# Patient Record
Sex: MALE | Race: BLACK OR AFRICAN AMERICAN | HISPANIC OR LATINO | Employment: FULL TIME | URBAN - METROPOLITAN AREA
[De-identification: names, ages, dates, MRNs, and addresses within clinical notes are randomized per-mention and may not be internally consistent; named-entity substitution may affect disease eponyms.]

---

## 2017-04-27 ENCOUNTER — HOSPITAL ENCOUNTER (EMERGENCY)
Facility: HOSPITAL | Age: 42
Discharge: HOME | End: 2017-04-27
Admitting: EMERGENCY MEDICINE
Payer: COMMERCIAL

## 2017-04-27 VITALS
OXYGEN SATURATION: 98 % | RESPIRATION RATE: 16 BRPM | DIASTOLIC BLOOD PRESSURE: 77 MMHG | HEART RATE: 98 BPM | WEIGHT: 211.86 LBS | TEMPERATURE: 99 F | SYSTOLIC BLOOD PRESSURE: 156 MMHG

## 2017-04-27 DIAGNOSIS — L02.91 ABSCESS: Primary | ICD-10-CM

## 2017-04-27 PROCEDURE — 99282 EMERGENCY DEPT VISIT SF MDM: CPT

## 2017-04-27 RX ORDER — SULFAMETHOXAZOLE AND TRIMETHOPRIM 800; 160 MG/1; MG/1
1 TABLET ORAL 2 TIMES DAILY
Qty: 20 TABLET | Refills: 0 | Status: SHIPPED | OUTPATIENT
Start: 2017-04-27 | End: 2017-05-07

## 2017-04-27 RX ORDER — CEPHALEXIN 500 MG/1
500 CAPSULE ORAL 4 TIMES DAILY
Qty: 40 CAPSULE | Refills: 0 | Status: SHIPPED | OUTPATIENT
Start: 2017-04-27 | End: 2017-05-07

## 2018-03-17 ENCOUNTER — HOSPITAL ENCOUNTER (EMERGENCY)
Facility: HOSPITAL | Age: 43
Discharge: HOME | End: 2018-03-17
Payer: COMMERCIAL

## 2018-03-17 VITALS
HEART RATE: 96 BPM | RESPIRATION RATE: 16 BRPM | TEMPERATURE: 98.2 F | DIASTOLIC BLOOD PRESSURE: 83 MMHG | SYSTOLIC BLOOD PRESSURE: 165 MMHG | OXYGEN SATURATION: 96 %

## 2018-03-17 DIAGNOSIS — L02.91 SKIN ABSCESS: ICD-10-CM

## 2018-03-17 DIAGNOSIS — IMO0002 ABDOMINAL ABSCESS: Primary | ICD-10-CM

## 2018-03-17 PROCEDURE — 99282 EMERGENCY DEPT VISIT SF MDM: CPT

## 2018-03-17 RX ORDER — ACETAMINOPHEN 500 MG
500 TABLET ORAL EVERY 6 HOURS PRN
Qty: 30 TABLET | Refills: 0 | Status: SHIPPED | OUTPATIENT
Start: 2018-03-17 | End: 2018-03-17

## 2018-03-17 RX ORDER — DOXYCYCLINE HYCLATE 100 MG/1
100 CAPSULE ORAL EVERY 12 HOURS SCHEDULED
Qty: 20 CAPSULE | Refills: 0 | Status: SHIPPED | OUTPATIENT
Start: 2018-03-17 | End: 2018-03-17

## 2018-03-17 RX ORDER — ACETAMINOPHEN 500 MG
500 TABLET ORAL EVERY 6 HOURS PRN
Qty: 30 TABLET | Refills: 0 | Status: SHIPPED | OUTPATIENT
Start: 2018-03-17 | End: 2019-09-24 | Stop reason: ALTCHOICE

## 2018-03-17 RX ORDER — DOXYCYCLINE HYCLATE 100 MG/1
100 CAPSULE ORAL EVERY 12 HOURS SCHEDULED
Qty: 20 CAPSULE | Refills: 0 | Status: SHIPPED | OUTPATIENT
Start: 2018-03-17 | End: 2018-03-27

## 2018-03-17 RX ORDER — IBUPROFEN 400 MG/1
400 TABLET ORAL EVERY 6 HOURS PRN
Qty: 30 TABLET | Refills: 0 | Status: SHIPPED | OUTPATIENT
Start: 2018-03-17 | End: 2018-03-17

## 2018-03-17 RX ORDER — LIDOCAINE HYDROCHLORIDE AND EPINEPHRINE 10; 10 MG/ML; UG/ML
1 INJECTION, SOLUTION INFILTRATION; PERINEURAL ONCE
Status: COMPLETED | OUTPATIENT
Start: 2018-03-17 | End: 2018-03-17

## 2018-03-17 RX ORDER — IBUPROFEN 400 MG/1
800 TABLET ORAL ONCE
Status: COMPLETED | OUTPATIENT
Start: 2018-03-17 | End: 2018-03-17

## 2018-03-17 RX ORDER — OXYCODONE HYDROCHLORIDE 5 MG/1
5 TABLET ORAL EVERY 4 HOURS PRN
Qty: 10 TABLET | Refills: 0 | Status: SHIPPED | OUTPATIENT
Start: 2018-03-17 | End: 2018-03-27

## 2018-03-17 RX ORDER — IBUPROFEN 400 MG/1
400 TABLET ORAL EVERY 6 HOURS PRN
Qty: 30 TABLET | Refills: 0 | Status: SHIPPED | OUTPATIENT
Start: 2018-03-17 | End: 2018-07-11

## 2018-03-17 RX ORDER — ACETAMINOPHEN 325 MG/1
975 TABLET ORAL ONCE
Status: COMPLETED | OUTPATIENT
Start: 2018-03-17 | End: 2018-03-17

## 2018-03-17 RX ADMIN — IBUPROFEN 800 MG: 400 TABLET ORAL at 05:35

## 2018-03-17 RX ADMIN — ACETAMINOPHEN 975 MG: 325 TABLET, FILM COATED ORAL at 05:35

## 2018-03-17 RX ADMIN — LIDOCAINE HYDROCHLORIDE,EPINEPHRINE BITARTRATE 1 ML: 10; .01 INJECTION, SOLUTION INFILTRATION; PERINEURAL at 04:22

## 2018-03-17 NOTE — DISCHARGE INSTRUCTIONS
Abscess   WHAT YOU NEED TO KNOW:   A warm compress may help your abscess drain  Your healthcare provider may make a cut in the abscess so it can drain  You may need surgery to remove an abscess that is on your hands or buttocks  DISCHARGE INSTRUCTIONS:   Return to the emergency department if:   · The area around your abscess becomes very painful, warm, or has red streaks  · You have a fever and chills  · Your heart is beating faster than usual      · You feel faint or confused  Contact your healthcare provider if:   · Your abscess gets bigger or does not get better  · Your abscess returns  · You have questions or concerns about your condition or care  Medicines: You may  need any of the following:  · Antibiotics  help treat a bacterial infection  · Acetaminophen  decreases pain and fever  It is available without a doctor's order  Ask how much to take and how often to take it  Follow directions  Acetaminophen can cause liver damage if not taken correctly  · NSAIDs , such as ibuprofen, help decrease swelling, pain, and fever  This medicine is available with or without a doctor's order  NSAIDs can cause stomach bleeding or kidney problems in certain people  If you take blood thinner medicine, always ask your healthcare provider if NSAIDs are safe for you  Always read the medicine label and follow directions  · Take your medicine as directed  Contact your healthcare provider if you think your medicine is not helping or if you have side effects  Tell him or her if you are allergic to any medicine  Keep a list of the medicines, vitamins, and herbs you take  Include the amounts, and when and why you take them  Bring the list or the pill bottles to follow-up visits  Carry your medicine list with you in case of an emergency  Self-care:   · Apply a warm compress to your abscess  This will help it open and drain  Wet a washcloth in warm, but not hot, water  Apply the compress for 10 minutes  Repeat this 4 times each day  Do not  press on an abscess or try to open it with a needle  You may push the bacteria deeper or into your blood  · Do not share your clothes, towels, or sheets with anyone  This can spread the infection to others  · Wash your hands often  This can help prevent the spread of germs  Use soap and water or an alcohol-based hand rub  Care for your wound after it is drained:   · Care for your wound as directed  If your healthcare provider says it is okay, carefully remove the bandage and gauze packing  You may need to soak the gauze to get it out of your wound  Clean your wound and the area around it as directed  Dry the area and put on new, clean bandages  Change your bandages when they get wet or dirty  · Ask your healthcare provider how to change the gauze in your wound  Keep track of how many pieces of gauze are placed inside the wound  Do not put too much packing in the wound  Do not pack the gauze too tightly in your wound  Follow up with your healthcare provider in 1 to 3 days: You may need to have your packing removed or your bandage changed  Write down your questions so you remember to ask them during your visits  © 2017 [address]  Information is for End User's use only and may not be sold, redistributed or otherwise used for commercial purposes  All illustrations and images included in CareNotes® are the copyrighted property of [employer] , [employer]  or Blake Mendoza  The above information is an  only  It is not intended as medical advice for individual conditions or treatments  Talk to your doctor, nurse or pharmacist before following any medical regimen to see if it is safe and effective for you

## 2018-03-17 NOTE — ED PROVIDER NOTES
History  Chief Complaint   Patient presents with    Abscess     pt reports he has a painful "boil" on his stomach that has been there for 3 days  pt reports white/red drainage  History provided by:  Patient   used: No    Abscess   Location:  Torso  Size:  3cm  Abscess quality: draining, fluctuance, induration, painful, redness, warmth and weeping    Red streaking: no    Progression:  Worsening  Pain details:     Quality:  Pressure and sharp    Severity:  Moderate  Chronicity:  Recurrent  Context: diabetes    Relieved by:  Nothing  Worsened by:  Nothing  Ineffective treatments:  Warm compresses  Associated symptoms: no fever and no headaches    Risk factors: prior abscess    Risk factors: no hx of MRSA        Prior to Admission Medications   Prescriptions Last Dose Informant Patient Reported? Taking?   metFORMIN (GLUCOPHAGE) 500 mg tablet   Yes No   Sig: Take 500 mg by mouth 2 (two) times a day with meals      Facility-Administered Medications: None       Past Medical History:   Diagnosis Date    Diabetes mellitus ([facility] )        History reviewed  No pertinent surgical history  History reviewed  No pertinent family history  I have reviewed and agree with the history as documented  Social History   Substance Use Topics    Smoking status: Current Every Day Smoker     Packs/day: 1 00     Types: Cigarettes    Smokeless tobacco: Not on file    Alcohol use Yes      Comment: Occ  Review of Systems   Constitutional: Negative for fever  HENT: Negative for ear pain  Eyes: Negative for pain  Respiratory: Negative for chest tightness  Cardiovascular: Negative for chest pain  Gastrointestinal: Negative for abdominal pain  Endocrine: Negative for polydipsia  Genitourinary: Negative for dysuria  Musculoskeletal: Negative for arthralgias  Skin: Positive for wound  Neurological: Negative for headaches  Hematological: Negative for adenopathy     Psychiatric/Behavioral: Negative for agitation  Physical Exam  ED Triage Vitals   Temperature Pulse Respirations Blood Pressure SpO2   03/17/18 0354 03/17/18 0355 03/17/18 0355 03/17/18 0355 03/17/18 0355   98 2 °F (36 8 °C) (!) 114 16 165/83 96 %      Temp Source Heart Rate Source Patient Position - Orthostatic VS BP Location FiO2 (%)   03/17/18 0354 03/17/18 0355 03/17/18 0355 03/17/18 0355 --   Oral Monitor Sitting Right arm       Pain Score       --                  Orthostatic Vital Signs  Vitals:    03/17/18 0355 03/17/18 0357   BP: 165/83    Pulse: (!) 114 96   Patient Position - Orthostatic VS: Sitting        Physical Exam   Constitutional: He appears well-developed and well-nourished  HENT:   Head: Normocephalic and atraumatic  Eyes: Conjunctivae are normal    Neck: Normal range of motion  Neck supple  Cardiovascular: Normal rate  Pulmonary/Chest: Effort normal    Abdominal: Soft  Genitourinary:   Genitourinary Comments: No complaints deferred  Musculoskeletal: Normal range of motion  He exhibits no edema, tenderness or deformity  Neurological: He is alert  Skin: Skin is warm  Capillary refill takes less than 2 seconds  There is erythema  No pallor  Psychiatric: He has a normal mood and affect         ED Medications  Medications   lidocaine-epinephrine (XYLOCAINE/EPINEPHRINE) 1 %-1:100,000 injection 1 mL (1 mL Infiltration Given by Other 3/17/18 0422)   ibuprofen (MOTRIN) tablet 800 mg (800 mg Oral Given 3/17/18 0535)   acetaminophen (TYLENOL) tablet 975 mg (975 mg Oral Given 3/17/18 0535)       Diagnostic Studies  Results Reviewed     None                 No orders to display              Procedures  Incision/Drainage  Date/Time: 3/17/2018 5:00 AM  Performed by: Shilo Dwyer  Authorized by: Shilo Dwyer     Patient location:  ED  Consent:     Consent obtained:  Verbal    Consent given by:  Patient    Risks discussed:  Bleeding, incomplete drainage, pain, damage to other organs and infection Alternatives discussed:  No treatment and delayed treatment  Universal protocol:     Procedure explained and questions answered to patient or proxy's satisfaction: yes      Relevant documents present and verified: yes      Patient identity confirmed:  Verbally with patient, arm band, provided demographic data and hospital-assigned identification number  Location:     Type:  Abscess    Size:  3 5 centimeters    Location:  Trunk    Trunk location:  Abdomen  Procedure details:     Complexity:  Simple    Incision types:  Stab incision    Scalpel blade:  11    Approach:  Open    Incision depth:  Skin    Wound management:  Probed and deloculated    Drainage:  Purulent and serosanguinous    Drainage amount: Moderate (With solid tissue expressed from wound)    Packing materials:  None  Post-procedure details:     Patient tolerance of procedure: Tolerated well, no immediate complications           Phone Contacts  ED Phone Contact    ED Course  ED Course                                MDM  Number of Diagnoses or Management Options  Abdominal abscess [facility]):   Skin abscess:   Diagnosis management comments: 59-year-old male presents emergency department for recurrence abscess above the umbilicus  Patient states he gets these abscesses about once a year  Patient is a diabetic  Patient states that abscess has become larger over the last few days  Patient states that abscess has been draining but lightly  I and D performed to improve output  Bloody scant pustular material was expressed from wound  Patient provided antibiotics and home pain medicines  Patient educated on wound care  Patient educated on persistent or worsening signs symptoms and to follow up with primary care and/or return to the emergency department  Patient admits understanding and agreement      CritCare Time    Disposition  Final diagnoses:   Abdominal abscess ([address] )   Skin abscess     Time reflects when diagnosis was documented in both MDM as applicable and the Disposition within this note     Time User Action Codes Description Comment    3/17/2018  5:01 AM Jessica Ann Add [K65 1] Abdominal abscess ([address] )     3/17/2018  5:02 AM Jessicasharon Hanbeau Add [L02 91] Skin abscess       ED Disposition     ED Disposition Condition Comment    Discharge  [address] discharge to home/self care  Condition at discharge: Stable        Follow-up Information     Follow up With Specialties Details Why Contact Info Additional [address],  Family Medicine   [address]  [address] [place] [address] Emergency Department Emergency Medicine  If symptoms worsen [address] [facility]  [phone] [place] ED, [address] [facility] , [place], [place], [address]        Discharge Medication List as of 3/17/2018  5:33 AM      START taking these medications    Details   oxyCODONE (ROXICODONE) 5 mg immediate release tablet Take 1 tablet (5 mg total) by mouth every 4 (four) hours as needed for moderate pain for up to 10 days Max Daily Amount: 30 mg, Starting Sat 3/17/2018, Until Tue 3/27/2018, Print      acetaminophen (TYLENOL) 500 mg tablet Take 1 tablet (500 mg total) by mouth every 6 (six) hours as needed for mild pain, moderate pain, headaches or fever, Starting Sat 3/17/2018, Normal      doxycycline hyclate (VIBRAMYCIN) 100 mg capsule Take 1 capsule (100 mg total) by mouth every 12 (twelve) hours for 10 days, Starting Sat 3/17/2018, Until Tue 3/27/2018, Normal      ibuprofen (MOTRIN) 400 mg tablet Take 1 tablet (400 mg total) by mouth every 6 (six) hours as needed for mild pain, Starting Sat 3/17/2018, Normal         CONTINUE these medications which have NOT CHANGED    Details   metFORMIN (GLUCOPHAGE) 500 mg tablet Take 500 mg by mouth 2 (two) times a day with meals, Until Discontinued, Historical Med           No discharge procedures on file      ED Provider  Electronically Signed by           Liss Brooks PA-C  03/20/18 0777

## 2018-08-31 LAB
LEFT EYE DIABETIC RETINOPATHY: NORMAL
RIGHT EYE DIABETIC RETINOPATHY: NORMAL

## 2018-09-07 RX ORDER — SILDENAFIL 100 MG/1
TABLET, FILM COATED ORAL
COMMUNITY
Start: 2015-09-28 | End: 2018-06-11

## 2018-09-07 RX ORDER — LISINOPRIL 5 MG/1
1 TABLET ORAL DAILY
COMMUNITY
Start: 2015-07-27 | End: 2018-09-11 | Stop reason: ALTCHOICE

## 2018-09-07 RX ORDER — ATORVASTATIN CALCIUM 10 MG/1
1 TABLET, FILM COATED ORAL 3 TIMES WEEKLY
COMMUNITY
Start: 2015-07-27 | End: 2018-09-11 | Stop reason: SDUPTHER

## 2018-09-09 ENCOUNTER — HOSPITAL ENCOUNTER (EMERGENCY)
Facility: HOSPITAL | Age: 43
Discharge: HOME | End: 2018-09-09
Attending: EMERGENCY MEDICINE
Payer: COMMERCIAL

## 2018-09-09 VITALS
DIASTOLIC BLOOD PRESSURE: 104 MMHG | BODY MASS INDEX: 28.29 KG/M2 | WEIGHT: 188.8 LBS | RESPIRATION RATE: 20 BRPM | HEART RATE: 105 BPM | OXYGEN SATURATION: 99 % | SYSTOLIC BLOOD PRESSURE: 203 MMHG | TEMPERATURE: 97.9 F

## 2018-09-09 DIAGNOSIS — L02.211 ABDOMINAL WALL ABSCESS: Primary | ICD-10-CM

## 2018-09-09 PROCEDURE — 99282 EMERGENCY DEPT VISIT SF MDM: CPT

## 2018-09-09 RX ORDER — HYDROCHLOROTHIAZIDE 12.5 MG/1
12.5 TABLET ORAL DAILY
Status: DISCONTINUED | OUTPATIENT
Start: 2018-09-10 | End: 2018-09-09

## 2018-09-09 RX ORDER — SULFAMETHOXAZOLE AND TRIMETHOPRIM 800; 160 MG/1; MG/1
1 TABLET ORAL 2 TIMES DAILY
Qty: 20 TABLET | Refills: 0 | Status: SHIPPED | OUTPATIENT
Start: 2018-09-09 | End: 2018-09-19

## 2018-09-09 RX ORDER — LIDOCAINE HYDROCHLORIDE 20 MG/ML
10 INJECTION, SOLUTION EPIDURAL; INFILTRATION; INTRACAUDAL; PERINEURAL ONCE
Status: COMPLETED | OUTPATIENT
Start: 2018-09-09 | End: 2018-09-09

## 2018-09-09 RX ORDER — HYDROCHLOROTHIAZIDE 12.5 MG/1
12.5 TABLET ORAL ONCE
Status: COMPLETED | OUTPATIENT
Start: 2018-09-09 | End: 2018-09-09

## 2018-09-09 RX ORDER — HYDROCHLOROTHIAZIDE 12.5 MG/1
12.5 TABLET ORAL DAILY
Qty: 30 TABLET | Refills: 0 | Status: SHIPPED | OUTPATIENT
Start: 2018-09-09 | End: 2018-09-18 | Stop reason: ALTCHOICE

## 2018-09-09 RX ORDER — SULFAMETHOXAZOLE AND TRIMETHOPRIM 800; 160 MG/1; MG/1
1 TABLET ORAL ONCE
Status: COMPLETED | OUTPATIENT
Start: 2018-09-09 | End: 2018-09-09

## 2018-09-09 RX ADMIN — SULFAMETHOXAZOLE AND TRIMETHOPRIM 1 TABLET: 800; 160 TABLET ORAL at 18:46

## 2018-09-09 RX ADMIN — HYDROCHLOROTHIAZIDE 12.5 MG: 12.5 TABLET ORAL at 18:46

## 2018-09-09 RX ADMIN — LIDOCAINE HYDROCHLORIDE 10 ML: 20 INJECTION, SOLUTION EPIDURAL; INFILTRATION; INTRACAUDAL; PERINEURAL at 17:57

## 2018-09-09 NOTE — ED PROVIDER NOTES
History  Chief Complaint   Patient presents with    Abscess     patient c/o of "boil" on abdomen;patient states that he has had this before and was seen here in the ED; denies fever but states that he feels tried      36 YO male presents with an abscess to the anterior abdominal wall  States he has recurrent abscesses to the same area, was evaluated in the ED 6 months ago for same  States swelling started again as previous  Pt has not had drainage from the area, he denies fevers  States he does feel some fatigue  Pt has a Hx of DM, states he has been compliant with his medications  Pt states he usually gets similar abscesses yearly  Denies new or different symptoms associated with this  Pt denies CP/SOB/F/C/N/V/D/C, no dysuria, burning on urination or blood in urine  History provided by:  Patient   used: No    Abscess   Location:  Torso  Torso abscess location:  Abd RUQ  Size:  3cm  Abscess quality: fluctuance    Red streaking: no    Progression:  Worsening  Chronicity:  Recurrent  Context: diabetes    Relieved by:  Nothing  Worsened by:  Nothing  Ineffective treatments:  None tried  Associated symptoms: fatigue    Associated symptoms: no fever, no headaches, no nausea and no vomiting    Fatigue:     Severity:  Moderate    Duration:  3 days    Timing:  Intermittent    Progression:  Waxing and waning      Prior to Admission Medications   Prescriptions Last Dose Informant Patient Reported?  Taking?   acetaminophen (TYLENOL) 500 mg tablet   No No   Sig: Take 1 tablet (500 mg total) by mouth every 6 (six) hours as needed for mild pain, moderate pain, headaches or fever   atorvastatin (LIPITOR) 10 mg tablet   Yes No   Sig: Take 1 tablet by mouth 3 (three) times a week   ibuprofen (MOTRIN) 400 mg tablet   No No   Sig: Take 1 tablet (400 mg total) by mouth every 6 (six) hours as needed for mild pain   insulin detemir (LEVEMIR FLEXTOUCH) 100 Units/mL injection pen   Yes No   Sig: Inject 24 Units under the skin daily   lisinopril (ZESTRIL) 5 mg tablet   Yes No   Sig: Take 1 tablet by mouth daily   metFORMIN (GLUCOPHAGE) 500 mg tablet   Yes No   Sig: Take 500 mg by mouth 2 (two) times a day with meals   sildenafil (VIAGRA) 100 mg tablet   Yes No   Sig: Take by mouth      Facility-Administered Medications: None       Past Medical History:   Diagnosis Date    Diabetes mellitus ([address] )     Hypertension        History reviewed  No pertinent surgical history  History reviewed  No pertinent family history  I have reviewed and agree with the history as documented  Social History   Substance Use Topics    Smoking status: Current Every Day Smoker     Packs/day: 1 00     Types: Cigarettes    Smokeless tobacco: Never Used    Alcohol use Yes      Comment: socially        Review of Systems   Constitutional: Positive for fatigue  Negative for fever  HENT: Negative for dental problem  Eyes: Negative for visual disturbance  Respiratory: Negative for shortness of breath  Cardiovascular: Negative for chest pain  Gastrointestinal: Negative for abdominal pain, nausea and vomiting  Genitourinary: Negative for dysuria and frequency  Musculoskeletal: Negative for neck pain and neck stiffness  Skin: Negative for rash  Neurological: Negative for dizziness, weakness, light-headedness and headaches  Psychiatric/Behavioral: Negative for agitation, behavioral problems and confusion  All other systems reviewed and are negative  Physical Exam  Physical Exam   Constitutional: He is oriented to person, place, and time  He appears well-developed and well-nourished  HENT:   Head: Normocephalic and atraumatic  Eyes: EOM are normal    Neck: Normal range of motion  Cardiovascular: Normal rate, regular rhythm and normal heart sounds  Pulmonary/Chest: Effort normal and breath sounds normal    Abdominal: Soft  Musculoskeletal: Normal range of motion     Neurological: He is alert and oriented to person, place, and time  Skin: Skin is warm and dry  Psychiatric: He has a normal mood and affect  His behavior is normal    Nursing note and vitals reviewed  Vital Signs  ED Triage Vitals [09/09/18 1738]   Temperature Pulse Respirations Blood Pressure SpO2   97 9 °F (36 6 °C) 105 20 (!) 203/104 99 %      Temp Source Heart Rate Source Patient Position - Orthostatic VS BP Location FiO2 (%)   Oral Monitor Sitting Right arm --      Pain Score       --           Vitals:    09/09/18 1738   BP: (!) 203/104   Pulse: 105   Patient Position - Orthostatic VS: Sitting       Visual Acuity      ED Medications  Medications   lidocaine (PF) (XYLOCAINE-MPF) 2 % injection 10 mL (10 mL Infiltration Given 9/9/18 1757)   sulfamethoxazole-trimethoprim (BACTRIM DS) 800-160 mg per tablet 1 tablet (1 tablet Oral Given 9/9/18 1846)   hydrochlorothiazide (HYDRODIURIL) tablet 12 5 mg (12 5 mg Oral Given 9/9/18 1846)       Diagnostic Studies  Results Reviewed     None                 No orders to display              Procedures  Incision/Drainage  Date/Time: 9/9/2018 7:16 PM  Performed by: Maximo Farris, [address] [place] [address] by: Christie GRACE     Patient location:  ED  Consent:     Consent obtained:  Verbal    Risks discussed:  Bleeding, incomplete drainage and pain  Universal protocol:     Procedure explained and questions answered to patient or proxy's satisfaction: yes      Patient identity confirmed:  Verbally with patient  Location:     Type:  Abscess    Size:  3cm    Location:  Trunk    Trunk location:  Abdomen  Pre-procedure details:     Skin preparation:  Chloraprep  Anesthesia (see MAR for exact dosages):      Anesthesia method:  Local infiltration    Local anesthetic:  Lidocaine 2% w/o epi  Procedure details:     Complexity:  Simple    Incision types:  Single straight    Scalpel blade:  11    Approach:  Open    Incision depth:  Skin and subcutaneous    Wound management:  Probed and deloculated    Drainage:  Bloody and purulent Drainage amount: Moderate    Wound treatment:  Packing placed    Packing materials:  1/2 in gauze  Post-procedure details:     Patient tolerance of procedure: Tolerated well, no immediate complications           Phone Contacts  ED Phone Contact    ED Course                               MDM  Number of Diagnoses or Management Options  Abdominal wall abscess: new and requires workup  Diagnosis management comments: 1  Abscess - Pt with recurrent abscesses  Likely MRSA infection  Will I&D  Will place on Abx as this is recurrent  Amount and/or Complexity of Data Reviewed  Review and summarize past medical records: yes  Independent visualization of images, tracings, or specimens: yes    Patient Progress  Patient progress: stable    CritCare Time    Disposition  Final diagnoses:   Abdominal wall abscess     Time reflects when diagnosis was documented in both MDM as applicable and the Disposition within this note     Time User Action Codes Description Comment    9/9/2018  6:38 PM Tcnata Garnett E Add [L02 211] Abdominal wall abscess       ED Disposition     ED Disposition Condition Comment    Discharge  [address] discharge to home/self care      Condition at discharge: Stable        Follow-up Information     Follow up With Specialties Details Why Contact Info    Becca Gustafson MD General Surgery Schedule an appointment as soon as possible for a visit  [address] [place] [address]  [phone]            Discharge Medication List as of 9/9/2018  6:41 PM      START taking these medications    Details   hydrochlorothiazide (HYDRODIURIL) 12 5 mg tablet Take 1 tablet (12 5 mg total) by mouth daily, Starting Sun 9/9/2018, Normal      sulfamethoxazole-trimethoprim (BACTRIM DS) 800-160 mg per tablet Take 1 tablet by mouth 2 (two) times a day for 10 days smx-tmp DS (BACTRIM) 800-160 mg tabs (1tab q12 D10), Starting Sun 9/9/2018, Until Wed 9/19/2018, Normal         CONTINUE these medications which have NOT CHANGED    Details   acetaminophen (TYLENOL) 500 mg tablet Take 1 tablet (500 mg total) by mouth every 6 (six) hours as needed for mild pain, moderate pain, headaches or fever, Starting Sat 3/17/2018, Print      atorvastatin (LIPITOR) 10 mg tablet Take 1 tablet by mouth 3 (three) times a week, Starting Mon 7/27/2015, Historical Med      ibuprofen (MOTRIN) 400 mg tablet Take 1 tablet (400 mg total) by mouth every 6 (six) hours as needed for mild pain, Starting Sat 3/17/2018, Print      insulin detemir (LEVEMIR FLEXTOUCH) 100 Units/mL injection pen Inject 24 Units under the skin daily, Starting Mon 7/13/2015, Historical Med      lisinopril (ZESTRIL) 5 mg tablet Take 1 tablet by mouth daily, Starting Mon 7/27/2015, Historical Med      metFORMIN (GLUCOPHAGE) 500 mg tablet Take 500 mg by mouth 2 (two) times a day with meals, Until Discontinued, Historical Med      sildenafil (VIAGRA) 100 mg tablet Take by mouth, Starting Mon 9/28/2015, Historical Med           No discharge procedures on file      ED Provider  Electronically Signed by           Tyrell Samano MD  09/09/18 6428

## 2018-09-11 ENCOUNTER — OFFICE VISIT (OUTPATIENT)
Dept: FAMILY MEDICINE CLINIC | Facility: CLINIC | Age: 43
End: 2018-09-11
Payer: COMMERCIAL

## 2018-09-11 ENCOUNTER — TELEPHONE (OUTPATIENT)
Dept: FAMILY MEDICINE CLINIC | Facility: CLINIC | Age: 43
End: 2018-09-11

## 2018-09-11 VITALS
WEIGHT: 181.2 LBS | OXYGEN SATURATION: 98 % | SYSTOLIC BLOOD PRESSURE: 110 MMHG | DIASTOLIC BLOOD PRESSURE: 78 MMHG | HEIGHT: 68 IN | BODY MASS INDEX: 27.46 KG/M2 | HEART RATE: 108 BPM

## 2018-09-11 DIAGNOSIS — F17.200 CURRENT SMOKER: ICD-10-CM

## 2018-09-11 DIAGNOSIS — Z79.4 UNCONTROLLED TYPE 2 DIABETES MELLITUS WITH HYPERGLYCEMIA, WITH LONG-TERM CURRENT USE OF INSULIN (HCC): Primary | ICD-10-CM

## 2018-09-11 DIAGNOSIS — R80.9 MICROALBUMINURIA: ICD-10-CM

## 2018-09-11 DIAGNOSIS — L02.211 ABSCESS OF ABDOMINAL WALL: ICD-10-CM

## 2018-09-11 DIAGNOSIS — E11.65 UNCONTROLLED TYPE 2 DIABETES MELLITUS WITH HYPERGLYCEMIA, WITH LONG-TERM CURRENT USE OF INSULIN (HCC): Primary | ICD-10-CM

## 2018-09-11 DIAGNOSIS — E78.00 HYPERCHOLESTEROLEMIA: ICD-10-CM

## 2018-09-11 LAB
SL AMB POCT GLUCOSE BLD: 392
SL AMB POCT HEMOGLOBIN AIC: 11.8

## 2018-09-11 PROCEDURE — 82948 REAGENT STRIP/BLOOD GLUCOSE: CPT | Performed by: FAMILY MEDICINE

## 2018-09-11 PROCEDURE — 3066F NEPHROPATHY DOC TX: CPT | Performed by: FAMILY MEDICINE

## 2018-09-11 PROCEDURE — 3046F HEMOGLOBIN A1C LEVEL >9.0%: CPT | Performed by: FAMILY MEDICINE

## 2018-09-11 PROCEDURE — 99214 OFFICE O/P EST MOD 30 MIN: CPT | Performed by: FAMILY MEDICINE

## 2018-09-11 PROCEDURE — 83036 HEMOGLOBIN GLYCOSYLATED A1C: CPT | Performed by: FAMILY MEDICINE

## 2018-09-11 RX ORDER — PEN NEEDLE, DIABETIC 31 GX5/16"
NEEDLE, DISPOSABLE MISCELLANEOUS DAILY
Qty: 100 EACH | Refills: 1 | Status: SHIPPED | OUTPATIENT
Start: 2018-09-11

## 2018-09-11 RX ORDER — ATORVASTATIN CALCIUM 10 MG/1
10 TABLET, FILM COATED ORAL DAILY
Qty: 30 TABLET | Refills: 3 | Status: SHIPPED | OUTPATIENT
Start: 2018-09-11 | End: 2018-10-16 | Stop reason: SDUPTHER

## 2018-09-11 NOTE — TELEPHONE ENCOUNTER
Called Dr Marjorie Dunn office and was able to get pt appt tomorrow, Sept 12 at 10:30am at the [facility] office  Pt aware

## 2018-09-11 NOTE — PATIENT INSTRUCTIONS
We reviewed his emergency room visit, still with very significant abscess abdominal wall, fortunately is afebrile, he will stay on Bactrim/antibiotic, we will set him up with general surgeon  We reviewed his sugars are far too high, uncontrolled diabetes, well aware of risks associated with this including heart disease, kidney damage, eye damage along with neuropathy  I would like him to restart his insulin at prior dose of 24 U daily, glucometer reading here now 392, A1c 11 8  Slip given for blood work along with urinalysis  He will also restart metformin twice daily with meals  He received prescription for hydrochlorothiazide 12 5 mg daily at the emergency room, blood pressure here today is much lower than prior reading, stay off lisinopril for now  I did send prescription to restart atorvastatin  I would like to see him again in 1 week, call us sooner if needed  He wishes to return to work today  He should use glucometer at least twice daily, call us if drops less than 70 or climbs over 450  Plan future diabetic education  He continues to smoke 1 pack per day, well aware of risk lung cancer etc, really needs to taper down and quit, consider future Chantix

## 2018-09-12 ENCOUNTER — TELEPHONE (OUTPATIENT)
Dept: FAMILY MEDICINE CLINIC | Facility: CLINIC | Age: 43
End: 2018-09-12

## 2018-09-12 ENCOUNTER — OFFICE VISIT (OUTPATIENT)
Dept: SURGERY | Facility: MEDICAL CENTER | Age: 43
End: 2018-09-12
Payer: COMMERCIAL

## 2018-09-12 VITALS
RESPIRATION RATE: 16 BRPM | BODY MASS INDEX: 27.68 KG/M2 | TEMPERATURE: 95.6 F | HEART RATE: 76 BPM | DIASTOLIC BLOOD PRESSURE: 70 MMHG | WEIGHT: 182.6 LBS | SYSTOLIC BLOOD PRESSURE: 110 MMHG | HEIGHT: 68 IN

## 2018-09-12 DIAGNOSIS — Z79.4 TYPE 2 DIABETES MELLITUS WITH HYPERGLYCEMIA, WITH LONG-TERM CURRENT USE OF INSULIN (HCC): Primary | ICD-10-CM

## 2018-09-12 DIAGNOSIS — E11.65 TYPE 2 DIABETES MELLITUS WITH HYPERGLYCEMIA, WITH LONG-TERM CURRENT USE OF INSULIN (HCC): Primary | ICD-10-CM

## 2018-09-12 DIAGNOSIS — L02.211 ABSCESS OF ABDOMINAL WALL: ICD-10-CM

## 2018-09-12 PROCEDURE — 99243 OFF/OP CNSLTJ NEW/EST LOW 30: CPT | Performed by: SURGERY

## 2018-09-12 PROCEDURE — 10060 I&D ABSCESS SIMPLE/SINGLE: CPT | Performed by: SURGERY

## 2018-09-12 NOTE — PROGRESS NOTES
Assessment/Plan: Sharon Mccormack is a 37year old male who presents today, per referral by Dr Ivett Polanco, regarding an abdominal wall abscess  Physical exam revealed an abscess of abdominal wall with some drainage  Discussed risks, benefits, and alternatives to incision and drainage of abdominal wall abscess  He consented and procedure occurred without complications  Demonstrated proper packing technique  He should pack the wound daily and cover the wound with gauze  He will follow up in 1 week  He knows to call the office if any questions or concerns arise  Smoking- Discussed smoking cessation  Explained there is a connection between smoking and abscesses  No problem-specific Assessment & Plan notes found for this encounter  Diagnoses and all orders for this visit:    Abscess of abdominal wall  -     Ambulatory referral to General Surgery    Other orders  -     Incision and Drainage          Subjective:      Patient ID: Corby Holder is a 37 y o  male  Sharon Mccormack is a 37year old male who presents today, per referral by Dr Ivett Polanco, regarding an abdominal wall abscess  He had it drained in the ER on 9/9/18  He reports it is sore on the right side of the abscess and prevents him from sleeping on his side  He has had abscesses in the past   He is a smoker  The following portions of the patient's history were reviewed and updated as appropriate: allergies, current medications, past family history, past medical history, past social history, past surgical history and problem list     Review of Systems   Constitutional: Negative  HENT: Negative  Eyes: Negative  Respiratory: Negative  Cardiovascular: Negative  Gastrointestinal: Negative  Endocrine: Negative  Genitourinary: Negative  Musculoskeletal: Negative  Skin: Positive for wound (Abdomenal wall)  Allergic/Immunologic: Negative  Neurological: Negative  Hematological: Negative      Psychiatric/Behavioral: Negative  All other systems reviewed and are negative  Objective:      /70   Pulse 76   Temp (!) 95 6 °F (35 3 °C) (Tympanic)   Resp 16   Ht 5' 8" (1 727 m)   Wt 82 8 kg (182 lb 9 6 oz)   BMI 27 76 kg/m²          Physical Exam   Constitutional: He is oriented to person, place, and time  He appears well-developed and well-nourished  No distress  HENT:   Head: Normocephalic and atraumatic  Right Ear: External ear normal    Left Ear: External ear normal    Nose: Nose normal    Mouth/Throat: Oropharynx is clear and moist  No oropharyngeal exudate  Eyes: Conjunctivae and EOM are normal  Right eye exhibits no discharge  Left eye exhibits no discharge  No scleral icterus  Neck: Normal range of motion  Neck supple  No JVD present  No tracheal deviation present  No thyromegaly present  Cardiovascular: Normal rate, regular rhythm, normal heart sounds and intact distal pulses  Exam reveals no gallop and no friction rub  No murmur heard  Pulmonary/Chest: Effort normal and breath sounds normal  No stridor  No respiratory distress  He has no wheezes  He has no rales  He exhibits no tenderness  Abdominal: Soft  Bowel sounds are normal  He exhibits no distension and no mass  There is no tenderness  There is no rebound and no guarding  Musculoskeletal: Normal range of motion  He exhibits no edema, tenderness or deformity  Lymphadenopathy:     He has no cervical adenopathy  Neurological: He is alert and oriented to person, place, and time  No cranial nerve deficit  Coordination normal    Skin: Skin is dry  No rash noted  He is not diaphoretic  No erythema  No pallor  Abscess of abdominal wall with drainage  Psychiatric: He has a normal mood and affect  His behavior is normal  Thought content normal    Nursing note and vitals reviewed        Incision and Drainage  Date/Time: 9/12/2018 10:47 AM  Performed by: Mario Jarrell by: Timothy Ruiz     Patient location: Bedside  Consent:     Consent obtained:  Verbal    Consent given by:  Patient    Risks discussed:  Bleeding and incomplete drainage  Location:     Type:  Abscess    Location:  Trunk    Trunk location:  Abdomen  Pre-procedure details:     Skin preparation:  Betadine  Anesthesia (see MAR for exact dosages): Anesthesia method:  Local infiltration    Local anesthetic:  Lidocaine 1% WITH epi and bupivacaine 0 5% w/o epi  Procedure details:     Complexity:  Intermediate    Incision types:  Elliptical    Scalpel blade:  15    Approach:  Open    Incision depth:  Subcutaneous    Wound management:  Probed and deloculated    Drainage:  Purulent    Drainage amount: Moderate    Wound treatment:  Packing placed    Packing materials:  1/2 in gauze  Post-procedure details:     Patient tolerance of procedure: Tolerated well, no immediate complications            By signing my name below, I, Sonny Dee, attest that this documentation has been prepared under the direction and in the presence of Richard Mckeon MD  Electronically Signed: Natan Hatfield 9/12/2018  Kevinbello Mo, personally performed the services described in this documentation  All medical record entries made by the scribe were at my direction and in my presence  I have reviewed the chart and discharge instructions (if applicable) and agree that the record reflects my personal performance and is accurate and complete  Richard Mckeon MD  9/12/2018

## 2018-09-12 NOTE — LETTER
September 12, 2018     Soledad August DO  [address]    Patient: Leatha Gamez   YOB: 1975   Date of Visit: 9/12/2018       Dear Dr  [facility]: Thank you for referring Jennifer Macias to me for evaluation  Below are my notes for this consultation  If you have questions, please do not hesitate to call me  I look forward to following your patient along with you           Sincerely,        Nicki Haas MD        CC: No Recipients

## 2018-09-12 NOTE — TELEPHONE ENCOUNTER
Received a call from [employer] is not cover by [employer], they will cover [address], please advise

## 2018-09-18 ENCOUNTER — OFFICE VISIT (OUTPATIENT)
Dept: FAMILY MEDICINE CLINIC | Facility: CLINIC | Age: 43
End: 2018-09-18

## 2018-09-18 VITALS
SYSTOLIC BLOOD PRESSURE: 130 MMHG | HEIGHT: 69 IN | WEIGHT: 183.6 LBS | DIASTOLIC BLOOD PRESSURE: 84 MMHG | BODY MASS INDEX: 27.19 KG/M2 | OXYGEN SATURATION: 98 % | HEART RATE: 102 BPM

## 2018-09-18 DIAGNOSIS — Z00.00 WELL ADULT HEALTH CHECK: Primary | ICD-10-CM

## 2018-09-18 DIAGNOSIS — F17.200 CURRENT SMOKER: ICD-10-CM

## 2018-09-18 DIAGNOSIS — E11.65 UNCONTROLLED TYPE 2 DIABETES MELLITUS WITH HYPERGLYCEMIA, WITH LONG-TERM CURRENT USE OF INSULIN (HCC): ICD-10-CM

## 2018-09-18 DIAGNOSIS — Z79.4 UNCONTROLLED TYPE 2 DIABETES MELLITUS WITH HYPERGLYCEMIA, WITH LONG-TERM CURRENT USE OF INSULIN (HCC): ICD-10-CM

## 2018-09-18 DIAGNOSIS — R80.9 MICROALBUMINURIA: ICD-10-CM

## 2018-09-18 DIAGNOSIS — L02.211 ABSCESS OF ABDOMINAL WALL: ICD-10-CM

## 2018-09-18 DIAGNOSIS — E78.00 HYPERCHOLESTEROLEMIA: ICD-10-CM

## 2018-09-18 RX ORDER — LOSARTAN POTASSIUM 50 MG/1
50 TABLET ORAL EVERY MORNING
Qty: 30 TABLET | Refills: 5 | Status: SHIPPED | OUTPATIENT
Start: 2018-09-18 | End: 2018-10-16 | Stop reason: SDUPTHER

## 2018-09-18 NOTE — PROGRESS NOTES
FAMILY PRACTICE OFFICE VISIT  [address]  [address], [address]      NAME: Alia Nielsen  AGE: 37 y o  SEX: male  : 1975   MRN: [id number]    DATE: 2018  TIME: 12:03 PM    Assessment and Plan     Problem List Items Addressed This Visit        Endocrine    Uncontrolled type 2 diabetes mellitus with hyperglycemia, with long-term current use of insulin (HCC)    Relevant Medications    losartan (COZAAR) 50 mg tablet    Other Relevant Orders    CBC    Comprehensive metabolic panel    Lipid panel    Urinalysis with microscopic       Other    Abscess of abdominal wall    Relevant Orders    CBC    Current smoker    Hypercholesterolemia    Microalbuminuria    Relevant Medications    losartan (COZAAR) 50 mg tablet      Other Visit Diagnoses     Well adult health check    -  Primary          Patient Instructions   He is in today for a physical exam/ regular check  He looks well here today, he has been using insulin, home readings have ranged from 125-150s AM,  150-190 PM-   Other than 350 other day after fries w watching football  He has not yet done blood work/urine, slip given to do fasting  He has not been using atorvastatin, I would like him to start that  He will stop hydrochlorothiazide, I would like him to use losartan 50 mg once daily for kidney protection, does have history microalbumin urea  Blood pressure also borderline here today  Abdominal wall abscess much improved, he will complete out Bactrim, he will see the surgeon again tomorrow  He does not do yearly Flu shot  Should do one -  He will consider doing in Oct at pharmacy    Tdap/tetanus shot will be done at a future date  (done every 10 yrs for superficial cuts, every 5 yrs for deep wounds)      Is a current smoker  (1 ppd x 5 yrs) -  Has smoked x 30 yrs or so 1/2 PPD -  Currently down to few cigs a day -  Aware risks/ need to quit      Regarding Colon Cancer screening, we discussed Colonoscopy vs ColoGuard is indicated starting at age 48  Not indicated      Discussed Prostate Cancer screening pros/cons starting at age 48  PSA blood test not indicated  Should do monthly testicular exam at home  We did review previous blood work,   He is not up to date with Lipid screening  He is up to date with Diabetes screening  Discussed importance of routine exercise, healthy diet       Also - should see Dentist routinely, and also should see Eye Dr ( Saw 3 weeks ago w new glasses)    We will see him again in 3 months, sooner as needed  Redo A1C then ( was 11 8 other day)              Chief Complaint     Chief Complaint   Patient presents with    Physical Exam       History of Present Illness   Facundo Au is a 37y o -year-old male who  is in today for a routine physical/follow-up visit, I had seen him the other day for the 1st time in a long while  He did see surgeon afterwards, relates he drained quite a bit regarding abdominal wall abscess, much improved, no pain or fever  He did start his insulin, sugars are improving  Has been cutting down on smoking  Review of Systems   Review of Systems   Constitutional: Negative for activity change, appetite change, fatigue, fever and unexpected weight change  HENT: Negative for sore throat and trouble swallowing  Respiratory: Negative for cough, chest tightness and shortness of breath  Cardiovascular: Negative for chest pain, palpitations and leg swelling  Gastrointestinal: Negative for abdominal pain and blood in stool  No acid reflux     No change in bowel  Abdominal abscess much improved, no drainage today   Genitourinary: Negative for dysuria and hematuria  Neurological: Negative for dizziness, light-headedness and headaches  Psychiatric/Behavioral: Negative for behavioral problems and confusion         Active Problem List     Patient Active Problem List Diagnosis    Hypercholesterolemia    Microalbuminuria    Uncontrolled type 2 diabetes mellitus with hyperglycemia, with long-term current use of insulin (HCC)    Abscess of abdominal wall    Current smoker       Past Medical History:  Past Medical History:   Diagnosis Date    Diabetes mellitus ([facility] [address] )     Hypertension        Past Surgical History:  No past surgical history on file  Family History:  No family history on file  Social History:  Social History     Social History    Marital status: Single     Spouse name: N/A    Number of children: N/A    Years of education: N/A     Occupational History    Not on file  Social History Main Topics    Smoking status: Current Every Day Smoker     Packs/day: 1 00     Types: Cigarettes    Smokeless tobacco: Never Used    Alcohol use Yes      Comment: socially    Drug use: No    Sexual activity: Not on file     Other Topics Concern    Not on file     Social History Narrative    No narrative on file       Objective     Vitals:    09/18/18 1007   BP: 130/84   Pulse: 102   SpO2: 98%   Weight: 83 3 kg (183 lb 9 6 oz)   Height: 5' 8 5" (1 74 m)     Body mass index is 27 51 kg/m²  BP Readings from Last 3 Encounters:   09/18/18 130/84   09/12/18 110/70   09/11/18 110/78       Wt Readings from Last 3 Encounters:   09/18/18 83 3 kg (183 lb 9 6 oz)   09/12/18 82 8 kg (182 lb 9 6 oz)   09/11/18 82 2 kg (181 lb 3 2 oz)       Physical Exam   Constitutional: He is oriented to person, place, and time  He appears well-developed and well-nourished  No distress  HENT:   Mouth/Throat: Oropharynx is clear and moist    TM clear b/l   Eyes: Conjunctivae are normal  No scleral icterus  Neck: Normal range of motion  Neck supple  Carotid bruit is not present  No thyromegaly present  Cardiovascular: Normal rate, regular rhythm and normal heart sounds  No murmur heard    No carotid bruit   Pulmonary/Chest: Effort normal and breath sounds normal  No respiratory distress  He has no wheezes  He has no rales  Abdominal: Soft  There is no tenderness  Abscess much smaller, no current drainage   Musculoskeletal: He exhibits no edema  Lymphadenopathy:     He has no cervical adenopathy  Neurological: He is alert and oriented to person, place, and time  No cranial nerve deficit  Coordination normal    Skin: He is not diaphoretic  Psychiatric: He has a normal mood and affect  His behavior is normal    Nursing note and vitals reviewed  ALLERGIES:  No Known Allergies    Current Medications     Current Outpatient Prescriptions   Medication Sig Dispense Refill    atorvastatin (LIPITOR) 10 mg tablet Take 1 tablet (10 mg total) by mouth daily 30 tablet 3    insulin glargine (BASAGLAR KWIKPEN) 100 units/mL injection pen Inject 24 Units under the skin daily 5 pen 3    metFORMIN (GLUCOPHAGE) 500 mg tablet Take 1 tablet (500 mg total) by mouth 2 (two) times a day with meals 60 tablet 3    sulfamethoxazole-trimethoprim (BACTRIM DS) 800-160 mg per tablet Take 1 tablet by mouth 2 (two) times a day for 10 days smx-tmp DS (BACTRIM) 800-160 mg tabs (1tab q12 D10) 20 tablet 0    acetaminophen (TYLENOL) 500 mg tablet Take 1 tablet (500 mg total) by mouth every 6 (six) hours as needed for mild pain, moderate pain, headaches or fever 30 tablet 0    Insulin Pen Needle (PEN NEEDLES 31GX5/16") 31G X 8 MM MISC Inject as directed daily 100 each 1    losartan (COZAAR) 50 mg tablet Take 1 tablet (50 mg total) by mouth every morning 30 tablet 5     No current facility-administered medications for this visit                Most recent labs available from [address] [place] [address]   ( others may be available in Care Everywhere / Media sections)  Lab Results   Component Value Date    WBC 12 65 (H) 03/10/2015    HGB 18 5 (H) 03/10/2015    HCT 50 6 (H) 03/10/2015     03/10/2015    ALT 16 03/10/2015    AST 10 03/10/2015     09/22/2015    K 4 3 09/22/2015     09/22/2015 CREATININE 0 84 09/22/2015    BUN 11 09/22/2015    CO2 28 09/22/2015    INR 0 92 03/10/2015    HGBA1C 11 8 09/11/2018     No results found for: [address]  Lab Results   Component Value Date    FKR3FRNKDXYS 1 459 03/10/2015         Orders Placed This Encounter   Procedures    CBC    Comprehensive metabolic panel    Lipid panel    Urinalysis with microscopic         Kevan Lind DO

## 2018-09-18 NOTE — PATIENT INSTRUCTIONS
He is in today for a physical exam/ regular check  He looks well here today, he has been using insulin, home readings have ranged from 125-150s AM,  150-190 PM-   Other than 350 other day after fries w watching football  He has not yet done blood work/urine, slip given to do fasting  He has not been using atorvastatin, I would like him to start that  He will stop hydrochlorothiazide, I would like him to use losartan 50 mg once daily for kidney protection, does have history microalbumin urea  Blood pressure also borderline here today  Abdominal wall abscess much improved, he will complete out Bactrim, he will see the surgeon again tomorrow  He does not do yearly Flu shot  Should do one -  He will consider doing in Oct at pharmacy  Tdap/tetanus shot will be done at a future date  (done every 10 yrs for superficial cuts, every 5 yrs for deep wounds)      Is a current smoker  (1 ppd x 5 yrs) -  Has smoked x 30 yrs or so 1/2 PPD -  Currently down to few cigs a day -  Aware risks/ need to quit      Regarding Colon Cancer screening, we discussed Colonoscopy vs ColoGuard is indicated starting at age 48  Not indicated      Discussed Prostate Cancer screening pros/cons starting at age 48  PSA blood test not indicated  Should do monthly testicular exam at home  We did review previous blood work,   He is not up to date with Lipid screening  He is up to date with Diabetes screening  Discussed importance of routine exercise, healthy diet       Also - should see Dentist routinely, and also should see Eye Dr ( Saw 3 weeks ago w new glasses)    We will see him again in 3 months, sooner as needed    Redo A1C then ( was 11 8 other day)

## 2018-09-19 ENCOUNTER — OFFICE VISIT (OUTPATIENT)
Dept: SURGERY | Facility: MEDICAL CENTER | Age: 43
End: 2018-09-19

## 2018-09-19 VITALS — TEMPERATURE: 94.8 F | BODY MASS INDEX: 27.78 KG/M2 | HEIGHT: 69 IN | WEIGHT: 187.6 LBS

## 2018-09-19 DIAGNOSIS — L02.211 ABSCESS OF ABDOMINAL WALL: Primary | ICD-10-CM

## 2018-09-19 PROCEDURE — 99024 POSTOP FOLLOW-UP VISIT: CPT | Performed by: SURGERY

## 2018-09-19 NOTE — PROGRESS NOTES
Assessment/Plan: Beverly Silva is a 37year old male who presents today in post-operative state status post incision and drainage of abscess of abdominal wall performed in office on 9/12/18  Physical exam revealed the wound is healing well  He does not need to continue packing the wound  Recommend he applies silver wound gel to the wound to aid in healing  He will follow up as needed  He knows to call the office if any questions or concerns arise  No problem-specific Assessment & Plan notes found for this encounter  Diagnoses and all orders for this visit:    Abscess of abdominal wall          Subjective:      Patient ID: Mitchell Galicia is a 37 y o  male  Beverly Silva is a 37year old male who presents today in post-operative state status post incision and drainage of abscess of abdominal wall performed in office on 9/12/18  He reports he has been packing the wound and is doing well  Abscess         The following portions of the patient's history were reviewed and updated as appropriate: allergies, current medications, past family history, past medical history, past social history, past surgical history and problem list     Review of Systems      Objective:      Temp (!) 94 8 °F (34 9 °C) (Tympanic)   Ht 5' 8 5" (1 74 m)   Wt 85 1 kg (187 lb 9 6 oz)   BMI 28 11 kg/m²          Physical Exam   Skin:   Wound is healing well  By signing my name below, Giuseppe Bourgeois, attest that this documentation has been prepared under the direction and in the presence of Ian Gupta MD  Electronically Signed: Natan Jauregui 9/19/2018  Woody Ahmadi, personally performed the services described in this documentation  All medical record entries made by the [employer] were at my direction and in my presence  I have reviewed the chart and discharge instructions (if applicable) and agree that the record reflects my personal performance and is accurate and complete   Ian Gupta MD  9/19/2018

## 2018-09-19 NOTE — LETTER
September 19, 2018     Lisbet Thomas DO  [address] [facility] [address]    Patient: Brooke Shane   YOB: 1975   Date of Visit: 9/19/2018       Dear Dr Alethea Jensen: Thank you for referring Ishan Herring to me for evaluation  Below are my notes for this consultation  If you have questions, please do not hesitate to call me  I look forward to following your patient along with you           Sincerely,        Lisa Moran MD        CC: No Recipients

## 2018-10-16 DIAGNOSIS — E11.65 TYPE 2 DIABETES MELLITUS WITH HYPERGLYCEMIA, WITH LONG-TERM CURRENT USE OF INSULIN (HCC): ICD-10-CM

## 2018-10-16 DIAGNOSIS — Z79.4 UNCONTROLLED TYPE 2 DIABETES MELLITUS WITH HYPERGLYCEMIA, WITH LONG-TERM CURRENT USE OF INSULIN (HCC): ICD-10-CM

## 2018-10-16 DIAGNOSIS — R80.9 MICROALBUMINURIA: ICD-10-CM

## 2018-10-16 DIAGNOSIS — E11.65 UNCONTROLLED TYPE 2 DIABETES MELLITUS WITH HYPERGLYCEMIA, WITH LONG-TERM CURRENT USE OF INSULIN (HCC): ICD-10-CM

## 2018-10-16 DIAGNOSIS — Z79.4 TYPE 2 DIABETES MELLITUS WITH HYPERGLYCEMIA, WITH LONG-TERM CURRENT USE OF INSULIN (HCC): ICD-10-CM

## 2018-10-16 PROCEDURE — 4010F ACE/ARB THERAPY RXD/TAKEN: CPT | Performed by: FAMILY MEDICINE

## 2018-10-16 RX ORDER — LOSARTAN POTASSIUM 50 MG/1
50 TABLET ORAL EVERY MORNING
Qty: 30 TABLET | Refills: 3 | Status: SHIPPED | OUTPATIENT
Start: 2018-10-16 | End: 2018-12-04 | Stop reason: SDUPTHER

## 2018-10-16 RX ORDER — ATORVASTATIN CALCIUM 10 MG/1
10 TABLET, FILM COATED ORAL DAILY
Qty: 30 TABLET | Refills: 5 | Status: SHIPPED | OUTPATIENT
Start: 2018-10-16 | End: 2019-04-21 | Stop reason: SDUPTHER

## 2018-12-03 RX ORDER — IBUPROFEN 800 MG/1
800 TABLET ORAL EVERY 6 HOURS PRN
Refills: 0 | COMMUNITY
Start: 2018-09-07 | End: 2019-03-26 | Stop reason: ALTCHOICE

## 2018-12-03 RX ORDER — BLOOD-GLUCOSE METER
KIT MISCELLANEOUS
Refills: 0 | COMMUNITY
Start: 2018-09-13 | End: 2022-02-04

## 2018-12-03 RX ORDER — LANCETS 33 GAUGE
EACH MISCELLANEOUS
Refills: 0 | COMMUNITY
Start: 2018-09-13 | End: 2019-09-24 | Stop reason: SDUPTHER

## 2018-12-03 RX ORDER — OXYCODONE HYDROCHLORIDE AND ACETAMINOPHEN 5; 325 MG/1; MG/1
1 TABLET ORAL
Refills: 0 | COMMUNITY
Start: 2018-09-07 | End: 2019-01-13 | Stop reason: ALTCHOICE

## 2018-12-04 ENCOUNTER — OFFICE VISIT (OUTPATIENT)
Dept: FAMILY MEDICINE CLINIC | Facility: CLINIC | Age: 43
End: 2018-12-04
Payer: COMMERCIAL

## 2018-12-04 VITALS
BODY MASS INDEX: 27.81 KG/M2 | HEART RATE: 94 BPM | HEIGHT: 69 IN | SYSTOLIC BLOOD PRESSURE: 150 MMHG | WEIGHT: 187.8 LBS | DIASTOLIC BLOOD PRESSURE: 94 MMHG | OXYGEN SATURATION: 97 %

## 2018-12-04 DIAGNOSIS — Z79.4 UNCONTROLLED TYPE 2 DIABETES MELLITUS WITH HYPERGLYCEMIA, WITH LONG-TERM CURRENT USE OF INSULIN (HCC): Primary | ICD-10-CM

## 2018-12-04 DIAGNOSIS — F17.200 CURRENT SMOKER: ICD-10-CM

## 2018-12-04 DIAGNOSIS — I10 ESSENTIAL HYPERTENSION: ICD-10-CM

## 2018-12-04 DIAGNOSIS — E78.00 HYPERCHOLESTEROLEMIA: ICD-10-CM

## 2018-12-04 DIAGNOSIS — N52.1 ERECTILE DYSFUNCTION ASSOCIATED WITH TYPE 2 DIABETES MELLITUS (HCC): ICD-10-CM

## 2018-12-04 DIAGNOSIS — E11.69 ERECTILE DYSFUNCTION ASSOCIATED WITH TYPE 2 DIABETES MELLITUS (HCC): ICD-10-CM

## 2018-12-04 DIAGNOSIS — E11.65 UNCONTROLLED TYPE 2 DIABETES MELLITUS WITH HYPERGLYCEMIA, WITH LONG-TERM CURRENT USE OF INSULIN (HCC): Primary | ICD-10-CM

## 2018-12-04 DIAGNOSIS — R80.9 MICROALBUMINURIA: ICD-10-CM

## 2018-12-04 LAB — SL AMB POCT HEMOGLOBIN AIC: 8.5

## 2018-12-04 PROCEDURE — 83036 HEMOGLOBIN GLYCOSYLATED A1C: CPT | Performed by: FAMILY MEDICINE

## 2018-12-04 PROCEDURE — 3045F PR MOST RECENT HEMOGLOBIN A1C LEVEL 7.0-9.0%: CPT | Performed by: FAMILY MEDICINE

## 2018-12-04 PROCEDURE — 4010F ACE/ARB THERAPY RXD/TAKEN: CPT | Performed by: FAMILY MEDICINE

## 2018-12-04 PROCEDURE — 3008F BODY MASS INDEX DOCD: CPT | Performed by: FAMILY MEDICINE

## 2018-12-04 PROCEDURE — 3066F NEPHROPATHY DOC TX: CPT | Performed by: FAMILY MEDICINE

## 2018-12-04 PROCEDURE — 99214 OFFICE O/P EST MOD 30 MIN: CPT | Performed by: FAMILY MEDICINE

## 2018-12-04 RX ORDER — LOSARTAN POTASSIUM 100 MG/1
100 TABLET ORAL EVERY MORNING
Qty: 90 TABLET | Refills: 3 | Status: SHIPPED | OUTPATIENT
Start: 2018-12-04 | End: 2019-09-24 | Stop reason: SDUPTHER

## 2018-12-04 RX ORDER — SILDENAFIL CITRATE 20 MG/1
TABLET ORAL
Qty: 90 TABLET | Refills: 1 | Status: SHIPPED | OUTPATIENT
Start: 2018-12-04 | End: 2019-03-26

## 2018-12-04 RX ORDER — VARENICLINE TARTRATE 25 MG
KIT ORAL
Qty: 53 TABLET | Refills: 0 | Status: SHIPPED | OUTPATIENT
Start: 2018-12-04 | End: 2019-03-26 | Stop reason: SDUPTHER

## 2018-12-04 NOTE — PROGRESS NOTES
FAMILY PRACTICE OFFICE VISIT  [place] Chen [address], [address]      NAME: Talia Alonzo  AGE: 37 y o  SEX: male  : 1975   MRN: [id number]    DATE: 2018  TIME: 10:12 AM    Assessment and Plan     Problem List Items Addressed This Visit        Unprioritized    Current smoker    Relevant Medications    varenicline (CHANTIX STARTING MONTH ) 0 5 MG X 11 & 1 MG X 42 tablet    Essential hypertension    Relevant Medications    losartan (COZAAR) 100 MG tablet    Hypercholesterolemia    Relevant Orders    Lipid panel    Microalbuminuria    Relevant Medications    losartan (COZAAR) 100 MG tablet    Other Relevant Orders    Urinalysis with microscopic    Uncontrolled type 2 diabetes mellitus with hyperglycemia, with long-term current use of insulin ([facility]) - Primary    Relevant Medications    losartan (COZAAR) 100 MG tablet    Other Relevant Orders    POCT hemoglobin A1c    Comprehensive metabolic panel    Urinalysis with microscopic    Lipid panel      Other Visit Diagnoses     Erectile dysfunction associated with type 2 diabetes mellitus ([facility] [address] )        Relevant Medications    sildenafil (REVATIO) 20 mg tablet        Patient Instructions   He has been watching his diet more closely, A1c redone here today has dropped from 11 8 to 8 5, keep at it  He will continue on his insulin/metformin as is  Blood pressure not ideal, I would like him to increase losartan to 100 mg daily, use up 2 of the 50 mg pills he has daily, then get new prescription for 100 mg  Slip given to redo blood work along with urinalysis, fasting, 1 month  Stay on atorvastatin 10 mg daily  Does have erectile dysfunction, we did discuss etiology, nerve damage with diabetes, issues with smoking  Can use generic Viagra 3 to 5 pills 1 hour before need  As is associated with medical condition may be covered by insurance      Smoking 1/2 pack per day, requests prescription for Chantix, discussed use, pros and cons  He will use the starter pack, stopped smoking after 1 week, update us in 3 weeks and we will send in continuing pack at that time  Try to keep the cigarettes out of the car on commute  Fortunately no others in his friend group/at work smoke  ( wife does smoke )    I would like him to have staff do blood pressure check in 6 weeks, I would like to see him in 4 months  Chief Complaint     Chief Complaint   Patient presents with    Follow-up     3 month check up to DM        History of Present Illness   Gali Rodriguez is a 37y o -year-old male who is in today for re-evaluation, I had seen him back in September for a physical, had abscess abdominal wall at that time, did see surgeon, issue resolved  He is using insulin 24 units daily, also on metformin twice daily with meals  Home readings have been running around 155 in the morning, 120 to 180 range postprandial     Trying to watch his diet more closely  Smoking 1/2 PPD yet -    ( previous 1/2 to 1 pack per day )      Review of Systems   Review of Systems   Constitutional: Negative for activity change, appetite change, chills, fatigue, fever and unexpected weight change  HENT: Negative for congestion, mouth sores, nosebleeds, sinus pressure, sore throat and trouble swallowing  Eyes: Negative for visual disturbance  Respiratory: Negative for cough, choking, chest tightness and shortness of breath  Cardiovascular: Negative for chest pain, palpitations and leg swelling  Gastrointestinal: Negative for abdominal pain, blood in stool, constipation, diarrhea, nausea and vomiting  No acid reflux   abdominal wall abscess resolved  No change in bowel   Genitourinary: Negative for dysuria and hematuria  Neurological: Negative for dizziness, syncope, speech difficulty, weakness, light-headedness and headaches  Hematological: Does not bruise/bleed easily  Psychiatric/Behavioral: Negative for behavioral problems, confusion and sleep disturbance  Active Problem List     Patient Active Problem List   Diagnosis    Hypercholesterolemia    Microalbuminuria    Uncontrolled type 2 diabetes mellitus with hyperglycemia, with long-term current use of insulin (HCC)    Abscess of abdominal wall    Current smoker    Essential hypertension       Past Medical History:  Past Medical History:   Diagnosis Date    Diabetes mellitus ([facility] [address] )     Hypertension        Past Surgical History:  No past surgical history on file  Family History:  No family history on file  Social History:  Social History     Social History    Marital status: Single     Spouse name: N/A    Number of children: N/A    Years of education: N/A     Occupational History    Not on file  Social History Main Topics    Smoking status: Current Every Day Smoker     Packs/day: 1 00     Types: Cigarettes    Smokeless tobacco: Never Used    Alcohol use Yes      Comment: socially    Drug use: No    Sexual activity: Not on file     Other Topics Concern    Not on file     Social History Narrative    No narrative on file       Objective     Vitals:    12/04/18 0936   BP: 150/94   BP Location: Left arm   Patient Position: Sitting   Cuff Size: Large   Pulse: 94   SpO2: 97%   Weight: 85 2 kg (187 lb 12 8 oz)   Height: 5' 8 5" (1 74 m)     Body mass index is 28 14 kg/m²  BP Readings from Last 3 Encounters:   12/04/18 150/94   09/18/18 130/84   09/12/18 110/70       Wt Readings from Last 3 Encounters:   12/04/18 85 2 kg (187 lb 12 8 oz)   09/19/18 85 1 kg (187 lb 9 6 oz)   09/18/18 83 3 kg (183 lb 9 6 oz)       Physical Exam   Constitutional: He is oriented to person, place, and time  He appears well-developed and well-nourished  No distress  Eyes: Conjunctivae are normal  No scleral icterus  Cardiovascular: Normal rate, regular rhythm and normal heart sounds      No murmur heard   Pulmonary/Chest: Effort normal and breath sounds normal  No respiratory distress  He has no wheezes  Abdominal: There is no tenderness  Musculoskeletal: He exhibits no edema  Neurological: He is alert and oriented to person, place, and time  Psychiatric: He has a normal mood and affect  ALLERGIES:  No Known Allergies    Current Medications     Current Outpatient Prescriptions   Medication Sig Dispense Refill    atorvastatin (LIPITOR) 10 mg tablet Take 1 tablet (10 mg total) by mouth daily 30 tablet 5    Blood Glucose Monitoring Suppl (ONE TOUCH ULTRA MINI) w/Device KIT As directed  0    insulin glargine (BASAGLAR KWIKPEN) 100 units/mL injection pen Inject 24 Units under the skin daily 5 pen 3    Insulin Pen Needle (PEN NEEDLES 31GX5/16") 31G X 8 MM MISC Inject as directed daily 100 each 1    losartan (COZAAR) 100 MG tablet Take 1 tablet (100 mg total) by mouth every morning 90 tablet 3    metFORMIN (GLUCOPHAGE) 500 mg tablet Take 1 tablet (500 mg total) by mouth 2 (two) times a day with meals 60 tablet 3    ONE TOUCH ULTRA TEST test strip daily  0    ONETOUCH DELICA LANCETS 80K MISC USE ONE DAILY  0    acetaminophen (TYLENOL) 500 mg tablet Take 1 tablet (500 mg total) by mouth every 6 (six) hours as needed for mild pain, moderate pain, headaches or fever 30 tablet 0    ibuprofen (MOTRIN) 800 mg tablet Take 800 mg by mouth every 6 (six) hours as needed  0    oxyCODONE-acetaminophen (PERCOCET) 5-325 mg per tablet Take 1 tablet by mouth every 4 to 6 hours if needed for pain  0    sildenafil (REVATIO) 20 mg tablet 3-5 pills 1 hr before need 90 tablet 1    varenicline (CHANTIX STARTING MONTH PAK) 0 5 MG X 11 & 1 MG X 42 tablet Take one 0 5mg tablet by mouth once daily for 3 days, then increase to one 0 5mg tablet twice daily for 3 days, then increase to one 1mg tablet twice daily  53 tablet 0     No current facility-administered medications for this visit                Most recent labs available from [address] [place] [address]   ( others may be available in Care Everywhere / Media sections)  Lab Results   Component Value Date    WBC 12 65 (H) 03/10/2015    HGB 18 5 (H) 03/10/2015    HCT 50 6 (H) 03/10/2015     03/10/2015    ALT 16 03/10/2015    AST 10 03/10/2015     09/22/2015    K 4 3 09/22/2015     09/22/2015    CREATININE 0 84 09/22/2015    BUN 11 09/22/2015    CO2 28 09/22/2015    INR 0 92 03/10/2015    HGBA1C 11 8 09/11/2018     No results found for: [facility]  Lab Results   Component Value Date    RSX4ESLDOBMU 1 459 03/10/2015         Orders Placed This Encounter   Procedures    Comprehensive metabolic panel    Urinalysis with microscopic    Lipid panel    POCT hemoglobin A1c         Shahrzad Alfaro DO

## 2018-12-04 NOTE — PROGRESS NOTES
Patient's shoes and socks removed  Right Foot/Ankle   Right Foot Inspection  Skin Exam: skin normal and skin intact no dry skin, no warmth, no callus, no erythema, no maceration, no abnormal color, no pre-ulcer, no ulcer and no callus                            Sensory       Monofilament testing: intact  Vascular    The right DP pulse is 1+  The right PT pulse is 1+  Right Toe  - Comprehensive Exam  Ecchymosis: none  Tenderness: none         Left Foot/Ankle  Left Foot Inspection  Skin Exam: skin normal and skin intactno dry skin, no warmth, no erythema, no maceration, normal color, no pre-ulcer, no ulcer and no callus                                         Sensory       Monofilament: diminished  Vascular    The left DP pulse is 1+  The left PT pulse is 1+  Left Toe  - Comprehensive Exam  Ecchymosis: none  Tenderness: none       Assign Risk Category:  Deformity present; Loss of protective sensation;  No weak pulses       Risk: 1

## 2018-12-04 NOTE — PATIENT INSTRUCTIONS
He has been watching his diet more closely, A1c redone here today has dropped from 11 8 to 8 5, keep at it  He will continue on his insulin/metformin as is  Blood pressure not ideal, I would like him to increase losartan to 100 mg daily, use up 2 of the 50 mg pills he has daily, then get new prescription for 100 mg  Slip given to redo blood work along with urinalysis, fasting, 1 month  Stay on atorvastatin 10 mg daily  Does have erectile dysfunction, we did discuss etiology, nerve damage with diabetes, issues with smoking  Can use generic Viagra 3 to 5 pills 1 hour before need  As is associated with medical condition may be covered by insurance  Smoking 1/2 pack per day, requests prescription for Chantix, discussed use, pros and cons  He will use the starter pack, stopped smoking after 1 week, update us in 3 weeks and we will send in continuing pack at that time  Try to keep the cigarettes out of the car on commute  Fortunately no others in his friend group/at work smoke  ( wife does smoke )    I would like him to have staff do blood pressure check in 6 weeks, I would like to see him in 4 months

## 2018-12-05 ENCOUNTER — HOSPITAL ENCOUNTER (EMERGENCY)
Facility: HOSPITAL | Age: 43
Discharge: HOME | End: 2018-12-05
Attending: EMERGENCY MEDICINE | Admitting: EMERGENCY MEDICINE
Payer: COMMERCIAL

## 2018-12-05 ENCOUNTER — APPOINTMENT (EMERGENCY)
Dept: CT IMAGING | Facility: HOSPITAL | Age: 43
End: 2018-12-05
Payer: COMMERCIAL

## 2018-12-05 VITALS
TEMPERATURE: 98.6 F | SYSTOLIC BLOOD PRESSURE: 151 MMHG | WEIGHT: 190.9 LBS | RESPIRATION RATE: 18 BRPM | OXYGEN SATURATION: 99 % | BODY MASS INDEX: 28.6 KG/M2 | HEART RATE: 89 BPM | DIASTOLIC BLOOD PRESSURE: 84 MMHG

## 2018-12-05 DIAGNOSIS — G98.8 NEUROLOGIC DISORDER: Primary | ICD-10-CM

## 2018-12-05 DIAGNOSIS — R47.1 DYSARTHRIA: ICD-10-CM

## 2018-12-05 LAB
ALBUMIN SERPL BCP-MCNC: 2.6 G/DL (ref 3.5–5)
ALP SERPL-CCNC: 101 U/L (ref 46–116)
ALT SERPL W P-5'-P-CCNC: 16 U/L (ref 12–78)
ANION GAP SERPL CALCULATED.3IONS-SCNC: 8 MMOL/L (ref 4–13)
APTT PPP: 30 SECONDS (ref 26–38)
AST SERPL W P-5'-P-CCNC: 20 U/L (ref 5–45)
BASOPHILS # BLD AUTO: 0.04 THOUSANDS/ΜL (ref 0–0.1)
BASOPHILS NFR BLD AUTO: 1 % (ref 0–1)
BILIRUB SERPL-MCNC: 0.16 MG/DL (ref 0.2–1)
BUN SERPL-MCNC: 10 MG/DL (ref 5–25)
CALCIUM SERPL-MCNC: 8.5 MG/DL (ref 8.3–10.1)
CHLORIDE SERPL-SCNC: 101 MMOL/L (ref 100–108)
CO2 SERPL-SCNC: 27 MMOL/L (ref 21–32)
CREAT SERPL-MCNC: 1.1 MG/DL (ref 0.6–1.3)
EOSINOPHIL # BLD AUTO: 0.18 THOUSAND/ΜL (ref 0–0.61)
EOSINOPHIL NFR BLD AUTO: 2 % (ref 0–6)
ERYTHROCYTE [DISTWIDTH] IN BLOOD BY AUTOMATED COUNT: 12.8 % (ref 11.6–15.1)
GFR SERPL CREATININE-BSD FRML MDRD: 93 ML/MIN/1.73SQ M
GLUCOSE SERPL-MCNC: 137 MG/DL (ref 65–140)
HCT VFR BLD AUTO: 45.9 % (ref 36.5–49.3)
HGB BLD-MCNC: 15.4 G/DL (ref 12–17)
IMM GRANULOCYTES # BLD AUTO: 0.02 THOUSAND/UL (ref 0–0.2)
IMM GRANULOCYTES NFR BLD AUTO: 0 % (ref 0–2)
INR PPP: 0.96 (ref 0.86–1.17)
LYMPHOCYTES # BLD AUTO: 1.99 THOUSANDS/ΜL (ref 0.6–4.47)
LYMPHOCYTES NFR BLD AUTO: 24 % (ref 14–44)
MCH RBC QN AUTO: 30.4 PG (ref 26.8–34.3)
MCHC RBC AUTO-ENTMCNC: 33.6 G/DL (ref 31.4–37.4)
MCV RBC AUTO: 91 FL (ref 82–98)
MONOCYTES # BLD AUTO: 0.65 THOUSAND/ΜL (ref 0.17–1.22)
MONOCYTES NFR BLD AUTO: 8 % (ref 4–12)
NEUTROPHILS # BLD AUTO: 5.56 THOUSANDS/ΜL (ref 1.85–7.62)
NEUTS SEG NFR BLD AUTO: 65 % (ref 43–75)
NRBC BLD AUTO-RTO: 0 /100 WBCS
PLATELET # BLD AUTO: 303 THOUSANDS/UL (ref 149–390)
PMV BLD AUTO: 9.4 FL (ref 8.9–12.7)
POTASSIUM SERPL-SCNC: 3.8 MMOL/L (ref 3.5–5.3)
PROT SERPL-MCNC: 6.9 G/DL (ref 6.4–8.2)
PROTHROMBIN TIME: 12.9 SECONDS (ref 11.8–14.2)
RBC # BLD AUTO: 5.06 MILLION/UL (ref 3.88–5.62)
SODIUM SERPL-SCNC: 136 MMOL/L (ref 136–145)
TROPONIN I SERPL-MCNC: <0.02 NG/ML
WBC # BLD AUTO: 8.44 THOUSAND/UL (ref 4.31–10.16)

## 2018-12-05 PROCEDURE — 80053 COMPREHEN METABOLIC PANEL: CPT | Performed by: EMERGENCY MEDICINE

## 2018-12-05 PROCEDURE — 70496 CT ANGIOGRAPHY HEAD: CPT

## 2018-12-05 PROCEDURE — 85610 PROTHROMBIN TIME: CPT | Performed by: EMERGENCY MEDICINE

## 2018-12-05 PROCEDURE — 70498 CT ANGIOGRAPHY NECK: CPT

## 2018-12-05 PROCEDURE — 84484 ASSAY OF TROPONIN QUANT: CPT | Performed by: EMERGENCY MEDICINE

## 2018-12-05 PROCEDURE — 99285 EMERGENCY DEPT VISIT HI MDM: CPT

## 2018-12-05 PROCEDURE — 85025 COMPLETE CBC W/AUTO DIFF WBC: CPT | Performed by: EMERGENCY MEDICINE

## 2018-12-05 PROCEDURE — 85730 THROMBOPLASTIN TIME PARTIAL: CPT | Performed by: EMERGENCY MEDICINE

## 2018-12-05 PROCEDURE — 93005 ELECTROCARDIOGRAM TRACING: CPT

## 2018-12-05 PROCEDURE — 36415 COLL VENOUS BLD VENIPUNCTURE: CPT | Performed by: EMERGENCY MEDICINE

## 2018-12-05 PROCEDURE — 70450 CT HEAD/BRAIN W/O DYE: CPT

## 2018-12-05 RX ORDER — ASPIRIN 81 MG/1
81 TABLET, CHEWABLE ORAL DAILY
Qty: 30 TABLET | Refills: 2 | Status: SHIPPED | OUTPATIENT
Start: 2018-12-05 | End: 2019-10-14

## 2018-12-05 RX ADMIN — IOHEXOL 85 ML: 350 INJECTION, SOLUTION INTRAVENOUS at 19:26

## 2018-12-05 NOTE — ED PROVIDER NOTES
History  Chief Complaint   Patient presents with    Slurred Speech     Pt experiencing slurred speech and diffirculy writing since the weekend  Pt denies H/A, dizziness, CP, SOB, denies any history to strokes  36 yo male heavy smoker with HTN, DM2, insulin controlled, c/o onset of speech change, that he describes as garbled or slurred, without aphasia, also associated with change in his handwriting, "like a  wrote it", also without word finding issue, that he says he has noticed consistently over the last 4-5 days personally, but cannot recall a precise onset  He even mentioned it to his PCP yesterday, without a specific plan or intervention  Then today at work, the handwriting issue was bothering him at work and a friend confirmed to him that his speech seemed slurred  He denies any ongoing HA, chest pain, shortness of breath  He also denies any other focal weakness, or balance or gait issue   He uses only occasional alcohol, none recent or associated with speech issue today, and he denies any street drugs  History provided by:  Patient      Prior to Admission Medications   Prescriptions Last Dose Informant Patient Reported? Taking?    Blood Glucose Monitoring Suppl (ONE TOUCH ULTRA MINI) w/Device KIT   Yes Yes   Sig: As directed   Insulin Pen Needle (PEN NEEDLES 31GX5/16") 31G X 8 MM MISC   No Yes   Sig: Inject as directed daily   ONE TOUCH ULTRA TEST test strip   Yes Yes   Sig: daily   ONETOUCH DELICA LANCETS 72O MISC   Yes Yes   Sig: USE ONE DAILY   acetaminophen (TYLENOL) 500 mg tablet   No Yes   Sig: Take 1 tablet (500 mg total) by mouth every 6 (six) hours as needed for mild pain, moderate pain, headaches or fever   atorvastatin (LIPITOR) 10 mg tablet   No Yes   Sig: Take 1 tablet (10 mg total) by mouth daily   ibuprofen (MOTRIN) 800 mg tablet   Yes Yes   Sig: Take 800 mg by mouth every 6 (six) hours as needed   insulin glargine (BASAGLAR KWIKPEN) 100 units/mL injection pen No Yes   Sig: Inject 24 Units under the skin daily   losartan (COZAAR) 100 MG tablet   No Yes   Sig: Take 1 tablet (100 mg total) by mouth every morning   metFORMIN (GLUCOPHAGE) 500 mg tablet   No Yes   Sig: Take 1 tablet (500 mg total) by mouth 2 (two) times a day with meals   oxyCODONE-acetaminophen (PERCOCET) 5-325 mg per tablet   Yes Yes   Sig: Take 1 tablet by mouth every 4 to 6 hours if needed for pain   sildenafil (REVATIO) 20 mg tablet   No Yes   Sig: 3-5 pills 1 hr before need   varenicline (CHANTIX STARTING MONTH PAK) 0 5 MG X 11 & 1 MG X 42 tablet   No Yes   Sig: Take one 0 5mg tablet by mouth once daily for 3 days, then increase to one 0 5mg tablet twice daily for 3 days, then increase to one 1mg tablet twice daily  Facility-Administered Medications: None       Past Medical History:   Diagnosis Date    Diabetes mellitus ([facility] [address] )     Hypertension        History reviewed  No pertinent surgical history  History reviewed  No pertinent family history  I have reviewed and agree with the history as documented  Social History   Substance Use Topics    Smoking status: Current Every Day Smoker     Packs/day: 1 00     Types: Cigarettes    Smokeless tobacco: Never Used    Alcohol use Yes      Comment: socially        Review of Systems   Constitutional: Negative for appetite change, chills and fever  HENT: Negative for sore throat  Respiratory: Negative for cough, shortness of breath and wheezing  Cardiovascular: Negative for chest pain and palpitations  Gastrointestinal: Negative for abdominal pain, diarrhea, nausea and vomiting  Genitourinary: Negative for dysuria and hematuria  Musculoskeletal: Negative for neck pain  Skin: Negative for rash  Neurological: Positive for speech difficulty  Negative for dizziness, weakness and headaches  Psychiatric/Behavioral: Negative for suicidal ideas  All other systems reviewed and are negative        Physical Exam  Physical Exam Constitutional: He is oriented to person, place, and time  He appears well-developed and well-nourished  Non-toxic appearance  HENT:   Head: Normocephalic  Right Ear: Tympanic membrane and external ear normal    Left Ear: External ear normal    Nose: Nose normal    Mouth/Throat: Oropharynx is clear and moist    Eyes: Pupils are equal, round, and reactive to light  Conjunctivae, EOM and lids are normal    Neck: Normal range of motion  Neck supple  No JVD present  No Brudzinski's sign and no Kernig's sign noted  Cardiovascular: Normal rate, regular rhythm and normal heart sounds  No murmur heard  Pulmonary/Chest: Effort normal and breath sounds normal  No accessory muscle usage  No tachypnea  No respiratory distress  He has no wheezes  Abdominal: Soft  Normal appearance and bowel sounds are normal  He exhibits no distension and no mass  There is no tenderness  There is no rigidity, no rebound and no guarding  Musculoskeletal: Normal range of motion  Lymphadenopathy:        Head (right side): No submental, no submandibular, no preauricular and no posterior auricular adenopathy present  Head (left side): No submental, no submandibular, no preauricular and no posterior auricular adenopathy present  He has no cervical adenopathy  Neurological: He is alert and oriented to person, place, and time  He has normal strength and normal reflexes  He displays no tremor  No cranial nerve deficit or sensory deficit  Coordination and gait normal  GCS eye subscore is 4  GCS verbal subscore is 5  GCS motor subscore is 6  Dysarthria is subjective, he is edentulous on the top but that is his baseline, but he feels he is slurring   He had no aphasia, and clock face was drawn with normal orientation  Skin: Skin is warm and dry  No rash noted  He is not diaphoretic  Psychiatric: He has a normal mood and affect   His speech is normal and behavior is normal  Thought content normal  Cognition and memory are normal    Nursing note and vitals reviewed        Vital Signs  ED Triage Vitals   Temperature Pulse Respirations Blood Pressure SpO2   12/05/18 1709 12/05/18 1707 12/05/18 1707 12/05/18 1707 12/05/18 1707   98 6 °F (37 °C) 101 20 (!) 176/94 98 %      Temp Source Heart Rate Source Patient Position - Orthostatic VS BP Location FiO2 (%)   12/05/18 1709 12/05/18 1707 12/05/18 1707 12/05/18 1707 --   Oral Monitor Lying Right arm       Pain Score       12/05/18 1707       No Pain           Vitals:    12/05/18 1707 12/05/18 1800 12/05/18 1901 12/05/18 1958   BP: (!) 176/94 166/92 155/84 151/84   Pulse: 101 96 90 89   Patient Position - Orthostatic VS: Lying Lying Lying Lying       Visual Acuity  Visual Acuity      Most Recent Value   L Pupil Size (mm)  3   R Pupil Size (mm)  3          ED Medications  Medications   iohexol (OMNIPAQUE) 350 MG/ML injection (MULTI-DOSE) 85 mL (85 mL Intravenous Given 12/5/18 1926)       Diagnostic Studies  Results Reviewed     Procedure Component Value Units Date/Time    Protime-INR [[id number]]  (Normal) Collected:  12/05/18 1727    Lab Status:  Final result Specimen:  Blood from Arm, Left Updated:  12/05/18 1808     Protime 12 9 seconds      INR 0 96    APTT [[id number]]  (Normal) Collected:  12/05/18 1727    Lab Status:  Final result Specimen:  Blood from Arm, Left Updated:  12/05/18 1808     PTT 30 seconds     Comprehensive metabolic panel [[id number]]  (Abnormal) Collected:  12/05/18 1727    Lab Status:  Final result Specimen:  Blood from Arm, Left Updated:  12/05/18 1806     Sodium 136 mmol/L      Potassium 3 8 mmol/L      Chloride 101 mmol/L      CO2 27 mmol/L      ANION GAP 8 mmol/L      BUN 10 mg/dL      Creatinine 1 10 mg/dL      Glucose 137 mg/dL      Calcium 8 5 mg/dL      AST 20 U/L      ALT 16 U/L      Alkaline Phosphatase 101 U/L      Total Protein 6 9 g/dL      Albumin 2 6 (L) g/dL      Total Bilirubin 0 16 (L) mg/dL      eGFR 93 ml/min/1 73sq m     Narrative:         Consolidated [place] [employer] Disease Education Program recommendations are as follows:  GFR calculation is accurate only with a steady state creatinine  Chronic Kidney disease less than 60 ml/min/1 73 sq  meters  Kidney failure less than 15 ml/min/1 73 sq  meters  Troponin I [[id number]]  (Normal) Collected:  12/05/18 1727    Lab Status:  Final result Specimen:  Blood from Arm, Left Updated:  12/05/18 1757     Troponin I <0 02 ng/mL     CBC and differential [[id number]] Collected:  12/05/18 1727    Lab Status:  Final result Specimen:  Blood from Arm, Left Updated:  12/05/18 1738     WBC 8 44 Thousand/uL      RBC 5 06 Million/uL      Hemoglobin 15 4 g/dL      Hematocrit 45 9 %      MCV 91 fL      MCH 30 4 pg      MCHC 33 6 g/dL      RDW 12 8 %      MPV 9 4 fL      Platelets 081 Thousands/uL      nRBC 0 /100 WBCs      Neutrophils Relative 65 %      Immat GRANS % 0 %      Lymphocytes Relative 24 %      Monocytes Relative 8 %      Eosinophils Relative 2 %      Basophils Relative 1 %      Neutrophils Absolute 5 56 Thousands/µL      Immature Grans Absolute 0 02 Thousand/uL      Lymphocytes Absolute 1 99 Thousands/µL      Monocytes Absolute 0 65 Thousand/µL      Eosinophils Absolute 0 18 Thousand/µL      Basophils Absolute 0 04 Thousands/µL                  CTA head and neck w wo contrast   Final Result by Mich Dias MD (12/05 1949)      No evidence of hemodynamic significant stenosis, aneurysm or dissection  If symptoms persist or worsen, follow-up brain MRI  Workstation performed: [id number]         CT head without contrast   Final Result by Conchita Lima MD (12/05 1741)      No acute intracranial abnormality                    Workstation performed: [id number]         MRI brain wo contrast    (Results Pending)              Procedures  ECG 12 Lead Documentation  Date/Time: 12/5/2018 5:11 PM  Performed by: Adelia Brito  Authorized by: Adelia Brito     Rate:     ECG rate:  98  Rhythm:     Rhythm: sinus rhythm Other findings:     Other findings: LVH             Phone Contacts  ED Phone Contact    ED Course  ED Course as of Dec 05 2305   Wed Dec 05, 2018   1722 Pt is not a candidate for TPA due to onset of unknown onset of symptoms more than 24 hours at this time    1801 Negative CT CT head without contrast   1830 D/W Dr Jose E Rivera, neurology, agrees with NIH zero, due to only subjective patient deficits, negative CT, normal labs, the acuity for admission can be based on CTA, home with aspirin, if positive with stenosis, admit for further workup  Stroke Assessment     Row Name 12/05/18 1807             NIH Stroke Scale    Interval Baseline      Level of Consciousness (1a ) 0      LOC Questions (1b ) 0      LOC Commands (1c ) 0      Best Gaze (2 ) 0      Visual (3 ) 0      Facial Palsy (4 ) 0      Motor Arm, Left (5a ) 0      Motor Arm, Right (5b ) 0      Motor Leg, Left (6a ) 0      Motor Leg, Right (6b ) 0      Limb Ataxia (7 ) 0      Sensory (8 ) 0      Best Language (9 ) 0      Dysarthria (10 ) 0      Extinction and Inattention (11 ) (Formerly Neglect) 0      Total 0                          MDM  CritCare Time    Disposition  Final diagnoses:   Neurologic disorder - possible subacute CVA   Dysarthria     Time reflects when diagnosis was documented in both MDM as applicable and the Disposition within this note     Time User Action Codes Description Comment    12/5/2018  8:37 PM [place] Add [G98 8] Neurologic disorder     12/5/2018  8:37 PM [place] Modify [G98 8] Neurologic disorder possible subacute CVA    12/5/2018  8:44 PM [place] Add [R47 1] Dysarthria       ED Disposition     ED Disposition Condition Comment    Discharge  [address] discharge to home/self care      Condition at discharge: Good        Follow-up Information     Follow up With Specialties Details Why Contact Info Additional [address], [address] [place] [address]  [phone]       [facility] Call For followup [place]ina [phone]  [address] [place] [address] Neurology [facility], [address] [place] [address], [place], [place], [phone]          Discharge Medication List as of 12/5/2018  8:44 PM      START taking these medications    Details   aspirin 81 mg chewable tablet Chew 1 tablet (81 mg total) daily, Starting Wed 12/5/2018, Print         CONTINUE these medications which have NOT CHANGED    Details   acetaminophen (TYLENOL) 500 mg tablet Take 1 tablet (500 mg total) by mouth every 6 (six) hours as needed for mild pain, moderate pain, headaches or fever, Starting Sat 3/17/2018, Print      atorvastatin (LIPITOR) 10 mg tablet Take 1 tablet (10 mg total) by mouth daily, Starting Tue 10/16/2018, Normal      Blood Glucose Monitoring Suppl (ONE TOUCH ULTRA MINI) w/Device KIT As directed, Starting Thu 9/13/2018, Historical Med      ibuprofen (MOTRIN) 800 mg tablet Take 800 mg by mouth every 6 (six) hours as needed, Starting Fri 9/7/2018, Historical Med      insulin glargine (BASAGLAR KWIKPEN) 100 units/mL injection pen Inject 24 Units under the skin daily, Starting Tue 10/16/2018, Normal      Insulin Pen Needle (PEN NEEDLES 31GX5/16") 31G X 8 MM MISC Inject as directed daily, Starting Tue 9/11/2018, Normal      losartan (COZAAR) 100 MG tablet Take 1 tablet (100 mg total) by mouth every morning, Starting Tue 12/4/2018, Print      metFORMIN (GLUCOPHAGE) 500 mg tablet Take 1 tablet (500 mg total) by mouth 2 (two) times a day with meals, Starting Tue 10/16/2018, Normal      ONE TOUCH ULTRA TEST test strip daily, Starting Thu 9/13/2018, Historical Med      ONETOUCH DELICA LANCETS 37F MISC USE ONE DAILY, Historical Med      oxyCODONE-acetaminophen (PERCOCET) 5-325 mg per tablet Take 1 tablet by mouth every 4 to 6 hours if needed for pain, Starting Fri 9/7/2018, Historical Med sildenafil (REVATIO) 20 mg tablet 3-5 pills 1 hr before need, Print      varenicline (CHANTIX STARTING MONTH DANIELLE) 0 5 MG X 11 & 1 MG X 42 tablet Take one 0 5mg tablet by mouth once daily for 3 days, then increase to one 0 5mg tablet twice daily for 3 days, then increase to one 1mg tablet twice daily  , Print             Outpatient Discharge Orders  MRI brain wo contrast   Standing Status: Future  Standing Exp   Date: 12/05/22         ED Provider  Electronically Signed by           Emiliano Tejeda MD  12/05/18 7010

## 2018-12-06 ENCOUNTER — TELEPHONE (OUTPATIENT)
Dept: FAMILY MEDICINE CLINIC | Facility: CLINIC | Age: 43
End: 2018-12-06

## 2018-12-06 NOTE — DISCHARGE INSTRUCTIONS
You did not have a clear neurologic deficit tonight that corresponded to any imaging abnormality  You still need an MRI which can be done as an outpatient, so I put in an order  Start an aspirin everyday  Follow up with your primary doctor and set up follow up with neurology yourself or through your doctor if you need a referral   Return if you are experiencing a new sudden weakness or deficit

## 2018-12-09 LAB
ATRIAL RATE: 98 BPM
P AXIS: 79 DEGREES
PR INTERVAL: 112 MS
QRS AXIS: 77 DEGREES
QRSD INTERVAL: 98 MS
QT INTERVAL: 362 MS
QTC INTERVAL: 462 MS
T WAVE AXIS: 88 DEGREES
VENTRICULAR RATE: 98 BPM

## 2018-12-09 PROCEDURE — 93010 ELECTROCARDIOGRAM REPORT: CPT | Performed by: INTERNAL MEDICINE

## 2019-01-11 ENCOUNTER — APPOINTMENT (OUTPATIENT)
Dept: LAB | Facility: CLINIC | Age: 44
End: 2019-01-11
Payer: COMMERCIAL

## 2019-01-11 ENCOUNTER — OFFICE VISIT (OUTPATIENT)
Dept: FAMILY MEDICINE CLINIC | Facility: CLINIC | Age: 44
End: 2019-01-11
Payer: COMMERCIAL

## 2019-01-11 VITALS
HEIGHT: 69 IN | OXYGEN SATURATION: 98 % | DIASTOLIC BLOOD PRESSURE: 96 MMHG | WEIGHT: 197.2 LBS | HEART RATE: 96 BPM | SYSTOLIC BLOOD PRESSURE: 158 MMHG | BODY MASS INDEX: 29.21 KG/M2

## 2019-01-11 DIAGNOSIS — Z79.4 UNCONTROLLED TYPE 2 DIABETES MELLITUS WITH HYPERGLYCEMIA, WITH LONG-TERM CURRENT USE OF INSULIN (HCC): ICD-10-CM

## 2019-01-11 DIAGNOSIS — I10 ESSENTIAL HYPERTENSION: ICD-10-CM

## 2019-01-11 DIAGNOSIS — F17.200 CURRENT SMOKER: ICD-10-CM

## 2019-01-11 DIAGNOSIS — E11.65 UNCONTROLLED TYPE 2 DIABETES MELLITUS WITH HYPERGLYCEMIA, WITH LONG-TERM CURRENT USE OF INSULIN (HCC): ICD-10-CM

## 2019-01-11 DIAGNOSIS — I10 ESSENTIAL HYPERTENSION: Primary | ICD-10-CM

## 2019-01-11 DIAGNOSIS — R80.9 MICROALBUMINURIA: ICD-10-CM

## 2019-01-11 PROBLEM — L02.211 ABSCESS OF ABDOMINAL WALL: Status: RESOLVED | Noted: 2018-09-11 | Resolved: 2019-01-11

## 2019-01-11 LAB
ANION GAP SERPL CALCULATED.3IONS-SCNC: 7 MMOL/L (ref 4–13)
BACTERIA UR QL AUTO: ABNORMAL /HPF
BILIRUB UR QL STRIP: NEGATIVE
BUN SERPL-MCNC: 16 MG/DL (ref 5–25)
CALCIUM SERPL-MCNC: 8.6 MG/DL (ref 8.3–10.1)
CHLORIDE SERPL-SCNC: 104 MMOL/L (ref 100–108)
CHOLEST SERPL-MCNC: 184 MG/DL (ref 50–200)
CLARITY UR: CLEAR
CO2 SERPL-SCNC: 28 MMOL/L (ref 21–32)
COLOR UR: YELLOW
CREAT SERPL-MCNC: 1.15 MG/DL (ref 0.6–1.3)
GFR SERPL CREATININE-BSD FRML MDRD: 88 ML/MIN/1.73SQ M
GLUCOSE P FAST SERPL-MCNC: 176 MG/DL (ref 65–99)
GLUCOSE UR STRIP-MCNC: ABNORMAL MG/DL
HDLC SERPL-MCNC: 71 MG/DL (ref 40–60)
HGB UR QL STRIP.AUTO: ABNORMAL
KETONES UR STRIP-MCNC: NEGATIVE MG/DL
LDLC SERPL CALC-MCNC: 96 MG/DL (ref 0–100)
LEUKOCYTE ESTERASE UR QL STRIP: NEGATIVE
NITRITE UR QL STRIP: NEGATIVE
NON-SQ EPI CELLS URNS QL MICRO: ABNORMAL /HPF
NONHDLC SERPL-MCNC: 113 MG/DL
OTHER STN SPEC: ABNORMAL
PH UR STRIP.AUTO: 6.5 [PH] (ref 4.5–8)
POTASSIUM SERPL-SCNC: 4.4 MMOL/L (ref 3.5–5.3)
PROT UR STRIP-MCNC: ABNORMAL MG/DL
RBC #/AREA URNS AUTO: ABNORMAL /HPF
SODIUM SERPL-SCNC: 139 MMOL/L (ref 136–145)
SP GR UR STRIP.AUTO: 1.02 (ref 1–1.03)
TRIGL SERPL-MCNC: 87 MG/DL
UROBILINOGEN UR QL STRIP.AUTO: 1 E.U./DL
WBC #/AREA URNS AUTO: ABNORMAL /HPF

## 2019-01-11 PROCEDURE — 80048 BASIC METABOLIC PNL TOTAL CA: CPT

## 2019-01-11 PROCEDURE — 36415 COLL VENOUS BLD VENIPUNCTURE: CPT

## 2019-01-11 PROCEDURE — 81001 URINALYSIS AUTO W/SCOPE: CPT | Performed by: FAMILY MEDICINE

## 2019-01-11 PROCEDURE — 80061 LIPID PANEL: CPT | Performed by: FAMILY MEDICINE

## 2019-01-11 PROCEDURE — 99213 OFFICE O/P EST LOW 20 MIN: CPT | Performed by: FAMILY MEDICINE

## 2019-01-11 PROCEDURE — 3066F NEPHROPATHY DOC TX: CPT | Performed by: FAMILY MEDICINE

## 2019-01-11 RX ORDER — METOPROLOL SUCCINATE 50 MG/1
50 TABLET, EXTENDED RELEASE ORAL DAILY
Qty: 30 TABLET | Refills: 5 | Status: SHIPPED | OUTPATIENT
Start: 2019-01-11 | End: 2019-09-24 | Stop reason: SDUPTHER

## 2019-01-11 NOTE — PATIENT INSTRUCTIONS
His blood pressure at home has been running around 160/100, he will continue on losartan 100 mg every morning which we had increased 1 month ago, we will add metoprolol succinate 50 mg at night  He will call us with home readings in 2 weeks  He is on Chantix, is down to 1/4 pack per day, keep trying to quit  Home sugars have been running around 120 in the morning, see previous note regarding improvement A1c, dropped from 11 8 to 8 5, keep at it  He will redo fasting blood work/urinalysis today    We did review his emergency room visit December 5th, no further issues with slurred speech

## 2019-01-11 NOTE — PROGRESS NOTES
FAMILY PRACTICE OFFICE VISIT  [facility] [address] [address]  [address], [address]      NAME: Lane Greene  AGE: 37 y o  SEX: male  : 1975   MRN: [id number]    DATE: 2019  TIME: 9:00 AM    Assessment and Plan     Problem List Items Addressed This Visit        U    Uncontrolled type 2 diabetes mellitus with hyperglycemia, with long-term current use of insulin (HCC)     Improving, home readings 120 range, treatment as is           Relevant Orders    Basic metabolic panel (Completed)       V    Essential hypertension - Primary     His blood pressure at home has been running around 160/100, he will continue on losartan 100 mg every morning which we had increased 1 month ago, we will add metoprolol succinate 50 mg at night  He will call us with home readings in 2 weeks  Relevant Medications    metoprolol succinate (TOPROL-XL) 50 mg 24 hr tablet    Other Relevant Orders    Basic metabolic panel (Completed)       W    Proteinuria     Continue on losartan            X    Current smoker     1 pack per day in past, started smoking age 13  Now 1/2 PPD -   On Chantix                Patient Instructions   His blood pressure at home has been running around 160/100, he will continue on losartan 100 mg every morning which we had increased 1 month ago, we will add metoprolol succinate 50 mg at night  He will call us with home readings in 2 weeks  He is on Chantix, is down to 1/4 pack per day, keep trying to quit  Home sugars have been running around 120 in the morning, see previous note regarding improvement A1c, dropped from 11 8 to 8 5, keep at it  He will redo fasting blood work/urinalysis today    We did review his emergency room visit , no further issues with slurred speech    ADD -   UA w +4 protein -  Set up Nephroogy consult/ do US     Chief Complaint     Chief Complaint   Patient presents with  Hypertension     bp was 164/97 afternoon 158/103(20 minutes after he took the 1st reading)       History of Present Illness   María Mercedes is a 37y o -year-old male who is in today as his blood pressure has been running high at home, he is trying to stop smoking, down to 1/4 pack per day, did start Chantix last month, is tolerating it  He relates his home sugar readings have been running around 120, trying to watch diet closer  After his last visit with me he presented to the emergency room with slurred speech the next day, testing was negative, no current issues  Review of Systems   Review of Systems   Constitutional: Negative for appetite change, fatigue, fever and unexpected weight change  HENT: Negative for sore throat and trouble swallowing  Respiratory: Negative for cough, chest tightness and shortness of breath  Cardiovascular: Negative for chest pain, palpitations and leg swelling  Gastrointestinal: Negative for abdominal pain and blood in stool  No acid reflux     No change in bowel   Genitourinary: Negative for dysuria and hematuria  Neurological: Negative for dizziness, light-headedness and headaches  Active Problem List     Patient Active Problem List   Diagnosis    Hypercholesterolemia    Proteinuria    Uncontrolled type 2 diabetes mellitus with hyperglycemia, with long-term current use of insulin ([facility] )    Current smoker    Essential hypertension       Past Medical History:  Past Medical History:   Diagnosis Date    Diabetes mellitus ([facility] )     Hypertension        Past Surgical History:  No past surgical history on file  Family History:  No family history on file  Social History:  Social History     Social History    Marital status: Single     Spouse name: N/A    Number of children: N/A    Years of education: N/A     Occupational History    Not on file       Social History Main Topics    Smoking status: Current Every Day Smoker     Packs/day: 1 00 Types: Cigarettes    Smokeless tobacco: Never Used    Alcohol use Yes      Comment: socially    Drug use: No    Sexual activity: Not on file     Other Topics Concern    Not on file     Social History Narrative    No narrative on file       Objective     Vitals:    01/11/19 0859   BP: 158/96   BP Location: Left arm   Patient Position: Sitting   Cuff Size: Standard   Pulse: 96   SpO2: 98%   Weight: 89 4 kg (197 lb 3 2 oz)   Height: 5' 8 5" (1 74 m)     Body mass index is 29 55 kg/m²  BP Readings from Last 3 Encounters:   01/11/19 158/96   12/05/18 151/84   12/04/18 150/94       Wt Readings from Last 3 Encounters:   01/11/19 89 4 kg (197 lb 3 2 oz)   12/05/18 86 6 kg (190 lb 14 4 oz)   12/04/18 85 2 kg (187 lb 12 8 oz)       Physical Exam   Constitutional: He is oriented to person, place, and time  He appears well-developed and well-nourished  No distress  Eyes: Conjunctivae are normal  No scleral icterus  Cardiovascular: Normal rate, regular rhythm and normal heart sounds  No murmur heard  Pulmonary/Chest: Effort normal and breath sounds normal  No respiratory distress  He has no wheezes  Musculoskeletal: He exhibits no edema  Lymphadenopathy:     He has no cervical adenopathy  Neurological: He is alert and oriented to person, place, and time         ALLERGIES:  No Known Allergies    Current Medications     Current Outpatient Prescriptions   Medication Sig Dispense Refill    atorvastatin (LIPITOR) 10 mg tablet Take 1 tablet (10 mg total) by mouth daily 30 tablet 5    Blood Glucose Monitoring Suppl (ONE TOUCH ULTRA MINI) w/Device KIT As directed  0    ibuprofen (MOTRIN) 800 mg tablet Take 800 mg by mouth every 6 (six) hours as needed  0    insulin glargine (BASAGLAR KWIKPEN) 100 units/mL injection pen Inject 24 Units under the skin daily 5 pen 3    Insulin Pen Needle (PEN NEEDLES 31GX5/16") 31G X 8 MM MISC Inject as directed daily 100 each 1    losartan (COZAAR) 100 MG tablet Take 1 tablet (100 mg total) by mouth every morning 90 tablet 3    metFORMIN (GLUCOPHAGE) 500 mg tablet Take 1 tablet (500 mg total) by mouth 2 (two) times a day with meals 60 tablet 3    ONE TOUCH ULTRA TEST test strip daily  0    ONETOUCH DELICA LANCETS 81O MISC USE ONE DAILY  0    sildenafil (REVATIO) 20 mg tablet 3-5 pills 1 hr before need 90 tablet 1    varenicline (CHANTIX STARTING MONTH DANIELLE) 0 5 MG X 11 & 1 MG X 42 tablet Take one 0 5mg tablet by mouth once daily for 3 days, then increase to one 0 5mg tablet twice daily for 3 days, then increase to one 1mg tablet twice daily  53 tablet 0    acetaminophen (TYLENOL) 500 mg tablet Take 1 tablet (500 mg total) by mouth every 6 (six) hours as needed for mild pain, moderate pain, headaches or fever (Patient not taking: Reported on 1/11/2019 ) 30 tablet 0    aspirin 81 mg chewable tablet Chew 1 tablet (81 mg total) daily (Patient not taking: Reported on 1/11/2019 ) 30 tablet 2    metoprolol succinate (TOPROL-XL) 50 mg 24 hr tablet Take 1 tablet (50 mg total) by mouth daily 30 tablet 5     No current facility-administered medications for this visit          Lab Results   Component Value Date    WBC 8 44 12/05/2018    HGB 15 4 12/05/2018    HCT 45 9 12/05/2018     12/05/2018    TRIG 87 01/11/2019    HDL 71 (H) 01/11/2019    ALT 16 12/05/2018    AST 20 12/05/2018     09/22/2015    K 4 4 01/11/2019     01/11/2019    CREATININE 1 15 01/11/2019    BUN 16 01/11/2019    CO2 28 01/11/2019    INR 0 96 12/05/2018    GLUF 176 (H) 01/11/2019    HGBA1C 8 5 12/04/2018     Lab Results   Component Value Date    LDLCALC 96 01/11/2019     Lab Results   Component Value Date    UHH0XFQSLQPH 1 459 03/10/2015         Orders Placed This Encounter   Procedures    Basic metabolic panel         Kobe Gustafson DO

## 2019-01-11 NOTE — ASSESSMENT & PLAN NOTE
His blood pressure at home has been running around 160/100, he will continue on losartan 100 mg every morning which we had increased 1 month ago, we will add metoprolol succinate 50 mg at night  He will call us with home readings in 2 weeks

## 2019-01-13 DIAGNOSIS — R80.9 PROTEINURIA, UNSPECIFIED TYPE: Primary | ICD-10-CM

## 2019-01-13 DIAGNOSIS — I10 ESSENTIAL HYPERTENSION: ICD-10-CM

## 2019-01-13 DIAGNOSIS — E11.65 UNCONTROLLED TYPE 2 DIABETES MELLITUS WITH HYPERGLYCEMIA, WITH LONG-TERM CURRENT USE OF INSULIN (HCC): ICD-10-CM

## 2019-01-13 DIAGNOSIS — Z79.4 UNCONTROLLED TYPE 2 DIABETES MELLITUS WITH HYPERGLYCEMIA, WITH LONG-TERM CURRENT USE OF INSULIN (HCC): ICD-10-CM

## 2019-02-19 ENCOUNTER — OFFICE VISIT (OUTPATIENT)
Dept: NEPHROLOGY | Facility: CLINIC | Age: 44
End: 2019-02-19
Payer: COMMERCIAL

## 2019-02-19 VITALS
SYSTOLIC BLOOD PRESSURE: 144 MMHG | DIASTOLIC BLOOD PRESSURE: 88 MMHG | HEIGHT: 69 IN | BODY MASS INDEX: 29.41 KG/M2 | WEIGHT: 198.6 LBS | HEART RATE: 80 BPM

## 2019-02-19 DIAGNOSIS — I10 ESSENTIAL HYPERTENSION: ICD-10-CM

## 2019-02-19 DIAGNOSIS — Z79.4 UNCONTROLLED TYPE 2 DIABETES MELLITUS WITH HYPERGLYCEMIA, WITH LONG-TERM CURRENT USE OF INSULIN (HCC): ICD-10-CM

## 2019-02-19 DIAGNOSIS — E11.65 UNCONTROLLED TYPE 2 DIABETES MELLITUS WITH HYPERGLYCEMIA, WITH LONG-TERM CURRENT USE OF INSULIN (HCC): ICD-10-CM

## 2019-02-19 DIAGNOSIS — I10 HYPERTENSION, UNSPECIFIED TYPE: Primary | ICD-10-CM

## 2019-02-19 DIAGNOSIS — R80.9 PROTEINURIA, UNSPECIFIED TYPE: ICD-10-CM

## 2019-02-19 PROCEDURE — 99244 OFF/OP CNSLTJ NEW/EST MOD 40: CPT | Performed by: INTERNAL MEDICINE

## 2019-02-19 RX ORDER — AMLODIPINE BESYLATE 5 MG/1
5 TABLET ORAL DAILY
Qty: 30 TABLET | Refills: 5 | Status: SHIPPED | OUTPATIENT
Start: 2019-02-19 | End: 2019-09-24 | Stop reason: SDUPTHER

## 2019-02-19 NOTE — PROGRESS NOTES
Consultation - Nephrology   Afia Maciel 37 y o  male MRN: [id number]  Unit/Bed#:  Encounter: [id number]      Assessment/Plan     Assessment / Plan:  1  Proteinuria    The patient is a diabetic and he states he was diagnosed sometime prior than 5 years ago and admits to not taking care of himself and having blood sugars that were out of control  He was found to have increasing proteinuria and he has been hearing about this for couple years and he is here for evaluation  He more than likely has underlying diabetic kidney disease  Fortunately his creatinine is still normal at 1  I spoke to him and his history is consistent with having diabetic nephropathy  What we need to do is focus on blood sugar control and he told me his A1c has improved significantly in the target would be under 7%  Blood pressure control is important and I am going to add amlodipine 5 mg a day  His lipid profile is excellent  I will check urine protein estimation and he is on an ARB  I will see him back in 2 months for blood pressure evaluation and to review his urine protein estimation I told to continue all his other current medications  Of note he does not take ibuprofen that often at all even though it is on his med list       2  Hypertension    The patient's blood pressure is not optimal   It is only slightly elevated today but he showed me levels that he takes as an outpatient will have 160/100  Will add amlodipine 5 mg a day and follow  History of Present Illness   Physician Requesting Consult: No att  providers found  Reason for Consult / Principal Problem:  Proteinuria  Hx and PE limited by:   HPI: Afia Maciel is a 37y o  year old male who presents for evaluation of proteinuria  The patient has had diabetes for at least 5 or more years although he admits to not seeing a doctor not having blood work done and not caring for his blood sugars prior to 5 years ago    Proteinuria has been increasing so he is here for evaluation and recommendations for treatment  History obtained from chart review and the patient  Consults    Review of Systems   Constitutional: Negative  HENT: Negative  Eyes: Negative  Respiratory: Negative  Negative for cough, chest tightness and shortness of breath  Cardiovascular: Negative  Negative for chest pain and leg swelling  Gastrointestinal: Negative  Negative for abdominal distention, abdominal pain, diarrhea, nausea and vomiting  Genitourinary: Negative  Negative for difficulty urinating, dysuria and hematuria  Musculoskeletal:        Hand pain and cramping for the last 3 years  Neurological: Negative  Negative for dizziness and light-headedness  Historical Information   Patient Active Problem List   Diagnosis    Hypercholesterolemia    Proteinuria    Current smoker    Essential hypertension     Past Medical History:   Diagnosis Date    [facility]     Hypertension     No history of hepatitis, cancer, stroke, kidney stones, kidney infections, lung disease, liver disease, bowel disease  History reviewed  No pertinent surgical history  Social History   Social History     Substance and Sexual Activity   Alcohol Use Yes    Comment: socially     Social History     Substance and Sexual Activity   Drug Use No     Social History     Tobacco Use   Smoking Status Current Every Day Smoker    Packs/day: 1 00    Types: Cigarettes   Smokeless Tobacco Never Used     Family History   Problem Relation Age of Onset    No Known Problems Mother     No Known Problems Father        Meds/Allergies   current meds:   No current facility-administered medications for this visit  No Known Allergies    Objective   [unfilled]  Body mass index is 29 76 kg/m²      Invasive Devices:        PHYSICAL EXAM:  /88 (BP Location: Left arm, Patient Position: Sitting, Cuff Size: Standard)   Pulse 80   Ht 5' 8 5" (1 74 m)   Wt 90 1 kg (198 lb 9 6 oz)   BMI 29 76 kg/m² Physical Exam   Constitutional: He is oriented to person, place, and time  He appears well-nourished  No distress  HENT:   Head: Atraumatic  Mouth/Throat: Oropharynx is clear and moist    Eyes: EOM are normal  No scleral icterus  Neck: Normal range of motion  Neck supple  No JVD present  Cardiovascular: Normal rate and regular rhythm  Exam reveals no friction rub  Pulmonary/Chest: Effort normal and breath sounds normal  No respiratory distress  He has no wheezes  He has no rales  Abdominal: Soft  Bowel sounds are normal  He exhibits no distension  There is no tenderness  Neurological: He is alert and oriented to person, place, and time           Current Weight: Weight - Scale: 90 1 kg (198 lb 9 6 oz)  First Weight: Weight - Scale: 90 1 kg (198 lb 9 6 oz)    Lab Results:              Invalid input(s): LABGLOM        Invalid input(s): LABALBU

## 2019-02-19 NOTE — PATIENT INSTRUCTIONS
You are here for your initial evaluation for protein in the urine  Given the history of longstanding diabetes it is likely that diabetes is started affect the kidneys and that is why there is protein there  You have been doing very well with her health taking medications having DR follow-up her blood pressures improved although not ideal and her sugars have improved  Also the good news is the blood test for the kidney function her creatinine is still normal at 1  For now we just need to make sure good sugar control with hemoglobin A1c less than 7% as the target  We are going to try and started medication amlodipine for her blood pressure  You are on losartan that helps reduce protein in the urine  Your lipid profiles phenomenal       Do lab work I am going to measure the amount of protein in the urine  Start amlodipine 5 mg once a day  Any side effects you can stop the medication and call me

## 2019-02-19 NOTE — LETTER
February 19, 2019     Marlis Nissen,   [address]    Patient: Xi Chu   YOB: 1975   Date of Visit: 2/19/2019       Dear Dr Wang Linares: Thank you for referring Boris Celeste to me for evaluation  Below are my notes for this consultation  If you have questions, please do not hesitate to call me  I look forward to following your patient along with you  Sincerely,        Izabella Aguila MD        CC: No Recipients  Izabella Aguila MD  2/19/2019  2:07 PM  Sign at close encounter  Consultation - Nephrology   Xi Chu 37 y o  male MRN: [id number]  Unit/Bed#:  Encounter: [id number]      Assessment/Plan     Assessment / Plan:  1  Proteinuria    The patient is a diabetic and he states he was diagnosed sometime prior than 5 years ago and admits to not taking care of himself and having blood sugars that were out of control  He was found to have increasing proteinuria and he has been hearing about this for couple years and he is here for evaluation  He more than likely has underlying diabetic kidney disease  Fortunately his creatinine is still normal at 1  I spoke to him and his history is consistent with having diabetic nephropathy  What we need to do is focus on blood sugar control and he told me his A1c has improved significantly in the target would be under 7%  Blood pressure control is important and I am going to add amlodipine 5 mg a day  His lipid profile is excellent  I will check urine protein estimation and he is on an ARB  I will see him back in 2 months for blood pressure evaluation and to review his urine protein estimation I told to continue all his other current medications  Of note he does not take ibuprofen that often at all even though it is on his med list       2  Hypertension    The patient's blood pressure is not optimal   It is only slightly elevated today but he showed me levels that he takes as an outpatient will have 160/100  Will add amlodipine 5 mg a day and follow  History of Present Illness   Physician Requesting Consult: No att  providers found  Reason for Consult / Principal Problem:  Proteinuria  Hx and PE limited by:   HPI: Aaron Moraes is a 37y o  year old male who presents for evaluation of proteinuria  The patient has had diabetes for at least 5 or more years although he admits to not seeing a doctor not having blood work done and not caring for his blood sugars prior to 5 years ago  Proteinuria has been increasing so he is here for evaluation and recommendations for treatment  History obtained from chart review and the patient  Consults    Review of Systems   Constitutional: Negative  HENT: Negative  Eyes: Negative  Respiratory: Negative  Negative for cough, chest tightness and shortness of breath  Cardiovascular: Negative  Negative for chest pain and leg swelling  Gastrointestinal: Negative  Negative for abdominal distention, abdominal pain, diarrhea, nausea and vomiting  Genitourinary: Negative  Negative for difficulty urinating, dysuria and hematuria  Musculoskeletal:        Hand pain and cramping for the last 3 years  Neurological: Negative  Negative for dizziness and light-headedness  Historical Information   Patient Active Problem List   Diagnosis    Hypercholesterolemia    Proteinuria    Current smoker    Essential hypertension     Past Medical History:   Diagnosis Date    [facility]     Hypertension     No history of hepatitis, cancer, stroke, kidney stones, kidney infections, lung disease, liver disease, bowel disease  History reviewed  No pertinent surgical history    Social History   Social History     Substance and Sexual Activity   Alcohol Use Yes    Comment: socially     Social History     Substance and Sexual Activity   Drug Use No     Social History     Tobacco Use   Smoking Status Current Every Day Smoker    Packs/day: 1 00    Types: Cigarettes Smokeless Tobacco Never Used     Family History   Problem Relation Age of Onset    No Known Problems Mother     No Known Problems Father        Meds/Allergies   current meds:   No current facility-administered medications for this visit  No Known Allergies    Objective   [unfilled]  Body mass index is 29 76 kg/m²  Invasive Devices:        PHYSICAL EXAM:  /88 (BP Location: Left arm, Patient Position: Sitting, Cuff Size: Standard)   Pulse 80   Ht 5' 8 5" (1 74 m)   Wt 90 1 kg (198 lb 9 6 oz)   BMI 29 76 kg/m²      Physical Exam   Constitutional: He is oriented to person, place, and time  He appears well-nourished  No distress  HENT:   Head: Atraumatic  Mouth/Throat: Oropharynx is clear and moist    Eyes: EOM are normal  No scleral icterus  Neck: Normal range of motion  Neck supple  No JVD present  Cardiovascular: Normal rate and regular rhythm  Exam reveals no friction rub  Pulmonary/Chest: Effort normal and breath sounds normal  No respiratory distress  He has no wheezes  He has no rales  Abdominal: Soft  Bowel sounds are normal  He exhibits no distension  There is no tenderness  Neurological: He is alert and oriented to person, place, and time           Current Weight: Weight - Scale: 90 1 kg (198 lb 9 6 oz)  First Weight: Weight - Scale: 90 1 kg (198 lb 9 6 oz)    Lab Results:              Invalid input(s): LABGLOM        Invalid input(s): LABALBU

## 2019-02-22 ENCOUNTER — HOSPITAL ENCOUNTER (EMERGENCY)
Facility: HOSPITAL | Age: 44
Discharge: HOME | End: 2019-02-22
Attending: EMERGENCY MEDICINE
Payer: COMMERCIAL

## 2019-02-22 ENCOUNTER — APPOINTMENT (EMERGENCY)
Dept: RADIOLOGY | Facility: HOSPITAL | Age: 44
End: 2019-02-22
Payer: COMMERCIAL

## 2019-02-22 VITALS
HEART RATE: 95 BPM | DIASTOLIC BLOOD PRESSURE: 81 MMHG | BODY MASS INDEX: 30 KG/M2 | HEIGHT: 68 IN | WEIGHT: 197.97 LBS | SYSTOLIC BLOOD PRESSURE: 145 MMHG | RESPIRATION RATE: 18 BRPM | TEMPERATURE: 98 F | OXYGEN SATURATION: 99 %

## 2019-02-22 DIAGNOSIS — R06.02 SHORTNESS OF BREATH: Primary | ICD-10-CM

## 2019-02-22 PROCEDURE — 99285 EMERGENCY DEPT VISIT HI MDM: CPT

## 2019-02-22 PROCEDURE — 71046 X-RAY EXAM CHEST 2 VIEWS: CPT

## 2019-02-22 PROCEDURE — 93005 ELECTROCARDIOGRAM TRACING: CPT

## 2019-02-22 RX ORDER — ALBUTEROL SULFATE 90 UG/1
2 AEROSOL, METERED RESPIRATORY (INHALATION) EVERY 4 HOURS PRN
Qty: 1 INHALER | Refills: 0 | Status: SHIPPED | OUTPATIENT
Start: 2019-02-22 | End: 2019-09-24 | Stop reason: ALTCHOICE

## 2019-02-23 NOTE — ED PROVIDER NOTES
History  Chief Complaint   Patient presents with    Shortness of Breath     Pt reports that 1 week ago he began with SOB, increasing over the past week  Pt is a 37year old male with a PMH of hypertension, hyperlipidemia, insulin dependent DM type II, 32 pack year history presenting with SOB for 2 weeks  Pt states that he was having sexual intercourse at onset of his symptoms, and since has has SOB with deep inspiration  He denies chest pain with the SOB  He denies ORTIZ, orthopnea, PND  Denies having these symptoms in the past  He denies fevers, cough, hemoptysis  He denies a history of asthma or COPD  He denies stridor or wheezing  States he has been taking Chantix and cut down to 1/2 pack per day  Denies sick contacts  Denies recent travel, history of clots, unilateral leg swelling or pain, recent surgery or trauma  Prior to Admission Medications   Prescriptions Last Dose Informant Patient Reported? Taking?    Blood Glucose Monitoring Suppl (ONE TOUCH ULTRA MINI) w/Device KIT  Self Yes Yes   Sig: As directed   Insulin Pen Needle (PEN NEEDLES 31GX5/16") 31G X 8 MM MISC  Self No Yes   Sig: Inject as directed daily   ONE TOUCH ULTRA TEST test strip  Self Yes Yes   Sig: daily   ONETOUCH DELICA LANCETS 69R MISC  Self Yes Yes   Sig: USE ONE DAILY   acetaminophen (TYLENOL) 500 mg tablet  Self No Yes   Sig: Take 1 tablet (500 mg total) by mouth every 6 (six) hours as needed for mild pain, moderate pain, headaches or fever   amLODIPine (NORVASC) 5 mg tablet   No Yes   Sig: Take 1 tablet (5 mg total) by mouth daily   aspirin 81 mg chewable tablet  Self No Yes   Sig: Chew 1 tablet (81 mg total) daily   atorvastatin (LIPITOR) 10 mg tablet  Self No Yes   Sig: Take 1 tablet (10 mg total) by mouth daily   ibuprofen (MOTRIN) 800 mg tablet  Self Yes Yes   Sig: Take 800 mg by mouth every 6 (six) hours as needed   insulin glargine (BASAGLAR KWIKPEN) 100 units/mL injection pen  Self No Yes   Sig: Inject 24 Units under the skin daily   losartan (COZAAR) 100 MG tablet  Self No Yes   Sig: Take 1 tablet (100 mg total) by mouth every morning   metFORMIN (GLUCOPHAGE) 500 mg tablet  Self No Yes   Sig: Take 1 tablet (500 mg total) by mouth 2 (two) times a day with meals   metoprolol succinate (TOPROL-XL) 50 mg 24 hr tablet   No Yes   Sig: Take 1 tablet (50 mg total) by mouth daily   sildenafil (REVATIO) 20 mg tablet  Self No Yes   Sig: 3-5 pills 1 hr before need   varenicline (CHANTIX STARTING MONTH PAK) 0 5 MG X 11 & 1 MG X 42 tablet Not Taking at Unknown time Self No No   Sig: Take one 0 5mg tablet by mouth once daily for 3 days, then increase to one 0 5mg tablet twice daily for 3 days, then increase to one 1mg tablet twice daily  Patient not taking: Reported on 2/22/2019      Facility-Administered Medications: None       Past Medical History:   Diagnosis Date    [facility] ([facility]     Hypertension        History reviewed  No pertinent surgical history  Family History   Problem Relation Age of Onset    No Known Problems Mother     No Known Problems Father      I have reviewed and agree with the history as documented  Social History     Tobacco Use    Smoking status: Current Every Day Smoker     Packs/day: 1 00     Types: Cigarettes    Smokeless tobacco: Never Used   Substance Use Topics    Alcohol use: Yes     Comment: socially    Drug use: No        Review of Systems   Constitutional: Negative for activity change, appetite change, chills, diaphoresis, fatigue and fever  HENT: Negative  Respiratory: Positive for shortness of breath  Negative for cough, chest tightness, wheezing and stridor  Cardiovascular: Negative for chest pain  Gastrointestinal: Negative for abdominal pain, constipation, diarrhea, nausea and vomiting  Genitourinary: Negative for decreased urine volume and difficulty urinating  Musculoskeletal: Negative for back pain, neck pain and neck stiffness     Neurological: Negative for dizziness, weakness, light-headedness and headaches  Physical Exam  Physical Exam   Constitutional: He is oriented to person, place, and time  He appears well-developed and well-nourished  No distress  HENT:   Head: Normocephalic and atraumatic  Mouth/Throat: Oropharynx is clear and moist  No oropharyngeal exudate or posterior oropharyngeal edema  Eyes: Pupils are equal, round, and reactive to light  Conjunctivae and EOM are normal    Neck: Normal range of motion  Neck supple  No JVD present  No tracheal deviation present  Cardiovascular: Normal rate, regular rhythm, normal heart sounds and intact distal pulses  Pulses:       Radial pulses are 2+ on the right side, and 2+ on the left side  Dorsalis pedis pulses are 2+ on the right side, and 2+ on the left side  No LE edema  Pulmonary/Chest: Effort normal and breath sounds normal  No accessory muscle usage or stridor  No tachypnea  No respiratory distress  He has no decreased breath sounds  He has no wheezes  He has no rhonchi  He has no rales  Abdominal: Soft  Bowel sounds are normal  He exhibits no distension and no mass  There is no tenderness  There is no rebound and no guarding  Musculoskeletal: Normal range of motion  Right lower leg: Normal  He exhibits no tenderness and no edema  Left lower leg: Normal  He exhibits no tenderness and no edema  Lymphadenopathy:     He has no cervical adenopathy  Neurological: He is alert and oriented to person, place, and time  Skin: Skin is warm and dry  Capillary refill takes less than 2 seconds  He is not diaphoretic         Vital Signs  ED Triage Vitals   Temperature Pulse Respirations Blood Pressure SpO2   02/22/19 1817 02/22/19 1817 02/22/19 1817 02/22/19 1818 02/22/19 1817   98 °F (36 7 °C) 95 18 145/81 99 %      Temp Source Heart Rate Source Patient Position - Orthostatic VS BP Location FiO2 (%)   02/22/19 1817 -- 02/22/19 1817 02/22/19 1817 --   Oral  Sitting Right arm Pain Score       02/22/19 1817       No Pain           Vitals:    02/22/19 1817 02/22/19 1818   BP:  145/81   Pulse: 95    Patient Position - Orthostatic VS: Sitting        Visual Acuity      ED Medications  Medications - No data to display    Diagnostic Studies  Results Reviewed     None                 XR chest 2 views   ED Interpretation by Angella Thornton PA-C (02/22 2024)   No acute findings      Final Result by Sean Bills MD (02/22 2103)      No acute cardiopulmonary disease  Workstation performed: [id number]                    Procedures  ECG 12 Lead Documentation  Date/Time: 2/22/2019 8:42 PM  Performed by: Angella Thornton PA-C  Authorized by: Angella Thornton PA-C     ECG reviewed by me, the ED Provider: yes    Patient location:  ED  Previous ECG:     Previous ECG:  Unavailable    Comparison to cardiac monitor: No    Interpretation:     Interpretation: normal    Rate:     ECG rate:  96    ECG rate assessment: normal    Rhythm:     Rhythm: sinus rhythm    Ectopy:     Ectopy: none    QRS:     QRS axis:  Normal    QRS intervals:  Normal  Conduction:     Conduction: normal    ST segments:     ST segments:  Normal  T waves:     T waves: normal             Phone Contacts  ED Phone Contact    ED Course               PERC Rule for PE      Most Recent Value   PERC Rule for PE   Age >=50  0 Filed at: 02/23/2019 0451   HR >=100  0 Filed at: 02/23/2019 0451   O2 Sat on room air < 95%  0 Filed at: 02/23/2019 0451   History of PE or DVT  0 Filed at: 02/23/2019 0451   Recent trauma or surgery  0 Filed at: 02/23/2019 0451   Hemoptysis  0 Filed at: 02/23/2019 0451   Exogenous estrogen  0 Filed at: 02/23/2019 0451   Unilateral leg swelling  0 Filed at: 02/23/2019 0451   [address]  Rule for PE Results  0 Filed at: 02/23/2019 0451                      MDM  Number of Diagnoses or Management Options  Shortness of breath:   Diagnosis management comments: EKG and chest XR negative for acute findings   PERC negative for PE, no risk factors warranting workup  With 2 week history, and no chest pain, unlikely this is cardiac in origin and troponin deferred  Given inhaler for SOB  Extensive history of smoking  Potential for early onset COPD  Follow up with PCP  Educated on return precautions  Stable and ready for discharge  Disposition  Final diagnoses:   Shortness of breath     Time reflects when diagnosis was documented in both MDM as applicable and the Disposition within this note     Time User Action Codes Description Comment    2/22/2019  8:37 PM Rocky Calloway Add [R06 02] Shortness of breath       ED Disposition     ED Disposition Condition Date/Time Comment    Discharge Good Fri Feb 22, 2019  8:37 PM [address] discharge to home/self care  Follow-up Information     Follow up With Specialties Details Why Contact Info Misty Kanner, DO Family Medicine Schedule an appointment as soon as possible for a visit today As needed [address] [place] [address]  [phone]            Discharge Medication List as of 2/22/2019  8:39 PM      START taking these medications    Details   albuterol (PROVENTIL HFA,VENTOLIN HFA) 90 mcg/act inhaler Inhale 2 puffs every 4 (four) hours as needed for wheezing, Starting Fri 2/22/2019, Print         CONTINUE these medications which have NOT CHANGED    Details   acetaminophen (TYLENOL) 500 mg tablet Take 1 tablet (500 mg total) by mouth every 6 (six) hours as needed for mild pain, moderate pain, headaches or fever, Starting Sat 3/17/2018, Print      amLODIPine (NORVASC) 5 mg tablet Take 1 tablet (5 mg total) by mouth daily, Starting Tue 2/19/2019, Normal      aspirin 81 mg chewable tablet Chew 1 tablet (81 mg total) daily, Starting Wed 12/5/2018, Print      atorvastatin (LIPITOR) 10 mg tablet Take 1 tablet (10 mg total) by mouth daily, Starting Tue 10/16/2018, Normal      Blood Glucose Monitoring Suppl (ONE TOUCH ULTRA MINI) w/Device KIT As directed, Starting Thu 9/13/2018, Historical Med      ibuprofen (MOTRIN) 800 mg tablet Take 800 mg by mouth every 6 (six) hours as needed, Starting Fri 9/7/2018, Historical Med      insulin glargine (BASAGLAR KWIKPEN) 100 units/mL injection pen Inject 24 Units under the skin daily, Starting Tue 10/16/2018, Normal      Insulin Pen Needle (PEN NEEDLES 31GX5/16") 31G X 8 MM MISC Inject as directed daily, Starting Tue 9/11/2018, Normal      losartan (COZAAR) 100 MG tablet Take 1 tablet (100 mg total) by mouth every morning, Starting Tue 12/4/2018, Print      metFORMIN (GLUCOPHAGE) 500 mg tablet Take 1 tablet (500 mg total) by mouth 2 (two) times a day with meals, Starting Tue 10/16/2018, Normal      metoprolol succinate (TOPROL-XL) 50 mg 24 hr tablet Take 1 tablet (50 mg total) by mouth daily, Starting Fri 1/11/2019, Normal      ONE TOUCH ULTRA TEST test strip daily, Starting Thu 9/13/2018, Historical Med      ONETOUCH DELICA LANCETS 99M MISC USE ONE DAILY, Historical Med      sildenafil (REVATIO) 20 mg tablet 3-5 pills 1 hr before need, Print      varenicline (CHANTIX STARTING MONTH PAK) 0 5 MG X 11 & 1 MG X 42 tablet Take one 0 5mg tablet by mouth once daily for 3 days, then increase to one 0 5mg tablet twice daily for 3 days, then increase to one 1mg tablet twice daily  , Print           No discharge procedures on file      ED Provider  Electronically Signed by           Sheron Gamble PA-C  02/23/19 0818

## 2019-02-24 LAB
ATRIAL RATE: 96 BPM
P AXIS: 73 DEGREES
PR INTERVAL: 132 MS
QRS AXIS: 38 DEGREES
QRSD INTERVAL: 94 MS
QT INTERVAL: 368 MS
QTC INTERVAL: 464 MS
T WAVE AXIS: 74 DEGREES
VENTRICULAR RATE: 96 BPM

## 2019-02-24 PROCEDURE — 93010 ELECTROCARDIOGRAM REPORT: CPT | Performed by: INTERNAL MEDICINE

## 2019-03-25 DIAGNOSIS — E11.65 UNCONTROLLED TYPE 2 DIABETES MELLITUS WITH HYPERGLYCEMIA, WITH LONG-TERM CURRENT USE OF INSULIN (HCC): ICD-10-CM

## 2019-03-25 DIAGNOSIS — Z79.4 UNCONTROLLED TYPE 2 DIABETES MELLITUS WITH HYPERGLYCEMIA, WITH LONG-TERM CURRENT USE OF INSULIN (HCC): ICD-10-CM

## 2019-03-26 ENCOUNTER — OFFICE VISIT (OUTPATIENT)
Dept: FAMILY MEDICINE CLINIC | Facility: CLINIC | Age: 44
End: 2019-03-26
Payer: COMMERCIAL

## 2019-03-26 VITALS
DIASTOLIC BLOOD PRESSURE: 88 MMHG | WEIGHT: 199 LBS | OXYGEN SATURATION: 98 % | TEMPERATURE: 97 F | HEIGHT: 69 IN | HEART RATE: 91 BPM | SYSTOLIC BLOOD PRESSURE: 150 MMHG | BODY MASS INDEX: 29.47 KG/M2

## 2019-03-26 DIAGNOSIS — M26.621 TMJ TENDERNESS, RIGHT: Primary | ICD-10-CM

## 2019-03-26 DIAGNOSIS — F17.200 CURRENT SMOKER: ICD-10-CM

## 2019-03-26 PROCEDURE — 3008F BODY MASS INDEX DOCD: CPT | Performed by: FAMILY MEDICINE

## 2019-03-26 PROCEDURE — 99213 OFFICE O/P EST LOW 20 MIN: CPT | Performed by: FAMILY MEDICINE

## 2019-03-26 RX ORDER — NAPROXEN 500 MG/1
250 TABLET ORAL 2 TIMES DAILY WITH MEALS
Qty: 20 TABLET | Refills: 0 | Status: SHIPPED | OUTPATIENT
Start: 2019-03-26 | End: 2019-09-24 | Stop reason: ALTCHOICE

## 2019-03-26 RX ORDER — VARENICLINE TARTRATE 25 MG
KIT ORAL
Qty: 53 TABLET | Refills: 0 | Status: SHIPPED | OUTPATIENT
Start: 2019-03-26 | End: 2019-09-24 | Stop reason: ALTCHOICE

## 2019-03-26 NOTE — PROGRESS NOTES
Assessment/Plan:     Diagnoses and all orders for this visit:    TMJ tenderness, right  Comments: Will treat with naproxen 500 mg b i d  With meals  He may also use Tylenol up to the maximum dose and recommended moist heat  Orders:  -     naproxen (NAPROSYN) 500 mg tablet; Take 0 5 tablets (250 mg total) by mouth 2 (two) times a day with meals    Current smoker  -     varenicline (CHANTIX STARTING MONTH PAK) 0 5 MG X 11 & 1 MG X 42 tablet; Take one 0 5mg tablet by mouth once daily for 3 days, then increase to one 0 5mg tablet twice daily for 3 days, then increase to one 1mg tablet twice daily  Subjective:      Patient ID: Renea Shoemaker is a 53 y o  male  Right ear started bothering him a couple days ago  It hurts 20 lies on that side  He denies nasal congestion or signs of illness  No fevers or chills  He has not had any drainage from the ear  No hearing loss  No recent dental problems  He has upper dentures for the past 4 months or so and lower dentures for 3 years  The following portions of the patient's history were reviewed and updated as appropriate: allergies, current medications, past family history, past medical history, past social history, past surgical history and problem list     Review of Systems   Constitutional: Negative for activity change, chills, fatigue and fever  HENT: Positive for ear pain  Negative for sore throat  Respiratory: Negative for cough  Neurological: Negative for dizziness and headaches  Objective:      /88   Pulse 91   Temp (!) 97 °F (36 1 °C) (Tympanic)   Ht 5' 8 5" (1 74 m)   Wt 90 3 kg (199 lb)   SpO2 98%   BMI 29 82 kg/m²          Physical Exam   Constitutional: He appears well-developed and well-nourished  HENT:   Head: Normocephalic and atraumatic  Left Ear: External ear normal    Mouth/Throat: Oropharynx is clear and moist    Both TMs are clear    There is tenderness anterior right ear  There is tenderness over the TMJ   Pharynx is normal    Cardiovascular: Normal rate and regular rhythm  Pulmonary/Chest: Effort normal and breath sounds normal    Musculoskeletal:   Pain right TMJ with jaw opening and movement side to side  Nursing note and vitals reviewed

## 2019-04-15 ENCOUNTER — TELEPHONE (OUTPATIENT)
Dept: NEPHROLOGY | Facility: CLINIC | Age: 44
End: 2019-04-15

## 2019-04-21 DIAGNOSIS — E11.65 UNCONTROLLED TYPE 2 DIABETES MELLITUS WITH HYPERGLYCEMIA, WITH LONG-TERM CURRENT USE OF INSULIN (HCC): ICD-10-CM

## 2019-04-21 DIAGNOSIS — Z79.4 UNCONTROLLED TYPE 2 DIABETES MELLITUS WITH HYPERGLYCEMIA, WITH LONG-TERM CURRENT USE OF INSULIN (HCC): ICD-10-CM

## 2019-04-21 RX ORDER — ATORVASTATIN CALCIUM 10 MG/1
TABLET, FILM COATED ORAL
Qty: 30 TABLET | Refills: 5 | Status: SHIPPED | OUTPATIENT
Start: 2019-04-21 | End: 2019-09-24 | Stop reason: SDUPTHER

## 2019-06-06 ENCOUNTER — TELEPHONE (OUTPATIENT)
Dept: NEPHROLOGY | Facility: CLINIC | Age: 44
End: 2019-06-06

## 2019-06-06 DIAGNOSIS — R80.1 PERSISTENT PROTEINURIA: ICD-10-CM

## 2019-06-06 DIAGNOSIS — I10 ESSENTIAL HYPERTENSION: Primary | ICD-10-CM

## 2019-09-23 ENCOUNTER — APPOINTMENT (EMERGENCY)
Dept: CT IMAGING | Facility: HOSPITAL | Age: 44
End: 2019-09-23
Payer: COMMERCIAL

## 2019-09-23 ENCOUNTER — HOSPITAL ENCOUNTER (EMERGENCY)
Facility: HOSPITAL | Age: 44
Discharge: HOME | End: 2019-09-23
Attending: EMERGENCY MEDICINE
Payer: COMMERCIAL

## 2019-09-23 VITALS
DIASTOLIC BLOOD PRESSURE: 100 MMHG | RESPIRATION RATE: 18 BRPM | TEMPERATURE: 98.4 F | HEART RATE: 75 BPM | SYSTOLIC BLOOD PRESSURE: 174 MMHG | OXYGEN SATURATION: 95 %

## 2019-09-23 DIAGNOSIS — R42 DIZZINESS: Primary | ICD-10-CM

## 2019-09-23 DIAGNOSIS — R73.9 HYPERGLYCEMIA: ICD-10-CM

## 2019-09-23 DIAGNOSIS — R29.898 ARM WEAKNESS: ICD-10-CM

## 2019-09-23 LAB
ALBUMIN SERPL BCP-MCNC: 2.2 G/DL (ref 3.5–5)
ALP SERPL-CCNC: 89 U/L (ref 46–116)
ALT SERPL W P-5'-P-CCNC: 14 U/L (ref 12–78)
ANION GAP SERPL CALCULATED.3IONS-SCNC: 7 MMOL/L (ref 4–13)
APTT PPP: 27 SECONDS (ref 23–37)
AST SERPL W P-5'-P-CCNC: 14 U/L (ref 5–45)
BACTERIA UR QL AUTO: ABNORMAL /HPF
BASOPHILS # BLD AUTO: 0.04 THOUSANDS/ΜL (ref 0–0.1)
BASOPHILS NFR BLD AUTO: 0 % (ref 0–1)
BILIRUB DIRECT SERPL-MCNC: 0.08 MG/DL (ref 0–0.2)
BILIRUB SERPL-MCNC: 0.2 MG/DL (ref 0.2–1)
BILIRUB UR QL STRIP: NEGATIVE
BUN SERPL-MCNC: 15 MG/DL (ref 5–25)
CALCIUM SERPL-MCNC: 8.2 MG/DL (ref 8.3–10.1)
CHLORIDE SERPL-SCNC: 102 MMOL/L (ref 100–108)
CLARITY UR: CLEAR
CO2 SERPL-SCNC: 28 MMOL/L (ref 21–32)
COLOR UR: YELLOW
CREAT SERPL-MCNC: 1.43 MG/DL (ref 0.6–1.3)
EOSINOPHIL # BLD AUTO: 0.24 THOUSAND/ΜL (ref 0–0.61)
EOSINOPHIL NFR BLD AUTO: 3 % (ref 0–6)
ERYTHROCYTE [DISTWIDTH] IN BLOOD BY AUTOMATED COUNT: 12.3 % (ref 11.6–15.1)
GFR SERPL CREATININE-BSD FRML MDRD: 67 ML/MIN/1.73SQ M
GLUCOSE SERPL-MCNC: 278 MG/DL (ref 65–140)
GLUCOSE UR STRIP-MCNC: ABNORMAL MG/DL
HCT VFR BLD AUTO: 39.6 % (ref 36.5–49.3)
HGB BLD-MCNC: 13.4 G/DL (ref 12–17)
HGB UR QL STRIP.AUTO: ABNORMAL
IMM GRANULOCYTES # BLD AUTO: 0.03 THOUSAND/UL (ref 0–0.2)
IMM GRANULOCYTES NFR BLD AUTO: 0 % (ref 0–2)
INR PPP: 0.99 (ref 0.84–1.19)
KETONES UR STRIP-MCNC: NEGATIVE MG/DL
LEUKOCYTE ESTERASE UR QL STRIP: NEGATIVE
LYMPHOCYTES # BLD AUTO: 2.07 THOUSANDS/ΜL (ref 0.6–4.47)
LYMPHOCYTES NFR BLD AUTO: 22 % (ref 14–44)
MCH RBC QN AUTO: 30.5 PG (ref 26.8–34.3)
MCHC RBC AUTO-ENTMCNC: 33.8 G/DL (ref 31.4–37.4)
MCV RBC AUTO: 90 FL (ref 82–98)
MONOCYTES # BLD AUTO: 0.7 THOUSAND/ΜL (ref 0.17–1.22)
MONOCYTES NFR BLD AUTO: 7 % (ref 4–12)
MUCOUS THREADS UR QL AUTO: ABNORMAL
NEUTROPHILS # BLD AUTO: 6.55 THOUSANDS/ΜL (ref 1.85–7.62)
NEUTS SEG NFR BLD AUTO: 68 % (ref 43–75)
NITRITE UR QL STRIP: NEGATIVE
NON-SQ EPI CELLS URNS QL MICRO: ABNORMAL /HPF
NRBC BLD AUTO-RTO: 0 /100 WBCS
PH UR STRIP.AUTO: 6 [PH]
PLATELET # BLD AUTO: 279 THOUSANDS/UL (ref 149–390)
PMV BLD AUTO: 9.3 FL (ref 8.9–12.7)
POTASSIUM SERPL-SCNC: 3.9 MMOL/L (ref 3.5–5.3)
PROT SERPL-MCNC: 6.3 G/DL (ref 6.4–8.2)
PROT UR STRIP-MCNC: >=300 MG/DL
PROTHROMBIN TIME: 13.1 SECONDS (ref 11.6–14.5)
RBC # BLD AUTO: 4.4 MILLION/UL (ref 3.88–5.62)
RBC #/AREA URNS AUTO: ABNORMAL /HPF
SODIUM SERPL-SCNC: 137 MMOL/L (ref 136–145)
SP GR UR STRIP.AUTO: 1.02 (ref 1–1.03)
TROPONIN I SERPL-MCNC: <0.02 NG/ML
UROBILINOGEN UR QL STRIP.AUTO: 1 E.U./DL
WBC # BLD AUTO: 9.63 THOUSAND/UL (ref 4.31–10.16)
WBC #/AREA URNS AUTO: ABNORMAL /HPF

## 2019-09-23 PROCEDURE — 96360 HYDRATION IV INFUSION INIT: CPT

## 2019-09-23 PROCEDURE — 84484 ASSAY OF TROPONIN QUANT: CPT | Performed by: EMERGENCY MEDICINE

## 2019-09-23 PROCEDURE — 80076 HEPATIC FUNCTION PANEL: CPT | Performed by: EMERGENCY MEDICINE

## 2019-09-23 PROCEDURE — 99285 EMERGENCY DEPT VISIT HI MDM: CPT | Performed by: EMERGENCY MEDICINE

## 2019-09-23 PROCEDURE — 85610 PROTHROMBIN TIME: CPT | Performed by: EMERGENCY MEDICINE

## 2019-09-23 PROCEDURE — 81001 URINALYSIS AUTO W/SCOPE: CPT | Performed by: EMERGENCY MEDICINE

## 2019-09-23 PROCEDURE — 99284 EMERGENCY DEPT VISIT MOD MDM: CPT

## 2019-09-23 PROCEDURE — 70450 CT HEAD/BRAIN W/O DYE: CPT

## 2019-09-23 PROCEDURE — 85730 THROMBOPLASTIN TIME PARTIAL: CPT | Performed by: EMERGENCY MEDICINE

## 2019-09-23 PROCEDURE — 93005 ELECTROCARDIOGRAM TRACING: CPT

## 2019-09-23 PROCEDURE — 85025 COMPLETE CBC W/AUTO DIFF WBC: CPT | Performed by: EMERGENCY MEDICINE

## 2019-09-23 PROCEDURE — 36415 COLL VENOUS BLD VENIPUNCTURE: CPT | Performed by: EMERGENCY MEDICINE

## 2019-09-23 PROCEDURE — 80048 BASIC METABOLIC PNL TOTAL CA: CPT | Performed by: EMERGENCY MEDICINE

## 2019-09-23 RX ADMIN — SODIUM CHLORIDE 1000 ML: 0.9 INJECTION, SOLUTION INTRAVENOUS at 18:52

## 2019-09-23 NOTE — ED PROVIDER NOTES
History  Chief Complaint   Patient presents with    Dizziness     pt presents to ed feeling lightdead, dizzy and having a left leg limp  pt denies chest pain, sob, and denies any numbness  Pt comes to ED c/o feeling lightheaded x several weeks described as feeling off balance without syncope or presyncope or dyspnea diaphoresis or CP  He also reports L leg weakness x 1 day in the entire L leg which is constant, currently present, and without modifiers  He is also concerned about L hand weakness which he says has been constant and unchanged x 2 years and is still present and affects the L hand but not the arm  He denies HA, neck pain, and recent fall/trauma  Prior to Admission Medications   Prescriptions Last Dose Informant Patient Reported? Taking? Blood Glucose Monitoring Suppl (ONE TOUCH ULTRA MINI) w/Device KIT  Self Yes No   Sig: As directed   Insulin Pen Needle (PEN NEEDLES 31GX5/16") 31G X 8 MM MISC  Self No No   Sig: Inject as directed daily   ONETOUCH DELICA LANCETS 34U MISC  Self Yes No   Sig: USE ONE DAILY   aspirin 81 mg chewable tablet  Self No No   Sig: Chew 1 tablet (81 mg total) daily   Patient taking differently: Chew 81 mg as needed       Facility-Administered Medications: None       Past Medical History:   Diagnosis Date    Diabetes mellitus ([facility] [address] )     Hypertension        History reviewed  No pertinent surgical history  Family History   Problem Relation Age of Onset    No Known Problems Mother     No Known Problems Father      I have reviewed and agree with the history as documented  Social History     Tobacco Use    Smoking status: Current Every Day Smoker     Packs/day: 0 50     Types: Cigarettes    Smokeless tobacco: Never Used   Substance Use Topics    Alcohol use: Yes     Comment: socially maybe 5 times a year    Drug use: No        Review of Systems   Neurological: Positive for weakness and light-headedness   Negative for dizziness, tremors, seizures, syncope, facial asymmetry, speech difficulty, numbness and headaches  All other systems reviewed and are negative  Physical Exam  Physical Exam   Constitutional: He is oriented to person, place, and time  He appears well-developed and well-nourished  No distress  HENT:   Head: Normocephalic and atraumatic  Eyes: Pupils are equal, round, and reactive to light  Conjunctivae and EOM are normal    Neck: Normal range of motion  Neck supple  No JVD present  Cardiovascular: Normal rate, regular rhythm, normal heart sounds and intact distal pulses  Exam reveals no gallop and no friction rub  No murmur heard  Pulmonary/Chest: Effort normal and breath sounds normal  No stridor  No respiratory distress  He has no wheezes  He has no rales  Abdominal: Soft  He exhibits no distension  There is no tenderness  Musculoskeletal: Normal range of motion  He exhibits no edema, tenderness or deformity  Neurological: He is alert and oriented to person, place, and time  No cranial nerve deficit or sensory deficit  He exhibits normal muscle tone  Coordination normal    Skin: Skin is warm and dry  Capillary refill takes less than 2 seconds  He is not diaphoretic  Nursing note and vitals reviewed        Vital Signs  ED Triage Vitals [09/23/19 1618]   Temperature Pulse Respirations Blood Pressure SpO2   98 4 °F (36 9 °C) 101 18 160/82 99 %      Temp Source Heart Rate Source Patient Position - Orthostatic VS BP Location FiO2 (%)   Oral Monitor Sitting Left arm --      Pain Score       No Pain           Vitals:    09/23/19 1618 09/23/19 1852 09/23/19 1900   BP: 160/82 168/95 (!) 174/100   Pulse: 101 78 75   Patient Position - Orthostatic VS: Sitting Lying          Visual Acuity      ED Medications  Medications   sodium chloride 0 9 % bolus 1,000 mL (0 mL Intravenous Stopped 9/23/19 2003)       Diagnostic Studies  Results Reviewed     Procedure Component Value Units Date/Time    Basic metabolic panel [[id number]]  (Abnormal) Collected:  09/23/19 1809    Lab Status:  Final result Specimen:  Blood from Arm, Right Updated:  09/23/19 1839     Sodium 137 mmol/L      Potassium 3 9 mmol/L      Chloride 102 mmol/L      CO2 28 mmol/L      ANION GAP 7 mmol/L      BUN 15 mg/dL      Creatinine 1 43 mg/dL      Glucose 278 mg/dL      Calcium 8 2 mg/dL      eGFR 67 ml/min/1 73sq m     Narrative:       [place] guidelines for Chronic Kidney Disease (CKD):     Stage 1 with normal or high GFR (GFR > 90 mL/min/1 73 square meters)    Stage 2 Mild CKD (GFR = 60-89 mL/min/1 73 square meters)    Stage 3A Moderate CKD (GFR = 45-59 mL/min/1 73 square meters)    Stage 3B Moderate CKD (GFR = 30-44 mL/min/1 73 square meters)    Stage 4 Severe CKD (GFR = 15-29 mL/min/1 73 square meters)    Stage 5 End Stage CKD (GFR <15 mL/min/1 73 square meters)  Note: GFR calculation is accurate only with a steady state creatinine    Hepatic function panel [[id number]]  (Abnormal) Collected:  09/23/19 1809    Lab Status:  Final result Specimen:  Blood from Arm, Right Updated:  09/23/19 1839     Total Bilirubin 0 20 mg/dL      Bilirubin, Direct 0 08 mg/dL      Alkaline Phosphatase 89 U/L      AST 14 U/L      ALT 14 U/L      Total Protein 6 3 g/dL      Albumin 2 2 g/dL     Troponin I [[id number]]  (Normal) Collected:  09/23/19 1809    Lab Status:  Final result Specimen:  Blood from Arm, Right Updated:  09/23/19 1839     Troponin I <0 02 ng/mL     Protime-INR [[id number]]  (Normal) Collected:  09/23/19 1809    Lab Status:  Final result Specimen:  Blood from Arm, Right Updated:  09/23/19 1831     Protime 13 1 seconds      INR 0 99    APTT [[id number]]  (Normal) Collected:  09/23/19 1809    Lab Status:  Final result Specimen:  Blood from Arm, Right Updated:  09/23/19 1831     PTT 27 seconds     Urine Microscopic [[id number]]  (Abnormal) Collected:  09/23/19 1759    Lab Status:  Final result Specimen:  Urine, Clean Catch Updated:  09/23/19 1830     RBC, UA 2-4 /hpf      WBC, UA None Seen /hpf      Epithelial Cells Occasional /hpf      Bacteria, UA None Seen /hpf      MUCUS THREADS Moderate    CBC and differential [[id number]] Collected:  09/23/19 1809    Lab Status:  Final result Specimen:  Blood from Arm, Right Updated:  09/23/19 1816     WBC 9 63 Thousand/uL      RBC 4 40 Million/uL      Hemoglobin 13 4 g/dL      Hematocrit 39 6 %      MCV 90 fL      MCH 30 5 pg      MCHC 33 8 g/dL      RDW 12 3 %      MPV 9 3 fL      Platelets 115 Thousands/uL      nRBC 0 /100 WBCs      Neutrophils Relative 68 %      Immat GRANS % 0 %      Lymphocytes Relative 22 %      Monocytes Relative 7 %      Eosinophils Relative 3 %      Basophils Relative 0 %      Neutrophils Absolute 6 55 Thousands/µL      Immature Grans Absolute 0 03 Thousand/uL      Lymphocytes Absolute 2 07 Thousands/µL      Monocytes Absolute 0 70 Thousand/µL      Eosinophils Absolute 0 24 Thousand/µL      Basophils Absolute 0 04 Thousands/µL     UA w Reflex to Microscopic [[id number]]  (Abnormal) Collected:  09/23/19 1759    Lab Status:  Final result Specimen:  Urine, Clean Catch Updated:  09/23/19 1810     Color, UA Yellow     Clarity, UA Clear     Specific Gravity, UA 1 025     pH, UA 6 0     Leukocytes, UA Negative     Nitrite, UA Negative     Protein, UA >=300 mg/dl      Glucose,  (1/2%) mg/dl      Ketones, UA Negative mg/dl      Urobilinogen, UA 1 0 E U /dl      Bilirubin, UA Negative     Blood, UA Moderate                 CT head without contrast   Final Result by Shannan Robledo MD (09/23 1945)      No acute intracranial abnormality                    Workstation performed: [id number]                    Procedures  ECG 12 Lead Documentation Only  Date/Time: 9/23/2019 6:05 PM  Performed by: Jules Beverly MD  Authorized by: Jules Beverly MD     Indications / Diagnosis:  Weakness  ECG reviewed by me, the ED Provider: yes    Patient location:  ED  Previous ECG:     Previous ECG:  Compared to current Comparison ECG info:  Sep 2019    Similarity:  No change  Interpretation:     Interpretation: non-specific    Rate:     ECG rate:  96    ECG rate assessment: normal    Rhythm:     Rhythm: sinus rhythm    Comments:      SR, LVH  No acute ischemic changes  Not significantly changed from prior  ED Course                               MDM    Disposition  Final diagnoses:   Dizziness   Hyperglycemia   Arm weakness     Time reflects when diagnosis was documented in both MDM as applicable and the Disposition within this note     Time User Action Codes Description Comment    9/23/2019  7:51 PM Tirso Sniff Add [R42] Dizziness     9/23/2019  7:51 PM Tirso Sniff Add [R73 9] Hyperglycemia     9/23/2019  7:51 PM Tirso Sniff Add [R29 898] Arm weakness       ED Disposition     ED Disposition Condition Date/Time Comment    Discharge Stable Mon Sep 23, 2019  7:51 PM [address] discharge to home/self care  Follow-up Information     Follow up With Specialties Details Why Contact Info    Ivett Polanco DO Family Medicine In 3 days  [address]    [address] [facility]  [phone]            Discharge Medication List as of 9/23/2019  7:52 PM      CONTINUE these medications which have NOT CHANGED    Details   aspirin 81 mg chewable tablet Chew 1 tablet (81 mg total) daily, Starting Wed 12/5/2018, Print      Blood Glucose Monitoring Suppl (ONE TOUCH ULTRA MINI) w/Device KIT As directed, Starting Thu 9/13/2018, Historical Med      Insulin Pen Needle (PEN NEEDLES 31GX5/16") 31G X 8 MM MISC Inject as directed daily, Starting Tue 9/11/2018, Normal      acetaminophen (TYLENOL) 500 mg tablet Take 1 tablet (500 mg total) by mouth every 6 (six) hours as needed for mild pain, moderate pain, headaches or fever, Starting Sat 3/17/2018, Print      albuterol (PROVENTIL HFA,VENTOLIN HFA) 90 mcg/act inhaler Inhale 2 puffs every 4 (four) hours as needed for wheezing, Starting Fri 2/22/2019, Print      amLODIPine (NORVASC) 5 mg tablet Take 1 tablet (5 mg total) by mouth daily, Starting Tue 2/19/2019, Normal      atorvastatin (LIPITOR) 10 mg tablet take 1 tablet by mouth once daily, Normal      insulin glargine (BASAGLAR KWIKPEN) 100 units/mL injection pen Inject 24 Units under the skin daily, Starting Tue 10/16/2018, Normal      losartan (COZAAR) 100 MG tablet Take 1 tablet (100 mg total) by mouth every morning, Starting Tue 12/4/2018, Print      metFORMIN (GLUCOPHAGE) 500 mg tablet take 1 tablet by mouth twice a day with meals, Normal      metoprolol succinate (TOPROL-XL) 50 mg 24 hr tablet Take 1 tablet (50 mg total) by mouth daily, Starting Fri 1/11/2019, Normal      naproxen (NAPROSYN) 500 mg tablet Take 0 5 tablets (250 mg total) by mouth 2 (two) times a day with meals, Starting Tue 3/26/2019, Normal      ONE TOUCH ULTRA TEST test strip daily, Starting Thu 9/13/2018, Historical Med      ONETOUCH DELICA LANCETS 15Q MISC USE ONE DAILY, Historical Med      varenicline (CHANTIX STARTING MONTH PAK) 0 5 MG X 11 & 1 MG X 42 tablet Take one 0 5mg tablet by mouth once daily for 3 days, then increase to one 0 5mg tablet twice daily for 3 days, then increase to one 1mg tablet twice daily  , Print           No discharge procedures on file      ED Provider  Electronically Signed by           Jordon Jones MD  09/26/19 8842

## 2019-09-23 NOTE — ED NOTES
Patient transported to [address] [place] [address] , [facility]  09/23/19 8019

## 2019-09-24 ENCOUNTER — TELEPHONE (OUTPATIENT)
Dept: FAMILY MEDICINE CLINIC | Facility: CLINIC | Age: 44
End: 2019-09-24

## 2019-09-24 ENCOUNTER — OFFICE VISIT (OUTPATIENT)
Dept: FAMILY MEDICINE CLINIC | Facility: CLINIC | Age: 44
End: 2019-09-24
Payer: COMMERCIAL

## 2019-09-24 VITALS
BODY MASS INDEX: 29.95 KG/M2 | DIASTOLIC BLOOD PRESSURE: 100 MMHG | HEIGHT: 69 IN | WEIGHT: 202.2 LBS | HEART RATE: 88 BPM | SYSTOLIC BLOOD PRESSURE: 160 MMHG | OXYGEN SATURATION: 98 %

## 2019-09-24 DIAGNOSIS — E11.65 TYPE 2 DIABETES MELLITUS WITH HYPERGLYCEMIA, WITH LONG-TERM CURRENT USE OF INSULIN (HCC): ICD-10-CM

## 2019-09-24 DIAGNOSIS — I10 HYPERTENSION, UNSPECIFIED TYPE: ICD-10-CM

## 2019-09-24 DIAGNOSIS — R80.9 MICROALBUMINURIA: ICD-10-CM

## 2019-09-24 DIAGNOSIS — R80.9 PROTEINURIA, UNSPECIFIED TYPE: ICD-10-CM

## 2019-09-24 DIAGNOSIS — F17.200 CURRENT SMOKER: ICD-10-CM

## 2019-09-24 DIAGNOSIS — I10 ESSENTIAL HYPERTENSION: ICD-10-CM

## 2019-09-24 DIAGNOSIS — R29.898 WEAKNESS OF LEFT HAND: ICD-10-CM

## 2019-09-24 DIAGNOSIS — Z79.4 UNCONTROLLED TYPE 2 DIABETES MELLITUS WITH HYPERGLYCEMIA, WITH LONG-TERM CURRENT USE OF INSULIN (HCC): ICD-10-CM

## 2019-09-24 DIAGNOSIS — I10 UNCONTROLLED HYPERTENSION: Primary | ICD-10-CM

## 2019-09-24 DIAGNOSIS — Z79.4 TYPE 2 DIABETES MELLITUS WITH HYPERGLYCEMIA, WITH LONG-TERM CURRENT USE OF INSULIN (HCC): ICD-10-CM

## 2019-09-24 DIAGNOSIS — N18.30 STAGE 3 CHRONIC KIDNEY DISEASE (HCC): ICD-10-CM

## 2019-09-24 DIAGNOSIS — E11.65 UNCONTROLLED TYPE 2 DIABETES MELLITUS WITH HYPERGLYCEMIA, WITH LONG-TERM CURRENT USE OF INSULIN (HCC): ICD-10-CM

## 2019-09-24 DIAGNOSIS — R29.898 LEFT LEG WEAKNESS: ICD-10-CM

## 2019-09-24 LAB
ATRIAL RATE: 79 BPM
P AXIS: 70 DEGREES
PR INTERVAL: 134 MS
QRS AXIS: 73 DEGREES
QRSD INTERVAL: 88 MS
QT INTERVAL: 380 MS
QTC INTERVAL: 435 MS
SL AMB POCT HEMOGLOBIN AIC: 10.9 (ref ?–6.5)
T WAVE AXIS: 84 DEGREES
VENTRICULAR RATE: 79 BPM

## 2019-09-24 PROCEDURE — 93010 ELECTROCARDIOGRAM REPORT: CPT | Performed by: INTERNAL MEDICINE

## 2019-09-24 PROCEDURE — 99214 OFFICE O/P EST MOD 30 MIN: CPT | Performed by: FAMILY MEDICINE

## 2019-09-24 PROCEDURE — 83036 HEMOGLOBIN GLYCOSYLATED A1C: CPT | Performed by: FAMILY MEDICINE

## 2019-09-24 RX ORDER — ATORVASTATIN CALCIUM 10 MG/1
10 TABLET, FILM COATED ORAL DAILY
Qty: 90 TABLET | Refills: 3 | Status: SHIPPED | OUTPATIENT
Start: 2019-09-24 | End: 2019-10-14 | Stop reason: SDUPTHER

## 2019-09-24 RX ORDER — LOSARTAN POTASSIUM 100 MG/1
100 TABLET ORAL EVERY MORNING
Qty: 90 TABLET | Refills: 3 | Status: SHIPPED | OUTPATIENT
Start: 2019-09-24 | End: 2020-07-24 | Stop reason: SDUPTHER

## 2019-09-24 RX ORDER — LANCETS 33 GAUGE
EACH MISCELLANEOUS
Refills: 0 | Status: CANCELLED | OUTPATIENT
Start: 2019-09-24

## 2019-09-24 RX ORDER — AMLODIPINE BESYLATE 5 MG/1
5 TABLET ORAL DAILY
Qty: 90 TABLET | Refills: 3 | Status: SHIPPED | OUTPATIENT
Start: 2019-09-24 | End: 2019-09-27 | Stop reason: SDUPTHER

## 2019-09-24 RX ORDER — METOPROLOL SUCCINATE 50 MG/1
50 TABLET, EXTENDED RELEASE ORAL DAILY
Qty: 90 TABLET | Refills: 3 | Status: SHIPPED | OUTPATIENT
Start: 2019-09-24 | End: 2020-07-24 | Stop reason: SDUPTHER

## 2019-09-24 NOTE — PATIENT INSTRUCTIONS
We reviewed emergency room visit from yesterday, he does have left leg weakness with imbalance, left hand clumsiness with decreased , CT of head was unremarkable, I would like him to do MRI of brain  Rule out CVA  We did discuss uncontrolled hypertension, he will use all medication, needs follow-up with Nephrology, had seen them back in February but has had no follow-up  Does have significant proteinuria, creatinine at emergency room had risen to 1 43  Make sure to keep hydrated  A1c was redone here today, much too high at 10 9, we discussed uncontrolled diabetes, well aware of risks associated with same  He has not been using insulin, he will restart that, I would like him to start at 10 units daily and monitor home readings, can titrate back up to 24 units which was prior dosing  We will see him again in 3 to 4 weeks, call sooner if needed  Also still smoking 1/2 pack per day, previously had quit with Chantix  He really needs to reassess use, well aware risk associated with this, taper down and quit  Due to weakness he is off work this week, if he notes any worsening I would like him to present to the emergency room again

## 2019-09-24 NOTE — PROGRESS NOTES
FAMILY PRACTICE OFFICE VISIT  Becca Chester YOUSIF SANCHEZ O  [facility]  [address] [address] [address] [place], [place], [address]      NAME: Becca Hanna  AGE: 53 y o   SEX: male  : 1975   MRN: [id number]    DATE: 2019  TIME: 5:27 PM    Assessment and Plan     Problem List Items Addressed This Visit        Endocrine    Type 2 diabetes mellitus with hyperglycemia, with long-term current use of insulin (HCC)    Relevant Medications    insulin glargine (BASAGLAR KWIKPEN) 100 units/mL injection pen    metFORMIN (GLUCOPHAGE) 500 mg tablet    Other Relevant Orders    POCT hemoglobin A1c (Completed)    MRI brain wo contrast    Glucometer    Glucometer test strips    Lancets       Cardiovascular and Mediastinum    Essential hypertension    Relevant Medications    amLODIPine (NORVASC) 5 mg tablet    losartan (COZAAR) 100 MG tablet    metoprolol succinate (TOPROL-XL) 50 mg 24 hr tablet       Genitourinary    Stage 3 chronic kidney disease (HCC)    Relevant Orders    MRI brain wo contrast       Other    Current smoker    Relevant Orders    MRI brain wo contrast    Proteinuria    Relevant Medications    losartan (COZAAR) 100 MG tablet    Other Relevant Orders    Ambulatory referral to Nephrology      Other Visit Diagnoses     Uncontrolled hypertension    -  Primary    Relevant Medications    amLODIPine (NORVASC) 5 mg tablet    losartan (COZAAR) 100 MG tablet    metoprolol succinate (TOPROL-XL) 50 mg 24 hr tablet    Other Relevant Orders    Ambulatory referral to Nephrology    MRI brain wo contrast    Weakness of left hand        Relevant Orders    MRI brain wo contrast    Left leg weakness        Relevant Orders    MRI brain wo contrast    Hypertension, unspecified type        Relevant Medications    amLODIPine (NORVASC) 5 mg tablet    losartan (COZAAR) 100 MG tablet    metoprolol succinate (TOPROL-XL) 50 mg 24 hr tablet    Uncontrolled type 2 diabetes mellitus with hyperglycemia, with long-term current use of insulin (HCC)        Relevant Medications    atorvastatin (LIPITOR) 10 mg tablet    insulin glargine (BASAGLAR KWIKPEN) 100 units/mL injection pen    losartan (COZAAR) 100 MG tablet    metFORMIN (GLUCOPHAGE) 500 mg tablet    Microalbuminuria        Relevant Medications    losartan (COZAAR) 100 MG tablet          Patient Instructions   We reviewed emergency room visit from yesterday, he does have left leg weakness with imbalance, left hand clumsiness with decreased , CT of head was unremarkable, I would like him to do MRI of brain  Rule out CVA  We did discuss uncontrolled hypertension, he will use all medication, needs follow-up with Nephrology, had seen them back in February but has had no follow-up  Does have significant proteinuria, creatinine at emergency room had risen to 1 43  Make sure to keep hydrated  A1c was redone here today, much too high at 10 9, we discussed uncontrolled diabetes, well aware of risks associated with same  He has not been using insulin, he will restart that, I would like him to start at 10 units daily and monitor home readings, can titrate back up to 24 units which was prior dosing  We will see him again in 3 to 4 weeks, call sooner if needed  Also still smoking 1/2 pack per day, previously had quit with Chantix  He really needs to reassess use, well aware risk associated with this, taper down and quit  Due to weakness he is off work this week, if he notes any worsening I would like him to present to the emergency room again  Chief Complaint     Chief Complaint   Patient presents with    Dizziness       History of Present Illness   Eduarda Mei is a 53y o -year-old male who is in today to re-evaluate after emergency room visit yesterday, yesterday had noted weakness left leg, when ambulating he feels leg may give out, imbalance, also notes tightness with clumsiness left hand  No change in speech, no altered mentation    No increased headache or vision change     CT at emergency room was okay    He admits to stopping insulin  Blood pressure remains far too high, never followed up with Nephrology, had seen them once  Relates is using blood pressure medication  Continues to smoke, 1/2 pack per day  Review of Systems   Review of Systems   Constitutional: Positive for fatigue  Negative for appetite change, fever and unexpected weight change  HENT: Negative for sore throat and trouble swallowing  Eyes: Negative for visual disturbance  Respiratory: Negative for cough, chest tightness, shortness of breath and wheezing  Cardiovascular: Positive for palpitations  Negative for chest pain and leg swelling  Gastrointestinal: Negative for abdominal pain, blood in stool, nausea and vomiting  No acid reflux     No change in bowel   Genitourinary: Negative for dysuria and hematuria  Skin: Negative for rash  Neurological: Positive for weakness, light-headedness and headaches  Negative for dizziness, seizures, syncope and speech difficulty  Hematological: Does not bruise/bleed easily  Psychiatric/Behavioral: Negative for behavioral problems, confusion and decreased concentration  Active Problem List     Patient Active Problem List   Diagnosis    Hypercholesterolemia    Proteinuria    Current smoker    Essential hypertension    Type 2 diabetes mellitus with hyperglycemia, with long-term current use of insulin (HCC)    Stage 3 chronic kidney disease (HCC)       Past Medical History:  Reviewed    Past Surgical History:  Reviewed    Family History:  Reviewed    Social History:  Reviewed    Objective     Vitals:    09/24/19 1540   BP: 160/100   BP Location: Left arm   Patient Position: Sitting   Cuff Size: Large   Pulse: 88   SpO2: 98%   Weight: 91 7 kg (202 lb 3 2 oz)   Height: 5' 8 5" (1 74 m)     Body mass index is 30 3 kg/m²      BP Readings from Last 3 Encounters:   09/24/19 160/100   09/23/19 (!) 174/100 03/26/19 150/88       Wt Readings from Last 3 Encounters:   09/24/19 91 7 kg (202 lb 3 2 oz)   03/26/19 90 3 kg (199 lb)   02/22/19 89 8 kg (197 lb 15 6 oz)       Physical Exam   Constitutional: He is oriented to person, place, and time  Pleasant male seated on table, speech is fluent, answers are appropriate  Blood pressure is elevated  Ambulation somewhat unsteady in hallway, appears to be favoring left leg  HENT:   Mouth/Throat: Oropharynx is clear and moist    Eyes: Pupils are equal, round, and reactive to light  Conjunctivae and EOM are normal  No scleral icterus  Cardiovascular: Normal rate and regular rhythm  Murmur (1/6 systolic ejection murmur left sternal border) heard  Pulmonary/Chest: Effort normal and breath sounds normal  He has no wheezes  He has no rales  Abdominal: Soft  There is no tenderness  Musculoskeletal: He exhibits no edema  Lymphadenopathy:     He has no cervical adenopathy  Neurological: He is alert and oriented to person, place, and time  Significant decrease fine motor left hand, decreased  left hand, proximal musculature arm appears to have intact strength  No cervical spine tenderness  Difficult to elicit DTR left arm  Mild generalized weakness left lower extremity, ambulates favoring left leg with some imbalance, nontender lumbar spine  DTR slightly decreased left patellar  Skin: He is not diaphoretic  Psychiatric: He has a normal mood and affect   His behavior is normal        ALLERGIES:  No Known Allergies    Current Medications     Current Outpatient Medications   Medication Sig Dispense Refill    amLODIPine (NORVASC) 5 mg tablet Take 1 tablet (5 mg total) by mouth daily 90 tablet 3    aspirin 81 mg chewable tablet Chew 1 tablet (81 mg total) daily (Patient taking differently: Chew 81 mg as needed ) 30 tablet 2    atorvastatin (LIPITOR) 10 mg tablet Take 1 tablet (10 mg total) by mouth daily 90 tablet 3    Blood Glucose Monitoring Suppl (ONE TOUCH ULTRA MINI) w/Device KIT As directed  0    insulin glargine (BASAGLAR KWIKPEN) 100 units/mL injection pen Inject 24 Units under the skin daily 5 pen 3    Insulin Pen Needle (PEN NEEDLES 31GX5/16") 31G X 8 MM MISC Inject as directed daily 100 each 1    losartan (COZAAR) 100 MG tablet Take 1 tablet (100 mg total) by mouth every morning 90 tablet 3    metFORMIN (GLUCOPHAGE) 500 mg tablet Take 1 tablet (500 mg total) by mouth 2 (two) times a day with meals 180 tablet 3    metoprolol succinate (TOPROL-XL) 50 mg 24 hr tablet Take 1 tablet (50 mg total) by mouth daily 90 tablet 3     No current facility-administered medications for this visit               Orders Placed This Encounter   Procedures    Glucometer    Glucometer test strips    Lancets    MRI brain wo contrast    Ambulatory referral to Nephrology    POCT hemoglobin A1c         Suhail Montano DO

## 2019-09-24 NOTE — TELEPHONE ENCOUNTER
Authorization for MRI submitted to [id number]  Sent to clinical review  Called and spoke with Ivan Clemons  In clinical review  Authorization approved        Paul isaura #[id number]  Valid: 9/24/2019 - 11/23/2019

## 2019-09-25 ENCOUNTER — HOSPITAL ENCOUNTER (OUTPATIENT)
Dept: MRI IMAGING | Facility: CLINIC | Age: 44
Discharge: HOME | End: 2019-09-25
Payer: COMMERCIAL

## 2019-09-25 DIAGNOSIS — F17.200 CURRENT SMOKER: ICD-10-CM

## 2019-09-25 DIAGNOSIS — N18.30 STAGE 3 CHRONIC KIDNEY DISEASE (HCC): ICD-10-CM

## 2019-09-25 DIAGNOSIS — Z79.4 TYPE 2 DIABETES MELLITUS WITH HYPERGLYCEMIA, WITH LONG-TERM CURRENT USE OF INSULIN (HCC): ICD-10-CM

## 2019-09-25 DIAGNOSIS — R29.898 LEFT LEG WEAKNESS: ICD-10-CM

## 2019-09-25 DIAGNOSIS — I10 UNCONTROLLED HYPERTENSION: ICD-10-CM

## 2019-09-25 DIAGNOSIS — R29.898 WEAKNESS OF LEFT HAND: ICD-10-CM

## 2019-09-25 DIAGNOSIS — E11.65 TYPE 2 DIABETES MELLITUS WITH HYPERGLYCEMIA, WITH LONG-TERM CURRENT USE OF INSULIN (HCC): ICD-10-CM

## 2019-09-25 PROCEDURE — 70551 MRI BRAIN STEM W/O DYE: CPT

## 2019-09-26 ENCOUNTER — TELEPHONE (OUTPATIENT)
Dept: FAMILY MEDICINE CLINIC | Facility: CLINIC | Age: 44
End: 2019-09-26

## 2019-09-26 PROBLEM — I63.81 INFARCTION OF RIGHT THALAMUS (HCC): Status: ACTIVE | Noted: 2019-09-26

## 2019-09-26 PROBLEM — I63.9 INFARCTION OF RIGHT THALAMUS (HCC): Status: ACTIVE | Noted: 2019-09-26

## 2019-09-26 NOTE — TELEPHONE ENCOUNTER
Dr Francia Johnson may you review this pt's MRI of the Brain - this is a pt of Dr Donell Curling       Radiology dept called the office with results: significant findings    MRI report is in Epic already     Thanks

## 2019-09-27 ENCOUNTER — OFFICE VISIT (OUTPATIENT)
Dept: FAMILY MEDICINE CLINIC | Facility: CLINIC | Age: 44
End: 2019-09-27
Payer: COMMERCIAL

## 2019-09-27 VITALS
OXYGEN SATURATION: 98 % | HEART RATE: 96 BPM | DIASTOLIC BLOOD PRESSURE: 100 MMHG | HEIGHT: 69 IN | BODY MASS INDEX: 29.33 KG/M2 | WEIGHT: 198 LBS | RESPIRATION RATE: 18 BRPM | SYSTOLIC BLOOD PRESSURE: 142 MMHG

## 2019-09-27 DIAGNOSIS — E78.00 HYPERCHOLESTEROLEMIA: ICD-10-CM

## 2019-09-27 DIAGNOSIS — N18.30 STAGE 3 CHRONIC KIDNEY DISEASE (HCC): ICD-10-CM

## 2019-09-27 DIAGNOSIS — I10 HYPERTENSION, UNSPECIFIED TYPE: ICD-10-CM

## 2019-09-27 DIAGNOSIS — I63.9 INFARCTION OF RIGHT THALAMUS (HCC): Primary | ICD-10-CM

## 2019-09-27 DIAGNOSIS — F17.200 CURRENT SMOKER: ICD-10-CM

## 2019-09-27 DIAGNOSIS — Z79.4 TYPE 2 DIABETES MELLITUS WITH HYPERGLYCEMIA, WITH LONG-TERM CURRENT USE OF INSULIN (HCC): ICD-10-CM

## 2019-09-27 DIAGNOSIS — I10 ESSENTIAL HYPERTENSION: ICD-10-CM

## 2019-09-27 DIAGNOSIS — E11.65 TYPE 2 DIABETES MELLITUS WITH HYPERGLYCEMIA, WITH LONG-TERM CURRENT USE OF INSULIN (HCC): ICD-10-CM

## 2019-09-27 DIAGNOSIS — R29.898 LEFT ARM WEAKNESS: ICD-10-CM

## 2019-09-27 LAB — SL AMB POCT GLUCOSE BLD: 251

## 2019-09-27 PROCEDURE — 99215 OFFICE O/P EST HI 40 MIN: CPT | Performed by: FAMILY MEDICINE

## 2019-09-27 PROCEDURE — 82948 REAGENT STRIP/BLOOD GLUCOSE: CPT | Performed by: FAMILY MEDICINE

## 2019-09-27 RX ORDER — AMLODIPINE BESYLATE 10 MG/1
10 TABLET ORAL DAILY
Qty: 90 TABLET | Refills: 3 | Status: SHIPPED | OUTPATIENT
Start: 2019-09-27 | End: 2019-10-06 | Stop reason: SDUPTHER

## 2019-09-27 NOTE — ASSESSMENT & PLAN NOTE
Lab Results   Component Value Date    HGBA1C 10 9 (A) 09/24/2019    Patient is diabetic control is quite poor  His Accu-Chek today was 250  He will bump up his Basaglar insulin to 16 units nightly  If his sugar remains above 150 in the morning for 2 days in a row, he will increase it by 2 more units, and continue that until his morning sugar is consistently below 150  Contact our office if he has any questions or concerns about this  He will start checking his sugars in a fasting state, 2 hours after dinner, and mid day  I would like him to come back within 2-3 weeks to see Dr Ena Aguilar  I am going to stop his low-dose metformin at this point due to his chronic kidney disease and the fact that it probably is not contributing much to his diabetic control

## 2019-09-27 NOTE — ASSESSMENT & PLAN NOTE
He does have some left triny paresis  He has some chronic left arm weakness for which I am going to get an EMG of his arm  I am going to expedite a neurology consultation and have him start taking a baby aspirin 81 mg daily  He also needs much better control of his diabetes and blood pressure  He agrees to quit smoking as well at this time

## 2019-09-27 NOTE — ASSESSMENT & PLAN NOTE
In light of his chronic left arm weakness which seems exacerbated by his recent stroke, I am going to get an EMG

## 2019-09-27 NOTE — PROGRESS NOTES
Assessment/Plan:       Problem List Items Addressed This Visit        Endocrine    Type 2 diabetes mellitus with hyperglycemia, with long-term current use of insulin ([facility] [address] )       Lab Results   Component Value Date    HGBA1C 10 9 (A) 09/24/2019    Patient is diabetic control is quite poor  His Accu-Chek today was 250  He will bump up his Basaglar insulin to 16 units nightly  If his sugar remains above 150 in the morning for 2 days in a row, he will increase it by 2 more units, and continue that until his morning sugar is consistently below 150  Contact our office if he has any questions or concerns about this  He will start checking his sugars in a fasting state, 2 hours after dinner, and mid day  I would like him to come back within 2-3 weeks to see Dr Roddy Arteaga  I am going to stop his low-dose metformin at this point due to his chronic kidney disease and the fact that it probably is not contributing much to his diabetic control  Relevant Medications    insulin glargine (BASAGLAR KWIKPEN) 100 units/mL injection pen    Other Relevant Orders    POCT blood glucose (Completed)    Ambulatory referral to Diabetic Education       Cardiovascular and Mediastinum    Essential hypertension     I am going to bump up his amlodipine to 10 mg daily  Relevant Medications    amLODIPine (NORVASC) 10 mg tablet       Nervous and Auditory    Infarction of right thalamus ([facility] [address] ) - [place] ( 9/19) - Primary     He does have some left triny paresis  He has some chronic left arm weakness for which I am going to get an EMG of his arm  I am going to expedite a neurology consultation and have him start taking a baby aspirin 81 mg daily  He also needs much better control of his diabetes and blood pressure  He agrees to quit smoking as well at this time  Relevant Orders    Ambulatory referral to Neurology       Genitourinary    Stage 3 chronic kidney disease [facility])     Continue Nephrology follow-up    Work on better diabetic control  Stop metformin  Relevant Orders    Ambulatory referral to Diabetic Education       Other    Hypercholesterolemia     Consider titrating his atorvastatin at his follow-up visit  I did not do that today as we are making other medication changes  Current smoker     I implored him to quit smoking  He believes he is going to start Chantix  Left arm weakness     In light of his chronic left arm weakness which seems exacerbated by his recent stroke, I am going to get an EMG  Relevant Orders    EMG 1 Limb      Other Visit Diagnoses     Hypertension, unspecified type        Relevant Medications    amLODIPine (NORVASC) 10 mg tablet        BMI Counseling: Body mass index is 29 67 kg/m²  The BMI is above normal  Nutrition recommendations include decreasing portion sizes and encouraging healthy choices of fruits and vegetables  Patient referred to nutritionist due to patient being overweight  Tobacco Cessation Counseling: Tobacco cessation counseling was provided  The patient is sincerely urged to quit consumption of tobacco  He is not ready to quit tobacco  Medication options and side effects of medication discussed  Varenicline (chantix) was prescribed  Subjective:      Patient ID: Ezio Rivera is a 53 y o  male  HPI patient presents today for a recent history of acute stroke  He noted 5 days ago when he awoke in the middle the night of his left leg felt weak, but seem to improve in the morning  Throughout the work day, he had persistent weakness of the left leg and arm and presented to the emergency room  CT of the head was negative  EKG was negative  Labs reveal poorly controlled diabetes with diabetic nephropathy  He was discharged from the emergency room  In follow-up  He was noted to still have some left arm weakness and had an MRI done which did reveal a right lacunar infarct  Patient is noting persistent left leg and arm weakness    He has had no dysarthria  He feels as though his symptoms have stabilized  He does note also having chronic, persistent left arm weakness with difficulty opening his hand in particular  He feels that he does not have as much coordination in his left hand as he did previously  He notes this has been going on for about 2 years  He has not had a workup for  He does continue to smoke but is interested in quitting smoking  He has very poorly controlled diabetes  He denies vision changes, numbness or weakness  He has not really been checking his sugars  Blood pressure is poorly controlled  He denies chest pain, shortness of breath, lightheadedness or headaches  He has been compliant with his blood pressure medications  The following portions of the patient's history were reviewed and updated as appropriate: allergies, current medications, past family history, past medical history, past social history, past surgical history and problem list       Current Outpatient Medications:     amLODIPine (NORVASC) 10 mg tablet, Take 1 tablet (10 mg total) by mouth daily, Disp: 90 tablet, Rfl: 3    aspirin 81 mg chewable tablet, Chew 1 tablet (81 mg total) daily (Patient taking differently: Chew 81 mg as needed ), Disp: 30 tablet, Rfl: 2    atorvastatin (LIPITOR) 10 mg tablet, Take 1 tablet (10 mg total) by mouth daily, Disp: 90 tablet, Rfl: 3    Blood Glucose Monitoring Suppl (ONE TOUCH ULTRA MINI) w/Device KIT, As directed, Disp: , Rfl: 0    insulin glargine (BASAGLAR KWIKPEN) 100 units/mL injection pen, Start 16 units September 27, 2019   Titrate as directed , Disp: 5 pen, Rfl: 3    Insulin Pen Needle (PEN NEEDLES 31GX5/16") 31G X 8 MM MISC, Inject as directed daily, Disp: 100 each, Rfl: 1    losartan (COZAAR) 100 MG tablet, Take 1 tablet (100 mg total) by mouth every morning, Disp: 90 tablet, Rfl: 3    metoprolol succinate (TOPROL-XL) 50 mg 24 hr tablet, Take 1 tablet (50 mg total) by mouth daily, Disp: 90 tablet, Rfl: 3     Review of Systems   Constitutional: Negative for appetite change, chills, fatigue, fever and unexpected weight change  HENT: Negative for trouble swallowing  Eyes: Negative for visual disturbance  Respiratory: Negative for cough, chest tightness, shortness of breath and wheezing  Cardiovascular: Negative for chest pain  Gastrointestinal: Negative for abdominal distention, abdominal pain, blood in stool, constipation and diarrhea  Endocrine: Negative for polyuria  Genitourinary: Negative for difficulty urinating and flank pain  Musculoskeletal: Negative for arthralgias and myalgias  Skin: Negative for rash  Neurological: Negative for dizziness and light-headedness  Hematological: Negative for adenopathy  Does not bruise/bleed easily  Psychiatric/Behavioral: Negative for sleep disturbance  Objective:      /100   Pulse 96   Resp 18   Ht 5' 8 5" (1 74 m)   Wt 89 8 kg (198 lb)   SpO2 98%   BMI 29 67 kg/m²          Physical Exam   Constitutional: He is oriented to person, place, and time  He appears well-developed and well-nourished  No distress  HENT:   Head: Normocephalic and atraumatic  Right Ear: External ear normal    Left Ear: External ear normal    Mouth/Throat: Oropharynx is clear and moist  No oropharyngeal exudate  Eyes: Pupils are equal, round, and reactive to light  EOM are normal  Right eye exhibits no discharge  Left eye exhibits no discharge  No scleral icterus  Neck: Normal carotid pulses present  Carotid bruit is not present  No tracheal deviation, no edema and no erythema present  No thyromegaly present  Cardiovascular: Normal rate, regular rhythm and normal heart sounds  Exam reveals no gallop  No murmur heard  Pulmonary/Chest: Effort normal  No stridor  No tachypnea  No respiratory distress  He has no wheezes  He has no rales  Abdominal: Soft  Bowel sounds are normal  He exhibits no distension and no mass   There is no hepatosplenomegaly  There is no tenderness  There is no rebound, no guarding and no CVA tenderness  Musculoskeletal: Normal range of motion  He exhibits no edema, tenderness or deformity  He has some weakness with left hand  as well as flexion and extension of his left arm  Strength is about 4/5 diffusely  He has some diffuse weakness throughout the left leg exam as well  Lymphadenopathy:     He has no cervical adenopathy  Neurological: He is alert and oriented to person, place, and time  He displays normal reflexes  No cranial nerve deficit  He exhibits normal muscle tone  Coordination normal    Skin: Skin is warm  No rash noted  He is not diaphoretic  No erythema  No pallor  Psychiatric: His speech is normal and behavior is normal  Judgment and thought content normal  His mood appears not anxious  Cognition and memory are normal  He does not exhibit a depressed mood           Kin Gutierres MD

## 2019-09-27 NOTE — PATIENT INSTRUCTIONS
Type 2 diabetes mellitus with hyperglycemia, with long-term current use of insulin ([facility])    Lab Results   Component Value Date    HGBA1C 10 9 (A) 09/24/2019    Patient is diabetic control is quite poor  His Accu-Chek today was 250  He will bump up his Basaglar insulin to 16 units nightly  If his sugar remains above 150 in the morning for 2 days in a row, he will increase it by 2 more units, and continue that until his morning sugar is consistently below 150  Contact our office if he has any questions or concerns about this  He will start checking his sugars in a fasting state, 2 hours after dinner, and mid day  I would like him to come back within 2-3 weeks to see Dr Donal Seth  I am going to stop his low-dose metformin at this point due to his chronic kidney disease and the fact that it probably is not contributing much to his diabetic control  Essential hypertension  I am going to bump up his amlodipine to 10 mg daily  Infarction of right thalamus ([facility] [address] ) - [place] ( 9/19)  He does have some left triny paresis  He has some chronic left arm weakness for which I am going to get an EMG of his arm  I am going to expedite a neurology consultation and have him start taking a baby aspirin 81 mg daily  He also needs much better control of his diabetes and blood pressure  He agrees to quit smoking as well at this time  Stage 3 chronic kidney disease [facility])  Continue Nephrology follow-up  Work on better diabetic control  Stop metformin  Current smoker  I implored him to quit smoking  He believes he is going to start Chantix  Hypercholesterolemia  Consider titrating his atorvastatin at his follow-up visit  I did not do that today as we are making other medication changes  Left arm weakness  In light of his chronic left arm weakness which seems exacerbated by his recent stroke, I am going to get an EMG       10% - bad control"> 10% - bad control,Hemoglobin A1c (HbA1c) greater than 10% indicating poor diabetic control,Haemoglobin A1c greater than 10% indicating poor diabetic control">   Diabetes Mellitus Type 2 in Adults, Ambulatory Care   GENERAL INFORMATION:   Diabetes mellitus type 2  is a disease that affects how your body uses glucose (sugar)  Insulin helps move sugar out of the blood so it can be used for energy  Normally, when the blood sugar level increases, the pancreas makes more insulin  Type 2 diabetes develops because either the body cannot make enough insulin, or it cannot use the insulin correctly  After many years, your pancreas may stop making insulin  Common symptoms include the following:   · More hunger or thirst than usual     · Frequent urination     · Weight loss without trying     · Blurred vision  Seek immediate care for the following symptoms:   · Severe abdominal pain, or pain that spreads to your back  You may also be vomiting  · Trouble staying awake or focusing    · Shaking or sweating    · Blurred or double vision    · Breath has a fruity, sweet smell    · Breathing is deep and labored, or rapid and shallow    · Heartbeat is fast and weak  Treatment for diabetes mellitus type 2  includes keeping your blood sugar at a normal level  You must eat the right foods, and exercise regularly  You may also need medicine if you cannot control your blood sugar level with nutrition and exercise  Manage diabetes mellitus type 2:   · Check your blood sugar level  You will be taught how to check a small drop of blood in a glucose monitor  Ask your healthcare provider when and how often to check during the day  Ask your healthcare provider what your blood sugar levels should be when you check them  · Keep track of carbohydrates (sugar and starchy foods)  Your blood sugar level can get too high if you eat too many carbohydrates  Your dietitian will help you plan meals and snacks that have the right amount of carbohydrates  · Eat low-fat foods    Some examples are skinless chicken and low-fat milk  · Eat less sodium (salt)  Some examples of high-sodium foods to limit are soy sauce, potato chips, and soup  Do not add salt to food you cook  Limit your use of table salt  · Eat high-fiber foods  Foods that are a good source of fiber include vegetables, whole grain bread, and beans  · Limit alcohol  Alcohol affects your blood sugar level and can make it harder to manage your diabetes  Women should limit alcohol to 1 drink a day  Men should limit alcohol to 2 drinks a day  A drink of alcohol is 12 ounces of beer, 5 ounces of wine, or 1½ ounces of liquor  · Get regular exercise  Exercise can help keep your blood sugar level steady, decrease your risk of heart disease, and help you lose weight  Exercise for at least 30 minutes, 5 days a week  Include muscle strengthening activities 2 days each week  Work with your healthcare provider to create an exercise plan  · Check your feet each day  for injuries or open sores  Ask your healthcare provider for activities you can do if you have an open sore  · Quit smoking  If you smoke, it is never too late to quit  Smoking can worsen the problems that may occur with diabetes  Ask your healthcare provider for information about how to stop smoking if you are having trouble quitting  · Ask about your weight:  Ask healthcare providers if you need to lose weight, and how much to lose  Ask them to help you with a weight loss program  Even a 10 to 15 pound weight loss can help you manage your blood sugar level  · Carry medical alert identification  Wear medical alert jewelry or carry a card that says you have diabetes  Ask your healthcare provider where to get these items  · Ask about vaccines  Diabetes puts you at risk of serious illness if you get the flu, pneumonia, or hepatitis  Ask your healthcare provider if you should get a flu, pneumonia, or hepatitis B vaccine, and when to get the vaccine    Follow up with your healthcare provider as directed:  Write down your questions so you remember to ask them during your visits  CARE AGREEMENT:   You have the right to help plan your care  Learn about your health condition and how it may be treated  Discuss treatment options with your caregivers to decide what care you want to receive  You always have the right to refuse treatment  The above information is an  only  It is not intended as medical advice for individual conditions or treatments  Talk to your doctor, nurse or pharmacist before following any medical regimen to see if it is safe and effective for you  © 2014 [address] is for End User's use only and may not be sold, redistributed or otherwise used for commercial purposes  All illustrations and images included in CareNotes® are the copyrighted property of A D A M [employer] Inc  or Blake Mendoza  Hypoglycemia in a Person with Diabetes   WHAT YOU NEED TO KNOW:   Hypoglycemia is a serious condition that happens when your blood glucose (sugar) level drops too low  The blood sugar level is usually too high in a person with diabetes, but the level can also drop too low  It is important to follow your diabetes management plan to keep your blood sugar level steady  DISCHARGE INSTRUCTIONS:   Call 911 for any of the following:   · You feel you are going to pass out  · You have a seizure or pass out  · You have trouble thinking clearly  Seek care immediately if:  · Your blood sugar is less than 50 mg/dL and does not respond to treatment  Contact your healthcare provider if:   · You have had symptoms of low blood sugar several times  · You have questions about the amount of insulin or diabetes medicine you are taking  · You have questions or concerns about your condition or care  Medicines:   · Insulin or diabetes medicine  help to keep your blood sugar under control       · Glucagon  may be needed if you have severe hypoglycemia  · Take your medicine as directed  Contact your healthcare provider if you think your medicine is not helping or if you have side effects  Tell him or her if you are allergic to any medicine  Keep a list of the medicines, vitamins, and herbs you take  Include the amounts, and when and why you take them  Bring the list or the pill bottles to follow-up visits  Carry your medicine list with you in case of an emergency  Follow up with your healthcare provider or specialist as directed: You may need dose changes to your insulin or oral diabetes medicine if you have hypoglycemia  Write down your questions so you remember to ask them during your visits  Manage hypoglycemia:   · Check your blood sugar level right away if you have symptoms of hypoglycemia  Hypoglycemia is usually 70 mg/dL or below  Ask your healthcare provider what blood sugar level is too low for you  · If your blood sugar level is too low, eat or drink 15 grams of fast-acting carbohydrate  Examples of this amount of fast-acting carbohydrate are 4 ounces (½ cup) of fruit juice or 4 ounces of regular soda  Other examples are 2 tablespoons of raisins or 3 to 4 glucose tablets  Check your blood sugar level 15 minutes later  If the level is still low (less than 100 mg/dL), have another 15 grams of carbohydrate  When the level returns to 100 mg/dL, eat a snack or meal that contains carbohydrates  This will help prevent another drop in blood sugar  Always carefully follow your healthcare provider's instructions on how to treat low blood sugar levels  · Always carry a source of fast-acting carbohydrate  If you have symptoms of hypoglycemia and you do not have a blood glucose meter, have a source of fast-acting carbohydrate anyway  Avoid carbohydrate foods that are high in fat  The fat content may make it take longer to increase your blood sugar level  Ask your healthcare provider if you should carry a glucagon kit   Glucagon is a medicine that is injected when you develop severe hypoglycemia and become unconscious  Check the expiration date every month and replace it before it expires  · Teach others how to help you if you have symptoms of hypoglycemia  Tell them about the symptoms of hypoglycemia  Ask them to give you a source of fast-acting carbohydrate if you cannot get it yourself  Ask them to give you a glucagon injection if you have symptoms of hypoglycemia and you become unconscious or have a seizure  Ask them to call 911   This is an emergency  Tell them never to try to make you swallow anything if you faint or have a seizure  · Wear medical alert jewelry  or carry a card that says you have diabetes  Ask where to get these items  Prevent hypoglycemia:   · Take diabetes medicine as directed  Take your medicine at the right time and in the right amount  Your healthcare provider may change your blood sugar goals if you get hypoglycemia often  · Eat regular meals and snacks  Talk to your dietitian or healthcare provider about a meal plan that is right for you  Do not skip meals  · Check your blood sugar level as directed  Ask your healthcare provider what your blood sugar levels should be before and after you eat  Ask when and how often to check your blood sugar level  You may need to check at least 3 times each day  Record your blood sugar level results and take the record with you when you see your healthcare provider  Your provider may use the record to make changes to your medicine, food, or exercise schedules  · Check your blood sugar level before you exercise  Exercise can decrease your blood sugar level  If your blood sugar level is less than 100 mg/dL, have a carbohydrate snack  Examples are 4 to 6 crackers, ½ banana, 8 ounces (1 cup) of nonfat or 1% milk, or 4 ounces (½ cup) of juice  If you will exercise for more than 1 hour, you may need to check your blood sugar level every 30 minutes   Your healthcare provider may also recommend that you check your blood sugar level after exercise  · Be aware of how alcohol affects your blood sugar level  Alcohol can cause your blood sugar level to drop for up to 12 hours after drinking  Ask your healthcare provider if alcohol is safe for you  If you drink alcohol, always have a snack or meal at the same time  Women should limit alcohol to 1 drink a day  Men should limit alcohol to 2 drinks a day  A drink of alcohol is 12 ounces of beer, 5 ounces of wine, or 1½ ounces of liquor  © 2017 [address] [facility] Information is for End User's use only and may not be sold, redistributed or otherwise used for commercial purposes  All illustrations and images included in CareNotes® are the copyrighted property of [employer]  or Blake Mendoza  The above information is an  only  It is not intended as medical advice for individual conditions or treatments  Talk to your doctor, nurse or pharmacist before following any medical regimen to see if it is safe and effective for you  Cigarette Smoking and Your Health   AMBULATORY CARE:   Risks to your health if you smoke:  Nicotine and other chemicals found in tobacco damage every cell in your body  Even if you are a light smoker, you have an increased risk for cancer, heart disease, and lung disease  If you are pregnant or have diabetes, smoking increases your risk for complications  Benefits to your health if you stop smoking:   · You decrease respiratory symptoms such as coughing, wheezing, and shortness of breath  · You reduce your risk for cancers of the lung, mouth, throat, kidney, bladder, pancreas, stomach, and cervix  If you already have cancer, you increase the benefits of chemotherapy  You also reduce your risk for cancer returning or a second cancer from developing  · You reduce your risk for heart disease, blood clots, heart attack, and stroke       · You reduce your risk for lung infections, and diseases such as pneumonia, asthma, chronic bronchitis, and emphysema  · Your circulation improves  More oxygen can be delivered to your body  If you have diabetes, you lower your risk for complications, such as kidney, artery, and eye diseases  You also lower your risk for nerve damage  Nerve damage can lead to amputations, poor vision, and blindness  · You improve your body's ability to heal and to fight infections  Benefits to the health of others if you stop smoking:  Tobacco is harmful to nonsmokers who breathe in your secondhand smoke  The following are ways the health of others around you may improve when you stop smoking:  · You lower the risks for lung cancer and heart disease in nonsmoking adults  · If you are pregnant, you lower the risk for miscarriage, early delivery, low birth weight, and stillbirth  You also lower your baby's risk for SIDS, obesity, developmental delay, and neurobehavioral problems, such as ADHD  · If you have children, you lower their risk for ear infections, colds, pneumonia, bronchitis, and asthma  For more information and support to stop smoking:   · [employer]  Phone: 6- 560 - 817-8333  Web Address: www Qu[employer]  Follow up with your healthcare provider as directed:  Write down your questions so you remember to ask them during your visits  © 2017 [address] Information is for End User's use only and may not be sold, redistributed or otherwise used for commercial purposes  All illustrations and images included in CareNotes® are the copyrighted property of A D A M , Inc  or Blake Mendoza  The above information is an  only  It is not intended as medical advice for individual conditions or treatments  Talk to your doctor, nurse or pharmacist before following any medical regimen to see if it is safe and effective for you

## 2019-09-27 NOTE — LETTER
September 27, 2019     Patient: Carlos Brochure   YOB: 1975   Date of Visit: 9/27/2019       To Whom it May Concern:    Houston Fisher is under my professional care  He was seen in my office on 9/27/2019  Please excuse Juliann Schaefer from 9/27 to 10/11/2019  He may return to work on 10/12/2019  If you have any questions or concerns, please don't hesitate to call           Sincerely,          Simba Post MD

## 2019-09-27 NOTE — ASSESSMENT & PLAN NOTE
Consider titrating his atorvastatin at his follow-up visit  I did not do that today as we are making other medication changes

## 2019-10-02 ENCOUNTER — OFFICE VISIT (OUTPATIENT)
Dept: DIABETES SERVICES | Facility: CLINIC | Age: 44
End: 2019-10-02
Payer: COMMERCIAL

## 2019-10-02 DIAGNOSIS — E11.65 TYPE 2 DIABETES MELLITUS WITH HYPERGLYCEMIA, WITH LONG-TERM CURRENT USE OF INSULIN (HCC): Primary | ICD-10-CM

## 2019-10-02 DIAGNOSIS — Z79.4 TYPE 2 DIABETES MELLITUS WITH HYPERGLYCEMIA, WITH LONG-TERM CURRENT USE OF INSULIN (HCC): Primary | ICD-10-CM

## 2019-10-02 PROCEDURE — 98960 EDU&TRN PT SELF-MGMT NQHP 1: CPT | Performed by: DIETITIAN, REGISTERED

## 2019-10-02 NOTE — PROGRESS NOTES
Type 2 Diabetes Class Assessment    HPI: Met with Antoni Key for DSME Initial visit  Wing Moreno has Type 2 Diabetes  He has been taking insulin as prescribed and is titrating doseas per Dr Cori Asher guidelines  States he is now taking 20 units daily and FBG this morning was 294  Diabetes Assessment  Visit Type: Initial visit  Present at Session: patient   Special Learning Needs: No  Barriers to Learning: no barriers    How do you feel about making lifestyle changes at this time? I have children, it's for my kids  How would you rate your current knowledge of diabetes? poor  How confident are you that will be able to take better control of your diabetes?: very good    How long have you had diabetes? About 10 years but I never took it seriously  Have you had diabetes education in the past?: Yes - but didn't take it seriously  Do you have any family members with diabetes?: Yes  Do you monitor your blood sugar? yes  Type of blood sugar monitor: AccuChek Guide  How old is your meter?: 2 weeks  How often do you test your blood sugars?:3 times daily  Do you keep a written record of your blood sugars? Yes   Blood sugar log with patient today and reviewed by educator?: No   Any financial concerns pertaining to your diabetes supplies, medication or care?: No, not yet  Have you ever experienced hypoglycemia?:  No  Have you ever been hospitalized or gone to the ER due to your blood sugars?: No  How do you treat low blood sugars?: educated  How do you treat high blood sugars? educated  Do you wear a Diabetes I D ?: no  Where do you dispose of your sharps (needles,lancetes)?: educated    Ht Readings from Last 1 Encounters:   09/27/19 5' 8 5" (1 74 m)     Wt Readings from Last 3 Encounters:   09/27/19 89 8 kg (198 lb)   09/25/19 90 7 kg (200 lb)   09/24/19 91 7 kg (202 lb 3 2 oz)        There is no height or weight on file to calculate BMI       Lab Results   Component Value Date    HGBA1C 10 9 (A) 09/24/2019    HGBA1C 8 5 12/04/2018    HGBA1C 11 8 09/11/2018       No results found for: CHOL  Lab Results   Component Value Date    HDL 71 (H) 01/11/2019     Lab Results   Component Value Date    LDLCALC 96 01/11/2019     Lab Results   Component Value Date    TRIG 87 01/11/2019     No results found for: CHOLHDL  No results found for: [place]    Weight Change: No    Diet Assessment    Do you follow any special diet presently?: No, but girlfriend won't let him have any sweets  Who cooks at home?:  girlfriend  Who does the grocery shopping?: girlfriend and patient   How frequently do you eat out?: not often    Activity Assessment    Exercise: walking the dog, using the stairs, home from work for 2 weeks post- stroke    Lifestyle/Social Assessment    Racial/ethnic group:                                       Primary Language: English  Marital Status: Single , has significant other and children  Education Level: High School Graduate   Work status: Full Time  Type of job and hours: 10 am - 7 pm  Who lives in your household?: significant other children  Who is you primary support person(s): friend(s)   Describe your quality of life currently?: good  Any concerns for your safety?: No  Any Methodist or cultural practices that may affect your diabetes care: No  Do you have a decrease or loss of hearing?: No  Do you have a decrease or loss of vision?: No  When was the last time you had an ophthalmology exam?: 8/2018  When was the last time you had dental exam?: not anymore, has full dentures  Do you check your feet for cracks, sores, debris?: No  When was the last time you had podiatry or foot exam?: last year  Last flu shot?: doesn't get them  Pneumonia shot?: No      The patient's history was reviewed and updated as appropriate: allergies, current medications      Intervention    Diabetes Overview :   Ciera Crawford was instructed on basic concepts of diabetes, including identifying role of diabetes self management, basic pathophysiology and types of diabetes, A1c and blood sugar targets  Karinbro Garcia has good understanding of material covered  Taking Medications: Instructed patient on action, side effects, efficacy, prescribed dosage and appropriate timing and frequency of administration of his diabetes medication  Keshia Garcia has good understanding of material covered  Monitoring Blood Sugars  Instructions for Meter Teaching- Patient instructed in the following:  Site selection and skin preparation, Loading strips and lancet device, meter activation, obtaining blood sample, test strip and lancet disposal and recording log book entries  Patient has good understanding of material covered and was able to test their own blood sugar in office today  Testing frequency: Encouraged pair testing  Test sugars before a meal and 2hr after the same meal, rotating between breakfast, lunch, and dinner  Test sugars twice a day (3 days a week, 7 days a week)  Goal Blood Sugars:   Premeal , even better <110  2hr after a meal <180, even better <140  A1C <7%, even better <6 5%  Hypoglycemia: Instructed patient on definition/risk of hypoglycemia, treatment, causes/symptoms, when to notify provider of lows, prevention of hypoglycemia and exercise precautions  Comments: Keshia Garcia verbalizes understanding of hypoglycemia concepts      Physical Activity: Discussed benefits of physical activity to optimize blood glucose control, encouraged activity at patient is physically able   Always consult a physician prior to starting an exercise program   Comments: Juan Ocampo understanding of hypoglycemia concepts        Diabetes Education Record  Keshia Garcia received the following handouts: Class Assessment education folder, class schedule      Patient response to instruction    Comprehensionvery good  Motivationvery good  Expected Compliancevery good    Thank you for referring your patient to [facility], it was a pleasure working with them today  Please feel free to call with any questions or concerns      Donya Stephenson  [address]  [address] [facility] [phone]

## 2019-10-02 NOTE — PATIENT INSTRUCTIONS
Class Assessment AVS    You are scheduled to attend Living Well with Diabetes Classes starting: November 5 at 9:30  Please bring a copy of your blood sugar log and pen with you to class  Testing frequency: 2-3 times per day    Goal Blood Sugars:   Premeal , even better <110  2hr after a meal <180, even better <140  A1C <7%, even better <6 5%  Thank you for coming to the [facility] for education today  Please feel free to call with any questions or concerns      Marcello Grissom  [address]  [address] [facility] [phone]

## 2019-10-06 DIAGNOSIS — I10 HYPERTENSION, UNSPECIFIED TYPE: ICD-10-CM

## 2019-10-07 RX ORDER — AMLODIPINE BESYLATE 10 MG/1
10 TABLET ORAL DAILY
Qty: 90 TABLET | Refills: 3 | Status: SHIPPED | OUTPATIENT
Start: 2019-10-07 | End: 2019-10-10 | Stop reason: SDUPTHER

## 2019-10-08 ENCOUNTER — PROCEDURE VISIT (OUTPATIENT)
Dept: NEUROLOGY | Facility: CLINIC | Age: 44
End: 2019-10-08
Payer: COMMERCIAL

## 2019-10-08 DIAGNOSIS — R29.898 LEFT ARM WEAKNESS: ICD-10-CM

## 2019-10-08 PROCEDURE — 95910 NRV CNDJ TEST 7-8 STUDIES: CPT | Performed by: PHYSICAL MEDICINE & REHABILITATION

## 2019-10-08 PROCEDURE — 95886 MUSC TEST DONE W/N TEST COMP: CPT | Performed by: PHYSICAL MEDICINE & REHABILITATION

## 2019-10-08 NOTE — PROGRESS NOTES
EMG 1 Limb     Date/Time 10/8/2019 11:22 AM     Performed by  Annamarie Yung MD     Authorized by Savita Hinton MD      Universal Protocol Consent: Verbal consent obtained  Risks and benefits: risks, benefits and alternatives were discussed  Consent given by: patient  Patient understanding: patient states understanding of the procedure being performed  Patient consent: the patient's understanding of the procedure matches consent given  Patient identity confirmed: verbally with patient               EMG LEFT UPPER EXTREMITY   78-year-old male  with past medical history of diabetes presents with chronic left arm weakness for the last 2 years    which seems to have been exacerbated by his recent stroke  He notes difficulty opening his hand in particular  Motor strength is  5/5 proximally and 4/5 distally  Patient is being evaluated for a focal neuropathy versus brachial plexopathy  Motor and sensory conduction studies were performed on the left median and ulnar nerves  The  median motor terminal latency was prolonged at 4 7 milliseconds with normal compound motor action potential amplitude and a slow conduction velocity of 48 m/sec across the wrist   The ulnar motor terminal latency was prolonged at 4 8 milliseconds with a low compound motor action potential amplitude of 2 5 mV and a slow conduction velocity 46 m/sec across the wrist and 29 m/sec across the elbow  The left  median and ulnar F waves were  prolonged at 33 6 and 39 0 milliseconds  The left median  sensory peak latency was prolonged at 4 5 milliseconds with a low sensory action potential amplitude of 11 microvolt  The ulnar  sensory peak latency was prolonged at 4 4 milliseconds with a low sensory action potential amplitude of 3 microvolt  The radial sensory action potential was normal   The medial antebrachial cutaneous sensory response was absent  The dorsal ulnar cutaneous sensory response was absent      Concentric needle EMG was performed in various distal and proximal muscles of the left upper extremity including APB, FDI, EIP, FCU, FDP 4,5 pronator teres, biceps, triceps and low cervical paraspinal region  There was no evidence of active denervation in any other muscles tested  Moderate decreased recruitment of giant motor units was noted in the FCU, FDP 4, 5, EIP and moderate decreased recruitment of giant and polyphasic motor units was noted in the APB and FDI  The compound motor unit action potentials were of normal configuration with interference patterns being full or full for effort in the remaining muscles tested  IMPRESSION:   There is electrophysiologic evidence of a chronic axonal brachial plexus lesion primarily affecting the lower trunk  Clinical correlation is recommended      INÉS Sanchez

## 2019-10-10 ENCOUNTER — OFFICE VISIT (OUTPATIENT)
Dept: NEPHROLOGY | Facility: CLINIC | Age: 44
End: 2019-10-10
Payer: COMMERCIAL

## 2019-10-10 VITALS
SYSTOLIC BLOOD PRESSURE: 136 MMHG | DIASTOLIC BLOOD PRESSURE: 78 MMHG | HEIGHT: 69 IN | BODY MASS INDEX: 29.84 KG/M2 | WEIGHT: 201.5 LBS | HEART RATE: 80 BPM

## 2019-10-10 DIAGNOSIS — I10 ESSENTIAL HYPERTENSION: Primary | ICD-10-CM

## 2019-10-10 DIAGNOSIS — N18.9 CHRONIC KIDNEY DISEASE, UNSPECIFIED CKD STAGE: ICD-10-CM

## 2019-10-10 DIAGNOSIS — R80.9 PROTEINURIA, UNSPECIFIED TYPE: ICD-10-CM

## 2019-10-10 DIAGNOSIS — I10 HYPERTENSION, UNSPECIFIED TYPE: ICD-10-CM

## 2019-10-10 PROBLEM — N18.30 STAGE 3 CHRONIC KIDNEY DISEASE (HCC): Status: RESOLVED | Noted: 2019-09-24 | Resolved: 2019-10-10

## 2019-10-10 PROCEDURE — 99214 OFFICE O/P EST MOD 30 MIN: CPT | Performed by: INTERNAL MEDICINE

## 2019-10-10 RX ORDER — AMLODIPINE BESYLATE 10 MG/1
10 TABLET ORAL DAILY
Qty: 90 TABLET | Refills: 3 | Status: SHIPPED | OUTPATIENT
Start: 2019-10-10 | End: 2020-07-24 | Stop reason: SDUPTHER

## 2019-10-10 NOTE — LETTER
October 10, 2019     Enedina Stevens DO  [address] [facility]  [address] [facility]    Patient: Estrada Mendez   YOB: 1975   Date of Visit: 10/10/2019       Dear Dr Guerda Avitia: Thank you for referring Gisell Persaud to me for evaluation  Below are my notes for this consultation  If you have questions, please do not hesitate to call me  I look forward to following your patient along with you  Sincerely,        Benjamin Humphrey MD        CC: No Recipients  Benjamin Humphrey MD  10/10/2019  5:50 PM  Sign at close encounter  NEPHROLOGY PROGRESS NOTE    Estrada Mendez 53 y o  male MRN: [id number]  Unit/Bed#:  Encounter: [id number]  Reason for Consult:  Proteinuria and chronic kidney disease    The patient was seen 1 time by me and it appears he did not really have all of his studies done that I wanted him to do  Unfortunately he did suffer a stroke with left-sided leg weakness he states he was not even kept in the hospital and went home but an MRI revealed a lacunar thalamic infarct  He states that he feels a little weakness in the left but he definitely was improved since his initial presentation  He is here for follow-up  ASSESSMENT/PLAN:  1  Renal    Patient's chronic kidney disease likely due to diabetic nephropathy  He had dipstick positive proteinuria and his renal function was normal when I initially met him  I initially was going to try and quantitate the proteinuria and also control his blood pressure is that was not controlled well in this would help delay progression  The patient did have the studies done but he is here for follow-up  His latest creatinine was 1 4 but that was when he was in the hospital and suffered a stroke in his blood sugars were high  I am unclear if there was to some pre renal azotemia whether not there has been progression which would be less likely    At this point his blood pressure is under better control with titration of his amlodipine and other medications  I am going to check a urine protein creatinine ratio  Important thing is to help delay progression or glycemic control blood pressure control he is on losartan and lipid control and I stressed the importance of these  He seems to have good insight at the present time with his recent stroke and he says he is eating much better so we will monitor his progression and progress  SUBJECTIVE:  Review of Systems   Constitution: Negative for chills and fever  HENT: Negative  Eyes: Negative  Cardiovascular: Negative  Negative for chest pain, dyspnea on exertion, leg swelling, orthopnea and palpitations  Respiratory: Negative  Negative for cough, shortness of breath and wheezing  Gastrointestinal: Negative  Negative for abdominal pain, diarrhea, nausea and vomiting  Genitourinary: Negative  Negative for dysuria, hematuria, incomplete emptying and urgency  Neurological:        Feels a little weak in his left leg but he is ambulating without difficulty  No other focal findings  Psychiatric/Behavioral: Negative  OBJECTIVE:  Current Weight: Weight - Scale: 91 4 kg (201 lb 8 oz)  [email or web address]:     Blood pressure 136/78, pulse 80, height 5' 8 5" (1 74 m), weight 91 4 kg (201 lb 8 oz)  , Body mass index is 30 19 kg/m²  [unfilled]    Physical Exam: /78 (BP Location: Left arm, Patient Position: Sitting, Cuff Size: Standard)   Pulse 80   Ht 5' 8 5" (1 74 m)   Wt 91 4 kg (201 lb 8 oz)   BMI 30 19 kg/m²    Physical Exam   Constitutional: He is oriented to person, place, and time  No distress  HENT:   Head: Atraumatic  Eyes: No scleral icterus  Neck: Neck supple  No JVD present  Cardiovascular: Normal rate and regular rhythm  Exam reveals no friction rub  No edema  Pulmonary/Chest: Effort normal and breath sounds normal  No respiratory distress  He has no wheezes  He has no rales  Abdominal: Soft  Bowel sounds are normal  He exhibits no distension  There is no tenderness  Neurological: He is oriented to person, place, and time  Psychiatric: He has a normal mood and affect  Medications:    Current Outpatient Medications:     amLODIPine (NORVASC) 10 mg tablet, Take 1 tablet (10 mg total) by mouth daily, Disp: 90 tablet, Rfl: 3    aspirin 81 mg chewable tablet, Chew 1 tablet (81 mg total) daily (Patient taking differently: Chew 81 mg as needed ), Disp: 30 tablet, Rfl: 2    atorvastatin (LIPITOR) 10 mg tablet, Take 1 tablet (10 mg total) by mouth daily, Disp: 90 tablet, Rfl: 3    Blood Glucose Monitoring Suppl (ONE TOUCH ULTRA MINI) w/Device KIT, As directed, Disp: , Rfl: 0    insulin glargine (BASAGLAR KWIKPEN) 100 units/mL injection pen, Start 16 units September 27, 2019  Titrate as directed  (Patient taking differently: 24 Units Start 16 units September 27, 2019   Titrate as directed ), Disp: 5 pen, Rfl: 3    Insulin Pen Needle (PEN NEEDLES 31GX5/16") 31G X 8 MM MISC, Inject as directed daily, Disp: 100 each, Rfl: 1    losartan (COZAAR) 100 MG tablet, Take 1 tablet (100 mg total) by mouth every morning, Disp: 90 tablet, Rfl: 3    metoprolol succinate (TOPROL-XL) 50 mg 24 hr tablet, Take 1 tablet (50 mg total) by mouth daily, Disp: 90 tablet, Rfl: 3    Laboratory Results:  Lab Results   Component Value Date    WBC 9 63 09/23/2019    HGB 13 4 09/23/2019    HCT 39 6 09/23/2019    MCV 90 09/23/2019     09/23/2019     Lab Results   Component Value Date    SODIUM 137 09/23/2019    K 3 9 09/23/2019     09/23/2019    CO2 28 09/23/2019    BUN 15 09/23/2019    CREATININE 1 43 (H) 09/23/2019    GLUC 251 09/27/2019    CALCIUM 8 2 (L) 09/23/2019     Lab Results   Component Value Date    CALCIUM 8 2 (L) 09/23/2019     No results found for: LABPROT

## 2019-10-10 NOTE — PROGRESS NOTES
NEPHROLOGY PROGRESS NOTE    Stiven Monreal 53 y o  male MRN: [id number]  Unit/Bed#:  Encounter: [id number]  Reason for Consult:  Proteinuria and chronic kidney disease    The patient was seen 1 time by me and it appears he did not really have all of his studies done that I wanted him to do  Unfortunately he did suffer a stroke with left-sided leg weakness he states he was not even kept in the hospital and went home but an MRI revealed a lacunar thalamic infarct  He states that he feels a little weakness in the left but he definitely was improved since his initial presentation  He is here for follow-up  ASSESSMENT/PLAN:  1  Renal    Patient's chronic kidney disease likely due to diabetic nephropathy  He had dipstick positive proteinuria and his renal function was normal when I initially met him  I initially was going to try and quantitate the proteinuria and also control his blood pressure is that was not controlled well in this would help delay progression  The patient did have the studies done but he is here for follow-up  His latest creatinine was 1 4 but that was when he was in the hospital and suffered a stroke in his blood sugars were high  I am unclear if there was to some pre renal azotemia whether not there has been progression which would be less likely  At this point his blood pressure is under better control with titration of his amlodipine and other medications  I am going to check a urine protein creatinine ratio  Important thing is to help delay progression or glycemic control blood pressure control he is on losartan and lipid control and I stressed the importance of these  He seems to have good insight at the present time with his recent stroke and he says he is eating much better so we will monitor his progression and progress  SUBJECTIVE:  Review of Systems   Constitution: Negative for chills and fever  HENT: Negative  Eyes: Negative  Cardiovascular: Negative    Negative for chest pain, dyspnea on exertion, leg swelling, orthopnea and palpitations  Respiratory: Negative  Negative for cough, shortness of breath and wheezing  Gastrointestinal: Negative  Negative for abdominal pain, diarrhea, nausea and vomiting  Genitourinary: Negative  Negative for dysuria, hematuria, incomplete emptying and urgency  Neurological:        Feels a little weak in his left leg but he is ambulating without difficulty  No other focal findings  Psychiatric/Behavioral: Negative  OBJECTIVE:  Current Weight: Weight - Scale: 91 4 kg (201 lb 8 oz)  [email or web address]     Blood pressure 136/78, pulse 80, height 5' 8 5" (1 74 m), weight 91 4 kg (201 lb 8 oz)  , Body mass index is 30 19 kg/m²  [unfilled]    Physical Exam: /78 (BP Location: Left arm, Patient Position: Sitting, Cuff Size: Standard)   Pulse 80   Ht 5' 8 5" (1 74 m)   Wt 91 4 kg (201 lb 8 oz)   BMI 30 19 kg/m²   Physical Exam   Constitutional: He is oriented to person, place, and time  No distress  HENT:   Head: Atraumatic  Eyes: No scleral icterus  Neck: Neck supple  No JVD present  Cardiovascular: Normal rate and regular rhythm  Exam reveals no friction rub  No edema  Pulmonary/Chest: Effort normal and breath sounds normal  No respiratory distress  He has no wheezes  He has no rales  Abdominal: Soft  Bowel sounds are normal  He exhibits no distension  There is no tenderness  Neurological: He is oriented to person, place, and time  Psychiatric: He has a normal mood and affect         Medications:    Current Outpatient Medications:     amLODIPine (NORVASC) 10 mg tablet, Take 1 tablet (10 mg total) by mouth daily, Disp: 90 tablet, Rfl: 3    aspirin 81 mg chewable tablet, Chew 1 tablet (81 mg total) daily (Patient taking differently: Chew 81 mg as needed ), Disp: 30 tablet, Rfl: 2    atorvastatin (LIPITOR) 10 mg tablet, Take 1 tablet (10 mg total) by mouth daily, Disp: 90 tablet, Rfl: 3    Blood Glucose Monitoring Suppl (ONE TOUCH ULTRA MINI) w/Device KIT, As directed, Disp: , Rfl: 0    insulin glargine (BASAGLAR KWIKPEN) 100 units/mL injection pen, Start 16 units September 27, 2019  Titrate as directed  (Patient taking differently: 24 Units Start 16 units September 27, 2019   Titrate as directed ), Disp: 5 pen, Rfl: 3    Insulin Pen Needle (PEN NEEDLES 31GX5/16") 31G X 8 MM MISC, Inject as directed daily, Disp: 100 each, Rfl: 1    losartan (COZAAR) 100 MG tablet, Take 1 tablet (100 mg total) by mouth every morning, Disp: 90 tablet, Rfl: 3    metoprolol succinate (TOPROL-XL) 50 mg 24 hr tablet, Take 1 tablet (50 mg total) by mouth daily, Disp: 90 tablet, Rfl: 3    Laboratory Results:  Lab Results   Component Value Date    WBC 9 63 09/23/2019    HGB 13 4 09/23/2019    HCT 39 6 09/23/2019    MCV 90 09/23/2019     09/23/2019     Lab Results   Component Value Date    SODIUM 137 09/23/2019    K 3 9 09/23/2019     09/23/2019    CO2 28 09/23/2019    BUN 15 09/23/2019    CREATININE 1 43 (H) 09/23/2019    GLUC 251 09/27/2019    CALCIUM 8 2 (L) 09/23/2019     Lab Results   Component Value Date    CALCIUM 8 2 (L) 09/23/2019     No results found for: LABPROT

## 2019-10-14 ENCOUNTER — OFFICE VISIT (OUTPATIENT)
Dept: FAMILY MEDICINE CLINIC | Facility: CLINIC | Age: 44
End: 2019-10-14
Payer: COMMERCIAL

## 2019-10-14 VITALS
RESPIRATION RATE: 18 BRPM | OXYGEN SATURATION: 98 % | WEIGHT: 199 LBS | HEIGHT: 69 IN | DIASTOLIC BLOOD PRESSURE: 80 MMHG | HEART RATE: 92 BPM | SYSTOLIC BLOOD PRESSURE: 120 MMHG | BODY MASS INDEX: 29.47 KG/M2

## 2019-10-14 DIAGNOSIS — F17.200 CURRENT SMOKER: ICD-10-CM

## 2019-10-14 DIAGNOSIS — Z79.4 UNCONTROLLED TYPE 2 DIABETES MELLITUS WITH HYPERGLYCEMIA, WITH LONG-TERM CURRENT USE OF INSULIN (HCC): ICD-10-CM

## 2019-10-14 DIAGNOSIS — I63.9 INFARCTION OF RIGHT THALAMUS (HCC): ICD-10-CM

## 2019-10-14 DIAGNOSIS — I10 ESSENTIAL HYPERTENSION: ICD-10-CM

## 2019-10-14 DIAGNOSIS — Z79.4 TYPE 2 DIABETES MELLITUS WITH HYPERGLYCEMIA, WITH LONG-TERM CURRENT USE OF INSULIN (HCC): Primary | ICD-10-CM

## 2019-10-14 DIAGNOSIS — E11.65 TYPE 2 DIABETES MELLITUS WITH HYPERGLYCEMIA, WITH LONG-TERM CURRENT USE OF INSULIN (HCC): Primary | ICD-10-CM

## 2019-10-14 DIAGNOSIS — E11.65 UNCONTROLLED TYPE 2 DIABETES MELLITUS WITH HYPERGLYCEMIA, WITH LONG-TERM CURRENT USE OF INSULIN (HCC): ICD-10-CM

## 2019-10-14 DIAGNOSIS — G98.8 NEUROLOGIC DISORDER: ICD-10-CM

## 2019-10-14 DIAGNOSIS — S14.3XXS BRACHIAL PLEXUS INJURY, LEFT, SEQUELA: ICD-10-CM

## 2019-10-14 DIAGNOSIS — E66.3 OVERWEIGHT WITH BODY MASS INDEX (BMI) 25.0-29.9: ICD-10-CM

## 2019-10-14 PROCEDURE — 3074F SYST BP LT 130 MM HG: CPT | Performed by: FAMILY MEDICINE

## 2019-10-14 PROCEDURE — 99214 OFFICE O/P EST MOD 30 MIN: CPT | Performed by: FAMILY MEDICINE

## 2019-10-14 PROCEDURE — 3008F BODY MASS INDEX DOCD: CPT | Performed by: FAMILY MEDICINE

## 2019-10-14 PROCEDURE — 3079F DIAST BP 80-89 MM HG: CPT | Performed by: FAMILY MEDICINE

## 2019-10-14 RX ORDER — ASPIRIN 81 MG/1
81 TABLET, CHEWABLE ORAL DAILY
Qty: 30 TABLET | Refills: 2 | Status: ON HOLD | OUTPATIENT
Start: 2019-10-14 | End: 2021-09-29 | Stop reason: SDUPTHER

## 2019-10-14 RX ORDER — ATORVASTATIN CALCIUM 80 MG/1
80 TABLET, FILM COATED ORAL DAILY
Qty: 90 TABLET | Refills: 3 | Status: SHIPPED | OUTPATIENT
Start: 2019-10-14 | End: 2020-07-24 | Stop reason: SDUPTHER

## 2019-10-14 NOTE — LETTER
October 15, 2019     Patient: Naa Marshall   YOB: 1975   Date of Visit: 10/14/2019       To Whom it May Concern:    Osmany Nye is under my professional care  He was seen in my office on 10/14/2019  He may return to work on 10/28/2019  If you have any questions or concerns, please don't hesitate to call           Sincerely,          Jose Guadalupe Lujan MD

## 2019-10-14 NOTE — PROGRESS NOTES
Assessment/Plan:       Problem List Items Addressed This Visit        Endocrine    Type 2 diabetes mellitus with hyperglycemia, with long-term current use of insulin ([facility] [address] ) - Primary       Lab Results   Component Value Date    HGBA1C 10 9 (A) 09/24/2019    Glucose readings are improving  He is going to continue to monitor his glucose routinely and he can continue to titrate up the Basaglar by 2 units if his fasting sugars remained above 150 for 2 days in a row  Relevant Medications    insulin glargine (BASAGLAR KWIKPEN) 100 units/mL injection pen    Other Relevant Orders    Lipid Panel with Direct LDL reflex    Comprehensive metabolic panel    CBC and differential    Hemoglobin A1C       Cardiovascular and Mediastinum    Essential hypertension     Continue current regimen  Relevant Orders    Lipid Panel with Direct LDL reflex    Comprehensive metabolic panel    Hemoglobin A1C       Nervous and Auditory    Infarction of right thalamus ([facility] [address] ) - Lacunar ( 9/19)     Blood pressure has improved significantly  Continue current regimen  He will be seeing Neurology later this week  He also will now bump up his atorvastatin to 80 mg daily  He had been tolerating 10 mg daily  Contact me if he is having any significant myalgias  Repeat labs within 3 months for A1c and lipids  Brachial plexus injury, left, sequela     He appears to have a chronic brachial plexus injury for least the past 2 years  He denies any prior history of trauma  He will be seeing Neurology later this week and I would appreciate their input on this chronic hand weakness  He does have some obvious atrophy of the interosseous muscles as well  Other    Current smoker     He did initiate Chantix  He has been cutting back             Other Visit Diagnoses     Overweight with body mass index (BMI) 25 0-29 9        Neurologic disorder        possible subacute CVA    Relevant Medications    aspirin 81 mg chewable tablet Uncontrolled type 2 diabetes mellitus with hyperglycemia, with long-term current use of insulin (HCC)        Relevant Medications    atorvastatin (LIPITOR) 80 mg tablet    insulin glargine (BASAGLAR KWIKPEN) 100 units/mL injection pen    Other Relevant Orders    Lipid Panel with Direct LDL reflex    Comprehensive metabolic panel    Hemoglobin A1C            Tobacco Cessation Counseling: Tobacco cessation counseling was provided  The patient is sincerely urged to quit consumption of tobacco  He is ready to quit tobacco  Medication options and side effects of medication discussed  Varenicline (chantix) was prescribed  He is taking Chantix currently  Subjective:      Patient ID: Anjel Tolliver is a 53 y o  male  HPI patient presents today for follow-up for his recent stroke and hypertension  Fortunately, he is tolerating amlodipine and his blood pressure seems to be coming down significantly  He has type 2 diabetes with improved control since he is back on insulin and titrating upward  He has had no hypoglycemia  He denies polyuria or polydipsia  In regards to his recent thalamic stroke he, he notes no significant deficit at this time  He does have chronic left arm weakness and I did an EMG which reveals a brachial plexus injury  The patient denies any known history of trauma but does note some weakening of his hand over the past 2 years  This does not appear to have anything to do with his recent stroke  He has some chronic kidney disease which is stable on recent lab work  He has hyperlipidemia and has tolerated statin therapy in the form of atorvastatin 10 mg        The following portions of the patient's history were reviewed and updated as appropriate: allergies, current medications, past family history, past medical history, past social history, past surgical history and problem list       Current Outpatient Medications:     amLODIPine (NORVASC) 10 mg tablet, Take 1 tablet (10 mg total) by mouth daily, Disp: 90 tablet, Rfl: 3    aspirin 81 mg chewable tablet, Chew 1 tablet (81 mg total) daily, Disp: 30 tablet, Rfl: 2    atorvastatin (LIPITOR) 80 mg tablet, Take 1 tablet (80 mg total) by mouth daily, Disp: 90 tablet, Rfl: 3    Blood Glucose Monitoring Suppl (ONE TOUCH ULTRA MINI) w/Device KIT, As directed, Disp: , Rfl: 0    insulin glargine (BASAGLAR KWIKPEN) 100 units/mL injection pen, 24 units daily  Titrate to units for fasting glucose of 150 for 2 days in a row, Disp: 5 pen, Rfl: 3    Insulin Pen Needle (PEN NEEDLES 31GX5/16") 31G X 8 MM MISC, Inject as directed daily, Disp: 100 each, Rfl: 1    losartan (COZAAR) 100 MG tablet, Take 1 tablet (100 mg total) by mouth every morning, Disp: 90 tablet, Rfl: 3    metoprolol succinate (TOPROL-XL) 50 mg 24 hr tablet, Take 1 tablet (50 mg total) by mouth daily, Disp: 90 tablet, Rfl: 3     Review of Systems   Constitutional: Negative for appetite change, chills, fatigue, fever and unexpected weight change  HENT: Negative for trouble swallowing  Eyes: Negative for visual disturbance  Respiratory: Negative for cough, chest tightness, shortness of breath and wheezing  Cardiovascular: Negative for chest pain  Gastrointestinal: Negative for abdominal distention, abdominal pain, blood in stool, constipation and diarrhea  Endocrine: Negative for polyuria  Genitourinary: Negative for difficulty urinating and flank pain  Musculoskeletal: Negative for arthralgias and myalgias  Skin: Negative for rash  Neurological: Negative for dizziness and light-headedness  Hematological: Negative for adenopathy  Does not bruise/bleed easily  Psychiatric/Behavioral: Negative for sleep disturbance  Objective:      /80   Pulse 92   Resp 18   Ht 5' 8 5" (1 74 m)   Wt 90 3 kg (199 lb)   SpO2 98%   BMI 29 82 kg/m²          Physical Exam   Constitutional: He is oriented to person, place, and time   He appears well-developed and well-nourished  No distress  HENT:   Head: Normocephalic  Eyes: Pupils are equal, round, and reactive to light  Right eye exhibits no discharge  Left eye exhibits no discharge  Neck: No tracheal deviation present  No thyromegaly present  Cardiovascular: Normal rate, regular rhythm and normal heart sounds  No murmur heard  Pulmonary/Chest: Effort normal  No respiratory distress  He has no wheezes  He has no rales  Abdominal: Soft  He exhibits no distension  There is no tenderness  Musculoskeletal: Normal range of motion  He exhibits no edema  He has some chronic weakness of his left   He has some notable atrophy of his left hand  Lymphadenopathy:     He has no cervical adenopathy  Neurological: He is alert and oriented to person, place, and time  No cranial nerve deficit  Skin: Skin is warm  He is not diaphoretic  No erythema  Psychiatric: He has a normal mood and affect   Judgment and thought content normal          Elizabeth Price MD

## 2019-10-14 NOTE — ASSESSMENT & PLAN NOTE
Lab Results   Component Value Date    HGBA1C 10 9 (A) 09/24/2019    Glucose readings are improving  He is going to continue to monitor his glucose routinely and he can continue to titrate up the Basaglar by 2 units if his fasting sugars remained above 150 for 2 days in a row

## 2019-10-14 NOTE — ASSESSMENT & PLAN NOTE
He appears to have a chronic brachial plexus injury for least the past 2 years  He denies any prior history of trauma  He will be seeing Neurology later this week and I would appreciate their input on this chronic hand weakness  He does have some obvious atrophy of the interosseous muscles as well

## 2019-10-14 NOTE — ASSESSMENT & PLAN NOTE
Blood pressure has improved significantly  Continue current regimen  He will be seeing Neurology later this week  He also will now bump up his atorvastatin to 80 mg daily  He had been tolerating 10 mg daily  Contact me if he is having any significant myalgias  Repeat labs within 3 months for A1c and lipids

## 2019-10-14 NOTE — LETTER
October 14, 2019     Patient: Rebekah Grey   YOB: 1975   Date of Visit: 10/14/2019       To Whom it May Concern:    Mami Muñoz is under my professional care  He was seen in my office on 10/14/2019  He may return to work on 09/28/2019       If you have any questions or concerns, please don't hesitate to call           Sincerely,          Jackelyn Singh MD

## 2019-10-17 ENCOUNTER — CONSULT (OUTPATIENT)
Dept: NEUROLOGY | Facility: CLINIC | Age: 44
End: 2019-10-17
Payer: COMMERCIAL

## 2019-10-17 VITALS
DIASTOLIC BLOOD PRESSURE: 80 MMHG | SYSTOLIC BLOOD PRESSURE: 130 MMHG | HEART RATE: 72 BPM | WEIGHT: 199 LBS | BODY MASS INDEX: 29.47 KG/M2 | HEIGHT: 69 IN

## 2019-10-17 DIAGNOSIS — I63.9 INFARCTION OF RIGHT THALAMUS (HCC): ICD-10-CM

## 2019-10-17 DIAGNOSIS — S14.3XXS BRACHIAL PLEXUS INJURY, LEFT, SEQUELA: Primary | ICD-10-CM

## 2019-10-17 PROCEDURE — 99245 OFF/OP CONSLTJ NEW/EST HI 55: CPT | Performed by: PSYCHIATRY & NEUROLOGY

## 2019-10-17 NOTE — PROGRESS NOTES
Juanjose Skelton is a 53 y o  male  Chief Complaint   Patient presents with    Cerebrovascular Accident       Assessment:  1, Right  thalamic lacunar CVA  2  Brachial plexus injury left sequela  Discussion:  Patient's recent MRI scan of the brain was reviewed, he has a 4 mm acute right thalamic lacunar infarction which could be secondary to his history of diabetes, would recommend a carotid ultrasound also would recommend evaluation by cardiologist to rule out any other etiology of his stroke, continue with aspirin, stroke education given to the patient, to keep his blood pressure cholesterol and sugar under control, also we discussed regarding his EMG findings in the left arm weakness which has been going on for the last 2 years, looks like that he has some lower trunk brachial plexus involvement he denies any history of trauma, would recommend an MRI scan of the left brachial plexus, patient was advised physical therapy, also would recommend evaluation by an orthopedic surgeon, for to go to the hospital if has any stroke-like symptoms and call us otherwise to see me back in 2 months and follow up with his other physicians      Subjective:    HPI   Patient is here for evaluation of left leg weakness that has resolved, it happened a few days ago, he went to the ER on September 23rd for some dizziness and left leg weakness for 1 day and this send him home, he was seen by his family physician and had an MRI scan of the brain that showed he had a small right thalamic lacunar stroke, since then his weakness has resolved he denies any headaches no vision difficulty no dizziness no speech difficulty, at baseline he has left arm weakness for the last 2 years and denies having any history of trauma, he does have some atrophy of the muscles of his hand, and he had an EMG that shows chronic axonal brachial plexus lesion primarily affecting the lower trunk, no other complaints    Vitals:    10/17/19 1521   BP: 130/80   BP Location: Left arm   Patient Position: Sitting   Cuff Size: Adult   Pulse: 72   Weight: 90 3 kg (199 lb)   Height: 5' 8 5" (1 74 m)       Current Medications    Current Outpatient Medications:     amLODIPine (NORVASC) 10 mg tablet, Take 1 tablet (10 mg total) by mouth daily, Disp: 90 tablet, Rfl: 3    aspirin 81 mg chewable tablet, Chew 1 tablet (81 mg total) daily, Disp: 30 tablet, Rfl: 2    atorvastatin (LIPITOR) 80 mg tablet, Take 1 tablet (80 mg total) by mouth daily, Disp: 90 tablet, Rfl: 3    Blood Glucose Monitoring Suppl (ONE TOUCH ULTRA MINI) w/Device KIT, As directed, Disp: , Rfl: 0    insulin glargine (BASAGLAR KWIKPEN) 100 units/mL injection pen, 24 units daily  Titrate to units for fasting glucose of 150 for 2 days in a row, Disp: 5 pen, Rfl: 3    Insulin Pen Needle (PEN NEEDLES 31GX5/16") 31G X 8 MM MISC, Inject as directed daily, Disp: 100 each, Rfl: 1    losartan (COZAAR) 100 MG tablet, Take 1 tablet (100 mg total) by mouth every morning, Disp: 90 tablet, Rfl: 3    metoprolol succinate (TOPROL-XL) 50 mg 24 hr tablet, Take 1 tablet (50 mg total) by mouth daily, Disp: 90 tablet, Rfl: 3      Allergies  Patient has no known allergies  Past Medical History  Past Medical History:   Diagnosis Date    Diabetes mellitus ([facility] [address] )     Hypertension          Past Surgical History:  History reviewed  No pertinent surgical history  Family History:  Family History   Problem Relation Age of Onset    No Known Problems Mother     No Known Problems Father        Social History:   reports that he has been smoking cigarettes  He has been smoking about 0 50 packs per day  He has never used smokeless tobacco  He reports that he drinks alcohol  He reports that he does not use drugs  I have reviewed the past medical history, surgical history, social and family history, current medications, allergies vitals, review of systems, and updated this information as appropriate today     Objective:    Physical Exam    Neurological Exam    GENERAL:  Cooperative in no acute distress  Well-developed and well-nourished    HEAD and NECK   Head is atraumatic normocephalic with no lesions or masses  Neck is supple with full range of motion    CARDIOVASCULAR  Carotid Arteries-no carotid bruits  NEUROLOGIC:  Mental Status-the patient is awake alert and oriented without aphasia or apraxia  Cranial Nerves: Visual fields are full to confrontation  Discs are flat  Limited exam as unable to dilate the pupils Extraocular movements are full without nystagmus  Pupils are 2-1/2 mm and reactive  Face is symmetrical to light touch  Movements of facial expression move symmetrically  Hearing is normal to finger rub bilaterally  Soft palate lifts symmetrically  Shoulder shrug is symmetrical  Tongue is midline without atrophy  Motor: No drift is noted on arm extension  Strength is full in the upper and lower extremities with normal bulk and tone  Except for wasting in the thenar and hypothenar muscles of the left hand and left hand  is decreased compared to the right with 5/-5 strength in left upper extremity proximally and distally, I did not see any weakness of the left leg but patient does complain of slight limping while walking  Sensory:  Decreased light touch pinprick temperature sensation in a glove and stocking distribution Cortical function is intact  Coordination: Finger to nose testing is performed accurately  Romberg is negative  Gait reveals a normal base with symmetrical arm swing  Tandem walk is normal   Reflexes:       1+ and symmetrical Toes are downgoing  No cervical spine tenderness          ROS:  Review of Systems   Constitutional: Negative  Negative for appetite change, chills, fatigue and fever  HENT: Negative  Negative for hearing loss, tinnitus, trouble swallowing and voice change  Eyes: Negative  Negative for photophobia, pain and visual disturbance  Respiratory: Negative    Negative for shortness of breath and wheezing  Cardiovascular: Negative  Negative for chest pain and palpitations  Gastrointestinal: Negative  Negative for nausea and vomiting  Endocrine: Negative  Negative for cold intolerance and heat intolerance  Genitourinary: Negative  Negative for dysuria, frequency and urgency  Musculoskeletal: Positive for gait problem (leg drop, left )  Negative for arthralgias, back pain, myalgias, neck pain and neck stiffness  Skin: Negative  Negative for rash  Allergic/Immunologic: Negative  Neurological: Negative for dizziness, tremors, seizures, syncope, facial asymmetry, speech difficulty, weakness, light-headedness, numbness and headaches  Hematological: Negative  Does not bruise/bleed easily  Psychiatric/Behavioral: Negative  Negative for confusion, decreased concentration, hallucinations and sleep disturbance

## 2019-10-25 ENCOUNTER — OFFICE VISIT (OUTPATIENT)
Dept: FAMILY MEDICINE CLINIC | Facility: CLINIC | Age: 44
End: 2019-10-25
Payer: COMMERCIAL

## 2019-10-25 VITALS
DIASTOLIC BLOOD PRESSURE: 80 MMHG | BODY MASS INDEX: 29.62 KG/M2 | WEIGHT: 200 LBS | SYSTOLIC BLOOD PRESSURE: 138 MMHG | HEART RATE: 80 BPM | OXYGEN SATURATION: 97 % | HEIGHT: 69 IN | RESPIRATION RATE: 18 BRPM

## 2019-10-25 DIAGNOSIS — I10 ESSENTIAL HYPERTENSION: ICD-10-CM

## 2019-10-25 DIAGNOSIS — Z79.4 TYPE 2 DIABETES MELLITUS WITH HYPERGLYCEMIA, WITH LONG-TERM CURRENT USE OF INSULIN (HCC): ICD-10-CM

## 2019-10-25 DIAGNOSIS — Z00.00 WELL ADULT HEALTH CHECK: Primary | ICD-10-CM

## 2019-10-25 DIAGNOSIS — F17.200 CURRENT SMOKER: ICD-10-CM

## 2019-10-25 DIAGNOSIS — I63.9 INFARCTION OF RIGHT THALAMUS (HCC): ICD-10-CM

## 2019-10-25 DIAGNOSIS — N18.9 CHRONIC KIDNEY DISEASE, UNSPECIFIED CKD STAGE: ICD-10-CM

## 2019-10-25 DIAGNOSIS — E11.65 TYPE 2 DIABETES MELLITUS WITH HYPERGLYCEMIA, WITH LONG-TERM CURRENT USE OF INSULIN (HCC): ICD-10-CM

## 2019-10-25 PROCEDURE — 99396 PREV VISIT EST AGE 40-64: CPT | Performed by: FAMILY MEDICINE

## 2019-10-25 RX ORDER — VARENICLINE TARTRATE 25 MG
KIT ORAL
Qty: 53 TABLET | Refills: 0 | Status: SHIPPED | OUTPATIENT
Start: 2019-10-25 | End: 2020-07-24 | Stop reason: ALTCHOICE

## 2019-10-25 RX ORDER — VARENICLINE TARTRATE 1 MG/1
1 TABLET, FILM COATED ORAL 2 TIMES DAILY
Qty: 56 TABLET | Refills: 1 | Status: SHIPPED | OUTPATIENT
Start: 2019-10-25 | End: 2020-07-24 | Stop reason: ALTCHOICE

## 2019-10-25 NOTE — PROGRESS NOTES
FAMILY PRACTICE OFFICE VISIT  Laquita SANCHEZ O  [facility]  [id number]   [address] [place], [place], [address]      NAME: Juanjose Skelton  AGE: 53 y o  SEX: male  : 1975   MRN: [id number]    DATE: 10/25/2019  TIME: 12:04 PM    Assessment and Plan     Problem List Items Addressed This Visit        Endocrine    Type 2 diabetes mellitus with hyperglycemia, with long-term current use of insulin (HCC)    Relevant Medications    insulin glargine (BASAGLAR KWIKPEN) 100 units/mL injection pen       Cardiovascular and Mediastinum    Essential hypertension       Nervous and Auditory    Infarction of right thalamus (HCC) - Lacunar ( )       Genitourinary    CKD (chronic kidney disease)       Other    Current smoker    Relevant Medications    varenicline (CHANTIX STARTING MONTH ) 0 5 MG X 11 & 1 MG X 42 tablet    varenicline (CHANTIX CONTINUING MONTH ) 1 mg tablet      Other Visit Diagnoses     Well adult health check    -  Primary          Patient Instructions   He is medically stable here today, has noted improvement regarding left-sided weakness/symptoms thalamic stroke which was seen on MRI  His blood pressure is much improved, 138/80, he will continue with medication as is, we did review his visit with Nephrology, he will be seeing them again in follow-up  He does have slip to redo fasting blood work/urine test in December  His sugars are improving, redo A1c in December, A1c in September was 10 9  We do need to get this at least less than 7  He will use 28 units insulin daily, call us if drops to 70 or less  Keep working at [employer]  He will be doing diabetic Education classes  We did review Neurology consultation, EMG showed findings brachial plexus left upper extremity, he does have upcoming MRI brachial plexus, neurology notes states they are referring to Orthopedics      Continue to work at controlling cardiovascular risk, history thalamic stroke with left-sided symptoms  Improving, he will be returning the full-time work in 3 days  Stay on aspirin 81 mg daily, atorvastatin 80 mg daily  _________________________________________________________________________    He does not do yearly Flu shot  Declines  Tdap/tetanus shot was declined  (done every 10 yrs for superficial cuts, every 5 yrs for deep wounds)  Declines Pneumovax  Currently smoking 3 to 4 cigarettes daily  he did use Chantix in the past, wishes to retry, prescription given for starter pack along with continuing pack  He tolerated it without issue in the past   He is well aware the risks associated with smoking, try not to smoke in the car on his way to work  Regarding Colon Cancer screening, we discussed Colonoscopy vs ColoGuard is indicated starting at age 36-53  He will do colonoscopy age 46  Discussed Prostate Cancer screening pros/cons starting at age 48    Continue to try to watch healthy diet, exercise routinely  We will see him again in 4 months, sooner as needed  Chief Complaint     Chief Complaint   Patient presents with    Annual Exam       History of Present Illness   Dhiraj Joel is a 53y o -year-old male who is in for a PE-  See prev re stroke/ diabetes htn etc -      He notes his left leg is getting stronger, still with some paresthesias/weakness left arm, EMG showed findings brachial plexus, has upcoming MRI  He did see Neurology, saw Nephrology  He did see Dr Howard Abdi here in follow-up, he is using 26 units insulin, sugars are improving, was 140 this morning, has run as low as 116 in the morning  No other hypoglycemic readings  Runs 180/190 later in the day  Still smokes few cigarettes daily, wishes to restart Chantix  Will be returning to work in 3 days      Review of Systems   Review of Systems   Constitutional: Positive for fatigue  Negative for appetite change, fever and unexpected weight change     HENT: Negative for sore throat and trouble swallowing  Respiratory: Negative for cough, chest tightness, shortness of breath and wheezing  Cardiovascular: Negative for chest pain, palpitations and leg swelling  Gastrointestinal: Negative for abdominal pain, blood in stool, nausea and vomiting  No acid reflux     No change in bowel   Genitourinary: Negative for dysuria and hematuria  Skin: Negative for rash and wound  Neurological: Negative for dizziness, seizures, syncope, light-headedness and headaches  Weakness left leg minimal/improved, paresthesias left upper extremity with mild weakness somewhat improved but still present   Hematological: Does not bruise/bleed easily  Psychiatric/Behavioral: Negative for behavioral problems and confusion  Active Problem List     Patient Active Problem List   Diagnosis    Hypercholesterolemia    Proteinuria    Current smoker    Essential hypertension    Type 2 diabetes mellitus with hyperglycemia, with long-term current use of insulin ([facility] [facility] )    Infarction of right thalamus ([facility] ) - Lacunar ( 9/19)    Left arm weakness    CKD (chronic kidney disease)    Brachial plexus injury, left, sequela       Past Medical History:  Reviewed    Past Surgical History:  Reviewed    Family History:  Reviewed    Social History:  Reviewed    Objective     Vitals:    10/25/19 0841   BP: 138/80   Pulse: 80   Resp: 18   SpO2: 97%   Weight: 90 7 kg (200 lb)   Height: 5' 8 5" (1 74 m)     Body mass index is 29 97 kg/m²  BP Readings from Last 3 Encounters:   10/25/19 138/80   10/17/19 130/80   10/14/19 120/80       Wt Readings from Last 3 Encounters:   10/25/19 90 7 kg (200 lb)   10/17/19 90 3 kg (199 lb)   10/14/19 90 3 kg (199 lb)       Physical Exam   Constitutional: He is oriented to person, place, and time  He appears well-developed and well-nourished  No distress  HENT:   TM clear   Eyes: No scleral icterus     Cardiovascular: Normal rate, regular rhythm and normal heart sounds  No murmur heard  No carotid bruit   Pulmonary/Chest: Effort normal and breath sounds normal  No respiratory distress  Abdominal: Soft  There is no tenderness  Musculoskeletal: He exhibits no edema  Lymphadenopathy:     He has no cervical adenopathy  Neurological: He is alert and oriented to person, place, and time  Slight decreased sensation to touch left upper extremity, very mild weakness left leg/arm verses right  See Neurology notes  Psychiatric: He has a normal mood and affect  His behavior is normal        ALLERGIES:  No Known Allergies    Current Medications     Current Outpatient Medications   Medication Sig Dispense Refill    amLODIPine (NORVASC) 10 mg tablet Take 1 tablet (10 mg total) by mouth daily 90 tablet 3    aspirin 81 mg chewable tablet Chew 1 tablet (81 mg total) daily 30 tablet 2    atorvastatin (LIPITOR) 80 mg tablet Take 1 tablet (80 mg total) by mouth daily 90 tablet 3    insulin glargine (BASAGLAR KWIKPEN) 100 units/mL injection pen Inject 28 Units under the skin daily Or as directed 5 pen 3    losartan (COZAAR) 100 MG tablet Take 1 tablet (100 mg total) by mouth every morning 90 tablet 3    metoprolol succinate (TOPROL-XL) 50 mg 24 hr tablet Take 1 tablet (50 mg total) by mouth daily 90 tablet 3    Blood Glucose Monitoring Suppl (ONE TOUCH ULTRA MINI) w/Device KIT As directed  0    Insulin Pen Needle (PEN NEEDLES 31GX5/16") 31G X 8 MM MISC Inject as directed daily 100 each 1    varenicline (CHANTIX CONTINUING MONTH DANIELLE) 1 mg tablet Take 1 tablet (1 mg total) by mouth 2 (two) times a day ( after using starter pack) 56 tablet 1    varenicline (CHANTIX STARTING MONTH DANIELLE) 0 5 MG X 11 & 1 MG X 42 tablet Take one 0 5mg tab by mouth 1x daily for 3 days, then increase to one 0 5mg tab 2x daily for 3 days, then increase to one 1mg tab 2x daily 53 tablet 0     No current facility-administered medications for this visit               No orders of the defined types were placed in this encounter          Sharri AcostarDO

## 2019-10-25 NOTE — PATIENT INSTRUCTIONS
He is medically stable here today, has noted improvement regarding left-sided weakness/symptoms thalamic stroke which was seen on MRI  His blood pressure is much improved, 138/80, he will continue with medication as is, we did review his visit with Nephrology, he will be seeing them again in follow-up  He does have slip to redo fasting blood work/urine test in December  His sugars are improving, redo A1c in December, A1c in September was 10 9  We do need to get this at least less than 7  He will use 28 units insulin daily, call us if drops to 70 or less  Keep working at [employer]  He will be doing diabetic Education classes  We did review Neurology consultation, EMG showed findings brachial plexus left upper extremity, he does have upcoming MRI brachial plexus, neurology notes states they are referring to Orthopedics  Continue to work at controlling cardiovascular risk, history thalamic stroke with left-sided symptoms  Improving, he will be returning the full-time work in 3 days  Stay on aspirin 81 mg daily, atorvastatin 80 mg daily  _________________________________________________________________________    He does not do yearly Flu shot  Declines  Tdap/tetanus shot was declined  (done every 10 yrs for superficial cuts, every 5 yrs for deep wounds)  Declines Pneumovax  Currently smoking 3 to 4 cigarettes daily  he did use Chantix in the past, wishes to retry, prescription given for starter pack along with continuing pack  He tolerated it without issue in the past   He is well aware the risks associated with smoking, try not to smoke in the car on his way to work  Regarding Colon Cancer screening, we discussed Colonoscopy vs ColoGuard is indicated starting at age 36-53  He will do colonoscopy age 46  Discussed Prostate Cancer screening pros/cons starting at age 48    Continue to try to watch healthy diet, exercise routinely      We will see him again in 4 months, sooner as needed

## 2019-10-30 DIAGNOSIS — E11.65 TYPE 2 DIABETES MELLITUS WITH HYPERGLYCEMIA, WITH LONG-TERM CURRENT USE OF INSULIN (HCC): ICD-10-CM

## 2019-10-30 DIAGNOSIS — Z79.4 TYPE 2 DIABETES MELLITUS WITH HYPERGLYCEMIA, WITH LONG-TERM CURRENT USE OF INSULIN (HCC): ICD-10-CM

## 2019-10-30 NOTE — TELEPHONE ENCOUNTER
Received a message from pt requesting a refill for his basaglar, Called pt , left message to contact his [facility] pharmacy,  Cynthea Dancer was approve by Dr Kianna Valencia on 10/25/2015

## 2019-11-05 ENCOUNTER — OFFICE VISIT (OUTPATIENT)
Dept: DIABETES SERVICES | Facility: CLINIC | Age: 44
End: 2019-11-05
Payer: COMMERCIAL

## 2019-11-05 DIAGNOSIS — Z79.4 TYPE 2 DIABETES MELLITUS WITH HYPERGLYCEMIA, WITH LONG-TERM CURRENT USE OF INSULIN (HCC): Primary | ICD-10-CM

## 2019-11-05 DIAGNOSIS — E11.65 TYPE 2 DIABETES MELLITUS WITH HYPERGLYCEMIA, WITH LONG-TERM CURRENT USE OF INSULIN (HCC): Primary | ICD-10-CM

## 2019-11-05 PROCEDURE — 98962 EDU&TRN PT SLF-MGMT NQHP 5-8: CPT | Performed by: DIETITIAN, REGISTERED

## 2019-11-05 NOTE — PROGRESS NOTES
Living Well with Diabetes Group Class #1    Gladys Gordon attended the Living Well with Diabetes Group Class #1  Topics Covered in class today include: What is diabetes; Types of Diabetes; How Diabetes is diagnosed; Management skills; the role of exercise in blood sugar managements, Home glucose monitoring, and target ranges  Foy Gottron participated in group activities    The patient's history was reviewed and updated as appropriate: allergies, current medications  Lab Results   Component Value Date    HGBA1C 10 9 (A) 09/24/2019       Diabetes Education Record  Foy Gottron was provided Living Well with Diabetes Class #1 book      Patient response to instruction    Comprehensiongood  Motivationgood  Expected Compliancegood    Start- Stop: 9:30 am - 11:30 am  Total Minutes: 120 Minutes  Group or Individual Instruction: DSMT - G - LW1  Other: Marco Paiz MD    Thank you for referring your patient to [facility], it was a pleasure working with them today  Please feel free to call with any questions or concerns      Missael Acosta [address]  [place] [phone]

## 2019-11-27 ENCOUNTER — TELEPHONE (OUTPATIENT)
Dept: DIABETES SERVICES | Facility: CLINIC | Age: 44
End: 2019-11-27

## 2020-01-17 ENCOUNTER — TELEPHONE (OUTPATIENT)
Dept: NEPHROLOGY | Facility: CLINIC | Age: 45
End: 2020-01-17

## 2020-01-17 NOTE — TELEPHONE ENCOUNTER
I called patient to schedule March follow up with Dr Conchita Rivera in our [facility] but patient was not accepting calls at this time  I will try again

## 2020-02-13 ENCOUNTER — TELEPHONE (OUTPATIENT)
Dept: NEPHROLOGY | Facility: CLINIC | Age: 45
End: 2020-02-13

## 2020-02-13 NOTE — TELEPHONE ENCOUNTER
I left a message for patient to schedule March follow up with Dr Olvin Ayala in our [facility]

## 2020-03-25 NOTE — DISCHARGE INSTRUCTIONS
March 26, 2020     Delaney Rehman    Patient: Delaney Rehman   YOB: 2000   Date of Visit: 3/25/2020       To Whom it May Concern:     Shama Dossjosué is under my professional care  He was seen by video visit on 3/25/20  He may return to work on 03/30/2020  Please excuse him for the following dates: 3/25/20, 3/26/20, 3/27/20, 3/28/20, and 3/29/20      If you have any questions or concerns, please don't hesitate to call                  Sincerely,       Renny Washburn, DO Take the Bactrim twice daily for the next 10 days  The number for the general surgeon is included in these discharge instructions  Call to follow up in the office for your recurrent abscesses  Try the hydrochlorothiazide for your blood pressure, discuss this with your family doctor, you should be managed by them  Abscess   WHAT YOU NEED TO KNOW:   A warm compress may help your abscess drain  Your healthcare provider may make a cut in the abscess so it can drain  You may need surgery to remove an abscess that is on your hands or buttocks  DISCHARGE INSTRUCTIONS:   Return to the emergency department if:   · The area around your abscess becomes very painful, warm, or has red streaks  · You have a fever and chills  · Your heart is beating faster than usual      · You feel faint or confused  Contact your healthcare provider if:   · Your abscess gets bigger or does not get better  · Your abscess returns  · You have questions or concerns about your condition or care  Medicines: You may  need any of the following:  · Antibiotics  help treat a bacterial infection  · Acetaminophen  decreases pain and fever  It is available without a doctor's order  Ask how much to take and how often to take it  Follow directions  Acetaminophen can cause liver damage if not taken correctly  · NSAIDs , such as ibuprofen, help decrease swelling, pain, and fever  This medicine is available with or without a doctor's order  NSAIDs can cause stomach bleeding or kidney problems in certain people  If you take blood thinner medicine, always ask your healthcare provider if NSAIDs are safe for you  Always read the medicine label and follow directions  · Take your medicine as directed  Contact your healthcare provider if you think your medicine is not helping or if you have side effects  Tell him or her if you are allergic to any medicine  Keep a list of the medicines, vitamins, and herbs you take   Include the amounts, and when and why you take them  Bring the list or the pill bottles to follow-up visits  Carry your medicine list with you in case of an emergency  Self-care:   · Apply a warm compress to your abscess  This will help it open and drain  Wet a washcloth in warm, but not hot, water  Apply the compress for 10 minutes  Repeat this 4 times each day  Do not  press on an abscess or try to open it with a needle  You may push the bacteria deeper or into your blood  · Do not share your clothes, towels, or sheets with anyone  This can spread the infection to others  · Wash your hands often  This can help prevent the spread of germs  Use soap and water or an alcohol-based hand rub  Care for your wound after it is drained:   · Care for your wound as directed  If your healthcare provider says it is okay, carefully remove the bandage and gauze packing  You may need to soak the gauze to get it out of your wound  Clean your wound and the area around it as directed  Dry the area and put on new, clean bandages  Change your bandages when they get wet or dirty  · Ask your healthcare provider how to change the gauze in your wound  Keep track of how many pieces of gauze are placed inside the wound  Do not put too much packing in the wound  Do not pack the gauze too tightly in your wound  Follow up with your healthcare provider in 1 to 3 days: You may need to have your packing removed or your bandage changed  Write down your questions so you remember to ask them during your visits  © 2017 [employer] Information is for End User's use only and may not be sold, redistributed or otherwise used for commercial purposes  All illustrations and images included in CareNotes® are the copyrighted property of [employer]  or Blake Mendoza  The above information is an  only  It is not intended as medical advice for individual conditions or treatments   Talk to your doctor, nurse or pharmacist before following any medical regimen to see if it is safe and effective for you

## 2020-04-16 ENCOUNTER — TELEPHONE (OUTPATIENT)
Dept: NEPHROLOGY | Facility: CLINIC | Age: 45
End: 2020-04-16

## 2020-07-24 ENCOUNTER — OFFICE VISIT (OUTPATIENT)
Dept: FAMILY MEDICINE CLINIC | Facility: CLINIC | Age: 45
End: 2020-07-24
Payer: COMMERCIAL

## 2020-07-24 VITALS
TEMPERATURE: 97.8 F | HEIGHT: 69 IN | WEIGHT: 196 LBS | HEART RATE: 76 BPM | BODY MASS INDEX: 29.03 KG/M2 | DIASTOLIC BLOOD PRESSURE: 78 MMHG | SYSTOLIC BLOOD PRESSURE: 140 MMHG | OXYGEN SATURATION: 100 %

## 2020-07-24 DIAGNOSIS — M79.89 SWELLING OF LOWER EXTREMITY: ICD-10-CM

## 2020-07-24 DIAGNOSIS — Z79.4 UNCONTROLLED TYPE 2 DIABETES MELLITUS WITH HYPERGLYCEMIA, WITH LONG-TERM CURRENT USE OF INSULIN (HCC): ICD-10-CM

## 2020-07-24 DIAGNOSIS — E11.65 UNCONTROLLED TYPE 2 DIABETES MELLITUS WITH HYPERGLYCEMIA, WITH LONG-TERM CURRENT USE OF INSULIN (HCC): ICD-10-CM

## 2020-07-24 DIAGNOSIS — E11.65 TYPE 2 DIABETES MELLITUS WITH HYPERGLYCEMIA, WITH LONG-TERM CURRENT USE OF INSULIN (HCC): ICD-10-CM

## 2020-07-24 DIAGNOSIS — E66.3 OVERWEIGHT WITH BODY MASS INDEX (BMI) OF 29 TO 29.9 IN ADULT: ICD-10-CM

## 2020-07-24 DIAGNOSIS — I10 ESSENTIAL HYPERTENSION: Primary | ICD-10-CM

## 2020-07-24 DIAGNOSIS — Z79.4 TYPE 2 DIABETES MELLITUS WITH HYPERGLYCEMIA, WITH LONG-TERM CURRENT USE OF INSULIN (HCC): ICD-10-CM

## 2020-07-24 DIAGNOSIS — F17.200 CURRENT SMOKER: ICD-10-CM

## 2020-07-24 DIAGNOSIS — R80.9 PROTEINURIA, UNSPECIFIED TYPE: ICD-10-CM

## 2020-07-24 DIAGNOSIS — I63.9 INFARCTION OF RIGHT THALAMUS (HCC): ICD-10-CM

## 2020-07-24 DIAGNOSIS — Z23 NEED FOR TDAP VACCINATION: ICD-10-CM

## 2020-07-24 DIAGNOSIS — R80.9 MICROALBUMINURIA: ICD-10-CM

## 2020-07-24 DIAGNOSIS — I10 HYPERTENSION, UNSPECIFIED TYPE: ICD-10-CM

## 2020-07-24 DIAGNOSIS — N18.9 CHRONIC KIDNEY DISEASE, UNSPECIFIED CKD STAGE: ICD-10-CM

## 2020-07-24 DIAGNOSIS — E11.42 DIABETIC POLYNEUROPATHY ASSOCIATED WITH TYPE 2 DIABETES MELLITUS (HCC): ICD-10-CM

## 2020-07-24 LAB — SL AMB POCT HEMOGLOBIN AIC: 11.4 (ref ?–6.5)

## 2020-07-24 PROCEDURE — 4010F ACE/ARB THERAPY RXD/TAKEN: CPT | Performed by: FAMILY MEDICINE

## 2020-07-24 PROCEDURE — 3046F HEMOGLOBIN A1C LEVEL >9.0%: CPT | Performed by: FAMILY MEDICINE

## 2020-07-24 PROCEDURE — 90471 IMMUNIZATION ADMIN: CPT

## 2020-07-24 PROCEDURE — 4004F PT TOBACCO SCREEN RCVD TLK: CPT | Performed by: FAMILY MEDICINE

## 2020-07-24 PROCEDURE — 3078F DIAST BP <80 MM HG: CPT | Performed by: FAMILY MEDICINE

## 2020-07-24 PROCEDURE — 3077F SYST BP >= 140 MM HG: CPT | Performed by: FAMILY MEDICINE

## 2020-07-24 PROCEDURE — 3008F BODY MASS INDEX DOCD: CPT | Performed by: FAMILY MEDICINE

## 2020-07-24 PROCEDURE — 3066F NEPHROPATHY DOC TX: CPT | Performed by: FAMILY MEDICINE

## 2020-07-24 PROCEDURE — 90715 TDAP VACCINE 7 YRS/> IM: CPT

## 2020-07-24 PROCEDURE — 99214 OFFICE O/P EST MOD 30 MIN: CPT | Performed by: FAMILY MEDICINE

## 2020-07-24 PROCEDURE — 83036 HEMOGLOBIN GLYCOSYLATED A1C: CPT | Performed by: FAMILY MEDICINE

## 2020-07-24 RX ORDER — METOPROLOL SUCCINATE 50 MG/1
50 TABLET, EXTENDED RELEASE ORAL DAILY
Qty: 90 TABLET | Refills: 3 | Status: SHIPPED | OUTPATIENT
Start: 2020-07-24 | End: 2021-11-23 | Stop reason: SDUPTHER

## 2020-07-24 RX ORDER — LOSARTAN POTASSIUM 100 MG/1
100 TABLET ORAL EVERY MORNING
Qty: 90 TABLET | Refills: 3 | Status: SHIPPED | OUTPATIENT
Start: 2020-07-24 | End: 2021-09-29 | Stop reason: HOSPADM

## 2020-07-24 RX ORDER — ATORVASTATIN CALCIUM 80 MG/1
80 TABLET, FILM COATED ORAL DAILY
Qty: 90 TABLET | Refills: 3 | Status: ON HOLD | OUTPATIENT
Start: 2020-07-24 | End: 2021-09-29 | Stop reason: SDUPTHER

## 2020-07-24 RX ORDER — AMLODIPINE BESYLATE 10 MG/1
10 TABLET ORAL DAILY
Qty: 90 TABLET | Refills: 3 | Status: SHIPPED | OUTPATIENT
Start: 2020-07-24 | End: 2021-11-23 | Stop reason: SDUPTHER

## 2020-07-24 NOTE — PROGRESS NOTES
Diabetic Foot Exam    Patient's shoes and socks removed  Right Foot/Ankle   Right Foot Inspection  Skin Exam: skin normal, skin intact and dry skin no warmth, no callus, no erythema, no maceration, no abnormal color, no pre-ulcer, no ulcer and no callus                            Sensory   Vibration: intact  Proprioception: intact   Monofilament testing: diminished  Vascular    The right DP pulse is 2+  The right PT pulse is 2+  Right Toe  - Comprehensive Exam  Ecchymosis: none  Arch: normal  Hammertoes: absent  Claw Toes: absent  Swelling: none   Tenderness: none         Left Foot/Ankle  Left Foot Inspection  Skin Exam: skin normal and skin intactno warmth, no erythema, no maceration, normal color, no pre-ulcer, no ulcer and no callus                                         Sensory   Vibration: intact  Proprioception: intact  Monofilament: diminished  Vascular    The left DP pulse is 2+  The left PT pulse is 2+  Left Toe  - Comprehensive Exam  Ecchymosis: none  Arch: normal  Hammertoes: absent  Claw toes: absent  Swelling: none   Tenderness: none       Assign Risk Category:  No deformity present; Loss of protective sensation;  No weak pulses
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
## 2020-07-24 NOTE — PROGRESS NOTES
Diabetic Foot Exam    Patient's shoes and socks removed  Right Foot/Ankle   Right Foot Inspection  Skin Exam: skin intact and dry skin                            Sensory       Monofilament testing: diminished  Vascular    The right DP pulse is 2+  The right PT pulse is 2+  Right Toe  - Comprehensive Exam  Ecchymosis: none  Arch: normal  Hammertoes: absent  Claw Toes: absent  Swelling: none   Tenderness: none         Left Foot/Ankle  Left Foot Inspection  Skin Exam: skin intact and dry skin                                         Sensory       Monofilament: diminished  Vascular    The left DP pulse is 2+  The left PT pulse is 2+  Left Toe  - Comprehensive Exam  Ecchymosis: none  Arch: normal  Hammertoes: absent  Claw toes: absent  Swelling: none   Tenderness: none       Assign Risk Category:  No deformity present; No loss of protective sensation;  No weak pulses       Risk: 0

## 2020-07-24 NOTE — PATIENT INSTRUCTIONS
His blood pressure here today is improved vs last year's readings but still not ideal, appears he was not using metoprolol, he should use metoprolol succinate 50 mg daily in p m , losartan 100 mg every morning, stay on amlodipine 10 mg daily  Does have some ankle swelling, possibly related to amlodipine, but we need to reassess kidney function, does have known chronic kidney disease along with protein in urine  Has not yet done blood work, slip given to do blood work along with urine test     Sugars are out of control, he lost his meter, I did write script for new meter  He should check his sugars and drop those readings off with us in 1 to 2 weeks, for now he will continue insulin 28 units daily  I did place order for him to see endocrinology regarding uncontrolled diabetes  Does have degree neuropathy in feet already  Should see eye doctor routinely  History CVA, continue aspirin 81 mg daily, atorvastatin 80 mg daily  Still smokes 1/2 pack per day, has cut down, did use Chantix, keep trying to quit, well aware risks associated with this  He held off on doing MRI brachial plexus on left, feels weakness has improved  We will see him again in 3 to 4 months with a physical   Also await endocrinology consult  Await update regarding sugars

## 2020-08-04 DIAGNOSIS — E11.65 TYPE 2 DIABETES MELLITUS WITH HYPERGLYCEMIA, WITH LONG-TERM CURRENT USE OF INSULIN (HCC): Primary | ICD-10-CM

## 2020-08-04 DIAGNOSIS — Z79.4 TYPE 2 DIABETES MELLITUS WITH HYPERGLYCEMIA, WITH LONG-TERM CURRENT USE OF INSULIN (HCC): Primary | ICD-10-CM

## 2020-08-04 RX ORDER — BLOOD-GLUCOSE METER
EACH MISCELLANEOUS
Qty: 100 EACH | Refills: 3 | Status: SHIPPED | OUTPATIENT
Start: 2020-08-04 | End: 2022-02-04

## 2020-08-04 NOTE — TELEPHONE ENCOUNTER
Pt needs a new glucose meter  Onetouch ultra mini is not available but Onetouch ultra 2 is   Order put in pending approval

## 2020-08-18 ENCOUNTER — OFFICE VISIT (OUTPATIENT)
Dept: FAMILY MEDICINE CLINIC | Facility: CLINIC | Age: 45
End: 2020-08-18
Payer: COMMERCIAL

## 2020-08-18 VITALS
OXYGEN SATURATION: 98 % | WEIGHT: 195 LBS | HEART RATE: 89 BPM | BODY MASS INDEX: 29.22 KG/M2 | SYSTOLIC BLOOD PRESSURE: 140 MMHG | TEMPERATURE: 98.4 F | DIASTOLIC BLOOD PRESSURE: 80 MMHG

## 2020-08-18 DIAGNOSIS — E11.42 DIABETIC POLYNEUROPATHY ASSOCIATED WITH TYPE 2 DIABETES MELLITUS (HCC): ICD-10-CM

## 2020-08-18 DIAGNOSIS — F17.200 CURRENT SMOKER: ICD-10-CM

## 2020-08-18 DIAGNOSIS — N18.9 CHRONIC KIDNEY DISEASE, UNSPECIFIED CKD STAGE: ICD-10-CM

## 2020-08-18 DIAGNOSIS — Z79.4 TYPE 2 DIABETES MELLITUS WITH HYPERGLYCEMIA, WITH LONG-TERM CURRENT USE OF INSULIN (HCC): ICD-10-CM

## 2020-08-18 DIAGNOSIS — I63.9 INFARCTION OF RIGHT THALAMUS (HCC): ICD-10-CM

## 2020-08-18 DIAGNOSIS — S14.3XXS BRACHIAL PLEXUS INJURY, LEFT, SEQUELA: ICD-10-CM

## 2020-08-18 DIAGNOSIS — R26.89 IMBALANCE: Primary | ICD-10-CM

## 2020-08-18 DIAGNOSIS — E11.65 TYPE 2 DIABETES MELLITUS WITH HYPERGLYCEMIA, WITH LONG-TERM CURRENT USE OF INSULIN (HCC): ICD-10-CM

## 2020-08-18 DIAGNOSIS — R29.898 LEFT ARM WEAKNESS: ICD-10-CM

## 2020-08-18 DIAGNOSIS — I10 ESSENTIAL HYPERTENSION: ICD-10-CM

## 2020-08-18 PROCEDURE — 3046F HEMOGLOBIN A1C LEVEL >9.0%: CPT | Performed by: FAMILY MEDICINE

## 2020-08-18 PROCEDURE — 99214 OFFICE O/P EST MOD 30 MIN: CPT | Performed by: FAMILY MEDICINE

## 2020-08-18 PROCEDURE — 3066F NEPHROPATHY DOC TX: CPT | Performed by: FAMILY MEDICINE

## 2020-08-18 PROCEDURE — 4004F PT TOBACCO SCREEN RCVD TLK: CPT | Performed by: FAMILY MEDICINE

## 2020-08-18 PROCEDURE — 3079F DIAST BP 80-89 MM HG: CPT | Performed by: FAMILY MEDICINE

## 2020-08-18 PROCEDURE — 3077F SYST BP >= 140 MM HG: CPT | Performed by: FAMILY MEDICINE

## 2020-08-18 NOTE — PATIENT INSTRUCTIONS
- history lacunar infarct lateral right thalamus September 2019, recent increased imbalance, has diabetic neuropathy, history brachial plexus left upper extremity with weakness  Would like him to do MRI head to rule out new CVA, do echocardiogram to rule out embolic source, does have hypertension, uncontrolled diabetes  See recent visit with me  Home sugar readings have been improving, was down to 92 this morning  Blood pressure 140/80 here today  He will be seeing Endocrinology September 14  He will continue with medication as is including aspirin 81 mg daily  Await testing  Can start physical therapy  If any worsening I would like him to present to the emergency room  Nephrology had been trying to get a hold of him to set up follow-up  Other treatment as discussed at recent office visit    Still needs to quit smoking -  Currently 1/2 PPD      OOW 8/17 - RTW 8/24

## 2020-08-18 NOTE — PROGRESS NOTES
FAMILY PRACTICE OFFICE VISIT  Mal[facility] SANCHEZ [facility]  [facility]  [address]  [facility] Jake Boyce [place], [place], [address]      NAME: Sarah Giang  AGE: 50 y o  SEX: male  : 1975   MRN: [id number]    DATE: 2020  TIME: 5:01 PM    Assessment and Plan     Problem List Items Addressed This Visit        Endocrine    Type 2 diabetes mellitus with hyperglycemia, with long-term current use of insulin ([address] )    Relevant Orders    Echo complete with contrast if indicated    Diabetic polyneuropathy associated with type 2 diabetes mellitus ([address] )       Cardiovascular and Mediastinum    Essential hypertension    Relevant Orders    Echo complete with contrast if indicated       Nervous and Auditory    Infarction of right thalamus ([address] ) - Lacunar ( )    Relevant Orders    Echo complete with contrast if indicated    MRI brain wo contrast    Ambulatory referral to Physical Therapy    Brachial plexus injury, left, sequela    Relevant Orders    Ambulatory referral to Physical Therapy       Genitourinary    CKD (chronic kidney disease)       Other    Current smoker    Left arm weakness    Relevant Orders    Ambulatory referral to Physical Therapy      Other Visit Diagnoses     Imbalance    -  Primary    Relevant Orders    MRI brain wo contrast    Ambulatory referral to Physical Therapy          Patient Instructions   - history lacunar infarct lateral right thalamus 2019, recent increased imbalance, has diabetic neuropathy, history brachial plexus left upper extremity with weakness  Would like him to do MRI head to rule out new CVA, do echocardiogram to rule out embolic source, does have hypertension, uncontrolled diabetes  See recent visit with me  Home sugar readings have been improving, was down to 92 this morning  Blood pressure 140/80 here today  He will be seeing Endocrinology       He will continue with medication as is including aspirin 81 mg daily  Await testing  Can start physical therapy  If any worsening I would like him to present to the emergency room  Nephrology had been trying to get a hold of him to set up follow-up  Other treatment as discussed at recent office visit    Still needs to quit smoking -  Currently 1/2 PPD  OOW 8/17 - RTW 8/24      Chief Complaint     Chief Complaint   Patient presents with    Dizziness     X 1 month worsening    Foot Swelling     right foot swelling, left foot is better       History of Present Illness   Mulugeta Gallagher is a 50y o -year-old male who I had seen on July 24th for regular visit, he relates he was sent home from work yesterday due to losing his balance, falling to the side while walking, like I was drunk but I was not drinking-denies full spinning sensation  Did have CVA last September with similar symptoms  No increased weakness left hand, see previous, never did MRI regarding brachial plexus  No focal weakness in legs, does feel his legs just give out at times  Sugar readings have been improving, no low readings  Unfortunately does continue to smoke  He relates he is using all medication as directed  Has had no chest pain or increased palpitations, no increased shortness of breath  Review of Systems   Review of Systems   Constitutional: Negative for appetite change, fatigue, fever and unexpected weight change  HENT: Negative for sore throat and trouble swallowing  Eyes:        No vision loss versus baseline   Respiratory: Negative for cough, chest tightness, shortness of breath and wheezing  Cardiovascular: Negative for chest pain, palpitations and leg swelling  Gastrointestinal: Negative for abdominal pain, blood in stool, nausea and vomiting  No acid reflux     No change in bowel   Genitourinary: Negative for dysuria and hematuria  Neurological: Negative for seizures, syncope, facial asymmetry, speech difficulty, light-headedness and headaches  See HPI   Hematological: Does not bruise/bleed easily  Psychiatric/Behavioral: Negative for behavioral problems and confusion  Active Problem List     Patient Active Problem List   Diagnosis    Hypercholesterolemia    Proteinuria    Current smoker    Essential hypertension    Type 2 diabetes mellitus with hyperglycemia, with long-term current use of insulin ([facility])    Infarction of right thalamus ([facility] ) - Lacunar ( 9/19)    Left arm weakness    CKD (chronic kidney disease)    Brachial plexus injury, left, sequela    Diabetic polyneuropathy associated with type 2 diabetes mellitus ([facility] )    Overweight with body mass index (BMI) of 29 to 29 9 in adult       Past Medical History:  Reviewed    Past Surgical History:  Reviewed    Family History:  Reviewed    Social History:  Reviewed    Objective     Vitals:    08/18/20 1354   BP: 140/80   BP Location: Left arm   Patient Position: Sitting   Cuff Size: Standard   Pulse: 89   Temp: 98 4 °F (36 9 °C)   SpO2: 98%   Weight: 88 5 kg (195 lb)     Body mass index is 29 22 kg/m²  BP Readings from Last 3 Encounters:   08/18/20 140/80   07/24/20 140/78   10/25/19 138/80       Wt Readings from Last 3 Encounters:   08/18/20 88 5 kg (195 lb)   07/24/20 88 9 kg (196 lb)   10/25/19 90 7 kg (200 lb)       Physical Exam  Constitutional:       Comments: Pleasant 22-year-old, appears older than stated age  Comfortable seated in chair  He arises from chair, able to stand for 3 to 5 seconds unassisted on each leg  Does fall slightly to left after this  No focal weakness in legs, muscle strength appears equal, intact  Decreased  left hand appears similar to prior visits, 4/5  Decreased sensation lateral hand as before  Alert, oriented x3  Mental status appears as at baseline  He is not orthostatic here to   Eyes:      General: No scleral icterus  Extraocular Movements: Extraocular movements intact        Conjunctiva/sclera: Conjunctivae normal  Pupils: Pupils are equal, round, and reactive to light  Cardiovascular:      Rate and Rhythm: Normal rate and regular rhythm  Heart sounds: Murmur (1/6 systolic ejection murmur left sternal border) present  Pulmonary:      Effort: Pulmonary effort is normal  No respiratory distress  Breath sounds: Normal breath sounds  No wheezing, rhonchi or rales  Abdominal:      Palpations: Abdomen is soft  Tenderness: There is no abdominal tenderness  Musculoskeletal:      Right lower leg: No edema  Left lower leg: No edema  Skin:     Coloration: Skin is not jaundiced  Neurological:      Comments: See constitutional above   Psychiatric:         Mood and Affect: Mood normal          Behavior: Behavior normal          ALLERGIES:  No Known Allergies    Current Medications     Current Outpatient Medications   Medication Sig Dispense Refill    amLODIPine (NORVASC) 10 mg tablet Take 1 tablet (10 mg total) by mouth daily 90 tablet 3    aspirin 81 mg chewable tablet Chew 1 tablet (81 mg total) daily 30 tablet 2    atorvastatin (LIPITOR) 80 mg tablet Take 1 tablet (80 mg total) by mouth daily 90 tablet 3    Blood Glucose Monitoring Suppl (ONE TOUCH ULTRA 2) w/Device KIT Test once daily or as directed 100 each 3    Blood Glucose Monitoring Suppl (ONE TOUCH ULTRA MINI) w/Device KIT As directed  0    insulin glargine (BASAGLAR KWIKPEN) 100 units/mL injection pen Inject 28 Units under the skin daily Or as directed 5 pen 3    Insulin Pen Needle (PEN NEEDLES 31GX5/16") 31G X 8 MM MISC Inject as directed daily 100 each 1    losartan (COZAAR) 100 MG tablet Take 1 tablet (100 mg total) by mouth every morning 90 tablet 3    metoprolol succinate (TOPROL-XL) 50 mg 24 hr tablet Take 1 tablet (50 mg total) by mouth daily 90 tablet 3     No current facility-administered medications for this visit               Orders Placed This Encounter   Procedures    MRI brain wo contrast    Ambulatory referral to Physical Therapy    Echo complete with contrast if indicated         Kobe Gustafson,

## 2020-08-18 NOTE — LETTER
August 18, 2020     Patient: Vickie Willingham   YOB: 1975   Date of Visit: 8/18/2020       To Whom it May Concern:    Art Clap is under my professional care  He was seen in my office on 8/18/2020  He is out of work as of 8/17/20 -  He can return to full duty as of Monday 8/24/20  If you have any questions or concerns, please don't hesitate to call           Sincerely,          Kobe Gustafson, DO        CC: No Recipients

## 2020-09-08 ENCOUNTER — HOSPITAL ENCOUNTER (OUTPATIENT)
Dept: MRI IMAGING | Facility: CLINIC | Age: 45
Discharge: HOME | End: 2020-09-08
Payer: COMMERCIAL

## 2020-09-08 DIAGNOSIS — R26.89 IMBALANCE: ICD-10-CM

## 2020-09-08 DIAGNOSIS — I63.9 INFARCTION OF RIGHT THALAMUS (HCC): ICD-10-CM

## 2020-09-08 PROCEDURE — G1004 CDSM NDSC: HCPCS

## 2020-09-08 PROCEDURE — 70551 MRI BRAIN STEM W/O DYE: CPT

## 2020-09-11 ENCOUNTER — HOSPITAL ENCOUNTER (OUTPATIENT)
Dept: NON INVASIVE DIAGNOSTICS | Facility: CLINIC | Age: 45
Discharge: HOME | End: 2020-09-11
Payer: COMMERCIAL

## 2020-09-11 DIAGNOSIS — E11.65 TYPE 2 DIABETES MELLITUS WITH HYPERGLYCEMIA, WITH LONG-TERM CURRENT USE OF INSULIN (HCC): ICD-10-CM

## 2020-09-11 DIAGNOSIS — I10 ESSENTIAL HYPERTENSION: ICD-10-CM

## 2020-09-11 DIAGNOSIS — I63.9 INFARCTION OF RIGHT THALAMUS (HCC): ICD-10-CM

## 2020-09-11 DIAGNOSIS — Z79.4 TYPE 2 DIABETES MELLITUS WITH HYPERGLYCEMIA, WITH LONG-TERM CURRENT USE OF INSULIN (HCC): ICD-10-CM

## 2020-09-11 PROCEDURE — 93306 TTE W/DOPPLER COMPLETE: CPT

## 2020-09-11 PROCEDURE — 93306 TTE W/DOPPLER COMPLETE: CPT | Performed by: INTERNAL MEDICINE

## 2020-09-12 PROBLEM — I35.1 NONRHEUMATIC AORTIC VALVE INSUFFICIENCY: Status: ACTIVE | Noted: 2020-09-12

## 2020-09-17 ENCOUNTER — TELEMEDICINE (OUTPATIENT)
Dept: ENDOCRINOLOGY | Facility: CLINIC | Age: 45
End: 2020-09-17
Payer: COMMERCIAL

## 2020-09-17 DIAGNOSIS — E11.65 TYPE 2 DIABETES MELLITUS WITH HYPERGLYCEMIA, WITH LONG-TERM CURRENT USE OF INSULIN (HCC): Primary | ICD-10-CM

## 2020-09-17 DIAGNOSIS — Z79.4 CURRENT USE OF INSULIN (HCC): ICD-10-CM

## 2020-09-17 DIAGNOSIS — Z79.4 TYPE 2 DIABETES MELLITUS WITH HYPERGLYCEMIA, WITH LONG-TERM CURRENT USE OF INSULIN (HCC): Primary | ICD-10-CM

## 2020-09-17 DIAGNOSIS — I63.9 INFARCTION OF RIGHT THALAMUS (HCC): ICD-10-CM

## 2020-09-17 PROCEDURE — 4004F PT TOBACCO SCREEN RCVD TLK: CPT | Performed by: INTERNAL MEDICINE

## 2020-09-17 PROCEDURE — 99214 OFFICE O/P EST MOD 30 MIN: CPT | Performed by: INTERNAL MEDICINE

## 2020-09-17 NOTE — PROGRESS NOTES
Virtual Brief Visit    Assessment/Plan:    Problem List Items Addressed This Visit        Endocrine    Type 2 diabetes mellitus with hyperglycemia, with long-term current use of insulin ([facility] [address] ) - Primary       Nervous and Auditory    Infarction of right thalamus ([facility] [address] ) - Lacunar ( 9/19)       Other    Current use of insulin ([facility] [address] )                Reason for visit is uncontrolled diabetes    Chief Complaint   Patient presents with    Virtual Brief Visit        Encounter provider Tre Menon MD    Provider located at [address] [place] [id number]    Recent Visits  No visits were found meeting these conditions  Showing recent visits within past 7 days and meeting all other requirements     Today's Visits  Date Type Provider Dept   09/17/20 Telemedicine Tre Menon MD  [facility] [address] today's visits and meeting all other requirements     Future Appointments  No visits were found meeting these conditions  Showing future appointments within next 150 days and meeting all other requirements        After connecting through telephone, the patient was identified by name and date of birth  Vickie Willingham was informed that this is a telemedicine visit and that the visit is being conducted through telephone  My office door was closed  No one else was in the room  He acknowledged consent and understanding of privacy and security of the platform  The patient has agreed to participate and understands he can discontinue the visit at any time  Patient is aware this is a billable service  Subjective    Vickie Willingham is a 50 y o  male  Seen in consultation for uncontrolled T2DM at the request of Dr Kobe Gustafson  HPI     Recalls being diagnosed with diabetes for about 5-6yrs ago  At the time he had a skin infection (boil on legs) and went to seek care at the ER  Bloodwork was elevated and he had surgery on the leg  Initially, diabetes was treated with metformin  He no longer takes this  He admits to not taking the disease "seriously" until about a year ago  He uses insulin, but cannot recall when he started this    He denies complications of eyes or kidneys, and has some "tingling" in feet  Denies recent  hospitalizations severe hypoglycemic or severe hyperglycemic episodes  "Mild" CVA in the Fall of 2019  Current regimen: Basaglar 28units at 9pm once daily  He is not taking anything other then the [address]    Injects in leg and rotates sites  He states that he had pain when injecting in the abdomen    further diabetic ROS: denies blurry vision, increased thirst or urination, but has the numbness/tingling in feet  Six months ago, he was having increased urination  He had lost weight but is now stable  Checks Bgs 2x daily  Recalled home blood glucose readings:   Before breakfast: 102-170  Before lunch:   Before dinner:   Bedtime: 100-170, 192 last night    He uses [employer] meter from [address]  Hypoglycemic episodes: denies, but recalls seeing a 74 about one month  This was in the morning  Hypoglycemia symptoms:   Treatment of hypoglycemia:     Diabetes education: Attended one group class last year  Diet: He has changed his diet in the past 2 mos  Usually, he eats a lunch and dinner  Lunch includes "a slice or two" of pizza  He will eat [place] food  He used to eat takeout every night, but now take out is 1-2x per week  Dinner includes meat and veggies  He is not snacking frequently  If he wants something sweet, he will now eat a fruit rather then cookies  He was drinking sweetened iced teas  He is now drinking water and unsweetened tea  diabetic diet compliance:  fair  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 [employer]       Opthamology: seen 2mos; no retinopathy, no macular edema  Podiatry: he does self care  Dental: has dentures  Infuenza vaccine: does not get these    Has hypertension: takes three meds losartan, metoprolol and amlodipine  Has hyperlipidemia: followed by PCP; on atorvastatin - tolerating well, no myalgias  Thyroid disorders: no personal or family hx of thyroid disease  History of pancreatitis: no hx, no hx of gallstones, little alcohol, no elevated trigs      Of note, he recently had an echo showing an EF of 50-55%    Past Medical History:   Diagnosis Date    Diabetes mellitus ([facility] [address] )     Hypertension        History reviewed  No pertinent surgical history  Current Outpatient Medications   Medication Sig Dispense Refill    amLODIPine (NORVASC) 10 mg tablet Take 1 tablet (10 mg total) by mouth daily 90 tablet 3    aspirin 81 mg chewable tablet Chew 1 tablet (81 mg total) daily 30 tablet 2    atorvastatin (LIPITOR) 80 mg tablet Take 1 tablet (80 mg total) by mouth daily 90 tablet 3    Blood Glucose Monitoring Suppl (ONE TOUCH ULTRA 2) w/Device KIT Test once daily or as directed 100 each 3    Blood Glucose Monitoring Suppl (ONE TOUCH ULTRA MINI) w/Device KIT As directed  0    insulin glargine (BASAGLAR KWIKPEN) 100 units/mL injection pen Inject 28 Units under the skin daily Or as directed 5 pen 3    Insulin Pen Needle (PEN NEEDLES 31GX5/16") 31G X 8 MM MISC Inject as directed daily 100 each 1    losartan (COZAAR) 100 MG tablet Take 1 tablet (100 mg total) by mouth every morning 90 tablet 3    metoprolol succinate (TOPROL-XL) 50 mg 24 hr tablet Take 1 tablet (50 mg total) by mouth daily 90 tablet 3     No current facility-administered medications for this visit  No Known Allergies    Review of Systems   Constitutional: Negative for fatigue and unexpected weight change  HENT: Negative for hearing loss, trouble swallowing and voice change  Eyes: Negative for visual disturbance  Respiratory: Negative for cough and shortness of breath  Cardiovascular: Negative for chest pain and palpitations     Gastrointestinal: Negative for constipation, diarrhea, nausea and vomiting  Endocrine: Negative for polydipsia and polyuria  Musculoskeletal: Negative for arthralgias and myalgias  Skin: Negative for rash  Denies changes in hair/skin/nails   Neurological: Negative for tremors, weakness and numbness  Psychiatric/Behavioral: Negative for dysphoric mood  The patient is not nervous/anxious  see HPI    There were no vitals filed for this visit  DATA  Lab Results   Component Value Date    HGBA1C 11 4 (A) 07/24/2020     Lab Results   Component Value Date     09/22/2015    SODIUM 137 09/23/2019    K 3 9 09/23/2019     09/23/2019    CO2 28 09/23/2019    ANIONGAP 8 09/22/2015    AGAP 7 09/23/2019    BUN 15 09/23/2019    CREATININE 1 43 (H) 09/23/2019    GLUC 251 09/27/2019    GLUF 176 (H) 01/11/2019    CALCIUM 8 2 (L) 09/23/2019    AST 14 09/23/2019    ALT 14 09/23/2019    ALKPHOS 89 09/23/2019    PROT 8 3 (H) 03/10/2015    TP 6 3 (L) 09/23/2019    BILITOT 0 2 03/10/2015    TBILI 0 20 09/23/2019    EGFR 67 09/23/2019       Lab Results   Component Value Date    CHOLESTEROL 184 01/11/2019     Lab Results   Component Value Date    HDL 71 (H) 01/11/2019     Lab Results   Component Value Date    TRIG 87 01/11/2019     Lab Results   Component Value Date    [place] 113 01/11/2019           Assessment   45yom with uncontrolled T2DM    Plan     1  T2DM, uncontrolled with hx of stroke: Unfortunately, the visit was shortened as he had another urgent matter to attend  I asked him to get his labs done to reassess options for treatments  There are labs from Dr Reagan López office in the system  Our office will have him reschedule a face to face visit  He has made changes to his diet and is adherent to the [employer]  I suspect the metformin was stopped after his hospital course as the creatinine did increase  He will need new CMP to determine additional treatment options          I spent 30 minutes with patient today in which greater than 50% of the time was spent in counseling/coordination of care regarding diabetes    VIRTUAL VISIT DISCLAIMER    Pippamarguerite Clinton acknowledges that he has consented to an online visit or consultation  He understands that the online visit is based solely on information provided by him, and that, in the absence of a face-to-face physical evaluation by the physician, the diagnosis he receives is both limited and provisional in terms of accuracy and completeness  This is not intended to replace a full medical face-to-face evaluation by the physician  Talia Alonzo understands and accepts these terms

## 2020-11-20 DIAGNOSIS — Z79.4 TYPE 2 DIABETES MELLITUS WITH HYPERGLYCEMIA, WITH LONG-TERM CURRENT USE OF INSULIN (HCC): ICD-10-CM

## 2020-11-20 DIAGNOSIS — E11.65 TYPE 2 DIABETES MELLITUS WITH HYPERGLYCEMIA, WITH LONG-TERM CURRENT USE OF INSULIN (HCC): ICD-10-CM

## 2020-11-20 RX ORDER — INSULIN GLARGINE 100 [IU]/ML
INJECTION, SOLUTION SUBCUTANEOUS
Qty: 15 ML | Refills: 0 | Status: SHIPPED | OUTPATIENT
Start: 2020-11-20 | End: 2021-09-30

## 2021-04-21 ENCOUNTER — OFFICE VISIT (OUTPATIENT)
Dept: FAMILY MEDICINE CLINIC | Facility: CLINIC | Age: 46
End: 2021-04-21
Payer: COMMERCIAL

## 2021-04-21 VITALS
BODY MASS INDEX: 28.35 KG/M2 | RESPIRATION RATE: 18 BRPM | WEIGHT: 191.4 LBS | TEMPERATURE: 96.5 F | HEART RATE: 85 BPM | SYSTOLIC BLOOD PRESSURE: 160 MMHG | HEIGHT: 69 IN | DIASTOLIC BLOOD PRESSURE: 98 MMHG | OXYGEN SATURATION: 99 %

## 2021-04-21 DIAGNOSIS — N17.9 ACUTE KIDNEY INJURY SUPERIMPOSED ON CHRONIC KIDNEY DISEASE (HCC): ICD-10-CM

## 2021-04-21 DIAGNOSIS — N28.9 RENAL INSUFFICIENCY: ICD-10-CM

## 2021-04-21 DIAGNOSIS — E11.65 TYPE 2 DIABETES MELLITUS WITH HYPERGLYCEMIA, WITH LONG-TERM CURRENT USE OF INSULIN (HCC): ICD-10-CM

## 2021-04-21 DIAGNOSIS — I10 ESSENTIAL HYPERTENSION: ICD-10-CM

## 2021-04-21 DIAGNOSIS — N18.9 ACUTE KIDNEY INJURY SUPERIMPOSED ON CHRONIC KIDNEY DISEASE (HCC): ICD-10-CM

## 2021-04-21 DIAGNOSIS — R60.0 LOCALIZED EDEMA: Primary | ICD-10-CM

## 2021-04-21 DIAGNOSIS — Z79.4 TYPE 2 DIABETES MELLITUS WITH HYPERGLYCEMIA, WITH LONG-TERM CURRENT USE OF INSULIN (HCC): ICD-10-CM

## 2021-04-21 PROCEDURE — 3080F DIAST BP >= 90 MM HG: CPT | Performed by: FAMILY MEDICINE

## 2021-04-21 PROCEDURE — 3066F NEPHROPATHY DOC TX: CPT | Performed by: FAMILY MEDICINE

## 2021-04-21 PROCEDURE — 3008F BODY MASS INDEX DOCD: CPT | Performed by: FAMILY MEDICINE

## 2021-04-21 PROCEDURE — 99214 OFFICE O/P EST MOD 30 MIN: CPT | Performed by: FAMILY MEDICINE

## 2021-04-21 PROCEDURE — 3077F SYST BP >= 140 MM HG: CPT | Performed by: FAMILY MEDICINE

## 2021-04-21 PROCEDURE — 4004F PT TOBACCO SCREEN RCVD TLK: CPT | Performed by: FAMILY MEDICINE

## 2021-04-21 NOTE — LETTER
April 21, 2021     Patient: Lashaun Muhammad   YOB: 1975   Date of Visit: 4/21/2021       To Whom it May Concern:    Kwadwo Bryson is under my professional care  He was seen in my office on 4/21/2021  He may return to work on 4/26/2021  If you have any questions or concerns, please don't hesitate to call           Sincerely,          Eric Lance DO        CC: No Recipients

## 2021-04-21 NOTE — PATIENT INSTRUCTIONS
No work until Monday 4/26/2021    Go for labs in the morning  Also chest xray in the am    Venous doppler tomorrow to check for blood clots  If negative - dr Dolores Crowe will call in rx for compression stockings    If any worsening thru the night - go to ER! NO added salt  NO soda  Limit salty foods  NO motrin like products  Drink more water     Try to monitor blood sugars and blood pressure        Return to see dr Katelyn Reyes next tues 4/27/2021  No work until 4/26/2021

## 2021-04-21 NOTE — PROGRESS NOTES
FAMILY PRACTICE OFFICE VISIT    NAME: Kathleen Harper    AGE: 48 y o  SEX: male  : 1975   MRN: [id number]    DATE: 2021  TIME: 6:23 PM    Assessment and Plan   There are no Patient Instructions on file for this visit  1  Localized edema  Differentials :  Venous insufficiency  With chronic skin changes associated with DM    Other differentials:  Lymphedema  DVT  CHF ( no personal history)  To consider sending to cardio pending followup    Return next week/sooner to ED prn    AVS:  Venous doppler tomorrow to check for blood clots  If negative - dr Tato Albarado will call in rx for compression stockings    If any worsening thru the night - go to ER! NO added salt  NO soda  Limit salty foods  NO motrin like products  Drink more water     Try to monitor blood sugars and blood pressure  Return to see dr Hayden Gomez next tu2021  No work until 2021      - VAS lower limb venous duplex study, complete bilateral; Future  - CBC and differential; Future  - Comprehensive metabolic panel; Future  - Troponin I; Future  - NT-BNP PRO; Future  - XR chest pa & lateral; Future    Advise pt to consider covid vaccine        Chief Complaint     Chief Complaint   Patient presents with    Leg Swelling     both legs x 3m       History of Present Illness   Kathleen Harper is a 48y o -year-old male who presents today with swelling in both legs below the knees  Right worse than left  This has been going on for 2 mos  Not worsening - but came for visit due to persistence  Today - swelling is greatly improved  No recent changes to medications  No recent travel  Pt is very active and denies sedentary lifestyle  No h/o DVT or PE  No known thrombotic events/history    Does not see any specialists  No h/o CHF    Pt drinks one soda/day  And 1 large coffee/day  Does not limit salt intake  Had echo done 2020 - mild to moderate aortic regurg        Not testing BP at home - cannot find cuff        Review of Systems Review of Systems   Constitutional:        Down 4 # from visit 8 mos ago  Is very busy at work  Does not monitor weight   Does not watch salt intake   Drinks large coffee daily without sugar  Eyes:        No changes in vision     Respiratory: Positive for cough  Negative for shortness of breath and wheezing  Tob use - so chronic cough     Cardiovascular: Positive for leg swelling  Negative for chest pain and palpitations  No paroxysmal nocturnal dyspnea  No orthopnea     Gastrointestinal: Negative for abdominal distention and abdominal pain  No abdominal bloating     Endocrine:        Has not been monitoring sugars at home  Not following careful diabetic diet  Genitourinary: Negative for decreased urine volume  Urination unchanged  Neurological: Negative for dizziness and headaches  Hematological:        No h/o dvt or PE       Active Problem List     Patient Active Problem List   Diagnosis    Hypercholesterolemia    Proteinuria    Current smoker    Essential hypertension    Type 2 diabetes mellitus with hyperglycemia, with long-term current use of insulin ([place] [address] )    Infarction of right thalamus ([place] [address] ) - Lacunar ( 9/19)    Left arm weakness    CKD (chronic kidney disease)    Brachial plexus injury, left, sequela    Diabetic polyneuropathy associated with type 2 diabetes mellitus ([place] [address] )    Overweight with body mass index (BMI) of 29 to 29 9 in adult    Aortic valve insufficiency ( mild-mod 2020 )    Current use of insulin ([facility])         Past Medical History:  Past Medical History:   Diagnosis Date    Diabetes mellitus ([facility] )     Hypertension        Past Surgical History:  History reviewed  No pertinent surgical history      Family History:  Family History   Problem Relation Age of Onset    No Known Problems Mother     No Known Problems Father        Social History:  Social History     Socioeconomic History    Marital status: Single     Spouse name: Not on file  Number of children: Not on file    Years of education: Not on file    Highest education level: Not on file   Occupational History    Not on file   Social Needs    Financial resource strain: Not on file    Food insecurity     Worry: Not on file     Inability: Not on file    Transportation needs     Medical: Not on file     Non-medical: Not on file   Tobacco Use    Smoking status: Current Every Day Smoker     Packs/day: 0 50     Types: Cigarettes    Smokeless tobacco: Never Used    Tobacco comment: states slowing it down, using Chantix   Substance and Sexual Activity    Alcohol use: Yes     Frequency: Monthly or less     Drinks per session: 1 or 2     Binge frequency: Never     Comment: socially maybe 5 times a year    Drug use: No    Sexual activity: Yes   Lifestyle    Physical activity     Days per week: Not on file     Minutes per session: Not on file    Stress: Not on file   Relationships    Social connections     Talks on phone: Not on file     Gets together: Not on file     Attends Muslim service: Not on file     Active member of club or organization: Not on file     Attends meetings of clubs or organizations: Not on file     Relationship status: Not on file    Intimate partner violence     Fear of current or ex partner: Not on file     Emotionally abused: Not on file     Physically abused: Not on file     Forced sexual activity: Not on file   Other Topics Concern    Not on file   Social History Narrative    Not on file       Objective     Vitals:    04/21/21 1757   BP: 160/98   Pulse: 85   Resp: 18   Temp: (!) 96 5 °F (35 8 °C)   SpO2: 99%     Wt Readings from Last 3 Encounters:   04/21/21 86 8 kg (191 lb 6 4 oz)   09/08/20 88 5 kg (195 lb)   08/18/20 88 5 kg (195 lb)       Physical Exam  Vitals signs and nursing note reviewed  Constitutional:       General: He is not in acute distress  Appearance: Normal appearance  He is not ill-appearing or toxic-appearing        Comments: Very comfortable   No distress     HENT:      Mouth/Throat:      Mouth: Mucous membranes are moist    Eyes:      General: No scleral icterus  Pupils: Pupils are equal, round, and reactive to light  Neck:      Vascular: No carotid bruit  Comments: No JVD  Cardiovascular:      Rate and Rhythm: Normal rate and regular rhythm  Pulses: Normal pulses  Heart sounds: Murmur present  Comments:  murmur - heard best right 2nd intercostal space - nonradiating  No ectopy  Pulmonary:      Effort: Pulmonary effort is normal  No respiratory distress  Breath sounds: Normal breath sounds  No stridor  No wheezing, rhonchi or rales  Abdominal:      General: Abdomen is flat  There is no distension  Tenderness: There is no abdominal tenderness  There is no guarding or rebound  Comments: No HJR   nondistended     Musculoskeletal:         General: No tenderness  Right lower leg: Edema present  Left lower leg: Edema present  Comments: B/l lower ext edema - right worse than left - pitting on left (+)1-2/4 and right (+) 2-3/4  nontender - both actually much better today than they have been - as per pt  (-) homans sign   Skin:     General: Skin is warm  Comments: Hyperpigmentation b/l lower ext (distal) - left worse than right  Changes most consistent with diabetes lipoidica  Prominent gastrocs musculature   Neurological:      General: No focal deficit present  Mental Status: He is alert and oriented to person, place, and time  Psychiatric:         Mood and Affect: Mood normal          Behavior: Behavior normal          Thought Content:  Thought content normal          Judgment: Judgment normal       Comments: Very pleasant         Pertinent Laboratory/Diagnostic Studies:  Lab Results   Component Value Date    GLUCOSE 193 (H) 09/22/2015    BUN 15 09/23/2019    CREATININE 1 43 (H) 09/23/2019    CALCIUM 8 2 (L) 09/23/2019     09/22/2015    K 3 9 09/23/2019    CO2 28 09/23/2019     09/23/2019     Lab Results   Component Value Date    ALT 14 09/23/2019    AST 14 09/23/2019    ALKPHOS 89 09/23/2019    BILITOT 0 2 03/10/2015       Lab Results   Component Value Date    WBC 9 63 09/23/2019    HGB 13 4 09/23/2019    HCT 39 6 09/23/2019    MCV 90 09/23/2019     09/23/2019       No results found for: TSH    No results found for: CHOL  Lab Results   Component Value Date    TRIG 87 01/11/2019     Lab Results   Component Value Date    HDL 71 (H) 01/11/2019     Lab Results   Component Value Date    LDLCALC 96 01/11/2019     Lab Results   Component Value Date    HGBA1C 11 4 (A) 07/24/2020       Results for orders placed or performed in visit on 07/24/20   POCT hemoglobin A1c   Result Value Ref Range    Hemoglobin A1C 11 4 (A) 6 5       No orders of the defined types were placed in this encounter  ALLERGIES:  No Known Allergies    Current Medications     Current Outpatient Medications   Medication Sig Dispense Refill    amLODIPine (NORVASC) 10 mg tablet Take 1 tablet (10 mg total) by mouth daily 90 tablet 3    aspirin 81 mg chewable tablet Chew 1 tablet (81 mg total) daily 30 tablet 2    atorvastatin (LIPITOR) 80 mg tablet Take 1 tablet (80 mg total) by mouth daily 90 tablet 3    Basaglar KwikPen 100 units/mL injection pen inject 28 unit subcutaneously once daily or as directed 15 mL 0    Blood Glucose Monitoring Suppl (ONE TOUCH ULTRA 2) w/Device KIT Test once daily or as directed 100 each 3    Blood Glucose Monitoring Suppl (ONE TOUCH ULTRA MINI) w/Device KIT As directed  0    Insulin Pen Needle (PEN NEEDLES 31GX5/16") 31G X 8 MM MISC Inject as directed daily 100 each 1    losartan (COZAAR) 100 MG tablet Take 1 tablet (100 mg total) by mouth every morning 90 tablet 3    metoprolol succinate (TOPROL-XL) 50 mg 24 hr tablet Take 1 tablet (50 mg total) by mouth daily 90 tablet 3     No current facility-administered medications for this visit            Health Maintenance     Health Maintenance   Topic Date Due    Pneumococcal Vaccine: Pediatrics (0 to 5 Years) and At-Risk Patients (6 to 59 Years) (1 of 1 - PPSV23) Never done    HIV Screening  Never done    COVID-19 Vaccine (1) Never done    DM Eye Exam  08/31/2020    Influenza Vaccine (1) Never done    HEMOGLOBIN A1C  10/24/2020    Annual Physical  10/25/2020    Depression Screening PHQ  07/24/2021    BMI: Followup Plan  07/24/2021    Diabetic Foot Exam  07/24/2021    BMI: Adult  04/21/2022    DTaP,Tdap,and Td Vaccines (2 - Td) 07/24/2030    HIB Vaccine  Aged Out    Hepatitis B Vaccine  Aged Out    IPV Vaccine  Aged Out    Hepatitis A Vaccine  Aged Out    Meningococcal ACWY Vaccine  Aged Out    HPV Vaccine  Aged [employer] History   Administered Date(s) Administered    Tdap 07/24/2020          Sheri Hendricks DO

## 2021-04-22 ENCOUNTER — HOSPITAL ENCOUNTER (OUTPATIENT)
Dept: ULTRASOUND IMAGING | Facility: HOSPITAL | Age: 46
Discharge: HOME | End: 2021-04-22
Payer: COMMERCIAL

## 2021-04-22 ENCOUNTER — HOSPITAL ENCOUNTER (OUTPATIENT)
Dept: RADIOLOGY | Facility: HOSPITAL | Age: 46
Discharge: HOME | End: 2021-04-22
Payer: COMMERCIAL

## 2021-04-22 DIAGNOSIS — R60.0 LOCALIZED EDEMA: ICD-10-CM

## 2021-04-22 PROCEDURE — 93971 EXTREMITY STUDY: CPT

## 2021-04-22 PROCEDURE — 93971 EXTREMITY STUDY: CPT | Performed by: SURGERY

## 2021-04-22 PROCEDURE — 71046 X-RAY EXAM CHEST 2 VIEWS: CPT

## 2021-04-23 NOTE — RESULT ENCOUNTER NOTE
Please call pt with results of chest xray and venous doppler -   1  No evidence of blood clots in either leg   2  CXR is normal  3  Still advise that pt get labs as ordered  4    Keep scheduled followup appt with me in may  Thanks!  (will also send thru the portal)

## 2021-04-30 ENCOUNTER — LAB (OUTPATIENT)
Dept: LAB | Facility: HOSPITAL | Age: 46
End: 2021-04-30
Payer: COMMERCIAL

## 2021-04-30 DIAGNOSIS — R60.0 LOCALIZED EDEMA: ICD-10-CM

## 2021-04-30 LAB
ALBUMIN SERPL BCP-MCNC: 1.5 G/DL (ref 3.5–5)
ALP SERPL-CCNC: 143 U/L (ref 46–116)
ALT SERPL W P-5'-P-CCNC: 22 U/L (ref 12–78)
ANION GAP SERPL CALCULATED.3IONS-SCNC: 5 MMOL/L (ref 4–13)
AST SERPL W P-5'-P-CCNC: 24 U/L (ref 5–45)
BASOPHILS # BLD AUTO: 0.04 THOUSANDS/ΜL (ref 0–0.1)
BASOPHILS NFR BLD AUTO: 1 % (ref 0–1)
BILIRUB SERPL-MCNC: 0.18 MG/DL (ref 0.2–1)
BUN SERPL-MCNC: 19 MG/DL (ref 5–25)
CALCIUM ALBUM COR SERPL-MCNC: 9.5 MG/DL (ref 8.3–10.1)
CALCIUM SERPL-MCNC: 7.5 MG/DL (ref 8.3–10.1)
CHLORIDE SERPL-SCNC: 108 MMOL/L (ref 100–108)
CO2 SERPL-SCNC: 26 MMOL/L (ref 21–32)
CREAT SERPL-MCNC: 2.02 MG/DL (ref 0.6–1.3)
EOSINOPHIL # BLD AUTO: 0.34 THOUSAND/ΜL (ref 0–0.61)
EOSINOPHIL NFR BLD AUTO: 4 % (ref 0–6)
ERYTHROCYTE [DISTWIDTH] IN BLOOD BY AUTOMATED COUNT: 14 % (ref 11.6–15.1)
GFR SERPL CREATININE-BSD FRML MDRD: 44 ML/MIN/1.73SQ M
GLUCOSE P FAST SERPL-MCNC: 240 MG/DL (ref 65–99)
HCT VFR BLD AUTO: 41.1 % (ref 36.5–49.3)
HGB BLD-MCNC: 13.8 G/DL (ref 12–17)
IMM GRANULOCYTES # BLD AUTO: 0.03 THOUSAND/UL (ref 0–0.2)
IMM GRANULOCYTES NFR BLD AUTO: 0 % (ref 0–2)
LYMPHOCYTES # BLD AUTO: 1.68 THOUSANDS/ΜL (ref 0.6–4.47)
LYMPHOCYTES NFR BLD AUTO: 19 % (ref 14–44)
MCH RBC QN AUTO: 29.9 PG (ref 26.8–34.3)
MCHC RBC AUTO-ENTMCNC: 33.6 G/DL (ref 31.4–37.4)
MCV RBC AUTO: 89 FL (ref 82–98)
MONOCYTES # BLD AUTO: 0.61 THOUSAND/ΜL (ref 0.17–1.22)
MONOCYTES NFR BLD AUTO: 7 % (ref 4–12)
NEUTROPHILS # BLD AUTO: 6.12 THOUSANDS/ΜL (ref 1.85–7.62)
NEUTS SEG NFR BLD AUTO: 69 % (ref 43–75)
NRBC BLD AUTO-RTO: 0 /100 WBCS
NT-PROBNP SERPL-MCNC: 1779 PG/ML
PLATELET # BLD AUTO: 302 THOUSANDS/UL (ref 149–390)
PMV BLD AUTO: 9.4 FL (ref 8.9–12.7)
POTASSIUM SERPL-SCNC: 3.7 MMOL/L (ref 3.5–5.3)
PROT SERPL-MCNC: 5.8 G/DL (ref 6.4–8.2)
RBC # BLD AUTO: 4.61 MILLION/UL (ref 3.88–5.62)
SODIUM SERPL-SCNC: 139 MMOL/L (ref 136–145)
TROPONIN I SERPL-MCNC: <0.02 NG/ML
WBC # BLD AUTO: 8.82 THOUSAND/UL (ref 4.31–10.16)

## 2021-04-30 PROCEDURE — 36415 COLL VENOUS BLD VENIPUNCTURE: CPT

## 2021-04-30 PROCEDURE — 84484 ASSAY OF TROPONIN QUANT: CPT

## 2021-04-30 PROCEDURE — 85025 COMPLETE CBC W/AUTO DIFF WBC: CPT

## 2021-04-30 PROCEDURE — 83880 ASSAY OF NATRIURETIC PEPTIDE: CPT

## 2021-04-30 PROCEDURE — 80053 COMPREHEN METABOLIC PANEL: CPT

## 2021-04-30 NOTE — RESULT ENCOUNTER NOTE
I tried calling patient at 18 - with results of labs done earlier today  I left message on voice mail as per pt communication that I will try calling back later this evening or tomorrow morning  I then tried his girlfriends # - no answer

## 2021-05-02 NOTE — RESULT ENCOUNTER NOTE
I was able to speak with pt yesterday am (5/1/2021)  around 0930  He admits to feeling well  Denies s/s CHF  Normal urine output  No fevers  Denies confusion, mental status changes or unusual fatique  I advised him to get a repeat STAT bmp Monday am (on 5/3/2021)  And that he may need to f/u with nephro pending results  He did not know what his sugars are running  labslip generated

## 2021-05-03 ENCOUNTER — LAB (OUTPATIENT)
Dept: LAB | Facility: HOSPITAL | Age: 46
End: 2021-05-03
Payer: COMMERCIAL

## 2021-05-03 DIAGNOSIS — N28.9 RENAL INSUFFICIENCY: ICD-10-CM

## 2021-05-03 LAB
ANION GAP SERPL CALCULATED.3IONS-SCNC: 7 MMOL/L (ref 4–13)
BUN SERPL-MCNC: 30 MG/DL (ref 5–25)
CALCIUM SERPL-MCNC: 7.7 MG/DL (ref 8.3–10.1)
CHLORIDE SERPL-SCNC: 107 MMOL/L (ref 100–108)
CO2 SERPL-SCNC: 25 MMOL/L (ref 21–32)
CREAT SERPL-MCNC: 2.13 MG/DL (ref 0.6–1.3)
GFR SERPL CREATININE-BSD FRML MDRD: 41 ML/MIN/1.73SQ M
GLUCOSE P FAST SERPL-MCNC: 259 MG/DL (ref 65–99)
POTASSIUM SERPL-SCNC: 3.9 MMOL/L (ref 3.5–5.3)
SODIUM SERPL-SCNC: 139 MMOL/L (ref 136–145)

## 2021-05-03 PROCEDURE — 36415 COLL VENOUS BLD VENIPUNCTURE: CPT

## 2021-05-03 PROCEDURE — 80048 BASIC METABOLIC PNL TOTAL CA: CPT

## 2021-05-03 NOTE — RESULT ENCOUNTER NOTE
Please call pt - his renal function continues to decline  I am quite concerned as he does have diabetes and hypertension  Advise that he keep hydrated and I have made an urgent referral to nephrology  Also  Sugars remains very high - and his overall nutritional status is poor  Advise a visit with his PCP - dr Kiana Natarajan - in followup within the next week  Thanks!

## 2021-05-04 NOTE — RESULT ENCOUNTER NOTE
I personally called pt at 1805 with message below  He is in agreement to f/u with nephrology asap - he was seen by dr Mayra Lopez in the past and will call today to get an appt scheduled  I also discussed that his albumin and protein are low and sugars remain very elevated  He has f/u already scheduled with pcp - dr Nazanin Borges within next week

## 2021-05-14 PROBLEM — E66.3 OVERWEIGHT WITH BODY MASS INDEX (BMI) OF 29 TO 29.9 IN ADULT: Status: RESOLVED | Noted: 2020-07-24 | Resolved: 2021-05-14

## 2021-05-14 PROBLEM — Z79.4 CURRENT USE OF INSULIN (HCC): Status: RESOLVED | Noted: 2020-09-17 | Resolved: 2021-05-14

## 2021-09-27 ENCOUNTER — APPOINTMENT (INPATIENT)
Dept: RADIOLOGY | Facility: HOSPITAL | Age: 46
DRG: 062 | End: 2021-09-27
Payer: COMMERCIAL

## 2021-09-27 ENCOUNTER — APPOINTMENT (EMERGENCY)
Dept: CT IMAGING | Facility: HOSPITAL | Age: 46
DRG: 062 | End: 2021-09-27
Payer: COMMERCIAL

## 2021-09-27 ENCOUNTER — HOSPITAL ENCOUNTER (INPATIENT)
Facility: HOSPITAL | Age: 46
LOS: 2 days | Discharge: HOME | DRG: 062 | End: 2021-09-29
Attending: EMERGENCY MEDICINE | Admitting: ANESTHESIOLOGY
Payer: COMMERCIAL

## 2021-09-27 DIAGNOSIS — R29.90 STROKE-LIKE SYMPTOMS: ICD-10-CM

## 2021-09-27 DIAGNOSIS — G98.8 NEUROLOGIC DISORDER: ICD-10-CM

## 2021-09-27 DIAGNOSIS — I63.9 INFARCTION OF RIGHT THALAMUS (HCC): ICD-10-CM

## 2021-09-27 DIAGNOSIS — H49.01 3RD CRANIAL NERVE PALSY, RIGHT: ICD-10-CM

## 2021-09-27 DIAGNOSIS — E11.65 TYPE 2 DIABETES MELLITUS WITH HYPERGLYCEMIA, WITH LONG-TERM CURRENT USE OF INSULIN (HCC): ICD-10-CM

## 2021-09-27 DIAGNOSIS — Z79.4 TYPE 2 DIABETES MELLITUS WITH HYPERGLYCEMIA, WITH LONG-TERM CURRENT USE OF INSULIN (HCC): ICD-10-CM

## 2021-09-27 DIAGNOSIS — H53.2 DIPLOPIA: Primary | ICD-10-CM

## 2021-09-27 DIAGNOSIS — I63.9 CEREBROVASCULAR ACCIDENT (CVA) (HCC): ICD-10-CM

## 2021-09-27 LAB
ANION GAP SERPL CALCULATED.3IONS-SCNC: 7 MMOL/L (ref 4–13)
APTT PPP: 28 SECONDS (ref 23–37)
ATRIAL RATE: 86 BPM
BUN SERPL-MCNC: 16 MG/DL (ref 5–25)
CALCIUM SERPL-MCNC: 7.6 MG/DL (ref 8.3–10.1)
CHLORIDE SERPL-SCNC: 102 MMOL/L (ref 100–108)
CO2 SERPL-SCNC: 24 MMOL/L (ref 21–32)
CREAT SERPL-MCNC: 2.33 MG/DL (ref 0.6–1.3)
ERYTHROCYTE [DISTWIDTH] IN BLOOD BY AUTOMATED COUNT: 13.7 % (ref 11.6–15.1)
GFR SERPL CREATININE-BSD FRML MDRD: 37 ML/MIN/1.73SQ M
GLUCOSE SERPL-MCNC: 196 MG/DL (ref 65–140)
GLUCOSE SERPL-MCNC: 342 MG/DL (ref 65–140)
GLUCOSE SERPL-MCNC: 364 MG/DL (ref 65–140)
GLUCOSE SERPL-MCNC: 417 MG/DL (ref 65–140)
HCT VFR BLD AUTO: 42.8 % (ref 36.5–49.3)
HGB BLD-MCNC: 14.3 G/DL (ref 12–17)
INR PPP: 0.95 (ref 0.84–1.19)
MCH RBC QN AUTO: 29.8 PG (ref 26.8–34.3)
MCHC RBC AUTO-ENTMCNC: 33.4 G/DL (ref 31.4–37.4)
MCV RBC AUTO: 89 FL (ref 82–98)
P AXIS: 75 DEGREES
PLATELET # BLD AUTO: 289 THOUSANDS/UL (ref 149–390)
PMV BLD AUTO: 9.7 FL (ref 8.9–12.7)
POTASSIUM SERPL-SCNC: 3.6 MMOL/L (ref 3.5–5.3)
PR INTERVAL: 146 MS
PROTHROMBIN TIME: 12.3 SECONDS (ref 11.6–14.5)
QRS AXIS: 60 DEGREES
QRSD INTERVAL: 92 MS
QT INTERVAL: 408 MS
QTC INTERVAL: 488 MS
RBC # BLD AUTO: 4.8 MILLION/UL (ref 3.88–5.62)
SARS-COV-2 RNA RESP QL NAA+PROBE: NEGATIVE
SODIUM SERPL-SCNC: 133 MMOL/L (ref 136–145)
T WAVE AXIS: 113 DEGREES
TROPONIN I SERPL-MCNC: 0.02 NG/ML
VENTRICULAR RATE: 86 BPM
WBC # BLD AUTO: 9.55 THOUSAND/UL (ref 4.31–10.16)

## 2021-09-27 PROCEDURE — 85610 PROTHROMBIN TIME: CPT | Performed by: EMERGENCY MEDICINE

## 2021-09-27 PROCEDURE — 85730 THROMBOPLASTIN TIME PARTIAL: CPT | Performed by: EMERGENCY MEDICINE

## 2021-09-27 PROCEDURE — 84484 ASSAY OF TROPONIN QUANT: CPT | Performed by: EMERGENCY MEDICINE

## 2021-09-27 PROCEDURE — 82948 REAGENT STRIP/BLOOD GLUCOSE: CPT

## 2021-09-27 PROCEDURE — U0005 INFEC AGEN DETEC AMPLI PROBE: HCPCS | Performed by: EMERGENCY MEDICINE

## 2021-09-27 PROCEDURE — 36415 COLL VENOUS BLD VENIPUNCTURE: CPT | Performed by: EMERGENCY MEDICINE

## 2021-09-27 PROCEDURE — 70496 CT ANGIOGRAPHY HEAD: CPT

## 2021-09-27 PROCEDURE — 93005 ELECTROCARDIOGRAM TRACING: CPT

## 2021-09-27 PROCEDURE — 80048 BASIC METABOLIC PNL TOTAL CA: CPT | Performed by: EMERGENCY MEDICINE

## 2021-09-27 PROCEDURE — 99285 EMERGENCY DEPT VISIT HI MDM: CPT

## 2021-09-27 PROCEDURE — U0003 INFECTIOUS AGENT DETECTION BY NUCLEIC ACID (DNA OR RNA); SEVERE ACUTE RESPIRATORY SYNDROME CORONAVIRUS 2 (SARS-COV-2) (CORONAVIRUS DISEASE [COVID-19]), AMPLIFIED PROBE TECHNIQUE, MAKING USE OF HIGH THROUGHPUT TECHNOLOGIES AS DESCRIBED BY CMS-2020-01-R: HCPCS | Performed by: EMERGENCY MEDICINE

## 2021-09-27 PROCEDURE — 85027 COMPLETE CBC AUTOMATED: CPT | Performed by: EMERGENCY MEDICINE

## 2021-09-27 PROCEDURE — 99292 CRITICAL CARE ADDL 30 MIN: CPT | Performed by: ANESTHESIOLOGY

## 2021-09-27 PROCEDURE — 96374 THER/PROPH/DIAG INJ IV PUSH: CPT

## 2021-09-27 PROCEDURE — NC001 PR NO CHARGE: Performed by: PSYCHIATRY & NEUROLOGY

## 2021-09-27 PROCEDURE — 3E03317 INTRODUCTION OF OTHER THROMBOLYTIC INTO PERIPHERAL VEIN, PERCUTANEOUS APPROACH: ICD-10-PCS | Performed by: EMERGENCY MEDICINE

## 2021-09-27 PROCEDURE — 71045 X-RAY EXAM CHEST 1 VIEW: CPT

## 2021-09-27 PROCEDURE — 99291 CRITICAL CARE FIRST HOUR: CPT | Performed by: NURSE PRACTITIONER

## 2021-09-27 PROCEDURE — 93010 ELECTROCARDIOGRAM REPORT: CPT | Performed by: INTERNAL MEDICINE

## 2021-09-27 PROCEDURE — 70498 CT ANGIOGRAPHY NECK: CPT

## 2021-09-27 PROCEDURE — 99285 EMERGENCY DEPT VISIT HI MDM: CPT | Performed by: EMERGENCY MEDICINE

## 2021-09-27 PROCEDURE — 99291 CRITICAL CARE FIRST HOUR: CPT | Performed by: PSYCHIATRY & NEUROLOGY

## 2021-09-27 PROCEDURE — 96376 TX/PRO/DX INJ SAME DRUG ADON: CPT

## 2021-09-27 RX ORDER — METOPROLOL SUCCINATE 50 MG/1
50 TABLET, EXTENDED RELEASE ORAL DAILY
Status: DISCONTINUED | OUTPATIENT
Start: 2021-09-28 | End: 2021-09-29

## 2021-09-27 RX ORDER — AMLODIPINE BESYLATE 10 MG/1
10 TABLET ORAL DAILY
Status: DISCONTINUED | OUTPATIENT
Start: 2021-09-28 | End: 2021-09-29 | Stop reason: HOSPADM

## 2021-09-27 RX ORDER — LABETALOL 20 MG/4 ML (5 MG/ML) INTRAVENOUS SYRINGE
10 ONCE AS NEEDED
Status: COMPLETED | OUTPATIENT
Start: 2021-09-27 | End: 2021-09-27

## 2021-09-27 RX ORDER — ATORVASTATIN CALCIUM 40 MG/1
80 TABLET, FILM COATED ORAL EVERY EVENING
Status: DISCONTINUED | OUTPATIENT
Start: 2021-09-28 | End: 2021-09-29 | Stop reason: HOSPADM

## 2021-09-27 RX ORDER — ATORVASTATIN CALCIUM 40 MG/1
40 TABLET, FILM COATED ORAL EVERY EVENING
Status: DISCONTINUED | OUTPATIENT
Start: 2021-09-27 | End: 2021-09-27

## 2021-09-27 RX ORDER — NICOTINE 21 MG/24HR
1 PATCH, TRANSDERMAL 24 HOURS TRANSDERMAL DAILY
Status: DISCONTINUED | OUTPATIENT
Start: 2021-09-28 | End: 2021-09-29 | Stop reason: HOSPADM

## 2021-09-27 RX ORDER — LABETALOL 20 MG/4 ML (5 MG/ML) INTRAVENOUS SYRINGE
10 ONCE
Status: COMPLETED | OUTPATIENT
Start: 2021-09-27 | End: 2021-09-27

## 2021-09-27 RX ORDER — LABETALOL 20 MG/4 ML (5 MG/ML) INTRAVENOUS SYRINGE
10 EVERY 4 HOURS PRN
Status: DISCONTINUED | OUTPATIENT
Start: 2021-09-27 | End: 2021-09-29 | Stop reason: HOSPADM

## 2021-09-27 RX ORDER — SODIUM CHLORIDE 9 MG/ML
50 INJECTION, SOLUTION INTRAVENOUS CONTINUOUS
Status: DISCONTINUED | OUTPATIENT
Start: 2021-09-27 | End: 2021-09-29

## 2021-09-27 RX ADMIN — ALTEPLASE 73.1 MG: KIT at 11:23

## 2021-09-27 RX ADMIN — ATORVASTATIN CALCIUM 40 MG: 40 TABLET, FILM COATED ORAL at 17:40

## 2021-09-27 RX ADMIN — LABETALOL 20 MG/4 ML (5 MG/ML) INTRAVENOUS SYRINGE 10 MG: at 10:41

## 2021-09-27 RX ADMIN — SODIUM CHLORIDE 9 UNITS/HR: 9 INJECTION, SOLUTION INTRAVENOUS at 20:32

## 2021-09-27 RX ADMIN — SODIUM CHLORIDE 50 ML: 9 INJECTION, SOLUTION INTRAVENOUS at 12:17

## 2021-09-27 RX ADMIN — LABETALOL 20 MG/4 ML (5 MG/ML) INTRAVENOUS SYRINGE 10 MG: at 11:12

## 2021-09-27 RX ADMIN — NICARDIPINE HYDROCHLORIDE 5 MG/HR: 2.5 INJECTION, SOLUTION INTRAVENOUS at 11:28

## 2021-09-27 RX ADMIN — SODIUM CHLORIDE 50 ML/HR: 0.9 INJECTION, SOLUTION INTRAVENOUS at 17:37

## 2021-09-27 RX ADMIN — NICARDIPINE HYDROCHLORIDE 5 MG/HR: 2.5 INJECTION, SOLUTION INTRAVENOUS at 18:06

## 2021-09-27 RX ADMIN — IOHEXOL 100 ML: 350 INJECTION, SOLUTION INTRAVENOUS at 10:30

## 2021-09-28 ENCOUNTER — APPOINTMENT (INPATIENT)
Dept: MRI IMAGING | Facility: HOSPITAL | Age: 46
DRG: 062 | End: 2021-09-28
Payer: COMMERCIAL

## 2021-09-28 ENCOUNTER — APPOINTMENT (INPATIENT)
Dept: NON INVASIVE DIAGNOSTICS | Facility: HOSPITAL | Age: 46
DRG: 062 | End: 2021-09-28
Payer: COMMERCIAL

## 2021-09-28 ENCOUNTER — APPOINTMENT (INPATIENT)
Dept: VASCULAR ULTRASOUND | Facility: HOSPITAL | Age: 46
DRG: 062 | End: 2021-09-28
Payer: COMMERCIAL

## 2021-09-28 ENCOUNTER — APPOINTMENT (INPATIENT)
Dept: CT IMAGING | Facility: HOSPITAL | Age: 46
DRG: 062 | End: 2021-09-28
Payer: COMMERCIAL

## 2021-09-28 PROBLEM — I63.9 CEREBROVASCULAR ACCIDENT (CVA) (HCC): Status: ACTIVE | Noted: 2021-09-27

## 2021-09-28 LAB
CHOLEST SERPL-MCNC: 287 MG/DL (ref 50–200)
EST. AVERAGE GLUCOSE BLD GHB EST-MCNC: 209 MG/DL
GLUCOSE SERPL-MCNC: 125 MG/DL (ref 65–140)
GLUCOSE SERPL-MCNC: 128 MG/DL (ref 65–140)
GLUCOSE SERPL-MCNC: 159 MG/DL (ref 65–140)
GLUCOSE SERPL-MCNC: 166 MG/DL (ref 65–140)
GLUCOSE SERPL-MCNC: 167 MG/DL (ref 65–140)
GLUCOSE SERPL-MCNC: 240 MG/DL (ref 65–140)
GLUCOSE SERPL-MCNC: 267 MG/DL (ref 65–140)
GLUCOSE SERPL-MCNC: 273 MG/DL (ref 65–140)
GLUCOSE SERPL-MCNC: 86 MG/DL (ref 65–140)
HBA1C MFR BLD: 8.9 %
HDLC SERPL-MCNC: 46 MG/DL
LDLC SERPL CALC-MCNC: 178 MG/DL (ref 0–100)
TRIGL SERPL-MCNC: 316 MG/DL

## 2021-09-28 PROCEDURE — G1004 CDSM NDSC: HCPCS

## 2021-09-28 PROCEDURE — 99222 1ST HOSP IP/OBS MODERATE 55: CPT | Performed by: INTERNAL MEDICINE

## 2021-09-28 PROCEDURE — 99233 SBSQ HOSP IP/OBS HIGH 50: CPT | Performed by: PSYCHIATRY & NEUROLOGY

## 2021-09-28 PROCEDURE — 70551 MRI BRAIN STEM W/O DYE: CPT

## 2021-09-28 PROCEDURE — 97162 PT EVAL MOD COMPLEX 30 MIN: CPT

## 2021-09-28 PROCEDURE — 82948 REAGENT STRIP/BLOOD GLUCOSE: CPT

## 2021-09-28 PROCEDURE — 97166 OT EVAL MOD COMPLEX 45 MIN: CPT

## 2021-09-28 PROCEDURE — 70450 CT HEAD/BRAIN W/O DYE: CPT

## 2021-09-28 PROCEDURE — 93306 TTE W/DOPPLER COMPLETE: CPT | Performed by: INTERNAL MEDICINE

## 2021-09-28 PROCEDURE — 80061 LIPID PANEL: CPT | Performed by: PHYSICIAN ASSISTANT

## 2021-09-28 PROCEDURE — 93970 EXTREMITY STUDY: CPT

## 2021-09-28 PROCEDURE — 93970 EXTREMITY STUDY: CPT | Performed by: SURGERY

## 2021-09-28 PROCEDURE — 99233 SBSQ HOSP IP/OBS HIGH 50: CPT | Performed by: ANESTHESIOLOGY

## 2021-09-28 PROCEDURE — 83036 HEMOGLOBIN GLYCOSYLATED A1C: CPT | Performed by: PHYSICIAN ASSISTANT

## 2021-09-28 PROCEDURE — 99223 1ST HOSP IP/OBS HIGH 75: CPT | Performed by: PHYSICAL MEDICINE & REHABILITATION

## 2021-09-28 PROCEDURE — 93306 TTE W/DOPPLER COMPLETE: CPT

## 2021-09-28 RX ORDER — CLOPIDOGREL BISULFATE 75 MG/1
75 TABLET ORAL DAILY
Status: DISCONTINUED | OUTPATIENT
Start: 2021-09-28 | End: 2021-09-29 | Stop reason: HOSPADM

## 2021-09-28 RX ORDER — POTASSIUM CHLORIDE 20 MEQ/1
40 TABLET, EXTENDED RELEASE ORAL ONCE
Status: COMPLETED | OUTPATIENT
Start: 2021-09-28 | End: 2021-09-28

## 2021-09-28 RX ORDER — ASPIRIN 81 MG/1
81 TABLET ORAL DAILY
Status: DISCONTINUED | OUTPATIENT
Start: 2021-09-29 | End: 2021-09-29 | Stop reason: HOSPADM

## 2021-09-28 RX ORDER — INSULIN GLARGINE 100 [IU]/ML
10 INJECTION, SOLUTION SUBCUTANEOUS
Status: DISCONTINUED | OUTPATIENT
Start: 2021-09-28 | End: 2021-09-29 | Stop reason: HOSPADM

## 2021-09-28 RX ORDER — ASPIRIN 325 MG
325 TABLET ORAL ONCE
Status: COMPLETED | OUTPATIENT
Start: 2021-09-28 | End: 2021-09-28

## 2021-09-28 RX ADMIN — CLOPIDOGREL BISULFATE 75 MG: 75 TABLET ORAL at 15:46

## 2021-09-28 RX ADMIN — INSULIN LISPRO 4 UNITS: 100 INJECTION, SOLUTION INTRAVENOUS; SUBCUTANEOUS at 15:43

## 2021-09-28 RX ADMIN — METOPROLOL SUCCINATE 50 MG: 50 TABLET, EXTENDED RELEASE ORAL at 09:50

## 2021-09-28 RX ADMIN — ASPIRIN 325 MG ORAL TABLET 325 MG: 325 PILL ORAL at 15:46

## 2021-09-28 RX ADMIN — INSULIN GLARGINE 10 UNITS: 100 INJECTION, SOLUTION SUBCUTANEOUS at 22:10

## 2021-09-28 RX ADMIN — POTASSIUM CHLORIDE 40 MEQ: 1500 TABLET, EXTENDED RELEASE ORAL at 09:53

## 2021-09-28 RX ADMIN — INSULIN LISPRO 2 UNITS: 100 INJECTION, SOLUTION INTRAVENOUS; SUBCUTANEOUS at 22:10

## 2021-09-28 RX ADMIN — AMLODIPINE BESYLATE 10 MG: 10 TABLET ORAL at 09:50

## 2021-09-28 RX ADMIN — LABETALOL 20 MG/4 ML (5 MG/ML) INTRAVENOUS SYRINGE 10 MG: at 09:50

## 2021-09-28 RX ADMIN — ATORVASTATIN CALCIUM 80 MG: 40 TABLET, FILM COATED ORAL at 18:38

## 2021-09-29 ENCOUNTER — TELEPHONE (OUTPATIENT)
Dept: CARDIOLOGY CLINIC | Facility: CLINIC | Age: 46
End: 2021-09-29

## 2021-09-29 VITALS
BODY MASS INDEX: 29.45 KG/M2 | DIASTOLIC BLOOD PRESSURE: 99 MMHG | HEIGHT: 69 IN | RESPIRATION RATE: 16 BRPM | OXYGEN SATURATION: 99 % | SYSTOLIC BLOOD PRESSURE: 160 MMHG | HEART RATE: 81 BPM | TEMPERATURE: 98.1 F | WEIGHT: 198.85 LBS

## 2021-09-29 LAB
ANION GAP SERPL CALCULATED.3IONS-SCNC: 8 MMOL/L (ref 4–13)
BASOPHILS # BLD AUTO: 0.04 THOUSANDS/ΜL (ref 0–0.1)
BASOPHILS NFR BLD AUTO: 0 % (ref 0–1)
BUN SERPL-MCNC: 18 MG/DL (ref 5–25)
CALCIUM SERPL-MCNC: 8 MG/DL (ref 8.3–10.1)
CHLORIDE SERPL-SCNC: 107 MMOL/L (ref 100–108)
CO2 SERPL-SCNC: 23 MMOL/L (ref 21–32)
CREAT SERPL-MCNC: 2.3 MG/DL (ref 0.6–1.3)
EOSINOPHIL # BLD AUTO: 0.42 THOUSAND/ΜL (ref 0–0.61)
EOSINOPHIL NFR BLD AUTO: 5 % (ref 0–6)
ERYTHROCYTE [DISTWIDTH] IN BLOOD BY AUTOMATED COUNT: 13.7 % (ref 11.6–15.1)
GFR SERPL CREATININE-BSD FRML MDRD: 37 ML/MIN/1.73SQ M
GLUCOSE SERPL-MCNC: 164 MG/DL (ref 65–140)
GLUCOSE SERPL-MCNC: 183 MG/DL (ref 65–140)
GLUCOSE SERPL-MCNC: 217 MG/DL (ref 65–140)
HCT VFR BLD AUTO: 40.8 % (ref 36.5–49.3)
HGB BLD-MCNC: 13.6 G/DL (ref 12–17)
IMM GRANULOCYTES # BLD AUTO: 0.03 THOUSAND/UL (ref 0–0.2)
IMM GRANULOCYTES NFR BLD AUTO: 0 % (ref 0–2)
LYMPHOCYTES # BLD AUTO: 1.72 THOUSANDS/ΜL (ref 0.6–4.47)
LYMPHOCYTES NFR BLD AUTO: 19 % (ref 14–44)
MCH RBC QN AUTO: 29.9 PG (ref 26.8–34.3)
MCHC RBC AUTO-ENTMCNC: 33.3 G/DL (ref 31.4–37.4)
MCV RBC AUTO: 90 FL (ref 82–98)
MONOCYTES # BLD AUTO: 0.7 THOUSAND/ΜL (ref 0.17–1.22)
MONOCYTES NFR BLD AUTO: 8 % (ref 4–12)
NEUTROPHILS # BLD AUTO: 5.99 THOUSANDS/ΜL (ref 1.85–7.62)
NEUTS SEG NFR BLD AUTO: 68 % (ref 43–75)
NRBC BLD AUTO-RTO: 0 /100 WBCS
PLATELET # BLD AUTO: 263 THOUSANDS/UL (ref 149–390)
PMV BLD AUTO: 9 FL (ref 8.9–12.7)
POTASSIUM SERPL-SCNC: 4.2 MMOL/L (ref 3.5–5.3)
RBC # BLD AUTO: 4.55 MILLION/UL (ref 3.88–5.62)
SODIUM SERPL-SCNC: 138 MMOL/L (ref 136–145)
WBC # BLD AUTO: 8.9 THOUSAND/UL (ref 4.31–10.16)

## 2021-09-29 PROCEDURE — 82948 REAGENT STRIP/BLOOD GLUCOSE: CPT

## 2021-09-29 PROCEDURE — 99239 HOSP IP/OBS DSCHRG MGMT >30: CPT | Performed by: INTERNAL MEDICINE

## 2021-09-29 PROCEDURE — 99233 SBSQ HOSP IP/OBS HIGH 50: CPT | Performed by: PHYSICIAN ASSISTANT

## 2021-09-29 PROCEDURE — 99232 SBSQ HOSP IP/OBS MODERATE 35: CPT | Performed by: INTERNAL MEDICINE

## 2021-09-29 PROCEDURE — 85025 COMPLETE CBC W/AUTO DIFF WBC: CPT | Performed by: INTERNAL MEDICINE

## 2021-09-29 PROCEDURE — 80048 BASIC METABOLIC PNL TOTAL CA: CPT | Performed by: INTERNAL MEDICINE

## 2021-09-29 RX ORDER — NICOTINE 21 MG/24HR
1 PATCH, TRANSDERMAL 24 HOURS TRANSDERMAL DAILY
Qty: 28 PATCH | Refills: 0 | Status: SHIPPED | OUTPATIENT
Start: 2021-09-30 | End: 2022-02-02 | Stop reason: HOSPADM

## 2021-09-29 RX ORDER — CLOPIDOGREL BISULFATE 75 MG/1
75 TABLET ORAL DAILY
Qty: 21 TABLET | Refills: 0 | Status: SHIPPED | OUTPATIENT
Start: 2021-09-29 | End: 2021-11-14

## 2021-09-29 RX ORDER — METOPROLOL TARTRATE 50 MG/1
50 TABLET, FILM COATED ORAL EVERY 12 HOURS SCHEDULED
Status: DISCONTINUED | OUTPATIENT
Start: 2021-09-29 | End: 2021-09-29 | Stop reason: HOSPADM

## 2021-09-29 RX ORDER — HYDRALAZINE HYDROCHLORIDE 25 MG/1
25 TABLET, FILM COATED ORAL EVERY 8 HOURS SCHEDULED
Qty: 90 TABLET | Refills: 0 | Status: SHIPPED | OUTPATIENT
Start: 2021-09-29 | End: 2021-11-14

## 2021-09-29 RX ORDER — HYDRALAZINE HYDROCHLORIDE 25 MG/1
25 TABLET, FILM COATED ORAL EVERY 8 HOURS SCHEDULED
Status: DISCONTINUED | OUTPATIENT
Start: 2021-09-29 | End: 2021-09-29 | Stop reason: HOSPADM

## 2021-09-29 RX ORDER — ASPIRIN 81 MG/1
81 TABLET, CHEWABLE ORAL DAILY
Qty: 30 TABLET | Refills: 0 | Status: ON HOLD | OUTPATIENT
Start: 2021-09-29 | End: 2022-02-02

## 2021-09-29 RX ORDER — ATORVASTATIN CALCIUM 80 MG/1
80 TABLET, FILM COATED ORAL DAILY
Qty: 30 TABLET | Refills: 0 | Status: SHIPPED | OUTPATIENT
Start: 2021-09-29 | End: 2021-11-14

## 2021-09-29 RX ADMIN — AMLODIPINE BESYLATE 10 MG: 10 TABLET ORAL at 08:49

## 2021-09-29 RX ADMIN — METOPROLOL SUCCINATE 50 MG: 50 TABLET, EXTENDED RELEASE ORAL at 08:49

## 2021-09-29 RX ADMIN — INSULIN LISPRO 2 UNITS: 100 INJECTION, SOLUTION INTRAVENOUS; SUBCUTANEOUS at 12:54

## 2021-09-29 RX ADMIN — INSULIN LISPRO 1 UNITS: 100 INJECTION, SOLUTION INTRAVENOUS; SUBCUTANEOUS at 08:49

## 2021-09-29 RX ADMIN — HYDRALAZINE HYDROCHLORIDE 25 MG: 25 TABLET, FILM COATED ORAL at 12:54

## 2021-09-29 NOTE — TELEPHONE ENCOUNTER
ALEX Leblanc [place], [place]  We need to add Loop implant the same day, on Friday if possible with virparia

## 2021-09-29 NOTE — TELEPHONE ENCOUNTER
S/w patient, bed res/prescreening completed  BW done while in the hospital  Aware not to take Losartan the morning of procedure  Patient will be coming into [place] office to  info sheet for procedure tomorrow  NALLELY for Loop implant  Pending auth for ANGIE  Referral has been updated

## 2021-09-29 NOTE — TELEPHONE ENCOUNTER
----- Message from Emilie Chow PA-C sent at 9/29/2021  9:28 AM EDT -----  Please schedule this pt for ANGIE; order is in  DX: stroke  Plan for this Friday 10/1/21 with Dr Cathy Su  Pt is leaving hospital today    thanks

## 2021-09-30 ENCOUNTER — TELEPHONE (OUTPATIENT)
Dept: INTERVENTIONAL RADIOLOGY/VASCULAR | Facility: HOSPITAL | Age: 46
End: 2021-09-30

## 2021-09-30 ENCOUNTER — TRANSITIONAL CARE MANAGEMENT (OUTPATIENT)
Dept: FAMILY MEDICINE CLINIC | Facility: CLINIC | Age: 46
End: 2021-09-30

## 2021-09-30 ENCOUNTER — TELEPHONE (OUTPATIENT)
Dept: FAMILY MEDICINE CLINIC | Facility: CLINIC | Age: 46
End: 2021-09-30

## 2021-09-30 DIAGNOSIS — Z79.4 TYPE 2 DIABETES MELLITUS WITH HYPERGLYCEMIA, WITH LONG-TERM CURRENT USE OF INSULIN (HCC): ICD-10-CM

## 2021-09-30 DIAGNOSIS — E11.65 TYPE 2 DIABETES MELLITUS WITH HYPERGLYCEMIA, WITH LONG-TERM CURRENT USE OF INSULIN (HCC): ICD-10-CM

## 2021-09-30 RX ORDER — INSULIN GLARGINE 100 [IU]/ML
INJECTION, SOLUTION SUBCUTANEOUS
Qty: 15 ML | Refills: 0 | Status: SHIPPED | OUTPATIENT
Start: 2021-09-30 | End: 2022-02-04 | Stop reason: SDUPTHER

## 2021-09-30 RX ORDER — FLUTICASONE PROPIONATE 50 MCG
2 SPRAY, SUSPENSION (ML) NASAL DAILY
COMMUNITY
Start: 2021-09-15 | End: 2022-02-02 | Stop reason: HOSPADM

## 2021-09-30 NOTE — TELEPHONE ENCOUNTER
Per [facility] protocol the patient was contacted to schedule a follow up appointment since she has not been seen within the last 6 months  Patient's appointment is scheduled for 10/7/21

## 2021-09-30 NOTE — PROGRESS NOTES
Patient instructed to arrive at 1000 for 1100 ANGIE procedure, nothing by mouth after midnight except sips of water with morning medications, and transportation required following procedure  Pt instructed to take evening long acting insulin per guidelines, pt does not take any other diabetic medications  Pt was instructed by the cardiology office to hold losartan and that was reinforced  Pt was able to verbalize back instructions and understanding

## 2021-10-01 ENCOUNTER — ANESTHESIA (OUTPATIENT)
Dept: INTERVENTIONAL RADIOLOGY/VASCULAR | Facility: HOSPITAL | Age: 46
End: 2021-10-01

## 2021-10-01 ENCOUNTER — HOSPITAL ENCOUNTER (OUTPATIENT)
Dept: INTERVENTIONAL RADIOLOGY/VASCULAR | Facility: HOSPITAL | Age: 46
Discharge: HOME | End: 2021-10-01
Payer: COMMERCIAL

## 2021-10-01 ENCOUNTER — ANESTHESIA EVENT (OUTPATIENT)
Dept: INTERVENTIONAL RADIOLOGY/VASCULAR | Facility: HOSPITAL | Age: 46
End: 2021-10-01

## 2021-10-01 ENCOUNTER — TELEPHONE (OUTPATIENT)
Dept: SURGERY | Facility: HOSPITAL | Age: 46
End: 2021-10-01

## 2021-10-01 VITALS — RESPIRATION RATE: 20 BRPM | OXYGEN SATURATION: 100 % | HEART RATE: 71 BPM

## 2021-10-01 VITALS
BODY MASS INDEX: 26.68 KG/M2 | HEIGHT: 69 IN | WEIGHT: 180.12 LBS | OXYGEN SATURATION: 99 % | TEMPERATURE: 97 F | SYSTOLIC BLOOD PRESSURE: 167 MMHG | RESPIRATION RATE: 22 BRPM | DIASTOLIC BLOOD PRESSURE: 93 MMHG | HEART RATE: 74 BPM

## 2021-10-01 DIAGNOSIS — I63.9 CEREBROVASCULAR ACCIDENT (CVA), UNSPECIFIED MECHANISM (HCC): ICD-10-CM

## 2021-10-01 LAB — GLUCOSE SERPL-MCNC: 206 MG/DL (ref 65–140)

## 2021-10-01 PROCEDURE — 93312 ECHO TRANSESOPHAGEAL: CPT

## 2021-10-01 PROCEDURE — 33285 INSJ SUBQ CAR RHYTHM MNTR: CPT

## 2021-10-01 PROCEDURE — C1764 EVENT RECORDER, CARDIAC: HCPCS

## 2021-10-01 PROCEDURE — 93312 ECHO TRANSESOPHAGEAL: CPT | Performed by: INTERNAL MEDICINE

## 2021-10-01 PROCEDURE — 93320 DOPPLER ECHO COMPLETE: CPT | Performed by: INTERNAL MEDICINE

## 2021-10-01 PROCEDURE — 93325 DOPPLER ECHO COLOR FLOW MAPG: CPT | Performed by: INTERNAL MEDICINE

## 2021-10-01 PROCEDURE — 82948 REAGENT STRIP/BLOOD GLUCOSE: CPT

## 2021-10-01 RX ORDER — LIDOCAINE HYDROCHLORIDE 20 MG/ML
INJECTION, SOLUTION EPIDURAL; INFILTRATION; INTRACAUDAL; PERINEURAL AS NEEDED
Status: DISCONTINUED | OUTPATIENT
Start: 2021-10-01 | End: 2021-10-01

## 2021-10-01 RX ORDER — GLYCOPYRROLATE 0.2 MG/ML
INJECTION INTRAMUSCULAR; INTRAVENOUS AS NEEDED
Status: DISCONTINUED | OUTPATIENT
Start: 2021-10-01 | End: 2021-10-01

## 2021-10-01 RX ORDER — PROPOFOL 10 MG/ML
INJECTION, EMULSION INTRAVENOUS AS NEEDED
Status: DISCONTINUED | OUTPATIENT
Start: 2021-10-01 | End: 2021-10-01

## 2021-10-01 RX ORDER — SODIUM CHLORIDE 9 MG/ML
INJECTION, SOLUTION INTRAVENOUS CONTINUOUS PRN
Status: DISCONTINUED | OUTPATIENT
Start: 2021-10-01 | End: 2021-10-01

## 2021-10-01 RX ADMIN — GLYCOPYRROLATE 0.1 MG: 0.2 INJECTION, SOLUTION INTRAMUSCULAR; INTRAVENOUS at 11:24

## 2021-10-01 RX ADMIN — PROPOFOL 20 MG: 10 INJECTION, EMULSION INTRAVENOUS at 11:28

## 2021-10-01 RX ADMIN — Medication 8 ML: at 12:13

## 2021-10-01 RX ADMIN — PROPOFOL 20 MG: 10 INJECTION, EMULSION INTRAVENOUS at 11:34

## 2021-10-01 RX ADMIN — PROPOFOL 120 MG: 10 INJECTION, EMULSION INTRAVENOUS at 11:26

## 2021-10-01 RX ADMIN — LIDOCAINE HYDROCHLORIDE 100 MG: 20 INJECTION, SOLUTION EPIDURAL; INFILTRATION; INTRACAUDAL; PERINEURAL at 11:26

## 2021-10-01 RX ADMIN — SODIUM CHLORIDE: 0.9 INJECTION, SOLUTION INTRAVENOUS at 11:10

## 2021-10-01 RX ADMIN — PROPOFOL 20 MG: 10 INJECTION, EMULSION INTRAVENOUS at 11:30

## 2021-10-01 RX ADMIN — PROPOFOL 20 MG: 10 INJECTION, EMULSION INTRAVENOUS at 11:32

## 2021-10-06 ENCOUNTER — TELEPHONE (OUTPATIENT)
Dept: NEUROLOGY | Facility: CLINIC | Age: 46
End: 2021-10-06

## 2021-10-06 PROBLEM — Z91.19 POOR COMPLIANCE: Status: ACTIVE | Noted: 2021-10-06

## 2021-10-06 PROBLEM — Z91.199 POOR COMPLIANCE: Status: ACTIVE | Noted: 2021-10-06

## 2021-10-07 ENCOUNTER — OFFICE VISIT (OUTPATIENT)
Dept: FAMILY MEDICINE CLINIC | Facility: CLINIC | Age: 46
End: 2021-10-07
Payer: COMMERCIAL

## 2021-10-07 ENCOUNTER — DOCUMENTATION (OUTPATIENT)
Dept: FAMILY MEDICINE CLINIC | Facility: CLINIC | Age: 46
End: 2021-10-07

## 2021-10-07 VITALS
OXYGEN SATURATION: 98 % | BODY MASS INDEX: 27.4 KG/M2 | TEMPERATURE: 97.5 F | HEART RATE: 89 BPM | SYSTOLIC BLOOD PRESSURE: 130 MMHG | HEIGHT: 69 IN | DIASTOLIC BLOOD PRESSURE: 76 MMHG | WEIGHT: 185 LBS

## 2021-10-07 DIAGNOSIS — I10 ESSENTIAL HYPERTENSION: ICD-10-CM

## 2021-10-07 DIAGNOSIS — E78.00 HYPERCHOLESTEROLEMIA: ICD-10-CM

## 2021-10-07 DIAGNOSIS — R80.9 PROTEINURIA, UNSPECIFIED TYPE: ICD-10-CM

## 2021-10-07 DIAGNOSIS — I63.9 CEREBROVASCULAR ACCIDENT (CVA), UNSPECIFIED MECHANISM (HCC): Primary | ICD-10-CM

## 2021-10-07 DIAGNOSIS — F17.200 CURRENT SMOKER: ICD-10-CM

## 2021-10-07 DIAGNOSIS — N18.32 STAGE 3B CHRONIC KIDNEY DISEASE (HCC): ICD-10-CM

## 2021-10-07 DIAGNOSIS — R29.898 LEFT ARM WEAKNESS: ICD-10-CM

## 2021-10-07 DIAGNOSIS — E11.42 DIABETIC POLYNEUROPATHY ASSOCIATED WITH TYPE 2 DIABETES MELLITUS (HCC): ICD-10-CM

## 2021-10-07 DIAGNOSIS — E11.65 TYPE 2 DIABETES MELLITUS WITH HYPERGLYCEMIA, WITH LONG-TERM CURRENT USE OF INSULIN (HCC): ICD-10-CM

## 2021-10-07 DIAGNOSIS — I63.9 INFARCTION OF RIGHT THALAMUS (HCC): ICD-10-CM

## 2021-10-07 DIAGNOSIS — Z79.4 TYPE 2 DIABETES MELLITUS WITH HYPERGLYCEMIA, WITH LONG-TERM CURRENT USE OF INSULIN (HCC): ICD-10-CM

## 2021-10-07 PROBLEM — Z91.19 POOR COMPLIANCE: Status: RESOLVED | Noted: 2021-10-06 | Resolved: 2021-10-07

## 2021-10-07 PROBLEM — Z91.199 POOR COMPLIANCE: Status: RESOLVED | Noted: 2021-10-06 | Resolved: 2021-10-07

## 2021-10-07 PROCEDURE — 1111F DSCHRG MED/CURRENT MED MERGE: CPT | Performed by: FAMILY MEDICINE

## 2021-10-07 PROCEDURE — 99495 TRANSJ CARE MGMT MOD F2F 14D: CPT | Performed by: FAMILY MEDICINE

## 2021-10-14 ENCOUNTER — IN-CLINIC DEVICE VISIT (OUTPATIENT)
Dept: CARDIOLOGY CLINIC | Facility: CLINIC | Age: 46
End: 2021-10-14
Payer: COMMERCIAL

## 2021-10-14 DIAGNOSIS — Z95.818 PRESENCE OF OTHER CARDIAC IMPLANTS AND GRAFTS: Primary | ICD-10-CM

## 2021-10-14 PROCEDURE — 93285 PRGRMG DEV EVAL SCRMS IP: CPT | Performed by: INTERNAL MEDICINE

## 2021-11-04 ENCOUNTER — TELEPHONE (OUTPATIENT)
Dept: NEPHROLOGY | Facility: CLINIC | Age: 46
End: 2021-11-04

## 2021-11-14 DIAGNOSIS — I63.9 INFARCTION OF RIGHT THALAMUS (HCC): ICD-10-CM

## 2021-11-14 DIAGNOSIS — Z79.4 TYPE 2 DIABETES MELLITUS WITH HYPERGLYCEMIA, WITH LONG-TERM CURRENT USE OF INSULIN (HCC): ICD-10-CM

## 2021-11-14 DIAGNOSIS — E11.65 TYPE 2 DIABETES MELLITUS WITH HYPERGLYCEMIA, WITH LONG-TERM CURRENT USE OF INSULIN (HCC): ICD-10-CM

## 2021-11-14 RX ORDER — ATORVASTATIN CALCIUM 80 MG/1
TABLET, FILM COATED ORAL
Qty: 30 TABLET | Refills: 0 | Status: SHIPPED | OUTPATIENT
Start: 2021-11-14 | End: 2021-12-08

## 2021-11-14 RX ORDER — CLOPIDOGREL BISULFATE 75 MG/1
TABLET ORAL
Qty: 21 TABLET | Refills: 0 | Status: SHIPPED | OUTPATIENT
Start: 2021-11-14 | End: 2021-11-23 | Stop reason: SDUPTHER

## 2021-11-14 RX ORDER — HYDRALAZINE HYDROCHLORIDE 25 MG/1
TABLET, FILM COATED ORAL
Qty: 90 TABLET | Refills: 0 | Status: SHIPPED | OUTPATIENT
Start: 2021-11-14 | End: 2021-12-08

## 2021-11-23 ENCOUNTER — OFFICE VISIT (OUTPATIENT)
Dept: FAMILY MEDICINE CLINIC | Facility: CLINIC | Age: 46
End: 2021-11-23
Payer: COMMERCIAL

## 2021-11-23 VITALS
BODY MASS INDEX: 27.4 KG/M2 | OXYGEN SATURATION: 98 % | HEART RATE: 86 BPM | HEIGHT: 69 IN | SYSTOLIC BLOOD PRESSURE: 130 MMHG | DIASTOLIC BLOOD PRESSURE: 60 MMHG | TEMPERATURE: 97.5 F | WEIGHT: 185 LBS

## 2021-11-23 DIAGNOSIS — I10 ESSENTIAL HYPERTENSION: ICD-10-CM

## 2021-11-23 DIAGNOSIS — Z79.4 TYPE 2 DIABETES MELLITUS WITH HYPERGLYCEMIA, WITH LONG-TERM CURRENT USE OF INSULIN (HCC): ICD-10-CM

## 2021-11-23 DIAGNOSIS — I63.9 CEREBROVASCULAR ACCIDENT (CVA), UNSPECIFIED MECHANISM (HCC): Primary | ICD-10-CM

## 2021-11-23 DIAGNOSIS — E78.00 HYPERCHOLESTEROLEMIA: ICD-10-CM

## 2021-11-23 DIAGNOSIS — N18.32 STAGE 3B CHRONIC KIDNEY DISEASE (HCC): ICD-10-CM

## 2021-11-23 DIAGNOSIS — F17.200 CURRENT SMOKER: ICD-10-CM

## 2021-11-23 DIAGNOSIS — I63.9 INFARCTION OF RIGHT THALAMUS (HCC): ICD-10-CM

## 2021-11-23 DIAGNOSIS — E11.65 TYPE 2 DIABETES MELLITUS WITH HYPERGLYCEMIA, WITH LONG-TERM CURRENT USE OF INSULIN (HCC): ICD-10-CM

## 2021-11-23 LAB — SL AMB POCT HEMOGLOBIN AIC: 9.7 (ref ?–6.5)

## 2021-11-23 PROCEDURE — 3008F BODY MASS INDEX DOCD: CPT | Performed by: FAMILY MEDICINE

## 2021-11-23 PROCEDURE — 83036 HEMOGLOBIN GLYCOSYLATED A1C: CPT | Performed by: FAMILY MEDICINE

## 2021-11-23 PROCEDURE — 99214 OFFICE O/P EST MOD 30 MIN: CPT | Performed by: FAMILY MEDICINE

## 2021-11-23 PROCEDURE — 3046F HEMOGLOBIN A1C LEVEL >9.0%: CPT | Performed by: FAMILY MEDICINE

## 2021-11-23 PROCEDURE — 4004F PT TOBACCO SCREEN RCVD TLK: CPT | Performed by: FAMILY MEDICINE

## 2021-11-23 RX ORDER — AMLODIPINE BESYLATE 10 MG/1
10 TABLET ORAL DAILY
Qty: 90 TABLET | Refills: 3 | Status: SHIPPED | OUTPATIENT
Start: 2021-11-23

## 2021-11-23 RX ORDER — METOPROLOL SUCCINATE 50 MG/1
50 TABLET, EXTENDED RELEASE ORAL DAILY
Qty: 90 TABLET | Refills: 3 | Status: SHIPPED | OUTPATIENT
Start: 2021-11-23

## 2021-11-23 RX ORDER — CLOPIDOGREL BISULFATE 75 MG/1
75 TABLET ORAL DAILY
Qty: 30 TABLET | Refills: 0 | Status: SHIPPED | OUTPATIENT
Start: 2021-11-23 | End: 2022-01-02 | Stop reason: ALTCHOICE

## 2021-12-08 ENCOUNTER — TELEPHONE (OUTPATIENT)
Dept: NEUROLOGY | Facility: CLINIC | Age: 46
End: 2021-12-08

## 2021-12-08 DIAGNOSIS — Z79.4 TYPE 2 DIABETES MELLITUS WITH HYPERGLYCEMIA, WITH LONG-TERM CURRENT USE OF INSULIN (HCC): ICD-10-CM

## 2021-12-08 DIAGNOSIS — E11.65 TYPE 2 DIABETES MELLITUS WITH HYPERGLYCEMIA, WITH LONG-TERM CURRENT USE OF INSULIN (HCC): ICD-10-CM

## 2021-12-08 DIAGNOSIS — I63.9 INFARCTION OF RIGHT THALAMUS (HCC): ICD-10-CM

## 2021-12-08 RX ORDER — ATORVASTATIN CALCIUM 80 MG/1
TABLET, FILM COATED ORAL
Qty: 30 TABLET | Refills: 0 | Status: SHIPPED | OUTPATIENT
Start: 2021-12-08 | End: 2022-01-02

## 2021-12-08 RX ORDER — HYDRALAZINE HYDROCHLORIDE 25 MG/1
TABLET, FILM COATED ORAL
Qty: 90 TABLET | Refills: 0 | Status: SHIPPED | OUTPATIENT
Start: 2021-12-08 | End: 2022-01-02

## 2021-12-10 ENCOUNTER — TELEPHONE (OUTPATIENT)
Dept: ADMINISTRATIVE | Facility: OTHER | Age: 46
End: 2021-12-10

## 2022-01-18 DIAGNOSIS — N18.30 STAGE 3 CHRONIC KIDNEY DISEASE, UNSPECIFIED WHETHER STAGE 3A OR 3B CKD (HCC): Primary | ICD-10-CM

## 2022-01-18 PROCEDURE — 3066F NEPHROPATHY DOC TX: CPT | Performed by: PSYCHIATRY & NEUROLOGY

## 2022-01-20 ENCOUNTER — REMOTE DEVICE CLINIC VISIT (OUTPATIENT)
Dept: CARDIOLOGY CLINIC | Facility: CLINIC | Age: 47
End: 2022-01-20
Payer: COMMERCIAL

## 2022-01-20 DIAGNOSIS — Z95.818 PRESENCE OF OTHER CARDIAC IMPLANTS AND GRAFTS: Primary | ICD-10-CM

## 2022-01-20 PROCEDURE — 93298 REM INTERROG DEV EVAL SCRMS: CPT | Performed by: INTERNAL MEDICINE

## 2022-01-20 PROCEDURE — G2066 INTER DEVC REMOTE 30D: HCPCS | Performed by: INTERNAL MEDICINE

## 2022-01-20 NOTE — PROGRESS NOTES
MDT LNQ22/ ACTIVE SYSTEM IS MRI CONDITIONAL   CARELINK TRANSMISSION: LOOP RECORDER  PRESENTING RHYTHM NSR @ 84 BPM  BATTERY STATUS "OK " NO PATIENT OR DEVICE ACTIVATED EPISODES  NORMAL DEVICE FUNCTION   DL

## 2022-01-21 ENCOUNTER — TELEPHONE (OUTPATIENT)
Dept: NEUROLOGY | Facility: CLINIC | Age: 47
End: 2022-01-21

## 2022-01-25 ENCOUNTER — VBI (OUTPATIENT)
Dept: ADMINISTRATIVE | Facility: OTHER | Age: 47
End: 2022-01-25

## 2022-01-25 ENCOUNTER — OFFICE VISIT (OUTPATIENT)
Dept: NEUROLOGY | Facility: CLINIC | Age: 47
End: 2022-01-25
Payer: COMMERCIAL

## 2022-01-25 VITALS
OXYGEN SATURATION: 99 % | TEMPERATURE: 96.9 F | DIASTOLIC BLOOD PRESSURE: 80 MMHG | WEIGHT: 188 LBS | SYSTOLIC BLOOD PRESSURE: 120 MMHG | HEART RATE: 81 BPM | HEIGHT: 69 IN | BODY MASS INDEX: 27.85 KG/M2

## 2022-01-25 DIAGNOSIS — F17.200 CURRENT SMOKER: ICD-10-CM

## 2022-01-25 DIAGNOSIS — E11.65 TYPE 2 DIABETES MELLITUS WITH HYPERGLYCEMIA, WITH LONG-TERM CURRENT USE OF INSULIN (HCC): ICD-10-CM

## 2022-01-25 DIAGNOSIS — I10 ESSENTIAL HYPERTENSION: ICD-10-CM

## 2022-01-25 DIAGNOSIS — I63.9 INFARCTION OF RIGHT THALAMUS (HCC): ICD-10-CM

## 2022-01-25 DIAGNOSIS — S14.3XXS BRACHIAL PLEXUS INJURY, LEFT, SEQUELA: ICD-10-CM

## 2022-01-25 DIAGNOSIS — E78.00 HYPERCHOLESTEROLEMIA: ICD-10-CM

## 2022-01-25 DIAGNOSIS — Z86.73 HISTORY OF CVA (CEREBROVASCULAR ACCIDENT): Primary | ICD-10-CM

## 2022-01-25 DIAGNOSIS — Z79.4 TYPE 2 DIABETES MELLITUS WITH HYPERGLYCEMIA, WITH LONG-TERM CURRENT USE OF INSULIN (HCC): ICD-10-CM

## 2022-01-25 PROCEDURE — 3079F DIAST BP 80-89 MM HG: CPT | Performed by: PSYCHIATRY & NEUROLOGY

## 2022-01-25 PROCEDURE — 3074F SYST BP LT 130 MM HG: CPT | Performed by: PSYCHIATRY & NEUROLOGY

## 2022-01-25 PROCEDURE — 3008F BODY MASS INDEX DOCD: CPT | Performed by: PSYCHIATRY & NEUROLOGY

## 2022-01-25 PROCEDURE — 4004F PT TOBACCO SCREEN RCVD TLK: CPT | Performed by: PSYCHIATRY & NEUROLOGY

## 2022-01-25 PROCEDURE — 99215 OFFICE O/P EST HI 40 MIN: CPT | Performed by: PSYCHIATRY & NEUROLOGY

## 2022-01-25 NOTE — PROGRESS NOTES
Mulugeta Gallagher is a 48 y o  male  Assessment:  1  History of CVA (cerebrovascular accident)    2  Essential hypertension    3  Type 2 diabetes mellitus with hyperglycemia, with long-term current use of insulin ([address] )    4  Infarction of right thalamus ([facility] [address] ) - [place] ( 9/19)    5  Current smoker    6  Hypercholesterolemia    7  Brachial plexus injury, left, sequela        Plan:  Continue with aspirin and statin  Stroke education given to the patient  Patient advised to quit smoking  Referral to Cardiology since patient has a loop recorder and also for ANGIE to rule out cardioembolic stroke  Patient advised to follow-up with ophthalmology  Referral to Orthopedics for his history of prior brachial injury and left hand weakness  Follow-up in 3-4 months  Discussion:  Patient's recent hospital records and imaging study results were reviewed, patient in the past in 2019 has had right thalamic lacunar infarct, his recent MRI scan of the brain shows punctate stroke in the left periventricular white matter adjacent to the atrium of the ventricle, CTA of the head and neck showed no large vessel occlusion there is a stenosis in the origin of the left vertebral artery  Patient finished his dual anti-platelet treatment and is currently on aspirin and continuing with statin  He is in follow up with Cardiology regarding his loop recorder and I am not sure if he has had a ANGIE if not then I think it is reasonable to have a ANGIE to rule out a cardioembolic etiology of his stroke, also patient was strongly advised that he should quit smoking and its complications were explained to the patient including recurrence of the stroke he has had history of for left brachial plexus injury with weakness of the left hand and tells me that did not follow-up with an orthopedic surgeon does not have any neck pain or arm pain but does have left hand weakness and hence would refer the patient to follow up with an orthopedic surgeon    I have advised the patient to see an ophthalmologist and get a complete eye exam in a visual field testing and to be careful with his driving    Stroke education was given to the patient to keep his blood pressure cholesterol and sugar under control to go to the hospital if has any recurrence of stroke like symptoms and call us otherwise to see me back in 4 months and follow up with his other physicians  Subjective:    HPI     49-year-old right-handed male with prior right thalamic infarct in 2019 history of hypertension hyperlipidemia and diabetes were medication compliance tobacco use who was in [facility] in September of 2021 as a stroke alert for sudden onset of lightheadedness and diplopia, and was found to have an acute ischemia in the left periventricular white matter since his discharge he tells me his double vision has resolved he does not have any focal weakness except for mild left hand weakness that is old secondary to history of brachial plexus injury, he continues to smoke otherwise denies any complaints    Vitals:    01/25/22 1106   BP: 120/80   BP Location: Left arm   Patient Position: Sitting   Cuff Size: Adult   Pulse: 81   Temp: (!) 96 9 °F (36 1 °C)   TempSrc: Temporal   SpO2: 99%   Weight: 85 3 kg (188 lb)   Height: 5' 9" (1 753 m)       Current Medications    Current Outpatient Medications:     amLODIPine (NORVASC) 10 mg tablet, Take 1 tablet (10 mg total) by mouth daily, Disp: 90 tablet, Rfl: 3    atorvastatin (LIPITOR) 80 mg tablet, take 1 tablet by mouth once daily, Disp: 30 tablet, Rfl: 0    Basaglar KwikPen 100 units/mL injection pen, inject 28 unit subcutaneously once daily or as directed, Disp: 15 mL, Rfl: 0    Blood Glucose Monitoring Suppl (ONE TOUCH ULTRA 2) w/Device KIT, Test once daily or as directed, Disp: 100 each, Rfl: 3    Blood Glucose Monitoring Suppl (ONE TOUCH ULTRA MINI) w/Device KIT, As directed, Disp: , Rfl: 0    hydrALAZINE (APRESOLINE) 25 mg tablet, take 1 tablet by mouth three times a day, Disp: 90 tablet, Rfl: 0    Insulin Pen Needle (PEN NEEDLES 31GX5/16") 31G X 8 MM MISC, Inject as directed daily, Disp: 100 each, Rfl: 1    metoprolol succinate (TOPROL-XL) 50 mg 24 hr tablet, Take 1 tablet (50 mg total) by mouth daily, Disp: 90 tablet, Rfl: 3    aspirin 81 mg chewable tablet, Chew 1 tablet (81 mg total) daily, Disp: 30 tablet, Rfl: 0    fluticasone (FLONASE) 50 mcg/act nasal spray, 2 sprays into each nostril daily (Patient not taking: Reported on 1/25/2022 ), Disp: , Rfl:     nicotine (NICODERM CQ) 14 mg/24hr TD 24 hr patch, Place 1 patch on the skin daily (Patient not taking: Reported on 10/7/2021), Disp: 28 patch, Rfl: 0      Allergies  Patient has no known allergies  Past Medical History  Past Medical History:   Diagnosis Date    Diabetes mellitus ([facility] [address] )     Hypertension     Stroke [facility])          Past Surgical History:  History reviewed  No pertinent surgical history  Family History:  Family History   Problem Relation Age of Onset    No Known Problems Mother     No Known Problems Father        Social History:   reports that he has been smoking cigarettes  He has been smoking about 0 50 packs per day  He has never used smokeless tobacco  He reports previous alcohol use  He reports that he does not use drugs  I have reviewed the past medical history, surgical history, social and family history, current medications, allergies vitals, review of systems, and updated this information as appropriate today  Objective:    Physical Exam    Neurological Exam    GENERAL:  Cooperative in no acute distress  Well-developed and well-nourished    HEAD and NECK   Head is atraumatic normocephalic with no lesions or masses  Neck is supple with full range of motion    CARDIOVASCULAR  Carotid Arteries-no carotid bruits      NEUROLOGIC:  Mental Status-the patient is awake alert and oriented without aphasia or apraxia  Cranial Nerves: Visual fields are full to confrontation  Except for questionabl in the right inferior field Visual acuity is 20/20 with hand-held chart Extraocular movements are full without nystagmus  Pupils are 2-1/2 mm and reactive  Face is symmetrical to light touch  Movements of facial expression move symmetrically  Hearing is normal to finger rub bilaterally  Soft palate lifts symmetrically  Shoulder shrug is symmetrical  Tongue is midline without atrophy  Motor: No drift is noted on arm extension  Strength is full in the upper and lower extremities with normal bulk and tone  Except for left hand weakness and difficulty in left hand extension at the wrist which is old as per the patient  Sensory: Intact to temperature and vibratory sensation in the upper and lower extremities bilaterally  Cortical function is intact  Coordination: Finger to nose testing is performed accurately  Romberg is negative  Gait reveals a normal base with symmetrical arm swing  Tandem walk is normal   Reflexes:  2+ and symmetrical  No cervical spine tenderness          ROS:  Review of Systems   Constitutional: Negative  Negative for appetite change and fever  HENT: Negative  Negative for hearing loss, tinnitus, trouble swallowing and voice change  Eyes: Negative  Negative for photophobia and pain  Respiratory: Negative  Negative for shortness of breath  Cardiovascular: Negative  Negative for palpitations  Gastrointestinal: Negative  Negative for nausea and vomiting  Endocrine: Negative  Negative for cold intolerance  Genitourinary: Positive for frequency and urgency  Negative for dysuria  Musculoskeletal: Negative  Negative for myalgias and neck pain  Skin: Negative  Negative for rash  Neurological: Negative  Negative for dizziness, tremors, seizures, syncope, facial asymmetry, speech difficulty, weakness, light-headedness, numbness and headaches  Hematological: Negative  Does not bruise/bleed easily     Psychiatric/Behavioral: Negative  Negative for confusion, hallucinations and sleep disturbance

## 2022-01-27 ENCOUNTER — VBI (OUTPATIENT)
Dept: ADMINISTRATIVE | Facility: OTHER | Age: 47
End: 2022-01-27

## 2022-01-27 DIAGNOSIS — I63.9 INFARCTION OF RIGHT THALAMUS (HCC): ICD-10-CM

## 2022-01-27 DIAGNOSIS — E11.65 TYPE 2 DIABETES MELLITUS WITH HYPERGLYCEMIA, WITH LONG-TERM CURRENT USE OF INSULIN (HCC): ICD-10-CM

## 2022-01-27 DIAGNOSIS — Z79.4 TYPE 2 DIABETES MELLITUS WITH HYPERGLYCEMIA, WITH LONG-TERM CURRENT USE OF INSULIN (HCC): ICD-10-CM

## 2022-01-27 RX ORDER — HYDRALAZINE HYDROCHLORIDE 25 MG/1
TABLET, FILM COATED ORAL
Qty: 90 TABLET | Refills: 0 | Status: SHIPPED | OUTPATIENT
Start: 2022-01-27 | End: 2022-02-04 | Stop reason: SDUPTHER

## 2022-01-27 RX ORDER — ATORVASTATIN CALCIUM 80 MG/1
TABLET, FILM COATED ORAL
Qty: 30 TABLET | Refills: 0 | Status: SHIPPED | OUTPATIENT
Start: 2022-01-27 | End: 2022-02-04 | Stop reason: SDUPTHER

## 2022-01-31 ENCOUNTER — APPOINTMENT (INPATIENT)
Dept: CT IMAGING | Facility: HOSPITAL | Age: 47
DRG: 062 | End: 2022-01-31
Payer: COMMERCIAL

## 2022-01-31 ENCOUNTER — HOSPITAL ENCOUNTER (INPATIENT)
Facility: HOSPITAL | Age: 47
LOS: 2 days | Discharge: HOME | DRG: 062 | End: 2022-02-02
Attending: EMERGENCY MEDICINE | Admitting: ANESTHESIOLOGY
Payer: COMMERCIAL

## 2022-01-31 ENCOUNTER — APPOINTMENT (EMERGENCY)
Dept: CT IMAGING | Facility: HOSPITAL | Age: 47
DRG: 062 | End: 2022-01-31
Payer: COMMERCIAL

## 2022-01-31 ENCOUNTER — APPOINTMENT (INPATIENT)
Dept: MRI IMAGING | Facility: HOSPITAL | Age: 47
DRG: 062 | End: 2022-01-31
Payer: COMMERCIAL

## 2022-01-31 DIAGNOSIS — I63.9 CEREBROVASCULAR ACCIDENT (CVA), UNSPECIFIED MECHANISM (HCC): ICD-10-CM

## 2022-01-31 DIAGNOSIS — G98.8 NEUROLOGIC DISORDER: ICD-10-CM

## 2022-01-31 DIAGNOSIS — R47.1 DYSARTHRIA: Primary | ICD-10-CM

## 2022-01-31 DIAGNOSIS — E11.65 TYPE 2 DIABETES MELLITUS WITH HYPERGLYCEMIA, WITH LONG-TERM CURRENT USE OF INSULIN (HCC): ICD-10-CM

## 2022-01-31 DIAGNOSIS — R29.90 STROKE-LIKE SYMPTOMS: ICD-10-CM

## 2022-01-31 DIAGNOSIS — Z79.4 TYPE 2 DIABETES MELLITUS WITH HYPERGLYCEMIA, WITH LONG-TERM CURRENT USE OF INSULIN (HCC): ICD-10-CM

## 2022-01-31 DIAGNOSIS — N17.9 AKI (ACUTE KIDNEY INJURY) (HCC): ICD-10-CM

## 2022-01-31 PROBLEM — R29.898 LEFT ARM WEAKNESS: Status: RESOLVED | Noted: 2019-09-27 | Resolved: 2022-01-31

## 2022-01-31 PROBLEM — N18.9 ACUTE ON CHRONIC RENAL FAILURE (HCC): Status: ACTIVE | Noted: 2022-01-31

## 2022-01-31 PROBLEM — I63.81 INFARCTION OF RIGHT THALAMUS (HCC): Status: RESOLVED | Noted: 2019-09-26 | Resolved: 2022-01-31

## 2022-01-31 PROBLEM — N18.9 CKD (CHRONIC KIDNEY DISEASE): Status: RESOLVED | Noted: 2019-10-10 | Resolved: 2022-01-31

## 2022-01-31 LAB
ANION GAP SERPL CALCULATED.3IONS-SCNC: 7 MMOL/L (ref 4–13)
APTT PPP: 26 SECONDS (ref 23–37)
ATRIAL RATE: 80 BPM
BUN SERPL-MCNC: 30 MG/DL (ref 5–25)
CALCIUM SERPL-MCNC: 7.6 MG/DL (ref 8.3–10.1)
CARDIAC TROPONIN I PNL SERPL HS: 21 NG/L
CHLORIDE SERPL-SCNC: 98 MMOL/L (ref 100–108)
CHOLEST SERPL-MCNC: 205 MG/DL
CO2 SERPL-SCNC: 26 MMOL/L (ref 21–32)
CREAT SERPL-MCNC: 3.04 MG/DL (ref 0.6–1.3)
ERYTHROCYTE [DISTWIDTH] IN BLOOD BY AUTOMATED COUNT: 13.9 % (ref 11.6–15.1)
FLUAV RNA RESP QL NAA+PROBE: NEGATIVE
FLUBV RNA RESP QL NAA+PROBE: NEGATIVE
GFR SERPL CREATININE-BSD FRML MDRD: 23 ML/MIN/1.73SQ M
GLUCOSE SERPL-MCNC: 325 MG/DL (ref 65–140)
GLUCOSE SERPL-MCNC: 469 MG/DL (ref 65–140)
GLUCOSE SERPL-MCNC: 490 MG/DL (ref 65–140)
HCT VFR BLD AUTO: 45.5 % (ref 36.5–49.3)
HDLC SERPL-MCNC: 59 MG/DL
HGB BLD-MCNC: 15.3 G/DL (ref 12–17)
INR PPP: 0.92 (ref 0.84–1.19)
LDLC SERPL CALC-MCNC: 95 MG/DL (ref 0–100)
MCH RBC QN AUTO: 30.8 PG (ref 26.8–34.3)
MCHC RBC AUTO-ENTMCNC: 33.6 G/DL (ref 31.4–37.4)
MCV RBC AUTO: 92 FL (ref 82–98)
NONHDLC SERPL-MCNC: 146 MG/DL
P AXIS: 61 DEGREES
PLATELET # BLD AUTO: 294 THOUSANDS/UL (ref 149–390)
PMV BLD AUTO: 9.5 FL (ref 8.9–12.7)
POTASSIUM SERPL-SCNC: 4.6 MMOL/L (ref 3.5–5.3)
PR INTERVAL: 142 MS
PROTHROMBIN TIME: 12 SECONDS (ref 11.6–14.5)
QRS AXIS: 71 DEGREES
QRSD INTERVAL: 100 MS
QT INTERVAL: 394 MS
QTC INTERVAL: 454 MS
RBC # BLD AUTO: 4.97 MILLION/UL (ref 3.88–5.62)
RSV RNA RESP QL NAA+PROBE: NEGATIVE
SARS-COV-2 RNA RESP QL NAA+PROBE: NEGATIVE
SODIUM SERPL-SCNC: 131 MMOL/L (ref 136–145)
T WAVE AXIS: 89 DEGREES
TRIGL SERPL-MCNC: 253 MG/DL
VENTRICULAR RATE: 80 BPM
WBC # BLD AUTO: 9.45 THOUSAND/UL (ref 4.31–10.16)

## 2022-01-31 PROCEDURE — 70498 CT ANGIOGRAPHY NECK: CPT

## 2022-01-31 PROCEDURE — 3E03317 INTRODUCTION OF OTHER THROMBOLYTIC INTO PERIPHERAL VEIN, PERCUTANEOUS APPROACH: ICD-10-PCS | Performed by: EMERGENCY MEDICINE

## 2022-01-31 PROCEDURE — G1004 CDSM NDSC: HCPCS

## 2022-01-31 PROCEDURE — 93005 ELECTROCARDIOGRAM TRACING: CPT

## 2022-01-31 PROCEDURE — NC001 PR NO CHARGE: Performed by: NURSE PRACTITIONER

## 2022-01-31 PROCEDURE — 70496 CT ANGIOGRAPHY HEAD: CPT

## 2022-01-31 PROCEDURE — 99232 SBSQ HOSP IP/OBS MODERATE 35: CPT | Performed by: NURSE PRACTITIONER

## 2022-01-31 PROCEDURE — 85610 PROTHROMBIN TIME: CPT | Performed by: EMERGENCY MEDICINE

## 2022-01-31 PROCEDURE — 85027 COMPLETE CBC AUTOMATED: CPT | Performed by: EMERGENCY MEDICINE

## 2022-01-31 PROCEDURE — 0241U HB NFCT DS VIR RESP RNA 4 TRGT: CPT | Performed by: EMERGENCY MEDICINE

## 2022-01-31 PROCEDURE — 82948 REAGENT STRIP/BLOOD GLUCOSE: CPT

## 2022-01-31 PROCEDURE — 36415 COLL VENOUS BLD VENIPUNCTURE: CPT | Performed by: EMERGENCY MEDICINE

## 2022-01-31 PROCEDURE — 99285 EMERGENCY DEPT VISIT HI MDM: CPT | Performed by: EMERGENCY MEDICINE

## 2022-01-31 PROCEDURE — 80048 BASIC METABOLIC PNL TOTAL CA: CPT | Performed by: EMERGENCY MEDICINE

## 2022-01-31 PROCEDURE — 80061 LIPID PANEL: CPT | Performed by: NURSE PRACTITIONER

## 2022-01-31 PROCEDURE — 70551 MRI BRAIN STEM W/O DYE: CPT

## 2022-01-31 PROCEDURE — 84484 ASSAY OF TROPONIN QUANT: CPT | Performed by: EMERGENCY MEDICINE

## 2022-01-31 PROCEDURE — 85730 THROMBOPLASTIN TIME PARTIAL: CPT | Performed by: EMERGENCY MEDICINE

## 2022-01-31 PROCEDURE — 93010 ELECTROCARDIOGRAM REPORT: CPT | Performed by: INTERNAL MEDICINE

## 2022-01-31 PROCEDURE — 99291 CRITICAL CARE FIRST HOUR: CPT

## 2022-01-31 PROCEDURE — 83036 HEMOGLOBIN GLYCOSYLATED A1C: CPT | Performed by: NURSE PRACTITIONER

## 2022-01-31 PROCEDURE — 99291 CRITICAL CARE FIRST HOUR: CPT | Performed by: NURSE PRACTITIONER

## 2022-01-31 RX ORDER — ATORVASTATIN CALCIUM 40 MG/1
80 TABLET, FILM COATED ORAL DAILY
Status: DISCONTINUED | OUTPATIENT
Start: 2022-02-01 | End: 2022-01-31

## 2022-01-31 RX ORDER — SODIUM CHLORIDE 9 MG/ML
100 INJECTION, SOLUTION INTRAVENOUS CONTINUOUS
Status: DISCONTINUED | OUTPATIENT
Start: 2022-01-31 | End: 2022-02-02 | Stop reason: HOSPADM

## 2022-01-31 RX ORDER — LABETALOL 20 MG/4 ML (5 MG/ML) INTRAVENOUS SYRINGE
10 EVERY 4 HOURS PRN
Status: DISCONTINUED | OUTPATIENT
Start: 2022-01-31 | End: 2022-02-01 | Stop reason: ALTCHOICE

## 2022-01-31 RX ORDER — NICOTINE 21 MG/24HR
1 PATCH, TRANSDERMAL 24 HOURS TRANSDERMAL DAILY
Status: DISCONTINUED | OUTPATIENT
Start: 2022-02-01 | End: 2022-02-01 | Stop reason: ALTCHOICE

## 2022-01-31 RX ORDER — AMLODIPINE BESYLATE 10 MG/1
10 TABLET ORAL DAILY
Status: DISCONTINUED | OUTPATIENT
Start: 2022-02-01 | End: 2022-02-02 | Stop reason: HOSPADM

## 2022-01-31 RX ORDER — ATORVASTATIN CALCIUM 40 MG/1
80 TABLET, FILM COATED ORAL EVERY EVENING
Status: DISCONTINUED | OUTPATIENT
Start: 2022-01-31 | End: 2022-02-01

## 2022-01-31 RX ORDER — HYDRALAZINE HYDROCHLORIDE 20 MG/ML
10 INJECTION INTRAMUSCULAR; INTRAVENOUS EVERY 4 HOURS PRN
Status: DISCONTINUED | OUTPATIENT
Start: 2022-01-31 | End: 2022-02-01 | Stop reason: ALTCHOICE

## 2022-01-31 RX ORDER — NICOTINE 21 MG/24HR
1 PATCH, TRANSDERMAL 24 HOURS TRANSDERMAL DAILY
Status: DISCONTINUED | OUTPATIENT
Start: 2022-02-01 | End: 2022-01-31

## 2022-01-31 RX ORDER — HYDRALAZINE HYDROCHLORIDE 25 MG/1
25 TABLET, FILM COATED ORAL 3 TIMES DAILY
Status: DISCONTINUED | OUTPATIENT
Start: 2022-01-31 | End: 2022-02-02 | Stop reason: HOSPADM

## 2022-01-31 RX ORDER — METOPROLOL SUCCINATE 50 MG/1
50 TABLET, EXTENDED RELEASE ORAL DAILY
Status: DISCONTINUED | OUTPATIENT
Start: 2022-02-01 | End: 2022-02-02 | Stop reason: HOSPADM

## 2022-01-31 RX ORDER — ASPIRIN 81 MG/1
81 TABLET, CHEWABLE ORAL DAILY
Status: DISCONTINUED | OUTPATIENT
Start: 2022-02-01 | End: 2022-01-31

## 2022-01-31 RX ADMIN — HYDRALAZINE HYDROCHLORIDE 10 MG: 20 INJECTION INTRAMUSCULAR; INTRAVENOUS at 19:32

## 2022-01-31 RX ADMIN — INSULIN LISPRO 8 UNITS: 100 INJECTION, SOLUTION INTRAVENOUS; SUBCUTANEOUS at 19:32

## 2022-01-31 RX ADMIN — SODIUM CHLORIDE 50 ML: 9 INJECTION, SOLUTION INTRAVENOUS at 16:54

## 2022-01-31 RX ADMIN — HYDRALAZINE HYDROCHLORIDE 25 MG: 25 TABLET, FILM COATED ORAL at 21:35

## 2022-01-31 RX ADMIN — ATORVASTATIN CALCIUM 80 MG: 40 TABLET, FILM COATED ORAL at 17:04

## 2022-01-31 RX ADMIN — SODIUM CHLORIDE 100 ML/HR: 0.9 INJECTION, SOLUTION INTRAVENOUS at 17:01

## 2022-01-31 RX ADMIN — HYDRALAZINE HYDROCHLORIDE 25 MG: 25 TABLET, FILM COATED ORAL at 17:04

## 2022-01-31 RX ADMIN — ALTEPLASE 69.1 MG: KIT at 15:59

## 2022-01-31 NOTE — ASSESSMENT & PLAN NOTE
Lab Results   Component Value Date    EGFR 23 01/31/2022    EGFR 37 09/29/2021    EGFR 37 09/27/2021    CREATININE 3 04 (H) 01/31/2022    CREATININE 2 30 (H) 09/29/2021    CREATININE 2 33 (H) 09/27/2021     In 2019 baseline creatinine appears to be 1 4   More recently appears to be 2 03  Presenting with creatinine of 3 04 in the setting of stroke like symptoms and hyperglycemia  Will initiate hydration

## 2022-01-31 NOTE — H&P
[address] [facility]  H&P- Vickie Scale 1975, 48 y o  male MRN: [id number]  Unit/Bed#:  Encounter: [id number]  Primary Care Provider: Kobe Gustafson DO   Date and time admitted to hospital: 1/31/2022  3:14 PM    * Stroke-like symptoms  Assessment & Plan  Patient presented with slurred speech, word finding difficulty and ataxia at approximately 1300  He describes his normal state of health prior to that time  NIH on presentation was 2   CT head showing no hemorrhage  CTA showing no hemodynamically significant stenosis, aneurysm or dissection   He was given TPA at 1558  Will admit to the icu for post TPA monitoring  Continue on stroke protocol   MRI brain, serial CT head s/p 24 hour TPA window, echocardiogram   Hold DAPT until outside of the TPA window  Consult to neurology, physical medicine, PT/OT, speech therapy   PRN IV antihypertensives to maintain BP < 185/105       Cerebrovascular accident (CVA) [facility])  Assessment & Plan  Patient has had multiple CVAs in the past   In 2019 he had a lacunar infarct in the R thalamus   In September of 2021 he also received TPA and was found to have a small focus of acute ischemia in the left periatrial white matter  He continues to smoke, continue to encourage cessation   Neurology office note from 1/25 stating recently off DAPT and just maintained on ASA and statin   medtronic "MRI conditional" Loop recorder placed on 10/1 to evaluate for paroxysmal atrial fibrillation as a cardio-embolic source    There have been 2 devices checks since implantation with no device activating episodes  ANGIE completed on 10/1 showing no thrombus in the right heart nor any atrial defects   Complete TPA /stroke protocol as above     Essential hypertension  Assessment & Plan  While in the TPA window will use PRN IV antihypertensives to maintain BP < 185/105  Can continue home dose amlodipine and BB tomorrow     Current smoker  Assessment & Plan  Nicotine patch   Encourage cessation     Type 2 diabetes mellitus with hyperglycemia, with long-term current use of insulin [facility])  Assessment & Plan  Lab Results   Component Value Date    HGBA1C 9 7 (A) 11/23/2021       Recent Labs     01/31/22  1521   POCGLU 490*       Blood Sugar Average: Last 72 hrs:  (P) 490     Hyperglycemia noted on BMP   Will check hgba1c  q 6 hour glucose checks with SSI coverage     Acute on chronic renal failure [facility])  Assessment & Plan  Lab Results   Component Value Date    EGFR 23 01/31/2022    EGFR 37 09/29/2021    EGFR 37 09/27/2021    CREATININE 3 04 (H) 01/31/2022    CREATININE 2 30 (H) 09/29/2021    CREATININE 2 33 (H) 09/27/2021     In 2019 baseline creatinine appears to be 1 4   More recently appears to be 2 03  Presenting with creatinine of 3 04 in the setting of stroke like symptoms and hyperglycemia  Will initiate hydration     Brachial plexus injury, left, sequela  Assessment & Plan  Patient states left sided weakness started before previous strokes  He is due to see orthopedics in the outpatient setting  Continue with outpatient follow up     -------------------------------------------------------------------------------------------------------------  Chief Complaint: slurred speech, word finding difficulties, and ataxia     History of Present Illness   HX and PE limited by: none   Sera Nieves is a 48 y o  male who presents with a past medical history of DM, HTN, prior CVAs (lacunar infarct R thalamus in 2019 with residual left arm weakness, small infarct in the left periatrial white matte 2021 for which he received TPA)  After his most recent stroke admission, he had a loop recorder placed for evaluation of paroxysmal atrial fibrillation, and an ANGIE that should no atrial thrombus or atrial defects  He continues to smoke  Per most recent neurology office note, he is recently off of DAPT and has been maintained on ASA and statin    He describes being in his normal state of health prior to 1300 today  At 1300, he developed slurred speech and word finding difficulties along with ataxia which prompted his evaluation  In the emergency department, his NIH was 2  CT head was negative for hemorrhage  CTA was negative for any hemodynamically significant stenosis, aneurysm or dissection  He was given TPA at 1558  He is referred to the ICU s/p TPA administration  History obtained from chart review and the patient   -------------------------------------------------------------------------------------------------------------  Dispo: Admit to Critical Care     Code Status: Level 1 - Full Code  --------------------------------------------------------------------------------------------------------------  Review of Systems   Constitutional: Negative  HENT: Negative  Eyes: Negative  Respiratory: Negative  Cardiovascular: Negative  Gastrointestinal: Negative  Endocrine: Negative  Genitourinary: Negative  Musculoskeletal: Positive for gait problem  Skin: Negative  Allergic/Immunologic: Negative  Neurological: Positive for speech difficulty and weakness  Hematological: Negative  Psychiatric/Behavioral: Negative  A 12-point, complete review of systems was reviewed and negative except as stated above     Physical Exam  Constitutional:       General: He is awake  HENT:      Head: Normocephalic and atraumatic  Eyes:      General: Lids are normal       Extraocular Movements: Extraocular movements intact  Conjunctiva/sclera: Conjunctivae normal    Neck:      Trachea: Trachea normal    Cardiovascular:      Rate and Rhythm: Normal rate  Pulses: Normal pulses  Pulmonary:      Effort: Pulmonary effort is normal       Breath sounds: Normal breath sounds  Abdominal:      Palpations: Abdomen is soft  Musculoskeletal:      Cervical back: Normal range of motion  Right lower leg: No edema  Left lower leg: No edema     Skin:     General: Skin is warm and dry    Neurological:      General: No focal deficit present  Mental Status: He is alert  GCS: GCS eye subscore is 4  GCS verbal subscore is 5  GCS motor subscore is 6  Sensory: Sensation is intact  Motor: Motor function is intact  Comments: Chronic left sided weakness secondary to brachial plexus injury    Mild dysarthria remains s/p TPA        -------------------------------------------------------------------------------------------------------------  Vitals:   Vitals:    01/31/22 1700 01/31/22 1700 01/31/22 1715 01/31/22 1730   BP: 153/84 153/84 (!) 173/94 162/81   BP Location: Right arm   Right arm   Pulse: 86 80 79 79   Resp: 18 16 18 14   Temp:    99 °F (37 2 °C)   TempSrc:    Oral   SpO2: 99%  99% 99%   Weight:    84 9 kg (187 lb 2 7 oz)   Height:    5' 10" (1 778 m)     Temp  Min: 97 9 °F (36 6 °C)  Max: 99 °F (37 2 °C)  IBW (Ideal Body Weight): 73 kg  Height: 5' 10" (177 8 cm)  Body mass index is 26 86 kg/m²  Laboratory and Diagnostics:  Results from last 7 days   Lab Units 01/31/22  1525   WBC Thousand/uL 9 45   HEMOGLOBIN g/dL 15 3   HEMATOCRIT % 45 5   PLATELETS Thousands/uL 294     Results from last 7 days   Lab Units 01/31/22  1525   SODIUM mmol/L 131*   POTASSIUM mmol/L 4 6   CHLORIDE mmol/L 98*   CO2 mmol/L 26   ANION GAP mmol/L 7   BUN mg/dL 30*   CREATININE mg/dL 3 04*   CALCIUM mg/dL 7 6*   GLUCOSE RANDOM mg/dL 469*          Results from last 7 days   Lab Units 01/31/22  1525   INR  0 92   PTT seconds 26              ABG:    VBG:          Micro:        EKG:   Imaging:   CTA stroke alert (head/neck)   Final Result      No evidence of hemodynamic significant stenosis, aneurysm or dissection  I personally discussed this study with neurologist on 1/31/2022 at 3:42 PM                         Workstation performed: [id number]         CT stroke alert brain   Final Result      No acute intracranial abnormality  Microangiopathic changes               I personally discussed this study with neurologist on 1/31/2022 at 3:42 PM                 Workstation performed: [id number]         MRI Inpatient Order    (Results Pending)   CT head wo contrast    (Results Pending)       Historical Information   Past Medical History:   Diagnosis Date    Diabetes mellitus ([address] )     Hypertension     Stroke [facility])      History reviewed  No pertinent surgical history  Social History   Social History     Substance and Sexual Activity   Alcohol Use Not Currently    Comment: socially maybe 5 times a year     Social History     Substance and Sexual Activity   Drug Use No     Social History     Tobacco Use   Smoking Status Current Every Day Smoker    Packs/day: 0 50    Types: Cigarettes   Smokeless Tobacco Never Used   Tobacco Comment    states slowing it down, using Chantix     Family History:   Family History   Problem Relation Age of Onset    No Known Problems Mother     No Known Problems Father      Family history unknown      Medications:  Current Facility-Administered Medications   Medication Dose Route Frequency    [START ON 2/1/2022] amLODIPine (NORVASC) tablet 10 mg  10 mg Oral Daily    atorvastatin (LIPITOR) tablet 80 mg  80 mg Oral QPM    hydrALAZINE (APRESOLINE) injection 10 mg  10 mg Intravenous Q4H PRN    hydrALAZINE (APRESOLINE) tablet 25 mg  25 mg Oral TID    insulin lispro (HumaLOG) 100 units/mL subcutaneous injection 2-12 Units  2-12 Units Subcutaneous Q6H [address]    Labetalol HCl (NORMODYNE) injection 10 mg  10 mg Intravenous Q4H PRN    [START ON 2/1/2022] metoprolol succinate (TOPROL-XL) 24 hr tablet 50 mg  50 mg Oral Daily    [START ON 2/1/2022] nicotine (NICODERM CQ) 21 mg/24 hr TD 24 hr patch 1 patch  1 patch Transdermal Daily    sodium chloride 0 9 % infusion  100 mL/hr Intravenous Continuous     Home medications:  Prior to Admission Medications   Prescriptions Last Dose Informant Patient Reported? Taking?    Basaglar KwikPen 100 units/mL injection pen  Self No No Sig: inject 28 unit subcutaneously once daily or as directed   Blood Glucose Monitoring Suppl (ONE TOUCH ULTRA 2) w/Device KIT  Self No No   Sig: Test once daily or as directed   Blood Glucose Monitoring Suppl (ONE TOUCH ULTRA MINI) w/Device KIT  Self Yes No   Sig: As directed   Insulin Pen Needle (PEN NEEDLES 31GX5/16") 31G X 8 MM MISC  Self No No   Sig: Inject as directed daily   amLODIPine (NORVASC) 10 mg tablet   No No   Sig: Take 1 tablet (10 mg total) by mouth daily   aspirin 81 mg chewable tablet  Self No No   Sig: Chew 1 tablet (81 mg total) daily   atorvastatin (LIPITOR) 80 mg tablet   No No   Sig: take 1 tablet by mouth once daily   fluticasone (FLONASE) 50 mcg/act nasal spray  Self Yes No   Si sprays into each nostril daily   Patient not taking: Reported on 2022    hydrALAZINE (APRESOLINE) 25 mg tablet   No No   Sig: take 1 tablet by mouth three times a day   metoprolol succinate (TOPROL-XL) 50 mg 24 hr tablet   No No   Sig: Take 1 tablet (50 mg total) by mouth daily   nicotine (NICODERM CQ) 14 mg/24hr TD 24 hr patch  Self No No   Sig: Place 1 patch on the skin daily   Patient not taking: Reported on 10/7/2021      Facility-Administered Medications: None     Allergies:  No Known Allergies    ------------------------------------------------------------------------------------------------------------  Advance Directive and Living Will:      Power of :    POLST:    ------------------------------------------------------------------------------------------------------------  Anticipated Length of Stay is > 2 midnights    Care Time Delivered:   Upon my evaluation, this patient had a high probability of imminent or life-threatening deterioration due to stroke like symptoms , which required my direct attention, intervention, and personal management    I have personally provided 45 minutes ([id number] [id number]) of critical care time, exclusive of procedures, teaching, family meetings, and any prior time recorded by providers other than myself  YASMIN Grullon        Portions of the record may have been created with voice recognition software  Occasional wrong word or "sound a like" substitutions may have occurred due to the inherent limitations of voice recognition software    Read the chart carefully and recognize, using context, where substitutions have occurred

## 2022-01-31 NOTE — PLAN OF CARE
Problem: Potential for Falls  Goal: Patient will remain free of falls  Description: INTERVENTIONS:  - Educate patient/family on patient safety including physical limitations  - Instruct patient to call for assistance with activity   - Consult OT/PT to assist with strengthening/mobility   - Keep Call bell within reach  - Keep bed low and locked with side rails adjusted as appropriate  - Keep care items and personal belongings within reach  - Initiate and maintain comfort rounds  - Make Fall Risk Sign visible to staff  - Offer Toileting every 2 Hours, in advance of need  - Initiate/Maintain bed alarm  - Apply yellow socks and bracelet for high fall risk patients  - Consider moving patient to room near nurses station  Outcome: Progressing     Problem: MOBILITY - ADULT  Goal: Maintain or return to baseline ADL function  Description: INTERVENTIONS:  -  Assess patient's ability to carry out ADLs; assess patient's baseline for ADL function and identify physical deficits which impact ability to perform ADLs (bathing, care of mouth/teeth, toileting, grooming, dressing, etc )  - Assess/evaluate cause of self-care deficits   - Assess range of motion  - Assess patient's mobility; develop plan if impaired  - Assess patient's need for assistive devices and provide as appropriate  - Encourage maximum independence but intervene and supervise when necessary  - Involve family in performance of ADLs  - Assess for home care needs following discharge   - Consider OT consult to assist with ADL evaluation and planning for discharge  - Provide patient education as appropriate  Outcome: Progressing  Goal: Maintains/Returns to pre admission functional level  Description: INTERVENTIONS:  - Perform BMAT or MOVE assessment daily    - Set and communicate daily mobility goal to care team and patient/family/caregiver  - Collaborate with rehabilitation services on mobility goals if consulted  - Perform Range of Motion 2 times a day    - Reposition patient every 2 hours  - Dangle patient 2 times a day  - Stand patient 2 times a day  - Ambulate patient 2 times a day  - Out of bed to chair 2 times a day   - Out of bed for meals 2 times a day  - Out of bed for toileting  - Record patient progress and toleration of activity level   Outcome: Progressing     Problem: NEUROSENSORY - ADULT  Goal: Achieves stable or improved neurological status  Description: INTERVENTIONS  - Monitor and report changes in neurological status  - Monitor vital signs such as temperature, blood pressure, glucose, and any other labs ordered   - Initiate measures to prevent increased intracranial pressure  - Monitor for seizure activity and implement precautions if appropriate      Outcome: Progressing  Goal: Remains free of injury related to seizures activity  Description: INTERVENTIONS  - Maintain airway, patient safety  and administer oxygen as ordered  - Monitor patient for seizure activity, document and report duration and description of seizure to physician/advanced practitioner  - If seizure occurs,  ensure patient safety during seizure  - Reorient patient post seizure  - Seizure pads on all 4 side rails  - Instruct patient/family to notify RN of any seizure activity including if an aura is experienced  - Instruct patient/family to call for assistance with activity based on nursing assessment  - Administer anti-seizure medications if ordered    Outcome: Progressing  Goal: Achieves maximal functionality and self care  Description: INTERVENTIONS  - Monitor swallowing and airway patency with patient fatigue and changes in neurological status  - Encourage and assist patient to increase activity and self care     - Encourage visually impaired, hearing impaired and aphasic patients to use assistive/communication devices  Outcome: Progressing

## 2022-01-31 NOTE — CONSULTS
Duplicate consult order  Please see original neurology/stroke alert consult note completed earlier this afternoon  Verified Results  CARDIAC CATHETERIZATION [id number] 06:27AM Cordell Conroy Order Number: [id number]    - Patient Instructions: To schedule this appointment, please contact Central Scheduling at [phone]  For Benji Branham, please call [phone]  Test Name Result Flag Reference   CARDIAC CATHETERIZATION (Report)     Sabrina Mcintosh [address]  [facility], [address]   [phone]     [facility]     Invasive Cardiovascular Lab Complete Report     Patient: Samuel Marte   MR number: [id number]   Account number: [de-identified]   Study date: 2017   Gender: Male   : 1964   Height: 74 in   Weight: 371 1 lb   BSA: 2 83 m squared     Allergies: NO KNOWN ALLERGIES     Diagnostic Cardiologist: Delbert Persaud DO   Interventional Cardiologist: Delbert Persaud DO   Primary Physician: Mica Cruz MD     SUMMARY     CORONARY CIRCULATION:   Distal LAD: There was a 95 % stenosis  Proximal circumflex: There was a 0 % stenosis at the site of a prior stent  Mid RCA: There was a 85 % stenosis  1ST LESION INTERVENTIONS:   A balloon angioplasty with stent and balloon angioplasty procedure was performed on the 85 % lesion in the mid RCA  Following intervention there was a 0 % residual stenosis  A Resolute Integrity Rx 3 0 x 18 drug-eluting stent was placed across the lesion and deployed at a maximum inflation pressure of 16 marcelle  INDICATIONS:   -- Possible CAD: unstable angina  PROCEDURES PERFORMED     -- Left heart catheterization without ventriculogram    -- Left coronary angiography  -- Right coronary angiography  -- Outpatient  -- Mod Sedation Same Physician Initial 15min  -- Mod Sedation Same Physician Add 15min  -- Coronary Catheterization (w/ LHC)  -- Coronary Drug Eluting Stent w/PTCA  -- Intervention on mid RCA: balloon angioplasty, stent, balloon angioplasty       PROCEDURE: The risks and alternatives of the procedures and conscious sedation were explained to the patient and informed consent was obtained  The patient was brought to the cath lab and placed on the table  The planned puncture sites   were prepped and draped in the usual sterile fashion  -- Right radial artery access  After performing an Lionel's test to verify adequate ulnar artery supply to the hand, the radial site was prepped  The puncture site was infiltrated with local anesthetic  The vessel was accessed using the   modified Seldinger technique, a wire was advanced into the vessel, and a sheath was advanced over the wire into the vessel  -- Left heart catheterization without ventriculogram  A catheter was advanced over a guidewire into the ascending aorta  After recording ascending aortic pressure, the catheter was advanced across the aortic valve and left ventricular   pressure was recorded  The catheter was pulled back across the aortic valve and into the ascending aorta and pullback pressures were obtained  -- Left coronary artery angiography  A catheter was advanced over a guidewire into the aorta and positioned in the left coronary artery ostium under fluoroscopic guidance  Angiography was performed  -- Right coronary artery angiography  A catheter was advanced over a guidewire into the aorta and positioned in the right coronary artery ostium under fluoroscopic guidance  Angiography was performed  -- Outpatient  -- Mod Sedation Same Physician Initial 15min  -- Mod Sedation Same Physician Add 15min  -- Coronary Catheterization (w/ LHC)  LESION INTERVENTION: A balloon angioplasty with stent and balloon angioplasty procedure was performed on the 85 % lesion in the mid RCA  Following intervention there was a 0 % residual stenosis  This was not a bifurcation lesion  This was   an ACC/AHA type B moderate risk lesion for intervention  The residual lesion was tubular and demonstrated no evidence of thrombus   There was ERIKA 3 flow before the procedure and ERIKA 3 flow after the procedure  There was no dissection  -- Vessel setup was performed  A Launcher 6Fr Jr 4 0 guiding catheter was used to cannulate the vessel  -- Vessel setup was performed  A readeo 180cm wire was used to cross the lesion  -- Balloon angioplasty was performed, using a Trek Rx 3 0 x 15mm balloon, with 2 inflations and a maximum inflation pressure of 10 marcelle  -- A Resolute Integrity Rx 3 0 x 18 drug-eluting stent was placed across the lesion and deployed at a maximum inflation pressure of 16 marcelle  INTERVENTIONS:   -- Coronary Drug Eluting Stent w/PTCA  PROCEDURE COMPLETION: The patient tolerated the procedure well and was discharged from the cath lab  TIMING: Test started at 09:12  Test concluded at 09:47  HEMOSTASIS: The sheath was removed  The site was compressed with a Hemoband   device  Hemostasis was obtained  MEDICATIONS GIVEN: Baby Aspirin, 324 mg, PO, at 08:44  Plavix 75mg, 75 mg, PO, at 08:44  Fentanyl (1OOmcg/2 ml), 50 mcg, IV, at 09:17  Versed (2mg/2ml), 2 mg, IV, at 09:17  1% Lidocaine, 2 ml,   subcutaneously, at 09:28  Verapamil (5mg/2ml), 2 5 mg, IV, at 09:29  Heparin 1000 units/ml, 4,000 units, IV, at 09:30  Nitroglycerin (200mcg/ml), 200 mcg, at 09:30  Fentanyl (1OOmcg/2 ml), 50 mcg, IV, at 09:31  Versed (2mg/2ml), 1 mg, IV,   at 09:31  Heparin 1000 units/ml, 6,000 units, IV, at 09:33  CONTRAST GIVEN: 70 ml Omnipaque (350 mg I /ml)  RADIATION EXPOSURE: Fluoroscopy time: 3 83 min  CORONARY VESSELS:  -- The coronary circulation is right dominant  -- Left main: Angiography showed minor luminal irregularities  -- Proximal LAD: Angiography showed minor luminal irregularities  -- Mid LAD: Angiography showed minor luminal irregularities  -- Distal LAD: The vessel was small sized  Angiography showed diffuse disease  There was a 95 % stenosis  -- Proximal circumflex:  There was a 0 % stenosis at the site of a prior stent  -- Mid RCA: There was a 85 % stenosis  IMPRESSIONS:   pci rca with shira     RECOMMENDATIONS   weight loss, statin, dapt x 1 year  Prepared and signed by     Isela Chadwick DO   Signed 2017 10:48:50     Study diagram     Angiographic findings   Native coronary lesions:   ï¾·Distal LAD: Lesion 1: 95 % stenosis  ï¾·Proximal circumflex: Lesion 1: 0 % stenosis, site of prior stent  ï¾·Mid RCA: Lesion 1: 85 % stenosis  Intervention results   Native coronary lesions:   ï¾·balloon angioplasty, stent, and balloon angioplasty of the 85 % stenosis in mid RCA  0 % residual stenosis  Stent: Resolute Integrity Rx 3 0 x 18 drug-eluting       Hemodynamic tables     Pressures: Baseline   Pressures: - HR: 66   Pressures: - Rhythm:   Pressures: -- Aortic Pressure (S/D/M): 107/62/77   Pressures: -- Left Ventricle (s/edp): 109/32/--     Outputs: Baseline   Outputs: -- CALCULATIONS: [age]   Outputs: -- CALCULATIONS: Body Surface Area: 2 83   Outputs: -- CALCULATIONS: Height in cm: 188 00   Outputs: -- CALCULATIONS: Sex: Male   Outputs: -- CALCULATIONS: Weight in k 70

## 2022-01-31 NOTE — ASSESSMENT & PLAN NOTE
Patient has had multiple CVAs in the past   In 2019 he had a lacunar infarct in the R thalamus   In September of 2021 he also received TPA and was found to have a small focus of acute ischemia in the left periatrial white matter  He continues to smoke, continue to encourage cessation   Neurology office note from 1/25 stating recently off DAPT and just maintained on ASA and statin   medtronic "MRI conditional" Loop recorder placed on 10/1 to evaluate for paroxysmal atrial fibrillation as a cardio-embolic source    There have been 2 devices checks since implantation with no device activating episodes  ANGIE completed on 10/1 showing no thrombus in the right heart nor any atrial defects   Complete TPA /stroke protocol as above

## 2022-01-31 NOTE — ASSESSMENT & PLAN NOTE
02/01/2022:  New to this examiner is this  64-year-old male with prior strokes (right thalamic in 2018, left periventricular in 09/2021) on aspirin, loop recorder placement, as well as hypertension and dyslipidemia  who presents with stroke-like symptoms  Patient had sudden onset of slurred speech and word-finding difficulty this afternoon at approximately 13:00  He states he was having a conversation and was normal prior to that time  He then endorse that he was walking like he was drunk  Stroke alert was initiated in the ED   NIHSS 2  CTH/CTA without acute changes  BP on presentation 170/81  Patient received tPA as noted below  On initial neuro exam, patient exhibited slurred speech and intermittent word-finding difficulties  On today's exam he continues with mild dysarthria and occasionally word-finding difficulties  His power exam is generally good with no lateralizing deficits  His MRI notes Small acute infarct in right posterior limb of internal capsule, as well as additional punctate ischemic injury in left ventral syed and left ventrolateral medulla only seen on exponential diffusion sequences    Plan:   - Continue Stroke Pathway now 24 hours post tPA  · Echocardiogram pending  · Permissive hypertension until 24 hours, begin blood pressure normalization subsequent to that  · We will put this patient on a dual anti-platelet for the next week  He will continue on Plavix after that  Aspirin will be DC  · Hemoglobin A1c  · Lipid panel demonstrates moderate control  We will change him from Lipitor 80 to Crestor 40 follow him in the office  He may need more aggressive lipid control  · Continue frequent neurochecks and notify neurology with any changes  · STAT CT Head for acute changes  · Telemetry monitoring  · Stroke education to be provided   · Continue PT/OT/Speech  - medical management and supportive care per primary team   Correction of metabolic or infectious disturbances

## 2022-01-31 NOTE — ED PROVIDER NOTES
Pt Name: Afia Maciel  MRN: [id number]  [place] 1975  Age/Sex: 48 y o  male  Date of evaluation: 1/31/2022  PCP: Danial James, [address] [place] [address]    Chief Complaint   Patient presents with    CVA/TIA-like Symptoms     pt c/o slurred speech and feeling off balance since 1230  pt has had stroke in the past with bilateral leg weakness and L arm weakness defecits         HPI and MDM    48 y o  male presenting with slurred speech  Also feels off balance  Has history of CVA  Last stroke was in September  States has had slurred speech and loss of balance and weakness in legs with strokes in the past   This feels similar  Last known normal was 12:30 PM   Denies recent illnesses, no fevers or chills, no headache  No visual changes  EKG shows normal sinus rhythm heart rate of 80, narrow QRS, normal axis, intervals reassuring, no ST elevation no significant ST depression  Patient with NIH stroke scale score of 2  Somewhat hypertensive otherwise vitals reassuring  Discussed with neurology Dr Telma Polk, neurology at bedside, recommending tPA  TPA administered, no acute findings on CT scans  Discussed with ICU for hospitalization  Medications   alteplase (ACTIVASE) IV bolus 7 677 mg (7 677 mg Intravenous Given 1/31/22 1558)     Followed by   alteplase (ACTIVASE) infusion 69 1 mg (69 1 mg Intravenous New Bag 1/31/22 1559)     Followed by   sodium chloride 0 9 % bolus 50 mL (has no administration in time range)         Past Medical and Surgical History    Past Medical History:   Diagnosis Date    Diabetes mellitus ([facility] [address] )     Hypertension     Stroke [facility])        History reviewed  No pertinent surgical history      Family History   Problem Relation Age of Onset    No Known Problems Mother     No Known Problems Father        Social History     Tobacco Use    Smoking status: Current Every Day Smoker     Packs/day: 0 50     Types: Cigarettes    Smokeless tobacco: Never Used    Tobacco comment: states slowing it down, using Chantix   Vaping Use    Vaping Use: Never used   Substance Use Topics    Alcohol use: Not Currently     Comment: socially maybe 5 times a year    Drug use: No           Allergies    No Known Allergies    Home Medications    Prior to Admission medications    Medication Sig Start Date End Date Taking? Authorizing Provider   amLODIPine (NORVASC) 10 mg tablet Take 1 tablet (10 mg total) by mouth daily 11/23/21   Carter Mccrary, DO   aspirin 81 mg chewable tablet Chew 1 tablet (81 mg total) daily 9/29/21 11/23/21  Sowmya Erwin MD   atorvastatin (LIPITOR) 80 mg tablet take 1 tablet by mouth once daily 1/27/22   Carter Mccrary, DO   Basaglar KwikPen 100 units/mL injection pen inject 28 unit subcutaneously once daily or as directed 9/30/21   Carter Mccrary, DO   Blood Glucose Monitoring Suppl (ONE TOUCH ULTRA 2) w/Device KIT Test once daily or as directed 8/4/20   Carter Mccrary, DO   Blood Glucose Monitoring Suppl (ONE TOUCH ULTRA MINI) w/Device KIT As directed 9/13/18   Historical Provider, MD   fluticasone (FLONASE) 50 mcg/act nasal spray 2 sprays into each nostril daily  Patient not taking: Reported on 1/25/2022  9/15/21 9/15/22  Historical Provider, MD   hydrALAZINE (APRESOLINE) 25 mg tablet take 1 tablet by mouth three times a day 1/27/22   Carter Mccrary, DO   Insulin Pen Needle (PEN NEEDLES 31GX5/16") 31G X 8 MM MISC Inject as directed daily 9/11/18   Carter Mccrary, DO   metoprolol succinate (TOPROL-XL) 50 mg 24 hr tablet Take 1 tablet (50 mg total) by mouth daily 11/23/21   Carter Mccrary, DO   nicotine (NICODERM CQ) 14 mg/24hr TD 24 hr patch Place 1 patch on the skin daily  Patient not taking: Reported on 10/7/2021 9/30/21   Sowmya Erwin MD           Review of Systems    Review of Systems   Constitutional: Negative for chills and fever  HENT: Negative for rhinorrhea and sore throat  Eyes: Negative for pain and visual disturbance     Respiratory: Negative for cough and shortness of breath  Cardiovascular: Negative for chest pain and leg swelling  Gastrointestinal: Negative for abdominal pain, nausea and vomiting  Genitourinary: Negative for dysuria and hematuria  Musculoskeletal: Positive for gait problem  Negative for back pain and myalgias  Skin: Negative for rash and wound  Neurological: Positive for speech difficulty  Negative for syncope and headaches  Physical Exam      ED Triage Vitals   Temperature Pulse Respirations Blood Pressure SpO2   01/31/22 1536 01/31/22 1512 01/31/22 1512 01/31/22 1512 01/31/22 1512   97 9 °F (36 6 °C) 85 16 170/81 100 %      Temp src Heart Rate Source Patient Position - Orthostatic VS BP Location FiO2 (%)   -- 01/31/22 1512 01/31/22 1512 01/31/22 1512 --    Monitor Sitting Left arm       Pain Score       --                      Physical Exam  Constitutional:       Appearance: He is not toxic-appearing  HENT:      Head: Normocephalic and atraumatic  Nose: Nose normal    Eyes:      Extraocular Movements: Extraocular movements intact  Pupils: Pupils are equal, round, and reactive to light  Cardiovascular:      Rate and Rhythm: Normal rate and regular rhythm  Pulmonary:      Effort: No respiratory distress  Breath sounds: Normal breath sounds  No wheezing  Abdominal:      General: There is no distension  Tenderness: There is no abdominal tenderness  Musculoskeletal:         General: No swelling or deformity  Normal range of motion  Cervical back: Normal range of motion and neck supple  Skin:     General: Skin is warm  Findings: No erythema  Neurological:      Mental Status: He is alert and oriented to person, place, and time  Cranial Nerves: No cranial nerve deficit  Sensory: No sensory deficit  Motor: No weakness        Comments: Slurred speech, left upper extremity dysmetria with finger-nose exam   Psychiatric:         Mood and Affect: Mood normal  Diagnostic Results      Labs:    Results Reviewed     Procedure Component Value Units Date/Time    COVID/FLU/RSV - 2 hour TAT [[id number]] Collected: 01/31/22 1608    Lab Status:  In process Specimen: Nares from Nose Updated: 01/31/22 1609    HS Troponin I 4hr [[id number]]     Lab Status: No result Specimen: Blood     HS Troponin 0hr (reflex protocol) [[id number]]  (Normal) Collected: 01/31/22 1525    Lab Status: Final result Specimen: Blood from Arm, Right Updated: 01/31/22 1554     hs TnI 0hr 21 ng/L     HS Troponin I 2hr [[id number]]     Lab Status: No result Specimen: Blood     Protime-INR [[id number]]  (Normal) Collected: 01/31/22 1525    Lab Status: Final result Specimen: Blood from Arm, Right Updated: 01/31/22 1544     Protime 12 0 seconds      INR 0 92    APTT [[id number]]  (Normal) Collected: 01/31/22 1525    Lab Status: Final result Specimen: Blood from Arm, Right Updated: 01/31/22 1544     PTT 26 seconds     CBC and Platelet [[id number]]  (Normal) Collected: 01/31/22 1525    Lab Status: Final result Specimen: Blood from Arm, Right Updated: 01/31/22 1544     WBC 9 45 Thousand/uL      RBC 4 97 Million/uL      Hemoglobin 15 3 g/dL      Hematocrit 45 5 %      MCV 92 fL      MCH 30 8 pg      MCHC 33 6 g/dL      RDW 13 9 %      Platelets 706 Thousands/uL      MPV 9 5 fL     Basic metabolic panel [[id number]]  (Abnormal) Collected: 01/31/22 1525    Lab Status: Final result Specimen: Blood from Arm, Right Updated: 01/31/22 1540     Sodium 131 mmol/L      Potassium 4 6 mmol/L      Chloride 98 mmol/L      CO2 26 mmol/L      ANION GAP 7 mmol/L      BUN 30 mg/dL      Creatinine 3 04 mg/dL      Glucose 469 mg/dL      Calcium 7 6 mg/dL      eGFR 23 ml/min/1 73sq m     Narrative:      Rema guidelines for Chronic Kidney Disease (CKD):     Stage 1 with normal or high GFR (GFR > 90 mL/min/1 73 square meters)    Stage 2 Mild CKD (GFR = 60-89 mL/min/1 73 square meters)    Stage 3A Moderate CKD (GFR = 45-59 mL/min/1 73 square meters)    Stage 3B Moderate CKD (GFR = 30-44 mL/min/1 73 square meters)    Stage 4 Severe CKD (GFR = 15-29 mL/min/1 73 square meters)    Stage 5 End Stage CKD (GFR <15 mL/min/1 73 square meters)  Note: GFR calculation is accurate only with a steady state creatinine    Fingerstick Glucose (POCT) [[id number]]  (Abnormal) Collected: 01/31/22 1521    Lab Status: Final result Updated: 01/31/22 1522     POC Glucose 490 mg/dl           All labs reviewed and utilized in the medical decision making process    Radiology:    CTA stroke alert (head/neck)   Final Result      No evidence of hemodynamic significant stenosis, aneurysm or dissection  I personally discussed this study with neurologist on 1/31/2022 at 3:42 PM                         Workstation performed: [id number]         CT stroke alert brain   Final Result      No acute intracranial abnormality  Microangiopathic changes  I personally discussed this study with neurologist on 1/31/2022 at 3:42 PM                 Workstation performed: [id number]             All radiology studies independently viewed by me and interpreted by the radiologist     Procedure    Procedures        FINAL IMPRESSION    Final diagnoses:   Dysarthria   Cerebrovascular accident (CVA), unspecified mechanism ([address] )         DISPOSITION    Time reflects when diagnosis was documented in both MDM as applicable and the Disposition within this note     Time User Action Codes Description Comment    1/31/2022  3:22 PM Martha Jaquez Add [R47 1] Dysarthria     1/31/2022  4:11 PM Martha Jaquez Add [I63 9] Cerebrovascular accident (CVA), unspecified mechanism [facility])       ED Disposition     ED Disposition Condition Date/Time Comment    Admit Stable Mon Jan 31, 2022  4:12 PM Case was discussed with Dr Paulina Bush and the patient's admission status was agreed to be Admission Status: inpatient status to the service of Dr Paulina Bush           Follow-up Information None           PATIENT REFERRED TO:    No follow-up provider specified  DISCHARGE MEDICATIONS:    Patient's Medications   Discharge Prescriptions    No medications on file       No discharge procedures on file  Vicki Gayle DO        This note was partially completed using voice recognition technology, and was scanned for gross errors; however some errors may still exist  Please contact the author with any questions or requests for clarification        Vicki Gayle DO  01/31/22 3034

## 2022-01-31 NOTE — CONSULTS
Consultation - Stroke   Jennifer Cohen 48 y o  male MRN: [id number]  Unit/Bed#: ED 25 Encounter: [id number]      Assessment/Plan     Stroke-like symptoms  Assessment & Plan  53-year-old male with diabetes, hypertension, prior strokes (right thalamic in 2018, left periventricular in 09/2021) on aspirin, loop recorder placement, who presents with stroke-like symptoms  Patient had sudden onset of slurred speech and word-finding difficulty this afternoon at approximately 13:00  He states he was having a conversation and was normal prior to that time  He then endorse that he was walking like he was drunk  Stroke alert was initiated in the ED   NIHSS 2  CTH/CTA without acute changes  BP on presentation 170/81  Upon further discussion with attending neurologist, patient was deemed a tPA candidate and received tPA at approximately 15:58  On initial neuro exam, patient exhibited slurred speech and intermittent word-finding difficulties  After completing CT, patient's speech appeared to improved but continued to be slurred and exhibited intermittent word-finding difficulties  No other deficits noted on exam   Unclear etiology at this time  Stroke pathway pending for further evaluation  Plan:   - Continue Stroke Pathway  · Repeat CT Head at 24 hours post tPA (approximately: 15:58 )  · MRI brain  · Echocardiogram  · Permissive hypertension, but less than 180/105  · Holding all antiplatelet and anticoagulation for 24 hours post tPA  · Hemoglobin A1c  · Lipid panel  · Continue frequent neurochecks and notify neurology with any changes  · STAT CT Head for acute changes  · Telemetry monitoring  · Stroke education to be provided   · Continue PT/OT/Speech  - medical management and supportive care per primary team   Correction of metabolic or infectious disturbances        TPA Decision: After a discussion of risks, benefits and alternatives (including best medical therapy excluding thrombolysis) reviewing inclusion and exclusion criteria the decision was made to proceed with thrombolytic therapy  Verbal consent was obtained from  the patient    Consent was obtained by YASMIN Brown      Recommendations for outpatient neurological follow up have yet to be determined  Case and treatment plan reviewed with attending neurologist, Dr Glenford Brunner  History of Present Illness     Reason for Consult / Principal Problem: Stroke  Patient last known well: 1300  Stroke alert called: 0614  Neurology time of arrival: 1524  HPI: Jennifer Cohne is a 48 y o   male with diabetes, hypertension, prior strokes (right thalamic in 2019, left periventricular in 09/2021) on aspirin, loop recorder placement, who presents with stroke-like symptoms  Patient states that he was talking to his co-worker this afternoon when he started having slurred speech and word-finding difficulties  He states that this started around 1:00 p m  This afternoon  He reports feeling fine up until that time  He also reports that he then was walking as if he was drunk  He denies any headache, lightheadedness, dizziness, numbness/tingling, weakness, visual disturbance, pain at this time  He reports that he is on aspirin at baseline for prior strokes but does not have residual symptoms from his prior strokes  Consult to Neurology  Consult performed by: YASMIN Brown  Consult ordered by: Diamond Bradley DO          Review of Systems   Constitutional: Negative for fever  HENT: Negative for trouble swallowing  Eyes: Negative for photophobia and visual disturbance  Respiratory: Negative for shortness of breath  Cardiovascular: Negative for chest pain  Gastrointestinal: Negative for abdominal pain  Musculoskeletal: Positive for gait problem  Negative for arthralgias, back pain, myalgias and neck pain  Skin: Negative for rash  Neurological: Positive for speech difficulty   Negative for dizziness, tremors, seizures, syncope, facial asymmetry, weakness, light-headedness, numbness and headaches  Psychiatric/Behavioral: Negative for confusion  All other systems reviewed and are negative  Historical Information   Past Medical History:   Diagnosis Date    Diabetes mellitus ([facility] [address] )     Hypertension     Stroke [facility])      History reviewed  No pertinent surgical history  Social History   Social History     Substance and Sexual Activity   Alcohol Use Not Currently    Comment: socially maybe 5 times a year     Social History     Substance and Sexual Activity   Drug Use No     E-Cigarette/Vaping    E-Cigarette Use Never User      E-Cigarette/Vaping Substances    Nicotine No     THC No     CBD No     Flavoring No     Other No     Unknown No      Social History     Tobacco Use   Smoking Status Current Every Day Smoker    Packs/day: 0 50    Types: Cigarettes   Smokeless Tobacco Never Used   Tobacco Comment    states slowing it down, using Chantix     Family History:   Family History   Problem Relation Age of Onset    No Known Problems Mother     No Known Problems Father        Review of previous medical records was completed  Meds/Allergies   all current active meds have been reviewed, current meds:   Current Facility-Administered Medications   Medication Dose Route Frequency    alteplase (ACTIVASE) infusion 69 1 mg  0 81 mg/kg Intravenous Once    Followed by   Ovidio Coadilson sodium chloride 0 9 % bolus 50 mL  50 mL Intravenous Once    and PTA meds:   Prior to Admission Medications   Prescriptions Last Dose Informant Patient Reported? Taking?    Basaglar KwikPen 100 units/mL injection pen  Self No No   Sig: inject 28 unit subcutaneously once daily or as directed   Blood Glucose Monitoring Suppl (ONE TOUCH ULTRA 2) w/Device KIT  Self No No   Sig: Test once daily or as directed   Blood Glucose Monitoring Suppl (ONE TOUCH ULTRA MINI) w/Device KIT  Self Yes No   Sig: As directed   Insulin Pen Needle (PEN NEEDLES 31GX5/16") 31G X 8 MM MISC  Self No No   Sig: Inject as directed daily   amLODIPine (NORVASC) 10 mg tablet   No No   Sig: Take 1 tablet (10 mg total) by mouth daily   aspirin 81 mg chewable tablet  Self No No   Sig: Chew 1 tablet (81 mg total) daily   atorvastatin (LIPITOR) 80 mg tablet   No No   Sig: take 1 tablet by mouth once daily   fluticasone (FLONASE) 50 mcg/act nasal spray  Self Yes No   Si sprays into each nostril daily   Patient not taking: Reported on 2022    hydrALAZINE (APRESOLINE) 25 mg tablet   No No   Sig: take 1 tablet by mouth three times a day   metoprolol succinate (TOPROL-XL) 50 mg 24 hr tablet   No No   Sig: Take 1 tablet (50 mg total) by mouth daily   nicotine (NICODERM CQ) 14 mg/24hr TD 24 hr patch  Self No No   Sig: Place 1 patch on the skin daily   Patient not taking: Reported on 10/7/2021      Facility-Administered Medications: None       No Known Allergies    Objective   Vitals:Blood pressure 166/70, pulse 84, temperature 97 9 °F (36 6 °C), resp  rate 18, weight 85 3 kg (188 lb 0 8 oz), SpO2 99 %  ,Body mass index is 27 77 kg/m²  No intake or output data in the 24 hours ending 22 1624    Invasive Devices: Invasive Devices  Report    Peripheral Intravenous Line            Peripheral IV 22 Right Antecubital <1 day                Physical Exam  Vitals and nursing note reviewed  Constitutional:       General: He is not in acute distress  Appearance: Normal appearance  He is not ill-appearing  HENT:      Head: Normocephalic  Mouth/Throat:      Mouth: Mucous membranes are moist       Pharynx: Oropharynx is clear  Eyes:      General: No scleral icterus  Right eye: No discharge  Left eye: No discharge  Extraocular Movements: Extraocular movements intact and EOM normal       Conjunctiva/sclera: Conjunctivae normal       Pupils: Pupils are equal, round, and reactive to light  Cardiovascular:      Rate and Rhythm: Normal rate     Pulmonary:      Effort: Pulmonary effort is normal  No respiratory distress  Musculoskeletal:         General: Normal range of motion  Cervical back: Normal range of motion  Skin:     General: Skin is warm and dry  Coloration: Skin is not jaundiced or pale  Neurological:      Mental Status: He is alert and oriented to person, place, and time  Coordination: Finger-Nose-Finger Test and Heel to [employer] normal    Psychiatric:         Mood and Affect: Mood normal          Behavior: Behavior normal          Thought Content: Thought content normal        Neurologic Exam     Mental Status   Oriented to person, place, and time  Level of consciousness: alert  Able to follow commands appropriately  Speech is slurred  Exhibits intermittent word-finding difficulties  During exam post CT, patient was able to name all objects on NIH card except for cactus and was able to read all phrases and words, although, speech was slurred still  Cranial Nerves     CN II   Right visual field deficit: none  Left visual field deficit: none     CN III, IV, VI   Pupils are equal, round, and reactive to light  Extraocular motions are normal      CN V   Facial sensation intact  CN VII   Facial expression full, symmetric       CN VIII   Hearing: intact    CN IX, X   Palate: symmetric    CN XI   CN XI normal      CN XII   CN XII normal      Motor Exam   Muscle bulk: normal  Able to lift and maintain antigravity in bilateral upper extremities and lower extremities without drift noted  Bilateral upper extremity strength 5/5 deltoids, biceps, triceps, hand   Bilateral lower extremity strength 5/5 hip flexion, dorsiflexion, plantar flexion     Sensory Exam   Light touch normal    Pinprick normal    No extinction noted     Gait, Coordination, and Reflexes     Coordination   Finger to nose coordination: normal  Heel to shin coordination: normal    Tremor   Resting tremor: absent  Intention tremor: absent    Reflexes   Right plantar: normal  Left plantar: equivocal      NIHSS:  1a Level of Consciousness: 0 = Alert   1b  LOC Questions: 0 = Answers both correctly   1c  LOC Commands: 0 = Obeys both correctly   2  Best Gaze: 0 = Normal   3  Visual: 0 = No visual field loss   4  Facial Palsy: 0=Normal symmetric movement   5a  Motor Right Arm: 0=No drift, limb holds 90 (or 45) degrees for full 10 seconds   5b  Motor Left Arm: 0=No drift, limb holds 90 (or 45) degrees for full 10 seconds   6a  Motor Right Le=No drift, limb holds 90 (or 45) degrees for full 10 seconds   6b  Motor Left Le=No drift, limb holds 90 (or 45) degrees for full 10 seconds   7  Limb Ataxia:  0=Absent   8  Sensory: 0=Normal; no sensory loss   9  Best Language:  1=Mild to moderate aphasia; some obvious loss of fluency or facility of comprehension without significant limitation on ideas expressed or form of expression  10  Dysarthria: 1=Mild to moderate, patient slurs at least some words and at worst, can be understood with some difficulty   11  Extinction and Inattention (formerly Neglect): 0=No abnormality   Total Score: 2     Time NIHSS was completed: 1539    Modified Pickens Score:  1 (No significant disability  Able to carry out all usual activities, despite some symptoms)    Lab Results:   I have personally reviewed pertinent reports    , CBC:   Results from last 7 days   Lab Units 22  1525   WBC Thousand/uL 9 45   RBC Million/uL 4 97   HEMOGLOBIN g/dL 15 3   HEMATOCRIT % 45 5   MCV fL 92   PLATELETS Thousands/uL 294   , BMP/CMP:   Results from last 7 days   Lab Units 22  1525   SODIUM mmol/L 131*   POTASSIUM mmol/L 4 6   CHLORIDE mmol/L 98*   CO2 mmol/L 26   BUN mg/dL 30*   CREATININE mg/dL 3 04*   CALCIUM mg/dL 7 6*   EGFR ml/min/1 73sq m 23   , Vitamin B12:   , HgBA1C:   , TSH:   , Coagulation:   Results from last 7 days   Lab Units 22  1525   INR  0 92   , Lipid Profile:   , Ammonia:   , Urinalysis:       Invalid input(s): URIBILINOGEN, Drug Screen:   , Medication Drug Levels:       Invalid input(s): CARBAMAZEPINE,  PHENOBARB, LACOSAMIDE, OXCARBAZEPINE     Imaging Studies: I have personally reviewed pertinent reports  EKG, Pathology, and Other Studies: I have personally reviewed pertinent reports  Code Status: Prior      Counseling / Coordination of Care  Total Critical Care time spent 31 minutes excluding procedures, teaching and family updates

## 2022-01-31 NOTE — ASSESSMENT & PLAN NOTE
Patient presented with slurred speech, word finding difficulty and ataxia at approximately 1300  He describes his normal state of health prior to that time      NIH on presentation was 2   CT head showing no hemorrhage  CTA showing no hemodynamically significant stenosis, aneurysm or dissection   He was given TPA at 1558  Will admit to the icu for post TPA monitoring  Continue on stroke protocol   MRI brain, serial CT head s/p 24 hour TPA window, echocardiogram   Hold DAPT until outside of the TPA window  Consult to neurology, physical medicine, PT/OT, speech therapy   PRN IV antihypertensives to maintain BP < 185/105

## 2022-01-31 NOTE — ASSESSMENT & PLAN NOTE
While in the TPA window will use PRN IV antihypertensives to maintain BP < 185/105  Can continue home dose amlodipine and BB tomorrow

## 2022-01-31 NOTE — ASSESSMENT & PLAN NOTE
Patient states left sided weakness started before previous strokes  He is due to see orthopedics in the outpatient setting  Continue with outpatient follow up

## 2022-01-31 NOTE — ASSESSMENT & PLAN NOTE
Lab Results   Component Value Date    HGBA1C 9 7 (A) 11/23/2021       Recent Labs     01/31/22  1521   POCGLU 490*       Blood Sugar Average: Last 72 hrs:  (P) 490     Hyperglycemia noted on BMP   Will check hgba1c  q 6 hour glucose checks with SSI coverage

## 2022-02-01 ENCOUNTER — APPOINTMENT (INPATIENT)
Dept: CT IMAGING | Facility: HOSPITAL | Age: 47
DRG: 062 | End: 2022-02-01
Payer: COMMERCIAL

## 2022-02-01 PROBLEM — I63.411 CEREBROVASCULAR ACCIDENT (CVA) DUE TO EMBOLISM OF RIGHT MIDDLE CEREBRAL ARTERY (HCC): Status: ACTIVE | Noted: 2022-01-31

## 2022-02-01 PROBLEM — Z86.73 HISTORY OF EMBOLIC STROKE: Status: ACTIVE | Noted: 2021-09-27

## 2022-02-01 PROBLEM — Z92.82 RECEIVED INTRAVENOUS TISSUE PLASMINOGEN ACTIVATOR (TPA) IN EMERGENCY DEPARTMENT: Status: ACTIVE | Noted: 2022-02-01

## 2022-02-01 LAB
ANION GAP SERPL CALCULATED.3IONS-SCNC: 7 MMOL/L (ref 4–13)
BASOPHILS # BLD AUTO: 0.04 THOUSANDS/ΜL (ref 0–0.1)
BASOPHILS NFR BLD AUTO: 0 % (ref 0–1)
BUN SERPL-MCNC: 27 MG/DL (ref 5–25)
CALCIUM SERPL-MCNC: 7.6 MG/DL (ref 8.3–10.1)
CHLORIDE SERPL-SCNC: 106 MMOL/L (ref 100–108)
CHOLEST SERPL-MCNC: 172 MG/DL
CO2 SERPL-SCNC: 22 MMOL/L (ref 21–32)
CREAT SERPL-MCNC: 2.79 MG/DL (ref 0.6–1.3)
EOSINOPHIL # BLD AUTO: 0.21 THOUSAND/ΜL (ref 0–0.61)
EOSINOPHIL NFR BLD AUTO: 2 % (ref 0–6)
ERYTHROCYTE [DISTWIDTH] IN BLOOD BY AUTOMATED COUNT: 13.9 % (ref 11.6–15.1)
EST. AVERAGE GLUCOSE BLD GHB EST-MCNC: 255 MG/DL
EST. AVERAGE GLUCOSE BLD GHB EST-MCNC: 258 MG/DL
GFR SERPL CREATININE-BSD FRML MDRD: 25 ML/MIN/1.73SQ M
GLUCOSE SERPL-MCNC: 153 MG/DL (ref 65–140)
GLUCOSE SERPL-MCNC: 190 MG/DL (ref 65–140)
GLUCOSE SERPL-MCNC: 211 MG/DL (ref 65–140)
GLUCOSE SERPL-MCNC: 216 MG/DL (ref 65–140)
GLUCOSE SERPL-MCNC: 245 MG/DL (ref 65–140)
GLUCOSE SERPL-MCNC: 528 MG/DL (ref 65–140)
GLUCOSE SERPL-MCNC: >500 MG/DL (ref 65–140)
HBA1C MFR BLD: 10.5 %
HBA1C MFR BLD: 10.6 %
HCT VFR BLD AUTO: 40.8 % (ref 36.5–49.3)
HDLC SERPL-MCNC: 44 MG/DL
HGB BLD-MCNC: 13.8 G/DL (ref 12–17)
IMM GRANULOCYTES # BLD AUTO: 0.03 THOUSAND/UL (ref 0–0.2)
IMM GRANULOCYTES NFR BLD AUTO: 0 % (ref 0–2)
LDLC SERPL DIRECT ASSAY-MCNC: 72 MG/DL (ref 0–100)
LYMPHOCYTES # BLD AUTO: 1.73 THOUSANDS/ΜL (ref 0.6–4.47)
LYMPHOCYTES NFR BLD AUTO: 17 % (ref 14–44)
MCH RBC QN AUTO: 30.4 PG (ref 26.8–34.3)
MCHC RBC AUTO-ENTMCNC: 33.8 G/DL (ref 31.4–37.4)
MCV RBC AUTO: 90 FL (ref 82–98)
MONOCYTES # BLD AUTO: 0.85 THOUSAND/ΜL (ref 0.17–1.22)
MONOCYTES NFR BLD AUTO: 8 % (ref 4–12)
NEUTROPHILS # BLD AUTO: 7.49 THOUSANDS/ΜL (ref 1.85–7.62)
NEUTS SEG NFR BLD AUTO: 73 % (ref 43–75)
NRBC BLD AUTO-RTO: 0 /100 WBCS
PLATELET # BLD AUTO: 272 THOUSANDS/UL (ref 149–390)
PMV BLD AUTO: 9.6 FL (ref 8.9–12.7)
POTASSIUM SERPL-SCNC: 4.2 MMOL/L (ref 3.5–5.3)
RBC # BLD AUTO: 4.54 MILLION/UL (ref 3.88–5.62)
SODIUM SERPL-SCNC: 135 MMOL/L (ref 136–145)
TRIGL SERPL-MCNC: 412 MG/DL
WBC # BLD AUTO: 10.35 THOUSAND/UL (ref 4.31–10.16)

## 2022-02-01 PROCEDURE — 92610 EVALUATE SWALLOWING FUNCTION: CPT

## 2022-02-01 PROCEDURE — 97163 PT EVAL HIGH COMPLEX 45 MIN: CPT

## 2022-02-01 PROCEDURE — 99222 1ST HOSP IP/OBS MODERATE 55: CPT | Performed by: PHYSICAL MEDICINE & REHABILITATION

## 2022-02-01 PROCEDURE — 97166 OT EVAL MOD COMPLEX 45 MIN: CPT

## 2022-02-01 PROCEDURE — 99233 SBSQ HOSP IP/OBS HIGH 50: CPT | Performed by: ANESTHESIOLOGY

## 2022-02-01 PROCEDURE — 80048 BASIC METABOLIC PNL TOTAL CA: CPT | Performed by: PHYSICIAN ASSISTANT

## 2022-02-01 PROCEDURE — 82948 REAGENT STRIP/BLOOD GLUCOSE: CPT

## 2022-02-01 PROCEDURE — 83721 ASSAY OF BLOOD LIPOPROTEIN: CPT | Performed by: PHYSICIAN ASSISTANT

## 2022-02-01 PROCEDURE — G1004 CDSM NDSC: HCPCS

## 2022-02-01 PROCEDURE — 82947 ASSAY GLUCOSE BLOOD QUANT: CPT | Performed by: NURSE PRACTITIONER

## 2022-02-01 PROCEDURE — 3046F HEMOGLOBIN A1C LEVEL >9.0%: CPT | Performed by: INTERNAL MEDICINE

## 2022-02-01 PROCEDURE — 92522 EVALUATE SPEECH PRODUCTION: CPT

## 2022-02-01 PROCEDURE — 99232 SBSQ HOSP IP/OBS MODERATE 35: CPT | Performed by: NURSE PRACTITIONER

## 2022-02-01 PROCEDURE — 85025 COMPLETE CBC W/AUTO DIFF WBC: CPT | Performed by: PHYSICIAN ASSISTANT

## 2022-02-01 PROCEDURE — 80061 LIPID PANEL: CPT | Performed by: PHYSICIAN ASSISTANT

## 2022-02-01 PROCEDURE — 83036 HEMOGLOBIN GLYCOSYLATED A1C: CPT | Performed by: PHYSICIAN ASSISTANT

## 2022-02-01 PROCEDURE — 70450 CT HEAD/BRAIN W/O DYE: CPT

## 2022-02-01 RX ORDER — BUPROPION HYDROCHLORIDE 150 MG/1
150 TABLET ORAL 2 TIMES DAILY
Status: DISCONTINUED | OUTPATIENT
Start: 2022-02-05 | End: 2022-02-01

## 2022-02-01 RX ORDER — ASPIRIN 81 MG/1
81 TABLET, CHEWABLE ORAL DAILY
Status: DISCONTINUED | OUTPATIENT
Start: 2022-02-02 | End: 2022-02-02 | Stop reason: HOSPADM

## 2022-02-01 RX ORDER — ROSUVASTATIN CALCIUM 5 MG/1
40 TABLET, COATED ORAL
Status: DISCONTINUED | OUTPATIENT
Start: 2022-02-01 | End: 2022-02-01 | Stop reason: CLARIF

## 2022-02-01 RX ORDER — BUPROPION HYDROCHLORIDE 150 MG/1
150 TABLET ORAL DAILY
Status: DISCONTINUED | OUTPATIENT
Start: 2022-02-02 | End: 2022-02-01

## 2022-02-01 RX ORDER — INSULIN GLARGINE 100 [IU]/ML
10 INJECTION, SOLUTION SUBCUTANEOUS ONCE
Status: COMPLETED | OUTPATIENT
Start: 2022-02-01 | End: 2022-02-01

## 2022-02-01 RX ORDER — BUPROPION HYDROCHLORIDE 150 MG/1
150 TABLET ORAL DAILY
Status: DISCONTINUED | OUTPATIENT
Start: 2022-02-02 | End: 2022-02-02 | Stop reason: HOSPADM

## 2022-02-01 RX ORDER — CLOPIDOGREL BISULFATE 75 MG/1
75 TABLET ORAL DAILY
Status: DISCONTINUED | OUTPATIENT
Start: 2022-02-01 | End: 2022-02-02 | Stop reason: HOSPADM

## 2022-02-01 RX ORDER — INSULIN GLARGINE 100 [IU]/ML
20 INJECTION, SOLUTION SUBCUTANEOUS
Status: DISCONTINUED | OUTPATIENT
Start: 2022-02-02 | End: 2022-02-02 | Stop reason: HOSPADM

## 2022-02-01 RX ORDER — ATORVASTATIN CALCIUM 40 MG/1
80 TABLET, FILM COATED ORAL
Status: DISCONTINUED | OUTPATIENT
Start: 2022-02-01 | End: 2022-02-02 | Stop reason: HOSPADM

## 2022-02-01 RX ADMIN — SODIUM CHLORIDE 100 ML/HR: 0.9 INJECTION, SOLUTION INTRAVENOUS at 21:47

## 2022-02-01 RX ADMIN — CLOPIDOGREL BISULFATE 75 MG: 75 TABLET ORAL at 18:02

## 2022-02-01 RX ADMIN — INSULIN LISPRO 13 UNITS: 100 INJECTION, SOLUTION INTRAVENOUS; SUBCUTANEOUS at 12:29

## 2022-02-01 RX ADMIN — INSULIN LISPRO 2 UNITS: 100 INJECTION, SOLUTION INTRAVENOUS; SUBCUTANEOUS at 21:44

## 2022-02-01 RX ADMIN — INSULIN LISPRO 5 UNITS: 100 INJECTION, SOLUTION INTRAVENOUS; SUBCUTANEOUS at 18:04

## 2022-02-01 RX ADMIN — HYDRALAZINE HYDROCHLORIDE 25 MG: 25 TABLET, FILM COATED ORAL at 21:43

## 2022-02-01 RX ADMIN — INSULIN GLARGINE 10 UNITS: 100 INJECTION, SOLUTION SUBCUTANEOUS at 21:43

## 2022-02-01 RX ADMIN — ATORVASTATIN CALCIUM 80 MG: 40 TABLET, FILM COATED ORAL at 16:17

## 2022-02-01 RX ADMIN — INSULIN HUMAN 5 UNITS: 100 INJECTION, SOLUTION PARENTERAL at 12:32

## 2022-02-01 RX ADMIN — INSULIN LISPRO 2 UNITS: 100 INJECTION, SOLUTION INTRAVENOUS; SUBCUTANEOUS at 00:10

## 2022-02-01 RX ADMIN — INSULIN LISPRO 12 UNITS: 100 INJECTION, SOLUTION INTRAVENOUS; SUBCUTANEOUS at 12:12

## 2022-02-01 RX ADMIN — INSULIN LISPRO 4 UNITS: 100 INJECTION, SOLUTION INTRAVENOUS; SUBCUTANEOUS at 05:40

## 2022-02-01 RX ADMIN — HYDRALAZINE HYDROCHLORIDE 25 MG: 25 TABLET, FILM COATED ORAL at 09:19

## 2022-02-01 RX ADMIN — HYDRALAZINE HYDROCHLORIDE 10 MG: 20 INJECTION INTRAMUSCULAR; INTRAVENOUS at 05:37

## 2022-02-01 RX ADMIN — HYDRALAZINE HYDROCHLORIDE 25 MG: 25 TABLET, FILM COATED ORAL at 16:17

## 2022-02-01 RX ADMIN — AMLODIPINE BESYLATE 10 MG: 10 TABLET ORAL at 09:19

## 2022-02-01 RX ADMIN — INSULIN GLARGINE 10 UNITS: 100 INJECTION, SOLUTION SUBCUTANEOUS at 12:32

## 2022-02-01 RX ADMIN — METOPROLOL SUCCINATE 50 MG: 50 TABLET, EXTENDED RELEASE ORAL at 09:19

## 2022-02-01 NOTE — PLAN OF CARE
Problem: Potential for Falls  Goal: Patient will remain free of falls  Description: INTERVENTIONS:  - Educate patient/family on patient safety including physical limitations  - Instruct patient to call for assistance with activity   - Consult OT/PT to assist with strengthening/mobility   - Keep Call bell within reach  - Keep bed low and locked with side rails adjusted as appropriate  - Keep care items and personal belongings within reach  - Initiate and maintain comfort rounds  - Make Fall Risk Sign visible to staff  - Offer Toileting every 2 Hours, in advance of need  - Initiate/Maintain bed alarm  - Apply yellow socks and bracelet for high fall risk patients  - Consider moving patient to room near nurses station  Outcome: Progressing     Problem: MOBILITY - ADULT  Goal: Maintain or return to baseline ADL function  Description: INTERVENTIONS:  -  Assess patient's ability to carry out ADLs; assess patient's baseline for ADL function and identify physical deficits which impact ability to perform ADLs (bathing, care of mouth/teeth, toileting, grooming, dressing, etc )  - Assess/evaluate cause of self-care deficits   - Assess range of motion  - Assess patient's mobility; develop plan if impaired  - Assess patient's need for assistive devices and provide as appropriate  - Encourage maximum independence but intervene and supervise when necessary  - Involve family in performance of ADLs  - Assess for home care needs following discharge   - Consider OT consult to assist with ADL evaluation and planning for discharge  - Provide patient education as appropriate  Outcome: Progressing  Goal: Maintains/Returns to pre admission functional level  Description: INTERVENTIONS:  - Perform BMAT or MOVE assessment daily    - Set and communicate daily mobility goal to care team and patient/family/caregiver     - Collaborate with rehabilitation services on mobility goals if consulted  - Record patient progress and toleration of activity level   Outcome: Progressing     Problem: NEUROSENSORY - ADULT  Goal: Achieves stable or improved neurological status  Description: INTERVENTIONS  - Monitor and report changes in neurological status  - Monitor vital signs such as temperature, blood pressure, glucose, and any other labs ordered   - Initiate measures to prevent increased intracranial pressure  - Monitor for seizure activity and implement precautions if appropriate      Outcome: Progressing  Goal: Remains free of injury related to seizures activity  Description: INTERVENTIONS  - Maintain airway, patient safety  and administer oxygen as ordered  - Monitor patient for seizure activity, document and report duration and description of seizure to physician/advanced practitioner  - If seizure occurs,  ensure patient safety during seizure  - Reorient patient post seizure  - Seizure pads on all 4 side rails  - Instruct patient/family to notify RN of any seizure activity including if an aura is experienced  - Instruct patient/family to call for assistance with activity based on nursing assessment  - Administer anti-seizure medications if ordered    Outcome: Progressing  Goal: Achieves maximal functionality and self care  Description: INTERVENTIONS  - Monitor swallowing and airway patency with patient fatigue and changes in neurological status  - Encourage and assist patient to increase activity and self care     - Encourage visually impaired, hearing impaired and aphasic patients to use assistive/communication devices  Outcome: Progressing     Problem: Prexisting or High Potential for Compromised Skin Integrity  Goal: Skin integrity is maintained or improved  Description: INTERVENTIONS:  - Identify patients at risk for skin breakdown  - Assess and monitor skin integrity  - Assess and monitor nutrition and hydration status  - Monitor labs   - Assess for incontinence   - Turn and reposition patient  - Assist with mobility/ambulation  - Relieve pressure over bony prominences  - Avoid friction and shearing  - Provide appropriate hygiene as needed including keeping skin clean and dry  - Evaluate need for skin moisturizer/barrier cream  - Collaborate with interdisciplinary team   - Patient/family teaching  - Consider wound care consult   Outcome: Progressing

## 2022-02-01 NOTE — PHYSICAL THERAPY NOTE
Physical Therapy Evaluation     Patient's Name: Xi Chu    Admitting Diagnosis  Dysarthria [R47 1]  GATITO (acute kidney injury) ([facility] ) [N17 9]  Stroke-like symptoms [R29 90]  Cerebrovascular accident (CVA), unspecified mechanism ([facility] ) [I63 9]    Problem List  Patient Active Problem List   Diagnosis    Hypercholesterolemia    Proteinuria    Current smoker    Essential hypertension    Type 2 diabetes mellitus with hyperglycemia, with long-term current use of insulin ([facility] )    Brachial plexus injury, left, sequela    Diabetic polyneuropathy associated with type 2 diabetes mellitus ([facility] )    Aortic valve insufficiency ( mild-mod 2020 )    C[facility]brovascular accident (CVA) ([facility]  )    History of CVA (cerebrovascular accident)    Stroke-like symptoms    Acute on chronic renal failure ([facility] )       Past Medical History  Past Medical History:   Diagnosis Date    Diabetes mellitus ([facility] )     Hypertension     Stroke [facility])        Past Surgical History  History reviewed  No pertinent surgical history  02/01/22 1006   PT Last Visit   PT Visit Date 02/01/22   Note Type   Note type Evaluation   Pain Assessment   Pain Assessment Tool 0-10   Pain Score No Pain   Restrictions/Precautions   Weight Bearing Precautions Per Order No   Other Precautions Chair Alarm; Bed Alarm;Telemetry;Multiple lines; Fall Risk   Home Living   Type of [address] Two level;Performs ADLs on one level; Able to live on main level with bedroom/bathroom;Stairs to enter with rails  (6-7 SHARRI)   Bathroom Shower/Tub Tub/shower unit   Bathroom Toilet Standard   Bathroom Equipment Other (Comment)  (none at baseline)   [address] Other (Comment)  (patient ambulates independently at baseline without AD)   Prior Function   Level of [place] Independent with ADLs and functional mobility   Lives With Significant other;Family   Receives Help From Family   ADL Assistance Independent   IADLs Independent Falls in the last 6 months 0   Vocational Full time employment  ([employer]idia [employer] in [employer] dept)   Comments patient drives   General   Family/Caregiver Present No   Cognition   Overall Cognitive Status WFL   Arousal/Participation Cooperative   Orientation Level Oriented X4   Memory Within functional limits   Following Commands Follows all commands and directions without difficulty   Comments patient agreeable to PT eval   RUE Assessment   RUE Assessment WFL   LUE Assessment   LUE Assessment WFL   RLE Assessment   RLE Assessment X   Strength RLE   R Hip Flexion 4/5   R Knee Extension 4-/5   R Ankle Dorsiflexion 4/5   R Ankle Plantar Flexion 4/5   LLE Assessment   LLE Assessment X   Strength LLE   L Hip Flexion 3+/5   L Knee Extension 4-/5   L Ankle Dorsiflexion 4-/5   L Ankle Plantar Flexion 4/5   Vision-Basic Assessment   Current Vision Wears glasses all the time   Coordination   Movements are Fluid and Coordinated 0   Sensation WFL   Finger to Nose & Finger to Finger  Impaired  (impaired on L - brachial plexus injury, left per chart review)   Light Touch   RLE Light Touch Grossly intact   LLE Light Touch Grossly intact   Bed Mobility   Supine to Sit 5  Supervision   Additional items Assist x 1; Increased time required;Verbal cues;HOB elevated   Transfers   Sit to Stand 4  Minimal assistance  (CGA)   Additional items Assist x 1; Increased time required;Verbal cues   Stand to Sit 4  Minimal assistance  (CGA)   Additional items Assist x 1; Increased time required;Verbal cues;Armrests   Ambulation/Elevation   Gait pattern Improper Weight shift; Inconsistent margaux   Gait Assistance 4  Minimal assist   Additional items Assist x 1;Verbal cues   Assistive Device None   Distance 20 ft     Stair Management Assistance Not tested   Balance   Static Sitting Good   Dynamic Sitting Fair +   Static Standing Fair -   Dynamic Standing Poor +   Ambulatory Poor +   Endurance Deficit   Endurance Deficit No   Activity Tolerance   Activity Tolerance Patient tolerated treatment well   Medical Staff Sophia Drew, Dr Claire Layton confirmed patient appropriate for therapy   Nurse Made Aware RN Larry    Assessment   Prognosis Good   Problem List Decreased strength; Impaired balance;Decreased mobility; Decreased coordination   Assessment Pt is 48 y o  male seen for PT evaluation s/p admit to [facility] on 1/31/2022 w/ Stroke-like symptoms  PT consulted to assess pt's functional mobility and d/c needs  Order placed for PT eval and tx, w/ up w/ A order  Performed at least 2 patient identifiers during session: Name and wristband  Comorbidities affecting pt's physical performance at time of assessment include: Brachial plexus injury, left, Cerebrovascular accident (CVA), Acute on chronic renal failure, Essential hypertension, Type 2 diabetes mellitus with hyperglycemia  PTA, pt was independent w/ all functional mobility w/ no AD, ambulates unrestricted distances and all terrain, has 6-7 SHARRI, lives w/ significant other in two level house with first floor set-up and works full time  Personal factors affecting pt at time of IE include: inaccessible home environment, stairs to enter home, inability to navigate level surfaces w/o external assistance, unable to perform dynamic tasks in community, inability to perform current job functions and inability to perform IADLs  Please find objective findings from PT assessment regarding body systems outlined above with impairments and limitations including weakness, impaired balance, impaired coordination, gait deviations, decreased activity tolerance and decreased functional mobility tolerance  The following objective measures performed on IE also reveal limitations: Barthel Index: 50/100, Modified [place]: 4 (moderate/severe disability) and AM-PAC 6-Clicks: 41/27   Pt's clinical presentation is currently unstable/unpredictable seen in pt's presentation of ongoing medical management/monitoring post TPA and need for input for task focus and mobility technique/safety  Pt to benefit from continued PT tx to address deficits as defined above and maximize level of functional independent mobility and consistency  From PT/mobility standpoint, recommendation at time of d/c would be home with outpatient rehabilitation pending progress in order to facilitate return to PLOF  Barriers to Discharge Inaccessible home environment   Goals   Patient Goals "to not come back"   [facility] Expiration Date 02/11/22   Short Term Goal #1 In 7-10 days: Increase bilateral LE strength 1/2 grade to facilitate independent mobility, Perform all transfers independently to improve independence, Ambulate > 150 ft  without AD modified independent w/o LOB and w/ normalized gait pattern 100% of the time, Navigate 7 stairs modified independent with unilateral handrail to facilitate return to previous living environment, Increase all balance 1 grade to decrease risk for falls and Complete exercise program independently   PT Treatment Day 0   Plan   Treatment/Interventions Functional transfer training;LE strengthening/ROM; Elevations; Therapeutic exercise;Patient/family training;Gait training;Spoke to nursing;Spoke to MD;OT   PT Frequency 3-5x/wk   Recommendation   PT Discharge Recommendation Home with outpatient rehabilitation   Additional Comments The patient's AM-PAC Basic Mobility Inpatient Short Form Raw Score is 18  A Raw score of greater than 16 suggests the patient may benefit from discharge to home  Please also refer to the recommendation of the Physical Therapist for safe discharge planning     AM-PAC Basic Mobility Inpatient   Turning in Bed Without Bedrails 3   Lying on Back to Sitting on Edge of Flat Bed 3   Moving Bed to Chair 3   Standing Up From Chair 3   Walk in Room 3   Climb 3-5 Stairs 3   Basic Mobility Inpatient Raw Score 18   Basic Mobility Standardized Score 41 05   Highest Level Of Mobility   -[facility] Goal 6: Walk 10 steps or more   -[facility] Highest Level of Mobility 6: Walk 10 steps or more   -[facility] Goal Achieved Yes   Modified Bond Scale   Modified Bond Scale 4   Barthel Index   Feeding 5   Bathing 0   Grooming Score 5   Dressing Score 5   Bladder Score 10   Bowels Score 10   Toilet Use Score 5   Transfers (Bed/Chair) Score 10   Mobility (Level Surface) Score 0   Stairs Score 0   Barthel Index Score 50     Adam Cords, PT, DPT

## 2022-02-01 NOTE — OCCUPATIONAL THERAPY NOTE
Occupational Therapy Evaluation      Francisca Chin    2/1/2022    Principal Problem:    Stroke-like symptoms  Active Problems:    Current smoker    Essential hypertension    Type 2 diabetes mellitus with hyperglycemia, with long-term current use of insulin (HCC)    Brachial plexus injury, left, sequela    Cerebrovascular accident (CVA) ([facility] )    Acute on chronic renal failure ([facility] )      Past Medical History:   Diagnosis Date    Diabetes mellitus ([facility] )     Hypertension     Stroke [facility])        History reviewed  No pertinent surgical history  02/01/22 1005   OT Last Visit   OT Visit Date 02/01/22   Note Type   Note type Evaluation   Restrictions/Precautions   Weight Bearing Precautions Per Order No   Other Precautions Chair Alarm; Bed Alarm;Telemetry;Multiple lines; Fall Risk   Pain Assessment   Pain Assessment Tool 0-10   Pain Score No Pain   Home Living   Type of Home House   Home Layout Two level;Performs ADLs on one level; Able to live on main level with bedroom/bathroom;Stairs to enter with rails   Bathroom Shower/Tub Tub/shower unit   Bathroom Toilet Standard   Bathroom Equipment Other (Comment)   [address] Other (Comment)  (patient ambulates independently at baseline without AD)   Prior Function   Level of [place] Independent with ADLs and functional mobility   Lives With Significant other;Family   Receives Help From Family   ADL Assistance Independent   IADLs Independent   Falls in the last 6 months 0   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)   Comments patient drives   Lifestyle   Autonomy Pt reports PLOF was Ind with ADLs, IADLs, and (+)    Reciprocal Relationships significant other   Psychosocial   Psychosocial (WDL) WDL   ADL   Eating Assistance 6  Modified independent   Eating Deficit Increased time to complete   Grooming Assistance 6  Modified Independent   Grooming Deficit Increased time to complete   UB Bathing Assistance 4  Minimal Assistance   UB Bathing Deficit Increased time to complete;Supervision/safety;Verbal cueing;Setup;Steadying   LB Bathing Assistance 3  Moderate Assistance   LB Bathing Deficit Increased time to complete;Supervision/safety;Verbal cueing;Setup;Steadying   UB Dressing Assistance 4  Minimal Assistance   UB Dressing Deficit Increased time to complete;Supervision/safety;Verbal cueing;Setup;Steadying   LB Dressing Assistance 3  Moderate Assistance   LB Dressing Deficit Increased time to complete;Supervision/safety;Verbal cueing;Setup;Steadying   Toileting Assistance  4  Minimal Assistance   Toileting Deficit Increased time to complete;Supervison/safety;Verbal cueing;Steadying;Setup   Functional Assistance 3  Moderate Assistance   Functional Deficit Increased time to complete;Supervision/safety;Verbal cueing;Setup;Steadying   Bed Mobility   Supine to Sit 5  Supervision   Additional items Assist x 1; Increased time required;Verbal cues;HOB elevated   Transfers   Sit to Stand 4  Minimal assistance  (CGA)   Additional items Assist x 1; Increased time required;Verbal cues   Stand to Sit 4  Minimal assistance  (CGA)   Additional items Assist x 1; Increased time required;Verbal cues;Armrests   Balance   Static Sitting Good   Dynamic Sitting Fair +   Static Standing Fair -   Dynamic Standing Poor +   Ambulatory Poor +   Activity Tolerance   Activity Tolerance Patient tolerated treatment well   Medical Staff Dr Werner Ramirez confirmed patient appropriate for therapy   Nurse Made Aware KIARA Juarez    RULESLY Assessment   RUE Assessment WFL   LUE Assessment   LUE Assessment WFL   Hand Function   Gross Motor Coordination Functional   Fine Motor Coordination Impaired  (deficits noted in hand likely due to brachial plexus injury)   Sensation   Light Touch No apparent deficits   Sharp/Dull No apparent deficits   Stereognosis No apparent deficits   Additional Comments Sensation and ROM was intacted       Proprioception Proprioception No apparent deficits   Vision-Basic Assessment   Current Vision Wears glasses all the time   Vision - Complex Assessment   Ocular Range of Motion [facility]   Perception   Inattention/Neglect Appears intact   Cognition   Overall Cognitive Status WFL   Arousal/Participation Alert; Cooperative   Attention Within functional limits   Orientation Level Oriented X4   Memory Within functional limits   Following Commands Follows all commands and directions without difficulty   Comments Pt was agreeable to OT session   Assessment   Limitation Decreased ADL status; Decreased UE strength;Decreased self-care trans;Decreased high-level ADLs; Decreased endurance;Decreased fine motor control  (deficits in L hand due to brachial plexus injury)   Prognosis Good   Assessment Pt is a 48 y o  male seen for OT evaluation s/p admit to [facility] on 1/31/2022 w/ Stroke-like symptoms  Comorbidities affecting pt's functional performance at time of assessment include:CVA, DM, HTN and tobacco abuse   Orders placed for OT evaluation and treatment  Performed at least two patient identifiers during session including name and wristband  Personal factors affecting pt at time of IE include:steps to enter environment, difficulty performing ADLS, difficulty performing IADLS , limited insight into deficits, compliance, decreased initiation and engagement , financial barriers, health management  and environment  Prior to admission, pt reports Ind with ADLs, Ind with IADLs, and (+) driving    Upon evaluation: Pt requires min a with UB ADLs, mod A with LB ADLs, min A with xfers and min A with functional mobility 2* the following deficits impacting occupational performance: weakness, decreased strength, decreased dynamic sit/ stand balance, decreased activity tolerance, decreased standing tolerance time for self care and functional mobility, decreased safety awareness, impaired interpersonal skills, environmental deficits, questionable insight, decreased coping skills, decreased mobilty and requiring external assistance to complete transitional movements  Pt to benefit from continued skilled OT tx while in the hospital to address deficits as defined above and maximize level of functional independence w ADL's and functional mobility  Occupational Performance areas to address include: bathing/shower, toilet hygiene, dressing, medication management, socialization, health maintenance, functional mobility, community mobility, clothing management and household maintenance  From OT standpoint, recommendation at time of d/c would be out pt OT  Plan   Treatment Interventions ADL retraining;Functional transfer training;UE strengthening/ROM; Endurance training; Activityengagement; Energy conservation;Cardiac education;Continued evaluation; Compensatory technique education;Equipment evaluation/education;Patient/family training   Goal Expiration Date 02/14/22   OT Frequency 3-5x/wk   Recommendation   OT Discharge Recommendation Home with outpatient rehabilitation   AM-PAC Daily Activity Inpatient   Lower Body Dressing 2   Bathing 2   Toileting 3   Upper Body Dressing 3   Grooming 4   Eating 4   Daily Activity Raw Score 18   Daily Activity Standardized Score (Calc for Raw Score >=11) 38 66   AM-PAC Applied Cognition Inpatient   Following a Speech/Presentation 4   Understanding Ordinary Conversation 4   Taking Medications 4   Remembering Where Things Are Placed or Put Away 4   Remembering List of 4-5 Errands 4   Taking Care of Complicated Tasks 4   Applied Cognition Raw Score 24   Applied Cognition Standardized Score 62 21   Barthel Index   Feeding 10   Bathing 0   Grooming Score 5   Dressing Score 5   Bladder Score 10   Bowels Score 10   Toilet Use Score 5   Transfers (Bed/Chair) Score 10   Mobility (Level Surface) Score 0   Stairs Score 0   Barthel Index Score 55   Modified [place] Scale   Modified Lia Scale 4     Occupational therapy Goals:  In 2-4 days    Patient will completed passport to independence program in order to further determine DC needs as well as any additional recommendations/ education  Patient will verbalize and demonstrate use of energy conservation/ deep breathing technique and work simplification skills during functional activity with no verbal cues  Patient will verbalize and demonstrate good body mechanics and joint protection techniques during ADLs/ IADLs with no verbal cues     Patient will increase OOB/ sitting tolerance to 4-6 hours per day for increased participation in self care and leisure tasks with no s/s of exertion  Patient will identify s/s of exertion during ADL and functional mobility with no verbal cues  Patient will verbalize/ demonstrate compensatory strategies to recover from exertion with no verbal cues  Patient will increase standing tolerance time to 10 minutes with No UE support to complete sink level ADLs @ Mod I level  Patient will increase sitting tolerance at edge of bed to 30 minutes to complete UB ADLs @ Indep  level       Patient/ Family will demonstrate competency with UE Home Exercise Program      Huber Heath MS OTR/L

## 2022-02-01 NOTE — ASSESSMENT & PLAN NOTE
His MRI and medical record demonstrates previous infarcts at a young age  Specifically his MRI notes embolic appearing small infarcts in left posteromedial thalamus, left periatrial white matter, right cerebellum  Mild chronic microangiopathy is also appreciated  He does have a loop in

## 2022-02-01 NOTE — ASSESSMENT & PLAN NOTE
Lab Results   Component Value Date    HGBA1C 10 5 (H) 01/31/2022       Recent Labs     02/01/22  0540 02/01/22  1200 02/01/22  1202 02/01/22  1320   POCGLU 245* >500* >500* >500*       Blood Sugar Average: Last 72 hrs:  (P) 303 25     · Home Insulin treatment started

## 2022-02-01 NOTE — PLAN OF CARE
Summary/Impressions:      Dysphagia-  Bedside observations support grossly intact oropharyngeal swallow function across all consistencies tested  Self-fed solid and liquid trials with no s/s of dysphagia or distress  Patient denies difficulties or changes from baseline function  Dysarthria-   Informal assessment measures and clinicial observation indicate mild dysarthria of speech  This impairment is characterized by slow rate of speech, occasional blended word boundaries and loss of breath  Patient participates in oral motor examination with minimal deficit in articulator strength and range of motion  Will continue to monitor closely for ongoing assessment/need for speech tx while admitted and following discharge if deficits do not resolve on their own  Per review of chart and patient interview, some improvement is indicated  Patient denies deficits or concerns with language (ie word retrieval and understanding)          Recommendations:   Diet: regular diet and thin liquids   Meds: whole with liquid   Feeding assistance: Distant/remote  Frequent Oral care: 2-4x/day  Aspiration precautions and compensatory swallowing strategies: upright posture  Other Recommendations/ considerations: NA

## 2022-02-01 NOTE — ASSESSMENT & PLAN NOTE
Lab Results   Component Value Date    EGFR 25 02/01/2022    EGFR 23 01/31/2022    EGFR 37 09/29/2021    CREATININE 2 79 (H) 02/01/2022    CREATININE 3 04 (H) 01/31/2022    CREATININE 2 30 (H) 09/29/2021     · In 2019 baseline creatinine appears to be 1 4   · More recently appears to be 2 03  · Presenting with creatinine of 3 04 in the setting of stroke like symptoms and hyperglycemia  · Continue IV hydration till tolerating full PO diet

## 2022-02-01 NOTE — ASSESSMENT & PLAN NOTE
· Patient has had multiple CVAs in the past   · In 2019 he had a lacunar infarct in the R thalamus   · In September of 2021 he also received TPA and was found to have a small focus of acute ischemia in the left periatrial white matter  · He continues to smoke, continue to encourage cessation   · Neurology office note from 1/25 stating recently off DAPT and just maintained on ASA and statin  · [employer] "MRI conditional" Loop recorder placed on 10/1 to evaluate for paroxysmal atrial fibrillation as a cardio-embolic source    There have been 2 devices checks since implantation with no device activating episodes  · ANGIE completed on 10/1 showing no thrombus in the right heart nor any atrial defects   · Complete TPA /stroke protocol as above

## 2022-02-01 NOTE — ASSESSMENT & PLAN NOTE
· Patient presented with slurred speech, word finding difficulty and ataxia at approximately 1300  He describes his normal state of health prior to that time      · NIH on presentation was 2   · CT head showing no hemorrhage  · CTA showing no hemodynamically significant stenosis, aneurysm or dissection   · He was given TPA at 1558  · Admitted to icu for post TPA monitoring  · Continue on stroke protocol   · MRI brain completed overnight, pending read  · CT head s/p 24 hour TPA window (1600 today)  · Echocardiogram pending  · Hold DAPT until outside of the TPA window  · Consult to neurology, physical medicine, PT/OT, speech therapy   · PRN IV antihypertensives to maintain BP < 185/105

## 2022-02-01 NOTE — CONSULTS
PHYSICAL MEDICINE AND REHABILITATION CONSULT NOTE  Kadi Velazquez 48 y o  male MRN: [id number]  Unit/Bed#:  Encounter: [id number]    Requested by (Physician/Service): Florina Harris DO  Reason for Consultation:  Assessment of rehabilitation needs  Reason for Hospitalization:  Dysarthria [R47 1]  GATITO (acute kidney injury) ([facility] ) [N17 9]  Stroke-like symptoms [R29 90]  Cerebrovascular accident (CVA), unspecified mechanism ([facility] ) [I63 9]      History of Present Illness:  Kadi Velazquez is a 48 y o  male who  has a past medical history of Diabetes mellitus ([facility] ), Hypertension, and Stroke ([facility] )  who presented to the [address] on 01/31 with stroke-like symptoms including slurred speech, word-finding difficulties and ataxia  NIH score was 2 on present a shin  CT head with no hemorrhage  CTA with no hemodynamically significant stenosis  Patient received IV tPA and was admitted to ICU for further monitoring  I of the brain revealed small acute infarct in the right posterior limb of internal capsule and tiny acute infarcts in the left ventral syed and left ventral lateral medulla  PM&R consulted for rehabilitation recommendations  Restrictions include:  none    Hospital Complications/Comorbidities:   Complications: As above  Comorbidities: As above    Morbid Obesity (BMI > 40) No     Last Weight Last BMI   Wt Readings from Last 1 Encounters:   01/31/22 84 9 kg (187 lb 2 7 oz)    Body mass index is 26 86 kg/m²  Functional History:     Prior to Admission:  Independent with ADLs, functional mobility in IADLs  Present:  Physical Therapy Occupational Therapy Speech Therapy   Minimal assistance with sit-to-stand and stand to sit transfers  Ambulated 20 ft with minimal assistance x1 with verbal cues   Modified independent with eating, grooming, minimal assistance with upper body bathing, mod assist with lower body bathing, minimal assistance with upper body dressing, mod assist with lower body dressing and assistance with toileting  Tolerating regular diet with thin liquids  Slow rate of speech with occasional blended word boundaries and loss of breath  Past Medical History:   Past Surgical History:   Family History:     Past Medical History:   Diagnosis Date    Diabetes mellitus ([address] )     Hypertension     Stroke [facility])     History reviewed  No pertinent surgical history  Family History   Problem Relation Age of Onset    No Known Problems Mother     No Known Problems Father      - No family history of neurologic diseases        Medications:    Current Facility-Administered Medications:     amLODIPine (NORVASC) tablet 10 mg, 10 mg, Oral, Daily, YASMIN Guillaume, 10 mg at 02/01/22 0919    atorvastatin (LIPITOR) tablet 80 mg, 80 mg, Oral, Daily With Dinner, YASMIN Manuel    clopidogrel (PLAVIX) tablet 75 mg, 75 mg, Oral, Daily, YASMIN Taylor    hydrALAZINE (APRESOLINE) injection 10 mg, 10 mg, Intravenous, Q4H PRN, YASMIN Guillaume, 10 mg at 02/01/22 8695    hydrALAZINE (APRESOLINE) tablet 25 mg, 25 mg, Oral, TID, YASMIN Guillaume, 25 mg at 02/01/22 0919    insulin glargine (LANTUS) subcutaneous injection 10 Units 0 1 mL, 10 Units, Subcutaneous, Once, YASMIN Dunn    [START ON 2/2/2022] insulin glargine (LANTUS) subcutaneous injection 20 Units 0 2 mL, 20 Units, Subcutaneous, HS, YASMIN Dunn    insulin lispro (HumaLOG) 100 units/mL subcutaneous injection 1-6 Units, 1-6 Units, Subcutaneous, HS, YASMIN Dunn    insulin lispro (HumaLOG) 100 units/mL subcutaneous injection 5-25 Units, 5-25 Units, Subcutaneous, TID AC, 13 Units at 02/01/22 1229 **AND** Fingerstick Glucose (POCT), , , TID AC, YASMIN Dunn    Labetalol HCl (NORMODYNE) injection 10 mg, 10 mg, Intravenous, Q4H PRN, YASMIN Guillaume    metoprolol succinate (TOPROL-XL) 24 hr tablet 50 mg, 50 mg, Oral, Daily, YASMIN Guillaume, 50 mg at 02/01/22 0919    nicotine (NICODERM CQ) 21 mg/24 hr TD 24 hr patch 1 patch, 1 patch, Transdermal, Daily, YASMIN Shaikh    sodium chloride 0 9 % infusion, 100 mL/hr, Intravenous, Continuous, YASMIN Shaikh, Last Rate: 100 mL/hr at 01/31/22 1701, 100 mL/hr at 01/31/22 1701    Allergies:   No Known Allergies     Social History:    Social History     Socioeconomic History    Marital status: Single     Spouse name: None    Number of children: None    Years of education: None    Highest education level: None   Occupational History    None   Tobacco Use    Smoking status: Current Every Day Smoker     Packs/day: 0 50     Types: Cigarettes    Smokeless tobacco: Never Used    Tobacco comment: states slowing it down, using Chantix   Vaping Use    Vaping Use: Never used   Substance and Sexual Activity    Alcohol use: Never     Comment: socially maybe 5 times a year    Drug use: No    Sexual activity: Yes   Other Topics Concern    None   Social History Narrative    None     Social Determinants of Health     Financial Resource Strain: Not on file   Food Insecurity: Not on file   Transportation Needs: No Transportation Needs    Lack of Transportation (Medical): No    Lack of Transportation (Non-Medical): No   Physical Activity: Not on file   Stress: Not on file   Social Connections: Not on file   Intimate Partner Violence: Not on file   Housing Stability: Not on file        Liset Mccrary lives in a 2 level house with 6-7  steps to enter  ADLs on 1 level  Able to live on main level with bedroom/bathroom  Stairs to enter with rails  Review of Systems: A 10-point review of systems was performed  Negative except as listed above       Physical Exam:  Vital Signs:      Temp:  [97 9 °F (36 6 °C)-99 °F (37 2 °C)] 98 °F (36 7 °C)  HR:  [] 84  Resp:  [14-44] 21  BP: (128-186)/() 159/91   Intake/Output Summary (Last 24 hours) at 2/1/2022 1423  Last data filed at 2/1/2022 0829  Gross per 24 hour   Intake 1246 67 ml   Output 1650 ml   Net -403 33 ml        Laboratory:      Lab Results   Component Value Date    HGB 15 3 01/31/2022    HGB 18 5 (H) 03/10/2015    HCT 45 5 01/31/2022    HCT 50 6 (H) 03/10/2015    WBC 9 45 01/31/2022    WBC 12 65 (H) 03/10/2015     Lab Results   Component Value Date    BUN 30 (H) 01/31/2022    BUN 11 09/22/2015     09/22/2015    K 4 6 01/31/2022    K 4 3 09/22/2015    CL 98 (L) 01/31/2022     09/22/2015    GLUCOSE 193 (H) 09/22/2015    CREATININE 3 04 (H) 01/31/2022    CREATININE 0 84 09/22/2015     Lab Results   Component Value Date    PROTIME 12 0 01/31/2022    PROTIME 12 6 03/10/2015    INR 0 92 01/31/2022    INR 0 92 03/10/2015        General: alert, no apparent distress, cooperative and comfortable  Head: Normal, normocephalic, atraumatic  Eye: Normal external eye, conjunctiva, lids   Ears: Normal external ears  Nose: Normal external nose, mucus membranes  Pharynx: Dental Hygiene adequate  Normal buccal mucosa  Normal pharynx  Neck / Thyroid: Supple, no masses, nodes, nodules or enlargement  Abdomen: soft, nontender, nondistended, no masses or organomegaly  Skin/Extremity: no rashes, no erythema, no peripheral edema     Neurologic:  Follows commands appropriately  Speech is slurred  Exhibits intermittent word-finding difficulties  Nerves 2 to 12 are intact  Sensations are intact to light touch and pinprick bilaterally grossly  Anger to nose is intact bilaterally  No resting tremor  Plantars are normal bilaterally    Psych: Appropriate affect, alert and oriented to person, place and time   Musculoskeletal - Strength:   Right  Left  Site  Right  Left  Site    5 5  S Ab: Shoulder Abductors  5  5  HF: Hip Flexors    5 5  EF: Elbow Flexors  5 5 KF: Knee Flexors    5  5  EE: Elbow Extensors  5  5  KE: Knee Extensors    5  5  WE: Wrist Extensors  5  5  DR: Dorsi Flexors    5  5  FF: Finger Flexors  5  5  PF: Plantar Flexors    5  5  HI: Hand Intrinsics 5  5  EHL: Extensor Hallucis Longus         Imaging: Reviewed  MRI brain wo contrast    Result Date: 2/1/2022  Impression: Small acute infarct in right posterior limb of internal capsule  Tiny acute infarcts in left ventral syed and left ventrolateral medulla only seen on exponential diffusion sequences  Unchanged old tiny infarcts in left posteromedial thalamus, left periatrial white matter, right cerebellum  Mild chronic microangiopathy  The study was marked in [facility] for immediate notification  Workstation performed: [id number]     CT stroke alert brain    Result Date: 1/31/2022  Impression: No acute intracranial abnormality  Microangiopathic changes  I personally discussed this study with neurologist on 1/31/2022 at 3:42 PM   Workstation performed: [id number]     CTA stroke alert (head/neck)    Result Date: 1/31/2022  Impression: No evidence of hemodynamic significant stenosis, aneurysm or dissection  I personally discussed this study with neurologist on 1/31/2022 at 3:42 PM  Workstation performed: [id number]       Assessment and Recommendations:  42-year-old male slurred speech and ataxia secondary to acute right posterior limb internal capsule infarct and left syed and medullary infarct  Impairments:  Impaired functional mobility and ability to perform ADL's  Impaired  speech    Recommendations:  - Chart reviewed  - Imaging reviewed   - Continue PT/OT/SLP while inpatient  - Pt is unable to safely return home until he is at the supervision/contact-guard functional level (25% assistance level with or without a device)  modified-independent functional level (0% assistance with a device) independent functional level (0% assistance without a device)    - Based upon patient's current functional level, we recommend referral to outpatient PT/OT/speech  Thank you for allowing the PM&R service to participate in the care of this patient  We will continue to follow João Mai progress with you   Please do not hesitate to call with questions or concerns    ** Please Note: Fluency Direct voice to text software may have been used in the creation of this document   **

## 2022-02-01 NOTE — ASSESSMENT & PLAN NOTE
Lab Results   Component Value Date    EGFR 23 01/31/2022    EGFR 37 09/29/2021    EGFR 37 09/27/2021    CREATININE 3 04 (H) 01/31/2022    CREATININE 2 30 (H) 09/29/2021    CREATININE 2 33 (H) 09/27/2021     · In 2019 baseline creatinine appears to be 1 4   · More recently appears to be 2 03  · Presenting with creatinine of 3 04 in the setting of stroke like symptoms and hyperglycemia  · Continue IV hydration

## 2022-02-01 NOTE — ASSESSMENT & PLAN NOTE
· Patient states left sided weakness started before previous strokes  · He is due to see orthopedics in the outpatient setting  · Continue with outpatient follow up

## 2022-02-01 NOTE — PROGRESS NOTES
[address] [facility]  Neurology Progress Note - [facility] 1975, 48 y o  male   MRN: [id number] Unit/Bed#:  Encounter: [id number]    * Bilateral small Cerebrovascular accident (CVA) status post tPA  Assessment & Plan  02/01/2022:  New to this examiner is this  78-year-old male with prior strokes (right thalamic in 2018, left periventricular in 09/2021) on aspirin, loop recorder placement, as well as hypertension and dyslipidemia  who presents with stroke-like symptoms  Patient had sudden onset of slurred speech and word-finding difficulty this afternoon at approximately 13:00  He states he was having a conversation and was normal prior to that time  He then endorse that he was walking like he was drunk  Stroke alert was initiated in the ED   NIHSS 2  CTH/CTA without acute changes  BP on presentation 170/81  Patient received tPA as noted below  On initial neuro exam, patient exhibited slurred speech and intermittent word-finding difficulties  On today's exam he continues with mild dysarthria and occasionally word-finding difficulties  His power exam is generally good with no lateralizing deficits  His MRI notes Small acute infarct in right posterior limb of internal capsule, as well as additional punctate ischemic injury in left ventral syed and left ventrolateral medulla only seen on exponential diffusion sequences    Plan:   - Continue Stroke Pathway now 24 hours post tPA  · Echocardiogram pending  · Permissive hypertension until 24 hours, begin blood pressure normalization subsequent to that  · We will put this patient on a dual anti-platelet for the next week  He will continue on Plavix after that  Aspirin will be DC  · Hemoglobin A1c  · Lipid panel demonstrates moderate control  We will change him from Lipitor 80 to Crestor 40 follow him in the office  He may need more aggressive lipid control  · Continue frequent neurochecks and notify neurology with any changes  · STAT CT Head for acute changes  · Telemetry monitoring  · Stroke education to be provided   · Continue PT/OT/Speech  - medical management and supportive care per primary team   Correction of metabolic or infectious disturbances  Received intravenous tissue plasminogen activator (tPA) in emergency department  Assessment & Plan  02/01/2022:  Seen again today by Neurology but new to this examiner is this 22-year-old gentleman who received tPA approximately 330 or 4:00 p m  Yesterday afternoon  He was examined does still in the ICU the following day approximately noon  Please see his exam below  Review of his CT scan 24 hours post tPA indicates no hemorrhagic conversion and his areas concerning for stroke or appreciated  He can now be started on his Plavix and aspirin for 7 days  History of embolic stroke  Assessment & Plan  His MRI and medical record demonstrates previous infarcts at a young age  Specifically his MRI notes embolic appearing small infarcts in left posteromedial thalamus, left periatrial white matter, right cerebellum  Mild chronic microangiopathy is also appreciated  He does have a loop in  This patient will need to be seen at in as an outpatient again in our [address] [place] [address] office by Dr Vanita Melchor, who he has seen previously in approximately a month or so  He will remain on Plavix alone after dual therapy with Plavix and aspirin for approximately a wee  His Lipitor is being changed to Crestor 40 mg  Subjective/Objective     Subjective:  My speech is still a little funny and I have to find the right word every now and then it was does not want to come out  ROS:  The patient reports no headache or visual disturbances  As noted above he feels his speech is still altered  His low word retrieval problems by his report today are somewhat improved compared with yesterday although they persist   He reports he has been up walking to the bathroom and generally doing well    The Minder of his query is negative  Medication Dose Route Frequency    amLODIPine  10 mg Oral Daily    [START ON 2/2/2022] aspirin  81 mg Oral Daily    atorvastatin  80 mg Oral Daily With Dinner    [START ON 2/2/2022] buPROPion  150 mg Oral Daily    clopidogrel  75 mg Oral Daily    hydrALAZINE  25 mg Oral TID    insulin glargine  10 Units Subcutaneous Once    [START ON 2/2/2022] insulin glargine  20 Units Subcutaneous HS    insulin lispro  1-6 Units Subcutaneous HS    insulin lispro  5-25 Units Subcutaneous TID AC    metoprolol succinate  50 mg Oral Daily    sodium chloride  100 mL/hr Intravenous Continuous         Vitals: Blood pressure 152/74, pulse 83, temperature 98 7 °F (37 1 °C), temperature source Oral, resp  rate (!) 24, height 5' 10" (1 778 m), weight 84 9 kg (187 lb 2 7 oz), SpO2 100 %  ,Body mass index is 26 86 kg/m²  Physical Exam:     Heraclio Haney seen in:  Bedside chair, there is no family present  General appearance: alert,   Neck, Lungs, Heart, & abdomen: WNL  Extremities: atraumatic, no cyanosis or edema    Neurologic:   Mental status: Alert, oriented, thought content appropriate, his speech is clear and intelligible though he does have a modest degree of dysarthria appreciated  He was fluent in his social conversation there were no word retrieval is paraphasic errors etcetera  I did not do confrontational naming or testing at this time  CN: exam EOM's I, Gaze conjugate  Soft left lateralizing nasal labial fold relaxation  Reminder CNVIII-XII normal    Motor: full power age appropriate x 4 limbs  Sensory: grossly intact  X 4 limbs, PP not tested  Cerebellar:  No gross cerebellar dysfunction appreciated    Gait:  We did not gait him at this time      DTR's and plantars:  Not tested on today's exam      Lab Results:   I have personally reviewed pertinent reports    , CBC:   Results from last 7 days   Lab Units 02/01/22  1537 01/31/22  1525   WBC Thousand/uL 10 35* 9 45   RBC Million/uL 4 54 4 97 HEMOGLOBIN g/dL 13 8 15 3   HEMATOCRIT % 40 8 45 5   MCV fL 90 92   PLATELETS Thousands/uL 272 294   , BMP/CMP:   Results from last 7 days   Lab Units 02/01/22  1537 01/31/22  1525   SODIUM mmol/L 135* 131*   POTASSIUM mmol/L 4 2 4 6   CHLORIDE mmol/L 106 98*   CO2 mmol/L 22 26   BUN mg/dL 27* 30*   CREATININE mg/dL 2 79* 3 04*   CALCIUM mg/dL 7 6* 7 6*   EGFR ml/min/1 73sq m 25 23   , Vitamin B12:   , HgBA1C:   Results from last 7 days   Lab Units 01/31/22  1525   HEMOGLOBIN A1C % 10 5*   , TSH:   , Coagulation:   Results from last 7 days   Lab Units 01/31/22  1525   INR  0 92   , Lipid Profile:   Results from last 7 days   Lab Units 02/01/22  1537 01/31/22  1525 01/31/22  1525   HDL mg/dL 44   < > 59   LDL CALC mg/dL  --   --  95   TRIGLYCERIDES mg/dL 412*   < > 253*    < > = values in this interval not displayed  Imaging Studies: I have personally reviewed pertinent films in PACS and Have reviewed both his 24 hour post tPA CT as well as his MRI  His MRI was reviewed briefly with the patient at this time  Noted are the small regions of ischemia and the previous region of infarct from his last stroke  EEG, Echo, Pathology, and Other Studies: He had a ANGIE which was negative in October of 2021  His device, his loop is interrogated just a week or 2 ago and it was negative

## 2022-02-01 NOTE — ASSESSMENT & PLAN NOTE
· brooke has had multiple CVAs in the past   · In 2019 he had a lacunar infarct in the R thalamus   · In September of 2021 he also received TPA and was found to have a small focus of acute ischemia in the left periatrial white matter  · He continues to smoke, continue to encourage cessation   · Neurology office note from 1/25 stating recently off DAPT and just maintained on ASA and statin  · [employer] "MRI conditional" Loop recorder placed on 10/1 to evaluate for paroxysmal atrial fibrillation as a cardio-embolic source    There have been 2 devices checks since implantation with no device activating episodes  · ANGIE completed on 10/1 showing no thrombus in the right heart nor any atrial defects   · Completed TPA /stroke protocol as above   · Started DAPT s/p tPA

## 2022-02-01 NOTE — PLAN OF CARE
Problem: PHYSICAL THERAPY ADULT  Goal: Performs mobility at highest level of function for planned discharge setting  See evaluation for individualized goals  Description: Treatment/Interventions: Functional transfer training,LE strengthening/ROM,Elevations,Therapeutic exercise,Patient/family training,Gait training,Spoke to nursing,Spoke to MD,OT          See flowsheet documentation for full assessment, interventions and recommendations  Note: Prognosis: Good  Problem List: Decreased strength,Impaired balance,Decreased mobility,Decreased coordination  Assessment: Pt is 48 y o  male seen for PT evaluation s/p [facility] w/ Stroke-like symptoms  PT consulted to assess pt's functional mobility and d/c needs  Order placed for PT eval and tx, w/ up w/ A order  Performed at least 2 patient identifiers during session: Name and wristband  Comorbidities affecting pt's physical performance at time of assessment include: Brachial plexus injury, left, Cerebrovascular accident (CVA), Acute on chronic renal failure, Essential hypertension, Type 2 diabetes mellitus with hyperglycemia  PTA, pt was independent w/ all functional mobility w/ no AD, ambulates unrestricted distances and all terrain, has 6-7 SHARRI, lives w/ significant other in two level house with first floor set-up and works full time  Personal factors affecting pt at time of IE include: inaccessible home environment, stairs to enter home, inability to navigate level surfaces w/o external assistance, unable to perform dynamic tasks in community, inability to perform current job functions and inability to perform IADLs  Please find objective findings from PT assessment regarding body systems outlined above with impairments and limitations including weakness, impaired balance, impaired coordination, gait deviations, decreased activity tolerance and decreased functional mobility tolerance   The following objective measures performed on IE also reveal limitations: Barthel Index: 50/100, Modified Bradley: 4 (moderate/severe disability) and AM-PAC 6-Clicks: 08/90  Pt's clinical presentation is currently unstable/unpredictable seen in pt's presentation of ongoing medical management/monitoring post TPA and need for input for task focus and mobility technique/safety  Pt to benefit from continued PT tx to address deficits as defined above and maximize level of functional independent mobility and consistency  From PT/mobility standpoint, recommendation at time of d/c would be home with outpatient rehabilitation pending progress in order to facilitate return to PLOF  Barriers to Discharge: Inaccessible home environment        PT Discharge Recommendation: Home with outpatient rehabilitation          See flowsheet documentation for full assessment

## 2022-02-01 NOTE — ASSESSMENT & PLAN NOTE
· While in the TPA window will use PRN IV antihypertensives to maintain BP < 185/105  · Can continue home dose amlodipine and BB tomorrow

## 2022-02-01 NOTE — UTILIZATION REVIEW
Initial Clinical Review    Admission: Date/Time/Statement:   Admission Orders (From admission, onward)     Ordered        01/31/22 1612  Inpatient Admission  Once                      Orders Placed This Encounter   Procedures    Inpatient Admission     Standing Status:   Standing     Number of Occurrences:   1     Order Specific Question:   Level of Care     Answer:   Critical Care [15]     Order Specific Question:   Estimated length of stay     Answer:   More than 2 Midnights     Order Specific Question:   Certification     Answer:   I certify that inpatient services are medically necessary for this patient for a duration of greater than two midnights  See H&P and MD Progress Notes for additional information about the patient's course of treatment  ED Arrival Information     Expected Arrival Acuity    - 1/31/2022 15:02 Emergent         Means of arrival Escorted by Service Admission type    Walk-In Family Member Critical Care/ICU Emergency         Arrival complaint    Stroke like symptoms        Chief Complaint   Patient presents with    CVA/TIA-like Symptoms     pt c/o slurred speech and feeling off balance since 1230  pt has had stroke in the past with bilateral leg weakness and L arm weakness defecits       Initial Presentation: 48year old male to the ED from home with complaints of slurred speech, feeling off balance which started 3 hours prior  Admitted to [facility]  for stroke like symptoms, CVA, acute on chronic renal failure, brachial plexus injury left sequela  Arrives as a stroke alert  Given TPA @ 1558  Check MRI brain  Creat on arrival 3 04 with baseline 1 4  Start IV fluids  Recheck GCS 15  Frequent neuro checks  Neurology consult: On initial arrival, speech slurred with intermittent work finding difficulty  After CT completed, improved speech  Check MRI brain, ECHO  NIHSS 2  CTH/CTA without changes  GIven TPA   Date: 2/1   Day 2:   COntinues with mild dysarthria   MRI brain shows: Small acute infarct in right posterior limb of internal capsule  Speech eval recommends regular diet and thin liquids       ED Triage Vitals   Temperature Pulse Respirations Blood Pressure SpO2   01/31/22 1536 01/31/22 1512 01/31/22 1512 01/31/22 1512 01/31/22 1512   97 9 °F (36 6 °C) 85 16 170/81 100 %      Temp Source Heart Rate Source Patient Position - Orthostatic VS BP Location FiO2 (%)   01/31/22 1730 01/31/22 1512 01/31/22 1512 01/31/22 1512 --   Oral Monitor Sitting Left arm       Pain Score       01/31/22 1749       No Pain          Wt Readings from Last 1 Encounters:   01/31/22 84 9 kg (187 lb 2 7 oz)     Additional Vital Signs:   Date/Time Temp Pulse Resp BP MAP (mmHg) SpO2 O2 Device Patient Position - Orthostatic VS   02/01/22 1200 -- 84 21 159/91 119 99 % -- --   02/01/22 1100 -- 86 17 140/81 104 99 % -- --   02/01/22 1000 -- 98 20 174/90 Abnormal  -- 99 % -- --   02/01/22 0900 -- 107 Abnormal  19 178/87 Abnormal  126 100 % -- --   02/01/22 0800 -- 97 26 Abnormal  142/75 102 99 % -- --   02/01/22 0300 -- 92 18 156/92 117 98 % -- --   02/01/22 0200 -- 91 17 158/80 115 99 % -- --   02/01/22 0100 -- 87 18 166/88 120 98 % -- --   02/01/22 0030 -- 87 21 166/88 120 98 % -- --   02/01/22 0000 -- 87 18 128/72 98 98 % -- --   01/31/22 1917 98 °F (36 7 °C) 81 18 170/86 121 98 % -- Lying   01/31/22 1900 -- 84 34 Abnormal  186/95 Abnormal  134 99 % -- --   01/31/22 1845 -- 80 28 Abnormal  169/94 125 99 % -- --   01/31/22 1830 -- 78 23 Abnormal  171/97 Abnormal  127 98 % -- --   01/31/22 1815 -- 93 44 Abnormal  174/90 Abnormal  -- 100 % -- --   01/31/22 1800 -- 79 24 Abnormal  180/80 Abnormal  -- 99 % -- --   01/31/22 1745 -- 81 25 Abnormal  180/84 Abnormal  120 100 % -- --   01/31/22 1730 99 °F (37 2 °C) 79 14 162/81 114 99 % None (Room air) --   01/31/22 1715 -- 79 18 173/94 Abnormal  -- 99 % -- --   01/31/22 17:00:03 -- 80 16 153/84 -- -- -- --   01/31/22 1700 -- 86 18 153/84 -- 99 % None (Room air) -- 01/31/22 1600 -- 87 18 139/103 Abnormal  -- 99 % None (Room air) --   01/31/22 1545 -- 82 18 165/79 -- 99 % -- --   01/31/22 1536 97 9 °F (36 6 °C) -- -- -- -- -- -- --   01/31/22 1530 -- 86 18 145/76 -- 99 % -- --   01/31/22 1515 -- 83 16 146/76 -- 100 % None (Room air) --   01/31/22 1512 -- 85 16 170/81 -- 100 % None (Room air) Sitting       Pertinent Labs/Diagnostic Test Results:   2/1 MRI brain: Small acute infarct in right posterior limb of internal capsule  Tiny acute infarcts in left ventral syed and left ventrolateral medulla only seen on exponential diffusion sequences  Unchanged old tiny infarcts in left posteromedial thalamus, left periatrial white matter, right cerebellum  Mild chronic microangiopathy  1/31 CTA head/neck: No evidence of hemodynamic significant stenosis, aneurysm or dissection     1/31 EKG: Normal sinus rhythm  Cannot rule out Anterior infarct , age undetermined  Abnormal ECG  When compared with ECG of 27-SEP-2021 10:46,  No significant change was found  Results from last 7 days   Lab Units 01/31/22  1608   SARS-COV-2  Negative     Results from last 7 days   Lab Units 01/31/22  1525   WBC Thousand/uL 9 45   HEMOGLOBIN g/dL 15 3   HEMATOCRIT % 45 5   PLATELETS Thousands/uL 294         Results from last 7 days   Lab Units 01/31/22  1525   SODIUM mmol/L 131*   POTASSIUM mmol/L 4 6   CHLORIDE mmol/L 98*   CO2 mmol/L 26   ANION GAP mmol/L 7   BUN mg/dL 30*   CREATININE mg/dL 3 04*   EGFR ml/min/1 73sq m 23   CALCIUM mg/dL 7 6*         Results from last 7 days   Lab Units 02/01/22  0540 02/01/22  0008 01/31/22  1741 01/31/22  1521   POC GLUCOSE mg/dl 245* 153* 325* 490*     Results from last 7 days   Lab Units 01/31/22  1525   GLUCOSE RANDOM mg/dL 469*         Results from last 7 days   Lab Units 01/31/22  1525   HEMOGLOBIN A1C % 10 5*   EAG mg/dl 255         Results from last 7 days   Lab Units 01/31/22  1525   HS TNI 0HR ng/L 21         Results from last 7 days   Lab Units 01/31/22  1525   PROTIME seconds 12 0   INR  0 92   PTT seconds 26       Results from last 7 days   Lab Units 01/31/22  1608   INFLUENZA A PCR  Negative   INFLUENZA B PCR  Negative   RSV PCR  Negative         ED Treatment:   Medication Administration from 01/31/2022 1502 to 01/31/2022 1728       Date/Time Order Dose Route Action     01/31/2022 1558 alteplase (ACTIVASE) IV bolus 7 677 mg 7 677 mg Intravenous Given     01/31/2022 1559 alteplase (ACTIVASE) infusion 69 1 mg 69 1 mg Intravenous New Bag     01/31/2022 1654 sodium chloride 0 9 % bolus 50 mL 50 mL Intravenous Given     01/31/2022 1704 hydrALAZINE (APRESOLINE) tablet 25 mg 25 mg Oral Given     01/31/2022 1704 atorvastatin (LIPITOR) tablet 80 mg 80 mg Oral Given     01/31/2022 1701 sodium chloride 0 9 % infusion 100 mL/hr Intravenous New Bag        Past Medical History:   Diagnosis Date    Diabetes mellitus ([facility] )     Hypertension     Stroke [facility])      Admitting Diagnosis: Dysarthria [R47 1]  GATITO (acute kidney injury) ([facility] ) [N17 9]  Stroke-like symptoms [R29 90]  Cerebrovascular accident (CVA), unspecified mechanism ([place] [address] ) [I63 9]  Age/Sex: 48 y o  male  Admission Orders:  Neuro checks hourly x 16 hours    I/O Every 2 hours  SCDS  NIHSS every 24 hours  CT head  HGBA1 c  CBC, Bmp  Occult blood x 3  Scheduled Medications:  amLODIPine, 10 mg, Oral, Daily  atorvastatin, 80 mg, Oral, Daily With Dinner  clopidogrel, 75 mg, Oral, Daily  hydrALAZINE, 25 mg, Oral, TID  insulin lispro, 2-12 Units, Subcutaneous, Q6H HAYLEE  metoprolol succinate, 50 mg, Oral, Daily  nicotine, 1 patch, Transdermal, Daily      Continuous IV Infusions:  sodium chloride, 100 mL/hr, Intravenous, Continuous      PRN Meds:  hydrALAZINE, 10 mg, Intravenous, Q4H PRN  Labetalol HCl, 10 mg, Intravenous, Q4H PRN        IP CONSULT TO NEUROLOGY  IP CONSULT TO PHYSICAL MEDICINE REHAB  IP CONSULT TO NEUROLOGY  IP CONSULT TO CASE MANAGEMENT  IP CONSULT TO NUTRITION SERVICES  IP CONSULT TO CASE MANAGEMENT    Network Utilization Review Department  ATTENTION: Please call with any questions or concerns to [phone] and carefully listen to the prompts so that you are directed to the right person  All voicemails are confidential   Sera Frye all requests for admission clinical reviews, approved or denied determinations and any other requests to dedicated fax number below belonging to the campus where the patient is receiving treatment   List of dedicated fax numbers for the Facilities:  [address] DENIALS (Administrative/Medical Necessity) [phone]   [address] (Maternity/NICU/Pediatrics) [phone]   [address]  [address] [facility] [place] [address] [id number] [place] [address] [address] [facility] [place] [address] [phone]

## 2022-02-01 NOTE — ASSESSMENT & PLAN NOTE
02/01/2022:  Seen again today by Neurology but new to this examiner is this 43-year-old gentleman who received tPA approximately 330 or 4:00 p m  Yesterday afternoon  He was examined does still in the ICU the following day approximately noon  Please see his exam below  Review of his CT scan 24 hours post tPA indicates no hemorrhagic conversion and his areas concerning for stroke or appreciated  He can now be started on his Plavix and aspirin for 7 days

## 2022-02-01 NOTE — PLAN OF CARE
Problem: Potential for Falls  Goal: Patient will remain free of falls  Description: INTERVENTIONS:  - Educate patient/family on patient safety including physical limitations  - Instruct patient to call for assistance with activity   - Consult OT/PT to assist with strengthening/mobility   - Keep Call bell within reach  - Keep bed low and locked with side rails adjusted as appropriate  - Keep care items and personal belongings within reach  - Initiate and maintain comfort rounds  - Make Fall Risk Sign visible to staff  - Offer Toileting every  Hours, in advance of need  - Initiate/Maintain alarm  - Obtain necessary fall risk management equipment:   - Apply yellow socks and bracelet for high fall risk patients  - Consider moving patient to room near nurses station  Outcome: Progressing     Problem: MOBILITY - ADULT  Goal: Maintain or return to baseline ADL function  Description: INTERVENTIONS:  -  Assess patient's ability to carry out ADLs; assess patient's baseline for ADL function and identify physical deficits which impact ability to perform ADLs (bathing, care of mouth/teeth, toileting, grooming, dressing, etc )  - Assess/evaluate cause of self-care deficits   - Assess range of motion  - Assess patient's mobility; develop plan if impaired  - Assess patient's need for assistive devices and provide as appropriate  - Encourage maximum independence but intervene and supervise when necessary  - Involve family in performance of ADLs  - Assess for home care needs following discharge   - Consider OT consult to assist with ADL evaluation and planning for discharge  - Provide patient education as appropriate  Outcome: Progressing  Goal: Maintains/Returns to pre admission functional level  Description: INTERVENTIONS:  - Perform BMAT or MOVE assessment daily    - Set and communicate daily mobility goal to care team and patient/family/caregiver     - Collaborate with rehabilitation services on mobility goals if consulted  - Perform Range of Motion  times a day  - Reposition patient every  hours  - Dangle patient  times a day  - Stand patient  times a day  - Ambulate patient  times a day  - Out of bed to chair  times a day   - Out of bed for meal times a day  - Out of bed for toileting  - Record patient progress and toleration of activity level   Outcome: Progressing     Problem: NEUROSENSORY - ADULT  Goal: Achieves stable or improved neurological status  Description: INTERVENTIONS  - Monitor and report changes in neurological status  - Monitor vital signs such as temperature, blood pressure, glucose, and any other labs ordered   - Initiate measures to prevent increased intracranial pressure  - Monitor for seizure activity and implement precautions if appropriate      Outcome: Progressing  Goal: Remains free of injury related to seizures activity  Description: INTERVENTIONS  - Maintain airway, patient safety  and administer oxygen as ordered  - Monitor patient for seizure activity, document and report duration and description of seizure to physician/advanced practitioner  - If seizure occurs,  ensure patient safety during seizure  - Reorient patient post seizure  - Seizure pads on all 4 side rails  - Instruct patient/family to notify RN of any seizure activity including if an aura is experienced  - Instruct patient/family to call for assistance with activity based on nursing assessment  - Administer anti-seizure medications if ordered    Outcome: Progressing  Goal: Achieves maximal functionality and self care  Description: INTERVENTIONS  - Monitor swallowing and airway patency with patient fatigue and changes in neurological status  - Encourage and assist patient to increase activity and self care     - Encourage visually impaired, hearing impaired and aphasic patients to use assistive/communication devices  Outcome: Progressing     Problem: Prexisting or High Potential for Compromised Skin Integrity  Goal: Skin integrity is maintained or improved  Description: INTERVENTIONS:  - Identify patients at risk for skin breakdown  - Assess and monitor skin integrity  - Assess and monitor nutrition and hydration status  - Monitor labs   - Assess for incontinence   - Turn and reposition patient  - Assist with mobility/ambulation  - Relieve pressure over bony prominences  - Avoid friction and shearing  - Provide appropriate hygiene as needed including keeping skin clean and dry  - Evaluate need for skin moisturizer/barrier cream  - Collaborate with interdisciplinary team   - Patient/family teaching  - Consider wound care consult   Outcome: Progressing

## 2022-02-01 NOTE — ASSESSMENT & PLAN NOTE
· Patient presented with slurred speech, word finding difficulty and ataxia at approximately 1300  He describes his normal state of health prior to that time  · NIH on presentation was 2   · CT head showing no hemorrhage  · CTA showing no hemodynamically significant stenosis, aneurysm or dissection   · He was given TPA at 1558  · Admitted to icu for post TPA monitoring  · Continue on stroke protocol   ? MRI brain completed overnight, pending read  ? CT head s/p 24 hour: Known small acute infarct in right posterior limb of internal capsule, tiny acute infarct in left ventral syed, and tiny acute infarct in left ventrolateral medulla are best seen on yesterday's MRI brain without contrast No new acute intracranial abnormality  ? Echocardiogram: Size was normal  Ejection fraction was estimated to be 60 %  Although no diagnostic regional wall motion abnormality was identified, this possibility cannot be completely excluded on the basis of this study  Concentric hypertrophy was present  The size was normal  Systolic function was normal  Wall thickness was normal   · Neurology, physical medicine, PT/OT, speech therapy   · PRN IV antihypertensives to maintain BP < 185/105

## 2022-02-01 NOTE — ASSESSMENT & PLAN NOTE
Lab Results   Component Value Date    HGBA1C 9 7 (A) 11/23/2021       Recent Labs     01/31/22  1521 01/31/22  1741 02/01/22  0008   POCGLU 490* 325* 153*       Blood Sugar Average: Last 72 hrs:  (P) 591 [id number]     · Hyperglycemia noted on BMP   · F/u hgba1c  · q 6 hour glucose checks with SSI coverage

## 2022-02-01 NOTE — PROGRESS NOTES
[facility]  Progress Note - Jennifer Cohen 1975, 48 y o  male MRN: [id number]  Unit/Bed#:  Encounter: [id number]  Primary Care Provider: Joni Yin DO   Date and time admitted to hospital: 1/31/2022  3:14 PM    * Stroke-like symptoms  Assessment & Plan  · Patient presented with slurred speech, word finding difficulty and ataxia at approximately 1300  He describes his normal state of health prior to that time  · NIH on presentation was 2   · CT head showing no hemorrhage  · CTA showing no hemodynamically significant stenosis, aneurysm or dissection   · He was given TPA at 1558  · Admitted to icu for post TPA monitoring  · Continue on stroke protocol   · MRI brain completed overnight, pending read  · CT head s/p 24 hour TPA window (1600 today)  · Echocardiogram pending  · Hold DAPT until outside of the TPA window  · Consult to neurology, physical medicine, PT/OT, speech therapy   · PRN IV antihypertensives to maintain BP < 185/105       Cerebrovascular accident (CVA) ([facility] [address] )  Assessment & Plan  · Patient has had multiple CVAs in the past   · In 2019 he had a lacunar infarct in the R thalamus   · In September of 2021 he also received TPA and was found to have a small focus of acute ischemia in the left periatrial white matter  · He continues to smoke, continue to encourage cessation   · Neurology office note from 1/25 stating recently off DAPT and just maintained on ASA and statin  · [employer] "MRI conditional" Loop recorder placed on 10/1 to evaluate for paroxysmal atrial fibrillation as a cardio-embolic source    There have been 2 devices checks since implantation with no device activating episodes  · ANGIE completed on 10/1 showing no thrombus in the right heart nor any atrial defects   · Complete TPA /stroke protocol as above     Essential hypertension  Assessment & Plan  · While in the TPA window will use PRN IV antihypertensives to maintain BP < 185/105  · Can continue home dose amlodipine and BB tomorrow     Current smoker  Assessment & Plan  · Nicotine patch   · Encourage cessation     Type 2 diabetes mellitus with hyperglycemia, with long-term current use of insulin [facility])  Assessment & Plan  Lab Results   Component Value Date    HGBA1C 9 7 (A) 11/23/2021       Recent Labs     01/31/22  1521 01/31/22  1741 02/01/22  0008   POCGLU 490* 325* 153*       Blood Sugar Average: Last 72 hrs:  (P) 063 [id number]     · Hyperglycemia noted on BMP   · F/u hgba1c  · q 6 hour glucose checks with SSI coverage     Acute on chronic renal failure [facility])  Assessment & Plan  Lab Results   Component Value Date    EGFR 23 01/31/2022    EGFR 37 09/29/2021    EGFR 37 09/27/2021    CREATININE 3 04 (H) 01/31/2022    CREATININE 2 30 (H) 09/29/2021    CREATININE 2 33 (H) 09/27/2021     · In 2019 baseline creatinine appears to be 1 4   · More recently appears to be 2 03  · Presenting with creatinine of 3 04 in the setting of stroke like symptoms and hyperglycemia  · Continue IV hydration     Brachial plexus injury, left, sequela  Assessment & Plan  · Patient states left sided weakness started before previous strokes  · He is due to see orthopedics in the outpatient setting  · Continue with outpatient follow up     ----------------------------------------------------------------------------------------  HPI/24hr events: Underwent MRI overnight  No significant changes in neuro exam     Patient appropriate for transfer out of the ICU today?: Patient does not meet criteria for referral to the ICU Follow-Up Clinic; referral has not been made  Disposition: Med-surg vs  Poss d/c home  Code Status: Level 1 - Full Code  ---------------------------------------------------------------------------------------  SUBJECTIVE  "I'm doing okay"  Denies any specific complaints this AM  States that dysarthria is relatively unchanged      Review of Systems  Review of systems was reviewed and negative unless stated above in HPI/24-hour events   ---------------------------------------------------------------------------------------  OBJECTIVE    Vitals   Vitals:    22 0030 22 0100 22 0200 22 0300   BP: 166/88 166/88 158/80 156/92   Pulse: 87 87 91 92   Resp:  17 18   Temp:       TempSrc:       SpO2: 98% 98% 99% 98%   Weight:       Height:         Temp (24hrs), Av 3 °F (36 8 °C), Min:97 9 °F (36 6 °C), Max:99 °F (37 2 °C)  Current: Temperature: 98 °F (36 7 °C)          Respiratory:  SpO2: SpO2: 98 %, SpO2 Activity: SpO2 Activity: At Rest, SpO2 Device: O2 Device: None (Room air)       Invasive/non-invasive ventilation settings   Respiratory  Report   Lab Data (Last 4 hours)    None         O2/Vent Data (Last 4 hours)    None                Physical Exam  Vitals and nursing note reviewed  Constitutional:       Appearance: He is well-developed  HENT:      Head: Normocephalic and atraumatic  Mouth/Throat:      Mouth: Mucous membranes are moist    Eyes:      Extraocular Movements: Extraocular movements intact  Conjunctiva/sclera: Conjunctivae normal       Pupils: Pupils are equal, round, and reactive to light  Cardiovascular:      Rate and Rhythm: Normal rate and regular rhythm  Heart sounds: No murmur heard  Pulmonary:      Effort: Pulmonary effort is normal  No respiratory distress  Breath sounds: Normal breath sounds  Abdominal:      Palpations: Abdomen is soft  Tenderness: There is no abdominal tenderness  Musculoskeletal:      Cervical back: Neck supple  Right lower leg: No edema  Left lower leg: No edema  Skin:     General: Skin is warm and dry  Neurological:      Mental Status: He is alert  Mental status is at baseline  Comments: Mild dysarthria  Strength 5/5 in all extremities               Laboratory and Diagnostics:  Results from last 7 days   Lab Units 22  1525   WBC Thousand/uL 9 45   HEMOGLOBIN g/dL 15 3   HEMATOCRIT % 45 5 PLATELETS Thousands/uL 294     Results from last 7 days   Lab Units 01/31/22  1525   SODIUM mmol/L 131*   POTASSIUM mmol/L 4 6   CHLORIDE mmol/L 98*   CO2 mmol/L 26   ANION GAP mmol/L 7   BUN mg/dL 30*   CREATININE mg/dL 3 04*   CALCIUM mg/dL 7 6*   GLUCOSE RANDOM mg/dL 469*          Results from last 7 days   Lab Units 01/31/22  1525   INR  0 92   PTT seconds 26              ABG:    VBG:          Micro        Imaging: I have personally reviewed pertinent reports  and I have personally reviewed pertinent films in PACS    Intake and Output  I/O       01/30 0701 01/31 0700 01/31 0701  02/01 0700    I V  (mL/kg)  61 7 (0 7)    Total Intake(mL/kg)  61 7 (0 7)    Urine (mL/kg/hr)  1200    Total Output  1200    Net  -1138 3          Unmeasured Urine Occurrence  1 x          Height and Weights   Height: 5' 10" (177 8 cm)  IBW (Ideal Body Weight): 73 kg  Body mass index is 26 86 kg/m²  Weight (last 2 days)     Date/Time Weight    01/31/22 1730 84 9 (187 17)    01/31/22 1536 85 3 (188 05)            Nutrition       Diet Orders   (From admission, onward)             Start     Ordered    01/31/22 1638  Diet NPO  Diet effective now        References:    Nutrtion Support Algorithm Enteral vs  Parenteral   Question Answer Comment   Diet Type NPO    RD to adjust diet per protocol?  No        01/31/22 1637                  Active Medications  Scheduled Meds:  Current Facility-Administered Medications   Medication Dose Route Frequency Provider Last Rate    amLODIPine  10 mg Oral Daily Jacquelyn Nica, CRNP      atorvastatin  80 mg Oral QPM Jacquelyn Nica, CRNP      hydrALAZINE  10 mg Intravenous Q4H PRN Jacquelyn Nica, CRNP      hydrALAZINE  25 mg Oral TID Jacquelyn Nica, CRNP      insulin lispro  2-12 Units Subcutaneous [facility] Jacquelyn Nica, CRNP      Labetalol HCl  10 mg Intravenous Q4H PRN Jacquelyn Nica, CRNP      metoprolol succinate  50 mg Oral Daily Jacquelyn Nica, CRNP      nicotine  1 patch Transdermal Daily YASMIN Davis      sodium chloride  100 mL/hr Intravenous Continuous YASMIN Davis 100 mL/hr (01/31/22 1701)     Continuous Infusions:  sodium chloride, 100 mL/hr, Last Rate: 100 mL/hr (01/31/22 1701)      PRN Meds:   hydrALAZINE, 10 mg, Q4H PRN  Labetalol HCl, 10 mg, Q4H PRN        Invasive Devices Review  Invasive Devices  Report    Peripheral Intravenous Line            Peripheral IV 01/31/22 Right Antecubital <1 day                ---------------------------------------------------------------------------------------  Advance Directive and Living Will:      Power of :    POLST:    ---------------------------------------------------------------------------------------  Care Time Delivered:   No Critical Care time spent       Adrienne Ruth PA-C      Portions of the record may have been created with voice recognition software  Occasional wrong word or "sound a like" substitutions may have occurred due to the inherent limitations of voice recognition software    Read the chart carefully and recognize, using context, where substitutions have occurred

## 2022-02-01 NOTE — SPEECH THERAPY NOTE
Speech-Language Pathology Bedside Speech and Swallow Evaluation        Patient Name: Renea Shoemaker    QRLQV'I Date: 2/1/2022     Problem List  Principal Problem:    Stroke-like symptoms  Active Problems:    Current smoker    Essential hypertension    Type 2 diabetes mellitus with hyperglycemia, with long-term current use of insulin ([facility] )    Brachial plexus injury, left, sequela    Cerebrovascular accident (CVA) ([facility] )    Acute on chronic renal failure [facility])         Past Medical History  Past Medical History:   Diagnosis Date    Diabetes mellitus ([facility] )     Hypertension     Stroke [facility])        Past Surgical History  History reviewed  No pertinent surgical history  Summary/Impressions:     Dysphagia-  Bedside observations support grossly intact oropharyngeal swallow function across all consistencies tested  Self-fed solid and liquid trials with no s/s of dysphagia or distress  Patient denies difficulties or changes from baseline function  Dysarthria-   Informal assessment measures and clinicial observation indicate mild dysarthria of speech  This impairment is characterized by slow rate of speech, occasional blended word boundaries and loss of breath  Patient participates in oral motor examination with minimal deficit in articulator strength and range of motion  Will continue to monitor closely for ongoing assessment/need for speech tx while admitted and following discharge if deficits do not resolve on their own  Per review of chart and patient interview, some improvement is indicated  Patient denies deficits or concerns with language (ie word retrieval and understanding)         Recommendations:   Diet: regular diet and thin liquids   Meds: whole with liquid   Feeding assistance: Distant/remote  Frequent Oral care: 2-4x/day  Aspiration precautions and compensatory swallowing strategies: upright posture  Other Recommendations/ considerations: NA       Current Medical Status  Pt is a 48 y o  male who presented to [facility] with sudden onset of slurred speech and word-finding difficulty this afternoon at approximately 13:00  He states he was having a conversation and was normal prior to that time  He then endorse that he was walking like he was drunk  Stroke alert was initiated in the ED   NIHSS 2  CTH/CTA without acute changes  BP on presentation 170/81  Upon further discussion with attending neurologist, patient was deemed a tPA candidate and received tPA at approximately 15:58  H/o stroke (R-thalamic in 2018); no deficits in speech/swallowing/language at baseline  Past medical history:  Please see H&P for details    Special Studies:  MRI 2/1/22: IMPRESSION:   -Small acute infarct in right posterior limb of internal capsule      -Tiny acute infarcts in left ventral syed and left ventrolateral medulla only seen on exponential diffusion sequences      -Unchanged old tiny infarcts in left posteromedial thalamus, left periatrial white matter, right cerebellum      -Mild chronic microangiopathy  CTA 1/31/22:    IMPRESSION:   No evidence of hemodynamic significant stenosis, aneurysm or dissection         Social/Education/Vocational Hx:  Pt lives with family    Swallow Information   Current Risks for Dysphagia & Aspiration: CVA  Current Symptoms/Concerns: slurred speech  Current Diet: regular diet and thin liquids   Baseline Diet: regular diet and thin liquids    Baseline Assessment   Behavior/Cognition: alert  Speech/Language Status: able to participate in conversation  Patient Positioning: upright in bed    Swallow Mechanism Exam   Facial: symmetrical  Labial: Mild L-side weaknes suspected (inconsistent)  Lingual: WFL  Velum: symmetrical  Mandible: adequate ROM  Dentition: upper dentures  Vocal quality:clear/adequate   Volitional Cough: strong/productive   Respiratory: RA    Consistencies Assessed and Performance   Consistencies Administered: ice chips, thin liquids, puree, mechanical soft solids, soft solids and hard solids    Oral Stage: WFL  Patient presents with adequate bolus acceptance, containment and manipulation  Mastication judged to be efficient and complete  No oral residue  Pharyngeal Stage: WFL  Swallow response appears brisk and timely upon laryngeal palpation  Hyolaryngeal excursion seems adequate  No overt s/s of aspiration or distress  Vocal quality remains dry  Esophageal Concerns: none reported     Plan  Will f/u as indicated for ongoing evaluation/tx of dysarthria of speech and ensure tolerance to diet  Goals:  Swallow:   - Patient will demonstrate tolerance to current, least restrictive diet with no overt s/s of aspiration x1-3 encounters       Speech;   -The patient will demonstrate intelligible speech in all activities of daily living including dynamic conversation    Discharge recommendation: Outpatient (if symptoms do not resolve)    Results Reviewed with: MD       Speech Therapy Prognosis   Prognosis: good  Prognosis Considerations: medical status      Lena Oppenheim, [address], [address] [facility]  Speech-Language Pathologist  [place] #[id number]  [place] #[id number]

## 2022-02-01 NOTE — PLAN OF CARE
Problem: OCCUPATIONAL THERAPY ADULT  Goal: Performs self-care activities at highest level of function for planned discharge setting  See evaluation for individualized goals  Description: Treatment Interventions: ADL retraining,Functional transfer training,UE strengthening/ROM,Endurance training,Activityengagement,Energy conservation,Cardiac education,Continued evaluation,Compensatory technique education,Equipment evaluation/education,Patient/family training          See flowsheet documentation for full assessment, interventions and recommendations  Note: Limitation: Decreased ADL status,Decreased UE strength,Decreased self-care trans,Decreased high-level ADLs,Decreased endurance,Decreased fine motor control (deficits in L hand due to brachial plexus injury)  Prognosis: Good  Assessment: Pt is a 48 y o  male seen for OT evaluation s/p admit to [facility] on 1/31/2022 w/ Stroke-like symptoms  Comorbidities affecting pt's functional performance at time of assessment include:CVA, DM, HTN and tobacco abuse   Orders placed for OT evaluation and treatment  Performed at least two patient identifiers during session including name and wristband  Personal factors affecting pt at time of IE include:steps to enter environment, difficulty performing ADLS, difficulty performing IADLS , limited insight into deficits, compliance, decreased initiation and engagement , financial barriers, health management  and environment  Prior to admission, pt reports Ind with ADLs, Ind with IADLs, and (+) driving    Upon evaluation: Pt requires min a with UB ADLs, mod A with LB ADLs, min A with xfers and min A with functional mobility 2* the following deficits impacting occupational performance: weakness, decreased strength, decreased dynamic sit/ stand balance, decreased activity tolerance, decreased standing tolerance time for self care and functional mobility, decreased safety awareness, impaired interpersonal skills, environmental deficits, questionable insight, decreased coping skills, decreased mobilty and requiring external assistance to complete transitional movements  Pt to benefit from continued skilled OT tx while in the hospital to address deficits as defined above and maximize level of functional independence w ADL's and functional mobility  Occupational Performance areas to address include: bathing/shower, toilet hygiene, dressing, medication management, socialization, health maintenance, functional mobility, community mobility, clothing management and household maintenance  From OT standpoint, recommendation at time of d/c would be out pt OT         OT Discharge Recommendation: Home with outpatient rehabilitation

## 2022-02-02 VITALS
WEIGHT: 187.17 LBS | SYSTOLIC BLOOD PRESSURE: 130 MMHG | DIASTOLIC BLOOD PRESSURE: 75 MMHG | RESPIRATION RATE: 17 BRPM | OXYGEN SATURATION: 98 % | TEMPERATURE: 97.8 F | HEIGHT: 70 IN | BODY MASS INDEX: 26.8 KG/M2 | HEART RATE: 84 BPM

## 2022-02-02 LAB
GLUCOSE SERPL-MCNC: 175 MG/DL (ref 65–140)
GLUCOSE SERPL-MCNC: 352 MG/DL (ref 65–140)

## 2022-02-02 PROCEDURE — 99239 HOSP IP/OBS DSCHRG MGMT >30: CPT | Performed by: INTERNAL MEDICINE

## 2022-02-02 PROCEDURE — 82948 REAGENT STRIP/BLOOD GLUCOSE: CPT

## 2022-02-02 RX ORDER — ASPIRIN 81 MG/1
81 TABLET, CHEWABLE ORAL DAILY
Qty: 7 TABLET | Refills: 0 | Status: SHIPPED | OUTPATIENT
Start: 2022-02-02 | End: 2022-03-07 | Stop reason: ALTCHOICE

## 2022-02-02 RX ORDER — CLOPIDOGREL BISULFATE 75 MG/1
75 TABLET ORAL DAILY
Qty: 30 TABLET | Refills: 0 | Status: SHIPPED | OUTPATIENT
Start: 2022-02-02 | End: 2022-02-04 | Stop reason: SDUPTHER

## 2022-02-02 RX ORDER — BUPROPION HYDROCHLORIDE 150 MG/1
150 TABLET ORAL DAILY
Qty: 30 TABLET | Refills: 0 | Status: SHIPPED | OUTPATIENT
Start: 2022-02-03 | End: 2022-02-04 | Stop reason: SDUPTHER

## 2022-02-02 RX ADMIN — HYDRALAZINE HYDROCHLORIDE 25 MG: 25 TABLET, FILM COATED ORAL at 10:30

## 2022-02-02 RX ADMIN — INSULIN LISPRO 25 UNITS: 100 INJECTION, SOLUTION INTRAVENOUS; SUBCUTANEOUS at 10:38

## 2022-02-02 RX ADMIN — METOPROLOL SUCCINATE 50 MG: 50 TABLET, EXTENDED RELEASE ORAL at 10:31

## 2022-02-02 RX ADMIN — AMLODIPINE BESYLATE 10 MG: 10 TABLET ORAL at 10:31

## 2022-02-02 RX ADMIN — ASPIRIN 81 MG: 81 TABLET, CHEWABLE ORAL at 10:30

## 2022-02-02 RX ADMIN — CLOPIDOGREL BISULFATE 75 MG: 75 TABLET ORAL at 10:30

## 2022-02-02 RX ADMIN — BUPROPION 150 MG: 150 TABLET, EXTENDED RELEASE ORAL at 10:31

## 2022-02-02 NOTE — DISCHARGE INSTR - AVS FIRST PAGE
Aspirin and plavix dual therapy for 1 week, then stop aspirin  Continue plavix indefinitely   Advised to quit smoking  Healthy lifestyle and diet

## 2022-02-02 NOTE — DISCHARGE SUMMARY
Admitting Provider:  Navdeep Nelson DO  Discharge Provider:  Melisa Bronson MD  Admission Date: 1/31/2022       Discharge Date: 02/02/22   LOS: 2  Primary Care Physician at Discharge: Kelly Reyes, [address]  COURSE:  María Mercedes is a 48 y o  male who presented with a stroke  Patient did have tiny acute infarcts in the left ventral syed and left ventral and lateral medulla  Patient does have past history of smoking and advised to quit  Patient feels better and is hemodynamically stable for discharge  Physical therapy input recommended that he can go home with outpatient therapy  Patient advised about diet, consult about exercise and advised strongly to quit smoking    Neurology input appreciated during the hospital course and they recommended dual antiplatelet therapy for a week followed by Plavix therapy      REASON FOR ADMISSION/ ADMISSION DIAGNOSES    Stroke-like symptoms  Acute stroke right posterior limb of internal capsule  Tobacco abuse disorder  History of hypertension  Poorly controlled type 2 diabetes mellitus  GATITO on CKD IV    DISCHARGE DIAGNOSES  Acute on chronic renal failure [facility])  Assessment & Plan  Lab Results   Component Value Date    EGFR 25 02/01/2022    EGFR 23 01/31/2022    EGFR 37 09/29/2021    CREATININE 2 79 (H) 02/01/2022    CREATININE 3 04 (H) 01/31/2022    CREATININE 2 30 (H) 09/29/2021     · In 2019 baseline creatinine appears to be 1 4   · More recently appears to be 2 03  · Presenting with creatinine of 3 04 in the setting of stroke like symptoms and hyperglycemia  · Continue IV hydration till tolerating full PO diet       History of embolic stroke  Assessment & Plan  · brooke has had multiple CVAs in the past   · In 2019 he had a lacunar infarct in the R thalamus   · In September of 2021 he also received TPA and was found to have a small focus of acute ischemia in the left periatrial white matter  · He continues to smoke, continue to encourage cessation · Neurology office note from 1/25 stating recently off DAPT and just maintained on ASA and statin  · [employer] "MRI conditional" Loop recorder placed on 10/1 to evaluate for paroxysmal atrial fibrillation as a cardio-embolic source  There have been 2 devices checks since implantation with no device activating episodes  · ANGIE completed on 10/1 showing no thrombus in the right heart nor any atrial defects   · Completed TPA /stroke protocol as above   · Started DAPT s/p tPA      Brachial plexus injury, left, sequela  Assessment & Plan  · Patient states left sided weakness started before previous strokes  · He is due to see orthopedics in the outpatient setting  · Continue with outpatient follow up        Type 2 diabetes mellitus with hyperglycemia, with long-term current use of insulin [facility])  Assessment & Plan  Lab Results   Component Value Date    HGBA1C 10 5 (H) 01/31/2022       Recent Labs     02/01/22  0540 02/01/22  1200 02/01/22  1202 02/01/22  1320   POCGLU 245* >500* >500* >500*       Blood Sugar Average: Last 72 hrs:  (P) 303 25     · Home Insulin treatment started    Essential hypertension  Assessment & Plan  · While in the TPA window will use PRN IV antihypertensives to maintain BP < 185/105  · Can continue home dose amlodipine and BB tomorrow       Current smoker  Assessment & Plan  · Nicotine patch   · Encourage cessation       * Bilateral small Cerebrovascular accident (CVA) status post tPA  Assessment & Plan  · Patient presented with slurred speech, word finding difficulty and ataxia at approximately 1300  He describes his normal state of health prior to that time  · NIH on presentation was 2   · CT head showing no hemorrhage  · CTA showing no hemodynamically significant stenosis, aneurysm or dissection   · He was given TPA at 1558  · Admitted to icu for post TPA monitoring  · Continue on stroke protocol   ? MRI brain completed overnight, pending read  ?  CT head s/p 24 hour: Known small acute infarct in right posterior limb of internal capsule, tiny acute infarct in left ventral syed, and tiny acute infarct in left ventrolateral medulla are best seen on yesterday's MRI brain without contrast No new acute intracranial abnormality  ? Echocardiogram: Size was normal  Ejection fraction was estimated to be 60 %  Although no diagnostic regional wall motion abnormality was identified, this possibility cannot be completely excluded on the basis of this study  Concentric hypertrophy was present  The size was normal  Systolic function was normal  Wall thickness was normal   · Neurology, physical medicine, PT/OT, speech therapy   · PRN IV antihypertensives to maintain BP < 185/105         CONSULTING PROVIDERS   IP CONSULT TO NEUROLOGY  IP CONSULT TO PHYSICAL MEDICINE REHAB  IP CONSULT TO NEUROLOGY  IP CONSULT TO NUTRITION SERVICES    PROCEDURES PERFORMED  * No surgery found *    RADIOLOGY RESULTS  CT head wo contrast    Result Date: 2/1/2022  Impression: Known small acute infarct in right posterior limb of internal capsule, tiny acute infarct in left ventral syed, and tiny acute infarct in left ventrolateral medulla are best seen on yesterday's MRI brain without contrast   Old tiny infarcts in left posterior medial thalamus, left periatrial white matter, and right cerebellum are also best seen on yesterday's MRI brain without contrast  No new acute intracranial abnormality  Workstation performed: [id number]     MRI brain wo contrast    Result Date: 2/1/2022  Impression: Small acute infarct in right posterior limb of internal capsule  Tiny acute infarcts in left ventral syed and left ventrolateral medulla only seen on exponential diffusion sequences  Unchanged old tiny infarcts in left posteromedial thalamus, left periatrial white matter, right cerebellum  Mild chronic microangiopathy  The study was marked in [facility] for immediate notification   Workstation performed: [id number]     CT stroke alert brain    Result Date: 1/31/2022  Impression: No acute intracranial abnormality  Microangiopathic changes  I personally discussed this study with neurologist on 1/31/2022 at 3:42 PM   Workstation performed: [id number]     CTA stroke alert (head/neck)    Result Date: 1/31/2022  Impression: No evidence of hemodynamic significant stenosis, aneurysm or dissection  I personally discussed this study with neurologist on 1/31/2022 at 3:42 PM  Workstation performed: [id number]       LABS  Results from last 7 days   Lab Units 02/01/22  1537 01/31/22  1525   WBC Thousand/uL 10 35* 9 45   HEMOGLOBIN g/dL 13 8 15 3   HEMATOCRIT % 40 8 45 5   MCV fL 90 92   PLATELETS Thousands/uL 272 294   INR   --  0 92     Results from last 7 days   Lab Units 02/01/22  1537 02/01/22  1212 01/31/22  1525   SODIUM mmol/L 135*  --  131*   POTASSIUM mmol/L 4 2  --  4 6   CHLORIDE mmol/L 106  --  98*   CO2 mmol/L 22  --  26   BUN mg/dL 27*  --  30*   CREATININE mg/dL 2 79*  --  3 04*   CALCIUM mg/dL 7 6*  --  7 6*   EGFR ml/min/1 73sq m 25  --  23   GLUCOSE RANDOM mg/dL 216* 528* 469*                  Results from last 7 days   Lab Units 02/02/22  1035 02/02/22  0556 02/01/22  2047 02/01/22  1749 02/01/22  1320 02/01/22  1202 02/01/22  1200 02/01/22  0540 02/01/22  0008 01/31/22  1741   POC GLUCOSE mg/dl 352* 175* 190* 211* >500* >500* >500* 245* 153* 325*     Results from last 7 days   Lab Units 02/01/22  1537 01/31/22  1525   HEMOGLOBIN A1C % 10 6* 10 5*             Results from last 7 days   Lab Units 02/01/22  1537 01/31/22  1525 01/31/22  1525   TRIGLYCERIDES mg/dL 412*   < > 253*   CHOLESTEROL mg/dL 172   < > 205*   LDL CALC mg/dL  --   --  95   HDL mg/dL 44   < > 59   LDL DIRECT mg/dl 72  --   --     < > = values in this interval not displayed         Cultures:         Invalid input(s): Angel Crawford        Results from last 7 days   Lab Units 01/31/22  1608   INFLUENZA A PCR  Negative       PHYSICAL EXAM:  Vitals:   Blood Pressure: 130/75 (02/02/22 0700)  Pulse: 84 (02/02/22 0700)  Temperature: 97 8 °F (36 6 °C) (02/02/22 0700)  Temp Source: Oral (02/02/22 0700)  Respirations: 17 (02/02/22 0700)  Height: 5' 10" (177 8 cm) (01/31/22 1730)  Weight - Scale: 84 9 kg (187 lb 2 7 oz) (02/02/22 0551)  SpO2: 98 % (02/02/22 0636)    General appearance: alert, appears stated age and cooperative  HEENT - atraumatic and normocephalic  Neck- supple  Skin - no fresh rash  Extremities no fresh focal deformities  Cardiovascular- S1-S2 heard  Respiratory- bilateral air entry present, no crackles or rhonchi  Skin - no fresh rash  Abdomen - normal bowel sounds present, no rebound tenderness  CNS- No fresh focal deficits  Psych- no acute psychosis     Planned Re-admission:  No  Discharge Disposition: Home/Self Care      Medications   · Summary of Medication Adjustments made as a result of this hospitalization:  See attached list  · Medication Dosing Tapers - Please refer to Discharge Medication List for details on any medication dosing tapers (if applicable to patient)  · Discharge Medication List: See after visit summary for reconciled discharge medications  Diet restrictions:  Healthy diet   Activity restrictions: No strenuous activity  Discharge Condition: stable    Outpatient Follow-Up and Discharge Instructions  See after visit summary section titled Discharge Instructions for information provided to patient and family  Code Status: Prior  Discharge Statement   I spent 35 minutes discharging the patient  This time was spent on the day of discharge  Greater than 50% of total time was spent with the patient and / or family counseling and / or coordination of care  Leopold Ewings, MD  [facility] Internal Medicine    ** Please Note: This note has been constructed using a voice recognition system   **

## 2022-02-03 ENCOUNTER — TRANSITIONAL CARE MANAGEMENT (OUTPATIENT)
Dept: FAMILY MEDICINE CLINIC | Facility: CLINIC | Age: 47
End: 2022-02-03

## 2022-02-03 NOTE — UTILIZATION REVIEW
Notification of Discharge   This is a Notification of Discharge from our facility [address]  Please be advised that this patient has been discharge from our facility  Below you will find the admission and discharge date and time including the patients disposition  UTILIZATION REVIEW CONTACT:  Patricia Cole  Utilization   Network Utilization Review Department  Phone: [phone] x carefully listen to the prompts  All voicemails are confidential   Email: [email or web address]     PHYSICIAN ADVISORY SERVICES:  FOR [id number] REVIEW - MEDICAL NECESSITY DENIAL  Phone: [phone]  Fax: [phone]  Email: [email or web address]     PRESENTATION DATE: 1/31/2022  3:14 PM  OBERVATION ADMISSION DATE:   INPATIENT ADMISSION DATE: 1/31/22  4:12 PM   DISCHARGE DATE: 2/2/2022 12:53 PM  DISPOSITION: Home/Self Care Home/Self Care      IMPORTANT INFORMATION:  Send all requests for admission clinical reviews, approved or denied determinations and any other requests to dedicated fax number below belonging to the campus where the patient is receiving treatment   List of dedicated fax numbers:  [address] DENIALS (Administrative/Medical Necessity) [phone]   [address] (Maternity/NICU/Pediatrics) [phone]   [facility] [phone]   [address] [facility] [phone]   [address] [place] [address] [phone]   [address] [phone]   [facility]  [phone]   [address] [place] [address], [facility]  [address] [facility] 1000 [facility] [phone]

## 2022-02-04 ENCOUNTER — TELEMEDICINE (OUTPATIENT)
Dept: FAMILY MEDICINE CLINIC | Facility: CLINIC | Age: 47
End: 2022-02-04
Payer: COMMERCIAL

## 2022-02-04 DIAGNOSIS — E11.42 DIABETIC POLYNEUROPATHY ASSOCIATED WITH TYPE 2 DIABETES MELLITUS (HCC): ICD-10-CM

## 2022-02-04 DIAGNOSIS — Z92.82 RECEIVED INTRAVENOUS TISSUE PLASMINOGEN ACTIVATOR (TPA) IN EMERGENCY DEPARTMENT: ICD-10-CM

## 2022-02-04 DIAGNOSIS — M62.81 LEFT-SIDED MUSCLE WEAKNESS: ICD-10-CM

## 2022-02-04 DIAGNOSIS — I63.9 CEREBROVASCULAR ACCIDENT (CVA), UNSPECIFIED MECHANISM (HCC): ICD-10-CM

## 2022-02-04 DIAGNOSIS — I35.1 NONRHEUMATIC AORTIC VALVE INSUFFICIENCY: ICD-10-CM

## 2022-02-04 DIAGNOSIS — I63.411 CEREBROVASCULAR ACCIDENT (CVA) DUE TO EMBOLISM OF RIGHT MIDDLE CEREBRAL ARTERY (HCC): Primary | ICD-10-CM

## 2022-02-04 DIAGNOSIS — F17.200 CURRENT SMOKER: ICD-10-CM

## 2022-02-04 DIAGNOSIS — I63.9 DYSARTHRIA DUE TO ACUTE STROKE (HCC): ICD-10-CM

## 2022-02-04 DIAGNOSIS — R47.1 DYSARTHRIA DUE TO ACUTE STROKE (HCC): ICD-10-CM

## 2022-02-04 DIAGNOSIS — Z79.4 TYPE 2 DIABETES MELLITUS WITH HYPERGLYCEMIA, WITH LONG-TERM CURRENT USE OF INSULIN (HCC): ICD-10-CM

## 2022-02-04 DIAGNOSIS — E11.65 TYPE 2 DIABETES MELLITUS WITH HYPERGLYCEMIA, WITH LONG-TERM CURRENT USE OF INSULIN (HCC): ICD-10-CM

## 2022-02-04 DIAGNOSIS — I63.9 INFARCTION OF RIGHT THALAMUS (HCC): ICD-10-CM

## 2022-02-04 DIAGNOSIS — N18.9 ACUTE RENAL FAILURE SUPERIMPOSED ON CHRONIC KIDNEY DISEASE, UNSPECIFIED CKD STAGE, UNSPECIFIED ACUTE RENAL FAILURE TYPE (HCC): ICD-10-CM

## 2022-02-04 DIAGNOSIS — N17.9 ACUTE RENAL FAILURE SUPERIMPOSED ON CHRONIC KIDNEY DISEASE, UNSPECIFIED CKD STAGE, UNSPECIFIED ACUTE RENAL FAILURE TYPE (HCC): ICD-10-CM

## 2022-02-04 DIAGNOSIS — I10 ESSENTIAL HYPERTENSION: ICD-10-CM

## 2022-02-04 PROCEDURE — 99495 TRANSJ CARE MGMT MOD F2F 14D: CPT | Performed by: FAMILY MEDICINE

## 2022-02-04 PROCEDURE — 3066F NEPHROPATHY DOC TX: CPT | Performed by: INTERNAL MEDICINE

## 2022-02-04 PROCEDURE — 1111F DSCHRG MED/CURRENT MED MERGE: CPT | Performed by: FAMILY MEDICINE

## 2022-02-04 RX ORDER — BLOOD SUGAR DIAGNOSTIC
STRIP MISCELLANEOUS
Qty: 200 EACH | Refills: 3 | Status: SHIPPED | OUTPATIENT
Start: 2022-02-04 | End: 2022-06-30 | Stop reason: ALTCHOICE

## 2022-02-04 RX ORDER — BLOOD-GLUCOSE METER
KIT MISCELLANEOUS
Qty: 1 KIT | Refills: 0 | Status: SHIPPED | OUTPATIENT
Start: 2022-02-04 | End: 2022-06-30 | Stop reason: ALTCHOICE

## 2022-02-04 RX ORDER — CLOPIDOGREL BISULFATE 75 MG/1
75 TABLET ORAL DAILY
Qty: 30 TABLET | Refills: 5 | Status: SHIPPED | OUTPATIENT
Start: 2022-02-04 | End: 2022-06-30

## 2022-02-04 RX ORDER — ATORVASTATIN CALCIUM 80 MG/1
80 TABLET, FILM COATED ORAL DAILY
Qty: 30 TABLET | Refills: 5 | Status: SHIPPED | OUTPATIENT
Start: 2022-02-04

## 2022-02-04 RX ORDER — HYDRALAZINE HYDROCHLORIDE 25 MG/1
25 TABLET, FILM COATED ORAL 3 TIMES DAILY
Qty: 90 TABLET | Refills: 5 | Status: SHIPPED | OUTPATIENT
Start: 2022-02-04

## 2022-02-04 RX ORDER — LANCETS 33 GAUGE
EACH MISCELLANEOUS
Qty: 200 EACH | Refills: 3 | Status: SHIPPED | OUTPATIENT
Start: 2022-02-04 | End: 2022-04-25 | Stop reason: ALTCHOICE

## 2022-02-04 RX ORDER — INSULIN GLARGINE 100 [IU]/ML
28 INJECTION, SOLUTION SUBCUTANEOUS DAILY
Qty: 15 ML | Refills: 5 | Status: SHIPPED | OUTPATIENT
Start: 2022-02-04 | End: 2022-08-09 | Stop reason: SDUPTHER

## 2022-02-04 RX ORDER — BUPROPION HYDROCHLORIDE 150 MG/1
150 TABLET ORAL EVERY MORNING
Qty: 30 TABLET | Refills: 1 | Status: SHIPPED | OUTPATIENT
Start: 2022-02-04 | End: 2022-03-07 | Stop reason: ALTCHOICE

## 2022-02-04 NOTE — PROGRESS NOTES
Virtual TCM Visit:    Verification of patient location:    Patient is located in the following state in which I hold an active license [place]        Assessment/Plan:        Problem List Items Addressed This Visit     Essential hypertension    Relevant Medications    hydrALAZINE (APRESOLINE) 25 mg tablet    Current smoker    Relevant Medications    buPROPion (WELLBUTRIN XL) 150 mg 24 hr tablet    Type 2 diabetes mellitus with hyperglycemia, with long-term current use of insulin (HCC)    Relevant Medications    Blood Glucose Monitoring Suppl (OneTouch Verio Reflect) w/Device KIT    glucose blood (OneTouch Verio) test strip    OneTouch Delica Lancets 90A MISC    atorvastatin (LIPITOR) 80 mg tablet    insulin glargine (Basaglar KwikPen) 100 units/mL injection pen    Diabetic polyneuropathy associated with type 2 diabetes mellitus (HCC)    Relevant Medications    insulin glargine (Basaglar KwikPen) 100 units/mL injection pen    Aortic valve insufficiency ( mild-mod 2020 )    Relevant Medications    clopidogrel (PLAVIX) 75 mg tablet    Bilateral small Cerebrovascular accident (CVA) status post tPA - Primary    Relevant Medications    atorvastatin (LIPITOR) 80 mg tablet    clopidogrel (PLAVIX) 75 mg tablet    buPROPion (WELLBUTRIN XL) 150 mg 24 hr tablet    Other Relevant Orders    Ambulatory Referral to Physical Therapy    Ambulatory Referral to Occupational Therapy    Ambulatory Referral to Speech Therapy    Acute on chronic renal failure (HCC)    Received intravenous tissue plasminogen activator (tPA) in emergency department    Dysarthria due to acute stroke [facility])    Relevant Orders    Ambulatory Referral to Speech Therapy      Other Visit Diagnoses     Left-sided muscle weakness        Relevant Orders    Ambulatory Referral to Physical Therapy    Ambulatory Referral to Occupational Therapy    Infarction of right thalamus ([facility] [address] ) - Lacunar ( 9/19)        Relevant Medications    atorvastatin (LIPITOR) 80 mg tablet Cerebrovascular accident (CVA), unspecified mechanism [facility])             Patient Instructions   Reviewed hospital stay January 31st through February 2nd, again had CVA, received tPA  Had been advised to use aspirin/Plavix for 1 week then stop aspirin, continue Plavix daily long-term  MRI = Small acute infarct in right posterior limb of internal capsule    Tiny acute infarcts in left ventral syed and left ventrolateral medulla only seen on exponential diffusion sequences    Unchanged old tiny infarcts in left posteromedial thalamus, left periatrial white matter, right cerebellum    Mild chronic microangiopathy  CTA = No evidence of hemodynamic significant stenosis, aneurysm or dissection  Inpatient creatinine peaked at 3 04, baseline creatinine last year was 2 to 2 3  Discharge BUN/creatinine 27/2 79 with potassium 4 2  WBC 10 with hemoglobin 13 8, platelet count 760H  Cholesterol 172 with HDL 44, triglyceride 412  Direct LDL 72  Glucose at one point was over 500, A1c was 10 6   _________________________________________________________    He continues with slurred speech, left-sided weakness, relates he is about the same as when he was inpatient  We discussed need for close follow-up/risk reduction, has had multiple CVAs now  Unfortunately he has continued to smoke, he is well aware of risks associated with smoking  Currently 1/2 pack per day  He did receive a prescription for Wellbutrin/bupropion 150 mg inpatient, can use that every morning  He did receive a slip to do PT/OT outpatient  He should start those  Also include speech therapy  He will call Neurology to move up his appointment, he is not scheduled to see them again until June 2nd  We discussed recurrent strokes, he should not drive until cleared by Neurology  Inpatient he received aspirin to use for 1 week, after that he should continue clopidogrel/Plavix 75 mg daily      We again discussed the need for better diabetes control, his control has been terrible  A1c inpatient far too high as above  He is still not checking home glucometer readings but relates he is back on insulin, his significant other relates he had stopped his insulin  I did send a new prescription to his local pharmacy for a new glucometer, ideally he should check twice daily  He can get those readings to us in 2 weeks  We have discussed need for Endocrinology follow-up multiple times, he has not seen Endocrinology since late 2020  He relates he will call them ASAP to set up an appointment    He is scheduled to see Nephrology regarding chronic renal failure which had worsened during hospital stay, keep appointment February 14th  He is next is set up to see Cardiology April 21st         He is currently out of work, remain out of work and re-evaluate here in 1 month  Encounter provider Carol Hebert DO       Provider located at [address] [address]  [phone]      Recent Visits  No visits were found meeting these conditions  Showing recent visits within past 7 days and meeting all other requirements  Today's Visits  Date Type Provider Dept   02/04/22 Telemedicine Carol Hebert DO [place] [address] Primary Care   Showing today's visits and meeting all other requirements  Future Appointments  No visits were found meeting these conditions  Showing future appointments within next 150 days and meeting all other requirements       After connecting through [employer], the patient was identified by name and date of birth  Cr Jeong was informed that this is a telemedicine visit and that the visit is being conducted through [facility] and patient was informed this is a secure, HIPAA-complaint platform  He agrees to proceed     My office door was closed  No one else was in the room    He acknowledged consent and understanding of privacy and security of the video platform  The patient has agreed to participate and understands they can discontinue the visit at any time  Patient is aware this is a billable service  Subjective:     Patient ID: Danyelle Daniels is a 48 y o  male who I had seen back in late November  History of strokes, history uncontrolled diabetes, smoking  He was hospitalized January 31st through February 2nd, had some increased left-sided weakness especially arm along with difficulty with slurred speech  MRI did show new strokes  Inpatient he did see Neurology, physiatry along with speech therapy  He was discharged back home, as in prior notes he does live [place]  in [place]  He relates he feels about the same as when he left the hospital, speech is no worse but is significantly slurred  He has no headache, no new weakness, arm weakness, left-sided weakness the same  He is using insulin again, his significant other states he had stopped insulin  He has not yet started the bupropion prescription he received, still smoking 1/2 pack per day  He is using Plavix, aspirin  States is on other medication  Has not yet set up physical therapy/occupational therapy  Unfortunately has still not seen Endocrinology, this has been a longstanding issue with poor follow-up regarding uncontrolled diabetes  Today he states his machine is broken, he has not been checking his sugars  Review of Systems   Constitutional: Positive for fatigue  Negative for appetite change, fever and unexpected weight change  See HPI   HENT: Negative for sore throat and trouble swallowing  Eyes: Negative for visual disturbance  Respiratory: Negative for cough, chest tightness, shortness of breath and wheezing  Cardiovascular: Negative for chest pain, palpitations and leg swelling  Gastrointestinal: Negative for abdominal pain, blood in stool, nausea and vomiting          No acid reflux     No change in bowel   Genitourinary: Negative for dysuria and hematuria  Neurological: Positive for weakness (Left-sided) and light-headedness (Occasional)  Negative for syncope and headaches  Difficulty with speech same as inpatient   Hematological: Bruises/bleeds easily (No bleeding)  Psychiatric/Behavioral: Negative for behavioral problems and confusion  Objective: There were no vitals filed for this visit  Physical Exam  Constitutional:       Comments: He has significant dysarthria/slurred speech during our video visit, is understandable  He has some left-sided weakness arm greater than leg  Denies chest pain, no fevers or chills  Has not checked his blood pressure today             Transitional Care Management Review:  Joseph Knapp is a 48 y o  male here for TCM follow up  During the TCM phone call patient stated:    TCM Call (since 1/4/2022)     Date and time call was made  2/3/2022  3:44 [address] reviewed  Records reviewed        Patient was hospitial[facility] at  [facility]        Date of Admission  01/31/22    Date of discharge  02/02/22    Diagnosis  Dysarthria    Disposition  Home    Current Symptoms  None      TCM Call (since 1/4/2022)     Post hospital issues  None    Scheduled for follow up?   Yes    Did you obtain your prescribed medications  Yes    Do you need help managing your prescriptions or medications  No    Is transportation to your appointment needed  No    I have advised the patient to call PCP with any new or worsening symptoms  OPHELIA Ramirez          Current Outpatient Medications:     amLODIPine (NORVASC) 10 mg tablet, Take 1 tablet (10 mg total) by mouth daily, Disp: 90 tablet, Rfl: 3    aspirin 81 mg chewable tablet, Chew 1 tablet (81 mg total) daily for 7 days, Disp: 7 tablet, Rfl: 0    atorvastatin (LIPITOR) 80 mg tablet, Take 1 tablet (80 mg total) by mouth daily, Disp: 30 tablet, Rfl: 5    clopidogrel (PLAVIX) 75 mg tablet, Take 1 tablet (75 mg total) by mouth daily, Disp: 30 tablet, Rfl: 5    hydrALAZINE (APRESOLINE) 25 mg tablet, Take 1 tablet (25 mg total) by mouth 3 (three) times a day, Disp: 90 tablet, Rfl: 5    insulin glargine (Basaglar KwikPen) 100 units/mL injection pen, Inject 28 Units under the skin daily, Disp: 15 mL, Rfl: 5    metoprolol succinate (TOPROL-XL) 50 mg 24 hr tablet, Take 1 tablet (50 mg total) by mouth daily, Disp: 90 tablet, Rfl: 3    Blood Glucose Monitoring Suppl (OneTouch Verio Reflect) w/Device KIT, Check blood sugars twice daily  Please substitute with appropriate alternative as covered by patient's insurance  Dx: E11 65, Disp: 1 kit, Rfl: 0    buPROPion (WELLBUTRIN XL) 150 mg 24 hr tablet, Take 1 tablet (150 mg total) by mouth every morning, Disp: 30 tablet, Rfl: 1    glucose blood (OneTouch Verio) test strip, Check blood sugars twice daily  Please substitute with appropriate alternative as covered by patient's insurance  Dx: E11 65, Disp: 200 each, Rfl: 3    Insulin Pen Needle (PEN NEEDLES 31GX5/16") 31G X 8 MM MISC, Inject as directed daily, Disp: 100 each, Rfl: 1    OneTouch Delica Lancets 39T MISC, Check blood sugars twice daily  Please substitute with appropriate alternative as covered by patient's insurance  Dx: E11 65, Disp: 200 each, Rfl: 3    I spent 30 minutes with the patient during this visit plus another 30 with chart review and planning  Theresa Ware DO      VIRTUAL VISIT [address] verbally agrees to participate in [employer]  Pt is aware that Virtual Care Services could be limited without vital signs or the ability to perform a full hands-on physical exam  Adam Silva understands he or the provider may request at any time to terminate the video visit and request the patient to seek care or treatment in person

## 2022-02-04 NOTE — PATIENT INSTRUCTIONS
Reviewed hospital stay January 31st through February 2nd, again had CVA, received tPA  Had been advised to use aspirin/Plavix for 1 week then stop aspirin, continue Plavix daily long-term  MRI = Small acute infarct in right posterior limb of internal capsule    Tiny acute infarcts in left ventral syed and left ventrolateral medulla only seen on exponential diffusion sequences    Unchanged old tiny infarcts in left posteromedial thalamus, left periatrial white matter, right cerebellum    Mild chronic microangiopathy  CTA = No evidence of hemodynamic significant stenosis, aneurysm or dissection  Inpatient creatinine peaked at 3 04, baseline creatinine last year was 2 to 2 3  Discharge BUN/creatinine 27/2 79 with potassium 4 2  WBC 10 with hemoglobin 13 8, platelet count 745W  Cholesterol 172 with HDL 44, triglyceride 412  Direct LDL 72  Glucose at one point was over 500, A1c was 10 6   _________________________________________________________    He continues with slurred speech, left-sided weakness, relates he is about the same as when he was inpatient  We discussed need for close follow-up/risk reduction, has had multiple CVAs now  Unfortunately he has continued to smoke, he is well aware of risks associated with smoking  Currently 1/2 pack per day  He did receive a prescription for Wellbutrin/bupropion 150 mg inpatient, can use that every morning  He did receive a slip to do PT/OT outpatient  He should start those  Also include speech therapy  He will call Neurology to move up his appointment, he is not scheduled to see them again until June 2nd  We discussed recurrent strokes, he should not drive until cleared by Neurology  Inpatient he received aspirin to use for 1 week, after that he should continue clopidogrel/Plavix 75 mg daily  We again discussed the need for better diabetes control, his control has been terrible  A1c inpatient far too high as above    He is still not checking home glucometer readings but relates he is back on insulin, his significant other relates he had stopped his insulin  I did send a new prescription to his local pharmacy for a new glucometer, ideally he should check twice daily  He can get those readings to us in 2 weeks  We have discussed need for Endocrinology follow-up multiple times, he has not seen Endocrinology since late 2020  He relates he will call them ASAP to set up an appointment    He is scheduled to see Nephrology regarding chronic renal failure which had worsened during hospital stay, keep appointment February 14th  He is next is set up to see Cardiology April 21st         He is currently out of work, remain out of work and re-evaluate here in 1 month

## 2022-02-07 ENCOUNTER — APPOINTMENT (OUTPATIENT)
Dept: LAB | Facility: HOSPITAL | Age: 47
End: 2022-02-07
Payer: COMMERCIAL

## 2022-02-07 ENCOUNTER — TELEPHONE (OUTPATIENT)
Dept: FAMILY MEDICINE CLINIC | Facility: CLINIC | Age: 47
End: 2022-02-07

## 2022-02-07 DIAGNOSIS — N18.30 STAGE 3 CHRONIC KIDNEY DISEASE, UNSPECIFIED WHETHER STAGE 3A OR 3B CKD (HCC): ICD-10-CM

## 2022-02-07 LAB
ANION GAP SERPL CALCULATED.3IONS-SCNC: 6 MMOL/L (ref 4–13)
BUN SERPL-MCNC: 29 MG/DL (ref 5–25)
CALCIUM SERPL-MCNC: 8.4 MG/DL (ref 8.3–10.1)
CHLORIDE SERPL-SCNC: 106 MMOL/L (ref 100–108)
CO2 SERPL-SCNC: 22 MMOL/L (ref 21–32)
CREAT SERPL-MCNC: 3.08 MG/DL (ref 0.6–1.3)
GFR SERPL CREATININE-BSD FRML MDRD: 22 ML/MIN/1.73SQ M
GLUCOSE P FAST SERPL-MCNC: 150 MG/DL (ref 65–99)
POTASSIUM SERPL-SCNC: 4.6 MMOL/L (ref 3.5–5.3)
SODIUM SERPL-SCNC: 134 MMOL/L (ref 136–145)
TSH SERPL DL<=0.05 MIU/L-ACNC: 10.21 UIU/ML (ref 0.36–3.74)

## 2022-02-07 PROCEDURE — 84443 ASSAY THYROID STIM HORMONE: CPT | Performed by: FAMILY MEDICINE

## 2022-02-07 PROCEDURE — 84439 ASSAY OF FREE THYROXINE: CPT | Performed by: FAMILY MEDICINE

## 2022-02-07 PROCEDURE — 36415 COLL VENOUS BLD VENIPUNCTURE: CPT | Performed by: FAMILY MEDICINE

## 2022-02-07 PROCEDURE — 82043 UR ALBUMIN QUANTITATIVE: CPT | Performed by: FAMILY MEDICINE

## 2022-02-07 PROCEDURE — 80048 BASIC METABOLIC PNL TOTAL CA: CPT

## 2022-02-07 PROCEDURE — 82570 ASSAY OF URINE CREATININE: CPT | Performed by: FAMILY MEDICINE

## 2022-02-07 NOTE — TELEPHONE ENCOUNTER
Patient called stating that his job needs a work note stating how long patient will be out of work  Please send work note to     [employer]  Fax# [phone]    Thank you!

## 2022-02-07 NOTE — LETTER
February 7, 2022     Patient: Francisca Chin   YOB: 1975   Date of Visit: 2/7/2022       To Whom it May Concern:    Stephen Long is under my professional care  He was recently hospitalized due to a new stroke, he needs to remain out of work until at least March 7th when I re-evaluate in the office  He will also be seeing his specialists  If you have any questions or concerns, please don't hesitate to call           Sincerely,          Hipolito Zapata MA        CC: No Recipients

## 2022-02-07 NOTE — TELEPHONE ENCOUNTER
I placed a note on his chart, he needs to remain out of work at least in twice see him again March 7th, he will also be seeing his specialists  Please get the note to him or send where he needs it

## 2022-02-08 ENCOUNTER — OFFICE VISIT (OUTPATIENT)
Dept: OBGYN CLINIC | Facility: CLINIC | Age: 47
End: 2022-02-08
Payer: COMMERCIAL

## 2022-02-08 VITALS
DIASTOLIC BLOOD PRESSURE: 96 MMHG | SYSTOLIC BLOOD PRESSURE: 154 MMHG | BODY MASS INDEX: 26.34 KG/M2 | HEIGHT: 70 IN | WEIGHT: 184 LBS | HEART RATE: 78 BPM

## 2022-02-08 DIAGNOSIS — S14.3XXA INJURY OF LEFT BRACHIAL PLEXUS, INITIAL ENCOUNTER: Primary | ICD-10-CM

## 2022-02-08 DIAGNOSIS — R29.898 WEAKNESS OF LEFT UPPER EXTREMITY: ICD-10-CM

## 2022-02-08 DIAGNOSIS — R29.898 LEFT HAND WEAKNESS: ICD-10-CM

## 2022-02-08 LAB
CREAT UR-MCNC: 132 MG/DL
MICROALBUMIN UR-MCNC: 9970 MG/L (ref 0–20)
MICROALBUMIN/CREAT 24H UR: 7553 MG/G CREATININE (ref 0–30)
T4 FREE SERPL-MCNC: 0.7 NG/DL (ref 0.76–1.46)

## 2022-02-08 PROCEDURE — 1111F DSCHRG MED/CURRENT MED MERGE: CPT | Performed by: FAMILY MEDICINE

## 2022-02-08 PROCEDURE — 3077F SYST BP >= 140 MM HG: CPT | Performed by: FAMILY MEDICINE

## 2022-02-08 PROCEDURE — 3080F DIAST BP >= 90 MM HG: CPT | Performed by: FAMILY MEDICINE

## 2022-02-08 PROCEDURE — 99203 OFFICE O/P NEW LOW 30 MIN: CPT | Performed by: FAMILY MEDICINE

## 2022-02-08 NOTE — PROGRESS NOTES
Assessment/Plan:  Assessment/Plan   Diagnoses and all orders for this visit:    Injury of left brachial plexus, initial encounter  -     Ambulatory Referral to Occupational Therapy; Future    Weakness of left upper extremity  -     Ambulatory Referral to Occupational Therapy; Future    Left hand weakness  -     Ambulatory Referral to Orthopedic Surgery  -     Ambulatory Referral to Occupational Therapy; Future        61-year-old right-hand dominant male with weakness of left upper extremity for many years duration  Discussed with patient EMG findings, impression and plan  EMG left upper extremity noted for findings suggestive of lower trunk brachial plexus injury  Physical exam of the left hand noted for atrophy in dorsal webspaces of the hand  There is no bony or soft tissue tenderness  Demonstrates intact flexion and extension of the wrist   He has limited active extension of the 5th digit and has weakness with abduction of the digits  He has intact sensation in the extremity  He has normal radial pulse  Clinical impression is that he is symptomatic from nerve injury  Patient's EMG was from more than 2 years ago  I discussed with patient that due to the chronicity of his pathology it is uncertain as to how much function he may regain in the upper extremity  I will refer him to formal therapy which he is to start as soon as possible and do home exercises as directed  He will follow up if no improvement  Subjective:   Patient ID: Lashaun Muhammad is a 48 y o  male  Chief Complaint   Patient presents with    Extremity Weakness       61-year-old right-hand-dominant presents for evaluation of left upper extremity weakness many years duration  He does not recall any particular trauma or inciting event  He states that for several years now he has had inability to fully extend the 5th digit and he has felt weakness in the left upper extremity    He was seen by Neurology and underwent EMG study more than 2 years ago and they were findings consistent with brachial plexus lower trunk injury  He was recommended for brachial plexus MRI however did not have this done  Patient has had multiple comorbidities including diabetes and multiple strokes, with most recent stroke about 1 week ago  The following portions of the patient's history were reviewed and updated as appropriate: He  has a past medical history of Diabetes mellitus ([facility] [address] ), Hypertension, and Stroke ([facility] [address] )  He  has no past surgical history on file  His family history includes No Known Problems in his father and mother  He  reports that he has been smoking cigarettes  He has been smoking about 0 50 packs per day  He has never used smokeless tobacco  He reports that he does not drink alcohol and does not use drugs  He has No Known Allergies       Review of Systems   Constitutional: Negative for chills and fever  HENT: Negative for sore throat  Eyes: Negative for visual disturbance  Respiratory: Negative for shortness of breath  Cardiovascular: Negative for chest pain  Gastrointestinal: Negative for abdominal pain  Genitourinary: Negative for flank pain  Musculoskeletal: Negative for arthralgias and joint swelling  Skin: Negative for rash and wound  Neurological: Positive for weakness  Negative for numbness  Hematological: Does not bruise/bleed easily  Psychiatric/Behavioral: Negative for self-injury  Objective:  Vitals:    02/08/22 1225   BP: 154/96   Pulse: 78   Weight: 83 5 kg (184 lb)   Height: 5' 10" (1 778 m)     Left Hand Exam     Tenderness   The patient is experiencing no tenderness  Muscle Strength   Wrist extension: 5/5   Wrist flexion: 5/5     Tests   Tinel's sign (median nerve): negative    Other   Sensation: normal  Pulse: present      Left Elbow Exam     Tests   Tinel's sign (cubital tunnel): negative          Observations     Left Wrist/Hand   Negative for deformity       Active Range of Motion     Left Wrist   Wrist flexion: WFL  Wrist extension: [facility]    Additional Active Range of Motion Details  No active extension of the left 5th digit    Strength/Myotome Testing     Left Wrist/Hand   Wrist extension: 5  Wrist flexion: 5    Additional Strength Details  4+/5  strength left   3/5 finger abduction left    Tests     Left Wrist/Hand   Positive Froment's sign  Negative AIN OK sign, Tinel's sign (medial nerve) and Tinel's sign (radial tunnel)  Physical Exam  Vitals and nursing note reviewed  Constitutional:       General: He is not in acute distress  Appearance: He is well-developed  He is not ill-appearing or diaphoretic  HENT:      Head: Normocephalic and atraumatic  Right Ear: External ear normal       Left Ear: External ear normal    Eyes:      Conjunctiva/sclera: Conjunctivae normal    Neck:      Trachea: No tracheal deviation  Cardiovascular:      Rate and Rhythm: Normal rate  Pulmonary:      Effort: Pulmonary effort is normal  No respiratory distress  Abdominal:      General: There is no distension  Musculoskeletal:      Left hand: No deformity  Skin:     General: Skin is warm and dry  Coloration: Skin is not jaundiced or pale  Neurological:      Mental Status: He is alert and oriented to person, place, and time  Psychiatric:         Mood and Affect: Mood normal          Behavior: Behavior normal          Thought Content:  Thought content normal          Judgment: Judgment normal

## 2022-02-09 PROBLEM — E03.9 ACQUIRED HYPOTHYROIDISM: Status: ACTIVE | Noted: 2022-02-09

## 2022-02-14 ENCOUNTER — OFFICE VISIT (OUTPATIENT)
Dept: NEPHROLOGY | Facility: CLINIC | Age: 47
End: 2022-02-14
Payer: COMMERCIAL

## 2022-02-14 VITALS
SYSTOLIC BLOOD PRESSURE: 136 MMHG | WEIGHT: 184 LBS | HEART RATE: 80 BPM | DIASTOLIC BLOOD PRESSURE: 78 MMHG | BODY MASS INDEX: 26.34 KG/M2 | HEIGHT: 70 IN

## 2022-02-14 DIAGNOSIS — N18.32 STAGE 3B CHRONIC KIDNEY DISEASE (HCC): ICD-10-CM

## 2022-02-14 DIAGNOSIS — I10 ESSENTIAL HYPERTENSION: Primary | ICD-10-CM

## 2022-02-14 DIAGNOSIS — R80.9 PROTEINURIA, UNSPECIFIED TYPE: ICD-10-CM

## 2022-02-14 PROBLEM — N18.9 CKD (CHRONIC KIDNEY DISEASE): Status: ACTIVE | Noted: 2022-02-14

## 2022-02-14 PROCEDURE — 99214 OFFICE O/P EST MOD 30 MIN: CPT | Performed by: INTERNAL MEDICINE

## 2022-02-14 PROCEDURE — 3008F BODY MASS INDEX DOCD: CPT | Performed by: INTERNAL MEDICINE

## 2022-02-14 PROCEDURE — 4004F PT TOBACCO SCREEN RCVD TLK: CPT | Performed by: INTERNAL MEDICINE

## 2022-02-14 PROCEDURE — 4010F ACE/ARB THERAPY RXD/TAKEN: CPT | Performed by: INTERNAL MEDICINE

## 2022-02-14 RX ORDER — LISINOPRIL 10 MG/1
10 TABLET ORAL DAILY
Qty: 30 TABLET | Refills: 5 | Status: SHIPPED | OUTPATIENT
Start: 2022-02-14

## 2022-02-14 NOTE — PROGRESS NOTES
NEPHROLOGY PROGRESS NOTE    Cr Jeong 48 y o  male MRN: [id number]  Unit/Bed#:  Encounter: [id number]  Reason for Consult:  Chronic kidney disease with protein    The patient is here for follow-up I last saw him in 2019 will be restarting workup in follow-up for chronic kidney disease and proteinuria from diabetes  He had followed up  He was recently hospitalized with a stroke and his family physician noted his kidney function has worsened so he is here for reestablishing care  We reviewed his medications and interim history  ASSESSMENT/PLAN:  1  Renal    Patient is chronic kidney disease with proteinuria due to diabetic nephropathy  When I initially saw him his creatinine was around 1 5  Unfortunately his creatinine now has increased to around 3 and he is very heavy proteinuria estimated at 7 g  Also he has had poor sugar control with hemoglobin A1cs routinely over 10%  This is likely lead to rapid progression of his chronic kidney disease and I informed them of this  The way to help treat this to delay progression is improving sugar control which she is working hard on I told trying get around 7%  He is on treatment for cholesterol  His blood pressure is acceptable as well  Given his very heavy proteinuria of 7 g some going to start him on lisinopril 10 mg a day  I told him were doing this to try reduce proteinuria as a could help delay progression but I want to make sure does not affect his electrolytes or renal function so they are going to check labs in 2 weeks  I will contact him with the results of urine protein estimation in [facility] and then will determine follow-up at that time  I actually brought up the idea of transplant referral although his estimated GFR still above the level that he could be referred  I just wanted to plant the seed that that is something that I likely would recommend for him in the future      We also briefly discussed different dialysis modalities because his wife had brought up the issue I told him at some point we can get them education about different modalities through our office  SUBJECTIVE:  Review of Systems   Constitutional: Negative for chills, decreased appetite, fever and malaise/fatigue  HENT: Negative  Eyes: Negative  Cardiovascular: Negative  Negative for chest pain, dyspnea on exertion, leg swelling and orthopnea  Respiratory: Negative  Negative for cough, shortness of breath, sputum production and wheezing  Gastrointestinal: Negative for abdominal pain, diarrhea, nausea and vomiting  Genitourinary: Negative for dysuria, flank pain, hematuria and incomplete emptying  Was having a period time worried  was emptying his bladder now he feels he is emptying and normally and completely  Neurological: Negative for dizziness, headaches, light-headedness and weakness  Slight expressive aphasia and slight weakness on his left side  Able ambulate normally  Psychiatric/Behavioral: Negative for altered mental status, depression, hallucinations and hypervigilance  OBJECTIVE:  Current Weight: Weight - Scale: 83 5 kg (184 lb)  [email or web address]:     Blood pressure 136/78, pulse 80, height 5' 10" (1 778 m), weight 83 5 kg (184 lb)  , Body mass index is 26 4 kg/m²  [unfilled]    Physical Exam: /78 (BP Location: Right arm, Patient Position: Sitting, Cuff Size: Standard)   Pulse 80   Ht 5' 10" (1 778 m)   Wt 83 5 kg (184 lb)   BMI 26 40 kg/m²   Physical Exam  Constitutional:       General: He is not in acute distress  Appearance: He is not ill-appearing or toxic-appearing  HENT:      Head: Normocephalic and atraumatic  Nose:      Comments: Wearing mask     Mouth/Throat:      Comments: Wearing mask  Eyes:      General: No scleral icterus  Extraocular Movements: Extraocular movements intact  Cardiovascular:      Rate and Rhythm: Normal rate and regular rhythm  Heart sounds: No friction rub   No gallop  Comments: No edema  Pulmonary:      Effort: Pulmonary effort is normal  No respiratory distress  Breath sounds: Normal breath sounds  No wheezing, rhonchi or rales  Abdominal:      General: Bowel sounds are normal  There is no distension  Palpations: Abdomen is soft  Tenderness: There is no abdominal tenderness  There is no rebound  Musculoskeletal:      Cervical back: Normal range of motion and neck supple  Neurological:      Mental Status: He is alert and oriented to person, place, and time  Comments: Slight expressive aphasia   4 + of 5 strength left side  Psychiatric:         Mood and Affect: Mood normal          Behavior: Behavior normal          Thought Content: Thought content normal          Judgment: Judgment normal          Medications:    Current Outpatient Medications:     amLODIPine (NORVASC) 10 mg tablet, Take 1 tablet (10 mg total) by mouth daily, Disp: 90 tablet, Rfl: 3    atorvastatin (LIPITOR) 80 mg tablet, Take 1 tablet (80 mg total) by mouth daily, Disp: 30 tablet, Rfl: 5    Blood Glucose Monitoring Suppl (OneTouch Verio Reflect) w/Device KIT, Check blood sugars twice daily  Please substitute with appropriate alternative as covered by patient's insurance  Dx: E11 65, Disp: 1 kit, Rfl: 0    buPROPion (WELLBUTRIN XL) 150 mg 24 hr tablet, Take 1 tablet (150 mg total) by mouth every morning, Disp: 30 tablet, Rfl: 1    clopidogrel (PLAVIX) 75 mg tablet, Take 1 tablet (75 mg total) by mouth daily, Disp: 30 tablet, Rfl: 5    glucose blood (OneTouch Verio) test strip, Check blood sugars twice daily  Please substitute with appropriate alternative as covered by patient's insurance   Dx: E11 65, Disp: 200 each, Rfl: 3    hydrALAZINE (APRESOLINE) 25 mg tablet, Take 1 tablet (25 mg total) by mouth 3 (three) times a day, Disp: 90 tablet, Rfl: 5    insulin glargine (Basaglar KwikPen) 100 units/mL injection pen, Inject 28 Units under the skin daily, Disp: 15 mL, Rfl: 5    Insulin Pen Needle (PEN NEEDLES 31GX5/16") 31G X 8 MM MISC, Inject as directed daily, Disp: 100 each, Rfl: 1    metoprolol succinate (TOPROL-XL) 50 mg 24 hr tablet, Take 1 tablet (50 mg total) by mouth daily, Disp: 90 tablet, Rfl: 3    OneTouch Delica Lancets 88R MISC, Check blood sugars twice daily  Please substitute with appropriate alternative as covered by patient's insurance   Dx: E11 65, Disp: 200 each, Rfl: 3    aspirin 81 mg chewable tablet, Chew 1 tablet (81 mg total) daily for 7 days (Patient not taking: Reported on 2/8/2022 ), Disp: 7 tablet, Rfl: 0    lisinopril (ZESTRIL) 10 mg tablet, Take 1 tablet (10 mg total) by mouth daily, Disp: 30 tablet, Rfl: 5    Laboratory Results:  Lab Results   Component Value Date    WBC 10 35 (H) 02/01/2022    HGB 13 8 02/01/2022    HCT 40 8 02/01/2022    MCV 90 02/01/2022     02/01/2022     Lab Results   Component Value Date    SODIUM 134 (L) 02/07/2022    K 4 6 02/07/2022     02/07/2022    CO2 22 02/07/2022    BUN 29 (H) 02/07/2022    CREATININE 3 08 (H) 02/07/2022    GLUC 216 (H) 02/01/2022    CALCIUM 8 4 02/07/2022     Lab Results   Component Value Date    CALCIUM 8 4 02/07/2022     No results found for: LABPROT

## 2022-02-14 NOTE — PATIENT INSTRUCTIONS
You are here for follow-up  I last saw you about 2 and half years ago and we were evaluating for diabetic kidney disease  You have diabetic kidney disease your latest creatinine was around 3  In looking at your objective data there is a lot of protein in the urine related to diabetic kidney disease and her blood sugars have not been ideally controlled your working hard on the now  I am going to start you on lisinopril 10 mg a day that is hopefully to help lower protein in the urine without adversely affecting her kidney function  Check lab work after you been on the new medication for 2 weeks and I will call you with results  Work on blood sugar control trying to get an A1c of 7%  Continued treatment for cholesterol  Blood pressure is acceptable today on current medications

## 2022-02-14 NOTE — LETTER
February 14, 2022     Silvia Pantoja DO  [address] [facility]  [address]  [place]  [address]    Patient: Kristina Arvizu   YOB: 1975   Date of Visit: 2/14/2022       Dear Dr Watts Ek: Thank you for referring Guero Barnes to me for evaluation  Below are my notes for this consultation  If you have questions, please do not hesitate to call me  I look forward to following your patient along with you  Sincerely,        Daniel Obregon MD        CC: No Recipients  Daniel Obregon MD  2/14/2022  4:54 PM  Sign when Signing Visit  NEPHROLOGY PROGRESS NOTE    Kristina Arvizu 48 y o  male MRN: [id number]  Unit/Bed#:  Encounter: [id number]  Reason for Consult:  Chronic kidney disease with protein    The patient is here for follow-up I last saw him in 2019 will be restarting workup in follow-up for chronic kidney disease and proteinuria from diabetes  He had followed up  He was recently hospitalized with a stroke and his family physician noted his kidney function has worsened so he is here for reestablishing care  We reviewed his medications and interim history  ASSESSMENT/PLAN:  1  Renal    Patient is chronic kidney disease with proteinuria due to diabetic nephropathy  When I initially saw him his creatinine was around 1 5  Unfortunately his creatinine now has increased to around 3 and he is very heavy proteinuria estimated at 7 g  Also he has had poor sugar control with hemoglobin A1cs routinely over 10%  This is likely lead to rapid progression of his chronic kidney disease and I informed them of this  The way to help treat this to delay progression is improving sugar control which she is working hard on I told trying get around 7%  He is on treatment for cholesterol  His blood pressure is acceptable as well  Given his very heavy proteinuria of 7 g some going to start him on lisinopril 10 mg a day    I told him were doing this to try reduce proteinuria as a could help delay progression but I want to make sure does not affect his electrolytes or renal function so they are going to check labs in 2 weeks  I will contact him with the results of urine protein estimation in [facility] and then will determine follow-up at that time  I actually brought up the idea of transplant referral although his estimated GFR still above the level that he could be referred  I just wanted to plant the seed that that is something that I likely would recommend for him in the future  We also briefly discussed different dialysis modalities because his wife had brought up the issue I told him at some point we can get them education about different modalities through our office  SUBJECTIVE:  Review of Systems   Constitutional: Negative for chills, decreased appetite, fever and malaise/fatigue  HENT: Negative  Eyes: Negative  Cardiovascular: Negative  Negative for chest pain, dyspnea on exertion, leg swelling and orthopnea  Respiratory: Negative  Negative for cough, shortness of breath, sputum production and wheezing  Gastrointestinal: Negative for abdominal pain, diarrhea, nausea and vomiting  Genitourinary: Negative for dysuria, flank pain, hematuria and incomplete emptying  Was having a period time worried  was emptying his bladder now he feels he is emptying and normally and completely  Neurological: Negative for dizziness, headaches, light-headedness and weakness  Slight expressive aphasia and slight weakness on his left side  Able ambulate normally  Psychiatric/Behavioral: Negative for altered mental status, depression, hallucinations and hypervigilance  OBJECTIVE:  Current Weight: Weight - Scale: 83 5 kg (184 lb)  [email or web address]     Blood pressure 136/78, pulse 80, height 5' 10" (1 778 m), weight 83 5 kg (184 lb)  , Body mass index is 26 4 kg/m²      [unfilled]    Physical Exam: /78 (BP Location: Right arm, Patient Position: Sitting, Cuff Size: Standard)   Pulse 80   Ht 5' 10" (1 778 m)   Wt 83 5 kg (184 lb)   BMI 26 40 kg/m²   Physical Exam  Constitutional:       General: He is not in acute distress  Appearance: He is not ill-appearing or toxic-appearing  HENT:      Head: Normocephalic and atraumatic  Nose:      Comments: Wearing mask     Mouth/Throat:      Comments: Wearing mask  Eyes:      General: No scleral icterus  Extraocular Movements: Extraocular movements intact  Cardiovascular:      Rate and Rhythm: Normal rate and regular rhythm  Heart sounds: No friction rub  No gallop  Comments: No edema  Pulmonary:      Effort: Pulmonary effort is normal  No respiratory distress  Breath sounds: Normal breath sounds  No wheezing, rhonchi or rales  Abdominal:      General: Bowel sounds are normal  There is no distension  Palpations: Abdomen is soft  Tenderness: There is no abdominal tenderness  There is no rebound  Musculoskeletal:      Cervical back: Normal range of motion and neck supple  Neurological:      Mental Status: He is alert and oriented to person, place, and time  Comments: Slight expressive aphasia   4 + of 5 strength left side  Psychiatric:         Mood and Affect: Mood normal          Behavior: Behavior normal          Thought Content: Thought content normal          Judgment: Judgment normal          Medications:    Current Outpatient Medications:     amLODIPine (NORVASC) 10 mg tablet, Take 1 tablet (10 mg total) by mouth daily, Disp: 90 tablet, Rfl: 3    atorvastatin (LIPITOR) 80 mg tablet, Take 1 tablet (80 mg total) by mouth daily, Disp: 30 tablet, Rfl: 5    Blood Glucose Monitoring Suppl (OneTouch Verio Reflect) w/Device KIT, Check blood sugars twice daily  Please substitute with appropriate alternative as covered by patient's insurance   Dx: E11 65, Disp: 1 kit, Rfl: 0    buPROPion (WELLBUTRIN XL) 150 mg 24 hr tablet, Take 1 tablet (150 mg total) by mouth every morning, Disp: 30 tablet, Rfl: 1   clopidogrel (PLAVIX) 75 mg tablet, Take 1 tablet (75 mg total) by mouth daily, Disp: 30 tablet, Rfl: 5    glucose blood (OneTouch Verio) test strip, Check blood sugars twice daily  Please substitute with appropriate alternative as covered by patient's insurance  Dx: E11 65, Disp: 200 each, Rfl: 3    hydrALAZINE (APRESOLINE) 25 mg tablet, Take 1 tablet (25 mg total) by mouth 3 (three) times a day, Disp: 90 tablet, Rfl: 5    insulin glargine (Basaglar KwikPen) 100 units/mL injection pen, Inject 28 Units under the skin daily, Disp: 15 mL, Rfl: 5    Insulin Pen Needle (PEN NEEDLES 31GX5/16") 31G X 8 MM MISC, Inject as directed daily, Disp: 100 each, Rfl: 1    metoprolol succinate (TOPROL-XL) 50 mg 24 hr tablet, Take 1 tablet (50 mg total) by mouth daily, Disp: 90 tablet, Rfl: 3    OneTouch Delica Lancets 36Y MISC, Check blood sugars twice daily  Please substitute with appropriate alternative as covered by patient's insurance   Dx: E11 65, Disp: 200 each, Rfl: 3    aspirin 81 mg chewable tablet, Chew 1 tablet (81 mg total) daily for 7 days (Patient not taking: Reported on 2/8/2022 ), Disp: 7 tablet, Rfl: 0    lisinopril (ZESTRIL) 10 mg tablet, Take 1 tablet (10 mg total) by mouth daily, Disp: 30 tablet, Rfl: 5    Laboratory Results:  Lab Results   Component Value Date    WBC 10 35 (H) 02/01/2022    HGB 13 8 02/01/2022    HCT 40 8 02/01/2022    MCV 90 02/01/2022     02/01/2022     Lab Results   Component Value Date    SODIUM 134 (L) 02/07/2022    K 4 6 02/07/2022     02/07/2022    CO2 22 02/07/2022    BUN 29 (H) 02/07/2022    CREATININE 3 08 (H) 02/07/2022    GLUC 216 (H) 02/01/2022    CALCIUM 8 4 02/07/2022     Lab Results   Component Value Date    CALCIUM 8 4 02/07/2022     No results found for: LABPROT

## 2022-02-21 ENCOUNTER — EVALUATION (OUTPATIENT)
Dept: SPEECH THERAPY | Facility: CLINIC | Age: 47
End: 2022-02-21
Payer: COMMERCIAL

## 2022-02-21 ENCOUNTER — EVALUATION (OUTPATIENT)
Dept: OCCUPATIONAL THERAPY | Facility: CLINIC | Age: 47
End: 2022-02-21
Payer: COMMERCIAL

## 2022-02-21 DIAGNOSIS — S14.3XXA INJURY OF LEFT BRACHIAL PLEXUS, INITIAL ENCOUNTER: ICD-10-CM

## 2022-02-21 DIAGNOSIS — R29.898 LEFT HAND WEAKNESS: ICD-10-CM

## 2022-02-21 DIAGNOSIS — I63.9 DYSARTHRIA DUE TO ACUTE STROKE (HCC): Primary | ICD-10-CM

## 2022-02-21 DIAGNOSIS — R47.1 DYSARTHRIA DUE TO ACUTE STROKE (HCC): Primary | ICD-10-CM

## 2022-02-21 DIAGNOSIS — R29.898 WEAKNESS OF LEFT UPPER EXTREMITY: ICD-10-CM

## 2022-02-21 DIAGNOSIS — I63.411 CEREBROVASCULAR ACCIDENT (CVA) DUE TO EMBOLISM OF RIGHT MIDDLE CEREBRAL ARTERY (HCC): ICD-10-CM

## 2022-02-21 PROCEDURE — 92507 TX SP LANG VOICE COMM INDIV: CPT

## 2022-02-21 PROCEDURE — 97165 OT EVAL LOW COMPLEX 30 MIN: CPT | Performed by: OCCUPATIONAL THERAPIST

## 2022-02-21 PROCEDURE — 92522 EVALUATE SPEECH PRODUCTION: CPT

## 2022-02-21 PROCEDURE — 97110 THERAPEUTIC EXERCISES: CPT | Performed by: OCCUPATIONAL THERAPIST

## 2022-02-21 NOTE — PROGRESS NOTES
OT Evaluation     Today's date: 2022  Patient name: Kadi Velazquez  : 1975  MRN: [id number]  Referring provider: Anju Nolen DO  Dx:   Encounter Diagnosis     ICD-10-CM    1  Left hand weakness  R29 898 Ambulatory Referral to Occupational Therapy   2  Injury of left brachial plexus, initial encounter  S14  3XXA Ambulatory Referral to Occupational Therapy   3  Weakness of left upper extremity  R29 898 Ambulatory Referral to Occupational Therapy                  Assessment  Assessment details: Tsering Escobedo is a 49 y/o right handed male with gradual weakness of the left hand  Weakness has occurred over several years and reports difficulty working on cars due to reduced dexterity and strength  He presents today with intrinsics weakness and /pinch weakness, decreased coordination and dexterity  He reports a CVA about two weeks ago which reduced his left hand coordination  Examination reveals decreased  and pinch strength, impaired coordination, intact sensation  Atrophy of intrinsics severe  No clawing demonstrated  Evaluation is of low complexity, due to minimal comorbidities and stable clinical presentation  Pt demonstrates good tolerance of therapy today and was provided with a written HEP focusing on stretching and strengthening and was instructed to perform exercises daily  The patient demonstrates HEP instructions appropriately, verbalizes understanding, and is in agreement with the written HEP  Pt will benefit from skilled OT intervention to progress motion, coordination and dexterity,  and allow return to PLOF        Impairments: abnormal coordination, abnormal or restricted ROM, impaired physical strength and lacks appropriate home exercise program  Understanding of Dx/Px/POC: good   Prognosis: good    Goals  STG 2 weeks   Increase digital arc by at least 10 degrees in extension and flexion   Increase flexion to [facility] by at least 1 cm   Reduce edema to a minimal level    LTG  By discharge   Achieve functional ALMAZAN flexion of digit >240   Achieve full passive  extension with minimal lag   Pain at rest resolved, pain >2/10 with activity   Achieve  strength to at least 30% of the uninvolved   Achieve FOTO goals established at IE  Plan  Patient would benefit from: OT eval and skilled occupational therapy  Planned modality interventions: thermotherapy: hydrocollator packs  Planned therapy interventions: joint mobilization, manual therapy, patient education, orthotic management and training, neuromuscular re-education, strengthening, stretching, therapeutic activities, therapeutic exercise, home exercise program, graded exercise, graded activity and functional ROM exercises  Frequency: 1x week  Duration in weeks: 6  Plan of Care beginning date: 2022  Plan of Care expiration date: 2022  Treatment plan discussed with: patient and family        Subjective Evaluation    History of Present Illness  Mechanism of injury: 6 years ago, became aware of left handed weakness trying to use a baseball glove  Was unable to close the glove to catch the ball  Recurrent probem    Quality of life: good    Pain  Current pain ratin  Quality: tight  Relieving factors: rest    Social Support  Lives with: significant other    Hand dominance: right    Treatments  No previous or current treatments  Patient Goals  Patient goals for therapy: increased strength and independence with ADLs/IADLs  Patient goal: be able to work on cars        Objective     Observations     Left Wrist/Hand   Positive for atrophy  Additional Observation Details  Fifth finger weakness restricts function; No clawing;  Delayed control of fifth finger  Neurological Testing     Sensation     Wrist/Hand   Left   Intact: light touch    Active Range of Motion     Additional Active Range of Motion Details  Full motion demonstrated in digital extension and flexion with guidance  Poor digital Abduction    Weak intrinsics with atrophy present  Strength/Myotome Testing     Left Wrist/Hand   Normal wrist strength    Additional Strength Details  Kalin #2  L  23 4 lbs,  R  107 lbs    Lat pinch  L  2 9 lbs,   R  18 7 lbs  Tests     Left Wrist/Hand   Positive crossed finger and Froment's sign  Precautions:  History of CVA and brachial plexus injury; Type 2 diabetic;  Heart valve dx         Date 2/23            Visit 1            Manuals                                                                 Neuro Re-Ed                                                                                                        Ther Ex 10'            HEP Wrist digits PROMbuddy strap V  10'                                                                                                       Ther Activity                                       Gait Training                                       Modalities

## 2022-02-21 NOTE — PROGRESS NOTES
Speech-Language Pathology Initial Evaluation    Today's date: 2022  Patients name: Kadi Velazquez  : 1975  MRN: [id number]  Safety measures: h/o CVA  Referring provider: Baldo Sosa DO    Primary diagnosis/billing code: I 63 9  Secondary diagnosis/billing code: I 63 411    Visit tracking:  -Referring provider: Epic  -Billing guidelines: CMS  -Visit # (**AUTH @ EVAL **)  -Insurance: [address] due 2022    Subjective comments: Patient wife reports that Patient can become frustrated when he is speaking  How did the patient hear about us? Physician    Patient's goal(s): "To get talking again "    Reason for referral: Decreased speech intelligibility  Prior functional status: Communication effective and appropriate in all situations  Clinically complex situations: Discharge from SNF or Hospital in the last 30 days and Previous therapy to address similar deficits    History: Patient is a 48 y o  male who was referred to outpatient skilled Speech Therapy services for a motor speech evaluation  Patient presented to [address] [place] [address] on 2022 with stroke symptoms  This is not the first CVA he has experienced, it was stated that he has had multiple in the past (, )  Patient was found to have acute infarcts in the L ventral syed and L ventral and lateral medulla  Patient was seen by skilled inpatient Speech Therapy while staying at hospital for dysphagia and dysarthria  Patient was discharged on regular diet and TL  When completing oral motor evaluation, it was revealed that Patient exhibits slow rate of speech, occasional blended word boundaries, and loss of breath  Today, Patient states that there are some days where he feels like his speech is back to normal, but it is fleeting  Patient does not feel like his level of fatigue plays into his intelligibility  Hearing: WFL  Vision: WFL (Wears glasses)     Home environment/lifestyle:  At home with wife and daughter   Highest level of education: NT  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 Absent   -Vocal Quality Weak   -Volitional Cough Strong/productive   -Respiration Support appears weak   -Drooling? No   -Tremor/Involuntary Movement? Not present   -Tracheostomy Present? No       1  Sustained phonation    -Vowel prolongation (norm=15-20 sec) 10 37 sec (10 03, 10 22, 10 88)       2  Diadochokinetic (DDK) Rates    -puh-puh-puh (norm=3 0-5 5 rep/1 sec) Slow   -tuh-tuh-tuh (norm=25 rep/10 sec) Slow   -kuh-kuh-kuh (norm=25 rep/10 sec) Slow and imprecise   -puh-tuh-kuh (norm=8 rep/10 sec) Slow and imprecise        3  Articulation    -Single syllable words 100% intelligible   -Multisyllabic words 90% intelligible   -Repetition of multisyllabic words 5x 100% intelligible   -Words of progressive length 100% intelligible   -Sentences 100% intelligible   -Reading ([place]) 90% intelligible   -Conversation 85% intelligible       4  Counting    -1 to 20 WNL   -20 to 1 WNL     Patient presents with mild dysarthria characterized by Imprecise articulation, Decreased breath support for speech and Slow rate  Goals    Short Term Goals:   1  Patient will perform oral motor exercises (with and without resistance) to facilitate improved strength and function for increased speech intelligibility, to be achieved in 4-6 weeks      2  Patient will perform oral motor and diadochokinetic speech rate exercises with > 90% accuracy to facilitate increased speech intelligibility, to be achieved in 4-6 weeks  3  Patient will utilize compensatory strategies (I e , over-articulation, decreased rate, etc) 80% of the time while orally reading sentence/paragraph length material to facilitate increased speech intelligibility, to be achieved in 4-6 weeks  4  Patient will complete oral reading and conversational tasks with the consistent use of compensatory strategies >90% of the time to facilitate increased speech intelligibility, to be achieved in 4-6 weeks  5  Patient and caregiver will be educated on speech intelligibility strategies (e g , over-exaggeration, slow rate, breath groups,, etc ) to promote increased communication success (to be achieved in 2-3 weeks)    6  Patient will utilize speech intelligibility strategies 80% of the time with min verbal cues while reading words, sentences, and paragraph length material to facilitate increased generalization of skills into conversational speech (to be achieved in 4-6 weeks)  Long Term Goals:   1  Patient will demonstrate improved functional and intelligible speech with the use of adequate labial and lingual function, increased articulatory precision, and speech prosody by discharge  2  Patient will independently utilize speech intelligibility strategies (e g , over-exaggeration, slow rate, breath groups, etc ) during oral reading tasks >90% intelligibility to facilitate increased generalization of skills into conversational speech by discharge  3  Patient will demonstrate independent implementation of compensatory strategies in conversation to improve speech intelligibility by discharge  4  Patient will develop functional and intelligible speech to promote positive communication interractions with the use of speech intelligibility strategies (to be achieved by discharge)            Impressions/Recommendations    Impressions:   -Patient presents with mild dysarthria characterized by decreased breath support for speech, low volume, imprecise articulation, limited word boundaries, and a slow rate secondary to CVA  Patient was noted to become frustrated when attempting DDK rates, but was able to complete them with verbal motivation and practice  Clinician encouraged Patient to keep trying although the task was difficult  Patient did exhibit weakness in his tongue, especially noted on L side  Education was provided as to why lingual strength can impact overall speech intelligibility  Patient's intelligibility was in tact when repeating simple words and some multi-syllabic words, but did start to break down with more complex multi-syllabic words including consonant blends (e g  statistical) and repeating sentences  At this time, it is recommended that Patient be seen 1X a week to target the above mentioned goals to facilitate increased speech intelligibility, decreased burden of communication for Patient and listeners, and increased overall QOL  Prognosis is good based on Patient's age, motivation, and family support        Recommendations:  -Patient would benefit from outpatient skilled Speech Therapy services: Speech-language therapy    -Frequency: 1x weekly  -Duration: 4-6 weeks    -Intervention certification from: 4/00/1388  -Intervention certification to: 32/59/9955    -Intervention comments:   30 minutes test administration, 15 minutes reviewing education regarding dysarthria and going over OME for HEP

## 2022-02-22 ENCOUNTER — TELEPHONE (OUTPATIENT)
Dept: SPEECH THERAPY | Facility: CLINIC | Age: 47
End: 2022-02-22

## 2022-02-22 NOTE — TELEPHONE ENCOUNTER
Therapist contacted Patient via phone call on 02/22/2022 regarding status of virtual visits  Clinician did leave a message stating that virtual visits will be covered but asked for Patient to call back to discuss which platform will be utilized

## 2022-02-25 ENCOUNTER — TELEPHONE (OUTPATIENT)
Dept: NEUROLOGY | Facility: CLINIC | Age: 47
End: 2022-02-25

## 2022-02-25 NOTE — TELEPHONE ENCOUNTER
LVM asking patient to call office to reschedule his march 1st appointment with Dr Asia Barnes as provider will not be in the office at time of appointment

## 2022-02-28 ENCOUNTER — TELEPHONE (OUTPATIENT)
Dept: SPEECH THERAPY | Facility: CLINIC | Age: 47
End: 2022-02-28

## 2022-02-28 ENCOUNTER — APPOINTMENT (OUTPATIENT)
Dept: LAB | Facility: HOSPITAL | Age: 47
End: 2022-02-28
Payer: COMMERCIAL

## 2022-02-28 DIAGNOSIS — N18.32 STAGE 3B CHRONIC KIDNEY DISEASE (HCC): ICD-10-CM

## 2022-02-28 LAB
ANION GAP SERPL CALCULATED.3IONS-SCNC: 10 MMOL/L (ref 4–13)
BUN SERPL-MCNC: 35 MG/DL (ref 5–25)
CALCIUM SERPL-MCNC: 8.6 MG/DL (ref 8.3–10.1)
CHLORIDE SERPL-SCNC: 106 MMOL/L (ref 100–108)
CO2 SERPL-SCNC: 20 MMOL/L (ref 21–32)
CREAT SERPL-MCNC: 3.04 MG/DL (ref 0.6–1.3)
CREAT UR-MCNC: 152 MG/DL
GFR SERPL CREATININE-BSD FRML MDRD: 23 ML/MIN/1.73SQ M
GLUCOSE P FAST SERPL-MCNC: 107 MG/DL (ref 65–99)
POTASSIUM SERPL-SCNC: 4.9 MMOL/L (ref 3.5–5.3)
PROT UR-MCNC: 1395 MG/DL
PROT/CREAT UR: 9.18 MG/G{CREAT} (ref 0–0.1)
SODIUM SERPL-SCNC: 136 MMOL/L (ref 136–145)

## 2022-02-28 PROCEDURE — 82570 ASSAY OF URINE CREATININE: CPT | Performed by: INTERNAL MEDICINE

## 2022-02-28 PROCEDURE — 80048 BASIC METABOLIC PNL TOTAL CA: CPT

## 2022-02-28 PROCEDURE — 84156 ASSAY OF PROTEIN URINE: CPT | Performed by: INTERNAL MEDICINE

## 2022-02-28 PROCEDURE — 3061F NEG MICROALBUMINURIA REV: CPT | Performed by: INTERNAL MEDICINE

## 2022-02-28 PROCEDURE — 36415 COLL VENOUS BLD VENIPUNCTURE: CPT

## 2022-03-01 NOTE — TELEPHONE ENCOUNTER
Therapist attempted to contact Patient via phone call on 02/28/2022 with secondary phone number provided  A call was placed 3 times to reach Patient and/or wife  The line was busy each time

## 2022-03-01 NOTE — TELEPHONE ENCOUNTER
Therapist contacted Patient via phone call on 02/28/2022 regarding virtual visit  No connection was made via TEAMS platform during scheduled therapy session  A message was left with the clinic's phone number

## 2022-03-07 ENCOUNTER — OFFICE VISIT (OUTPATIENT)
Dept: SPEECH THERAPY | Facility: CLINIC | Age: 47
End: 2022-03-07
Payer: COMMERCIAL

## 2022-03-07 ENCOUNTER — OFFICE VISIT (OUTPATIENT)
Dept: FAMILY MEDICINE CLINIC | Facility: CLINIC | Age: 47
End: 2022-03-07
Payer: COMMERCIAL

## 2022-03-07 ENCOUNTER — EVALUATION (OUTPATIENT)
Dept: PHYSICAL THERAPY | Facility: CLINIC | Age: 47
End: 2022-03-07
Payer: COMMERCIAL

## 2022-03-07 ENCOUNTER — TELEPHONE (OUTPATIENT)
Dept: NEPHROLOGY | Facility: CLINIC | Age: 47
End: 2022-03-07

## 2022-03-07 VITALS
HEART RATE: 76 BPM | BODY MASS INDEX: 25.62 KG/M2 | HEIGHT: 70 IN | SYSTOLIC BLOOD PRESSURE: 130 MMHG | DIASTOLIC BLOOD PRESSURE: 70 MMHG | TEMPERATURE: 97.6 F | WEIGHT: 179 LBS | OXYGEN SATURATION: 99 %

## 2022-03-07 DIAGNOSIS — N18.4 STAGE 4 CHRONIC KIDNEY DISEASE (HCC): ICD-10-CM

## 2022-03-07 DIAGNOSIS — I63.411 CEREBROVASCULAR ACCIDENT (CVA) DUE TO EMBOLISM OF RIGHT MIDDLE CEREBRAL ARTERY (HCC): ICD-10-CM

## 2022-03-07 DIAGNOSIS — E11.65 TYPE 2 DIABETES MELLITUS WITH HYPERGLYCEMIA, WITH LONG-TERM CURRENT USE OF INSULIN (HCC): ICD-10-CM

## 2022-03-07 DIAGNOSIS — I63.9 DYSARTHRIA DUE TO ACUTE STROKE (HCC): Primary | ICD-10-CM

## 2022-03-07 DIAGNOSIS — E11.42 DIABETIC POLYNEUROPATHY ASSOCIATED WITH TYPE 2 DIABETES MELLITUS (HCC): ICD-10-CM

## 2022-03-07 DIAGNOSIS — I63.411 CEREBROVASCULAR ACCIDENT (CVA) DUE TO EMBOLISM OF RIGHT MIDDLE CEREBRAL ARTERY (HCC): Primary | ICD-10-CM

## 2022-03-07 DIAGNOSIS — F17.200 CURRENT SMOKER: ICD-10-CM

## 2022-03-07 DIAGNOSIS — Z79.4 TYPE 2 DIABETES MELLITUS WITH HYPERGLYCEMIA, WITH LONG-TERM CURRENT USE OF INSULIN (HCC): ICD-10-CM

## 2022-03-07 DIAGNOSIS — I10 ESSENTIAL HYPERTENSION: ICD-10-CM

## 2022-03-07 DIAGNOSIS — R47.1 DYSARTHRIA DUE TO ACUTE STROKE (HCC): ICD-10-CM

## 2022-03-07 DIAGNOSIS — Z86.73 HISTORY OF EMBOLIC STROKE: ICD-10-CM

## 2022-03-07 DIAGNOSIS — M62.81 LEFT-SIDED MUSCLE WEAKNESS: ICD-10-CM

## 2022-03-07 DIAGNOSIS — E03.9 ACQUIRED HYPOTHYROIDISM: ICD-10-CM

## 2022-03-07 DIAGNOSIS — R47.1 DYSARTHRIA DUE TO ACUTE STROKE (HCC): Primary | ICD-10-CM

## 2022-03-07 DIAGNOSIS — I63.9 DYSARTHRIA DUE TO ACUTE STROKE (HCC): ICD-10-CM

## 2022-03-07 PROCEDURE — 97162 PT EVAL MOD COMPLEX 30 MIN: CPT

## 2022-03-07 PROCEDURE — 99214 OFFICE O/P EST MOD 30 MIN: CPT | Performed by: FAMILY MEDICINE

## 2022-03-07 PROCEDURE — 97110 THERAPEUTIC EXERCISES: CPT

## 2022-03-07 PROCEDURE — 3066F NEPHROPATHY DOC TX: CPT | Performed by: INTERNAL MEDICINE

## 2022-03-07 PROCEDURE — 92507 TX SP LANG VOICE COMM INDIV: CPT

## 2022-03-07 NOTE — PROGRESS NOTES
FAMILY PRACTICE OFFICE VISIT  [facility]  [address]  [address], [place], [facility]      NAME: Francisca Chin  AGE: 48 y o  SEX: male  : 1975   MRN: [id number]    DATE: 3/7/2022  TIME: 12:56 PM    Assessment and Plan     Problem List Items Addressed This Visit     Essential hypertension    Relevant Orders    Basic metabolic panel    Current smoker    Type 2 diabetes mellitus with hyperglycemia, with long-term current use of insulin ([facility] [address] )    Relevant Orders    Hemoglobin R6I    Basic metabolic panel    Diabetic polyneuropathy associated with type 2 diabetes mellitus ([facility] [address] )    History of embolic stroke    Bilateral small Cerebrovascular accident (CVA) status post tPA - Primary    Dysarthria due to acute stroke ([facility] [address] )    Acquired hypothyroidism    Relevant Orders    T4, free    TSH, 3rd generation    CKD (chronic kidney disease)    Relevant Orders    Basic metabolic panel          Patient Instructions   Since last seen he has been working harder at [employer], has dropped 5 lb  Sugars especially in the last few weeks have been much improved, has dropped as low as 100  He will continue his insulin as is, he is scheduled to see endocrinology , he will keep that appointment  His last A1c  was 10 6  I would like him to redo A1c again around May 2nd  He did see Nephrology mid February, creatinine has been running around 3  Stage 4 chronic kidney disease, he did start lisinopril 10 mg daily, redo BMP later in February showed creatinine to be stable at 3 04 with potassium 4 9  He will see Nephrology again in follow-up  He is scheduled to see Neurology tomorrow, history multiple CVAs, he has completed aspirin, now on Plavix  Continue with good blood pressure control, blood pressure today 130/70  Stay on amlodipine, atorvastatin, hydralazine, metoprolol along with lisinopril    Last cholesterol  was 172 with HDL 44, LDL 72  Triglyceride was elevated to 412  CBC was okay    February TSH was 10, I would like him to include thyroid blood work with other blood work early May, may need to start levothyroxine  He is set up to see Cardiology also in April  He continues to work at smoke cessation, has been unable to quit, is less than 1/2 pack per day now, keep trying to titrate down, he is more motivated to quit  He did not fill prescription for bupropion/Wellbutrin  He did try Chantix in the past, did not quit with that  He does remain out of work since January 31st,   He will remain out of work, continue with speech therapy along with Occupational therapy  We will fill out form for him  We will see him again in 3 months, call us sooner if needed  Chief Complaint     Chief Complaint   Patient presents with    Physical Exam       History of Present Illness   Mulugeta Gallagher is a 48y o -year-old male who is in for a follow-up visit, he relates since I saw him he has been trying to watch his diet more closely, has dropped 5 lb, sugars the past few weeks have been much improved, has dropped as low as 100  He does continue with weakness left arm, leg, no worse than after hospital stay, history of multiple strokes  He is doing speech therapy, occupational therapy  Still with significant changes with speech  Unfortunately does continue to smoke, has dropped down to less than 1/2 pack per day  He did see Nephrology, now on lisinopril, he is set up to see specialists  He remains out of work since his last stroke, he has a , is unable to adequately/safely perform duties      Review of Systems   Review of Systems   Constitutional: Positive for activity change (He is trying to remain active at home, remains out of work on disability) and fatigue (Baseline)  Negative for appetite change, fever and unexpected weight change (He dropped 5 lb with changing his diet)     HENT: Negative for sore throat and trouble swallowing  Respiratory: Negative for cough, chest tightness, shortness of breath and wheezing  He has no increased cough or wheezing   Cardiovascular: Negative for chest pain, palpitations and leg swelling  Gastrointestinal: Negative for abdominal pain, blood in stool, nausea and vomiting  No acid reflux     No change in bowel   Genitourinary: Negative for dysuria and hematuria  Neurological: Negative for syncope  He has had no increased weakness left arm, leg, speech is somewhat improved with therapy but still has noticeable impediment  Has no increased headaches versus baseline, no new dizziness or lightheadedness  Psychiatric/Behavioral: Negative for behavioral problems and confusion         Active Problem List     Patient Active Problem List   Diagnosis    Hypercholesterolemia    Proteinuria    Current smoker    Essential hypertension    Type 2 diabetes mellitus with hyperglycemia, with long-term current use of insulin ([facility] [address] )    Brachial plexus injury, left, sequela    Diabetic polyneuropathy associated with type 2 diabetes mellitus ([address] )    Aortic valve insufficiency ( mild-mod 2020 )    History of embolic stroke    History of CVA (cerebrovascular accident)    Bilateral small Cerebrovascular accident (CVA) status post tPA    Acute on chronic renal failure (HCC)    Received intravenous tissue plasminogen activator (tPA) in emergency department    Dysarthria due to acute stroke ([facility] [address] )    Acquired hypothyroidism    CKD (chronic kidney disease)       Past Medical History:  Reviewed    Past Surgical History:  Reviewed    Family History:  Reviewed    Social History:  Reviewed    Objective     Vitals:    03/07/22 0955   BP: 130/70   BP Location: Left arm   Patient Position: Sitting   Cuff Size: Large   Pulse: 76   Temp: 97 6 °F (36 4 °C)   SpO2: 99%   Weight: 81 2 kg (179 lb)   Height: 5' 10" (1 778 m)     Body mass index is 25 68 kg/m²     BP Readings from Last 3 Encounters:   03/07/22 130/70   02/14/22 136/78   02/08/22 154/96       Wt Readings from Last 3 Encounters:   03/07/22 81 2 kg (179 lb)   02/14/22 83 5 kg (184 lb)   02/08/22 83 5 kg (184 lb)       Physical Exam  Constitutional:       Comments: 24-year-old male seated on table, no respiratory distress  His speech is still noticeably affected by his last CVA, is intelligible though  He does have some mild weakness left arm, leg, no worse than baseline  He does have some imbalance with standing/walking   Eyes:      General: No scleral icterus  Cardiovascular:      Rate and Rhythm: Normal rate and regular rhythm  Heart sounds: Murmur (1/6 systolic ejection murmur) heard  Pulmonary:      Effort: Pulmonary effort is normal  No respiratory distress  Breath sounds: Normal breath sounds  No wheezing, rhonchi or rales  Abdominal:      Palpations: Abdomen is soft  Tenderness: There is no abdominal tenderness  There is no guarding  Musculoskeletal:      Left lower leg: No edema  Lymphadenopathy:      Cervical: No cervical adenopathy  Skin:     Coloration: Skin is not jaundiced     Psychiatric:         Behavior: Behavior normal          ALLERGIES:  No Known Allergies    Current Medications     Current Outpatient Medications   Medication Sig Dispense Refill    amLODIPine (NORVASC) 10 mg tablet Take 1 tablet (10 mg total) by mouth daily 90 tablet 3    atorvastatin (LIPITOR) 80 mg tablet Take 1 tablet (80 mg total) by mouth daily 30 tablet 5    clopidogrel (PLAVIX) 75 mg tablet Take 1 tablet (75 mg total) by mouth daily 30 tablet 5    hydrALAZINE (APRESOLINE) 25 mg tablet Take 1 tablet (25 mg total) by mouth 3 (three) times a day 90 tablet 5    insulin glargine (Basaglar KwikPen) 100 units/mL injection pen Inject 28 Units under the skin daily 15 mL 5    lisinopril (ZESTRIL) 10 mg tablet Take 1 tablet (10 mg total) by mouth daily 30 tablet 5    metoprolol succinate (TOPROL-XL) 50 mg 24 hr tablet Take 1 tablet (50 mg total) by mouth daily 90 tablet 3    Blood Glucose Monitoring Suppl (OneTouch Verio Reflect) w/Device KIT Check blood sugars twice daily  Please substitute with appropriate alternative as covered by patient's insurance  Dx: E11 65 1 kit 0    glucose blood (OneTouch Verio) test strip Check blood sugars twice daily  Please substitute with appropriate alternative as covered by patient's insurance  Dx: E11 65 200 each 3    Insulin Pen Needle (PEN NEEDLES 31GX5/16") 31G X 8 MM MISC Inject as directed daily 100 each 1    OneTouch Delica Lancets 81F MISC Check blood sugars twice daily  Please substitute with appropriate alternative as covered by patient's insurance  Dx: E11 65 200 each 3     No current facility-administered medications for this visit              Orders Placed This Encounter   Procedures    Hemoglobin A1C    T4, free    TSH, 3rd generation    Basic metabolic panel         Cisco Flowers DO

## 2022-03-07 NOTE — PATIENT INSTRUCTIONS
Since last seen he has been working harder at [employer], has dropped 5 lb  Sugars especially in the last few weeks have been much improved, has dropped as low as 100  He will continue his insulin as is, he is scheduled to see endocrinology March 14th, he will keep that appointment  His last A1c February 1st was 10 6  I would like him to redo A1c again around May 2nd  He did see Nephrology mid February, creatinine has been running around 3  Stage 4 chronic kidney disease, he did start lisinopril 10 mg daily, redo BMP later in February showed creatinine to be stable at 3 04 with potassium 4 9  He will see Nephrology again in follow-up  He is scheduled to see Neurology tomorrow, history multiple CVAs, he has completed aspirin, now on Plavix  Continue with good blood pressure control, blood pressure today 130/70  Stay on amlodipine, atorvastatin, hydralazine, metoprolol along with lisinopril  Last cholesterol February 1st was 172 with HDL 44, LDL 72  Triglyceride was elevated to 412  CBC was okay    February TSH was 10, I would like him to include thyroid blood work with other blood work early May, may need to start levothyroxine  He is set up to see Cardiology also in April  He continues to work at smoke cessation, has been unable to quit, is less than 1/2 pack per day now, keep trying to titrate down, he is more motivated to quit  He did not fill prescription for bupropion/Wellbutrin  He did try Chantix in the past, did not quit with that  He does remain out of work since January 31st,   He will remain out of work, continue with speech therapy along with Occupational therapy  We will fill out form for him  We will see him again in 3 months, call us sooner if needed

## 2022-03-07 NOTE — TELEPHONE ENCOUNTER
Patient's girlfriend advised that kidney function and electrolytes are stable per Dr America Mckinney  Pt to be seen again in May with follow up [facility] and [facility]       ----- Message from Marshall Anne MD sent at 3/5/2022  8:59 AM EST -----  Let hoim know kidney function and electrolytes OK on new med as stable  When is he on call back?

## 2022-03-07 NOTE — PROGRESS NOTES
PT Evaluation     Today's date: 3/7/2022  Patient name: Gali Rodriguez  : 1975  MRN: [id number]  Referring provider: Delisa Chicas DO  Dx:   Encounter Diagnosis     ICD-10-CM    1  Bilateral small Cerebrovascular accident (CVA) status post tPA  I63 [id number] Ambulatory Referral to Physical Therapy   2  Left-sided muscle weakness  M62 81 Ambulatory Referral to Physical Therapy       Assessment  Assessment details: Gali Rodriguez is a pleasant 48 y o  male who was referred to outpatient physical therapy following a CVA on 2022 that resulted in L sided weakness  Since being home, he has suffered multiple falls without injury  His goals are to function better and normally  PT examination findings were initiated, but not complete due to time constraints  Findings so far include: abnormal gait deviations such as decreased foot clearance, variable margaux and step length, and L lateral trunk lean; slow gait speed; decreased LE strength and power shown by his 5x sit to stand (STS) time; and impaired balance demonstrated via his Functional Gait Assessment (FGA) score  These findings classify him as an increased falls risk and decreases his safety with functional mobility  Plan to assess endurance via 6 MWT next visit  Time was spent educating him on the importance of gradually increasing his activity tolerance at home by walking short distances more regularly throughout the house  Consistency of exercises at home was stressed as this is an important part of recovery  He verbalized understanding  The patient would benefit from skilled physical therapy to provide exercises, neuromuscular reeducation, gait training, manual techniques, and modalities as deemed necessary in order to help him achieve his goals and maximize his safety with functional mobility to help return him to his PLOF         Impairments: abnormal gait, activity intolerance, impaired balance, impaired physical strength, lacks appropriate home exercise program and safety issue  Barriers to therapy: Financial issues of high co-pay  Understanding of Dx/Px/POC: good   Prognosis: good    Goals  STGs in 4 weeks  1  Patient will be independent with HEP  2  Patient will achieve a 5x STS time no more than 12 seconds for improved LE strength and power  3  Patient will be able to complete a full 6 MWT for assessment of endurance capability  LTGs in 10 weeks  1  Patient will be able to independently manage symptoms  2  Patient will increase his gait speed to at least 1 2 m/s for improved community ambulation  3  Patient will increase his FGA score to at least 20/30 for improved balance  4  Patient will increase his 6 MWT distance by at least 15% from initial distance  5  Patient will be able to consistently ascend and descend stairs in a reciprocal pattern with use of handrail for increased household accessibility  Plan  Plan details: Due to high co-pay even with financial assistance program, he would like to start with 1x/week  Discussed that consistency with exercises at home will be vital for improvements to be made  If progress slow, he understands that he is able to increase to 2x/week if needed  Patient would benefit from: skilled physical therapy and PT eval  Planned therapy interventions: joint mobilization, manual therapy, neuromuscular re-education, patient education, body mechanics training, strengthening, stretching, therapeutic activities, therapeutic exercise, gait training, graded activity, graded exercise and home exercise program  Frequency: 1-2x/week  Duration in weeks: 10  Plan of Care beginning date: 3/7/2022  Plan of Care expiration date: 5/16/2022  Treatment plan discussed with: patient and family        Subjective Evaluation    History of Present Illness  Mechanism of injury: Patient presented to [address] [place] [address] on 01/31/2022 with stroke symptoms    This is not the first CVA he has experienced, it was stated that he has had multiple in the past (2019, 2021)  Patient was found to have acute infarcts in the L ventral syed and L ventral and lateral medulla  He notes that the L side of his body was affected and weaker  He is seeing OT for his hand and SLP for dysarthria  He is having balance issues and finds that his L leg is weaker and that he walks like he is drunk  He does find that the more he does, the worse it gets  He has difficulty going up and down stairs as he has to go one step at a time and he is currently unable to walk one of his dogs as it is big and tends to pull  He limits how much he goes up/down especially when he is home alone  He reports about 3-4 falls since the stroke  He says that most of the falls were due to his dog cutting him off while he was walking and not being able to react in time  His most recent fall was yesterday  He is able to get up on his own  He does feel that if he stands for more than 10 mins that his L leg is going to buckle under him     Pain  No pain reported    Social Support  Steps to enter house: yes (1 SHARRI)  Stairs in house: yes (6+6 with a handrail )   Lives in: multiple-level home  Lives with: young children and significant other (9 y o daughter)    Patient Goals  Patient goals for therapy: increased strength and improved balance  Patient goal: go back to normal        Objective    Balance Test    6 Minute Walk Test (ft):  NV   Functional Gait Assessment:  IE 3/7: score 14/30   Gait Speed (m/s):  IE 3/7: 10m/9 1s = 1 1 m/s   5x Sit To Stand (s):  IE 3/7: 13 4 (UEs crossed; back of legs pushed into chair)   TUG (s):  IE 3/7: 10 2; 10 1; avg 10 15         Coordination Left Right   Heel To Nino  WNL   WNL         Manual Muscle Testing - Hip Left Right   Flexion 3+ 4+   Abduction 3+ 4     Manual Muscle Testing - Knee Left Right   Flexion 4 4+   Extension 4+ 5     Manual Muscle Testing - Ankle Left Right   Doriflexion 4 5   Plantarflexion 4 4+        Gait Assessment:    Decreased foot clearance; intermittent scissor step; decreased arm swing; L lateral trunk lean during L stance; variable margaux and step length         Precautions: falls risk; hx of CVA; DM      Manuals 3/7                                                                Neuro Re-Ed                                                                                                        Ther Ex             Bike              Stand hip abd             Stand hip ext             Stand march             HR/TR                                                    Ther Activity                                       Gait Training             6 MWT NV                         Modalities             edu on POC; increasing activity tolerance DF

## 2022-03-07 NOTE — PROGRESS NOTES
Daily Speech Treatment Note    Today's date: 3/7/2022  Patients name: Destini Plummer  : 1975  MRN: [id number]  Safety measures: h/o CVA  Referring provider: Houston Carrasquillo DO    Primary diagnosis/billing code: I 63 9  Secondary diagnosis/billing code: I 63 411    Visit tracking:  -Referring provider: Epic  -Billing guidelines: CMS  -Visit # Natiliana Doris expires 2022)  -Insurance: [employer]  -RE due 2022    Subjective/Behavioral:  -Patient arrived approx  30 minutes late as he was coming from another appointment in [facility]    -Patient stated today that he sometimes coughs when he is drinking water  He stated that it is not consistent, but does happen occasionally  Due to limited time, Patient was instructed to keep track of how many times he coughs when drinking and under what circumstances so this can be addressed next session  Patient was given general safe swallow strategies to consider (e g  drinking slowly, taking small sips, using a straw if needed)  Objective/Assessment:  -Reviewed testing results and goals in plan care with patient  Patient is in agreement at this time  Short Term Goals:   1  Patient will perform oral motor exercises (with and without resistance) to facilitate improved strength and function for increased speech intelligibility, to be achieved in 4-6 weeks  Patient practiced OME with resistance   -Lingual protrusion: 10 reps  -Lingual lateralization: 10 reps (to each side)    2  Patient will perform oral motor and diadochokinetic speech rate exercises with > 90% accuracy to facilitate increased speech intelligibility, to be achieved in 4-6 weeks  3  Patient will utilize compensatory strategies (I e , over-articulation, decreased rate, etc) 80% of the time while orally reading sentence/paragraph length material to facilitate increased speech intelligibility, to be achieved in 4-6 weeks      Compensatory strategies were reviewed when completing activities presented today  To target speech intelligibility: Patient read tri-syllabic words and was instructed to put words into sentences  Patient completed this task with approx  95% intelligibility  Patient was noted to have some imprecise articulation, but maintained an even rate throughout task  To target speech intelligibility: Patient read "tongue twisters" and was instructed to over articulate  Patient completed this task with approx  90% intelligibility  4  Patient will complete oral reading and conversational tasks with the consistent use of compensatory strategies >90% of the time to facilitate increased speech intelligibility, to be achieved in 4-6 weeks  5  Patient and caregiver will be educated on speech intelligibility strategies (e g , over-exaggeration, slow rate, breath groups,, etc ) to promote increased communication success (to be achieved in 2-3 weeks)    6  Patient will utilize speech intelligibility strategies 80% of the time with min verbal cues while reading words, sentences, and paragraph length material to facilitate increased generalization of skills into conversational speech (to be achieved in 4-6 weeks)  Plan:  -Patient was provided with home exercises/activities to target goals in plan of care at the end of today's session   -Continue with current plan of care

## 2022-03-08 ENCOUNTER — DOCUMENTATION (OUTPATIENT)
Dept: NEPHROLOGY | Facility: CLINIC | Age: 47
End: 2022-03-08

## 2022-03-08 ENCOUNTER — OFFICE VISIT (OUTPATIENT)
Dept: NEUROLOGY | Facility: CLINIC | Age: 47
End: 2022-03-08
Payer: COMMERCIAL

## 2022-03-08 VITALS
DIASTOLIC BLOOD PRESSURE: 88 MMHG | SYSTOLIC BLOOD PRESSURE: 142 MMHG | WEIGHT: 179.8 LBS | HEIGHT: 70 IN | BODY MASS INDEX: 25.74 KG/M2 | TEMPERATURE: 97 F | HEART RATE: 72 BPM | RESPIRATION RATE: 16 BRPM

## 2022-03-08 DIAGNOSIS — Z79.4 TYPE 2 DIABETES MELLITUS WITH HYPERGLYCEMIA, WITH LONG-TERM CURRENT USE OF INSULIN (HCC): ICD-10-CM

## 2022-03-08 DIAGNOSIS — E78.00 HYPERCHOLESTEROLEMIA: ICD-10-CM

## 2022-03-08 DIAGNOSIS — Z86.73 HISTORY OF CVA (CEREBROVASCULAR ACCIDENT): Primary | ICD-10-CM

## 2022-03-08 DIAGNOSIS — S14.3XXS BRACHIAL PLEXUS INJURY, LEFT, SEQUELA: ICD-10-CM

## 2022-03-08 DIAGNOSIS — E11.65 TYPE 2 DIABETES MELLITUS WITH HYPERGLYCEMIA, WITH LONG-TERM CURRENT USE OF INSULIN (HCC): ICD-10-CM

## 2022-03-08 PROCEDURE — 3079F DIAST BP 80-89 MM HG: CPT | Performed by: PSYCHIATRY & NEUROLOGY

## 2022-03-08 PROCEDURE — 3077F SYST BP >= 140 MM HG: CPT | Performed by: PSYCHIATRY & NEUROLOGY

## 2022-03-08 PROCEDURE — 99215 OFFICE O/P EST HI 40 MIN: CPT | Performed by: PSYCHIATRY & NEUROLOGY

## 2022-03-08 NOTE — PROGRESS NOTES
Order for BMP and [facility]PC mailed to patient to be done in May prior to his next follow up visit with Dr Al Woods in May    He has been placed on recall list

## 2022-03-08 NOTE — PROGRESS NOTES
Anum Wright is a 50 y o  male  Chief Complaint   Patient presents with    Cerebrovascular Accident       Assessment:  1  History of CVA (cerebrovascular accident)    2  Hypercholesterolemia    3  Brachial plexus injury, left, sequela    4  Type 2 diabetes mellitus with hyperglycemia, with long-term current use of insulin ([facility])        Plan:  Continue with Plavix and Crestor  Follow-up with cardiologist to rule out cardioembolic etiology and also to manages cholesterol  Stroke education given to the patient to keep his blood pressure cholesterol and sugar under control  Follow-up in 3-4 months  Discussion:  Patient's recent hospital records and imaging study results were reviewed, he has right thalamic stroke in 2018 left periventricular in 9/21 and a recent small acute infarct in the right posterior limb of internal capsule, and tiny acute infarcts in the left ventral syed and left ventrolateral medulla seen on exponential diffusion sequence and unchanged old tiny infarcts in the left posterior medial thalamus left periatrial white matter right cerebellum  Patient is currently on Plavix and Crestor I have advised him to continue with same,    He has a loop recorder and has an appointment to follow-up with the cardiologist to rule out a cardioembolic etiology, he would need aggressive lipid control I have advised him to follow-up with the cardiologist and monitor that, he was advised to quit smoking and the importance of doing that he was advised to discuss with family physician to see if he can go on a nicotine patch  Stroke education given to the patient and was advised to keep his blood pressure cholesterol and sugar under control continue with his physical therapy and speech therapy to go to the hospital if has any stroke-like symptoms and call us otherwise to see me back in 3-4 months      Subjective:    HPI   Patient is here in follow-up after his recent admission into the hospital with slurring of the speech and acute stroke status post tPA 02/01/2022, is currently on Plavix, he is accompanied with his wife, he has history of prior strokes, has a loop recorder placed and is on Crestor, since his discharge from the hospital he feels his speech is getting better he still has some dysarthria and is in physical therapy and speech therapy he had baseline has some left-sided weakness and also has a prior history of left brachial plexus injury, no bowel and bladder incontinence, no visual symptoms no difficulty in swallowing no other complaints  Vitals:    03/08/22 1046   BP: 142/88   BP Location: Left arm   Patient Position: Sitting   Cuff Size: Adult   Pulse: 72   Resp: 16   Temp: (!) 97 °F (36 1 °C)   TempSrc: Temporal   Weight: 81 6 kg (179 lb 12 8 oz)   Height: 5' 10" (1 778 m)       Current Medications    Current Outpatient Medications:     amLODIPine (NORVASC) 10 mg tablet, Take 1 tablet (10 mg total) by mouth daily, Disp: 90 tablet, Rfl: 3    atorvastatin (LIPITOR) 80 mg tablet, Take 1 tablet (80 mg total) by mouth daily, Disp: 30 tablet, Rfl: 5    Blood Glucose Monitoring Suppl (OneTouch Verio Reflect) w/Device KIT, Check blood sugars twice daily  Please substitute with appropriate alternative as covered by patient's insurance  Dx: E11 65, Disp: 1 kit, Rfl: 0    clopidogrel (PLAVIX) 75 mg tablet, Take 1 tablet (75 mg total) by mouth daily, Disp: 30 tablet, Rfl: 5    glucose blood (OneTouch Verio) test strip, Check blood sugars twice daily  Please substitute with appropriate alternative as covered by patient's insurance   Dx: E11 65, Disp: 200 each, Rfl: 3    hydrALAZINE (APRESOLINE) 25 mg tablet, Take 1 tablet (25 mg total) by mouth 3 (three) times a day, Disp: 90 tablet, Rfl: 5    insulin glargine (Basaglar KwikPen) 100 units/mL injection pen, Inject 28 Units under the skin daily, Disp: 15 mL, Rfl: 5    Insulin Pen Needle (PEN NEEDLES 31GX5/16") 31G X 8 MM MISC, Inject as directed daily, Disp: 100 each, Rfl: 1    lisinopril (ZESTRIL) 10 mg tablet, Take 1 tablet (10 mg total) by mouth daily, Disp: 30 tablet, Rfl: 5    metoprolol succinate (TOPROL-XL) 50 mg 24 hr tablet, Take 1 tablet (50 mg total) by mouth daily, Disp: 90 tablet, Rfl: 3    OneTouch Delica Lancets 12O MISC, Check blood sugars twice daily  Please substitute with appropriate alternative as covered by patient's insurance  Dx: E11 65, Disp: 200 each, Rfl: 3      Allergies  Patient has no known allergies  Past Medical History  Past Medical History:   Diagnosis Date    Diabetes mellitus ([facility] [address] )     Hypertension     Stroke [facility])          Past Surgical History:  History reviewed  No pertinent surgical history  Family History:  Family History   Problem Relation Age of Onset    No Known Problems Mother     No Known Problems Father        Social History:   reports that he has been smoking cigarettes  He has been smoking about 0 50 packs per day  He has never used smokeless tobacco  He reports that he does not drink alcohol and does not use drugs  I have reviewed the past medical history, surgical history, social and family history, current medications, allergies vitals, review of systems, and updated this information as appropriate today  Objective:    Physical Exam    Neurological Exam      GENERAL:  Cooperative in no acute distress  Well-developed and well-nourished    HEAD and NECK   Head is atraumatic normocephalic with no lesions or masses  Neck is supple with full range of motion    CARDIOVASCULAR  Carotid Arteries-no carotid bruits  NEUROLOGIC:  Mental Status-the patient is awake alert and oriented with dysarthria  Cranial Nerves: Visual fields are full to confrontation  Discs are flat  Extraocular movements are full without nystagmus  Pupils are 2-1/2 mm and reactive  Face is symmetrical to light touch  Movements of facial expression move symmetrically  Hearing is normal to finger rub bilaterally   Soft palate lifts symmetrically  Shoulder shrug is symmetrical  Tongue is midline without atrophy  Motor: No drift is noted on arm extension  Strength is full in the upper and lower extremities with normal bulk and tone except for left hand weakness and difficulty in the left hand extension at the wrist which is old and left lower extremity strength is 5-/5 with slightly poor effort  Sensory: Intact to temperature and vibratory sensation in the upper and lower extremities bilaterally  Cortical function is intact  Coordination: Finger to nose testing is performed accurately  Romberg is negative  Gait reveals a normal base with symmetrical arm swing  Reflexes:   1+ and symmetrical      ROS:  Review of Systems   Constitutional: Negative  Negative for appetite change and fever  HENT: Positive for trouble swallowing  Negative for hearing loss, tinnitus and voice change  Eyes: Negative  Negative for photophobia and pain  Respiratory: Negative  Negative for shortness of breath  Cardiovascular: Negative  Negative for palpitations  Gastrointestinal: Negative  Negative for nausea and vomiting  Endocrine: Negative  Negative for cold intolerance  Genitourinary: Negative  Negative for dysuria, frequency and urgency  Musculoskeletal: Negative  Negative for myalgias and neck pain  Skin: Negative  Negative for rash  Neurological: Positive for facial asymmetry  Negative for dizziness, tremors, seizures, syncope, speech difficulty, light-headedness, numbness and headaches  Patient states weakness on left side  Facial asymmetry to left side   Hematological: Negative  Does not bruise/bleed easily  Psychiatric/Behavioral: Negative  Negative for confusion, hallucinations and sleep disturbance

## 2022-03-08 NOTE — PROGRESS NOTES
Patient ID: Kadi Velazquez is a 50 y o  male  Assessment/Plan:    No problem-specific Assessment & Plan notes found for this encounter  {Assess/PlanSmartLinks:73999}       Subjective:    HPI    {[facility] HPI texts:89069}    {Common ambulatory SmartLinks:23334}        Objective: There were no vitals taken for this visit  Physical Exam    Neurological Exam      ROS:    Review of Systems   Constitutional: Negative  Negative for appetite change and fever  HENT: Positive for trouble swallowing  Negative for hearing loss, tinnitus and voice change  Eyes: Negative  Negative for photophobia and pain  Respiratory: Negative  Negative for shortness of breath  Cardiovascular: Negative  Negative for palpitations  Gastrointestinal: Negative  Negative for nausea and vomiting  Endocrine: Negative  Negative for cold intolerance  Genitourinary: Negative  Negative for dysuria, frequency and urgency  Musculoskeletal: Negative  Negative for myalgias and neck pain  Skin: Negative  Negative for rash  Neurological: Positive for facial asymmetry, speech difficulty and weakness  Negative for tremors, seizures, syncope, light-headedness, numbness and headaches  Patient states left sided facial asymmetry  Patient states he has left sided weakness   Hematological: Negative  Does not bruise/bleed easily  Psychiatric/Behavioral: Negative  Negative for confusion, hallucinations and sleep disturbance

## 2022-03-09 ENCOUNTER — TELEPHONE (OUTPATIENT)
Dept: FAMILY MEDICINE CLINIC | Facility: CLINIC | Age: 47
End: 2022-03-09

## 2022-03-09 NOTE — TELEPHONE ENCOUNTER
Patient called today asking for letter for work  Letter have to include first day out of work (1/31/22) , date when he can come back (June 2022? ) and reason to be out of work  Letter need to be fax to Santiago James fax # [phone] before Friday 3/11/22

## 2022-03-09 NOTE — LETTER
March 9, 2022     Patient: Joseph Knapp   YOB: 1975   Date of Visit: 3/9/2022       To Whom it May Concern:    Seven Bass is under my professional care  He was re-evaluated in my office on 3/7/22  He has been out of work since 1/31/22, and needs to remain out of work for at least 6 months due to complications related to stroke  He will continue to see us along with his specialists  If you have any questions or concerns, please don't hesitate to call           Sincerely,          Rosana Hamilton DO      CC: No Recipients

## 2022-03-14 ENCOUNTER — CONSULT (OUTPATIENT)
Dept: ENDOCRINOLOGY | Facility: CLINIC | Age: 47
End: 2022-03-14
Payer: COMMERCIAL

## 2022-03-14 ENCOUNTER — APPOINTMENT (OUTPATIENT)
Dept: SPEECH THERAPY | Facility: CLINIC | Age: 47
End: 2022-03-14
Payer: COMMERCIAL

## 2022-03-14 VITALS
DIASTOLIC BLOOD PRESSURE: 90 MMHG | BODY MASS INDEX: 26 KG/M2 | HEART RATE: 72 BPM | SYSTOLIC BLOOD PRESSURE: 142 MMHG | HEIGHT: 70 IN | WEIGHT: 181.6 LBS

## 2022-03-14 DIAGNOSIS — E11.42 DIABETIC POLYNEUROPATHY ASSOCIATED WITH TYPE 2 DIABETES MELLITUS (HCC): ICD-10-CM

## 2022-03-14 DIAGNOSIS — E78.00 HYPERCHOLESTEROLEMIA: ICD-10-CM

## 2022-03-14 DIAGNOSIS — Z79.4 TYPE 2 DIABETES MELLITUS WITH HYPERGLYCEMIA, WITH LONG-TERM CURRENT USE OF INSULIN (HCC): Primary | ICD-10-CM

## 2022-03-14 DIAGNOSIS — E03.9 ACQUIRED HYPOTHYROIDISM: ICD-10-CM

## 2022-03-14 DIAGNOSIS — E11.65 TYPE 2 DIABETES MELLITUS WITH HYPERGLYCEMIA, WITH LONG-TERM CURRENT USE OF INSULIN (HCC): Primary | ICD-10-CM

## 2022-03-14 PROCEDURE — 99204 OFFICE O/P NEW MOD 45 MIN: CPT | Performed by: INTERNAL MEDICINE

## 2022-03-14 PROCEDURE — 3008F BODY MASS INDEX DOCD: CPT | Performed by: INTERNAL MEDICINE

## 2022-03-14 PROCEDURE — 4004F PT TOBACCO SCREEN RCVD TLK: CPT | Performed by: INTERNAL MEDICINE

## 2022-03-14 NOTE — PROGRESS NOTES
Daily Speech Treatment Note    Today's date: 3/14/2022  Patients name: Ama Andino  : 1975  MRN: [id number]  Safety measures: h/o CVA  Referring provider: Jennifer Brown DO    Primary diagnosis/billing code: I 63 9  Secondary diagnosis/billing code: I 63 411    Visit tracking:  -Referring provider: Epic  -Billing guidelines: CMS  -Visit # Madhuri Short expires 2022) ***  -Insurance: [address] due 2022    Subjective/Behavioral:  -***    Objective/Assessment:  -Reviewed testing results and goals in plan care with patient  Patient is in agreement at this time  Short Term Goals:   1  Patient will perform oral motor exercises (with and without resistance) to facilitate improved strength and function for increased speech intelligibility, to be achieved in 4-6 weeks  Patient practiced OME with resistance   -Lingual protrusion: *** reps  -Lingual lateralization: *** reps (to each side)    2  Patient will perform oral motor and diadochokinetic speech rate exercises with > 90% accuracy to facilitate increased speech intelligibility, to be achieved in 4-6 weeks  3  Patient will utilize compensatory strategies (I e , over-articulation, decreased rate, etc) 80% of the time while orally reading sentence/paragraph length material to facilitate increased speech intelligibility, to be achieved in 4-6 weeks  4  Patient will complete oral reading and conversational tasks with the consistent use of compensatory strategies >90% of the time to facilitate increased speech intelligibility, to be achieved in 4-6 weeks  5  Patient and caregiver will be educated on speech intelligibility strategies (e g , over-exaggeration, slow rate, breath groups,, etc ) to promote increased communication success (to be achieved in 2-3 weeks)    6   Patient will utilize speech intelligibility strategies 80% of the time with min verbal cues while reading words, sentences, and paragraph length material to facilitate increased generalization of skills into conversational speech (to be achieved in 4-6 weeks)  Plan:  -Patient was provided with home exercises/activities to target goals in plan of care at the end of today's session   -Continue with current plan of care

## 2022-03-14 NOTE — PROGRESS NOTES
New consult note      CC: diabetes    History of Present Illness:   50 yr male with type 2 diabetes for 5 Yrs, HTN, HLD, hypothyroidism, CKD4 eGFR 23, retinopathy, DPN, and CVA  No MI or known PVR  Smokes 1pack/3 days  50+pack years  He had hx of prediabetes for many years before diagnosis of type 2 diabetes  He could not tolerate metformin 2" diarrhea  He was initially non adherent to diet, lifestyle and medications but now making changes  Over last 3 years, there has been proressive renal impairment to eGFR 23  Follows Dr Lilia Winter  Home blood glucose monitoring: usually daily AM - 100-216mg/dL  Current meds:  Basaglar 28u q am    Opthamology: due  Podiatry: no  Vaccination:covid - yes   Dental:  Pancreatitis: No    Ace/ARB: [id number] qdaily  Statin:lipitor 80mg qhs  Thyroid issues: yes, but not started on any treatment yet    Patient Active Problem List   Diagnosis    Hypercholesterolemia    Proteinuria    Current smoker    Essential hypertension    Type 2 diabetes mellitus with hyperglycemia, with long-term current use of insulin ([facility] )    Brachial plexus injury, left, sequela    Diabetic polyneuropathy associated with type 2 diabetes mellitus ([facility] )    Aortic valve insufficiency ( mild-mod 2020 )    History of embolic stroke    History of CVA (cerebrovascular accident)    Bilateral small Cerebrovascular accident (CVA) status post tPA    Acute on chronic renal failure (HCC)    Received intravenous tissue plasminogen activator (tPA) in emergency department    Dysarthria due to acute stroke ([facility] )    Acquired hypothyroidism    CKD (chronic kidney disease)     Past Medical History:   Diagnosis Date    Diabetes mellitus ([facility] )     Hypertension     Stroke ([facility] )       History reviewed  No pertinent surgical history     Family History   Problem Relation Age of Onset    No Known Problems Mother     No Known Problems Father      Social History     Tobacco Use    Smoking status: Current Every Day Smoker     Packs/day: 0 50     Types: Cigarettes    Smokeless tobacco: Never Used    Tobacco comment: states slowing it down, using Chantix   Substance Use Topics    Alcohol use: Never     Comment: socially maybe 5 times a year     No Known Allergies      Current Outpatient Medications:     amLODIPine (NORVASC) 10 mg tablet, Take 1 tablet (10 mg total) by mouth daily, Disp: 90 tablet, Rfl: 3    atorvastatin (LIPITOR) 80 mg tablet, Take 1 tablet (80 mg total) by mouth daily, Disp: 30 tablet, Rfl: 5    clopidogrel (PLAVIX) 75 mg tablet, Take 1 tablet (75 mg total) by mouth daily, Disp: 30 tablet, Rfl: 5    hydrALAZINE (APRESOLINE) 25 mg tablet, Take 1 tablet (25 mg total) by mouth 3 (three) times a day, Disp: 90 tablet, Rfl: 5    insulin glargine (Basaglar KwikPen) 100 units/mL injection pen, Inject 28 Units under the skin daily, Disp: 15 mL, Rfl: 5    Insulin Pen Needle (PEN NEEDLES 31GX5/16") 31G X 8 MM MISC, Inject as directed daily, Disp: 100 each, Rfl: 1    lisinopril (ZESTRIL) 10 mg tablet, Take 1 tablet (10 mg total) by mouth daily, Disp: 30 tablet, Rfl: 5    metoprolol succinate (TOPROL-XL) 50 mg 24 hr tablet, Take 1 tablet (50 mg total) by mouth daily, Disp: 90 tablet, Rfl: 3    Blood Glucose Monitoring Suppl (OneTouch Verio Reflect) w/Device KIT, Check blood sugars twice daily  Please substitute with appropriate alternative as covered by patient's insurance  Dx: E11 65 (Patient not taking: Reported on 3/14/2022 ), Disp: 1 kit, Rfl: 0    glucose blood (OneTouch Verio) test strip, Check blood sugars twice daily  Please substitute with appropriate alternative as covered by patient's insurance  Dx: E11 65 (Patient not taking: Reported on 3/14/2022 ), Disp: 200 each, Rfl: 3    OneTouch Delica Lancets 15A MISC, Check blood sugars twice daily  Please substitute with appropriate alternative as covered by patient's insurance   Dx: E11 65 (Patient not taking: Reported on 3/14/2022 ), Disp: 200 each, Rfl: 3    Review of Systems   Constitutional: Positive for activity change, appetite change and fatigue  HENT: Negative  Eyes: Negative  Respiratory: Negative  Cardiovascular: Negative for chest pain  Gastrointestinal: Negative  Endocrine: Negative  Genitourinary: Negative  Musculoskeletal: Negative  Skin: Negative  Allergic/Immunologic: Negative  Neurological: Negative  Hematological: Negative  Psychiatric/Behavioral: Negative  All other systems reviewed and are negative  Physical Exam:  Body mass index is 26 06 kg/m²  /90 (BP Location: Left arm, Patient Position: Sitting, Cuff Size: Adult)   Pulse 72   Ht 5' 10" (1 778 m)   Wt 82 4 kg (181 lb 9 6 oz)   BMI 26 06 kg/m²    Vitals:    03/14/22 1003   Weight: 82 4 kg (181 lb 9 6 oz)        Physical Exam  Constitutional:       Appearance: He is well-developed  HENT:      Head: Normocephalic  Eyes:      Pupils: Pupils are equal, round, and reactive to light  Neck:      Thyroid: No thyromegaly  Cardiovascular:      Rate and Rhythm: Normal rate  Heart sounds: Normal heart sounds  Pulmonary:      Effort: Pulmonary effort is normal       Breath sounds: Normal breath sounds  Abdominal:      General: Bowel sounds are normal       Palpations: Abdomen is soft  Musculoskeletal:         General: No deformity  Cervical back: Normal range of motion  Skin:     Capillary Refill: Capillary refill takes less than 2 seconds  Coloration: Skin is not pale  Findings: No rash  Neurological:      Mental Status: He is alert and oriented to person, place, and time           Labs:   Lab Results   Component Value Date    HGBA1C 10 6 (H) 02/01/2022       Lab Results   Component Value Date    VIE1AZWWLBCE 10 209 (H) 02/07/2022       Lab Results   Component Value Date    CREATININE 3 04 (H) 02/28/2022    CREATININE 3 08 (H) 02/07/2022    CREATININE 2 79 (H) 02/01/2022    BUN 35 (H) 02/28/2022    NA 142 09/22/2015    K 4 9 02/28/2022     02/28/2022    CO2 20 (L) 02/28/2022     eGFR   Date Value Ref Range Status   02/28/2022 23 ml/min/1 73sq m Final       Lab Results   Component Value Date    ALT 22 04/30/2021    AST 24 04/30/2021    ALKPHOS 143 (H) 04/30/2021    BILITOT 0 2 03/10/2015       Lab Results   Component Value Date    CHOLESTEROL 172 02/01/2022    CHOLESTEROL 205 (H) 01/31/2022    CHOLESTEROL 287 (H) 09/28/2021     Lab Results   Component Value Date    HDL 44 02/01/2022    HDL 59 01/31/2022    HDL 46 09/28/2021     Lab Results   Component Value Date    TRIG 412 (H) 02/01/2022    TRIG 253 (H) 01/31/2022    TRIG 316 (H) 09/28/2021     Lab Results   Component Value Date    NONHDLC 146 01/31/2022    [place] 113 01/11/2019         Impression:  1  Type 2 diabetes mellitus with hyperglycemia, with long-term current use of insulin ([facility] [address] )    2  Diabetic polyneuropathy associated with type 2 diabetes mellitus ([address] )    3  Acquired hypothyroidism    4  Hypercholesterolemia         Plan:    Diagnoses and all orders for this visit:    Type 2 diabetes mellitus with hyperglycemia, with long-term current use of insulin ([facility] [address] )  He is unocontrolled with A1c 10 6%  Goal is 7 5-8%  Reported fasting glucose on 100-200mg/dL  Today we discussed all aspects of diabetes including pathophysiology, risk factors, complications, SAGM, CGM, diet, lifestyle modifications, medical fitness training, diabetes education, goals of therapy, follow up needs and medications including insulin and GLP1 agonists  Advised to maintain goal blood sugars 70-180mg/dL  Will see diabetes education for carb counting, CGM and eventually wishes to consider a pump  For now, will add a GLP1 agonist to his basaglar 28u qam   If remains uncontrolled, will need mealtime insulin  Send glucose logs in a few weeks  Follow up in 3 months     -     Hemoglobin A1C; Future  -     Microalbumin / creatinine urine ratio;  Future  -     Dulaglutide 0 75 MG/0 5ML SOPN; Inject 0 5 mL (0 75 mg total) under the skin once a week    Diabetic polyneuropathy associated with type 2 diabetes mellitus ([facility] [address] )    Acquired hypothyroidism  Recent labs showed hypothyroidism  No clinical syndrome  Repeat tests pending  May initiate levothyroxine 50mcg if repeat testing shows similar levels as recent past labs  Hypercholesterolemia        I have spent 50 minutes with patient today in which greater than 50% of this time was spent in counseling/coordination of care  Discussed with the patient and all questioned fully answered  He will call me if any problems arise  Educated/ Counseled patient on diagnostic test results, prognosis, risk vs benefit of treatment options, importance of treatment compliance, healthy life and lifestyle choices        [address]

## 2022-03-14 NOTE — PATIENT INSTRUCTIONS
Dulaglutide (By injection)   Dulaglutide (doo-la-GLOO-tide)  Treats type 2 diabetes  Also lowers risk of death, heart attack, or stroke in patients with diabetes and heart or blood vessel problems  Brand Name(s): Trulicity   There may be other brand names for this medicine  When This Medicine Should Not Be Used: This medicine is not right for everyone  Do not use it if you had an allergic reaction to dulaglutide, you have multiple endocrine neoplasia syndrome type 2 (MEN 2), or you or anyone in your family has had medullary thyroid cancer  How to Use This Medicine:   Injectable  · Your doctor will prescribe your exact dose  This medicine is usually given once a week, on the same day of the week  It is given as a shot under the skin of your stomach, thighs, or upper arms  · You may be taught how to give your medicine at home  Make sure you understand all instructions before giving yourself an injection  Do not use more medicine or use it more often than your doctor tells you to  · If you use insulin in addition to this medicine, do not mix them in the same syringe  You may give the shots in the same area (including the stomach), but do not give the shots right next to each other  · If the medicine in the pen or prefilled syringe has changed color, looks cloudy, or has particles in it, do not use it  · You will be shown the body areas where this shot can be given  Use a different body area each time you give yourself a shot  Keep track of where you give each shot to make sure you rotate body areas  · Use a new needle and syringe each time you inject your medicine  · This medicine should come with a Medication Guide  Ask your pharmacist for a copy if you do not have one  · Missed dose: If you miss a dose, use it as soon as possible within 3 days after your missed dose  If you miss a dose by more than 3 days, wait until your next regular weekly dose    · Store your medicine pens or prefilled syringes in the refrigerator and keep them in the original carton  Protect the pens from light  You may also store the pens at room temperature for up to 14 days  Do not freeze the medicine, and do not use the medicine if it has been frozen  Drugs and Foods to Avoid:   Ask your doctor or pharmacist before using any other medicine, including over-the-counter medicines, vitamins, and herbal products  · Some medicines can affect how dulaglutide works  Tell your doctor if you are using warfarin, insulin, or other diabetes medicine (including metformin, sulfonylurea)  Warnings While Using This Medicine:   · Tell your doctor if you are pregnant or breastfeeding, or if you have kidney disease or a history of diabetic retinopathy or pancreas problems  Tell your doctor if you have severe digestion problems (including gastroparesis) or stomach or bowel problems  · This medicine may cause the following problems:  ? Increased risk of thyroid tumor  ? Pancreatitis (swelling of the pancreas)  ? Low blood sugar (more likely if you also take insulin or other diabetes medicine)  · Your doctor will do lab tests at regular visits to check on the effects of this medicine  Keep all appointments  · Keep all medicine out of the reach of children  Never share your medicine with anyone  Do not share needles or pens because you can spread an infection    Possible Side Effects While Using This Medicine:   Call your doctor right away if you notice any of these side effects:  · Allergic reaction: Itching or hives, swelling in your face or hands, swelling or tingling in your mouth or throat, chest tightness, trouble breathing  · Blurred vision, changes in vision  · Change in how much or how often you urinate  · Lump or swelling in your neck, trouble breathing or swallowing  · Shaking, trembling, sweating, fast or pounding heartbeat, lightheadedness, hunger, confusion  · Sudden and severe stomach pain, nausea, vomiting, fever, and lightheadedness  If you notice these less serious side effects, talk with your doctor:   · Diarrhea  · Redness, itching, swelling, or any changes in your skin where the shot was given  If you notice other side effects that you think are caused by this medicine, tell your doctor  Call your doctor for medical advice about side effects  You may report side effects to FDA at 8-387-FDA-5505    © Copyright [employer] 2022 Information is for End User's use only and may not be sold, redistributed or otherwise used for commercial purposes  The above information is an  only  It is not intended as medical advice for individual conditions or treatments  Talk to your doctor, nurse or pharmacist before following any medical regimen to see if it is safe and effective for you        PLease check with insurance about

## 2022-03-15 ENCOUNTER — APPOINTMENT (OUTPATIENT)
Dept: PHYSICAL THERAPY | Facility: CLINIC | Age: 47
End: 2022-03-15
Payer: COMMERCIAL

## 2022-03-15 NOTE — PROGRESS NOTES
Daily Note     Today's date: 3/15/2022  Patient name: Alexander Durbin  : 1975  MRN: [id number]  Referring provider: Dasha Miguel DO  Dx: No diagnosis found  Subjective: ***      Objective: See treatment diary below    6 MWT: ***      Assessment: Tolerated treatment {Tolerated treatment :}   Patient {assessment:}      Plan: {PLAN:}     Precautions: falls risk; hx of CVA; DM      Manuals 3/7                                                                Neuro Re-Ed                                                                                                        Ther Ex             Bike              Stand hip abd             Stand hip ext             Stand march             HR/TR                                                    Ther Activity                                       Gait Training             6 MWT NV                         Modalities             edu on POC; increasing activity tolerance DF

## 2022-03-16 ENCOUNTER — OFFICE VISIT (OUTPATIENT)
Dept: PHYSICAL THERAPY | Facility: CLINIC | Age: 47
End: 2022-03-16
Payer: COMMERCIAL

## 2022-03-16 DIAGNOSIS — I63.411 CEREBROVASCULAR ACCIDENT (CVA) DUE TO EMBOLISM OF RIGHT MIDDLE CEREBRAL ARTERY (HCC): Primary | ICD-10-CM

## 2022-03-16 DIAGNOSIS — M62.81 LEFT-SIDED MUSCLE WEAKNESS: ICD-10-CM

## 2022-03-16 PROCEDURE — 97110 THERAPEUTIC EXERCISES: CPT

## 2022-03-16 NOTE — PROGRESS NOTES
Daily Note     Today's date: 3/16/2022  Patient name: Destini Plummer  : 1975  MRN: [id number]  Referring provider: Houston Carrasquillo DO  Dx:   Encounter Diagnosis     ICD-10-CM    1  Bilateral small Cerebrovascular accident (CVA) status post tPA  I63 411    2  Left-sided muscle weakness  M62 81                   Subjective: Davidson Ma states that he has been trying to do a bit more walking around the house after his initial evaluation by taking his little dog out more  He reports feeling pretty good today coming in       Objective: See treatment diary below    6 MWT: 694 ft (test ended with 2 min and 20 secs left--due to pt feeling that his L knee was going to give out)      Assessment: Adam tolerated treatment well with LE fatigue and muscle burn reported throughout session  6 MWT was performed today to assess his endurance  He was unable to complete full test secondary to L leg weakness and feeling that it was going to buckle  This indicates significantly limited cardiorespiratory and muscular endurance with potential safety issues for community ambulation  Remainder of session was used to introduce LE strengthening exercises that he can safely perform at home  He demonstrated more difficulty coordinating the movement with his L LE compared to his R LE  He needed intermittent verbal cues to slow movement to focus on control and to avoid trunk compensations  He was able to correct and maintain  He was provided written and illustrated instructions for home use  He was educated on DOMS and how to modify dosage and volume of exercises as needed based on response  All his questions were answered  Plan to progress to dynamic balance exercises next session  Plan: Continue per plan of care  Progress treatment as tolerated         Precautions: falls risk; hx of CVA; DM    Access TVPM [email or web address]       Manuals 3/7 3/16                                                               Neuro Re-Ed             STS   2x10 Step ups (fwd/lat)             Side stepping             hk marching                                                                              Ther Ex             Bike or TM             Stand hip abd  2x10           Stand hip ext  2x10           Stand march  2x10           HR/TR  2x10 ea           Hamstring curl  2x10                                     Ther Activity                                       Gait Training             6 MWT NV DF                         Modalities             edu on POC; increasing activity tolerance DF

## 2022-03-19 NOTE — PROGRESS NOTES
Discharge Summary  3/18/2022  Renetta Mejia attended his initial evaluation and did not return to this facility  Due to recent stroke, he will be transferring care to a neurological facility for further treatment  No future appointments scheduled at this facility

## 2022-03-21 ENCOUNTER — OFFICE VISIT (OUTPATIENT)
Dept: PHYSICAL THERAPY | Facility: CLINIC | Age: 47
End: 2022-03-21
Payer: COMMERCIAL

## 2022-03-21 DIAGNOSIS — M62.81 LEFT-SIDED MUSCLE WEAKNESS: ICD-10-CM

## 2022-03-21 DIAGNOSIS — I63.411 CEREBROVASCULAR ACCIDENT (CVA) DUE TO EMBOLISM OF RIGHT MIDDLE CEREBRAL ARTERY (HCC): Primary | ICD-10-CM

## 2022-03-21 PROCEDURE — 97110 THERAPEUTIC EXERCISES: CPT

## 2022-03-21 PROCEDURE — 97112 NEUROMUSCULAR REEDUCATION: CPT

## 2022-03-21 NOTE — PROGRESS NOTES
Daily Note     Today's date: 3/21/2022  Patient name: Kadi Velazquez  : 1975  MRN: [id number]  Referring provider: Baldo Sosa DO  Dx:   Encounter Diagnosis     ICD-10-CM    1  Bilateral small Cerebrovascular accident (CVA) status post tPA  I63 411    2  Left-sided muscle weakness  M62 81                   Subjective: Tsering Escobedo states that he is feeling good today  He reports being sore after last session for about 4 days  He notes that because of the soreness, he has not been able to do his exercises at home  He apologized for being late due to traffic and was accommodated  Objective: See treatment diary below      Assessment: Adam tolerated treatment well with LE fatigue and muscle burn reported throughout session  Dynamic balance exercises were able to be introduced with good challenge observed  He had difficulty with motor control, needing regular verbal cues to focus on task and slow down to increase control  Session was ended early secondary to significant LE fatigue and soreness reported towards end of session  RPE throughout session was 8/10  Continued to encourage regular walking and incorporating his HEP into his routine  He verbalized understanding  He would benefit from continued skilled PT  Plan: Continue per plan of care  Progress treatment as tolerated         Precautions: falls risk; hx of CVA; DM    Access VJTU [email or web address]       Manuals 3/7 3/16 3/21                                                              Neuro Re-Ed             STS   2x10 2x10          Step ups (fwd/lat)   4" x10 ea          Side stepping   Rail x8          hk marching   Rail x8                                                                           Ther Ex             Bike or TM   6' bike          Stand hip abd  2x10           Stand hip ext  2x10 2x10          Stand march  2x10           HR/TR  2x10 ea 2x10 ea          Hamstring curl  2x10                                     Ther Activity Gait Training             6 MWT NV DF                         Modalities             edu on POC; increasing activity tolerance DF

## 2022-03-22 ENCOUNTER — OFFICE VISIT (OUTPATIENT)
Dept: SPEECH THERAPY | Facility: CLINIC | Age: 47
End: 2022-03-22
Payer: COMMERCIAL

## 2022-03-22 DIAGNOSIS — R47.1 DYSARTHRIA DUE TO ACUTE STROKE (HCC): Primary | ICD-10-CM

## 2022-03-22 DIAGNOSIS — I63.9 DYSARTHRIA DUE TO ACUTE STROKE (HCC): Primary | ICD-10-CM

## 2022-03-22 DIAGNOSIS — I63.411 CEREBROVASCULAR ACCIDENT (CVA) DUE TO EMBOLISM OF RIGHT MIDDLE CEREBRAL ARTERY (HCC): ICD-10-CM

## 2022-03-22 PROCEDURE — 92507 TX SP LANG VOICE COMM INDIV: CPT

## 2022-03-22 NOTE — PROGRESS NOTES
Daily Speech Treatment Note/DISCHARGE    Today's date: 3/22/2022  Patients name: Kvng Beckett  : 1975  MRN: [id number]  Safety measures: h/o CVA  Referring provider: Sherly Costa DO    Primary diagnosis/billing code: I 63 9  Secondary diagnosis/billing code: I 63 411    Visit tracking:  -Referring provider: Epic  -Billing guidelines: CMS  -Visit #3/8 (**AUTH expires 2022)   -Insurance: [address]  -RE due 2022    Subjective/Behavioral:  -Patient stated he felt as though his speech and lingual strength has improved greatly  Objective/Assessment:  -Reviewed testing results and goals in plan care with patient  Patient is in agreement at this time  Short Term Goals:   1  Patient will perform oral motor exercises (with and without resistance) to facilitate improved strength and function for increased speech intelligibility, to be achieved in 4-6 weeks  --MET    Patient practiced OME with resistance   -Lingual protrusion: 3 reps  -Lingual lateralization: 3 reps (to each side)    -Patient's lingual strength appeared to improve since initial evaluation  2  Patient will perform oral motor and diadochokinetic speech rate exercises with > 90% accuracy to facilitate increased speech intelligibility, to be achieved in 4-6 weeks  --MET    1  Sustained phonation    -Vowel prolongation (norm=15-20 sec) 10 26 sec (11 16, 10 03, 9 59)       2  Diadochokinetic (DDK) Rates    -puh-puh-puh (norm=3 0-5 5 rep/1 sec) WFL   -tuh-tuh-tuh (norm=25 rep/10 sec) WFL   -kuh-kuh-kuh (norm=25 rep/10 sec) Imprecise   -puh-tuh-kuh (norm=8 rep/10 sec) WFL       3  Patient will utilize compensatory strategies (I e , over-articulation, decreased rate, etc) 80% of the time while orally reading sentence/paragraph length material to facilitate increased speech intelligibility, to be achieved in 4-6 weeks  --MET    Compensatory strategies were reviewed when completing activities presented today       To target speech intelligibility: Patient read multi-syllabic words and was instructed to put words into sentences  Patient completed this task with approx  95% intelligibility  Patient was noted to have some imprecise articulation, but maintained an even rate throughout task  To target speech intelligibility: Patient read sentences with emphasis on different words (e g  WHAT do you mean, what DO you mean, what do YOU mean, what do you MEAN)  Patient completed this task with 85% intelligibility  Patient required verbal cues/demonstration to hear the difference  4  Patient will complete oral reading and conversational tasks with the consistent use of compensatory strategies >90% of the time to facilitate increased speech intelligibility, to be achieved in 4-6 weeks  --MET    To target speech intelligibility: Patient answered "Get to Know You" questions utilizing over articulation and slow rate  Patient completed this task with 95% intelligibility  5  Patient and caregiver will be educated on speech intelligibility strategies (e g , over-exaggeration, slow rate, breath groups, etc ) to promote increased communication success (to be achieved in 2-3 weeks) --MET    6  Patient will utilize speech intelligibility strategies 80% of the time with min verbal cues while reading words, sentences, and paragraph length material to facilitate increased generalization of skills into conversational speech (to be achieved in 4-6 weeks)  --MET      Plan:  -Patient was provided with home exercises/activities to target goals in plan of care at the end of today's session   -Discussed session and patient's progress with caregiver/family member after today's session   -Discharge

## 2022-03-24 ENCOUNTER — TELEPHONE (OUTPATIENT)
Dept: FAMILY MEDICINE CLINIC | Facility: CLINIC | Age: 47
End: 2022-03-24

## 2022-03-24 NOTE — TELEPHONE ENCOUNTER
Patient spoke with pharmacist at [facility], [place]  Patient interested in [facility]? Needs an Rx, if appropriate  Please advise

## 2022-03-28 ENCOUNTER — OFFICE VISIT (OUTPATIENT)
Dept: PHYSICAL THERAPY | Facility: CLINIC | Age: 47
End: 2022-03-28
Payer: COMMERCIAL

## 2022-03-28 DIAGNOSIS — M62.81 LEFT-SIDED MUSCLE WEAKNESS: ICD-10-CM

## 2022-03-28 DIAGNOSIS — I63.411 CEREBROVASCULAR ACCIDENT (CVA) DUE TO EMBOLISM OF RIGHT MIDDLE CEREBRAL ARTERY (HCC): Primary | ICD-10-CM

## 2022-03-28 PROCEDURE — 97110 THERAPEUTIC EXERCISES: CPT

## 2022-03-28 PROCEDURE — 97112 NEUROMUSCULAR REEDUCATION: CPT

## 2022-03-28 NOTE — PROGRESS NOTES
Daily Note     Today's date: 3/28/2022  Patient name: Xi Chu  : 1975  MRN: [id number]  Referring provider: Gold Mejia DO  Dx:   Encounter Diagnosis     ICD-10-CM    1  Bilateral small Cerebrovascular accident (CVA) status post tPA  I63 411    2  Left-sided muscle weakness  M62 81                   Subjective: Eitan Bianchi states that he was sore again for about 4 days after his last session  He admits to only doing a little of his HEP due to the soreness  No new issues to report  Objective: See treatment diary below      Assessment: Adam tolerated treatment well with L leg fatigue and soreness reported throughout session  He was provided regular seated rest breaks for longer duration than previously to allow more to be completed within session and to determine if this decreases DOMS following  He demonstrated improved motor control with sit to stands and step ups compared to previous sessions with intermittent cues needed to slow controlled motion  He was able to maintain good step height and length during dynamic balance exercises  As fatigue set in, his L toes began to catch more often, but he was able to independently recover and correct  He would benefit from continued skilled PT to progress balance and endurance to reach maximal functional capacity  Plan: Continue per plan of care  Progress treatment as tolerated         Precautions: falls risk; hx of CVA; DM    Access [facility] [email or web address]       Manuals 3/7 3/16 3/21 3/28                                                             Neuro Re-Ed             STS   2x10 2x10 2x10         Step ups (fwd/lat)   4" x10 ea 6" x10 ea         Side stepping   Rail x8 Rail x8         hk marching   Rail x8 Rail x8         Hurdles (fwd/lat)    Rail x8 ea         bwd ambu    Rail x8         Tandem ambu    Rail x8         biodex LOS    Static x2         Cone taps    2x10 ea                                   Ther Ex             Bike or TM   6' bike 6' bike Stand hip abd  2x10           Stand hip ext  2x10 2x10          Stand march  2x10           HR/TR  2x10 ea 2x10 ea          Hamstring curl  2x10                                     Ther Activity                                       Gait Training             6 MWT NV DF                         Modalities             edu on POC; increasing activity tolerance DF

## 2022-03-29 DIAGNOSIS — Z79.4 TYPE 2 DIABETES MELLITUS WITH HYPERGLYCEMIA, WITH LONG-TERM CURRENT USE OF INSULIN (HCC): Primary | ICD-10-CM

## 2022-03-29 DIAGNOSIS — E11.65 TYPE 2 DIABETES MELLITUS WITH HYPERGLYCEMIA, WITH LONG-TERM CURRENT USE OF INSULIN (HCC): Primary | ICD-10-CM

## 2022-03-29 NOTE — TELEPHONE ENCOUNTER
Pt calling back regarding this , he states the pharmacy is waiting for a script so they can give him this   Are you able to help me with this ?

## 2022-03-30 RX ORDER — FLASH GLUCOSE SCANNING READER
EACH MISCELLANEOUS
Qty: 1 EACH | Refills: 0 | Status: SHIPPED | OUTPATIENT
Start: 2022-03-30 | End: 2022-06-30 | Stop reason: ALTCHOICE

## 2022-03-30 RX ORDER — FLASH GLUCOSE SENSOR
KIT MISCELLANEOUS
Qty: 2 EACH | Refills: 3 | Status: SHIPPED | OUTPATIENT
Start: 2022-03-30 | End: 2022-07-19 | Stop reason: SDUPTHER

## 2022-04-04 ENCOUNTER — APPOINTMENT (OUTPATIENT)
Dept: PHYSICAL THERAPY | Facility: CLINIC | Age: 47
End: 2022-04-04
Payer: COMMERCIAL

## 2022-04-04 NOTE — PROGRESS NOTES
Re-Evaluation      Today's date: 2022  Patient name: Sandro Ortiz  : 1975  MRN: [id number]  Referring provider: Beto Jane DO  Dx: No diagnosis found  Subjective: ***      Objective: See treatment diary below  Balance Test    6 Minute Walk Test (ft):  NV   Functional Gait Assessment:  IE 3/7: score 14/30   Gait Speed (m/s):  IE 3/7: 10m/9 1s = 1 1 m/s   5x Sit To Stand (s):  IE 3/7: 13 4 (UEs crossed; back of legs pushed into chair)   TUG (s):  IE 3/7: 10 2; 10 1; avg 10 15         Assessment: Tolerated treatment well  Patient would benefit from continued PT      Plan: Continue per plan of care        Precautions: falls risk; hx of CVA; DM    Access VNCI [email or web address]       Manuals 3/7 3/16 3/21 3/28 4/4                                                            Neuro Re-Ed             STS   2x10 2x10 2x10         Step ups (fwd/lat)   4" x10 ea 6" x10 ea         Side stepping   Rail x8 Rail x8         hk marching   Rail x8 Rail x8         Hurdles (fwd/lat)    Rail x8 ea         bwd ambu    Rail x8         Tandem ambu    Rail x8         biodex LOS    Static x2         Cone taps    2x10 ea                                   Ther Ex             Bike or TM   6' bike 6' bike         Stand hip abd  2x10           Stand hip ext  2x10 2x10          Stand march  2x10           HR/TR  2x10 ea 2x10 ea          Hamstring curl  2x10                                     Ther Activity                                       Gait Training             6 MWT NV DF                         Modalities             edu on POC; increasing activity tolerance DF

## 2022-04-11 ENCOUNTER — EVALUATION (OUTPATIENT)
Dept: PHYSICAL THERAPY | Facility: CLINIC | Age: 47
End: 2022-04-11
Payer: COMMERCIAL

## 2022-04-11 DIAGNOSIS — M62.81 LEFT-SIDED MUSCLE WEAKNESS: ICD-10-CM

## 2022-04-11 DIAGNOSIS — I63.411 CEREBROVASCULAR ACCIDENT (CVA) DUE TO EMBOLISM OF RIGHT MIDDLE CEREBRAL ARTERY (HCC): Primary | ICD-10-CM

## 2022-04-11 PROCEDURE — 97116 GAIT TRAINING THERAPY: CPT

## 2022-04-11 PROCEDURE — 97530 THERAPEUTIC ACTIVITIES: CPT

## 2022-04-11 NOTE — PROGRESS NOTES
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  Pt arrived 15 minutes late to appt and was accommodated       Objective: See treatment diary below    Balance Test  Evaluation 3/7  4/11   6 Minute Walk Test (ft): 713 ft (test ended with 2 min and 20 secs left--due to pt feeling that his L knee was going to give out) 1244 ft, no breaks    Functional Gait Assessment:  IE 3/7: score 14/30 20/30    Gait Speed (m/s):  IE 3/7: 10m/9 1s = 1 1 m/s NV   5x Sit To Stand (s):  IE 3/7: 13 4 (UEs crossed; back of legs pushed into chair) 14 28 s (UE crossed, decreased LE pushing into chair)    TUG (s):  IE 3/7: 10 2; 10 1; avg 10 15 NV           Assessment: Reassessment performed at this visit indicates that pt has made progress in the domains of both endurance and balance, per FGA and 6 MWT  Pt's FGA score still classifies him as a fall risk and indicate he has remaining balance deficits that need addressed  Pt's form improvements in sit to stand (decreased use of posterior LE on chair) may account for decrease in time to perform test  Pt was in agreement with POC moving forward to continue to work towards functional balance improvements as well as continuing endurance and strength training  Patient would benefit from continued PT to continue to work towards PLOF and safe functional ADL performance       Addendum: Spoke with pt via phone on  and he noted his insurance is not currently operational due to him being out of work  Pt was informed that he can call to schedule when his insurance resumes  He was in agreement with this current episode of care being discharged and a new script being obtained when he comes back  Plan: Continue per plan of care        Precautions: falls risk; hx of CVA; DM    Access NATHANIEL [email or web address]       Manuals 3/7 3/16 3/21 3/28 4/4                                                            Neuro Re-Ed             STS   2x10 2x10 2x10         Step ups (fwd/lat)   4" x10 ea 6" x10 ea         Side stepping   Rail x8 Rail x8         hk marching   Rail x8 Rail x8         Hurdles (fwd/lat)    Rail x8 ea         bwd ambu    Rail x8         Tandem ambu    Rail x8         biodex LOS    Static x2         Cone taps    2x10 ea                                   Ther Ex             Bike or TM   6' bike 6' bike         Stand hip abd  2x10           Stand hip ext  2x10 2x10          Stand march  2x10           HR/TR  2x10 ea 2x10 ea          Hamstring curl  2x10                                     Ther Activity             Reassessment of functional measures      SM                     Gait Training             6 MWT NV DF    SM                     Modalities             edu on POC; increasing activity tolerance DF

## 2022-04-18 ENCOUNTER — APPOINTMENT (OUTPATIENT)
Dept: PHYSICAL THERAPY | Facility: CLINIC | Age: 47
End: 2022-04-18
Payer: COMMERCIAL

## 2022-04-19 ENCOUNTER — TELEPHONE (OUTPATIENT)
Dept: NEPHROLOGY | Facility: CLINIC | Age: 47
End: 2022-04-19

## 2022-04-20 ENCOUNTER — TELEPHONE (OUTPATIENT)
Dept: NEPHROLOGY | Facility: CLINIC | Age: 47
End: 2022-04-20

## 2022-04-21 ENCOUNTER — REMOTE DEVICE CLINIC VISIT (OUTPATIENT)
Dept: CARDIOLOGY CLINIC | Facility: CLINIC | Age: 47
End: 2022-04-21
Payer: COMMERCIAL

## 2022-04-21 DIAGNOSIS — Z95.818 PRESENCE OF OTHER CARDIAC IMPLANTS AND GRAFTS: Primary | ICD-10-CM

## 2022-04-21 PROCEDURE — 93298 REM INTERROG DEV EVAL SCRMS: CPT | Performed by: INTERNAL MEDICINE

## 2022-04-21 PROCEDURE — G2066 INTER DEVC REMOTE 30D: HCPCS | Performed by: INTERNAL MEDICINE

## 2022-04-25 ENCOUNTER — CONSULT (OUTPATIENT)
Dept: CARDIOLOGY CLINIC | Facility: CLINIC | Age: 47
End: 2022-04-25
Payer: COMMERCIAL

## 2022-04-25 VITALS
SYSTOLIC BLOOD PRESSURE: 130 MMHG | HEART RATE: 94 BPM | OXYGEN SATURATION: 99 % | HEIGHT: 70 IN | RESPIRATION RATE: 16 BRPM | DIASTOLIC BLOOD PRESSURE: 80 MMHG | WEIGHT: 174 LBS | BODY MASS INDEX: 24.91 KG/M2

## 2022-04-25 DIAGNOSIS — I10 ESSENTIAL HYPERTENSION: Primary | ICD-10-CM

## 2022-04-25 DIAGNOSIS — E11.65 TYPE 2 DIABETES MELLITUS WITH HYPERGLYCEMIA, WITH LONG-TERM CURRENT USE OF INSULIN (HCC): ICD-10-CM

## 2022-04-25 DIAGNOSIS — E78.2 MIXED HYPERLIPIDEMIA: ICD-10-CM

## 2022-04-25 DIAGNOSIS — Z86.73 HISTORY OF CVA (CEREBROVASCULAR ACCIDENT): ICD-10-CM

## 2022-04-25 DIAGNOSIS — Z79.4 TYPE 2 DIABETES MELLITUS WITH HYPERGLYCEMIA, WITH LONG-TERM CURRENT USE OF INSULIN (HCC): ICD-10-CM

## 2022-04-25 DIAGNOSIS — Z95.818 STATUS POST PLACEMENT OF IMPLANTABLE LOOP RECORDER: ICD-10-CM

## 2022-04-25 PROCEDURE — 3008F BODY MASS INDEX DOCD: CPT | Performed by: INTERNAL MEDICINE

## 2022-04-25 PROCEDURE — 3079F DIAST BP 80-89 MM HG: CPT | Performed by: INTERNAL MEDICINE

## 2022-04-25 PROCEDURE — 99214 OFFICE O/P EST MOD 30 MIN: CPT | Performed by: INTERNAL MEDICINE

## 2022-04-25 PROCEDURE — 93000 ELECTROCARDIOGRAM COMPLETE: CPT | Performed by: INTERNAL MEDICINE

## 2022-04-25 PROCEDURE — 3075F SYST BP GE 130 - 139MM HG: CPT | Performed by: INTERNAL MEDICINE

## 2022-04-25 PROCEDURE — 4004F PT TOBACCO SCREEN RCVD TLK: CPT | Performed by: INTERNAL MEDICINE

## 2022-04-25 NOTE — PROGRESS NOTES
CARDIOLOGY OFFICE VISIT  [facility]  [address], [place], [facility] Rozina Dean  Tel: [phone]      NAME: Olu Castillo  AGE: 50 y o  SEX: male  : 1975   MRN: [id number]      Chief Complaint:  Chief Complaint   Patient presents with    New Patient Visit         History of Present Illness:   Patient comes for follow up  States he is doing well from cardiac stand point and denies chest pain / pressure, SOB, palpitations, lightheadedness, syncope, swelling feet, orthopnea, PND, claudication  Patient has a history of TIA/CVA for which a ANGIE was done in 2021 and the loop recorder was implanted which our office is tracking on a regular basis  Till now no evidence of atrial fibrillation has janet seen although a short episode of NSVT was seen    Essential hypertension -  Has been hypertensive for many years  Taking medications regularly  Denies lightheadedness, headache, medication side effects  Mixed hyperlipidemia -  Has had hyperlipidemia for many years  Taking statin regularly along with diet control  Denies myalgia  PCP closely monitoring the blood work  Tobacco abuse - states he has decreased his smoking but has not been able to quit yet    DM 2      Past Medical History:  Past Medical History:   Diagnosis Date    Diabetes mellitus ([facility] [address] )     Hypertension     Stroke [facility])          Past Surgical History:  History reviewed  No pertinent surgical history        Family History:  Family History   Problem Relation Age of Onset    No Known Problems Mother     No Known Problems Father          Social History:  Social History     Socioeconomic History    Marital status: Single     Spouse name: None    Number of children: None    Years of education: None    Highest education level: None   Occupational History    None   Tobacco Use    Smoking status: Current Every Day Smoker     Packs/day: 0 50     Types: Cigarettes    Smokeless tobacco: Never Used    Tobacco comment: states slowing it down, using Chantix   Vaping Use    Vaping Use: Never used   Substance and Sexual Activity    Alcohol use: Never     Comment: socially maybe 5 times a year    Drug use: No    Sexual activity: Yes   Other Topics Concern    None   Social History Narrative    None     Social Determinants of Health     Financial Resource Strain: Not on file   Food Insecurity: Not on file   Transportation Needs: No Transportation Needs    Lack of Transportation (Medical): No    Lack of Transportation (Non-Medical):  No   Physical Activity: Not on file   Stress: Not on file   Social Connections: Not on file   Intimate Partner Violence: Not on file   Housing Stability: Not on file         Active Problems:  Patient Active Problem List   Diagnosis    Hypercholesterolemia    Proteinuria    Current smoker    Essential hypertension    Type 2 diabetes mellitus with hyperglycemia, with long-term current use of insulin ([facility] [address] )    Brachial plexus injury, left, sequela    Diabetic polyneuropathy associated with type 2 diabetes mellitus ([facility] [address] )    Aortic valve insufficiency ( mild-mod 2020 )    History of embolic stroke    History of CVA (cerebrovascular accident)    Bilateral small Cerebrovascular accident (CVA) status post tPA    Acute on chronic renal failure (HCC)    Received intravenous tissue plasminogen activator (tPA) in emergency department    Dysarthria due to acute stroke ([facility] [address] )    Acquired hypothyroidism    CKD (chronic kidney disease)         The following portions of the patient's history were reviewed and updated as appropriate: past medical history, past surgical history, past family history,  past social history, current medications, allergies and problem list       Review of Systems:  Constitutional: Denies fever, chills  Eyes: Denies eye redness, eye discharge  ENT: Denies hearing loss, sneezing, nasal discharge, sore throat Respiratory: Denies cough, expectoration, shortness of breath  Cardiovascular: Denies chest pain, palpitations, lower extremity swelling  Gastrointestinal: Denies abdominal pain, nausea, vomiting, diarrhea  Genito-Urinary: Denies dysuria, incontinence  Musculoskeletal: Denies back pain, joint pain, muscle pain  Neurologic: Denies lightheadedness, syncope, headache, seizures  Endocrine: Denies polydipsia, temperature intolerance  Allergy and Immunology: Denies hives, insect bite sensitivity  Hematological and Lymphatic: Denies bleeding problems, swollen glands   Psychological: Denies depression, suicidal ideation, anxiety, panic  Dermatological: Denies pruritus, rash, skin lesion changes      Vitals:  Vitals:    04/25/22 1114   BP: 130/80   Pulse: 94   Resp: 16   SpO2: 99%       Body mass index is 24 97 kg/m²  Weight (last 2 days)     Date/Time Weight    04/25/22 1114 78 9 (174)            Physical Examination:  General: Patient is not in acute distress  Awake, alert, oriented in time, place and person  Responding to commands  Head: Normocephalic  Atraumatic  Eyes: Both pupils normal sized, round and reactive to light  Nonicteric  ENT: Normal external ear canals  Neck: Supple  JVP not raised  Trachea central  No thyromegaly  Lungs: Bilateral bronchovascular breath sounds with no crackles or rhonchi  Chest wall: No tenderness  Cardiovascular: RRR  S1 and S2 normal  No murmur, rub or gallop  Gastrointestinal: Abdomen soft, nontender  No guarding or rigidity  Liver and spleen not palpable  Bowel sounds present  Neurologic: Patient is awake, alert, oriented in time, place and person  Responding to commands  Moving all extremities  Integumentary:  No skin rash  Lymphatic: No cervical lymphadenopathy  Back: Symmetric   No CVA tenderness  Extremities: No clubbing, cyanosis or edema      Laboratory Results:  CBC with diff:   Lab Results   Component Value Date    WBC 10 35 (H) 02/01/2022    WBC 12 65 (H) 03/10/2015    RBC 4 54 02/01/2022    RBC 5 65 (H) 03/10/2015    HGB 13 8 02/01/2022    HGB 18 5 (H) 03/10/2015    HCT 40 8 02/01/2022    HCT 50 6 (H) 03/10/2015    MCV 90 02/01/2022    MCV 90 03/10/2015    MCH 30 4 02/01/2022    MCH 32 7 03/10/2015    RDW 13 9 02/01/2022    RDW 12 5 03/10/2015     02/01/2022     03/10/2015       CMP:  Lab Results   Component Value Date    CREATININE 3 04 (H) 02/28/2022    CREATININE 0 84 09/22/2015    BUN 35 (H) 02/28/2022    BUN 11 09/22/2015     09/22/2015    K 4 9 02/28/2022    K 4 3 09/22/2015     02/28/2022     09/22/2015    CO2 20 (L) 02/28/2022    CO2 28 09/22/2015    GLUCOSE 193 (H) 09/22/2015    PROT 8 3 (H) 03/10/2015    ALKPHOS 143 (H) 04/30/2021    ALKPHOS 138 (H) 03/10/2015    ALT 22 04/30/2021    ALT 16 03/10/2015    AST 24 04/30/2021    AST 10 03/10/2015    BILIDIR 0 08 09/23/2019       Lab Results   Component Value Date    HGBA1C 10 6 (H) 02/01/2022    MG 1 8 03/10/2015       Lab Results   Component Value Date    TROPONINI 0 02 09/27/2021    TROPONINI <0 02 04/30/2021    TROPONINI <0 02 09/23/2019       Lipid Profile:   No results found for: CHOL  Lab Results   Component Value Date    HDL 44 02/01/2022    HDL 59 01/31/2022    HDL 46 09/28/2021     Lab Results   Component Value Date    LDLCALC  02/01/2022      Comment:      Calculated LDL invalid, triglycerides >400 mg/dl    LDLCALC 95 01/31/2022    LDLCALC 178 (H) 09/28/2021     Lab Results   Component Value Date    TRIG 412 (H) 02/01/2022    TRIG 253 (H) 01/31/2022    TRIG 316 (H) 09/28/2021       Cardiac testing:   Results for orders placed during the hospital encounter of 09/27/21    Echo complete with contrast if indicated    Narrative  [address] [place], [place] [address] [phone]    Transthoracic Echocardiogram  2D, M-mode, Doppler, and Color Doppler    Study date:  28-Sep-2021    Patient: Jonathan Virk  MR number: [id number]  Account number: [id number]  : 1975  Age: 48 years  Gender: Male  Status: Inpatient  Location: Bedside  Height: 68 5 in  Weight: 198 lb  BP: 180/ 95 mmHg    Indications: Arterial embolism and thrombosis, Evaluate for suspected cardiac source of embolism  Diagnoses: I74 9 - Embolism and thrombosis of unspecified artery    Sonographer:  Shantelle Wheatley RDCS  Referring Physician:  YASMIN Childers  Group:  [facility]  Interpreting Physician:  Omega Noonan MD    SUMMARY    LEFT VENTRICLE:  Ejection fraction was estimated to be 60 %  Although no diagnostic regional wall motion abnormality was identified, this possibility cannot be completely excluded on the basis of this study  Concentric hypertrophy was present  MITRAL VALVE:  There was trace regurgitation  AORTIC VALVE:  There was mild regurgitation  TRICUSPID VALVE:  There was trace regurgitation  PULMONIC VALVE:  There was trace regurgitation  HISTORY: PRIOR HISTORY: Risk factors: hypertension, diabetes, hypercholesterolemia, and a history of current cigarette use (within the last month)  PROCEDURE: The procedure was performed at the bedside  This was a routine study  The transthoracic approach was used  The study included complete 2D imaging, M-mode, complete spectral Doppler, and color Doppler  The heart rate was 83 bpm,  at the start of the study  Images were obtained from the parasternal, apical, subcostal, and suprasternal notch acoustic windows  Image quality was adequate  LEFT VENTRICLE: Size was normal  Ejection fraction was estimated to be 60 %  Although no diagnostic regional wall motion abnormality was identified, this possibility cannot be completely excluded on the basis of this study  Concentric  hypertrophy was present      RIGHT VENTRICLE: The size was normal  Systolic function was normal  Wall thickness was normal     LEFT ATRIUM: Size was normal     RIGHT ATRIUM: Size was normal     MITRAL VALVE: There was annular calcification  DOPPLER: There was trace regurgitation  AORTIC VALVE: Leaflets exhibited calcification  DOPPLER: There was no evidence for stenosis  There was mild regurgitation  TRICUSPID VALVE: The valve structure was normal  There was normal leaflet separation  DOPPLER: The transtricuspid velocity was within the normal range  There was no evidence for stenosis  There was trace regurgitation  PULMONIC VALVE: Leaflets exhibited normal thickness, no calcification, and normal cuspal separation  DOPPLER: The transpulmonic velocity was within the normal range  There was trace regurgitation  PERICARDIUM: There was no pericardial effusion  The pericardium was normal in appearance  AORTA: The root exhibited normal size  SYSTEM MEASUREMENT TABLES    2D  %FS: 28 3 %  Ao Diam: 3 8 cm  Ao asc: 3 7 cm  EDV(Teich): 106 9 ml  EF(Teich): 54 6 %  ESV(Teich): 48 6 ml  IVSd: 1 8 cm  LA Area: 14 4 cm2  LA Diam: 3 8 cm  LVEDV MOD A4C: 95 2 ml  LVEF MOD A4C: 53 5 %  LVESV MOD A4C: 44 2 ml  LVIDd: 4 8 cm  LVIDs: 3 4 cm  LVLd A4C: 9 6 cm  LVLs A4C: 8 1 cm  LVPWd: 1 5 cm  RA Area: 12 1 cm2  RVIDd: 2 7 cm  SV MOD A4C: 51 ml  SV(Teich): 58 4 ml    CW  AR Dec [place]: 4 m/s2  AR Dec Time: 1250 6 ms  AR PHT: 362 7 ms  AR Vmax: 5 m/s  AR maxP 5 mmHg  AV Vmax: 1 8 m/s  AV maxP 7 mmHg    MM  TAPSE: 2 5 cm    PW  E' Sept: 0 1 m/s  E/E' Sept: 12  LVOT Env  Ti: 289 2 ms  LVOT VTI: 17 7 cm  LVOT Vmax: 0 9 m/s  LVOT Vmean: 0 6 m/s  LVOT maxPG: 3 2 mmHg  LVOT meanP 7 mmHg  MV A [place]: 0 8 m/s  MV Dec [place]: 4 5 m/s2  MV DecT: 160 3 ms  MV E [place]: 0 7 m/s  MV E/A Ratio: 0 9  MV PHT: 46 5 ms  MVA By PHT: 4 7 cm2    [facility]    Prepared and electronically signed by    Sharyn Garcia MD  Signed 28-Sep-2021 11:48:34    Results for orders placed during the hospital encounter of 10/01/21    ANGIE (order if scheduling before 10/11/21)    Narrative  [address] [facility]  [address] [place], [place] [address]  [phone]    Transesophageal Echocardiogram  2D, Doppler, and Color Doppler    Study date:  01-Oct-2021    Patient: Cindy Rivera  MR number: [id number]  Account number: [de-identified]  : 1975  Age: 48 years  Gender: Male  Status: Outpatient  Location: GI Lab  Height: 68 5 in  Weight: 198 lb  BP: 165/ 103 mmHg    Indications: Cerebral vascular accident    Diagnoses: I63 9 - Cerebral infarction, unspecified    Sonographer:  Jurgen Sin  Interpreting Physician:  Melissa Casillas MD  Referring Physician:  Catracho Monge PA-C  Group:  [facility]  RN:  Bony Brizuela RN    SUMMARY    LEFT VENTRICLE:  Ejection fraction was estimated to be 60 %  There were no regional wall motion abnormalities  There was mild concentric hypertrophy  ATRIAL SEPTUM:  No defect or patent foramen ovale was identified  Contrast injection was performed  There was no right-to-left shunt, induced by abdominal compression  AORTIC VALVE:  There was mild regurgitation  TRICUSPID VALVE:  There was trace regurgitation  HISTORY: PRIOR HISTORY: DM; Hypertension; Hyperlipidemia; Smoker; CVA    PROCEDURE: The study was performed in the GI Lab  This was a routine study  The risks and alternatives of the procedure were explained to the patient and informed consent was obtained  The transesophageal approach was used  The study  included complete 2D imaging, limited spectral Doppler, and color Doppler  The heart rate was 86 bpm, at the start of the study  An adult omniplane probe was inserted by the attending cardiologist  Intubated with ease  One intubation  attempt(s)  There was no blood detected on the probe  Intravenous contrast ( 10cc agitated saline) was administered to evaluate intracardiac shunting  Image quality was adequate  There were no complications during the procedure    MEDICATIONS: Anesthesia administered by anesthesia team     LEFT VENTRICLE: Size was normal  Ejection fraction was estimated to be 60 %  There were no regional wall motion abnormalities  There was mild concentric hypertrophy  RIGHT VENTRICLE: The size was normal  Systolic function was normal  Wall thickness was normal     LEFT ATRIUM: Size was normal  No thrombus was identified  APPENDAGE: The size was normal  No thrombus was identified  DOPPLER: The function was normal (normal emptying velocity)  ATRIAL SEPTUM: No defect or patent foramen ovale was identified  Contrast injection was performed  There was no right-to-left shunt, induced by abdominal compression  RIGHT ATRIUM: Size was normal  No thrombus was identified  MITRAL VALVE: Valve structure was normal  There was normal leaflet separation  There was no echocardiographic evidence of vegetation  DOPPLER: There was no regurgitation  AORTIC VALVE: The valve was trileaflet  Leaflets exhibited normal thickness, mild calcification, and normal cuspal separation  There was no echocardiographic evidence of vegetation  DOPPLER: There was mild regurgitation  TRICUSPID VALVE: The valve structure was normal  There was normal leaflet separation  There was no echocardiographic evidence of vegetation  DOPPLER: There was trace regurgitation  PULMONIC VALVE: Leaflets exhibited normal thickness, no calcification, and normal cuspal separation  There was no echocardiographic evidence of vegetation  PERICARDIUM: There was no pericardial effusion  The pericardium was normal in appearance  AORTA: The root exhibited normal size  There was no atheroma  There was no evidence for dissection  There was no evidence for aneurysm  [address] Accredited Echocardiography Laboratory    Prepared and electronically signed by    Talia Hopkins MD  Signed 01-Oct-2021 14:04:40      EKG:  Reviewed by me   4/25/2022    Normal sinus rhythm    Medications:    Current Outpatient Medications:     amLODIPine (NORVASC) 10 mg tablet, Take 1 tablet (10 mg total) by mouth daily, Disp: 90 tablet, Rfl: 3    atorvastatin (LIPITOR) 80 mg tablet, Take 1 tablet (80 mg total) by mouth daily, Disp: 30 tablet, Rfl: 5    clopidogrel (PLAVIX) 75 mg tablet, Take 1 tablet (75 mg total) by mouth daily, Disp: 30 tablet, Rfl: 5    Continuous Blood Gluc Sensor (FreeStyle Slime 2 Sensor) MISC, Change sensor every 14 days, Disp: 2 each, Rfl: 3    Dulaglutide 0 75 MG/0 5ML SOPN, Inject 0 5 mL (0 75 mg total) under the skin once a week, Disp: 6 mL, Rfl: 0    hydrALAZINE (APRESOLINE) 25 mg tablet, Take 1 tablet (25 mg total) by mouth 3 (three) times a day, Disp: 90 tablet, Rfl: 5    insulin glargine (Basaglar KwikPen) 100 units/mL injection pen, Inject 28 Units under the skin daily, Disp: 15 mL, Rfl: 5    Insulin Pen Needle (PEN NEEDLES 31GX5/16") 31G X 8 MM MISC, Inject as directed daily, Disp: 100 each, Rfl: 1    lisinopril (ZESTRIL) 10 mg tablet, Take 1 tablet (10 mg total) by mouth daily, Disp: 30 tablet, Rfl: 5    metoprolol succinate (TOPROL-XL) 50 mg 24 hr tablet, Take 1 tablet (50 mg total) by mouth daily, Disp: 90 tablet, Rfl: 3    Blood Glucose Monitoring Suppl (OneTouch Verio Reflect) w/Device KIT, Check blood sugars twice daily  Please substitute with appropriate alternative as covered by patient's insurance  Dx: E11 65 (Patient not taking: Reported on 3/14/2022 ), Disp: 1 kit, Rfl: 0    Continuous Blood Gluc  (FreeStyle Slime 2 [place]) MALATHI, Use to test blood sugar, Disp: 1 each, Rfl: 0    glucose blood (OneTouch Verio) test strip, Check blood sugars twice daily  Please substitute with appropriate alternative as covered by patient's insurance  Dx: E11 65 (Patient not taking: Reported on 3/14/2022 ), Disp: 200 each, Rfl: 3      Allergies:  No Known Allergies      Assessment and Plan:  1  History of CVA (cerebrovascular accident)  Loop recorder has not yet picked up any evidence of atrial fibrillation    Continue meds per Neurology  - Ambulatory Referral to Cardiology    2  Essential hypertension  BP stable  Continue current medications  Continue to monitor BP at home and call if abnormal    3  Mixed hyperlipidemia  Continue statin and diet control  His PCP closely monitors his blood work    4  Status post placement of implantable loop recorder  Continue to monitor his loop recorder closely    Recommend aggressive risk factor modification and therapeutic lifestyle changes  Low-salt, low-calorie, low-fat, low-cholesterol diet with regular exercise and to optimize weight  I will defer the ordering and monitoring of necessity lab studies to you, but I am available and happy to review and manage any of the data at your request in the future  Discussed concepts of atherosclerosis, including signs and symptoms of cardiac disease  Previous studies were reviewed  Safety measures were reviewed  Questions were entertained and answered  Patient was advised to report any problems requiring medical attention  Follow-up with PCP and appropriate specialist and lab work as discussed  Return for follow up visit as scheduled or earlier, if needed  Thank you for allowing me to participate in the care and evaluation of your patient  Should you have any questions, please feel free to contact me        Yash Bacon MD  9/60/6161,8:23 PM

## 2022-06-21 ENCOUNTER — TELEPHONE (OUTPATIENT)
Dept: ENDOCRINOLOGY | Facility: CLINIC | Age: 47
End: 2022-06-21

## 2022-06-21 NOTE — TELEPHONE ENCOUNTER
Received a fax from [employer] requesting to initiate a prior authorization for Pt's Trulicity medication , Stephenie Liam medicaid @ [phone] per representative this medication needs to be bill through his [employer], spoke to the pharmacist, when they run the jess theoght his primary insurance is showing the his insurance is inactive   I will call pt to see if he can call his medicaid plan

## 2022-06-30 ENCOUNTER — OFFICE VISIT (OUTPATIENT)
Dept: FAMILY MEDICINE CLINIC | Facility: CLINIC | Age: 47
End: 2022-06-30
Payer: COMMERCIAL

## 2022-06-30 ENCOUNTER — TELEPHONE (OUTPATIENT)
Dept: ADMINISTRATIVE | Facility: OTHER | Age: 47
End: 2022-06-30

## 2022-06-30 VITALS
SYSTOLIC BLOOD PRESSURE: 140 MMHG | HEIGHT: 70 IN | OXYGEN SATURATION: 99 % | HEART RATE: 88 BPM | DIASTOLIC BLOOD PRESSURE: 80 MMHG | BODY MASS INDEX: 25.91 KG/M2 | TEMPERATURE: 98.2 F | WEIGHT: 181 LBS

## 2022-06-30 DIAGNOSIS — N18.4 STAGE 4 CHRONIC KIDNEY DISEASE (HCC): ICD-10-CM

## 2022-06-30 DIAGNOSIS — E11.65 TYPE 2 DIABETES MELLITUS WITH HYPERGLYCEMIA, WITH LONG-TERM CURRENT USE OF INSULIN (HCC): Primary | ICD-10-CM

## 2022-06-30 DIAGNOSIS — E03.9 ACQUIRED HYPOTHYROIDISM: ICD-10-CM

## 2022-06-30 DIAGNOSIS — I63.411 CEREBROVASCULAR ACCIDENT (CVA) DUE TO EMBOLISM OF RIGHT MIDDLE CEREBRAL ARTERY (HCC): ICD-10-CM

## 2022-06-30 DIAGNOSIS — Z79.4 TYPE 2 DIABETES MELLITUS WITH HYPERGLYCEMIA, WITH LONG-TERM CURRENT USE OF INSULIN (HCC): Primary | ICD-10-CM

## 2022-06-30 DIAGNOSIS — Z86.73 HISTORY OF CVA (CEREBROVASCULAR ACCIDENT): ICD-10-CM

## 2022-06-30 DIAGNOSIS — I10 ESSENTIAL HYPERTENSION: ICD-10-CM

## 2022-06-30 DIAGNOSIS — E11.42 DIABETIC POLYNEUROPATHY ASSOCIATED WITH TYPE 2 DIABETES MELLITUS (HCC): ICD-10-CM

## 2022-06-30 DIAGNOSIS — F17.200 CURRENT SMOKER: ICD-10-CM

## 2022-06-30 LAB — SL AMB POCT HEMOGLOBIN AIC: 6.6 (ref ?–6.5)

## 2022-06-30 PROCEDURE — 99214 OFFICE O/P EST MOD 30 MIN: CPT | Performed by: FAMILY MEDICINE

## 2022-06-30 PROCEDURE — 83036 HEMOGLOBIN GLYCOSYLATED A1C: CPT | Performed by: FAMILY MEDICINE

## 2022-06-30 NOTE — TELEPHONE ENCOUNTER
----- Message from Beverly Corey, [address] sent at 6/30/2022 10:49 AM EDT -----  Regarding: DM Eye exam  06/30/22 10:49 AM    Hello, our patient Mulugeta Gallagher has had Diabetic Eye Exam completed/performed  Please assist in updating the patient chart by making an External outreach to [facility] [phone], facility located in [place]  The date of service is 4/22        Thank you,  Anu Cannon MA  [facility]

## 2022-06-30 NOTE — LETTER
Diabetic Eye Exam Form    Date Requested: 22  Patient: Lane Greene  Patient : 1975   Referring Provider: Alix Art DO    DIABETIC Eye Exam Date _______________________________    Type of Exam MUST be documented for Diabetic Eye Exams  Please CHECK ONE  Retinal Exam       Dilated Retinal Exam       OCT       Optomap-Iris Exam      Fundus Photography     Left Eye - Please check Retinopathy AND Type or No Retinopathy      Exam did show retinopathy    Exam did not show retinopathy         Mild     Proliferative           Moderate    Severe            None         Right Eye - Please check Retinopathy AND Type or No Retinopathy     Exam did show retinopathy    Exam did not show retinopathy         Mild     Proliferative        Moderate    Severe        None       Comments __________________________________________________________    Practice Providing Exam ______________________________________________    Exam Performed By (print name) _______________________________________      Provider Signature ___________________________________________________    These reports are needed for  compliance  Please fax this completed form and a copy of the Diabetic Eye Exam report to our office located at [place] [address] as soon as possible via [phone] cesar Zhang: Phone [phone]  We thank you for your assistance in treating our mutual patient

## 2022-06-30 NOTE — TELEPHONE ENCOUNTER
Upon review of the In Basket request and the patient's chart, initial outreach has been made via fax, please see Contacts section for details       Thank you  Sarah Muhammad MA

## 2022-06-30 NOTE — LETTER
June 30, 2022     Patient: Anjel Tolliver  YOB: 1975  Date of Visit: 6/30/2022      To Whom it May Concern:    Indiana Torrez is under my professional care  Shira Laughlin was seen in my office on 6/30/2022  Shira Laughlin is to remain out of work for the next three months and will be evaluated then  If you have any questions or concerns, please don't hesitate to call           Sincerely,          Dimas Patel,

## 2022-06-30 NOTE — PATIENT INSTRUCTIONS
Reviewed medications along with medical history  A1c back in February was 10 6, redo here today is down to 6  6!   He should continue with insulin, other medications, he saw endocrinology back in March, he should have follow-up with them  They had started Trulicity also    He did see Cardiology in late April, has loop recorder, had short episode SVT, no evidence AFib  Does have history CVA, continue to control cardiovascular risk as can  He will be seeing Neurology again July 25th  Speech still thickened but is improved, some days slurs more than others  Still w decreased fine motor / weakness left hand, mild weakness left leg  Blood pressure here today after sitting is 140/80, not ideal   Cholesterol in February was 172 with HDL 44, LDL 72  Triglycerides were still elevated at 412  Nephrology has been trying to get him in for an appointment, does have chronic kidney disease, EGFR 23  He will see them in July  I did print their blood work order from March, he should also redo thyroid blood work  Back in February his TSH was 10, await redo  50+ pack-year smoking history, still smoking 1/2 to 1 PPD -   really needs to quit    Regarding work, he has been out of work since Jan 31, 2022  As in prior notes he is a   When he does return he plans to work in side, he would need to be able to lift, be on feet, he would be 40 hours weekly  At present he cannot safely perform those duties, stay out of work  I would like him to continue to try to increase his activity at home, has done therapy in the past   Hopefully continues to improve to point he can return to work    May require work hardening program      Recheck here 3 months-needs full physical by end of year  Needs diabetic eye exam

## 2022-06-30 NOTE — PROGRESS NOTES
FAMILY PRACTICE OFFICE VISIT  [facility]  [address]  [address], [place], [address]      NAME: Soumya Aparicio  AGE: 50 y o  SEX: male  : 1975   MRN: [id number]    DATE: 2022  TIME: 12:11 PM    Assessment and Plan     Problem List Items Addressed This Visit     History of CVA (cerebrovascular accident)    CKD (chronic kidney disease)    Bilateral small Cerebrovascular accident (CVA) status post tPA    Essential hypertension    Current smoker    Type 2 diabetes mellitus with hyperglycemia, with long-term current use of insulin ([facility] [address] ) - Primary    Relevant Orders    POCT hemoglobin A1c (Completed)    Diabetic polyneuropathy associated with type 2 diabetes mellitus ([facility] [address] )    Acquired hypothyroidism          Patient Instructions   Reviewed medications along with medical history  A1c back in February was 10 6, redo here today is down to 6  6!   He should continue with insulin, other medications, he saw endocrinology back in March, he should have follow-up with them  They had started Trulicity also    He did see Cardiology in late April, has loop recorder, had short episode SVT, no evidence AFib  Does have history CVA, continue to control cardiovascular risk as can  He will be seeing Neurology again   Speech still thickened but is improved, some days slurs more than others  Still w decreased fine motor / weakness left hand, mild weakness left leg  Blood pressure here today after sitting is 140/80, not ideal   Cholesterol in February was 172 with HDL 44, LDL 72  Triglycerides were still elevated at 412  Nephrology has been trying to get him in for an appointment, does have chronic kidney disease, EGFR 23  He will see them in July  I did print their blood work order from March, he should also redo thyroid blood work  Back in February his TSH was 10, await redo        50+ pack-year smoking history, still smoking 1/2 to 1 PPD -   really needs to quit    Regarding work, he has been out of work since Jan 31, 2022  As in prior notes he is a   When he does return he plans to work in side, he would need to be able to lift, be on feet, he would be 40 hours weekly  At present he cannot safely perform those duties, stay out of work  I would like him to continue to try to increase his activity at home, has done therapy in the past   Hopefully continues to improve to point he can return to work  May require work hardening program      Recheck here 3 months-needs full physical by end of year  Needs diabetic eye exam           Chief Complaint     Chief Complaint   Patient presents with    Follow-up       History of Present Illness   Sandro Ortiz is a 50y o -year-old male who is in today for a routine follow-up, I had seen him March 7th  His appointment was pushed back as I was out of the office  He has been working on his diabetic control, is seeing Endocrinology, sugars are much improved  Saw Cardiology in April, no increased palpitations, no shortness of breath worse than baseline, unfortunately does continue to smoke 1/2 to 1 pack per day  See previous regarding history strokes, his speech is still somewhat thickened, slurs speech at times  Still has some decreased fine motor left hand, weakness left hand, milder weakness in leg  Has not fallen  Has tried to do some yd work at home, fatigues easily  Has some imbalance  Remains out of work since January 31st ( was '' - if/ when returns 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' -  Would need to handle product/ lift, bend, would be fulltime      Review of Systems   Review of Systems   Constitutional: Positive for fatigue  Negative for appetite change, fever and unexpected weight change  HENT: Negative for sore throat and trouble swallowing  Eyes: Negative for visual disturbance     Respiratory: Positive for shortness of breath (Baseline mild dyspnea on exertion)  Negative for cough, chest tightness and wheezing  Cardiovascular: Negative for chest pain, palpitations and leg swelling  Has loop recorder, saw Cardiology in April, short episode SVT, no AFib   Gastrointestinal: Negative for abdominal pain, blood in stool, nausea and vomiting  No acid reflux     No change in bowel   Genitourinary: Negative for dysuria and hematuria  Neurological: Positive for weakness  Negative for syncope and headaches  No increased vertigo, no increased imbalance   Psychiatric/Behavioral: Negative for confusion  Active Problem List     Patient Active Problem List   Diagnosis    Hypercholesterolemia    Proteinuria    Current smoker    Essential hypertension    Type 2 diabetes mellitus with hyperglycemia, with long-term current use of insulin ([facility] [address] )    Brachial plexus injury, left, sequela    Diabetic polyneuropathy associated with type 2 diabetes mellitus ([facility] [address] )    Aortic valve insufficiency ( mild-mod 2020 )    History of embolic stroke    History of CVA (cerebrovascular accident)    Bilateral small Cerebrovascular accident (CVA) status post tPA    Acute on chronic renal failure (HCC)    Received intravenous tissue plasminogen activator (tPA) in emergency department    [facility] due to acute [facility] )    Acquired hypothyroidism    CKD (chronic kidney disease)       Past Medical History:  Reviewed    Past Surgical History:  Reviewed    Family History:  Reviewed    Social History:  Reviewed    Objective     Vitals:    06/30/22 1038   BP: 140/80   BP Location: Left arm   Patient Position: Sitting   Cuff Size: Large   Pulse: 88   Temp: 98 2 °F (36 8 °C)   SpO2: 99%   Weight: 82 1 kg (181 lb)   Height: 5' 10" (1 778 m)     Body mass index is 25 97 kg/m²      BP Readings from Last 3 Encounters:   06/30/22 140/80   04/25/22 130/80   03/14/22 142/90       Wt Readings from Last 3 Encounters:   06/30/22 82 1 kg (181 lb)   04/25/22 78 9 kg (174 lb) 03/14/22 82 4 kg (181 lb 9 6 oz)       Physical Exam  Constitutional:       Appearance: He is well-developed  Comments: 47-year-old male seated on table, appears older than stated age  His speech is more fluent here today but remains thickened, occasionally slurs a word  Does have some imbalance with ambulation, does not have cane  Has mild to moderate decreased fine motor left hand,  strength 4/5 on left  Mild weakness left lower extremity  Eyes:      General: No scleral icterus  Cardiovascular:      Rate and Rhythm: Normal rate and regular rhythm  Heart sounds: Normal heart sounds  No murmur heard  Pulmonary:      Effort: Pulmonary effort is normal  No respiratory distress  Breath sounds: Normal breath sounds  No wheezing, rhonchi or rales  Abdominal:      Palpations: Abdomen is soft  Tenderness: There is no abdominal tenderness  There is no guarding  Musculoskeletal:      Right lower leg: No edema  Left lower leg: No edema  Lymphadenopathy:      Cervical: No cervical adenopathy  Skin:     Coloration: Skin is not jaundiced  Neurological:      Comments: See constitutional above    Alert, oriented x3   Psychiatric:         Mood and Affect: Mood normal          Behavior: Behavior normal          ALLERGIES:  No Known Allergies    Current Medications     Current Outpatient Medications   Medication Sig Dispense Refill    amLODIPine (NORVASC) 10 mg tablet Take 1 tablet (10 mg total) by mouth daily 90 tablet 3    atorvastatin (LIPITOR) 80 mg tablet Take 1 tablet (80 mg total) by mouth daily 30 tablet 5    clopidogrel (PLAVIX) 75 mg tablet Take 1 tablet (75 mg total) by mouth daily 30 tablet 5    Continuous Blood Gluc Sensor (FreeStyle Slime 2 Sensor) MISC Change sensor every 14 days 2 each 3    Dulaglutide 0 75 MG/0 5ML SOPN Inject 0 5 mL (0 75 mg total) under the skin once a week 6 mL 1    hydrALAZINE (APRESOLINE) 25 mg tablet Take 1 tablet (25 mg total) by mouth 3 (three) times a day 90 tablet 5    insulin glargine (Basaglar KwikPen) 100 units/mL injection pen Inject 28 Units under the skin daily 15 mL 5    Insulin Pen Needle (PEN NEEDLES 31GX5/16") 31G X 8 MM MISC Inject as directed daily 100 each 1    lisinopril (ZESTRIL) 10 mg tablet Take 1 tablet (10 mg total) by mouth daily 30 tablet 5    metoprolol succinate (TOPROL-XL) 50 mg 24 hr tablet Take 1 tablet (50 mg total) by mouth daily 90 tablet 3     No current facility-administered medications for this visit              Orders Placed This Encounter   Procedures    POCT hemoglobin A1c         Anastasia Gonzales DO

## 2022-07-01 ENCOUNTER — TELEPHONE (OUTPATIENT)
Dept: NEPHROLOGY | Facility: CLINIC | Age: 47
End: 2022-07-01

## 2022-07-01 NOTE — TELEPHONE ENCOUNTER
Spoke with patient reminding him to go for lab work before his appt on 7/7  He expressed understanding and thanked us for the reminder call

## 2022-07-05 ENCOUNTER — TELEPHONE (OUTPATIENT)
Dept: FAMILY MEDICINE CLINIC | Facility: CLINIC | Age: 47
End: 2022-07-05

## 2022-07-05 NOTE — TELEPHONE ENCOUNTER
As a follow-up, a second attempt has been made for outreach via fax, please see Contacts section for details      Thank you  Summer Arguelles MA

## 2022-07-05 NOTE — TELEPHONE ENCOUNTER
Please contact patient to follow up about insurance coverage  If he does not have any, we need to send a Self Pay form ASAP please!

## 2022-07-07 NOTE — TELEPHONE ENCOUNTER
I spoke to the patient, he currently does not have insurance  I advised him he will be receiving a self pay acknowledgment form through his mychart  He understood  Self pay form added to his documents

## 2022-07-08 ENCOUNTER — TELEPHONE (OUTPATIENT)
Dept: NEPHROLOGY | Facility: CLINIC | Age: 47
End: 2022-07-08

## 2022-07-08 NOTE — TELEPHONE ENCOUNTER
Called to ask to reschedule 7/7 no show appt, voicemail box full  Sending no show letter through [facility]

## 2022-07-11 NOTE — TELEPHONE ENCOUNTER
As a final attempt, a third outreach has been made via fax  Please see Contacts section for details  This encounter will be closed and completed by end of day  Should we receive the requested information because of previous outreach attempts, the requested patient's chart will be updated appropriately       Thank you  Sarah Muhammad MA     Called and they are faxing over 7/13/22

## 2022-07-15 NOTE — TELEPHONE ENCOUNTER
Upon review of the In Basket request we have found/obtained the documentation  The status of this report is in progress/pending/awaiting final  Due to protocols, we are unable to hold requests for resulting/linking of a pending/ in progress/awaiting final items and are unable to proceed  Any additional questions or concerns should be emailed to the Practice Liaisons via [email or web address] email, please do not reply via In Basket      Thank you  Bunny Morgan MA

## 2022-07-19 DIAGNOSIS — Z79.4 TYPE 2 DIABETES MELLITUS WITH HYPERGLYCEMIA, WITH LONG-TERM CURRENT USE OF INSULIN (HCC): ICD-10-CM

## 2022-07-19 DIAGNOSIS — E11.65 TYPE 2 DIABETES MELLITUS WITH HYPERGLYCEMIA, WITH LONG-TERM CURRENT USE OF INSULIN (HCC): ICD-10-CM

## 2022-07-19 NOTE — TELEPHONE ENCOUNTER
Pt called and stated he has new insurance and will need prescription refills sent to pharmacy for Sailaja and Jillian Villalta 2 sensors

## 2022-07-20 RX ORDER — FLASH GLUCOSE SENSOR
KIT MISCELLANEOUS
Qty: 2 EACH | Refills: 3 | Status: SHIPPED | OUTPATIENT
Start: 2022-07-20

## 2022-07-21 DIAGNOSIS — Z86.73 PERSONAL HISTORY OF TRANSIENT CEREBRAL ISCHEMIA: ICD-10-CM

## 2022-07-21 DIAGNOSIS — S14.3XXS LATE EFFECT OF BRACHIAL PLEXUS INJURY: Primary | ICD-10-CM

## 2022-07-25 ENCOUNTER — OFFICE VISIT (OUTPATIENT)
Dept: NEUROLOGY | Facility: CLINIC | Age: 47
End: 2022-07-25
Payer: COMMERCIAL

## 2022-07-25 ENCOUNTER — TELEPHONE (OUTPATIENT)
Dept: FAMILY MEDICINE CLINIC | Facility: CLINIC | Age: 47
End: 2022-07-25

## 2022-07-25 VITALS
HEART RATE: 92 BPM | SYSTOLIC BLOOD PRESSURE: 140 MMHG | HEIGHT: 70 IN | TEMPERATURE: 98.2 F | OXYGEN SATURATION: 98 % | DIASTOLIC BLOOD PRESSURE: 78 MMHG | BODY MASS INDEX: 26.2 KG/M2 | WEIGHT: 183 LBS

## 2022-07-25 DIAGNOSIS — F17.200 CURRENT SMOKER: ICD-10-CM

## 2022-07-25 DIAGNOSIS — Z86.73 HISTORY OF CVA (CEREBROVASCULAR ACCIDENT): Primary | ICD-10-CM

## 2022-07-25 DIAGNOSIS — Z86.73 PERSONAL HISTORY OF TRANSIENT CEREBRAL ISCHEMIA: ICD-10-CM

## 2022-07-25 DIAGNOSIS — Z79.4 TYPE 2 DIABETES MELLITUS WITH HYPERGLYCEMIA, WITH LONG-TERM CURRENT USE OF INSULIN (HCC): ICD-10-CM

## 2022-07-25 DIAGNOSIS — E78.00 HYPERCHOLESTEROLEMIA: ICD-10-CM

## 2022-07-25 DIAGNOSIS — S14.3XXS BRACHIAL PLEXUS INJURY, LEFT, SEQUELA: ICD-10-CM

## 2022-07-25 DIAGNOSIS — I10 ESSENTIAL HYPERTENSION: ICD-10-CM

## 2022-07-25 DIAGNOSIS — E11.65 TYPE 2 DIABETES MELLITUS WITH HYPERGLYCEMIA, WITH LONG-TERM CURRENT USE OF INSULIN (HCC): ICD-10-CM

## 2022-07-25 PROCEDURE — 99214 OFFICE O/P EST MOD 30 MIN: CPT | Performed by: PSYCHIATRY & NEUROLOGY

## 2022-07-25 NOTE — TELEPHONE ENCOUNTER
Patient's S/o called to leave a message for Paula Cage, providing a fax number for a form: [phone]

## 2022-07-25 NOTE — PROGRESS NOTES
Lane Greene is a 50 y o  male  Chief Complaint   Patient presents with    History of CVA (cerebrovascular accident),        Assessment:  1  History of CVA (cerebrovascular accident)    2  Personal history of transient cerebral ischemia    3  Hypercholesterolemia    4  Brachial plexus injury, left, sequela    5  Essential hypertension    6  Type 2 diabetes mellitus with hyperglycemia, with long-term current use of insulin (HCC)    7  Current smoker        Plan:  Continue with Plavix and Crestor  Follow-up with cardiologist to rule out cardioembolic etiology  Stroke education given to the patient  Patient advised to quit smoking  Follow-up in 6 months    Discussion:  Patient is doing well without having any stroke-like symptoms, I advised him to continue with Plavix and Crestor for now follow-up with cardiology regarding his loop recorder, to keep his blood pressure cholesterol and sugar under control he was advised to quit smoking, go to the hospital if has any worsening symptoms and call me otherwise to see me back in 6 months and follow up with the other physicians  Subjective:    HPI   Patient is here in follow-up for his history of stroke, he is status post tPA in February of 2022 is currently on Plavix since his last visit he has not had any more strokes he has a loop recorder and is in follow up with cardiologist he is also on Crestor denies having any issues with his speech no dysarthria he feels he is at baseline he has some left-sided weakness secondary to his brachial plexus injury which is old no bowel and bladder incontinence he still smokes 10 cigarettes a day and is trying to quit smoking no visual symptoms no difficulty no other complaints      Vitals:    07/25/22 1249   BP: 140/78   BP Location: Right arm   Patient Position: Sitting   Cuff Size: Adult   Pulse: 92   Temp: 98 2 °F (36 8 °C)   TempSrc: Probe   SpO2: 98%   Height: 5' 10" (1 778 m)       Current Medications    Current Outpatient Medications:     amLODIPine (NORVASC) 10 mg tablet, Take 1 tablet (10 mg total) by mouth daily, Disp: 90 tablet, Rfl: 3    atorvastatin (LIPITOR) 80 mg tablet, Take 1 tablet (80 mg total) by mouth daily, Disp: 30 tablet, Rfl: 5    Continuous Blood Gluc Sensor (FreeStyle Slime 2 Sensor) MISC, Change sensor every 14 days, Disp: 2 each, Rfl: 3    Dulaglutide 0 75 MG/0 5ML SOPN, Inject 0 5 mL (0 75 mg total) under the skin once a week, Disp: 6 mL, Rfl: 1    hydrALAZINE (APRESOLINE) 25 mg tablet, Take 1 tablet (25 mg total) by mouth 3 (three) times a day, Disp: 90 tablet, Rfl: 5    insulin glargine (Basaglar KwikPen) 100 units/mL injection pen, Inject 28 Units under the skin daily, Disp: 15 mL, Rfl: 5    lisinopril (ZESTRIL) 10 mg tablet, Take 1 tablet (10 mg total) by mouth daily, Disp: 30 tablet, Rfl: 5    metoprolol succinate (TOPROL-XL) 50 mg 24 hr tablet, Take 1 tablet (50 mg total) by mouth daily, Disp: 90 tablet, Rfl: 3    clopidogrel (PLAVIX) 75 mg tablet, Take 1 tablet (75 mg total) by mouth daily, Disp: 30 tablet, Rfl: 5    Insulin Pen Needle (PEN NEEDLES 31GX5/16") 31G X 8 MM MISC, Inject as directed daily, Disp: 100 each, Rfl: 1      Allergies  Patient has no known allergies  Past Medical History  Past Medical History:   Diagnosis Date    Diabetes mellitus ([facility] [address] )     Hypertension     Stroke [facility])          Past Surgical History:  History reviewed  No pertinent surgical history  Family History:  Family History   Problem Relation Age of Onset    No Known Problems Mother     No Known Problems Father     Pancreatic cancer Brother        Social History:   reports that he has been smoking cigarettes  He has been smoking about 0 50 packs per day  He has never used smokeless tobacco  He reports that he does not drink alcohol and does not use drugs      I have reviewed the past medical history, surgical history, social and family history, current medications, allergies vitals, review of systems, and updated this information as appropriate today  Objective:    Physical Exam    Neurological Exam      GENERAL:  Cooperative in no acute distress  Well-developed and well-nourished    HEAD and NECK   Head is atraumatic normocephalic with no lesions or masses  Neck is supple with full range of motion    CARDIOVASCULAR  Carotid Arteries-no carotid bruits  NEUROLOGIC:  Mental Status-the patient is awake alert and oriented without aphasia or apraxia  Cranial Nerves: Visual fields are full to confrontation  Extraocular movements are full without nystagmus  Pupils are 2-1/2 mm and reactive  Face is symmetrical to light touch  Movements of facial expression move symmetrically  Hearing is normal to finger rub bilaterally  Soft palate lifts symmetrically  Shoulder shrug is symmetrical  Tongue is midline without atrophy  Motor: No drift is noted on arm extension  Strength is full in the upper and lower extremities with normal bulk and tone  Except for slight weakness of the left arm with decreased hand  which is secondary to his prior injury  Gait is unremarkable  Reflexes:   1+ and symmetrical        ROS:  Review of Systems   Constitutional: Negative  Negative for appetite change and fever  HENT: Negative  Negative for hearing loss, tinnitus, trouble swallowing and voice change  Eyes: Negative  Negative for photophobia and pain  Respiratory: Negative  Negative for shortness of breath  Cardiovascular: Negative  Negative for palpitations  Gastrointestinal: Negative  Negative for nausea and vomiting  Endocrine: Negative  Negative for cold intolerance  Genitourinary: Negative  Negative for dysuria, frequency and urgency  Musculoskeletal: Negative  Negative for myalgias and neck pain  Skin: Negative  Negative for rash  Neurological: Negative  Negative for dizziness, tremors, seizures, syncope, facial asymmetry, speech difficulty, weakness, light-headedness, numbness and headaches  Hematological: Negative  Does not bruise/bleed easily  Psychiatric/Behavioral: Negative  Negative for confusion, hallucinations and sleep disturbance

## 2022-07-26 ENCOUNTER — TELEPHONE (OUTPATIENT)
Dept: ENDOCRINOLOGY | Facility: CLINIC | Age: 47
End: 2022-07-26

## 2022-07-27 ENCOUNTER — TELEPHONE (OUTPATIENT)
Dept: ENDOCRINOLOGY | Facility: CLINIC | Age: 47
End: 2022-07-27

## 2022-07-27 NOTE — TELEPHONE ENCOUNTER
Stephen Long (Key: [id number])    Need help? Call us at [phone]    Status   Sent to [employer]   The plan will fax you a determination, typically within 1 to 5 business days  How do I follow up?     Drug    FreeStyle Slime 2 Sensor   Form    [facility] Medicare Drug Coverage Determination Request Form    Drug Coverage Determination Request Form for [facility] Medicare Members      [phone]  phone      [phone]  fax

## 2022-08-09 DIAGNOSIS — Z79.4 TYPE 2 DIABETES MELLITUS WITH HYPERGLYCEMIA, WITH LONG-TERM CURRENT USE OF INSULIN (HCC): ICD-10-CM

## 2022-08-09 DIAGNOSIS — E11.65 TYPE 2 DIABETES MELLITUS WITH HYPERGLYCEMIA, WITH LONG-TERM CURRENT USE OF INSULIN (HCC): ICD-10-CM

## 2022-08-09 RX ORDER — INSULIN GLARGINE 100 [IU]/ML
28 INJECTION, SOLUTION SUBCUTANEOUS DAILY
Qty: 15 ML | Refills: 5 | Status: SHIPPED | OUTPATIENT
Start: 2022-08-09

## 2022-08-09 NOTE — TELEPHONE ENCOUNTER
Pt called in stating his insurance co called him stating they needed an authorization for the medication  He called the pharmacy to see if his med was there and it was not, so he call here  He stated he has been without his med for a week  Please fill if appropriate

## 2022-08-20 DIAGNOSIS — I63.81 INFARCTION OF RIGHT THALAMUS (HCC): ICD-10-CM

## 2022-08-20 DIAGNOSIS — Z79.4 TYPE 2 DIABETES MELLITUS WITH HYPERGLYCEMIA, WITH LONG-TERM CURRENT USE OF INSULIN (HCC): ICD-10-CM

## 2022-08-20 DIAGNOSIS — E11.65 TYPE 2 DIABETES MELLITUS WITH HYPERGLYCEMIA, WITH LONG-TERM CURRENT USE OF INSULIN (HCC): ICD-10-CM

## 2022-08-20 DIAGNOSIS — I63.411 CEREBROVASCULAR ACCIDENT (CVA) DUE TO EMBOLISM OF RIGHT MIDDLE CEREBRAL ARTERY (HCC): ICD-10-CM

## 2022-08-20 RX ORDER — CLOPIDOGREL BISULFATE 75 MG/1
TABLET ORAL
Qty: 30 TABLET | Refills: 5 | Status: SHIPPED | OUTPATIENT
Start: 2022-08-20

## 2022-08-20 RX ORDER — ATORVASTATIN CALCIUM 80 MG/1
TABLET, FILM COATED ORAL
Qty: 30 TABLET | Refills: 5 | Status: SHIPPED | OUTPATIENT
Start: 2022-08-20

## 2022-08-22 DIAGNOSIS — R80.9 PROTEINURIA, UNSPECIFIED TYPE: ICD-10-CM

## 2022-08-22 PROCEDURE — 3066F NEPHROPATHY DOC TX: CPT | Performed by: PSYCHIATRY & NEUROLOGY

## 2022-08-22 PROCEDURE — 4010F ACE/ARB THERAPY RXD/TAKEN: CPT | Performed by: PSYCHIATRY & NEUROLOGY

## 2022-08-22 RX ORDER — LISINOPRIL 10 MG/1
10 TABLET ORAL DAILY
Qty: 90 TABLET | Refills: 3 | Status: SHIPPED | OUTPATIENT
Start: 2022-08-22

## 2022-09-19 DIAGNOSIS — I10 ESSENTIAL HYPERTENSION: ICD-10-CM

## 2022-09-19 RX ORDER — HYDRALAZINE HYDROCHLORIDE 25 MG/1
TABLET, FILM COATED ORAL
Qty: 90 TABLET | Refills: 5 | Status: SHIPPED | OUTPATIENT
Start: 2022-09-19

## 2022-09-30 ENCOUNTER — OFFICE VISIT (OUTPATIENT)
Dept: FAMILY MEDICINE CLINIC | Facility: CLINIC | Age: 47
End: 2022-09-30
Payer: COMMERCIAL

## 2022-09-30 VITALS
HEART RATE: 74 BPM | SYSTOLIC BLOOD PRESSURE: 146 MMHG | OXYGEN SATURATION: 99 % | HEIGHT: 70 IN | WEIGHT: 180 LBS | BODY MASS INDEX: 25.77 KG/M2 | DIASTOLIC BLOOD PRESSURE: 86 MMHG

## 2022-09-30 DIAGNOSIS — Z12.11 COLON CANCER SCREENING: ICD-10-CM

## 2022-09-30 DIAGNOSIS — E11.65 TYPE 2 DIABETES MELLITUS WITH HYPERGLYCEMIA, WITH LONG-TERM CURRENT USE OF INSULIN (HCC): ICD-10-CM

## 2022-09-30 DIAGNOSIS — F17.200 CURRENT SMOKER: ICD-10-CM

## 2022-09-30 DIAGNOSIS — I35.1 NONRHEUMATIC AORTIC VALVE INSUFFICIENCY: ICD-10-CM

## 2022-09-30 DIAGNOSIS — Z86.73 HISTORY OF CVA (CEREBROVASCULAR ACCIDENT): ICD-10-CM

## 2022-09-30 DIAGNOSIS — Z00.00 ENCOUNTER FOR ANNUAL PHYSICAL EXAM: Primary | ICD-10-CM

## 2022-09-30 DIAGNOSIS — E03.9 ACQUIRED HYPOTHYROIDISM: ICD-10-CM

## 2022-09-30 DIAGNOSIS — Z79.4 TYPE 2 DIABETES MELLITUS WITH HYPERGLYCEMIA, WITH LONG-TERM CURRENT USE OF INSULIN (HCC): ICD-10-CM

## 2022-09-30 DIAGNOSIS — N18.4 STAGE 4 CHRONIC KIDNEY DISEASE (HCC): ICD-10-CM

## 2022-09-30 DIAGNOSIS — I63.9 DYSARTHRIA DUE TO ACUTE STROKE (HCC): ICD-10-CM

## 2022-09-30 DIAGNOSIS — R47.1 DYSARTHRIA DUE TO ACUTE STROKE (HCC): ICD-10-CM

## 2022-09-30 DIAGNOSIS — I63.411 CEREBROVASCULAR ACCIDENT (CVA) DUE TO EMBOLISM OF RIGHT MIDDLE CEREBRAL ARTERY (HCC): ICD-10-CM

## 2022-09-30 DIAGNOSIS — I10 ESSENTIAL HYPERTENSION: ICD-10-CM

## 2022-09-30 DIAGNOSIS — E11.42 DIABETIC POLYNEUROPATHY ASSOCIATED WITH TYPE 2 DIABETES MELLITUS (HCC): ICD-10-CM

## 2022-09-30 LAB
LEFT EYE DIABETIC RETINOPATHY: NORMAL
LEFT EYE IMAGE QUALITY: NORMAL
LEFT EYE MACULAR EDEMA: NORMAL
LEFT EYE OTHER RETINOPATHY: NORMAL
RIGHT EYE DIABETIC RETINOPATHY: NORMAL
RIGHT EYE IMAGE QUALITY: NORMAL
RIGHT EYE MACULAR EDEMA: NORMAL
RIGHT EYE OTHER RETINOPATHY: NORMAL
SEVERITY (EYE EXAM): NORMAL
SL AMB POCT HEMOGLOBIN AIC: 7.6 (ref ?–6.5)

## 2022-09-30 PROCEDURE — 83036 HEMOGLOBIN GLYCOSYLATED A1C: CPT | Performed by: FAMILY MEDICINE

## 2022-09-30 PROCEDURE — 99396 PREV VISIT EST AGE 40-64: CPT | Performed by: FAMILY MEDICINE

## 2022-09-30 NOTE — PATIENT INSTRUCTIONS
Reviewed health history along with medication, he needs to take his hydralazine 25 mg 3 times daily, blood pressure is not ideal here today  Also continue lisinopril 10 mg daily, metoprolol succinate 50 mg daily, amlodipine 10 mg daily  Continue to control cardiovascular risk/further stroke as can  He does have loop recorder, saw Cardiology back in April, back in February cholesterol was 172 with HDL 44, LDL 72  Unfortunately continues to smoke, 1/2 pack per day, well aware risks associated with this, continue to try to cut back and quit  A1c redone here today has risen to 7 6, was 6 6 at his visit in late June  Overall does remain much improved versus back in February when his A1c was 10 6, has even been higher than this in the past   He is not seeing Endocrinology, continue with insulin, dulaglutide as is  Continue with healthy diet  Iris exam done here today, he should see an eye doctor  Foot exam was done here today  He needs follow-up with Nephrology regarding chronic kidney disease  Did discuss monitoring closely as he may need dialysis  He did see Neurology in July, he will see them every 6 months, continue Plavix along with Crestor  We did review previous blood work, he did not do blood work after last visit, I reprinted lab slips      Immunization History   Administered Date(s) Administered    COVID-19 MODERNA VACC 0 5 ML IM 08/28/2021, 09/23/2021    Tdap 07/24/2020       He does not do yearly Flu shot, declines [id number]  Tdap/tetanus shot is up to date  (done every 10 yrs for superficial cuts, every 5 yrs for deep wounds)  Covid vaccine received x2     Regarding Colon Cancer screening, we discussed Colonoscopy vs ColoGuard is indicated starting at age 36-53  Will do ColoGuard  He has not done that yet, I reordered it    Discussed Prostate Cancer screening pros/cons starting at age 48      We did discuss his work situation, has been out of work at [employer] since January 31st, previously had been a , 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

## 2022-09-30 NOTE — PROGRESS NOTES
FAMILY PRACTICE OFFICE VISIT  [facility] [address] [facility]  [address]  [address], [place], [address]      NAME: Jeannette Tang  AGE: 50 y o  SEX: male  : 1975   MRN: [id number]    DATE: 2022  TIME: 1:01 PM    Assessment and Plan     Problem List Items Addressed This Visit     History of CVA (cerebrovascular accident)    CKD (chronic kidney disease)    Relevant Orders    Microalbumin / creatinine urine ratio    Comprehensive metabolic panel    Dysarthria due to acute stroke ([facility] [address] )    Bilateral small Cerebrovascular accident (CVA) status post tPA    Relevant Orders    CBC    Comprehensive metabolic panel    Aortic valve insufficiency ( mild-mod  )    Essential hypertension    Current smoker    Type 2 diabetes mellitus with hyperglycemia, with long-term current use of insulin (HCC)    Relevant Orders    Microalbumin / creatinine urine ratio    CBC    Comprehensive metabolic panel    POCT hemoglobin A1c (Completed)    IRIS Diabetic eye exam    Diabetic polyneuropathy associated with type 2 diabetes mellitus ([facility] [address] )    Acquired hypothyroidism    Relevant Orders    TSH, 3rd generation    T4, free      Other Visit Diagnoses     Encounter for annual physical exam    -  Primary    Colon cancer screening        Relevant Orders    Cologuard          Patient Instructions     Reviewed health history along with medication, he needs to take his hydralazine 25 mg 3 times daily, blood pressure is not ideal here today  Also continue lisinopril 10 mg daily, metoprolol succinate 50 mg daily, amlodipine 10 mg daily  Continue to control cardiovascular risk/further stroke as can  He does have loop recorder, saw Cardiology back in April, back in February cholesterol was 172 with HDL 44, LDL 72  Unfortunately continues to smoke, 1/2 pack per day, well aware risks associated with this, continue to try to cut back and quit      A1c redone here today has risen to 7 6, was 6 6 at his visit in late June  Overall does remain much improved versus back in February when his A1c was 10 6, has even been higher than this in the past   He is not seeing Endocrinology, continue with insulin, dulaglutide as is  Continue with healthy diet  Iris exam done here today, he should see an eye doctor  Foot exam was done here today  He needs follow-up with Nephrology regarding chronic kidney disease  Did discuss monitoring closely as he may need dialysis  He did see Neurology in July, he will see them every 6 months, continue Plavix along with Crestor  We did review previous blood work, he did not do blood work after last visit, I reprinted lab slips      Immunization History   Administered Date(s) Administered    COVID-19 MODERNA VACC 0 5 ML IM 08/28/2021, 09/23/2021    Tdap 07/24/2020       He does not do yearly Flu shot, declines [id number]  Tdap/tetanus shot is up to date  (done every 10 yrs for superficial cuts, every 5 yrs for deep wounds)  Covid vaccine received x2     Regarding Colon Cancer screening, we discussed Colonoscopy vs ColoGuard is indicated starting at age 36-53  Will do ColoGuard  He has not done that yet, I reordered it    Discussed Prostate Cancer screening pros/cons starting at age 48  We did discuss his work situation, has been out of work at [employer] since January 31st, previously had been a , 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             Chief Complaint     Chief Complaint   Patient presents with    Follow-up       History of Present Illness   Destini Plummer is a 50y o -year-old male who is in today for a routine 3 month follow-up/ PE, since I saw him June 30th he relates he has been feeling about the same, continues with decreased fine motor left hand but no worse than before, does try to do home exercises  Has some weakness of left arm, leg which continue to be mild, he has had no falls, no new signs of stroke  Denies chest pain or increased palpitations  Regarding medication he is using everything other than only using hydralazine once daily  Remains out of work since January 31st, he relates his prior job was terminated    Still smoking 1/2 pack per day      Review of Systems   Review of Systems   Constitutional: Positive for fatigue  Negative for appetite change, fever and unexpected weight change  HENT: Negative for sore throat and trouble swallowing  Respiratory: Positive for cough (Occasional cough, no hemoptysis)  Negative for chest tightness, shortness of breath and wheezing  Cardiovascular: Negative for chest pain, palpitations and leg swelling  Gastrointestinal: Negative for abdominal pain, blood in stool, nausea and vomiting  No acid reflux     No change in bowel   Genitourinary: Negative for dysuria and hematuria  Neurological: Negative for dizziness, syncope, light-headedness and headaches  Psychiatric/Behavioral: Negative for behavioral problems and confusion         Active Problem List     Patient Active Problem List   Diagnosis    Hypercholesterolemia    Proteinuria    Current smoker    Essential hypertension    Type 2 diabetes mellitus with hyperglycemia, with long-term current use of insulin ([facility] [address] )    Brachial plexus injury, left, sequela    Diabetic polyneuropathy associated with type 2 diabetes mellitus ([address] )    Aortic valve insufficiency ( mild-mod 2020 )    History of embolic stroke    History of CVA (cerebrovascular accident)    Bilateral small Cerebrovascular accident (CVA) status post tPA    Acute on chronic renal failure (HCC)    Received intravenous tissue plasminogen activator (tPA) in emergency department    Dysarthria due to acute stroke ([facility] [address] )    Acquired hypothyroidism    CKD (chronic kidney disease)       Past Medical History:  Reviewed    Past Surgical History:  Reviewed    Family History:  Reviewed    Social History:  Reviewed    Objective     Vitals:    09/30/22 1214   BP: 146/86   BP Location: Left arm   Patient Position: Sitting   Cuff Size: Adult   Pulse: 74   SpO2: 99%   Weight: 81 6 kg (180 lb)   Height: 5' 10" (1 778 m)     Body mass index is 25 83 kg/m²  BP Readings from Last 3 Encounters:   09/30/22 146/86   07/25/22 140/78   06/30/22 140/80       Wt Readings from Last 3 Encounters:   09/30/22 81 6 kg (180 lb)   07/25/22 83 kg (183 lb)   06/30/22 82 1 kg (181 lb)       Physical Exam  Constitutional:       Comments: 58-year-old, appears older than stated age, speech is intelligible but thickened, does appear improved verses previous  He does have decreased fine motor left hand, mild weakness left arm, leg as before  No other new neurological deficit  He is alert, oriented   Eyes:      General: No scleral icterus  Cardiovascular:      Rate and Rhythm: Normal rate and regular rhythm  Heart sounds: Murmur heard  Pulmonary:      Effort: Pulmonary effort is normal  No respiratory distress  Breath sounds: Normal breath sounds  No wheezing, rhonchi or rales  Abdominal:      Palpations: Abdomen is soft  Tenderness: There is no abdominal tenderness  Musculoskeletal:      Right lower leg: No edema  Left lower leg: No edema  Skin:     Coloration: Skin is not jaundiced     Neurological:      Comments: See above   Psychiatric:         Behavior: Behavior normal          ALLERGIES:  No Known Allergies    Current Medications     Current Outpatient Medications   Medication Sig Dispense Refill    amLODIPine (NORVASC) 10 mg tablet Take 1 tablet (10 mg total) by mouth daily 90 tablet 3    atorvastatin (LIPITOR) 80 mg tablet take 1 tablet by mouth once daily 30 tablet 5    clopidogrel (PLAVIX) 75 mg tablet take 1 tablet by mouth once daily 30 tablet 5    Dulaglutide 0 75 MG/0 5ML SOPN Inject 0 5 mL (0 75 mg total) under the skin once a week 6 mL 1    hydrALAZINE (APRESOLINE) 25 mg tablet take 1 tablet by mouth three times a day 90 tablet 5    Insulin Glargine Solostar (Basaglar KwikPen) 100 UNIT/ML SOPN Inject 28 Units under the skin daily 15 mL 5    lisinopril (ZESTRIL) 10 mg tablet Take 1 tablet (10 mg total) by mouth daily 90 tablet 3    metoprolol succinate (TOPROL-XL) 50 mg 24 hr tablet Take 1 tablet (50 mg total) by mouth daily 90 tablet 3    Continuous Blood Gluc Sensor (FreeStyle Slime 2 Sensor) MISC Change sensor every 14 days (Patient not taking: Reported on 9/30/2022) 2 each 3     No current facility-administered medications for this visit              Orders Placed This Encounter   Procedures    IRIS Diabetic eye exam    Cologuard    Microalbumin / creatinine urine ratio    CBC    Comprehensive metabolic panel    TSH, 3rd generation    T4, free    POCT hemoglobin A1c         Misty Kanner

## 2022-09-30 NOTE — PROGRESS NOTES
Diabetic Foot Exam    Patient's shoes and socks removed  Right Foot/Ankle   Right Foot Inspection      Sensory   Monofilament testing: diminished    Vascular  Capillary refills: < 3 seconds  The right DP pulse is 2+  The right PT pulse is 2+  Left Foot/Ankle  Left Foot Inspection      Sensory   Monofilament testing: diminished    Vascular  Capillary refills: < 3 seconds  The left DP pulse is 2+  The left PT pulse is 2+       Assign Risk Category  No deformity present  Loss of protective sensation  No weak pulses  Risk: 1

## 2022-11-02 DIAGNOSIS — I10 ESSENTIAL HYPERTENSION: ICD-10-CM

## 2022-11-02 RX ORDER — AMLODIPINE BESYLATE 10 MG/1
TABLET ORAL
Qty: 90 TABLET | Refills: 3 | Status: SHIPPED | OUTPATIENT
Start: 2022-11-02

## 2022-12-06 ENCOUNTER — TELEPHONE (OUTPATIENT)
Dept: NEUROLOGY | Facility: CLINIC | Age: 47
End: 2022-12-06

## 2022-12-06 NOTE — TELEPHONE ENCOUNTER
Called patient to reschedule 1//25/22 appointment with Dr Artemio Markham but patient's phone number is out of service  Called Significant other phone number which is also out of service  Letter sent to liban

## 2022-12-22 ENCOUNTER — TELEPHONE (OUTPATIENT)
Dept: FAMILY MEDICINE CLINIC | Facility: CLINIC | Age: 47
End: 2022-12-22

## 2022-12-22 NOTE — TELEPHONE ENCOUNTER
Patient called today asking about forms from [employer] [facility]  We do not receive forms, I do provided patient with direct fax number and ask to call [employer] and re-fax forms to this number

## 2022-12-30 ENCOUNTER — OFFICE VISIT (OUTPATIENT)
Dept: FAMILY MEDICINE CLINIC | Facility: CLINIC | Age: 47
End: 2022-12-30

## 2022-12-30 VITALS
OXYGEN SATURATION: 99 % | TEMPERATURE: 98.5 F | SYSTOLIC BLOOD PRESSURE: 136 MMHG | BODY MASS INDEX: 26.05 KG/M2 | DIASTOLIC BLOOD PRESSURE: 80 MMHG | WEIGHT: 182 LBS | HEIGHT: 70 IN | HEART RATE: 92 BPM

## 2022-12-30 DIAGNOSIS — E03.9 ACQUIRED HYPOTHYROIDISM: ICD-10-CM

## 2022-12-30 DIAGNOSIS — E11.42 DIABETIC POLYNEUROPATHY ASSOCIATED WITH TYPE 2 DIABETES MELLITUS (HCC): ICD-10-CM

## 2022-12-30 DIAGNOSIS — R26.89 IMPAIRED BALANCE AS LATE EFFECT OF CEREBROVASCULAR ACCIDENT (CVA): ICD-10-CM

## 2022-12-30 DIAGNOSIS — E78.00 HYPERCHOLESTEROLEMIA: ICD-10-CM

## 2022-12-30 DIAGNOSIS — I69.322 DYSARTHRIA AS LATE EFFECT OF CEREBROVASCULAR ACCIDENT (CVA): Primary | ICD-10-CM

## 2022-12-30 DIAGNOSIS — I10 ESSENTIAL HYPERTENSION: ICD-10-CM

## 2022-12-30 DIAGNOSIS — Z12.11 COLON CANCER SCREENING: ICD-10-CM

## 2022-12-30 DIAGNOSIS — N18.4 STAGE 4 CHRONIC KIDNEY DISEASE (HCC): ICD-10-CM

## 2022-12-30 DIAGNOSIS — R80.1 PERSISTENT PROTEINURIA: ICD-10-CM

## 2022-12-30 DIAGNOSIS — I69.398 IMPAIRED BALANCE AS LATE EFFECT OF CEREBROVASCULAR ACCIDENT (CVA): ICD-10-CM

## 2022-12-30 DIAGNOSIS — R29.818 FINE MOTOR SKILL LOSS: ICD-10-CM

## 2022-12-30 DIAGNOSIS — Z86.73 HISTORY OF CEREBROVASCULAR ACCIDENT (CVA) DUE TO EMBOLISM: ICD-10-CM

## 2022-12-30 DIAGNOSIS — Z79.4 TYPE 2 DIABETES MELLITUS WITH OTHER CIRCULATORY COMPLICATION, WITH LONG-TERM CURRENT USE OF INSULIN (HCC): ICD-10-CM

## 2022-12-30 DIAGNOSIS — E11.59 TYPE 2 DIABETES MELLITUS WITH OTHER CIRCULATORY COMPLICATION, WITH LONG-TERM CURRENT USE OF INSULIN (HCC): ICD-10-CM

## 2022-12-30 DIAGNOSIS — R29.898 FINE MOTOR SKILL LOSS: ICD-10-CM

## 2022-12-30 PROBLEM — Z92.82 RECEIVED INTRAVENOUS TISSUE PLASMINOGEN ACTIVATOR (TPA) IN EMERGENCY DEPARTMENT: Status: RESOLVED | Noted: 2022-02-01 | Resolved: 2022-12-30

## 2022-12-30 NOTE — PATIENT INSTRUCTIONS
He is approaching 1 year out status post embolic CVA, speech has improved but continues with decree dysarthria, speech is intelligible  Does continue with decreased , decreased fine motor left hand, has done physical therapy, continue with strengthening at home  Mild imbalance does persist, does have increased fall risk, watch for falls  Continue to try to decrease cardiovascular risk/stroke risk as can, unfortunately he does continue to smoke 1/2 pack/day, really needs to quit  See previous discussions  As in prior notes he did have loop recorder implant back in April 2022, no sign of A  fib  Should continue on Plavix, Crestor as is  Blood pressure acceptable but not ideal after sitting, make sure to continue amlodipine 10 mg daily, hydralazine 25 mg 3 times daily, lisinopril 10 mg daily  A1c September 30 had risen to 7 6, he should continue insulin, dulaglutide, continue to work on [address]  [employer]  I would like him to redo blood work along with urine test mid January  Foot exam was done in late September, is up-to-date with eye exam     We again discussed his chronic kidney disease, stage IV, needs to see nephrology, still has not set that up, does have their number  We did discuss possibility of future dialysis  We will see him again in 3 months, call sooner if needed  Awaiting follow-up paperwork regarding out of work/disability  He has been out of work since January 31, 2022 due to stroke, is improved, appears to be reaching maximal benefit/recovery, continue to try to do strengthening left hand, work on fine motor skills, try to read out loud, do speech therapy at home  He will remain out of work for another 3 months, at that time we will plan to release him to work with restriction, he will be limited by his speech, left hand fine motor deficit, mild weakness left leg, mild imbalance regarding types of work

## 2022-12-30 NOTE — PROGRESS NOTES
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) - Primary    Bilateral small Cerebrovascular accident (CVA) status post tPA    Essential hypertension    Relevant Orders    Comprehensive metabolic panel    Microalbumin / creatinine urine ratio    Hypercholesterolemia    Relevant Orders    Lipid panel    Type 2 diabetes mellitus with circulatory disorder, with long-term current use of insulin (HCC)    Relevant Orders    Comprehensive metabolic panel    Hemoglobin A1C    Microalbumin / creatinine urine ratio    Lipid panel    Diabetic polyneuropathy associated with type 2 diabetes mellitus ([facility] [address] )    Acquired hypothyroidism    Relevant Orders    TSH, 3rd generation with Free T4 reflex   Other Visit Diagnoses     Colon cancer screening        Relevant Orders    Cologuard          Patient Instructions   He is approaching 1 year out status post embolic CVA, speech has improved but continues with decree dysarthria, speech is intelligible  Does continue with decreased , decreased fine motor left hand, has done physical therapy, continue with strengthening at home  Mild imbalance does persist, does have increased fall risk, watch for falls      Continue to try to decrease cardiovascular risk/stroke risk as can, unfortunately he does continue to smoke 1/2 pack/day, really needs to quit  See previous discussions  As in prior notes he did have loop recorder implant back in April 2022, no sign of A  fib  Should continue on Plavix, Crestor as is  Blood pressure acceptable but not ideal after sitting, make sure to continue amlodipine 10 mg daily, hydralazine 25 mg 3 times daily, lisinopril 10 mg daily  A1c September 30 had risen to 7 6, he should continue insulin, dulaglutide, continue to work on [address]  [employer]  I would like him to redo blood work along with urine test mid January  Foot exam was done in late September, is up-to-date with eye exam     We again discussed his chronic kidney disease, stage IV, needs to see nephrology, still has not set that up, does have their number  We did discuss possibility of future dialysis  We will see him again in 3 months, call sooner if needed  Awaiting follow-up paperwork regarding out of work/disability  He has been out of work since January 31, 2022 due to stroke, is improved, appears to be reaching maximal benefit/recovery, continue to try to do strengthening left hand, work on fine motor skills, try to read out loud, do speech therapy at home  He will remain out of work for another 3 months, at that time we will plan to release him to work with restriction, he will be limited by his speech, left hand fine motor deficit, mild weakness left leg, mild imbalance regarding types of work  Chief Complaint     Chief Complaint   Patient presents with   • Physical Exam       History of Present Illness   Mansi Valero is a 50y o -year-old male who is in for a follow-up visit, he relates he is using medication as directed  His speech has improved but still with degree dysarthria  Continues with decreased fine motor left hand, no new weakness versus previous, does have some chronic weakness left leg with imbalance, no recent falls  Still smoking 1/2 pack/day      Has not set up follow-up with nephrology yet regarding chronic kidney disease  Did see neurology back in July    Remains out of work since January 31, 2022 due to stroke  Was working at , was driving cars, 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  He is planning on trying to return to a job with a  that does not involve driving when released      Review of Systems   Review of Systems   Constitutional: Positive for fatigue (Baseline)  Negative for appetite change, fever and unexpected weight change  HENT: Negative for sore throat and trouble swallowing  Respiratory: Negative for cough, chest tightness, shortness of breath and wheezing  Cardiovascular: Negative for chest pain, palpitations and leg swelling  Gastrointestinal: Negative for abdominal pain, blood in stool, nausea and vomiting  No new acid reflux     No change in bowel   Genitourinary: Negative for dysuria and hematuria  Neurological: Positive for speech difficulty, weakness, light-headedness (Occasional) and headaches (Occasional, no change in pattern, no worse than baseline)  Negative for dizziness, seizures and syncope  Hematological: Bruises/bleeds easily  Psychiatric/Behavioral: Negative for behavioral problems and confusion         Active Problem List     Patient Active Problem List   Diagnosis   • Hypercholesterolemia   • Proteinuria   • Current smoker   • Essential hypertension   • Type 2 diabetes mellitus with circulatory disorder, with long-term current use of insulin ([facility])   • Brachial plexus injury, left, sequela   • Diabetic polyneuropathy associated with type 2 diabetes mellitus ([facility] [address] )   • Aortic valve insufficiency ( mild-mod 2020 )   • History of embolic stroke   • History of CVA (cerebrovascular accident)   • Bilateral small Cerebrovascular accident (CVA) status post tPA   • Acute on chronic renal failure ([address] )   • 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)   • Acquired hypothyroidism   • CKD (chronic kidney disease)   • Fine motor skill loss left hand   • Impaired balance as late effect of cerebrovascular accident (CVA)       Past Medical History:  Reviewed    Past Surgical History:  Reviewed    Family History:  Reviewed    Social History:  Reviewed    Objective     Vitals:    12/30/22 1142   BP: 136/80   BP Location: Left arm   Patient Position: Sitting   Cuff Size: Large   Pulse: 92   Temp: 98 5 °F (36 9 °C)   SpO2: 99%   Weight: 82 6 kg (182 lb)   Height: 5' 10" (1 778 m)     Body mass index is 26 11 kg/m²  BP Readings from Last 3 Encounters:   12/30/22 136/80   09/30/22 146/86   07/25/22 140/78       Wt Readings from Last 3 Encounters:   12/30/22 82 6 kg (182 lb)   09/30/22 81 6 kg (180 lb)   07/25/22 83 kg (183 lb)       Physical Exam  Constitutional:       Comments: 38-year-old male who appears older than his stated age seated on table, appears comfortable at rest   Speech is intelligible but does have degree of dysarthria  Does have significant decrease fine motor left hand as before, +4  left hand, proximal musculature appears intact  Very mild weakness left leg  He was able to toe-touch but did lose balance upon arising  Finger-to-nose slightly off  He is alert, mental status appears appropriate/intact   Eyes:      General: No scleral icterus  Cardiovascular:      Rate and Rhythm: Normal rate and regular rhythm  Heart sounds: Murmur (1/6 systolic ejection murmur) heard  Pulmonary:      Effort: Pulmonary effort is normal  No respiratory distress  Breath sounds: Normal breath sounds  No wheezing, rhonchi or rales  Abdominal:      Tenderness: There is no abdominal tenderness  Musculoskeletal:      Right lower leg: No edema  Left lower leg: No edema  Lymphadenopathy:      Cervical: No cervical adenopathy  Skin:     Coloration: Skin is not jaundiced  Neurological:      Comments: See above    Had seen neurology for full exam back in July   Psychiatric:         Behavior: Behavior normal  ALLERGIES:  No Known Allergies    Current Medications     Current Outpatient Medications   Medication Sig Dispense Refill   • amLODIPine (NORVASC) 10 mg tablet take 1 tablet by mouth once daily 90 tablet 3   • atorvastatin (LIPITOR) 80 mg tablet take 1 tablet by mouth once daily 30 tablet 5   • clopidogrel (PLAVIX) 75 mg tablet take 1 tablet by mouth once daily 30 tablet 5   • Continuous Blood Gluc Sensor (FreeStyle Slime 2 Sensor) MISC Change sensor every 14 days 2 each 3   • Dulaglutide 0 75 MG/0 5ML SOPN Inject 0 5 mL (0 75 mg total) under the skin once a week 6 mL 1   • hydrALAZINE (APRESOLINE) 25 mg tablet take 1 tablet by mouth three times a day 90 tablet 5   • Insulin Glargine Solostar (Basaglar KwikPen) 100 UNIT/ML SOPN Inject 28 Units under the skin daily 15 mL 5   • lisinopril (ZESTRIL) 10 mg tablet Take 1 tablet (10 mg total) by mouth daily 90 tablet 3   • metoprolol succinate (TOPROL-XL) 50 mg 24 hr tablet Take 1 tablet (50 mg total) by mouth daily 90 tablet 3     No current facility-administered medications for this visit              Orders Placed This Encounter   Procedures   • Cologuard   • CBC   • Comprehensive metabolic panel   • Hemoglobin A1C   • TSH, 3rd generation with Free T4 reflex   • Microalbumin / creatinine urine ratio   • Lipid panel         Taylor Anna DO

## 2023-01-03 ENCOUNTER — TELEPHONE (OUTPATIENT)
Dept: ADMINISTRATIVE | Facility: OTHER | Age: 48
End: 2023-01-03

## 2023-01-03 NOTE — TELEPHONE ENCOUNTER
Upon review of the request/inquiry, we are reaching out to you to ask for assistance  The original request did not include enough detail to proceed  Please provide the following details to allow for request processing; please provide another facility name and/or location for eye exam   There is no [facility] in [place], [place]  To respond with requested information, open this Encounter, navigate to the Routing section, add your response to the routing comments, add my name to the Recipient field, and select Send and Close Workspace      Thank you  Izabel Calhoun MA

## 2023-01-03 NOTE — TELEPHONE ENCOUNTER
----- Message from Caden Barnes sent at 12/30/2022 11:55 AM EST -----  Regarding: DM Eye exam  12/30/22 11:56 AM    Hello, our patient Romana Ro has had Diabetic Eye Exam completed/performed  Please assist in updating the patient chart by making an External outreach to [facility]  facility located in [place]  The date of service is 2022      Thank you,  Anu Sweet MA  [facility] PRIMARY [facility]

## 2023-01-03 NOTE — LETTER
Diabetic Eye Exam Form    Date Requested: 23  Patient: Viktoriya Tom  Patient : 1975   Referring Provider: Nayan Mejía DO      DIABETIC Eye Exam Date _______________________________      Type of Exam MUST be documented for Diabetic Eye Exams  Please CHECK ONE  Retinal Exam       Dilated Retinal Exam       OCT       Optomap-Iris Exam      Fundus Photography       Left Eye - Please check Retinopathy or No Retinopathy        Exam did show retinopathy    Exam did not show retinopathy       Right Eye - Please check Retinopathy or No Retinopathy       Exam did show retinopathy    Exam did not show retinopathy       Comments __________________________________________________________    Practice Providing Exam ______________________________________________    Exam Performed By (print name) _______________________________________      Provider Signature ___________________________________________________      These reports are needed for  compliance  Please fax this completed form and a copy of the Diabetic Eye Exam report to our office located at [place] [address] as soon as possible via [phone] cesar Bowen: Phone [phone]  We thank you for your assistance in treating our mutual patient

## 2023-01-06 NOTE — TELEPHONE ENCOUNTER
Upon review of the In Basket request and the patient's chart, initial outreach has been made via fax to facility  Please see Contacts section for details       Thank you  Stephanie White MA

## 2023-01-11 NOTE — TELEPHONE ENCOUNTER
As a follow-up, a second attempt has been made for outreach via fax to facility  Please see Contacts section for details      Thank you  Carmelita Montiel MA

## 2023-01-13 NOTE — TELEPHONE ENCOUNTER
As a final attempt, a third outreach has been made via telephone call to facility  Please see Contacts section for details  This encounter will be closed and completed by end of day  Should we receive the requested information because of previous outreach attempts, the requested patient's chart will be updated appropriately       Thank you  Martell Edmondson MA

## 2023-02-16 DIAGNOSIS — Z79.4 TYPE 2 DIABETES MELLITUS WITH HYPERGLYCEMIA, WITH LONG-TERM CURRENT USE OF INSULIN (HCC): ICD-10-CM

## 2023-02-16 DIAGNOSIS — E11.65 TYPE 2 DIABETES MELLITUS WITH HYPERGLYCEMIA, WITH LONG-TERM CURRENT USE OF INSULIN (HCC): ICD-10-CM

## 2023-02-22 RX ORDER — DULAGLUTIDE 0.75 MG/.5ML
INJECTION, SOLUTION SUBCUTANEOUS
Qty: 2 ML | Refills: 0 | Status: SHIPPED | OUTPATIENT
Start: 2023-02-22

## 2023-02-28 ENCOUNTER — OFFICE VISIT (OUTPATIENT)
Dept: ENDOCRINOLOGY | Age: 48
End: 2023-02-28

## 2023-02-28 VITALS
DIASTOLIC BLOOD PRESSURE: 80 MMHG | WEIGHT: 186 LBS | BODY MASS INDEX: 26.63 KG/M2 | SYSTOLIC BLOOD PRESSURE: 160 MMHG | HEART RATE: 106 BPM | OXYGEN SATURATION: 95 % | TEMPERATURE: 99.5 F | HEIGHT: 70 IN

## 2023-02-28 DIAGNOSIS — N18.4 STAGE 4 CHRONIC KIDNEY DISEASE (HCC): ICD-10-CM

## 2023-02-28 DIAGNOSIS — E11.59 TYPE 2 DIABETES MELLITUS WITH OTHER CIRCULATORY COMPLICATION, WITH LONG-TERM CURRENT USE OF INSULIN (HCC): Primary | ICD-10-CM

## 2023-02-28 DIAGNOSIS — Z79.4 TYPE 2 DIABETES MELLITUS WITH OTHER CIRCULATORY COMPLICATION, WITH LONG-TERM CURRENT USE OF INSULIN (HCC): Primary | ICD-10-CM

## 2023-02-28 DIAGNOSIS — E78.00 HYPERCHOLESTEROLEMIA: ICD-10-CM

## 2023-02-28 DIAGNOSIS — I63.411 CEREBROVASCULAR ACCIDENT (CVA) DUE TO EMBOLISM OF RIGHT MIDDLE CEREBRAL ARTERY (HCC): ICD-10-CM

## 2023-02-28 DIAGNOSIS — E55.9 VITAMIN D DEFICIENCY: ICD-10-CM

## 2023-02-28 DIAGNOSIS — E03.9 ACQUIRED HYPOTHYROIDISM: ICD-10-CM

## 2023-02-28 NOTE — PROGRESS NOTES
Katty Rivera 50 y o  male MRN: [id number]    Encounter: [id number]      Assessment/Plan     Assessment: This is a 50y o -year-old male for follow up on    1-T2DM: no recent A1c level  He is on CGM but no data is available  He denies having hypoglycemic symptoms  He has been loose on his diet recently  His last A1c level is from months ago which was 7 5%  He is on trulicity and basaglar/   His GFR is too low for taking metformin  Trulicity use in CKD is ???   It is advised there is no need for dose adjustment and actually somehow it may help proteinuria  If his A1c level is higher than before options are [facility] his nephrolog[facility] versus adding low dose prandin q ac     2-h/o"hypothyroidism" in chart: he is unaware of that and his SC of 100 does not go with that  SO will re check his TSH level  3-AV disease, CVA, dyslipidemia: per his cardiologist    Plan:  A1c, TSH, lipid panel, CMP now   All meds same for time being  RTV in 3 months with A1c    CC: Diabetes    History of Present Illness     HPI:  T2DM, HTN ( since 8 yrs ago), CKD V, dyslpidemia, CVA     Review of Systems   Constitutional: Negative for appetite change, fatigue and unexpected weight change  HENT: Negative for trouble swallowing  Eyes: Negative for visual disturbance  Respiratory: Negative for cough, chest tightness and shortness of breath  Cardiovascular: Negative for chest pain, palpitations and leg swelling  Gastrointestinal: Negative for blood in stool, constipation, diarrhea, nausea and vomiting  Endocrine: Negative for polyphagia and polyuria  Genitourinary: Negative for dysuria and frequency  Musculoskeletal: Negative for arthralgias  Skin: Negative for rash and wound  Neurological: Negative for weakness, numbness and headaches  Hematological: Does not bruise/bleed easily  Psychiatric/Behavioral: Negative for confusion         Historical Information   Past Medical History:   Diagnosis Date   • Diabetes mellitus ([facility] [address] )    • Hypertension    • Received intravenous tissue plasminogen activator (tPA) in emergency department 2/1/2022   • Stroke [facility])      History reviewed  No pertinent surgical history  Social History   Social History     Substance and Sexual Activity   Alcohol Use Not Currently    Comment: Stopped 2 years ago     Social History     Substance and Sexual Activity   Drug Use No     Social History     Tobacco Use   Smoking Status Every Day   • Packs/day: 0 50   • Types: Cigarettes   Smokeless Tobacco Never   Tobacco Comments    states slowing it down, using Chantix     Family History:   Family History   Problem Relation Age of Onset   • No Known Problems Mother    • No Known Problems Father    • Pancreatic cancer Brother        Meds/Allergies   Current Outpatient Medications   Medication Sig Dispense Refill   • amLODIPine (NORVASC) 10 mg tablet take 1 tablet by mouth once daily 90 tablet 3   • atorvastatin (LIPITOR) 80 mg tablet take 1 tablet by mouth once daily 30 tablet 5   • clopidogrel (PLAVIX) 75 mg tablet take 1 tablet by mouth once daily 30 tablet 5   • Continuous Blood Gluc Sensor (FreeStyle Slime 2 Sensor) MISC Change sensor every 14 days 2 each 3   • hydrALAZINE (APRESOLINE) 25 mg tablet take 1 tablet by mouth three times a day 90 tablet 5   • Insulin Glargine Solostar (Basaglar KwikPen) 100 UNIT/ML SOPN Inject 28 Units under the skin daily 15 mL 5   • lisinopril (ZESTRIL) 10 mg tablet Take 1 tablet (10 mg total) by mouth daily 90 tablet 3   • metoprolol succinate (TOPROL-XL) 50 mg 24 hr tablet Take 1 tablet (50 mg total) by mouth daily 90 tablet 3   • Trulicity 3 00 CP/4 1QA injection inject 0 5 milliliters ( 0 75 milligrams ) subcutaneously every week IN THE ABDOMEN THIGHS OR OUTER AREA OF UPPER ARM ROTATE INJECTION SITES 2 mL 0     No current facility-administered medications for this visit       No Known Allergies    Objective   Vitals: Blood pressure 160/80, pulse (!) 106, temperature 99 5 °F (37 5 °C), temperature source Temporal, height 5' 10" (1 778 m), weight 84 4 kg (186 lb), SpO2 95 %  Physical Exam  Constitutional:       General: He is not in acute distress  Appearance: Normal appearance  He is not ill-appearing  HENT:      Head: Normocephalic  Mouth/Throat:      Mouth: Mucous membranes are moist    Eyes:      General: Scleral icterus present  Extraocular Movements: Extraocular movements intact  Neck:      Thyroid: No thyromegaly  Cardiovascular:      Rate and Rhythm: Normal rate and regular rhythm  Heart sounds: Murmur heard  Pulmonary:      Breath sounds: Rhonchi present  Abdominal:      Palpations: Abdomen is soft  There is no mass  Tenderness: There is no abdominal tenderness  Musculoskeletal:      Right lower leg: Edema present  Left lower leg: Edema present  Skin:     General: Skin is dry  Findings: No rash  Neurological:      General: No focal deficit present  Mental Status: He is oriented to person, place, and time  Cranial Nerves: No cranial nerve deficit  The history was obtained from the review of the chart, patient  Lab Results:   Lab Results   Component Value Date/Time    Hemoglobin A1C 7 6 (A) 09/30/2022 12:58 PM    Hemoglobin A1C 6 6 (A) 06/30/2022 12:03 PM           Imaging Studies: I have personally reviewed pertinent reports  Portions of the record may have been created with voice recognition software  Occasional wrong word or "sound a like" substitutions may have occurred due to the inherent limitations of voice recognition software  Read the chart carefully and recognize, using context, where substitutions have occurred

## 2023-05-04 ENCOUNTER — TELEPHONE (OUTPATIENT)
Dept: NEPHROLOGY | Facility: CLINIC | Age: 48
End: 2023-05-04

## 2023-05-04 NOTE — TELEPHONE ENCOUNTER
I called Carrol Herring @ [phone] and spoke to [facility] () to check eligible for the patient and as of 5/3/2023 the patient has current active coverage as of 9/15/2022  According to [facility] this patient plan does not require a referral from Dr Francisca Burns on file, and this patient plan does not require a Co-Pay  The reference number for this call is: [id number]    Maldonado Lind,

## 2023-05-08 ENCOUNTER — TELEPHONE (OUTPATIENT)
Dept: NEPHROLOGY | Facility: CLINIC | Age: 48
End: 2023-05-08

## 2023-05-08 DIAGNOSIS — N18.32 STAGE 3B CHRONIC KIDNEY DISEASE (HCC): Primary | ICD-10-CM

## 2023-05-08 NOTE — TELEPHONE ENCOUNTER
Called unable to [facility] to remind patient to get labs done prior to 5/17 consult appt with Dr Brand  patient phone is not receiving messages at this time  will send message to patient via my chart

## 2023-05-16 ENCOUNTER — TELEPHONE (OUTPATIENT)
Dept: NEPHROLOGY | Facility: CLINIC | Age: 48
End: 2023-05-16

## 2023-05-16 NOTE — TELEPHONE ENCOUNTER
I called and left message on patients answering machine confirming appt for tomorrow 05/17/2023 with Dr Yusra Andrew

## 2023-05-22 ENCOUNTER — TELEPHONE (OUTPATIENT)
Dept: NEPHROLOGY | Facility: CLINIC | Age: 48
End: 2023-05-22

## 2023-05-22 DIAGNOSIS — E11.65 TYPE 2 DIABETES MELLITUS WITH HYPERGLYCEMIA, WITH LONG-TERM CURRENT USE OF INSULIN (HCC): ICD-10-CM

## 2023-05-22 DIAGNOSIS — Z79.4 TYPE 2 DIABETES MELLITUS WITH HYPERGLYCEMIA, WITH LONG-TERM CURRENT USE OF INSULIN (HCC): ICD-10-CM

## 2023-05-22 RX ORDER — DULAGLUTIDE 0.75 MG/.5ML
INJECTION, SOLUTION SUBCUTANEOUS
Qty: 2 ML | Refills: 0 | Status: SHIPPED | OUTPATIENT
Start: 2023-05-22

## 2023-05-22 NOTE — TELEPHONE ENCOUNTER
Requested medication(s) are due for refill today: Yes  Patient has already received a courtesy refill: Yes  Other reason request has been forwarded to provider:    Endocrinology:  Diabetes - GLP-1 Receptor Agonists Failed 05/22/2023 02:34 PM   Protocol Details  HBA1C within 180 days

## 2023-05-22 NOTE — TELEPHONE ENCOUNTER
I called and left message on patients answering machine confirming aptp for tomorrow 05/23/2023 with Soledad Walker  Also left message reminding patient to have blood work done prior appt

## 2023-06-18 ENCOUNTER — HOSPITAL ENCOUNTER (EMERGENCY)
Facility: HOSPITAL | Age: 48
Discharge: HOME | End: 2023-06-18
Attending: EMERGENCY MEDICINE
Payer: COMMERCIAL

## 2023-06-18 VITALS
BODY MASS INDEX: 27.08 KG/M2 | WEIGHT: 188.71 LBS | SYSTOLIC BLOOD PRESSURE: 195 MMHG | RESPIRATION RATE: 18 BRPM | DIASTOLIC BLOOD PRESSURE: 99 MMHG | OXYGEN SATURATION: 98 % | HEART RATE: 109 BPM | TEMPERATURE: 98.8 F

## 2023-06-18 DIAGNOSIS — L05.01 PILONIDAL ABSCESS: Primary | ICD-10-CM

## 2023-06-18 PROCEDURE — 99282 EMERGENCY DEPT VISIT SF MDM: CPT

## 2023-06-18 RX ORDER — LIDOCAINE HYDROCHLORIDE AND EPINEPHRINE 10; 10 MG/ML; UG/ML
5 INJECTION, SOLUTION INFILTRATION; PERINEURAL ONCE
Status: COMPLETED | OUTPATIENT
Start: 2023-06-18 | End: 2023-06-18

## 2023-06-18 RX ORDER — OXYCODONE HYDROCHLORIDE AND ACETAMINOPHEN 5; 325 MG/1; MG/1
1 TABLET ORAL ONCE
Status: COMPLETED | OUTPATIENT
Start: 2023-06-18 | End: 2023-06-18

## 2023-06-18 RX ADMIN — OXYCODONE AND ACETAMINOPHEN 1 TABLET: 5; 325 TABLET ORAL at 20:12

## 2023-06-18 RX ADMIN — LIDOCAINE HYDROCHLORIDE,EPINEPHRINE BITARTRATE 5 ML: 10; .01 INJECTION, SOLUTION INFILTRATION; PERINEURAL at 20:46

## 2023-06-19 NOTE — ED PROVIDER NOTES
"History  Chief Complaint   Patient presents with   • Abscess     Pt states abscess in buttocks area  States has been coming and going over the last 4mo  States burst today with yellow drainage  The patient is a 46 YOM with hx DMII who presents to the ED for evaluation of abscess to his buttocks  He reports history of several similar abscess in the past, reports \"they always come and go but I can't get the cyst out  \" he reports this abscess has been present x several days, but tonight while at dinner the patient suddenly felt it begin to drain  He otherwise denies fever, chills, rectal pain, pain with bowel movements, testicular involvement, dysuria, hematuria  Prior to Admission Medications   Prescriptions Last Dose Informant Patient Reported? Taking?    Continuous Blood Gluc Sensor (FreeStyle Slime 2 Sensor) MISC  Self No No   Sig: Change sensor every 14 days   Insulin Glargine Solostar (Basaglar KwikPen) 100 UNIT/ML SOPN  Self No No   Sig: Inject 28 Units under the skin daily   Trulicity 9 20 GY/8 0PN injection   No No   Sig: inject 0 5 milliliters ( 0 75 milligrams ) subcutaneously every week IN THE ABDOMEN THIGHS OR OUTER AREA OF UPPER ARM ROTATE INJECTION SITES   amLODIPine (NORVASC) 10 mg tablet  Self No No   Sig: take 1 tablet by mouth once daily   atorvastatin (LIPITOR) 80 mg tablet  Self No No   Sig: take 1 tablet by mouth once daily   clopidogrel (PLAVIX) 75 mg tablet  Self No No   Sig: take 1 tablet by mouth once daily   hydrALAZINE (APRESOLINE) 25 mg tablet  Self No No   Sig: take 1 tablet by mouth three times a day   lisinopril (ZESTRIL) 10 mg tablet  Self No No   Sig: Take 1 tablet (10 mg total) by mouth daily   metoprolol succinate (TOPROL-XL) 50 mg 24 hr tablet  Self No No   Sig: Take 1 tablet (50 mg total) by mouth daily      Facility-Administered Medications: None       Past Medical History:   Diagnosis Date   • Diabetes mellitus ([facility] [address] )    • Hypertension    • Received intravenous tissue " plasminogen activator (tPA) in emergency department 2/1/2022   • Stroke [facility])        History reviewed  No pertinent surgical history  Family History   Problem Relation Age of Onset   • No Known Problems Mother    • No Known Problems Father    • Pancreatic cancer Brother      I have reviewed and agree with the history as documented  E-Cigarette/Vaping   • E-Cigarette Use Never User      E-Cigarette/Vaping Substances   • Nicotine No    • THC No    • CBD No    • Flavoring No    • Other No    • Unknown No      Social History     Tobacco Use   • Smoking status: Every Day     Packs/day: 0 50     Types: Cigarettes   • Smokeless tobacco: Never   • Tobacco comments:     states slowing it down, using Chantix   Vaping Use   • Vaping Use: Never used   Substance Use Topics   • Alcohol use: Not Currently     Comment: Stopped 2 years ago   • Drug use: No       Review of Systems   Constitutional: Negative for chills and fever  HENT: Negative for congestion and rhinorrhea  Respiratory: Negative for cough and shortness of breath  Cardiovascular: Negative for chest pain and leg swelling  Gastrointestinal: Negative for abdominal pain, constipation, diarrhea, nausea and vomiting  Genitourinary: Negative for dysuria and flank pain  Musculoskeletal: Negative for arthralgias and myalgias  Skin: Positive for wound  Negative for rash  Neurological: Negative for dizziness, weakness, numbness and headaches  Psychiatric/Behavioral: Negative for behavioral problems  Physical Exam  Physical Exam  Vitals and nursing note reviewed  Constitutional:       General: He is not in acute distress  Appearance: He is well-developed  He is not ill-appearing or toxic-appearing  HENT:      Head: Normocephalic and atraumatic  Eyes:      Conjunctiva/sclera: Conjunctivae normal    Cardiovascular:      Rate and Rhythm: Normal rate  Pulses: Normal pulses  Heart sounds: No murmur heard    Pulmonary:      Effort: Pulmonary effort is normal  No respiratory distress  Abdominal:      General: Abdomen is flat  Palpations: Abdomen is soft  Tenderness: There is no abdominal tenderness  Musculoskeletal:         General: No swelling  Cervical back: Neck supple  Skin:     General: Skin is warm and dry  Capillary Refill: Capillary refill takes less than 2 seconds  Findings: Abscess and erythema present  Comments: Abscess to proximal right side of the gluteal cleft with purulent drainage  No crepitus  No perirectal involvement, or testicular/peroneal involvement  No surrounding cellulitis    Neurological:      Mental Status: He is alert  Psychiatric:         Mood and Affect: Mood normal          Vital Signs  ED Triage Vitals   Temperature Pulse Respirations Blood Pressure SpO2   06/18/23 1915 06/18/23 1915 06/18/23 1915 06/18/23 1915 06/18/23 1915   98 8 °F (37 1 °C) (!) 109 18 (!) 195/99 98 %      Temp Source Heart Rate Source Patient Position - Orthostatic VS BP Location FiO2 (%)   06/18/23 1915 06/18/23 1915 06/18/23 1915 06/18/23 1915 --   Oral Monitor Sitting Right arm       Pain Score       06/18/23 2012       8           Vitals:    06/18/23 1915   BP: (!) 195/99   Pulse: (!) 109   Patient Position - Orthostatic VS: Sitting         Visual Acuity      ED Medications  Medications   oxyCODONE-acetaminophen (PERCOCET) 5-325 mg per tablet 1 tablet (1 tablet Oral Given 6/18/23 2012)   lidocaine-epinephrine (XYLOCAINE/EPINEPHRINE) 1 %-1:100,000 injection 5 mL (5 mL Infiltration Given by Other 6/18/23 2046)       Diagnostic Studies  Results Reviewed     None                 No orders to display              Procedures  Incision and drain    Date/Time: 6/18/2023 8:30 PM    Performed by: Karl Lockwood PA-C  Authorized by: Karl Lockwood PA-C  Universal Protocol:  Consent: Verbal consent obtained    Risks and benefits: risks, benefits and alternatives were discussed  Consent given by: patient  Patient understanding: patient states understanding of the procedure being performed  Patient consent: the patient's understanding of the procedure matches consent given  Patient identity confirmed: verbally with patient and arm band      Patient location:  ED  Location:     Type:  Abscess    Location:  Anogenital    Anogenital location:  Gluteal cleft  Pre-procedure details:     Skin preparation:  Antiseptic wash  Anesthesia (see MAR for exact dosages): Anesthesia method:  Local infiltration    Local anesthetic:  Lidocaine 1% w/o epi  Procedure details:     Complexity:  Simple    Incision types:  Stab incision    Scalpel blade:  11    Wound management:  Probed and deloculated and irrigated with saline    Drainage:  Purulent    Drainage amount:  Copious    Wound treatment:  Packing placed    Packing materials:  1/2 in gauze  Post-procedure details:     Patient tolerance of procedure:  Procedure terminated at patient's request (Packing terminated at patient's request, 2 inches of packing placed prior)             ED Course       Medical Decision Making  DDX including but not limited to: cellulitis, folliculitis, abscess, pilonidal abscess  Doubt necrotizing fasciitis    Patient presented with abscess draining  Given large abscess, will perform incision and pack wound  I&D performed as above  Ambulatory referral to surgery placed  At the time of discharge, the patient is in no acute distress  I discussed with the patient the diagnosis, treatment plan, follow-up, return precautions, and discharge instructions; they were given the opportunity to ask questions and verbalized understanding  Pilonidal abscess: acute illness or injury  Risk  Prescription drug management            Disposition  Final diagnoses:   Pilonidal abscess     Time reflects when diagnosis was documented in both MDM as applicable and the Disposition within this note     Time User Action Codes Description Comment    6/18/2023 8:57 PM Marsha Benavidez Add [L05 01] Pilonidal abscess       ED Disposition     ED Disposition   Discharge    Condition   Stable    Date/Time   Sun Jun 18, 2023  8:57 PM    Comment   Ines Alexise 429 discharge to home/self care                 Follow-up Information     Follow up With Specialties Details Why Contact Info Additional [address]  [place] [address] [phone]  [address], [address], [address], [place], [place], [address]          Discharge Medication List as of 6/18/2023  9:00 PM      CONTINUE these medications which have NOT CHANGED    Details   amLODIPine (NORVASC) 10 mg tablet take 1 tablet by mouth once daily, Normal      atorvastatin (LIPITOR) 80 mg tablet take 1 tablet by mouth once daily, Normal      clopidogrel (PLAVIX) 75 mg tablet take 1 tablet by mouth once daily, Normal      Continuous Blood Gluc Sensor (FreeStyle Slime 2 Sensor) MISC Change sensor every 14 days, Normal      hydrALAZINE (APRESOLINE) 25 mg tablet take 1 tablet by mouth three times a day, Normal      Insulin Glargine Solostar (Basaglar KwikPen) 100 UNIT/ML SOPN Inject 28 Units under the skin daily, Starting Tue 8/9/2022, Normal      lisinopril (ZESTRIL) 10 mg tablet Take 1 tablet (10 mg total) by mouth daily, Starting Mon 8/22/2022, Normal      metoprolol succinate (TOPROL-XL) 50 mg 24 hr tablet Take 1 tablet (50 mg total) by mouth daily, Starting Tue 11/23/2021, Normal      Trulicity 3 87 QI/3 8NY injection inject 0 5 milliliters ( 0 75 milligrams ) subcutaneously every week IN THE ABDOMEN THIGHS OR OUTER AREA OF UPPER ARM ROTATE INJECTION SITES, Normal                 PDMP Review       Value Time User    PDMP Reviewed  Yes 2/2/2022 10:31 Magdalena Childs MD          ED Provider  Electronically Signed by           Keyla Alvarez PA-C  06/19/23 [id number]

## 2023-06-22 PROBLEM — Z91.199 NONCOMPLIANCE WITH TREATMENT PLAN: Status: ACTIVE | Noted: 2023-06-22

## 2023-06-23 ENCOUNTER — OFFICE VISIT (OUTPATIENT)
Dept: FAMILY MEDICINE CLINIC | Facility: CLINIC | Age: 48
End: 2023-06-23
Payer: COMMERCIAL

## 2023-06-23 VITALS
OXYGEN SATURATION: 99 % | HEART RATE: 94 BPM | HEIGHT: 70 IN | BODY MASS INDEX: 26.45 KG/M2 | TEMPERATURE: 97 F | DIASTOLIC BLOOD PRESSURE: 88 MMHG | WEIGHT: 184.8 LBS | SYSTOLIC BLOOD PRESSURE: 170 MMHG

## 2023-06-23 DIAGNOSIS — R29.818 FINE MOTOR SKILL LOSS: ICD-10-CM

## 2023-06-23 DIAGNOSIS — Z79.4 TYPE 2 DIABETES MELLITUS WITH OTHER CIRCULATORY COMPLICATION, WITH LONG-TERM CURRENT USE OF INSULIN (HCC): ICD-10-CM

## 2023-06-23 DIAGNOSIS — Z12.5 SCREENING PSA (PROSTATE SPECIFIC ANTIGEN): ICD-10-CM

## 2023-06-23 DIAGNOSIS — N18.4 STAGE 4 CHRONIC KIDNEY DISEASE (HCC): ICD-10-CM

## 2023-06-23 DIAGNOSIS — E78.00 HYPERCHOLESTEROLEMIA: ICD-10-CM

## 2023-06-23 DIAGNOSIS — I63.411 CEREBROVASCULAR ACCIDENT (CVA) DUE TO EMBOLISM OF RIGHT MIDDLE CEREBRAL ARTERY (HCC): ICD-10-CM

## 2023-06-23 DIAGNOSIS — Z91.199 NONCOMPLIANCE WITH TREATMENT PLAN: ICD-10-CM

## 2023-06-23 DIAGNOSIS — E03.9 ACQUIRED HYPOTHYROIDISM: ICD-10-CM

## 2023-06-23 DIAGNOSIS — R29.898 FINE MOTOR SKILL LOSS: ICD-10-CM

## 2023-06-23 DIAGNOSIS — L05.01 CYST, PILONIDAL, WITH ABSCESS: ICD-10-CM

## 2023-06-23 DIAGNOSIS — E11.42 DIABETIC POLYNEUROPATHY ASSOCIATED WITH TYPE 2 DIABETES MELLITUS (HCC): ICD-10-CM

## 2023-06-23 DIAGNOSIS — I10 ESSENTIAL HYPERTENSION: Primary | ICD-10-CM

## 2023-06-23 DIAGNOSIS — I69.322 DYSARTHRIA AS LATE EFFECT OF CEREBROVASCULAR ACCIDENT (CVA): ICD-10-CM

## 2023-06-23 DIAGNOSIS — R26.89 IMPAIRED BALANCE AS LATE EFFECT OF CEREBROVASCULAR ACCIDENT (CVA): ICD-10-CM

## 2023-06-23 DIAGNOSIS — F17.200 CURRENT SMOKER: ICD-10-CM

## 2023-06-23 DIAGNOSIS — E11.59 TYPE 2 DIABETES MELLITUS WITH OTHER CIRCULATORY COMPLICATION, WITH LONG-TERM CURRENT USE OF INSULIN (HCC): ICD-10-CM

## 2023-06-23 DIAGNOSIS — Z12.11 COLON CANCER SCREENING: ICD-10-CM

## 2023-06-23 DIAGNOSIS — I69.398 IMPAIRED BALANCE AS LATE EFFECT OF CEREBROVASCULAR ACCIDENT (CVA): ICD-10-CM

## 2023-06-23 LAB
LEFT EYE DIABETIC RETINOPATHY: ABNORMAL
LEFT EYE IMAGE QUALITY: ABNORMAL
LEFT EYE MACULAR EDEMA: ABNORMAL
LEFT EYE OTHER RETINOPATHY: ABNORMAL
RIGHT EYE DIABETIC RETINOPATHY: ABNORMAL
RIGHT EYE IMAGE QUALITY: ABNORMAL
RIGHT EYE MACULAR EDEMA: ABNORMAL
RIGHT EYE OTHER RETINOPATHY: ABNORMAL
SEVERITY (EYE EXAM): ABNORMAL
SL AMB POCT HEMOGLOBIN AIC: 8.6 (ref ?–6.5)

## 2023-06-23 PROCEDURE — 99215 OFFICE O/P EST HI 40 MIN: CPT | Performed by: FAMILY MEDICINE

## 2023-06-23 PROCEDURE — 83036 HEMOGLOBIN GLYCOSYLATED A1C: CPT | Performed by: FAMILY MEDICINE

## 2023-06-23 RX ORDER — INSULIN GLARGINE 100 [IU]/ML
28 INJECTION, SOLUTION SUBCUTANEOUS DAILY
Qty: 15 ML | Refills: 5 | Status: SHIPPED | OUTPATIENT
Start: 2023-06-23

## 2023-06-23 RX ORDER — HYDRALAZINE HYDROCHLORIDE 25 MG/1
25 TABLET, FILM COATED ORAL 3 TIMES DAILY
Qty: 270 TABLET | Refills: 1 | Status: SHIPPED | OUTPATIENT
Start: 2023-06-23

## 2023-06-23 RX ORDER — ATORVASTATIN CALCIUM 80 MG/1
80 TABLET, FILM COATED ORAL DAILY
Qty: 90 TABLET | Refills: 1 | Status: SHIPPED | OUTPATIENT
Start: 2023-06-23

## 2023-06-23 RX ORDER — METOPROLOL SUCCINATE 50 MG/1
50 TABLET, EXTENDED RELEASE ORAL DAILY
Qty: 90 TABLET | Refills: 1 | Status: SHIPPED | OUTPATIENT
Start: 2023-06-23

## 2023-06-23 RX ORDER — LISINOPRIL 10 MG/1
10 TABLET ORAL DAILY
Qty: 90 TABLET | Refills: 1 | Status: SHIPPED | OUTPATIENT
Start: 2023-06-23

## 2023-06-23 RX ORDER — DULAGLUTIDE 0.75 MG/.5ML
0.75 INJECTION, SOLUTION SUBCUTANEOUS
Qty: 2 ML | Refills: 2 | Status: SHIPPED | OUTPATIENT
Start: 2023-06-23

## 2023-06-23 RX ORDER — AMLODIPINE BESYLATE 10 MG/1
10 TABLET ORAL DAILY
Qty: 90 TABLET | Refills: 1 | Status: SHIPPED | OUTPATIENT
Start: 2023-06-23

## 2023-06-23 RX ORDER — CLOPIDOGREL BISULFATE 75 MG/1
75 TABLET ORAL DAILY
Qty: 90 TABLET | Refills: 1 | Status: SHIPPED | OUTPATIENT
Start: 2023-06-23

## 2023-06-23 NOTE — PATIENT INSTRUCTIONS
Reviewed medications, glucose sensors are not in [facility], he does plan to pick those up at his house in [facility] -we need to start checking sugars again, A1c redone here today is 8 6, was 7 6 last September  He is overdue for blood work, I did order blood and urine testing, he will go for that  Blood pressure remains elevated, increased risk for another stroke, does continue with weakness left hand, decreased fine motor left hand, speech has improved but is not at baseline  No longer working  He will restart all medication, I sent those to local pharmacy  I also sent in his insulin    He does need follow-up with Endocrinology, Nephrology, Cardiology  We will see him again in 1 month, also will have routine physical in October  Still smoking 1/2 pack/day, keep working on cutting down and quitting, has smoked over 1 pack/day for most of his life, started age [age]  Check CT screening of lung baseline  I did reorder Cologuard, screening overdue    Pilonidal cyst has improved since incision and drainage at emergency room, if does recur he should see a colorectal surgeon  Iris exam to be done here today
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
## 2023-09-01 ENCOUNTER — TELEPHONE (OUTPATIENT)
Dept: FAMILY MEDICINE CLINIC | Facility: CLINIC | Age: 48
End: 2023-09-01

## 2023-09-01 NOTE — TELEPHONE ENCOUNTER
9/1 11:15am: PT ([phone]) is requesting a letter faxed to [facility] at [phone] stating that PT has diabetes, so his electricity needs to be turned back on. [employer] contact phone [phone].
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
## 2023-09-06 ENCOUNTER — TELEPHONE (OUTPATIENT)
Dept: FAMILY MEDICINE CLINIC | Facility: CLINIC | Age: 48
End: 2023-09-06

## 2023-09-06 NOTE — LETTER
September 7, 2023     Patient: Eloy Mcghee  YOB: 1975  Date of Visit :   6/23/2023    Acct shafer name: Mica Hughes # [de-identified]      To Whom it May Concern:    Faith White is a patient in our family practice who has insulin-dependent diabetes along with history of stroke amongst other medical conditions. He does need electricity in his home to maintain his health/properly store his insulin. If you have any questions please call.          Sincerely,          Clifton Me, DO        CC: No Recipients
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
## 2023-09-07 NOTE — TELEPHONE ENCOUNTER
Letter faxed to [employer] at [phone] (found in duplicate encounter 1/1/20). L/m for patient to inform him of this.

## 2023-09-19 ENCOUNTER — TELEPHONE (OUTPATIENT)
Dept: FAMILY MEDICINE CLINIC | Facility: CLINIC | Age: 48
End: 2023-09-19

## 2023-09-19 NOTE — TELEPHONE ENCOUNTER
Pt calling to request a letter stating he is able to drive as he is returning to work. There is no letter on his chart. Please advise. His appt is 10/02.

## 2023-09-20 NOTE — TELEPHONE ENCOUNTER
9/20 11:25am: PT returned call, combining his 10/2 annual physical appt w/obtaining letter stating PT is cleared for driving.

## 2023-09-20 NOTE — TELEPHONE ENCOUNTER
LVM to let pt know that he will need a clearance visit with our office or Neurology in order to be cleared for driving.

## 2023-09-27 ENCOUNTER — TELEPHONE (OUTPATIENT)
Dept: NEPHROLOGY | Facility: CLINIC | Age: 48
End: 2023-09-27

## 2023-09-27 NOTE — TELEPHONE ENCOUNTER
Called spoke with patient advised patient to get labs done prior to 10/04/23 fu appt with . patient understood and is okay with it.

## 2023-09-29 ENCOUNTER — APPOINTMENT (OUTPATIENT)
Dept: LAB | Facility: HOSPITAL | Age: 48
End: 2023-09-29
Attending: INTERNAL MEDICINE
Payer: COMMERCIAL

## 2023-09-29 DIAGNOSIS — N18.32 STAGE 3B CHRONIC KIDNEY DISEASE (HCC): ICD-10-CM

## 2023-09-29 LAB
25(OH)D3 SERPL-MCNC: 8.2 NG/ML (ref 30–100)
ALBUMIN SERPL BCP-MCNC: 3.5 G/DL (ref 3.5–5)
ALP SERPL-CCNC: 147 U/L (ref 34–104)
ALT SERPL W P-5'-P-CCNC: 5 U/L (ref 7–52)
ANION GAP SERPL CALCULATED.3IONS-SCNC: 7 MMOL/L
AST SERPL W P-5'-P-CCNC: 10 U/L (ref 13–39)
BACTERIA UR QL AUTO: ABNORMAL /HPF
BASOPHILS # BLD AUTO: 0.04 THOUSANDS/ÂΜL (ref 0–0.1)
BASOPHILS NFR BLD AUTO: 1 % (ref 0–1)
BILIRUB SERPL-MCNC: 0.29 MG/DL (ref 0.2–1)
BILIRUB UR QL STRIP: NEGATIVE
BUN SERPL-MCNC: 44 MG/DL (ref 5–25)
CALCIUM SERPL-MCNC: 6.9 MG/DL (ref 8.4–10.2)
CHLORIDE SERPL-SCNC: 112 MMOL/L (ref 96–108)
CHOLEST SERPL-MCNC: 181 MG/DL
CLARITY UR: CLEAR
CO2 SERPL-SCNC: 20 MMOL/L (ref 21–32)
COLOR UR: ABNORMAL
CREAT SERPL-MCNC: 4.17 MG/DL (ref 0.6–1.3)
CREAT UR-MCNC: 103.8 MG/DL
CREAT UR-MCNC: 104.6 MG/DL
EOSINOPHIL # BLD AUTO: 0.3 THOUSAND/ÂΜL (ref 0–0.61)
EOSINOPHIL NFR BLD AUTO: 4 % (ref 0–6)
ERYTHROCYTE [DISTWIDTH] IN BLOOD BY AUTOMATED COUNT: 14.6 % (ref 11.6–15.1)
GFR SERPL CREATININE-BSD FRML MDRD: 15 ML/MIN/1.73SQ M
GLUCOSE P FAST SERPL-MCNC: 165 MG/DL (ref 65–99)
GLUCOSE UR STRIP-MCNC: ABNORMAL MG/DL
HCT VFR BLD AUTO: 42.6 % (ref 36.5–49.3)
HDLC SERPL-MCNC: 46 MG/DL
HGB BLD-MCNC: 13.9 G/DL (ref 12–17)
HGB UR QL STRIP.AUTO: ABNORMAL
IMM GRANULOCYTES # BLD AUTO: 0.03 THOUSAND/UL (ref 0–0.2)
IMM GRANULOCYTES NFR BLD AUTO: 0 % (ref 0–2)
KETONES UR STRIP-MCNC: NEGATIVE MG/DL
LDLC SERPL CALC-MCNC: 112 MG/DL (ref 0–100)
LEUKOCYTE ESTERASE UR QL STRIP: NEGATIVE
LYMPHOCYTES # BLD AUTO: 1.67 THOUSANDS/ÂΜL (ref 0.6–4.47)
LYMPHOCYTES NFR BLD AUTO: 21 % (ref 14–44)
MCH RBC QN AUTO: 30.4 PG (ref 26.8–34.3)
MCHC RBC AUTO-ENTMCNC: 32.6 G/DL (ref 31.4–37.4)
MCV RBC AUTO: 93 FL (ref 82–98)
MICROALBUMIN UR-MCNC: 5261.4 MG/L
MICROALBUMIN/CREAT 24H UR: 5069 MG/G CREATININE (ref 0–30)
MONOCYTES # BLD AUTO: 0.55 THOUSAND/ÂΜL (ref 0.17–1.22)
MONOCYTES NFR BLD AUTO: 7 % (ref 4–12)
NEUTROPHILS # BLD AUTO: 5.2 THOUSANDS/ÂΜL (ref 1.85–7.62)
NEUTS SEG NFR BLD AUTO: 67 % (ref 43–75)
NITRITE UR QL STRIP: NEGATIVE
NON-SQ EPI CELLS URNS QL MICRO: ABNORMAL /HPF
NONHDLC SERPL-MCNC: 135 MG/DL
NRBC BLD AUTO-RTO: 0 /100 WBCS
PH UR STRIP.AUTO: 6 [PH]
PHOSPHATE SERPL-MCNC: 4.8 MG/DL (ref 2.7–4.5)
PLATELET # BLD AUTO: 279 THOUSANDS/UL (ref 149–390)
PMV BLD AUTO: 10 FL (ref 8.9–12.7)
POTASSIUM SERPL-SCNC: 4.7 MMOL/L (ref 3.5–5.3)
PROT SERPL-MCNC: 7.3 G/DL (ref 6.4–8.4)
PROT UR STRIP-MCNC: ABNORMAL MG/DL
PROT UR-MCNC: 919 MG/DL
PROT/CREAT UR: 8.79 MG/G{CREAT} (ref 0–0.1)
PSA SERPL-MCNC: 0.36 NG/ML (ref 0–4)
PTH-INTACT SERPL-MCNC: 667.9 PG/ML (ref 12–88)
RBC # BLD AUTO: 4.57 MILLION/UL (ref 3.88–5.62)
RBC #/AREA URNS AUTO: ABNORMAL /HPF
SODIUM SERPL-SCNC: 139 MMOL/L (ref 135–147)
SP GR UR STRIP.AUTO: 1.02 (ref 1–1.03)
TRIGL SERPL-MCNC: 115 MG/DL
TSH SERPL DL<=0.05 MIU/L-ACNC: 3.17 UIU/ML (ref 0.45–4.5)
UROBILINOGEN UR STRIP-ACNC: <2 MG/DL
WBC # BLD AUTO: 7.79 THOUSAND/UL (ref 4.31–10.16)
WBC #/AREA URNS AUTO: ABNORMAL /HPF

## 2023-09-29 PROCEDURE — 85025 COMPLETE CBC W/AUTO DIFF WBC: CPT

## 2023-09-29 PROCEDURE — 82306 VITAMIN D 25 HYDROXY: CPT

## 2023-09-29 PROCEDURE — 83970 ASSAY OF PARATHORMONE: CPT

## 2023-09-29 PROCEDURE — 84100 ASSAY OF PHOSPHORUS: CPT

## 2023-10-01 PROBLEM — Z95.818 IMPLANTABLE LOOP RECORDER PRESENT: Status: ACTIVE | Noted: 2023-10-01

## 2023-10-01 PROBLEM — N18.4 STAGE 4 CHRONIC KIDNEY DISEASE (HCC): Status: ACTIVE | Noted: 2022-02-14

## 2023-10-01 PROBLEM — N25.81 HYPERPARATHYROIDISM DUE TO RENAL INSUFFICIENCY (HCC): Status: ACTIVE | Noted: 2023-10-01

## 2023-10-02 ENCOUNTER — OFFICE VISIT (OUTPATIENT)
Dept: FAMILY MEDICINE CLINIC | Facility: CLINIC | Age: 48
End: 2023-10-02
Payer: COMMERCIAL

## 2023-10-02 VITALS
HEART RATE: 94 BPM | RESPIRATION RATE: 12 BRPM | WEIGHT: 186.6 LBS | OXYGEN SATURATION: 99 % | HEIGHT: 70 IN | BODY MASS INDEX: 26.71 KG/M2 | SYSTOLIC BLOOD PRESSURE: 130 MMHG | DIASTOLIC BLOOD PRESSURE: 90 MMHG

## 2023-10-02 DIAGNOSIS — R29.818 FINE MOTOR SKILL LOSS: ICD-10-CM

## 2023-10-02 DIAGNOSIS — Z59.9 FINANCIAL DIFFICULTIES: ICD-10-CM

## 2023-10-02 DIAGNOSIS — E78.00 HYPERCHOLESTEROLEMIA: ICD-10-CM

## 2023-10-02 DIAGNOSIS — Z95.818 IMPLANTABLE LOOP RECORDER PRESENT: ICD-10-CM

## 2023-10-02 DIAGNOSIS — E03.9 ACQUIRED HYPOTHYROIDISM: ICD-10-CM

## 2023-10-02 DIAGNOSIS — Z86.73 HISTORY OF CVA (CEREBROVASCULAR ACCIDENT): ICD-10-CM

## 2023-10-02 DIAGNOSIS — Z91.199 NONCOMPLIANCE WITH TREATMENT PLAN: ICD-10-CM

## 2023-10-02 DIAGNOSIS — I10 ESSENTIAL HYPERTENSION: ICD-10-CM

## 2023-10-02 DIAGNOSIS — I63.411 CEREBROVASCULAR ACCIDENT (CVA) DUE TO EMBOLISM OF RIGHT MIDDLE CEREBRAL ARTERY (HCC): ICD-10-CM

## 2023-10-02 DIAGNOSIS — R29.898 FINE MOTOR SKILL LOSS: ICD-10-CM

## 2023-10-02 DIAGNOSIS — E11.65 UNCONTROLLED TYPE 2 DIABETES MELLITUS WITH HYPERGLYCEMIA (HCC): ICD-10-CM

## 2023-10-02 DIAGNOSIS — N18.4 STAGE 4 CHRONIC KIDNEY DISEASE (HCC): Primary | ICD-10-CM

## 2023-10-02 DIAGNOSIS — Z12.11 COLON CANCER SCREENING: ICD-10-CM

## 2023-10-02 DIAGNOSIS — E55.9 VITAMIN D DEFICIENCY: ICD-10-CM

## 2023-10-02 DIAGNOSIS — F17.200 CURRENT SMOKER: ICD-10-CM

## 2023-10-02 DIAGNOSIS — N25.81 HYPERPARATHYROIDISM DUE TO RENAL INSUFFICIENCY (HCC): ICD-10-CM

## 2023-10-02 DIAGNOSIS — R26.89 IMPAIRED BALANCE AS LATE EFFECT OF CEREBROVASCULAR ACCIDENT (CVA): ICD-10-CM

## 2023-10-02 DIAGNOSIS — E11.42 DIABETIC POLYNEUROPATHY ASSOCIATED WITH TYPE 2 DIABETES MELLITUS (HCC): ICD-10-CM

## 2023-10-02 DIAGNOSIS — I69.322 DYSARTHRIA AS LATE EFFECT OF CEREBROVASCULAR ACCIDENT (CVA): ICD-10-CM

## 2023-10-02 DIAGNOSIS — I69.398 IMPAIRED BALANCE AS LATE EFFECT OF CEREBROVASCULAR ACCIDENT (CVA): ICD-10-CM

## 2023-10-02 LAB — SL AMB POCT HEMOGLOBIN AIC: 7.7 (ref ?–6.5)

## 2023-10-02 PROCEDURE — 99214 OFFICE O/P EST MOD 30 MIN: CPT | Performed by: FAMILY MEDICINE

## 2023-10-02 PROCEDURE — 83036 HEMOGLOBIN GLYCOSYLATED A1C: CPT | Performed by: FAMILY MEDICINE

## 2023-10-02 RX ORDER — ATORVASTATIN CALCIUM 80 MG/1
80 TABLET, FILM COATED ORAL DAILY
Qty: 90 TABLET | Refills: 1 | Status: SHIPPED | OUTPATIENT
Start: 2023-10-02 | End: 2023-10-05 | Stop reason: SDUPTHER

## 2023-10-02 RX ORDER — AMLODIPINE BESYLATE 10 MG/1
10 TABLET ORAL DAILY
Qty: 90 TABLET | Refills: 1 | Status: SHIPPED | OUTPATIENT
Start: 2023-10-02 | End: 2023-10-05 | Stop reason: SDUPTHER

## 2023-10-02 RX ORDER — CLOPIDOGREL BISULFATE 75 MG/1
75 TABLET ORAL DAILY
Qty: 90 TABLET | Refills: 1 | Status: SHIPPED | OUTPATIENT
Start: 2023-10-02 | End: 2023-10-05 | Stop reason: SDUPTHER

## 2023-10-02 RX ORDER — ERGOCALCIFEROL 1.25 MG/1
50000 CAPSULE ORAL WEEKLY
Qty: 16 CAPSULE | Refills: 0 | Status: SHIPPED | OUTPATIENT
Start: 2023-10-02 | End: 2023-10-05 | Stop reason: SDUPTHER

## 2023-10-02 RX ORDER — LISINOPRIL 10 MG/1
10 TABLET ORAL DAILY
Qty: 90 TABLET | Refills: 1 | Status: SHIPPED | OUTPATIENT
Start: 2023-10-02 | End: 2023-10-05 | Stop reason: SDUPTHER

## 2023-10-02 RX ORDER — HYDRALAZINE HYDROCHLORIDE 25 MG/1
25 TABLET, FILM COATED ORAL 3 TIMES DAILY
Qty: 270 TABLET | Refills: 1 | Status: SHIPPED | OUTPATIENT
Start: 2023-10-02 | End: 2023-10-05 | Stop reason: SDUPTHER

## 2023-10-02 RX ORDER — METOPROLOL SUCCINATE 50 MG/1
50 TABLET, EXTENDED RELEASE ORAL DAILY
Qty: 90 TABLET | Refills: 1 | Status: SHIPPED | OUTPATIENT
Start: 2023-10-02 | End: 2023-10-05 | Stop reason: SDUPTHER

## 2023-10-02 RX ORDER — DULAGLUTIDE 0.75 MG/.5ML
0.75 INJECTION, SOLUTION SUBCUTANEOUS
Qty: 2 ML | Refills: 2 | Status: SHIPPED | OUTPATIENT
Start: 2023-10-02

## 2023-10-02 RX ORDER — INSULIN GLARGINE 100 [IU]/ML
28 INJECTION, SOLUTION SUBCUTANEOUS DAILY
Qty: 15 ML | Refills: 5 | Status: SHIPPED | OUTPATIENT
Start: 2023-10-02

## 2023-10-02 SDOH — ECONOMIC STABILITY - INCOME SECURITY: PROBLEM RELATED TO HOUSING AND ECONOMIC CIRCUMSTANCES, UNSPECIFIED: Z59.9
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
## 2023-10-02 NOTE — PROGRESS NOTES
Diabetic Foot Exam    Patient's shoes and socks removed. Right Foot/Ankle   Right Foot Inspection  Skin Exam: skin normal and skin intact. No dry skin, no warmth, no callus, no erythema, no maceration, no abnormal color, no pre-ulcer, no ulcer and no callus. Toe Exam: ROM and strength within normal limits. Sensory   Monofilament testing: diminished    Vascular  Capillary refills: < 3 seconds  The right DP pulse is 1+. The right PT pulse is 1+. Left Foot/Ankle  Left Foot Inspection  Skin Exam: skin normal and skin intact. No dry skin, no warmth, no erythema, no maceration, normal color, no pre-ulcer, no ulcer and no callus. Toe Exam: ROM and strength within normal limits. Sensory   Monofilament testing: diminished    Vascular  Capillary refills: < 3 seconds  The left DP pulse is 1+. The left PT pulse is 1+.      Assign Risk Category  No deformity present  No loss of protective sensation  No weak pulses  Risk: 1

## 2023-10-02 NOTE — PATIENT INSTRUCTIONS
Blood pressure today is 130/90 -  relates has been off all meds for few months due to loss of coverage and financial issues, consult placed w social work care [employer], he will also work with [place] office to rein[place]. Recent cholesterol 181 with HDL 46, , he is to use atorvastatin 80 mg every day. Had seen Cardiology in the past, does have loop recorder, should keep follow-up appointment with cardiology. We did review recent blood work, BUNs/creatinine 44/4.17 with potassium 4.7, estimated GFR 15, we did discuss severe kidney disease/worsening renal function, he has an appointment with Nephrology October 4, needs to keep that appointment, we again discussed compliance/follow-up as he has multiple significant medical issues and health continues to decline. A1c redone here today is 7.7, was 8.6 in June -  he missed his appointment with Endocrinology back in June, needs to see them in f/up -  really needs to work on getting coverage back and restart Insulin / Trulicity. Still smoking 1/2 PPD  - well aware of risks associated with smoking, has not done lung screening CT, I would like him to do that. Other recent blood work showed CBC to be acceptable with hemoglobin 13.9, TSH okay at 3.2. Vitamin D was low at 8.2, he will use 50,000 units weekly for 16 weeks then use 2000 units OTC daily unless directed otherwise by nephrology. He had been seen at the emergency room back in June with pilonidal cyst.   Improved    Immunization History   Administered Date(s) Administered    COVID-19 MODERNA VACC 0.5 ML IM 08/28/2021, 09/23/2021    Tdap 07/24/2020       He does not do yearly Flu shot. Really should do. Tdap/tetanus shot is up to date  (done every 10 yrs for superficial cuts, every 5 yrs for deep wounds)  Covid vaccine declined     Regarding Colon Cancer screening, we discussed Colonoscopy vs ColoGuard is indicated starting at age 39.   Does have in system to do Cologuard screening, he really should do that, mail it back     Discussed Prostate Cancer screening pros/cons starting at age 48. PSA blood test up-to-date. Recent PSA was okay at 0.36    Continue to try to watch healthy diet    He has been out of work since stroke, he does have opportunity to work at old employer, we did discuss risks associated with uncontrolled blood pressure, uncontrolled diabetes, renal failure. He did drive to the office today, I feel he is medically able to work    We will see him again in 6 weeks ( PE then-as lacks coverage I did not put through as a physical today), sooner as needed.

## 2023-10-02 NOTE — LETTER
October 2, 2023     Patient: Kathy Scherer  YOB: 1975  Date of Visit: 10/2/2023      To Whom it May Concern:    Maribel De La Rosa is a long-term patient in our family practice, he was seen today for a checkup, he may return to work with no restriction as of today.       Sincerely,          Rinku Branch, DO

## 2023-10-04 ENCOUNTER — TELEPHONE (OUTPATIENT)
Dept: NEPHROLOGY | Facility: CLINIC | Age: 48
End: 2023-10-04

## 2023-10-04 ENCOUNTER — TELEPHONE (OUTPATIENT)
Dept: FAMILY MEDICINE CLINIC | Facility: CLINIC | Age: 48
End: 2023-10-04

## 2023-10-04 DIAGNOSIS — E11.59 TYPE 2 DIABETES MELLITUS WITH OTHER CIRCULATORY COMPLICATION, WITH LONG-TERM CURRENT USE OF INSULIN (HCC): Primary | ICD-10-CM

## 2023-10-04 DIAGNOSIS — Z79.4 TYPE 2 DIABETES MELLITUS WITH OTHER CIRCULATORY COMPLICATION, WITH LONG-TERM CURRENT USE OF INSULIN (HCC): Primary | ICD-10-CM

## 2023-10-04 NOTE — TELEPHONE ENCOUNTER
Good Afternoon    Dr. Piyush Loredo    Patient of yours had an appointment today but his [employer] is not active. Patient would like to know when he can schedule another appointment before 10/31/2023.     Please Advised!!    Thank you

## 2023-10-05 ENCOUNTER — TELEPHONE (OUTPATIENT)
Dept: FAMILY MEDICINE CLINIC | Facility: CLINIC | Age: 48
End: 2023-10-05

## 2023-10-05 DIAGNOSIS — E78.00 HYPERCHOLESTEROLEMIA: ICD-10-CM

## 2023-10-05 DIAGNOSIS — Z79.4 TYPE 2 DIABETES MELLITUS WITH OTHER CIRCULATORY COMPLICATION, WITH LONG-TERM CURRENT USE OF INSULIN (HCC): Primary | ICD-10-CM

## 2023-10-05 DIAGNOSIS — Z86.73 HISTORY OF CVA (CEREBROVASCULAR ACCIDENT): ICD-10-CM

## 2023-10-05 DIAGNOSIS — E11.65 UNCONTROLLED TYPE 2 DIABETES MELLITUS WITH HYPERGLYCEMIA (HCC): ICD-10-CM

## 2023-10-05 DIAGNOSIS — I63.411 CEREBROVASCULAR ACCIDENT (CVA) DUE TO EMBOLISM OF RIGHT MIDDLE CEREBRAL ARTERY (HCC): ICD-10-CM

## 2023-10-05 DIAGNOSIS — N18.4 STAGE 4 CHRONIC KIDNEY DISEASE (HCC): ICD-10-CM

## 2023-10-05 DIAGNOSIS — E55.9 VITAMIN D DEFICIENCY: ICD-10-CM

## 2023-10-05 DIAGNOSIS — I10 ESSENTIAL HYPERTENSION: ICD-10-CM

## 2023-10-05 DIAGNOSIS — E11.59 TYPE 2 DIABETES MELLITUS WITH OTHER CIRCULATORY COMPLICATION, WITH LONG-TERM CURRENT USE OF INSULIN (HCC): Primary | ICD-10-CM

## 2023-10-05 RX ORDER — LISINOPRIL 10 MG/1
10 TABLET ORAL DAILY
Qty: 90 TABLET | Refills: 1 | Status: SHIPPED | OUTPATIENT
Start: 2023-10-05

## 2023-10-05 RX ORDER — METOPROLOL SUCCINATE 50 MG/1
50 TABLET, EXTENDED RELEASE ORAL DAILY
Qty: 90 TABLET | Refills: 1 | Status: SHIPPED | OUTPATIENT
Start: 2023-10-05

## 2023-10-05 RX ORDER — ERGOCALCIFEROL 1.25 MG/1
50000 CAPSULE ORAL WEEKLY
Qty: 16 CAPSULE | Refills: 0 | Status: SHIPPED | OUTPATIENT
Start: 2023-10-05

## 2023-10-05 RX ORDER — CLOPIDOGREL BISULFATE 75 MG/1
75 TABLET ORAL DAILY
Qty: 90 TABLET | Refills: 1 | Status: SHIPPED | OUTPATIENT
Start: 2023-10-05

## 2023-10-05 RX ORDER — AMLODIPINE BESYLATE 10 MG/1
10 TABLET ORAL DAILY
Qty: 90 TABLET | Refills: 1 | Status: SHIPPED | OUTPATIENT
Start: 2023-10-05

## 2023-10-05 RX ORDER — HYDRALAZINE HYDROCHLORIDE 25 MG/1
25 TABLET, FILM COATED ORAL 3 TIMES DAILY
Qty: 270 TABLET | Refills: 1 | Status: SHIPPED | OUTPATIENT
Start: 2023-10-05

## 2023-10-05 RX ORDER — ATORVASTATIN CALCIUM 80 MG/1
80 TABLET, FILM COATED ORAL DAILY
Qty: 90 TABLET | Refills: 1 | Status: SHIPPED | OUTPATIENT
Start: 2023-10-05

## 2023-10-05 NOTE — TELEPHONE ENCOUNTER
[facility]   Nadine Marie, PharmD, BCPS, BCACP     Tasia Dinh is a 46 y.o. male who was referred to the clinical pharmacist by Jerrod Leavitt DO for medication review. Patient presents via telephone for initial clinical pharmacist consult. Telemedicine consent  This virtual check-in was done via telephone. Encounter provider: Nadine Marie Pharmacist    Patient agrees to participate in a virtual check in via telephone or video visit instead of presenting to the office to address urgent/immediate medical needs. After connecting, the patient was identified by name and date of birth. Tasia Dinh was informed that this was a telemedicine visit and that the exam was being conducted via the [employer]. He agrees to proceed. Patient is located in the following state in which I hold an active license ([place]). My office door was closed. No one else was in the room. Tasia Dinh acknowledged consent and understanding of privacy and security of the telemedicine visit. I informed the patient that I have reviewed his record in Epic and presented the opportunity for him to ask any questions regarding the visit today. The patient agreed to participate. Plan: The following actions were taken today by the Clinical Pharmacist:   1. Will contact [facility] pharmacy so patient can obtain Basalgar at $35/30-day supply   2. Transfer remaining prescriptions to [facility]  • Excluding Trulicity    Follow-up:   Follow-up with clinical pharmacist: Ni Zhou PCP visit: 11/13/2023    The following items are to be considered at a future visit:   1. Consider application for Ozempic PAP vs. titration of Basaglar. Chronic Conditions Addressed at this Visit:   Diabetes: Goals - A1c: <7%; SMBGs: Preprandial: 80-130mg/dL and Postprandial: <180mg/dL per ADA Guidelines. - Most recent A1c: above goal.   - SMBG: unknown.   Current DM Regimen (prescribed): • Basaglar 28 units SQ daily  • Trulicity 9.13 mg SQ weekly  MEDICATIONS: Ariadne Rosario is available for capped price through  program. Trulicity is not accempting any applications for PAP. Consider application for Ozempic PAP vs. titration of Basaglar. CKD stage IV/V limits medication options. HOME MONITORING: Check blood sugars daily and record. Medication Reconciliation: Medication list reviewed with patient at today's visit. - Reviewed and updated allergies  - Medication adherence is poor due to lack on insurance. - Cost of generic medications at [address] [facility] (using [facility] [facility]): ~$75/30-day supply   - Cost of generic medications at [address] [facility] (using [facility] [facility]): ~$75/30-day supply   - Cost of generic medications at [address] [facility] (using [employer]): ~$50/30-day supply  · Will transfer prescriptions to [facility] pharmacy. Discussed with patient that he will be contacted by [facility] pharmacy to set up an account and approve payment. Subjective:     Patient has been without his prescribed medications for at least 4 weeks due to loss of insurance coverage. Yesterday, he reached out to [place] to obtain new insurance.     DM History:  Microvascular complications: nephropathy  Cardiovascular complications: stroke/TIA  - Aspirin: no, clopidogrel  - Statin: Yes   - ACEi/ARB: Yes    Previous DM medications:   • Metformin (GI upset, CKD)    Social History     Tobacco Use   • Smoking status: Every Day     Packs/day: 1.00     Years: 35.00     Total pack years: 35.00     Types: Cigarettes     Start date: 6/16/1987   • Smokeless tobacco: Never   • Tobacco comments:     states slowing it down, used Chantix-currently 1/2 pack/day, at least 35 pack years as of 2023   Vaping Use   • Vaping Use: Never used   Substance Use Topics   • Alcohol use: Not Currently     Comment: stopped 8/22   • Drug use: No     Objective:     Current Outpatient Medications   Medication Instructions   • amLODIPine (NORVASC) 10 mg, Oral, Daily   • atorvastatin (LIPITOR) 80 mg, Oral, Daily   • clopidogrel (PLAVIX) 75 mg, Oral, Daily   • Continuous Blood Gluc Sensor (FreeStyle Slime 2 Sensor) [facility] Change sensor every 14 days   • ergocalciferol (VITAMIN D2) 50,000 Units, Oral, Weekly, For 16 weeks then use 2000 units daily   • hydrALAZINE (APRESOLINE) 25 mg, Oral, 3 times daily   • Insulin Glargine Solostar (BASAGLAR KWIKPEN) 28 Units, Subcutaneous, Daily   • lisinopril (ZESTRIL) 10 mg, Oral, Daily   • metoprolol succinate (TOPROL-XL) 50 mg, Oral, Daily   • Trulicity 6.65 mg, Subcutaneous, Every 7 days     I have reviewed the patient's allergies, medications and history as noted in the electronic medical record and updated as necessary. Vital Signs:  BP Readings from Last 3 Encounters:   10/02/23 130/90   06/23/23 170/88   06/18/23 (!) 195/99     Pulse Readings from Last 3 Encounters:   10/02/23 94   06/23/23 94   06/18/23 (!) 109      Estimated body mass index is 26.77 kg/m² as calculated from the following:    Height as of 10/2/23: 5' 10" (1.778 m). Weight as of 10/2/23: 84.6 kg (186 lb 9.6 oz).      Pertinent Lab Data:  Lab Results   Component Value Date    SODIUM 139 09/29/2023    K 4.7 09/29/2023     (H) 09/29/2023    CO2 20 (L) 09/29/2023    BUN 44 (H) 09/29/2023    CREATININE 4.17 (H) 09/29/2023    GLUC 216 (H) 02/01/2022    CALCIUM 6.9 (L) 09/29/2023    EGFR 15 09/29/2023     Lab Results   Component Value Date    HGBA1C 7.7 (A) 10/02/2023        Pharmacist Tracking Tool:   Reason For Outreach: Embedded Pharmacist  Demographics:  Intervention Method: Phone  Type of Intervention: New  Topics Addressed: Diabetes and CMR  Pharmacologic Interventions: Med Rec  Non-Pharmacologic Interventions: Care coordination and Cost  Time:  Direct Patient Care: 30 mins  Care Coordination: 50 mins  Recommendation Recipient: Patient/Caregiver and Provider  Outcome: Accepted    Emelia Sosa PharmD  Clinical Integration Pharmacist  [address] [facility].  [phone]  [email or web address]

## 2023-10-06 ENCOUNTER — TELEPHONE (OUTPATIENT)
Dept: NEPHROLOGY | Facility: CLINIC | Age: 48
End: 2023-10-06

## 2023-10-06 NOTE — TELEPHONE ENCOUNTER
I called patient to schedule a Neprhology Consult ref by Dr. Danielle Castillo for Type 2 diabetes mellitus with other circulatory complication, with long-term current use of insulin . Patient insurance was not active patient was unable to see the Doctor. patient stated he will call back the office .  Referral is close

## 2023-10-12 ENCOUNTER — TELEPHONE (OUTPATIENT)
Dept: FAMILY MEDICINE CLINIC | Facility: CLINIC | Age: 48
End: 2023-10-12

## 2023-10-12 NOTE — TELEPHONE ENCOUNTER
10/12 9:12am: CESAR for PT to call back to schedule in-person/virtual appt w/[facility] for diabetes mgt.

## 2023-10-23 ENCOUNTER — PATIENT OUTREACH (OUTPATIENT)
Dept: FAMILY MEDICINE CLINIC | Facility: CLINIC | Age: 48
End: 2023-10-23

## 2023-10-23 NOTE — PROGRESS NOTES
JOEY LOPEZ received referral regarding patient being noncompliant with care. JOEY LOPEZ noted in chart patient is having medical coverage issues. JOEY LOPEZ placed call to the patient, Ellie Bunch who advised that he has been been trying to fill out paperwork for medical assistance and it has been a "waiting game". It is for re-certification. They need a case number but cannot get ahold of anyone. His wife is working on it. She has tried calling [facility]. They do not know . JOEY LOPEZ advised the case number may be on paperwork received. He advised they have not received any letters just an email and it did not contain the number. They do not have a due date for re-submission. JOEY LOPEZ provided customer service line for [facility]. Ellie Bunch has not tried to get medications and he has been off of them for 2 weeks as they were over $150. Currently he is working only once/week. He goes in as he is needed by the employer. They are "playing catch up with bills". He is behind on his car payments Their mortgage and other bills are handled/up to date. Ellie Alivia is aware to contact JOEY LOPEZ if information not able to being obtained after call with [facility]. JOEY LOPEZ will close referral due to requested information being provided and will remain available for psychosocial support as needed.

## 2023-11-13 ENCOUNTER — TELEPHONE (OUTPATIENT)
Dept: NEPHROLOGY | Facility: CLINIC | Age: 48
End: 2023-11-13

## 2023-11-13 ENCOUNTER — TELEPHONE (OUTPATIENT)
Dept: FAMILY MEDICINE CLINIC | Facility: CLINIC | Age: 48
End: 2023-11-13

## 2023-11-13 DIAGNOSIS — I10 ESSENTIAL HYPERTENSION: ICD-10-CM

## 2023-11-13 DIAGNOSIS — E78.00 HYPERCHOLESTEROLEMIA: ICD-10-CM

## 2023-11-13 DIAGNOSIS — E11.65 UNCONTROLLED TYPE 2 DIABETES MELLITUS WITH HYPERGLYCEMIA (HCC): ICD-10-CM

## 2023-11-13 DIAGNOSIS — N18.4 STAGE 4 CHRONIC KIDNEY DISEASE (HCC): ICD-10-CM

## 2023-11-13 DIAGNOSIS — I63.411 CEREBROVASCULAR ACCIDENT (CVA) DUE TO EMBOLISM OF RIGHT MIDDLE CEREBRAL ARTERY (HCC): ICD-10-CM

## 2023-11-13 DIAGNOSIS — N18.32 STAGE 3B CHRONIC KIDNEY DISEASE (HCC): Primary | ICD-10-CM

## 2023-11-13 DIAGNOSIS — Z86.73 HISTORY OF CVA (CEREBROVASCULAR ACCIDENT): ICD-10-CM

## 2023-11-13 DIAGNOSIS — E55.9 VITAMIN D DEFICIENCY: ICD-10-CM

## 2023-11-13 NOTE — TELEPHONE ENCOUNTER
Please call him back, I am sorry to see that he canceled his appointment today, he really needs follow-up regarding his multiple medical issues. He was asking for refills on all medications, we have sent meds multiple times, please check what pharmacy he wants to use. He really needs to also get in to see his specialists.

## 2023-11-13 NOTE — TELEPHONE ENCOUNTER
11-13-23 / 2:35 PM    Pt called in stating that he would like his prescriptions to go to the [facility] in [place]. [place]. He stated he just got his insurance back today and he will be in touch with his specialists, and he has an appt set with you 1-15-24.

## 2023-11-13 NOTE — TELEPHONE ENCOUNTER
Called pt per Dr. Pete Blanchard to let him know that Dr. Pete Blanchard was sorry to see he had canceled his appt today, and for him to call us back with the pharmacy he would like to use.

## 2023-11-14 NOTE — PROGRESS NOTES
NEPHROLOGY OFFICE NOTE    Patient: Eri Singh               Sex: male           YOB: 1975        Age:  46 y.o.       11/16/2023      BACKGROUND     I had the pleasure of seeing Priscilla Stapleton in the nephrology office today for follow-up. He has a past medical history significant for T2DM with polyneuropathy and nephropathy, CKD, secondary hyperparathyroidism of renal origin, HTN, hyperlipidemia, and prior stroke. He is re-establishing care with nephrology after having last been seen by Dr. Amanda Veloz on 2/14/2022. He has underlying chronic kidney disease that appears progressive in the setting of nephrotic range proteinuria. Etiology of chronic kidney disease peers to be multifactorial and secondary to diabetic nephropathy with nephrotic range proteinuria and hypertensive nephrosclerosis. When last seen by nephrology, creatinine levels were fluctuating around 3.0. In the interm, creatinine level has risen to 4.17. He has a history of type 2 diabetes that was poorly controlled last year with a hemoglobin A1C of 10.6. Currently, hemoglobin A1C has improved to 7.7. He is maintained on Trulicity and insulin. He was previously seen by Endocrinology in 2022, but was lost to follow-up due to loss of insurance coverage. He has underlying hypertension and actively maintained on hydralazine, lisinopril, amlodipine, and metoprolol. Unfortunately, patient lost insurance coverage and states that he has been off of his medications and just recently obtained insurance coverage and will be refilling them today. He has a history of prior CVA and maintained on statin therapy and clopidogrel. He has a loop recorder in place following prior hospitalization for CVA and was last seen by Cardiology in April of 2022 needs to reestablish. Presents with his significant other for reestablishment of care.   Reports that he had lost his insurance and now that he has reobtained insurance he requested refills on his medications per his PCP and plans to pick them up today. Most recent labs were obtained today, which are currently pending. Most recent labs readily available were obtained on 9/29/2023, which we reviewed together. SUBJECTIVE     He currently has no complaints at this time and is feeling well. Patient denies any chest pain, shortness of breath, or swelling. REVIEW OF SYSTEMS     Review of Systems   Constitutional:  Positive for activity change and fatigue. Negative for chills and fever. HENT:  Negative for trouble swallowing. Respiratory:  Negative for shortness of breath. Cardiovascular:  Negative for leg swelling. Gastrointestinal:  Negative for constipation, diarrhea, nausea and vomiting. Genitourinary:  Negative for difficulty urinating, dysuria, frequency and hematuria. Musculoskeletal:  Negative for back pain. Skin:  Negative for pallor. Neurological:  Negative for dizziness, syncope, weakness and light-headedness. Psychiatric/Behavioral:  Negative for sleep disturbance. The patient is not nervous/anxious. OBJECTIVE     Current Weight: Weight - Scale: 85.3 kg (188 lb)  Vitals:    11/16/23 0959   BP: (!) 170/110   Pulse: 88   Resp: 16   Temp: (!) 97 °F (36.1 °C)   SpO2: 100%     Body mass index is 26.98 kg/m².     CURRENT MEDICATIONS       Current Outpatient Medications:     [START ON 11/17/2023] calcitriol (ROCALTROL) 0.25 mcg capsule, Take 1 capsule (0.25 mcg total) by mouth 3 (three) times a week Monday, Wednesday, Friday, Disp: 40 capsule, Rfl: 1    amLODIPine (NORVASC) 10 mg tablet, Take 1 tablet (10 mg total) by mouth daily (Patient not taking: Reported on 11/16/2023), Disp: 90 tablet, Rfl: 1    atorvastatin (LIPITOR) 80 mg tablet, Take 1 tablet (80 mg total) by mouth daily (Patient not taking: Reported on 11/16/2023), Disp: 90 tablet, Rfl: 1    clopidogrel (PLAVIX) 75 mg tablet, Take 1 tablet (75 mg total) by mouth daily (Patient not taking: Reported on 11/16/2023), Disp: 90 tablet, Rfl: 1    Continuous Blood Gluc Sensor (FreeStyle Slime 2 Sensor) MISC, Change sensor every 14 days (Patient not taking: Reported on 11/16/2023), Disp: 2 each, Rfl: 3    dulaglutide (Trulicity) 2.59 OU/8.9QD injection, Inject 0.5 mL (0.75 mg total) under the skin every 7 days (Patient not taking: Reported on 11/16/2023), Disp: 2 mL, Rfl: 2    hydrALAZINE (APRESOLINE) 25 mg tablet, Take 1 tablet (25 mg total) by mouth 3 (three) times a day (Patient not taking: Reported on 11/16/2023), Disp: 270 tablet, Rfl: 1    Insulin Glargine Solostar (Basaglar KwikPen) 100 UNIT/ML SOPN, Inject 0.28 mL (28 Units total) under the skin daily (Patient not taking: Reported on 11/16/2023), Disp: 15 mL, Rfl: 5    lisinopril (ZESTRIL) 10 mg tablet, Take 1 tablet (10 mg total) by mouth daily (Patient not taking: Reported on 11/16/2023), Disp: 90 tablet, Rfl: 1    metoprolol succinate (TOPROL-XL) 50 mg 24 hr tablet, Take 1 tablet (50 mg total) by mouth daily (Patient not taking: Reported on 11/16/2023), Disp: 90 tablet, Rfl: 1    PHYSICAL EXAMINATION     Physical Exam  Vitals reviewed. Constitutional:       General: He is not in acute distress. HENT:      Head: Normocephalic. Mouth/Throat:      Lips: Pink. Mouth: Mucous membranes are moist.   Eyes:      General: Lids are normal. No scleral icterus. Cardiovascular:      Rate and Rhythm: Normal rate and regular rhythm. Heart sounds: S1 normal and S2 normal.   Pulmonary:      Effort: Pulmonary effort is normal. No accessory muscle usage or respiratory distress. Breath sounds: Normal breath sounds. Abdominal:      General: There is no distension. Tenderness: There is no abdominal tenderness. Musculoskeletal:      Cervical back: Normal range of motion and neck supple. No tenderness. Right lower leg: No edema. Left lower leg: No edema. Skin:     General: Skin is warm. Coloration: Skin is not cyanotic or jaundiced. Neurological:      General: No focal deficit present. Mental Status: He is alert and oriented to person, place, and time. Psychiatric:         Attention and Perception: Attention normal.         Speech: Speech normal.         Behavior: Behavior is cooperative. LAB RESULTS     CMP 9/29/2023:  Sodium 139  Potassium 4.7  Chloride 112  CO2 20  Anion gap 7  BUN 44  Creatinine 4.17  Glucose 165  Calcium 6.9  Albumin 3.5  eGFR 15    PTH: 667.9  Vitamin D: 8.2    Albumin to creatinine ratio: 5069      RADIOLOGY RESULTS      Results for orders placed during the hospital encounter of 09/27/21    XR chest 1 view portable    Narrative  CHEST    INDICATION:   stroke alert protocol. Smoker. COMPARISON:  4/22/2021    EXAM PERFORMED/VIEWS:  XR CHEST PORTABLE      FINDINGS:    Cardiomediastinal silhouette appears unremarkable. The lungs are clear. No pneumothorax or pleural effusion. Osseous structures appear within normal limits for patient age. Impression  No acute cardiopulmonary disease. Workstation performed: [id number]    Results for orders placed during the hospital encounter of 04/22/21    XR chest pa & lateral    Narrative  CHEST    INDICATION:   R60.0: Localized edema. COMPARISON:  February 22, 2019    EXAM PERFORMED/VIEWS:  XR CHEST PA & LATERAL  The frontal view was performed utilizing dual energy radiographic technique. Images: 4    FINDINGS:  Lungs are well-aerated. Cardiomediastinal silhouette appears unremarkable. The lungs are clear. No pneumothorax or pleural effusion. Osseous structures appear within normal limits for patient age. Impression  No acute cardiopulmonary disease. Workstation performed: [id number]      ASSESSMENT/PLAN     Chronic Kidney Disease stage IV/V:  Previously followed by Dr. Vessie Sacks.  After review of the medical record, prior baseline creatinine levels were fluctuating around 2.7 - 3.0 in 2022 and last seen by nephrology. In the interim, creatinine levels have worsened to 4.17 on labs obtained 9/29/2023. Etiology of chronic kidney disease affected to be multifactorial and secondary to diabetic nephropathy with nephrotic range proteinuria and hypertensive nephrosclerosis. Patient and his significant other were unclear on diagnosis of chronic kidney disease and time was taken to discuss the diagnosis with them and answer their questions. No renal imaging on file, will obtain renal ultrasound for evaluation of renal anatomy. Will refer to [facility] for CKD and dialysis modality education. I briefly discussed home versus in center dialysis options with the patient today, I do feel patient would be a good home candidate. He is interested in pursuing dialysis for management of progressive chronic kidney disease and we will follow-up on preferred modality and access placement at follow-up. Given progressive decline in renal function, patient and wife are interested in pursuing renal transplant and will plan for referral to Dr. Malachi Vu for evaluation through [facility]. Hypertension:  Blood pressure significantly elevated in office visit today, he reports that he has not been taking any of his blood pressure medications. Refills were sent per his PCP and he will be restarting amlodipine 10 mg daily, hydralazine 25 mg 3 times daily, and metoprolol 50 mg daily. Advise close monitoring of blood pressure at home and to contact our office if consistently greater than 130/80. Encouraged low-salt diet. Nephrotic Range Proteinuria:  Most recent microalbumin to creatinine ratio 5 g (prior reading 7.5g on 2/7/2022). No significant hematuria. Etiology suspected to be diabetic nephropathy with documented history of poorly controlled type 2 diabetes. Plan to check SPEP and UPEP prior to follow-up for further evaluation. Given lack of hematuria, will hold on additional serological work-up at present time.     He was previously on lisinopril, which he stopped several months back due to loss of insurance coverage. Given decline in renal function to a current eGFR of 15%, advised patient to continue to hold lisinopril until we obtain follow-up BMP today to evaluate renal parameters and potassium level. Patient is not a candidate for initiation of SGLT2 inhibitor. Vitamin D Deficiency, secondary hyperparathyroidism of renal origin:  Most recent vitamin D level significantly low at 8.2, calcium low at 6.9, PTH significantly elevated at 667.9. Not currently taking any vitamin supplementations. We will start cholecalciferol 4000 units once daily and given significantly elevated PTH we will also start calcitriol 0.25 mcg 3 times weekly and repeat labs prior to follow-up. Type 2 Diabetes: Following with PCP. Recent hemoglobin A1c was 7.7, reports that he stopped taking his medications due to lack of insurance coverage but will be resuming today. Discussed importance of good glycemic control and recommend a goal A1C of 7.0 or less. He is not a candidate for SGLT2 inhibitor in the setting of current EGFR. Recommend close nephrology follow-up in 2 months with provider. Theresa Velazquez, [address] [place] [address]  Nephrology  11/16/2023      Portions of the record may have been created with voice recognition software. Occasional wrong word or "sound a like" substitutions may have occurred due to the inherent limitations of voice recognition software. Read the chart carefully and recognize, using context, where substitutions have occurred.

## 2023-11-15 PROBLEM — E55.9 VITAMIN D DEFICIENCY: Status: ACTIVE | Noted: 2023-11-15

## 2023-11-15 RX ORDER — DULAGLUTIDE 0.75 MG/.5ML
0.75 INJECTION, SOLUTION SUBCUTANEOUS
Qty: 2 ML | Refills: 2 | Status: SHIPPED | OUTPATIENT
Start: 2023-11-15

## 2023-11-15 RX ORDER — METOPROLOL SUCCINATE 50 MG/1
50 TABLET, EXTENDED RELEASE ORAL DAILY
Qty: 90 TABLET | Refills: 1 | Status: SHIPPED | OUTPATIENT
Start: 2023-11-15

## 2023-11-15 RX ORDER — INSULIN GLARGINE 100 [IU]/ML
28 INJECTION, SOLUTION SUBCUTANEOUS DAILY
Qty: 15 ML | Refills: 5 | Status: SHIPPED | OUTPATIENT
Start: 2023-11-15

## 2023-11-15 RX ORDER — AMLODIPINE BESYLATE 10 MG/1
10 TABLET ORAL DAILY
Qty: 90 TABLET | Refills: 1 | Status: SHIPPED | OUTPATIENT
Start: 2023-11-15

## 2023-11-15 RX ORDER — HYDRALAZINE HYDROCHLORIDE 25 MG/1
25 TABLET, FILM COATED ORAL 3 TIMES DAILY
Qty: 270 TABLET | Refills: 1 | Status: SHIPPED | OUTPATIENT
Start: 2023-11-15

## 2023-11-15 RX ORDER — ATORVASTATIN CALCIUM 80 MG/1
80 TABLET, FILM COATED ORAL DAILY
Qty: 90 TABLET | Refills: 1 | Status: SHIPPED | OUTPATIENT
Start: 2023-11-15

## 2023-11-15 RX ORDER — CLOPIDOGREL BISULFATE 75 MG/1
75 TABLET ORAL DAILY
Qty: 90 TABLET | Refills: 1 | Status: SHIPPED | OUTPATIENT
Start: 2023-11-15

## 2023-11-15 RX ORDER — ERGOCALCIFEROL 1.25 MG/1
50000 CAPSULE ORAL WEEKLY
Qty: 16 CAPSULE | Refills: 0 | Status: SHIPPED | OUTPATIENT
Start: 2023-11-15 | End: 2023-11-16 | Stop reason: ALTCHOICE

## 2023-11-15 NOTE — TELEPHONE ENCOUNTER
11/15 3:15pm: PT called regarding status of his medication refills that he requested to be sent to the [facility]. Please advise.

## 2023-11-16 ENCOUNTER — APPOINTMENT (OUTPATIENT)
Dept: LAB | Facility: CLINIC | Age: 48
End: 2023-11-16
Payer: COMMERCIAL

## 2023-11-16 ENCOUNTER — OFFICE VISIT (OUTPATIENT)
Dept: NEPHROLOGY | Facility: CLINIC | Age: 48
End: 2023-11-16
Payer: COMMERCIAL

## 2023-11-16 ENCOUNTER — TELEPHONE (OUTPATIENT)
Dept: OTHER | Facility: OTHER | Age: 48
End: 2023-11-16

## 2023-11-16 VITALS
WEIGHT: 188 LBS | SYSTOLIC BLOOD PRESSURE: 170 MMHG | RESPIRATION RATE: 16 BRPM | TEMPERATURE: 97 F | BODY MASS INDEX: 26.92 KG/M2 | OXYGEN SATURATION: 100 % | HEIGHT: 70 IN | HEART RATE: 88 BPM | DIASTOLIC BLOOD PRESSURE: 110 MMHG

## 2023-11-16 DIAGNOSIS — E55.9 VITAMIN D DEFICIENCY: ICD-10-CM

## 2023-11-16 DIAGNOSIS — R80.9 NEPHROTIC RANGE PROTEINURIA: ICD-10-CM

## 2023-11-16 DIAGNOSIS — E11.59 TYPE 2 DIABETES MELLITUS WITH OTHER CIRCULATORY COMPLICATION, WITH LONG-TERM CURRENT USE OF INSULIN (HCC): ICD-10-CM

## 2023-11-16 DIAGNOSIS — Z79.4 TYPE 2 DIABETES MELLITUS WITH OTHER CIRCULATORY COMPLICATION, WITH LONG-TERM CURRENT USE OF INSULIN (HCC): ICD-10-CM

## 2023-11-16 DIAGNOSIS — N18.32 STAGE 3B CHRONIC KIDNEY DISEASE (HCC): ICD-10-CM

## 2023-11-16 DIAGNOSIS — N18.4 STAGE 4 CHRONIC KIDNEY DISEASE (HCC): Primary | ICD-10-CM

## 2023-11-16 DIAGNOSIS — N25.81 HYPERPARATHYROIDISM DUE TO RENAL INSUFFICIENCY (HCC): ICD-10-CM

## 2023-11-16 LAB
25(OH)D3 SERPL-MCNC: 11.5 NG/ML (ref 30–100)
ANION GAP SERPL CALCULATED.3IONS-SCNC: 9 MMOL/L
BACTERIA UR QL AUTO: ABNORMAL /HPF
BASOPHILS # BLD AUTO: 0.04 THOUSANDS/ÂΜL (ref 0–0.1)
BASOPHILS NFR BLD AUTO: 0 % (ref 0–1)
BILIRUB UR QL STRIP: NEGATIVE
BUN SERPL-MCNC: 44 MG/DL (ref 5–25)
CALCIUM SERPL-MCNC: 7.6 MG/DL (ref 8.4–10.2)
CAOX CRY URNS QL MICRO: ABNORMAL /HPF
CHLORIDE SERPL-SCNC: 110 MMOL/L (ref 96–108)
CLARITY UR: CLEAR
CO2 SERPL-SCNC: 18 MMOL/L (ref 21–32)
COLOR UR: ABNORMAL
CREAT SERPL-MCNC: 3.93 MG/DL (ref 0.6–1.3)
CREAT UR-MCNC: 90 MG/DL
EOSINOPHIL # BLD AUTO: 0.29 THOUSAND/ÂΜL (ref 0–0.61)
EOSINOPHIL NFR BLD AUTO: 3 % (ref 0–6)
ERYTHROCYTE [DISTWIDTH] IN BLOOD BY AUTOMATED COUNT: 15 % (ref 11.6–15.1)
GFR SERPL CREATININE-BSD FRML MDRD: 16 ML/MIN/1.73SQ M
GLUCOSE P FAST SERPL-MCNC: 152 MG/DL (ref 65–99)
GLUCOSE UR STRIP-MCNC: ABNORMAL MG/DL
HCT VFR BLD AUTO: 43.8 % (ref 36.5–49.3)
HGB BLD-MCNC: 14.4 G/DL (ref 12–17)
HGB UR QL STRIP.AUTO: ABNORMAL
IMM GRANULOCYTES # BLD AUTO: 0.04 THOUSAND/UL (ref 0–0.2)
IMM GRANULOCYTES NFR BLD AUTO: 0 % (ref 0–2)
KETONES UR STRIP-MCNC: NEGATIVE MG/DL
LEUKOCYTE ESTERASE UR QL STRIP: NEGATIVE
LYMPHOCYTES # BLD AUTO: 1.87 THOUSANDS/ÂΜL (ref 0.6–4.47)
LYMPHOCYTES NFR BLD AUTO: 20 % (ref 14–44)
MCH RBC QN AUTO: 31.9 PG (ref 26.8–34.3)
MCHC RBC AUTO-ENTMCNC: 32.9 G/DL (ref 31.4–37.4)
MCV RBC AUTO: 97 FL (ref 82–98)
MICROALBUMIN UR-MCNC: 4239 MG/L
MICROALBUMIN/CREAT 24H UR: 4710 MG/G CREATININE (ref 0–30)
MONOCYTES # BLD AUTO: 0.68 THOUSAND/ÂΜL (ref 0.17–1.22)
MONOCYTES NFR BLD AUTO: 7 % (ref 4–12)
MUCOUS THREADS UR QL AUTO: ABNORMAL
NEUTROPHILS # BLD AUTO: 6.57 THOUSANDS/ÂΜL (ref 1.85–7.62)
NEUTS SEG NFR BLD AUTO: 70 % (ref 43–75)
NITRITE UR QL STRIP: NEGATIVE
NON-SQ EPI CELLS URNS QL MICRO: ABNORMAL /HPF
NRBC BLD AUTO-RTO: 0 /100 WBCS
PH UR STRIP.AUTO: 6.5 [PH]
PHOSPHATE SERPL-MCNC: 4.5 MG/DL (ref 2.7–4.5)
PLATELET # BLD AUTO: 288 THOUSANDS/UL (ref 149–390)
PMV BLD AUTO: 10.9 FL (ref 8.9–12.7)
POTASSIUM SERPL-SCNC: 6.3 MMOL/L (ref 3.5–5.3)
PROT UR STRIP-MCNC: ABNORMAL MG/DL
PTH-INTACT SERPL-MCNC: 686.3 PG/ML (ref 12–88)
RBC # BLD AUTO: 4.52 MILLION/UL (ref 3.88–5.62)
RBC #/AREA URNS AUTO: ABNORMAL /HPF
SODIUM SERPL-SCNC: 137 MMOL/L (ref 135–147)
SP GR UR STRIP.AUTO: 1.01 (ref 1–1.03)
URATE SERPL-MCNC: 6 MG/DL (ref 3.5–8.5)
UROBILINOGEN UR STRIP-ACNC: <2 MG/DL
WBC # BLD AUTO: 9.49 THOUSAND/UL (ref 4.31–10.16)
WBC #/AREA URNS AUTO: ABNORMAL /HPF

## 2023-11-16 PROCEDURE — 84100 ASSAY OF PHOSPHORUS: CPT

## 2023-11-16 PROCEDURE — 81001 URINALYSIS AUTO W/SCOPE: CPT

## 2023-11-16 PROCEDURE — 36415 COLL VENOUS BLD VENIPUNCTURE: CPT

## 2023-11-16 PROCEDURE — 85025 COMPLETE CBC W/AUTO DIFF WBC: CPT

## 2023-11-16 PROCEDURE — 99204 OFFICE O/P NEW MOD 45 MIN: CPT

## 2023-11-16 PROCEDURE — 84550 ASSAY OF BLOOD/URIC ACID: CPT

## 2023-11-16 PROCEDURE — 80048 BASIC METABOLIC PNL TOTAL CA: CPT

## 2023-11-16 PROCEDURE — 82570 ASSAY OF URINE CREATININE: CPT

## 2023-11-16 PROCEDURE — 82306 VITAMIN D 25 HYDROXY: CPT

## 2023-11-16 PROCEDURE — 82043 UR ALBUMIN QUANTITATIVE: CPT

## 2023-11-16 PROCEDURE — 83970 ASSAY OF PARATHORMONE: CPT

## 2023-11-16 RX ORDER — CALCITRIOL 0.25 UG/1
0.25 CAPSULE, LIQUID FILLED ORAL 3 TIMES WEEKLY
Qty: 40 CAPSULE | Refills: 1 | Status: SHIPPED | OUTPATIENT
Start: 2023-11-17

## 2023-11-16 NOTE — PATIENT INSTRUCTIONS
Start over the counter vitamin D supplement (D3, cholecalciferol) 4000 units once daily. Check your blood pressure daily at home, goal average 130/80  Call our office to update your medication list   Hold lisinopril until we see your follow-up kidney numbers     Chronic Kidney Disease   AMBULATORY CARE:   Chronic kidney disease (CKD)  is the gradual and permanent loss of kidney function. Normally, the kidneys remove fluid, chemicals, and waste from your blood. These wastes are turned into urine by your kidneys. CKD may worsen over time and lead to kidney failure. Common signs and symptoms include the following:   Changes in how often you need to urinate    Swelling in your arms, legs, or feet    Shortness of breath    Fatigue or weakness    Bad or bitter taste in your mouth    Nausea, vomiting, or loss of appetite    Call your local emergency number (911 in the [address]) if:   You have a seizure. You have shortness of breath. Seek care immediately if:   You are confused and very drowsy. Call your doctor or nephrologist if:   You suddenly gain or lose more weight than your healthcare provider has told you is okay. You have itchy skin or a rash. You urinate more or less than you normally do. You have blood in your urine. You have nausea and are vomiting. You have fatigue or muscle weakness. You have hiccups that will not stop. You have questions or concerns about your condition or care. How CKD is diagnosed:  CKD has 5 stages. Your healthcare provider will use results from the following tests to find the stage of CKD you have:  Blood and urine tests  show how well your kidneys are working. They may also show the cause of your CKD. Ultrasound, CT scan, or MRI  pictures may be used to check your kidneys. You may be given contrast liquid to help your kidneys show up better in the pictures. Tell the healthcare provider if you have ever had an allergic reaction to contrast liquid. Do not enter the MRI room with anything metal. Metal can cause serious injury. Tell the healthcare provider if you have any metal in or on your body. A biopsy  is a procedure to remove a small piece of tissue from your kidney. It is done to find the cause of your CKD. Treatment  can help control signs and symptoms, and prevent a worse stage of CKD. Your care team may include specialists, such as a dietitian or a heart specialist. This depends on the stage of your CKD and if you have other health conditions to manage. Healthcare providers will work with you to create a plan based on your decisions for treatment. Your treatment plan may include any of the following:  Medicines  may be given to decrease your blood pressure and get rid of extra fluid. You may also receive medicine to manage health conditions that may occur with CKD, such as anemia, diabetes, and heart disease. Dialysis  is a treatment to remove chemicals and waste from your blood when your kidneys can no longer do this. Surgery  may be needed to create an arteriovenous fistula (AVF) in your arm or insert a catheter into your abdomen. This is done so you can receive dialysis. A kidney transplant  may be done if your CKD becomes severe. What you can do to manage CKD: Management may include making some lifestyle changes. Tell your healthcare provider if you have any concerns about being able to make changes. He or she can help you find solutions, including working with specialists. Ask for help creating a plan to break large goals into smaller steps. Your plan may include any of the following:  Manage other health conditions. Your healthcare provider will work with you to make a care plan that meets your needs. You will be checked regularly for heart disease or other conditions that can make CKD worse, such as diabetes. Your blood pressure will be closely monitored.  You will also get a target blood pressure and help making a plan to reach your target. This may include taking your blood pressure at home. Maintain a healthy weight. Your weight and body mass index (BMI) will be checked regularly. BMI helps find if your weight is healthy for your height. Your healthcare provider will use other tests to check your muscle and protein levels. Extra weight can strain your kidneys. A low weight or low muscle mass can make you feel more tired. You may have trouble doing your daily activities. Ask your provider what a healthy weight is for you. He or she can help you create a plan to lose or gain weight safely, if needed. The plan may include keeping a food diary. This is a list of foods and liquids you have each day. Your provider will use the diary to help you make changes, if needed. Changes are based on your health and any other conditions you have, such as diabetes. Create an exercise plan. Regular exercise can help you manage CKD, high blood pressure, and diabetes. Exercise also helps control weight. Your provider can help you create exercise goals and a plan to reach those goals. For example, your goal may be to exercise for 30 minutes in a day. Your plan can include breaking exercise into 10 minute sessions, 3 times during the day. Create a healthy eating plan. Your provider may tell you to eat food low in potassium, phosphorus, or protein. Your provider may also recommend vitamin or mineral supplements. Do not take any supplements without talking to your provider. A dietitian can help you plan meals if needed. Ask how much liquid to drink each day and which liquids are best for you. Limit sodium (salt) as directed. You may need to limit sodium to less than 2,300 milligrams (mg) each day. Ask your dietitian or healthcare provider how much sodium you can have each day. The amount depends on your stage of kidney disease.  Table salt, canned foods, soups, salted snacks, and processed meats, like deli meats and sausage, are high in sodium. Your provider or a dietitian can show you how to read food labels for sodium. Limit alcohol as directed. Alcohol can cause fluid retention and can affect your kidneys. Ask how much alcohol is safe for you. A drink of alcohol is 12 ounces of beer, 5 ounces of wine, or 1½ ounces of liquor. Do not smoke. Nicotine and other chemicals in cigarettes and cigars can cause kidney damage. Ask your provider for information if you currently smoke and need help to quit. E-cigarettes or smokeless tobacco still contain nicotine. Talk to your provider before you use these products. Ask about over-the-counter medicines. Medicines such as NSAIDs and laxatives may harm your kidneys. Some cough and cold medicines can raise your blood pressure. Always ask if a medicine is safe before you take it. Ask about vaccines you may need. CKD can increase your risk for infections such as pneumonia, influenza, and hepatitis. Vaccines lower your risk for infection. Your healthcare provider will tell you which vaccines you need and when to get them. Follow up with your doctor or nephrologist as directed: You will need to return for tests to monitor your kidney and nerve function, and your parathyroid hormone level. Your medicines may be changed, based on certain test results. Write down your questions so you remember to ask them during your visits. © Copyright [employer] Baker 2023 Information is for End User's use only and may not be sold, redistributed or otherwise used for commercial purposes. The above information is an  only. It is not intended as medical advice for individual conditions or treatments. Talk to your doctor, nurse or pharmacist before following any medical regimen to see if it is safe and effective for you. How to Take a Blood Pressure Reading   AMBULATORY CARE:   Blood pressure (BP)  is the force of blood pushing on the walls of your arteries.  Your BP results are written as 2 numbers. The first, or top, number is called systolic BP. This is the pressure caused by your heart pushing blood out to your body. The second, or bottom, number is called diastolic BP. This is the pressure when your heart relaxes and fills back up with blood. Ask your healthcare provider what your BP should be. For most people, a good BP goal is less than 120/80. Call your doctor if:   Your BP is higher or lower than you were told it should be. You have questions or concerns about your condition or care. Why you may need to take BP readings: You may not have any signs or symptoms of high BP. You may need to take BP readings regularly to know how often your BP is high. High BP increases your risk for a stroke, heart attack, or kidney disease. You may need to take medicine to keep your BP at a normal level. Write down and keep a log of your BP readings. Your healthcare provider can use the results to see if your BP medicines are working. How often to take BP readings: Your healthcare provider may recommend that you take BP readings 2 times a day. Take your BP at the same times each day, such as the morning and evening. How to take BP readings: You can take BP readings at home with a digital BP monitor. Read the instructions that came with your BP monitor. The monitor comes with an adjustable cuff. Ask your healthcare provider if your cuff is the correct size. Do not eat, drink, smoke, or exercise for 30 minutes before you take BP readings. Rest quietly for 5 minutes before you begin. Do not talk while you take BP readings. Sit with your feet flat on the floor and your back against a chair. Put your arm straight out and support it on a flat surface. Your arm should be at chest level. Do not move your arm while you take your BP readings. Make sure all of the air is out of the cuff. Place the BP cuff against your bare skin about 1 inch (2.5 cm) above your elbow.  Wrap the cuff snugly around your arm. The BP reading may not be correct if the cuff is too loose. If you are using a wrist cuff, wrap the cuff snugly around your wrist. Hold your wrist at the same level as your heart. Turn on the BP monitor and follow the directions. Write down your BP, the date, the time, and which arm you used to take BP reading. Take 2 BP readings and write down both results. Use the same arm each time. These BP readings can be 1 minute apart. What else you need to know:   Do not take a BP reading in an arm that is injured or has an IV or shunt. The reading may not be accurate. Do not stop taking your medicines if your BP is at your goal.  A BP at your goal means your medicine is working correctly. Take your BP medicines as directed. Follow up with your doctor as directed:  Bring the log of your BP readings. Also bring the BP machine. Healthcare providers can check that you are using the machine correctly. Write down your questions so you remember to ask them during your visits. © Copyright [employer]ule[employer] Mar 2023 Information is for End User's use only and may not be sold, redistributed or otherwise used for commercial purposes. The above information is an  only. It is not intended as medical advice for individual conditions or treatments. Talk to your doctor, nurse or pharmacist before following any medical regimen to see if it is safe and effective for you.

## 2023-11-16 NOTE — LETTER
November 16, 2023     Michelet Reynaga, [address] [address]    Patient: Devan Aguilera   YOB: 1975   Date of Visit: 11/16/2023       Dear Dr. Katelyn Radford: Thank you for referring Anthony Roberts to me for evaluation. Below are my notes for this consultation. If you have questions, please do not hesitate to call me. I look forward to following your patient along with you. Sincerely,        YASMIN Ronquillo        CC: No Recipients    Lissa Johnson, [address] [place] [address]  11/16/2023 11:08 AM  Sign when Signing Visit  NEPHROLOGY OFFICE NOTE    Patient: Devan Aguilera               Sex: male           YOB: 1975        Age:  46 y.o.       11/16/2023      BACKGROUND     I had the pleasure of seeing Anthony Roberts in the nephrology office today for follow-up. He has a past medical history significant for T2DM with polyneuropathy and nephropathy, CKD, secondary hyperparathyroidism of renal origin, HTN, hyperlipidemia, and prior stroke. He is re-establishing care with nephrology after having last been seen by Dr. Kiah Bailey on 2/14/2022. He has underlying chronic kidney disease that appears progressive in the setting of nephrotic range proteinuria. Etiology of chronic kidney disease peers to be multifactorial and secondary to diabetic nephropathy with nephrotic range proteinuria and hypertensive nephrosclerosis. When last seen by nephrology, creatinine levels were fluctuating around 3.0. In the interm, creatinine level has risen to 4.17. He has a history of type 2 diabetes that was poorly controlled last year with a hemoglobin A1C of 10.6. Currently, hemoglobin A1C has improved to 7.7. He is maintained on Trulicity and insulin. He was previously seen by Endocrinology in 2022, but was lost to follow-up due to loss of insurance coverage. He has underlying hypertension and actively maintained on hydralazine, lisinopril, amlodipine, and metoprolol. Unfortunately, patient lost insurance coverage and states that he has been off of his medications and just recently obtained insurance coverage and will be refilling them today. He has a history of prior CVA and maintained on statin therapy and clopidogrel. He has a loop recorder in place following prior hospitalization for CVA and was last seen by Cardiology in April of 2022 needs to reestablish. Presents with his significant other for reestablishment of care. Reports that he had lost his insurance and now that he has reobtained insurance he requested refills on his medications per his PCP and plans to pick them up today. Most recent labs were obtained today, which are currently pending. Most recent labs readily available were obtained on 9/29/2023, which we reviewed together. SUBJECTIVE     He currently has no complaints at this time and is feeling well. Patient denies any chest pain, shortness of breath, or swelling. REVIEW OF SYSTEMS     Review of Systems   Constitutional:  Positive for activity change and fatigue. Negative for chills and fever. HENT:  Negative for trouble swallowing. Respiratory:  Negative for shortness of breath. Cardiovascular:  Negative for leg swelling. Gastrointestinal:  Negative for constipation, diarrhea, nausea and vomiting. Genitourinary:  Negative for difficulty urinating, dysuria, frequency and hematuria. Musculoskeletal:  Negative for back pain. Skin:  Negative for pallor. Neurological:  Negative for dizziness, syncope, weakness and light-headedness. Psychiatric/Behavioral:  Negative for sleep disturbance. The patient is not nervous/anxious. OBJECTIVE     Current Weight: Weight - Scale: 85.3 kg (188 lb)  Vitals:    11/16/23 0959   BP: (!) 170/110   Pulse: 88   Resp: 16   Temp: (!) 97 °F (36.1 °C)   SpO2: 100%     Body mass index is 26.98 kg/m².     CURRENT MEDICATIONS       Current Outpatient Medications:   •  [START ON 11/17/2023] calcitriol (ROCALTROL) 0.25 mcg capsule, Take 1 capsule (0.25 mcg total) by mouth 3 (three) times a week Monday, Wednesday, Friday, Disp: 40 capsule, Rfl: 1  •  amLODIPine (NORVASC) 10 mg tablet, Take 1 tablet (10 mg total) by mouth daily (Patient not taking: Reported on 11/16/2023), Disp: 90 tablet, Rfl: 1  •  atorvastatin (LIPITOR) 80 mg tablet, Take 1 tablet (80 mg total) by mouth daily (Patient not taking: Reported on 11/16/2023), Disp: 90 tablet, Rfl: 1  •  clopidogrel (PLAVIX) 75 mg tablet, Take 1 tablet (75 mg total) by mouth daily (Patient not taking: Reported on 11/16/2023), Disp: 90 tablet, Rfl: 1  •  Continuous Blood Gluc Sensor (FreeStyle Slime 2 Sensor) MISC, Change sensor every 14 days (Patient not taking: Reported on 11/16/2023), Disp: 2 each, Rfl: 3  •  dulaglutide (Trulicity) 7.01 XB/0.7WV injection, Inject 0.5 mL (0.75 mg total) under the skin every 7 days (Patient not taking: Reported on 11/16/2023), Disp: 2 mL, Rfl: 2  •  hydrALAZINE (APRESOLINE) 25 mg tablet, Take 1 tablet (25 mg total) by mouth 3 (three) times a day (Patient not taking: Reported on 11/16/2023), Disp: 270 tablet, Rfl: 1  •  Insulin Glargine Solostar (Basaglar KwikPen) 100 UNIT/ML SOPN, Inject 0.28 mL (28 Units total) under the skin daily (Patient not taking: Reported on 11/16/2023), Disp: 15 mL, Rfl: 5  •  lisinopril (ZESTRIL) 10 mg tablet, Take 1 tablet (10 mg total) by mouth daily (Patient not taking: Reported on 11/16/2023), Disp: 90 tablet, Rfl: 1  •  metoprolol succinate (TOPROL-XL) 50 mg 24 hr tablet, Take 1 tablet (50 mg total) by mouth daily (Patient not taking: Reported on 11/16/2023), Disp: 90 tablet, Rfl: 1    PHYSICAL EXAMINATION     Physical Exam  Vitals reviewed. Constitutional:       General: He is not in acute distress. HENT:      Head: Normocephalic. Mouth/Throat:      Lips: Pink. Mouth: Mucous membranes are moist.   Eyes:      General: Lids are normal. No scleral icterus.   Cardiovascular:      Rate and Rhythm: Normal rate and regular rhythm. Heart sounds: S1 normal and S2 normal.   Pulmonary:      Effort: Pulmonary effort is normal. No accessory muscle usage or respiratory distress. Breath sounds: Normal breath sounds. Abdominal:      General: There is no distension. Tenderness: There is no abdominal tenderness. Musculoskeletal:      Cervical back: Normal range of motion and neck supple. No tenderness. Right lower leg: No edema. Left lower leg: No edema. Skin:     General: Skin is warm. Coloration: Skin is not cyanotic or jaundiced. Neurological:      General: No focal deficit present. Mental Status: He is alert and oriented to person, place, and time. Psychiatric:         Attention and Perception: Attention normal.         Speech: Speech normal.         Behavior: Behavior is cooperative. LAB RESULTS     CMP 9/29/2023:  Sodium 139  Potassium 4.7  Chloride 112  CO2 20  Anion gap 7  BUN 44  Creatinine 4.17  Glucose 165  Calcium 6.9  Albumin 3.5  eGFR 15    PTH: 667.9  Vitamin D: 8.2    Albumin to creatinine ratio: 5069      RADIOLOGY RESULTS      Results for orders placed during the hospital encounter of 09/27/21    XR chest 1 view portable    Narrative  CHEST    INDICATION:   stroke alert protocol. Smoker. COMPARISON:  4/22/2021    EXAM PERFORMED/VIEWS:  XR CHEST PORTABLE      FINDINGS:    Cardiomediastinal silhouette appears unremarkable. The lungs are clear. No pneumothorax or pleural effusion. Osseous structures appear within normal limits for patient age. Impression  No acute cardiopulmonary disease. Workstation performed: [id number]    Results for orders placed during the hospital encounter of 04/22/21    XR chest pa & lateral    Narrative  CHEST    INDICATION:   R60.0: Localized edema.     COMPARISON:  February 22, 2019    EXAM PERFORMED/VIEWS:  XR CHEST PA & LATERAL  The frontal view was performed utilizing dual energy radiographic technique. Images: 4    FINDINGS:  Lungs are well-aerated. Cardiomediastinal silhouette appears unremarkable. The lungs are clear. No pneumothorax or pleural effusion. Osseous structures appear within normal limits for patient age. Impression  No acute cardiopulmonary disease. Workstation performed: [id number]      ASSESSMENT/PLAN     Chronic Kidney Disease stage IV/V:  Previously followed by Dr. Shelby Ibarra. After review of the medical record, prior baseline creatinine levels were fluctuating around 2.7 - 3.0 in 2022 and last seen by nephrology. In the interim, creatinine levels have worsened to 4.17 on labs obtained 9/29/2023. Etiology of chronic kidney disease affected to be multifactorial and secondary to diabetic nephropathy with nephrotic range proteinuria and hypertensive nephrosclerosis. Patient and his significant other were unclear on diagnosis of chronic kidney disease and time was taken to discuss the diagnosis with them and answer their questions. No renal imaging on file, will obtain renal ultrasound for evaluation of renal anatomy. Will refer to [facility] for CKD and dialysis modality education. I briefly discussed home versus in center dialysis options with the patient today and he is interested in pursuing dialysis for management of discretion of chronic kidney disease and we will follow-up on for modality and access placement at follow-up. Given progressive decline in renal function, patient and wife are interested in pursuing renal transplant and will plan for referral to Dr. Uday Pérez for evaluation through [facility]. Hypertension:  Blood pressure significantly elevated in office visit today, he reports that he has not been taking any of his blood pressure medications. Refills were sent per his PCP and he will be restarting amlodipine 10 mg daily, hydralazine 25 mg 3 times daily, and metoprolol 50 mg daily.       Advise close monitoring of blood pressure at home and to contact our office if consistently greater than 130/80. Encouraged low-salt diet. Nephrotic Range Proteinuria:  Most recent microalbumin to creatinine ratio 5 g (prior reading 7.5g on 2/7/2022). No significant hematuria. Etiology suspected to be diabetic nephropathy with documented history of poorly controlled type 2 diabetes. Plan to check SPEP and UPEP prior to follow-up for further evaluation. Given lack of hematuria, will hold on additional serological work-up at present time. He was previously on lisinopril, which he stopped several months back due to loss of insurance coverage. Given decline in renal function to a current eGFR of 15%, advised patient to continue to hold lisinopril until we obtain follow-up BMP today to evaluate renal parameters and potassium level. Patient is not a candidate for initiation of SGLT2 inhibitor. Vitamin D Deficiency, secondary hyperparathyroidism of renal origin:  Most recent vitamin D level significantly low at 8.2, calcium low at 6.9, PTH significantly elevated at 667.9. Not currently taking any vitamin supplementations. We will start cholecalciferol 4000 units once daily and given significantly elevated PTH we will also start calcitriol 0.25 mcg 3 times weekly and repeat labs prior to follow-up. Type 2 Diabetes: Following with PCP. Recent hemoglobin A1c was 7.7, reports that he stopped taking his medications due to lack of insurance coverage but will be resuming today. Discussed importance of good glycemic control and recommend a goal A1C of 7.0 or less. He is not a candidate for SGLT2 inhibitor in the setting of current EGFR. Recommend close nephrology follow-up in 2 months with provider. Dave Rader, [address] [place] [address]  Nephrology  11/16/2023      Portions of the record may have been created with voice recognition software.  Occasional wrong word or "sound a like" substitutions may have occurred due to the inherent limitations of voice recognition software. Read the chart carefully and recognize, using context, where substitutions have occurred.

## 2023-11-16 NOTE — TELEPHONE ENCOUNTER
Lab Result: CRITICAL Lab Potassium 6.3 The specimen was slightly hemolyze    Date/Time Drawn: 11/16/23  @ 7:00 AM    Ordering Provider: Jay Jara Name: [facility]        The following critical/stat result was read back to the lab as stated above and [employer] to the on-call provider. The provider confirmed receipt of the message.

## 2023-11-17 ENCOUNTER — APPOINTMENT (OUTPATIENT)
Dept: LAB | Facility: CLINIC | Age: 48
End: 2023-11-17
Payer: COMMERCIAL

## 2023-11-17 ENCOUNTER — TELEPHONE (OUTPATIENT)
Dept: NEPHROLOGY | Facility: CLINIC | Age: 48
End: 2023-11-17

## 2023-11-17 DIAGNOSIS — Z79.4 TYPE 2 DIABETES MELLITUS WITH OTHER CIRCULATORY COMPLICATION, WITH LONG-TERM CURRENT USE OF INSULIN (HCC): ICD-10-CM

## 2023-11-17 DIAGNOSIS — E87.5 HYPERKALEMIA: ICD-10-CM

## 2023-11-17 DIAGNOSIS — N25.81 HYPERPARATHYROIDISM DUE TO RENAL INSUFFICIENCY (HCC): ICD-10-CM

## 2023-11-17 DIAGNOSIS — E11.59 TYPE 2 DIABETES MELLITUS WITH OTHER CIRCULATORY COMPLICATION, WITH LONG-TERM CURRENT USE OF INSULIN (HCC): ICD-10-CM

## 2023-11-17 DIAGNOSIS — N18.4 STAGE 4 CHRONIC KIDNEY DISEASE (HCC): ICD-10-CM

## 2023-11-17 DIAGNOSIS — R80.9 NEPHROTIC RANGE PROTEINURIA: ICD-10-CM

## 2023-11-17 DIAGNOSIS — E87.5 HYPERKALEMIA: Primary | ICD-10-CM

## 2023-11-17 LAB
ANION GAP SERPL CALCULATED.3IONS-SCNC: 8 MMOL/L
BUN SERPL-MCNC: 43 MG/DL (ref 5–25)
CALCIUM SERPL-MCNC: 8 MG/DL (ref 8.4–10.2)
CHLORIDE SERPL-SCNC: 109 MMOL/L (ref 96–108)
CO2 SERPL-SCNC: 19 MMOL/L (ref 21–32)
CREAT SERPL-MCNC: 4.06 MG/DL (ref 0.6–1.3)
GFR SERPL CREATININE-BSD FRML MDRD: 16 ML/MIN/1.73SQ M
GLUCOSE SERPL-MCNC: 192 MG/DL (ref 65–140)
POTASSIUM SERPL-SCNC: 5.2 MMOL/L (ref 3.5–5.3)
SODIUM SERPL-SCNC: 136 MMOL/L (ref 135–147)

## 2023-11-17 PROCEDURE — 80048 BASIC METABOLIC PNL TOTAL CA: CPT

## 2023-11-17 PROCEDURE — 36415 COLL VENOUS BLD VENIPUNCTURE: CPT

## 2023-11-17 NOTE — TELEPHONE ENCOUNTER
I called and spoke to [facility] adding critical value received yesterday evening with a potassium of 6.2. My on-call provider did attempt to contact patient but was unable to get in touch with him. Potassium level was hemolyzed which may affect results. Patient is currently not taking lisinopril and advised him to continue to hold medication. We will repeat BMP today to verify potassium levels. Advised patient to come in and  sample of Veltassa so he has it on hand should follow potassium levels remain elevated. Also printed on potassium content of food list for patient to be careful with high potassium foods. Patient verbalized understanding of the plan.

## 2023-11-29 ENCOUNTER — HOSPITAL ENCOUNTER (OUTPATIENT)
Dept: ULTRASOUND IMAGING | Facility: HOSPITAL | Age: 48
Discharge: HOME | End: 2023-11-29
Payer: COMMERCIAL

## 2023-11-29 DIAGNOSIS — N18.4 STAGE 4 CHRONIC KIDNEY DISEASE (HCC): ICD-10-CM

## 2023-11-29 PROCEDURE — 76770 US EXAM ABDO BACK WALL COMP: CPT

## 2024-01-25 ENCOUNTER — TELEPHONE (OUTPATIENT)
Age: 49
End: 2024-01-25

## 2024-01-25 NOTE — TELEPHONE ENCOUNTER
Patient called to check status of fax sent from [employer]. Patient states [employer] sent Fax at 6:30 pm 01/25, medical forms .Upon chart review/ contacted practice/clinical, advised to send to in house fax number [phone]. Advised patient of status and Fax number,Patient request Dr Emerson, to fill out forms .Please advise Patient at [phone] , when fax is received or forms are ready for .

## 2024-01-26 NOTE — TELEPHONE ENCOUNTER
Please call him, he missed his last appointment with us, really needs to keep his appointments.  I need documentation in order to fill out forms.  As before he does live all the way up in [place], it might be best to transfer to a provider near his home.

## 2024-01-26 NOTE — TELEPHONE ENCOUNTER
1/26 8:53am: Spoke w/PT, relayed reply per PCP, and let him know that once the documents have been received we will call him to schedule an appt to complete paperwork & follow-up visit. PT is okay w/seeing other providers to complete paperwork. PT wants to keep Dr Emerson as his PCP.

## 2024-01-29 NOTE — TELEPHONE ENCOUNTER
Patient called checking on status of fax from [employer]. After reviewing chart, I stated that I did not see a fax from [employer] and relayed previous message that we will call him once fax comes in;  patient will wait until tomorrow to call back.

## 2024-02-01 ENCOUNTER — OFFICE VISIT (OUTPATIENT)
Dept: FAMILY MEDICINE CLINIC | Facility: CLINIC | Age: 49
End: 2024-02-01
Payer: COMMERCIAL

## 2024-02-01 VITALS
HEART RATE: 72 BPM | OXYGEN SATURATION: 95 % | SYSTOLIC BLOOD PRESSURE: 142 MMHG | BODY MASS INDEX: 28.64 KG/M2 | WEIGHT: 189 LBS | HEIGHT: 68 IN | DIASTOLIC BLOOD PRESSURE: 90 MMHG

## 2024-02-01 DIAGNOSIS — E11.65 UNCONTROLLED TYPE 2 DIABETES MELLITUS WITH HYPERGLYCEMIA (HCC): ICD-10-CM

## 2024-02-01 DIAGNOSIS — Z79.4 TYPE 2 DIABETES MELLITUS WITH OTHER CIRCULATORY COMPLICATION, WITH LONG-TERM CURRENT USE OF INSULIN (HCC): Primary | ICD-10-CM

## 2024-02-01 DIAGNOSIS — E11.59 TYPE 2 DIABETES MELLITUS WITH OTHER CIRCULATORY COMPLICATION, WITH LONG-TERM CURRENT USE OF INSULIN (HCC): Primary | ICD-10-CM

## 2024-02-01 DIAGNOSIS — E11.42 DIABETIC POLYNEUROPATHY ASSOCIATED WITH TYPE 2 DIABETES MELLITUS (HCC): ICD-10-CM

## 2024-02-01 DIAGNOSIS — Z12.11 COLON CANCER SCREENING: ICD-10-CM

## 2024-02-01 LAB — SL AMB POCT HEMOGLOBIN AIC: 7.8 (ref ?–6.5)

## 2024-02-01 PROCEDURE — 99214 OFFICE O/P EST MOD 30 MIN: CPT | Performed by: FAMILY MEDICINE

## 2024-02-01 PROCEDURE — 83036 HEMOGLOBIN GLYCOSYLATED A1C: CPT | Performed by: FAMILY MEDICINE

## 2024-02-01 RX ORDER — INSULIN GLARGINE 100 [IU]/ML
28 INJECTION, SOLUTION SUBCUTANEOUS DAILY
Qty: 15 ML | Refills: 5 | Status: SHIPPED | OUTPATIENT
Start: 2024-02-01

## 2024-02-01 NOTE — PROGRESS NOTES
FAMILY PRACTICE OFFICE VISIT  Chris Emerson D.O.    [facility]  [facility]  [address]  [address]  [place], [address]      NAME: Adam Gonzalez  AGE: 48 y.o. SEX: male  : 1975   MRN: [id number]    DATE: 2024  TIME: 12:42 PM    Assessment and Plan     Problem List Items Addressed This Visit       Diabetic polyneuropathy associated with type 2 diabetes mellitus (HCC)    Relevant Medications    Insulin Glargine Solostar (Basaglar KwikPen) 100 UNIT/ML SOPN    Type 2 diabetes mellitus with circulatory disorder, with long-term current use of insulin (HCC) - Primary    Relevant Medications    Insulin Glargine Solostar (Basaglar KwikPen) 100 UNIT/ML SOPN    Other Relevant Orders    POCT hemoglobin A1c (Completed)     Other Visit Diagnoses       Uncontrolled type 2 diabetes mellitus with hyperglycemia (HCC)        Relevant Medications    Insulin Glargine Solostar (Basaglar KwikPen) 100 UNIT/ML SOPN    Colon cancer screening        Relevant Orders    Cologuard            Patient Instructions   Reviewed medications, he has been off insulin for about 1 week, I did send new prescription for Basaglar KwikPen 28 units daily, he should restart with that, watch for low readings, continue to monitor at home.  A1c redone here today is about the same as October, was 7.7 then, 7.8 here today.  Is also on Trulicity.    He will be seeing nephrology this month in follow-up, has EGFR less than 20, there is an order in system for blood work along with urinalysis that he should do prior to seeing them.  He did have an ultrasound back in November.  November creatinine 4.06 with potassium 5.2    Cholesterol back in September was 181 with HDL 46, , continue atorvastatin 80 mg daily.  It appears he is on all other medications as prescribed, did fill prescriptions in November-other than is not on lisinopril, only uses hydralazine once daily.    Blood pressure not ideal here today, I  would like him to use hydralazine 3 times daily as it was prescribed, he can stay off lisinopril until he sees nephrology.    Does have loop recorder, continue follow-up with cardiology.  History CVA.  Smoking is down to 1/2 pack/day, really needs to totally quit.    Never did lung CT scanning, never did Cologuard, I would like him to do those.  TSH was okay in September at 3.166.    Await specialty consults, recheck here 3 months with full physical.  He relates he wishes to continue to travel down here for visits rather than seeing someone closer to his home, really needs to keep visits.        Chief Complaint     Chief Complaint   Patient presents with    Forms     Water       History of Present Illness   Adam Gonzalez is a 48 y.o.-year-old male who was a walk-in today, he needed paperwork filled out to not have his water turned off.  Since I saw him in October he had canceled an appointment, had missed another 1 with me.  Continues to travel quite a long distance to the office, lives in [place], does have a place in [place].    It appears he did fill prescriptions in November, has been off insulin though for the last week or so, Lantus was not covered.    Uses hydralazine only once daily, not on lisinopril.    See extensive history, he is scheduled to see nephrology this month    Still smoking 1/2 pack/day.    Frustrated unable to find work, history CVA, speech has improved, still notes imbalance at times, no worsening/no sign of new stroke    Has not seen cardiology/endocrinology recently        Review of Systems   Review of Systems   Constitutional:         No fevers or chills, no new chest pain, no increased palpitations, does have loop recorder.  Has a mild smoker's cough, no increased shortness of breath, no wheezing.  No change in bowel or bladder, no increased headaches       Active Problem List     Patient Active Problem List   Diagnosis    Hypercholesterolemia    Nephrotic range proteinuria     "Current smoker    Essential hypertension    Type 2 diabetes mellitus with circulatory disorder, with long-term current use of insulin (HCC)    Brachial plexus injury, left, sequela    Diabetic polyneuropathy associated with type 2 diabetes mellitus (HCC)    Aortic valve insufficiency ( mild-mod 2020 )    History of embolic stroke    History of CVA (cerebrovascular accident)    Bilateral Cerebrovascular accident (CVA) status post tPA    Acute on chronic renal failure     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)    Acquired hypothyroidism    Stage 4 chronic kidney disease (HCC)    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    Impaired balance as late effect of cerebrovascular accident (CVA)    Noncompliance with treatment plan    Hyperparathyroidism due to renal insufficiency (HCC)    Implantable loop recorder present    Vitamin D deficiency       Past Medical History:  Reviewed    Past Surgical History:  Reviewed    Family History:  Reviewed    Social History:  Reviewed    Objective     Vitals:    02/01/24 1130   BP: 142/90   BP Location: Left arm   Patient Position: Sitting   Cuff Size: Large   Pulse: 72   SpO2: 95%   Weight: 85.7 kg (189 lb)   Height: 5' 8\" (1.727 m)     Body mass index is 28.74 kg/m².    BP Readings from Last 3 Encounters:   02/01/24 142/90   11/16/23 (!) 170/110   10/02/23 130/90       Wt Readings from Last 3 Encounters:   02/01/24 85.7 kg (189 lb)   11/16/23 85.3 kg (188 lb)   10/02/23 84.6 kg (186 lb 9.6 oz)       Physical Exam  Constitutional:       Comments: 48-year-old male who appears older than his stated age.  Speech is fluent here today, remains with very slight deficit due to stroke.    Balance relatively stable here in office today, overall neurologically unchanged from prior visits.    He is alert, oriented, no scleral icterus, heart is regular rate and rhythm, lungs are clear and equal bilaterally, abdomen is soft, nontender, no ankle swelling. "         ALLERGIES:  No Known Allergies    Current Medications     Current Outpatient Medications   Medication Sig Dispense Refill    amLODIPine (NORVASC) 10 mg tablet Take 1 tablet (10 mg total) by mouth daily 90 tablet 1    atorvastatin (LIPITOR) 80 mg tablet Take 1 tablet (80 mg total) by mouth daily 90 tablet 1    calcitriol (ROCALTROL) 0.25 mcg capsule Take 1 capsule (0.25 mcg total) by mouth 3 (three) times a week Monday, Wednesday, Friday 40 capsule 1    clopidogrel (PLAVIX) 75 mg tablet Take 1 tablet (75 mg total) by mouth daily 90 tablet 1    dulaglutide (Trulicity) 0.75 MG/0.5ML injection Inject 0.5 mL (0.75 mg total) under the skin every 7 days 2 mL 2    hydrALAZINE (APRESOLINE) 25 mg tablet Take 1 tablet (25 mg total) by mouth 3 (three) times a day (Patient taking differently: Take 25 mg by mouth 3 (three) times a day Misses, usually uses once daily) 270 tablet 1    Insulin Glargine Solostar (Basaglar KwikPen) 100 UNIT/ML SOPN Inject 0.28 mL (28 Units total) under the skin daily 15 mL 5    metoprolol succinate (TOPROL-XL) 50 mg 24 hr tablet Take 1 tablet (50 mg total) by mouth daily 90 tablet 1    Continuous Blood Gluc Sensor (FreeStyle Slime 2 Sensor) MISC Change sensor every 14 days (Patient not taking: Reported on 11/16/2023) 2 each 3    lisinopril (ZESTRIL) 10 mg tablet Take 1 tablet (10 mg total) by mouth daily (Patient not taking: Reported on 2/1/2024) 90 tablet 1     No current facility-administered medications for this visit.            Orders Placed This Encounter   Procedures    Cologuachristopher    POCT hemoglobin A1c         Chris Emerson DO

## 2024-02-01 NOTE — TELEPHONE ENCOUNTER
Pt came into office this morning thinking he had an appt with Dr. Emerson today.  However, his appt was scheduled for 2/8/24.  While pt was here, he inquired about paperwork from [email or web address].  I explained we haven't received the fax yet, and as soon as we do we'll give him a call.  Pt asked since he was here could he see Dr. Emerson now since he drove all the way from [place].  Dr. Emerson's schedule was checked and has an 11:20AM opening, so the pt requested that appt time for today.      [employer] paperwork fax was received 2/1/24 at 9:53 AM.  Once Dr. Emerson see pt at his appt today and completes paperwork it will be faxed.

## 2024-02-01 NOTE — PATIENT INSTRUCTIONS
Reviewed medications, he has been off insulin for about 1 week, I did send new prescription for Basaglar KwikPen 28 units daily, he should restart with that, watch for low readings, continue to monitor at home.  A1c redone here today is about the same as October, was 7.7 then, 7.8 here today.  Is also on Trulicity.    He will be seeing nephrology this month in follow-up, has EGFR less than 20, there is an order in system for blood work along with urinalysis that he should do prior to seeing them.  He did have an ultrasound back in November.  November creatinine 4.06 with potassium 5.2    Cholesterol back in September was 181 with HDL 46, , continue atorvastatin 80 mg daily.  It appears he is on all other medications as prescribed, did fill prescriptions in November-other than is not on lisinopril, only uses hydralazine once daily.    Blood pressure not ideal here today, I would like him to use hydralazine 3 times daily as it was prescribed, he can stay off lisinopril until he sees nephrology.    Does have loop recorder, continue follow-up with cardiology.  History CVA.  Smoking is down to 1/2 pack/day, really needs to totally quit.    Never did lung CT scanning, never did Cologuard, I would like him to do those.  TSH was okay in September at 3.166.    Await specialty consults, recheck here 3 months with full physical.  He relates he wishes to continue to travel down here for visits rather than seeing someone closer to his home, really needs to keep visits.

## 2024-02-07 ENCOUNTER — TELEPHONE (OUTPATIENT)
Dept: NEPHROLOGY | Facility: CLINIC | Age: 49
End: 2024-02-07

## 2024-02-07 NOTE — TELEPHONE ENCOUNTER
Called patient unable to get in touch of patient he is not excepting calls at this time. will send appt and lab reminder via patient's my chart.

## 2024-02-09 ENCOUNTER — OFFICE VISIT (OUTPATIENT)
Dept: NEPHROLOGY | Facility: CLINIC | Age: 49
End: 2024-02-09

## 2024-02-09 VITALS
WEIGHT: 191 LBS | BODY MASS INDEX: 28.95 KG/M2 | DIASTOLIC BLOOD PRESSURE: 84 MMHG | OXYGEN SATURATION: 98 % | HEART RATE: 96 BPM | HEIGHT: 68 IN | SYSTOLIC BLOOD PRESSURE: 126 MMHG

## 2024-02-09 DIAGNOSIS — N18.4 STAGE 4 CHRONIC KIDNEY DISEASE (HCC): ICD-10-CM

## 2024-02-09 NOTE — LETTER
February 9, 2024     YASMIN Gresham  [address]  [address]  [facility] [address]    Patient: Adam Gonzalez   YOB: 1975   Date of Visit: 2/9/2024       Dear Dr. Bauer:    Thank you for referring Adam Gonzalez to me for evaluation. Below are my notes for this consultation.    If you have questions, please do not hesitate to call me. I look forward to following your patient along with you.         Sincerely,        Renny Benedict MD        CC: No Recipients    Renny Benedict MD  2/9/2024  3:28 PM  Sign when Signing Visit  Assessment   Adam Gonzalez is a 48 y.o. male central americ[place] ancestry who is referred by Dr. YOCASTA Benedict and Shonda HOFFMAN, with a past medical history of CKD stage V with Creat 4.06 and eGFR 16 11/2023, diabetes (diagnosed 10 years ago), hypertension (diagnosed 10 years ago), hyperlipidemia, CVA, current smoker since 11 yo attempting to quit, no ETOH, no drugs, seen in the [facility]phrology Clinic for pre-transplant kidney evaluation.    Patient was lost to follow-up from prior nephrologist Dr. Johnson and last seen in 2022 and then reestablish care in November 2023 with his nephrolog team with Shonda HOFFMAN and native kidney disease was felt to be secondary to diabetes and hypertension.  And Dr. YOCASTA Benedict. patient has had a history of uncontrolled diabetes.  Patient also had insurance difficulties and therefore was not able to take his antihypertensives for a prolonged period until recently per the notes.    Plan   1. Proceed with standard age appropriate kidney transplant work up.  Overall, patient will likely benefit from a renal transplant pending evaluation.  Patient understands risks involved with transplantation versus benefits.  Patient has had issues with compliance with medications in the past but appears to be more related to insurance issues and patient is compliant with current medication regimen and blood pressures are well-controlled and  diabetes is improved control from prior.  Patient does understand that his kidney disease is significantly advanced and he may need dialysis soon and he should continue to work with his current nephrologist very closely to monitor for dialysis needs.  He was also reminded to have blood work for his nephrology team that has been ordered prior.  I have also messaged the advanced practitioner who saw the patient in November to inform them that patient is overdue for lab work and he will be going on Monday.  2. The need to avoid blood transfusion to decrease allosensitization was explained to the patient.  3. The advantages of a living donor over a  donor was explained to the patient and family. I strongly encouraged the patient and family to pursue a living donor.  4-history of CVA-patient was to reestablish care with cardiology team as patient has a loop recorder.  8-kxmtftzfehtgd-gfxltocgpohn diabetes-most recent hemoglobin A1c  was 7.8%. prior was above 10% 2 years ago and improved with improving diet. Patient does not have retinopathy nor neuropathy.   6-patient appears to be overdue for lab work that was ordered by patient's nephrologist and was reminded to complete this today.  Patient did complete ultrasound  and showed no hydronephrosis and normal-sized kidneys.   7-patient will need cardiology clearance.  Last echocardiogram was in .  EF 60%.  Mild aortic regurgitation.  8 -family history - paternal aunt required dialysis  9 - colonoscopy - pt is due as now 49 yo. Pt is aware. He is working with his PCP to do cologuard but is not objectionable to having colonoscopy.   10 -cardiology-will need cardiology clearance during evaluation process    I have discussed with Adam Gonzalez and family at length about the risk and benefits of kidney transplantation. I have strongly encouraged him to pursue living donation, and explained to him the benefits of living donation over   donor transplantation. We have briefly discussed the surgical procedure. We have discussed about the need for life long immunosuppression and the importance of compliance. We have discussed the side effects of immunosuppression including but not limited to infection, malignancies and developing/worsening diabetes control. Adam Gonzalez verbalized understanding and is interested in pursuing transplant. I have also encouraged patient to continue follow up with all of his physicians as he is. Today's visit is strictly for renal transplant medical evaluation, and patient knows to follow up with his physicians for his chronic management of his care. Patient also knows to go to the emergency room if need arises for urgent/emergent issues.     I have spent a total time of 60 minutes on 02/09/24 in caring for this patient including Diagnostic results, Prognosis, Risks and benefits of tx options, Patient and family education, Importance of tx compliance, Risk factor reductions, Impressions, Counseling / Coordination of care, Documenting in the medical record, and Obtaining or reviewing history  . I counseled the patient regarding the risks and benefits of kidney transplantation, the immediate and long-term complications of kidney transplantation, and the advantage of receiving a living donor kidney transplant.     It was a pleasure evaluating your patient in the office today. Thank you for allowing our team to participate in the care of Mr Adam Gonzalez. Please do not hesitate to contact our team if further issues/questions shall arise in the interim.     HISTORY OF PRESENT ILLNESS    Adam Gonzalez is a 48 y.o. male seen in the Nephrology Clinic for evaluation for kidney transplant.    CKD V due to DM/HTN, not on dialysis, S Cr 4.06 mg/dl, eGFR 16 ml/min.    Renal disease is not biopsy proven.     Primary Nephrologist is Dr RASHI Benedict and Shonda HOFFMAN.    Native Urine Output: yes  Hx of voiding difficulty: no  Hx of  hematuria: no  Hx of proteinuria: yes  Hx of nephrolithiasis: no  Hx of recurrent urinary tract infection: no    HTN: onset 10 years ago,  hx of malignant HTN: no, admission for HTN emergencies: no    DM type 2: onset 10 years ago    Total insulin requirement/day 28 units daily  DM retinopathy: no, Laser Surgery: no  DM neuropathy: none  DM gastroparesis: no  DKA: none  Hypoglycemic awareness: none  Hx of amputation: no     PAD/PVD: no, Claudication: no    Cardiac history - CAD: no, MI: no     Primary Cardiologist: unclear. Pt has loop recorder and states he has cardiologist appt upcoming    Exercise tolerance: not working    Hx of CVA: yes    Hx of DVT/PE: no    Viral Infection:    HIV: no  Hep B: no  Hep C: no    Cancer: History of cancer: no    Health maintenance and other pertinent history:    If AA, history of sickle cell: no    Male:    Colonoscopy: no  Prostate: no    Blood transfusion: no    Last admission 2022     Review Of Systems:     Constitutional: nl appetite.  no fevers, chills, involuntary weight gain or weight loss.  Eyes: wears glasses.  ENT: no ear disease, epistaxis or oral ulcers.  Respiratory: no SOB, cough, hemoptysis, ORTIZ.    Cardiovascular: no CP, palpitations, claudication, edema.  GI: no N/V/D/C, abdominal pain, melena, BRBPR.  : see HPI  Endocrine: no thyroid disease  Heme: no bleeding or clotting disorders or swollen lymph nodes  MS: no joint effusions or deformities.  Skin: no skin disease  Neuro: no HA, seizures, numbness, tingling, focal weakness  Psych: no depression, anxiety    Lives with wife and daughter (10 yo); pet 2 dog    Employment history: autoworker    HD Access: n/a  Abdominal Surgeries: none    Smoke: currently 1/2 ppd; started at 9 yo  ETOH: none  Drugs: none    Past Medical History:   Diagnosis Date   • Diabetes mellitus (HCC)    • Hypertension    • Received intravenous tissue plasminogen activator (tPA) in emergency department 2/1/2022   • Stroke (HCC)      No past  surgical history on file.    Family History   Problem Relation Age of Onset   • No Known Problems Mother    • No Known Problems Father    • Pancreatic cancer Brother      Social History     Socioeconomic History   • Marital status: Single     Spouse name: None   • Number of children: None   • Years of education: None   • Highest education level: None   Occupational History   • None   Tobacco Use   • Smoking status: Every Day     Current packs/day: 1.00     Average packs/day: 1 pack/day for 36.6 years (36.6 ttl pk-yrs)     Types: Cigarettes     Start date: 6/16/1987   • Smokeless tobacco: Never   • Tobacco comments:     states slowing it down, used Chantix-currently 1/2 pack/day, at least 35 pack years as of 2023   Vaping Use   • Vaping status: Never Used   Substance and Sexual Activity   • Alcohol use: Not Currently     Comment: stopped 8/22   • Drug use: No   • Sexual activity: Yes   Other Topics Concern   • None   Social History Narrative   • None     Social Determinants of Health     Financial Resource Strain: Not on file   Food Insecurity: Not on file   Transportation Needs: No Transportation Needs (9/28/2021)    PRAPARE - Transportation    • Lack of Transportation (Medical): No    • Lack of Transportation (Non-Medical): No   Physical Activity: Not on file   Stress: Not on file   Social Connections: Not on file   Intimate Partner Violence: Not on file   Housing Stability: Not on file     Living donors:  has not discussed with family    Current Outpatient Medications   Medication Sig Dispense Refill   • amLODIPine (NORVASC) 10 mg tablet Take 1 tablet (10 mg total) by mouth daily 90 tablet 1   • atorvastatin (LIPITOR) 80 mg tablet Take 1 tablet (80 mg total) by mouth daily 90 tablet 1   • calcitriol (ROCALTROL) 0.25 mcg capsule Take 1 capsule (0.25 mcg total) by mouth 3 (three) times a week Monday, Wednesday, Friday 40 capsule 1   • clopidogrel (PLAVIX) 75 mg tablet Take 1 tablet (75 mg total) by mouth daily 90  "tablet 1   • dulaglutide (Trulicity) 0.75 MG/0.5ML injection Inject 0.5 mL (0.75 mg total) under the skin every 7 days 2 mL 2   • hydrALAZINE (APRESOLINE) 25 mg tablet Take 1 tablet (25 mg total) by mouth 3 (three) times a day (Patient taking differently: Take 25 mg by mouth 3 (three) times a day Misses, usually uses once daily) 270 tablet 1   • Insulin Glargine Solostar (Basaglar KwikPen) 100 UNIT/ML SOPN Inject 0.28 mL (28 Units total) under the skin daily 15 mL 5   • metoprolol succinate (TOPROL-XL) 50 mg 24 hr tablet Take 1 tablet (50 mg total) by mouth daily 90 tablet 1   • Continuous Blood Gluc Sensor (FreeStyle Slime 2 Sensor) MISC Change sensor every 14 days (Patient not taking: Reported on 11/16/2023) 2 each 3   • lisinopril (ZESTRIL) 10 mg tablet Take 1 tablet (10 mg total) by mouth daily (Patient not taking: Reported on 2/1/2024) 90 tablet 1     No current facility-administered medications for this visit.     Patient has no known allergies.    Physical Exam:    /84 (BP Location: Left arm, Patient Position: Sitting, Cuff Size: Standard)   Pulse 96   Ht 5' 8\" (1.727 m)   Wt 86.6 kg (191 lb)   SpO2 98%   BMI 29.04 kg/m²     General : NAD    HEENT:  anicteric, no thrush, dental - dentures upper and lower teeth  Cardiac:  RRR, S1, S2 normal,  no murmur    Lung:  CTAB   Abdomen:  soft, nontender, no ascites   No groin bruit bilaterally  no edema   Neuro:no focal neuro deficits   Skin: no rash  Carotid bruit: no  "

## 2024-02-09 NOTE — LETTER
February 9, 2024     Chris Emerson DO  [address]  [address]  [facility] [address]    Patient: Adam Gonzalez   YOB: 1975   Date of Visit: 2/9/2024       Dear Dr. Emerson:    Thank you for referring Adam Gonzalez to me for evaluation. Below are my notes for this consultation.    If you have questions, please do not hesitate to call me. I look forward to following your patient along with you.         Sincerely,        Renny Benedict MD        CC: YASMIN Gresham MD  2/9/2024  3:28 PM  Sign when Signing Visit  Assessment   Adam Goznalez is a 48 y.o. male [place] americ[place] ancestry who is referred by Dr. YOCASTA Benedict and Shonda HOFFMAN, with a past medical history of CKD stage V with Creat 4.06 and eGFR 16 11/2023, diabetes (diagnosed 10 years ago), hypertension (diagnosed 10 years ago), hyperlipidemia, CVA, current smoker since 11 yo attempting to quit, no ETOH, no drugs, seen in the Nephrology Clinic for pre-transplant kidney evaluation.    Patient was lost to follow-up from prior nephrologist Dr. Johnson and last seen in 2022 and then reestablish care in November 2023 with his nephrolog team with Shonda HOFFMAN and native kidney disease was felt to be secondary to diabetes and hypertension.  And Dr. YOCASTA Benedict. patient has had a history of uncontrolled diabetes.  Patient also had insurance difficulties and therefore was not able to take his antihypertensives for a prolonged period until recently per the notes.    Plan   1. Proceed with standard age appropriate kidney transplant work up.  Overall, patient will likely benefit from a renal transplant pending evaluation.  Patient understands risks involved with transplantation versus benefits.  Patient has had issues with compliance with medications in the past but appears to be more related to insurance issues and patient is compliant with current medication regimen and blood pressures are well-controlled  and diabetes is improved control from prior.  Patient does understand that his kidney disease is significantly advanced and he may need dialysis soon and he should continue to work with his current nephrologist very closely to monitor for dialysis needs.  He was also reminded to have blood work for his nephrology team that has been ordered prior.  I have also messaged the advanced practitioner who saw the patient in November to inform them that patient is overdue for lab work and he will be going on Monday.  2. The need to avoid blood transfusion to decrease allosensitization was explained to the patient.  3. The advantages of a living donor over a  donor was explained to the patient and family. I strongly encouraged the patient and family to pursue a living donor.  4-history of CVA-patient was to reestablish care with cardiology team as patient has a loop recorder.  6-eevurrtsaquoq-pljakskwqrox diabetes-most recent hemoglobin A1c  was 7.8%. prior was above 10% 2 years ago and improved with improving diet. Patient does not have retinopathy nor neuropathy.   6-patient appears to be overdue for lab work that was ordered by patient's nephrologist and was reminded to complete this today.  Patient did complete ultrasound  and showed no hydronephrosis and normal-sized kidneys.   7-patient will need cardiology clearance.  Last echocardiogram was in 2020.  EF 60%.  Mild aortic regurgitation.  8 -family history - paternal aunt required dialysis  9 - colonoscopy - pt is due as now 47 yo. Pt is aware. He is working with his PCP to do cologuard but is not objectionable to having colonoscopy.   10 -cardiology-will need cardiology clearance during evaluation process    I have discussed with Adam Gonzalez and family at length about the risk and benefits of kidney transplantation. I have strongly encouraged him to pursue living donation, and explained to him the benefits of living donation over   donor transplantation. We have briefly discussed the surgical procedure. We have discussed about the need for life long immunosuppression and the importance of compliance. We have discussed the side effects of immunosuppression including but not limited to infection, malignancies and developing/worsening diabetes control. Adam Gonzalez verbalized understanding and is interested in pursuing transplant. I have also encouraged patient to continue follow up with all of his physicians as he is. Today's visit is strictly for renal transplant medical evaluation, and patient knows to follow up with his physicians for his chronic management of his care. Patient also knows to go to the emergency room if need arises for urgent/emergent issues.     I have spent a total time of 60 minutes on 24 in caring for this patient including Diagnostic results, Prognosis, Risks and benefits of tx options, Patient and family education, Importance of tx compliance, Risk factor reductions, Impressions, Counseling / Coordination of care, Documenting in the medical record, and Obtaining or reviewing history  . I counseled the patient regarding the risks and benefits of kidney transplantation, the immediate and long-term complications of kidney transplantation, and the advantage of receiving a living donor kidney transplant.     It was a pleasure evaluating your patient in the office today. Thank you for allowing our team to participate in the care of Mr Adam Gonzalez. Please do not hesitate to contact our team if further issues/questions shall arise in the interim.     HISTORY OF PRESENT ILLNESS    Adam Gonzalez is a 48 y.o. male seen in the Nephrology Clinic for evaluation for kidney transplant.    CKD V due to DM/HTN, not on dialysis, S Cr 4.06 mg/dl, eGFR 16 ml/min.    Renal disease is not biopsy proven.     Primary Nephrologist is Dr RASHI Benedict and Shonda HOFFMAN.    Native Urine Output: yes  Hx of voiding difficulty:  no  Hx of hematuria: no  Hx of proteinuria: yes  Hx of nephrolithiasis: no  Hx of recurrent urinary tract infection: no    HTN: onset 10 years ago,  hx of malignant HTN: no, admission for HTN emergencies: no    DM type 2: onset 10 years ago    Total insulin requirement/day 28 units daily  DM retinopathy: no, Laser Surgery: no  DM neuropathy: none  DM gastroparesis: no  DKA: none  Hypoglycemic awareness: none  Hx of amputation: no     PAD/PVD: no, Claudication: no    Cardiac history - CAD: no, MI: no     Primary Cardiologist: unclear. Pt has loop recorder and states he has cardiologist appt upcoming    Exercise tolerance: not working    Hx of CVA: yes    Hx of DVT/PE: no    Viral Infection:    HIV: no  Hep B: no  Hep C: no    Cancer: History of cancer: no    Health maintenance and other pertinent history:    If AA, history of sickle cell: no    Male:    Colonoscopy: no  Prostate: no    Blood transfusion: no    Last admission 2022     Review Of Systems:     Constitutional: nl appetite.  no fevers, chills, involuntary weight gain or weight loss.  Eyes: wears glasses.  ENT: no ear disease, epistaxis or oral ulcers.  Respiratory: no SOB, cough, hemoptysis, ORTIZ.    Cardiovascular: no CP, palpitations, claudication, edema.  GI: no N/V/D/C, abdominal pain, melena, BRBPR.  : see HPI  Endocrine: no thyroid disease  Heme: no bleeding or clotting disorders or swollen lymph nodes  MS: no joint effusions or deformities.  Skin: no skin disease  Neuro: no HA, seizures, numbness, tingling, focal weakness  Psych: no depression, anxiety    Lives with wife and daughter (10 yo); pet 2 dog    Employment history: autoworker    HD Access: n/a  Abdominal Surgeries: none    Smoke: currently 1/2 ppd; started at 11 yo  ETOH: none  Drugs: none    Past Medical History:   Diagnosis Date   • Diabetes mellitus (HCC)    • Hypertension    • Received intravenous tissue plasminogen activator (tPA) in emergency department 2/1/2022   • Stroke (HCC)       No past surgical history on file.    Family History   Problem Relation Age of Onset   • No Known Problems Mother    • No Known Problems Father    • Pancreatic cancer Brother      Social History     Socioeconomic History   • Marital status: Single     Spouse name: None   • Number of children: None   • Years of education: None   • Highest education level: None   Occupational History   • None   Tobacco Use   • Smoking status: Every Day     Current packs/day: 1.00     Average packs/day: 1 pack/day for 36.6 years (36.6 ttl pk-yrs)     Types: Cigarettes     Start date: 6/16/1987   • Smokeless tobacco: Never   • Tobacco comments:     states slowing it down, used Chantix-currently 1/2 pack/day, at least 35 pack years as of 2023   Vaping Use   • Vaping status: Never Used   Substance and Sexual Activity   • Alcohol use: Not Currently     Comment: stopped 8/22   • Drug use: No   • Sexual activity: Yes   Other Topics Concern   • None   Social History Narrative   • None     Social Determinants of Health     Financial Resource Strain: Not on file   Food Insecurity: Not on file   Transportation Needs: No Transportation Needs (9/28/2021)    PRAPARE - Transportation    • Lack of Transportation (Medical): No    • Lack of Transportation (Non-Medical): No   Physical Activity: Not on file   Stress: Not on file   Social Connections: Not on file   Intimate Partner Violence: Not on file   Housing Stability: Not on file     Living donors:  has not discussed with family    Current Outpatient Medications   Medication Sig Dispense Refill   • amLODIPine (NORVASC) 10 mg tablet Take 1 tablet (10 mg total) by mouth daily 90 tablet 1   • atorvastatin (LIPITOR) 80 mg tablet Take 1 tablet (80 mg total) by mouth daily 90 tablet 1   • calcitriol (ROCALTROL) 0.25 mcg capsule Take 1 capsule (0.25 mcg total) by mouth 3 (three) times a week Monday, Wednesday, Friday 40 capsule 1   • clopidogrel (PLAVIX) 75 mg tablet Take 1 tablet (75 mg total) by mouth  "daily 90 tablet 1   • dulaglutide (Trulicity) 0.75 MG/0.5ML injection Inject 0.5 mL (0.75 mg total) under the skin every 7 days 2 mL 2   • hydrALAZINE (APRESOLINE) 25 mg tablet Take 1 tablet (25 mg total) by mouth 3 (three) times a day (Patient taking differently: Take 25 mg by mouth 3 (three) times a day Misses, usually uses once daily) 270 tablet 1   • Insulin Glargine Solostar (Basaglar KwikPen) 100 UNIT/ML SOPN Inject 0.28 mL (28 Units total) under the skin daily 15 mL 5   • metoprolol succinate (TOPROL-XL) 50 mg 24 hr tablet Take 1 tablet (50 mg total) by mouth daily 90 tablet 1   • Continuous Blood Gluc Sensor (FreeStyle Slime 2 Sensor) MISC Change sensor every 14 days (Patient not taking: Reported on 11/16/2023) 2 each 3   • lisinopril (ZESTRIL) 10 mg tablet Take 1 tablet (10 mg total) by mouth daily (Patient not taking: Reported on 2/1/2024) 90 tablet 1     No current facility-administered medications for this visit.     Patient has no known allergies.    Physical Exam:    /84 (BP Location: Left arm, Patient Position: Sitting, Cuff Size: Standard)   Pulse 96   Ht 5' 8\" (1.727 m)   Wt 86.6 kg (191 lb)   SpO2 98%   BMI 29.04 kg/m²     General : NAD    HEENT:  anicteric, no thrush, dental - dentures upper and lower teeth  Cardiac:  RRR, S1, S2 normal,  no murmur    Lung:  CTAB   Abdomen:  soft, nontender, no ascites   No groin bruit bilaterally  no edema   Neuro:no focal neuro deficits   Skin: no rash  Carotid bruit: no  "

## 2024-02-09 NOTE — PROGRESS NOTES
Assessment   Adam Gonzalez is a 48 y.o. male [place] ancestry who is referred by Dr. YOCASTA Benedict and Shonda HOFFMAN, with a past medical history of CKD stage V with Creat 4.06 and eGFR 16 11/2023, diabetes (diagnosed 10 years ago), hypertension (diagnosed 10 years ago), hyperlipidemia, CVA, current smoker since 11 yo attempting to quit, no ETOH, no drugs, seen in the [facility]phrology Clinic for pre-transplant kidney evaluation.    Patient was lost to follow-up from prior nephrologist Dr. Johnson and last seen in 2022 and then reestablish care in November 2023 with his nephrolog team with Shonda HOFFMAN and native kidney disease was felt to be secondary to diabetes and hypertension.  And Dr. YOCASTA Benedict. patient has had a history of uncontrolled diabetes.  Patient also had insurance difficulties and therefore was not able to take his antihypertensives for a prolonged period until recently per the notes.    Plan   1. Proceed with standard age appropriate kidney transplant work up.  Overall, patient will likely benefit from a renal transplant pending evaluation.  Patient understands risks involved with transplantation versus benefits.  Patient has had issues with compliance with medications in the past but appears to be more related to insurance issues and patient is compliant with current medication regimen and blood pressures are well-controlled and diabetes is improved control from prior.  Patient does understand that his kidney disease is significantly advanced and he may need dialysis soon and he should continue to work with his current nephrologist very closely to monitor for dialysis needs.  He was also reminded to have blood work for his nephrology team that has been ordered prior.  I have also messaged the advanced practitioner who saw the patient in November to inform them that patient is overdue for lab work and he will be going on Monday.  2. The need to avoid blood transfusion to decrease  allosensitization was explained to the patient.  3. The advantages of a living donor over a  donor was explained to the patient and family. I strongly encouraged the patient and family to pursue a living donor.  4-history of CVA-patient was to reestablish care with cardiology team as patient has a loop recorder.  6-mzrcgevmwvdgp-msumgyxzwkzz diabetes-most recent hemoglobin A1c  was 7.8%. prior was above 10% 2 years ago and improved with improving diet. Patient does not have retinopathy nor neuropathy.   6-patient appears to be overdue for lab work that was ordered by patient's nephrologist and was reminded to complete this today.  Patient did complete ultrasound  and showed no hydronephrosis and normal-sized kidneys.   7-patient will need cardiology clearance.  Last echocardiogram was in .  EF 60%.  Mild aortic regurgitation.  8 -family history - paternal aunt required dialysis  9 - colonoscopy - pt is due as now 49 yo. Pt is aware. He is working with his PCP to do cologuard but is not objectionable to having colonoscopy.   10 -cardiology-will need cardiology clearance during evaluation process    I have discussed with Adam CONTE Carlos and family at length about the risk and benefits of kidney transplantation. I have strongly encouraged him to pursue living donation, and explained to him the benefits of living donation over  donor transplantation. We have briefly discussed the surgical procedure. We have discussed about the need for life long immunosuppression and the importance of compliance. We have discussed the side effects of immunosuppression including but not limited to infection, malignancies and developing/worsening diabetes control. Adam Gonzalez verbalized understanding and is interested in pursuing transplant. I have also encouraged patient to continue follow up with all of his physicians as he is. Today's visit is strictly for renal transplant medical evaluation,  and patient knows to follow up with his physicians for his chronic management of his care. Patient also knows to go to the emergency room if need arises for urgent/emergent issues.     I have spent a total time of 60 minutes on 02/09/24 in caring for this patient including Diagnostic results, Prognosis, Risks and benefits of tx options, Patient and family education, Importance of tx compliance, Risk factor reductions, Impressions, Counseling / Coordination of care, Documenting in the medical record, and Obtaining or reviewing history  . I counseled the patient regarding the risks and benefits of kidney transplantation, the immediate and long-term complications of kidney transplantation, and the advantage of receiving a living donor kidney transplant.     It was a pleasure evaluating your patient in the office today. Thank you for allowing our team to participate in the care of Mr Adam Gonzalez. Please do not hesitate to contact our team if further issues/questions shall arise in the interim.     HISTORY OF PRESENT ILLNESS    Adam Gonzalez is a 48 y.o. male seen in the Nephrology Clinic for evaluation for kidney transplant.    CKD V due to DM/HTN, not on dialysis, S Cr 4.06 mg/dl, eGFR 16 ml/min.    Renal disease is not biopsy proven.     Primary Nephrologist is Dr RASHI Benedict and Shonda HOFFMAN.    Native Urine Output: yes  Hx of voiding difficulty: no  Hx of hematuria: no  Hx of proteinuria: yes  Hx of nephrolithiasis: no  Hx of recurrent urinary tract infection: no    HTN: onset 10 years ago,  hx of malignant HTN: no, admission for HTN emergencies: no    DM type 2: onset 10 years ago    Total insulin requirement/day 28 units daily  DM retinopathy: no, Laser Surgery: no  DM neuropathy: none  DM gastroparesis: no  DKA: none  Hypoglycemic awareness: none  Hx of amputation: no     PAD/PVD: no, Claudication: no    Cardiac history - CAD: no, MI: no     Primary Cardiologist: unclear. Pt has loop recorder and states he  has cardiologist appt upcoming    Exercise tolerance: not working    Hx of CVA: yes    Hx of DVT/PE: no    Viral Infection:    HIV: no  Hep B: no  Hep C: no    Cancer: History of cancer: no    Health maintenance and other pertinent history:    If AA, history of sickle cell: no    Male:    Colonoscopy: no  Prostate: no    Blood transfusion: no    Last admission 2022     Review Of Systems:     Constitutional: nl appetite.  no fevers, chills, involuntary weight gain or weight loss.  Eyes: wears glasses.  ENT: no ear disease, epistaxis or oral ulcers.  Respiratory: no SOB, cough, hemoptysis, ORTIZ.    Cardiovascular: no CP, palpitations, claudication, edema.  GI: no N/V/D/C, abdominal pain, melena, BRBPR.  : see HPI  Endocrine: no thyroid disease  Heme: no bleeding or clotting disorders or swollen lymph nodes  MS: no joint effusions or deformities.  Skin: no skin disease  Neuro: no HA, seizures, numbness, tingling, focal weakness  Psych: no depression, anxiety    Lives with wife and daughter (10 yo); pet 2 dog    Employment history: autoworker    HD Access: n/a  Abdominal Surgeries: none    Smoke: currently 1/2 ppd; started at 11 yo  ETOH: none  Drugs: none    Past Medical History:   Diagnosis Date    Diabetes mellitus (HCC)     Hypertension     Received intravenous tissue plasminogen activator (tPA) in emergency department 2/1/2022    Stroke (HCC)      No past surgical history on file.    Family History   Problem Relation Age of Onset    No Known Problems Mother     No Known Problems Father     Pancreatic cancer Brother      Social History     Socioeconomic History    Marital status: Single     Spouse name: None    Number of children: None    Years of education: None    Highest education level: None   Occupational History    None   Tobacco Use    Smoking status: Every Day     Current packs/day: 1.00     Average packs/day: 1 pack/day for 36.6 years (36.6 ttl pk-yrs)     Types: Cigarettes     Start date: 6/16/1987     Smokeless tobacco: Never    Tobacco comments:     states slowing it down, used Chantix-currently 1/2 pack/day, at least 35 pack years as of 2023   Vaping Use    Vaping status: Never Used   Substance and Sexual Activity    Alcohol use: Not Currently     Comment: stopped 8/22    Drug use: No    Sexual activity: Yes   Other Topics Concern    None   Social History Narrative    None     Social Determinants of Health     Financial Resource Strain: Not on file   Food Insecurity: Not on file   Transportation Needs: No Transportation Needs (9/28/2021)    PRAPARE - Transportation     Lack of Transportation (Medical): No     Lack of Transportation (Non-Medical): No   Physical Activity: Not on file   Stress: Not on file   Social Connections: Not on file   Intimate Partner Violence: Not on file   Housing Stability: Not on file     Living donors:  has not discussed with family    Current Outpatient Medications   Medication Sig Dispense Refill    amLODIPine (NORVASC) 10 mg tablet Take 1 tablet (10 mg total) by mouth daily 90 tablet 1    atorvastatin (LIPITOR) 80 mg tablet Take 1 tablet (80 mg total) by mouth daily 90 tablet 1    calcitriol (ROCALTROL) 0.25 mcg capsule Take 1 capsule (0.25 mcg total) by mouth 3 (three) times a week Monday, Wednesday, Friday 40 capsule 1    clopidogrel (PLAVIX) 75 mg tablet Take 1 tablet (75 mg total) by mouth daily 90 tablet 1    dulaglutide (Trulicity) 0.75 MG/0.5ML injection Inject 0.5 mL (0.75 mg total) under the skin every 7 days 2 mL 2    hydrALAZINE (APRESOLINE) 25 mg tablet Take 1 tablet (25 mg total) by mouth 3 (three) times a day (Patient taking differently: Take 25 mg by mouth 3 (three) times a day Misses, usually uses once daily) 270 tablet 1    Insulin Glargine Solostar (Basaglar KwikPen) 100 UNIT/ML SOPN Inject 0.28 mL (28 Units total) under the skin daily 15 mL 5    metoprolol succinate (TOPROL-XL) 50 mg 24 hr tablet Take 1 tablet (50 mg total) by mouth daily 90 tablet 1    Continuous  "Blood Gluc Sensor (FreeStyle Slime 2 Sensor) MISC Change sensor every 14 days (Patient not taking: Reported on 11/16/2023) 2 each 3    lisinopril (ZESTRIL) 10 mg tablet Take 1 tablet (10 mg total) by mouth daily (Patient not taking: Reported on 2/1/2024) 90 tablet 1     No current facility-administered medications for this visit.     Patient has no known allergies.    Physical Exam:    /84 (BP Location: Left arm, Patient Position: Sitting, Cuff Size: Standard)   Pulse 96   Ht 5' 8\" (1.727 m)   Wt 86.6 kg (191 lb)   SpO2 98%   BMI 29.04 kg/m²     General : NAD    HEENT:  anicteric, no thrush, dental - dentures upper and lower teeth  Cardiac:  RRR, S1, S2 normal,  no murmur    Lung:  CTAB   Abdomen:  soft, nontender, no ascites   No groin bruit bilaterally  no edema   Neuro:no focal neuro deficits   Skin: no rash  Carotid bruit: no  "

## 2024-02-09 NOTE — PATIENT INSTRUCTIONS
1) We will review your case at the transplant committee meeting and will review our decision with you in approximately 4 weeks over the phone.  2) If you do not receive a call from [facility] within 4 weeks, please call our local Nephrology office.  3) From a renal transplant evaluation purpose, we will see you once a year in our local office for medical follow-up.  4) Once we call you with our decision regarding further evaluation, you will receive further instruction over the phone and in the mail regarding the next steps to complete the transplant evaluation.  5) All of your non transplant evaluation follow up regarding your regular medical follow ups, your renal care follow ups, and any other specialists visits you are following with should continue as you are advised by those respective physicians and continue to follow with their management and care.   6) please have labwork for your nephrologist

## 2024-02-16 ENCOUNTER — TELEPHONE (OUTPATIENT)
Dept: NEPHROLOGY | Facility: CLINIC | Age: 49
End: 2024-02-16

## 2024-02-16 NOTE — TELEPHONE ENCOUNTER
I called [facility] [employer] [facility] @ [phone] (Automated System) to check eligible for the patient and as of 2/16/2024 this patient has current active coverage as of 11/9/2023. Nori Pérez, .

## 2024-02-21 ENCOUNTER — TELEPHONE (OUTPATIENT)
Dept: NEPHROLOGY | Facility: CLINIC | Age: 49
End: 2024-02-21

## 2024-02-21 ENCOUNTER — APPOINTMENT (OUTPATIENT)
Dept: LAB | Facility: CLINIC | Age: 49
End: 2024-02-21
Payer: COMMERCIAL

## 2024-02-21 LAB
25(OH)D3 SERPL-MCNC: <7 NG/ML (ref 30–100)
ANION GAP SERPL CALCULATED.3IONS-SCNC: 9 MMOL/L
BACTERIA UR QL AUTO: ABNORMAL /HPF
BASOPHILS # BLD AUTO: 0.05 THOUSANDS/ÂΜL (ref 0–0.1)
BASOPHILS NFR BLD AUTO: 1 % (ref 0–1)
BILIRUB UR QL STRIP: NEGATIVE
BUN SERPL-MCNC: 44 MG/DL (ref 5–25)
CALCIUM SERPL-MCNC: 6.8 MG/DL (ref 8.4–10.2)
CHLORIDE SERPL-SCNC: 109 MMOL/L (ref 96–108)
CLARITY UR: CLEAR
CO2 SERPL-SCNC: 19 MMOL/L (ref 21–32)
COLOR UR: ABNORMAL
CREAT SERPL-MCNC: 4.42 MG/DL (ref 0.6–1.3)
EOSINOPHIL # BLD AUTO: 0.2 THOUSAND/ÂΜL (ref 0–0.61)
EOSINOPHIL NFR BLD AUTO: 2 % (ref 0–6)
ERYTHROCYTE [DISTWIDTH] IN BLOOD BY AUTOMATED COUNT: 14.1 % (ref 11.6–15.1)
GFR SERPL CREATININE-BSD FRML MDRD: 14 ML/MIN/1.73SQ M
GLUCOSE P FAST SERPL-MCNC: 314 MG/DL (ref 65–99)
GLUCOSE UR STRIP-MCNC: ABNORMAL MG/DL
HCT VFR BLD AUTO: 44.8 % (ref 36.5–49.3)
HGB BLD-MCNC: 14 G/DL (ref 12–17)
HGB UR QL STRIP.AUTO: ABNORMAL
IMM GRANULOCYTES # BLD AUTO: 0.03 THOUSAND/UL (ref 0–0.2)
IMM GRANULOCYTES NFR BLD AUTO: 0 % (ref 0–2)
KETONES UR STRIP-MCNC: NEGATIVE MG/DL
LEUKOCYTE ESTERASE UR QL STRIP: ABNORMAL
LYMPHOCYTES # BLD AUTO: 1.64 THOUSANDS/ÂΜL (ref 0.6–4.47)
LYMPHOCYTES NFR BLD AUTO: 18 % (ref 14–44)
MCH RBC QN AUTO: 31.3 PG (ref 26.8–34.3)
MCHC RBC AUTO-ENTMCNC: 31.3 G/DL (ref 31.4–37.4)
MCV RBC AUTO: 100 FL (ref 82–98)
MONOCYTES # BLD AUTO: 0.66 THOUSAND/ÂΜL (ref 0.17–1.22)
MONOCYTES NFR BLD AUTO: 7 % (ref 4–12)
NEUTROPHILS # BLD AUTO: 6.76 THOUSANDS/ÂΜL (ref 1.85–7.62)
NEUTS SEG NFR BLD AUTO: 72 % (ref 43–75)
NITRITE UR QL STRIP: NEGATIVE
NON-SQ EPI CELLS URNS QL MICRO: ABNORMAL /HPF
NRBC BLD AUTO-RTO: 0 /100 WBCS
PH UR STRIP.AUTO: 6.5 [PH]
PHOSPHATE SERPL-MCNC: 4.3 MG/DL (ref 2.7–4.5)
PLATELET # BLD AUTO: 297 THOUSANDS/UL (ref 149–390)
PMV BLD AUTO: 10.4 FL (ref 8.9–12.7)
POTASSIUM SERPL-SCNC: 5 MMOL/L (ref 3.5–5.3)
PROT UR STRIP-MCNC: ABNORMAL MG/DL
PTH-INTACT SERPL-MCNC: 721.6 PG/ML (ref 12–88)
RBC # BLD AUTO: 4.48 MILLION/UL (ref 3.88–5.62)
RBC #/AREA URNS AUTO: ABNORMAL /HPF
SODIUM SERPL-SCNC: 137 MMOL/L (ref 135–147)
SP GR UR STRIP.AUTO: 1.01 (ref 1–1.03)
URATE SERPL-MCNC: 7.6 MG/DL (ref 3.5–8.5)
UROBILINOGEN UR STRIP-ACNC: <2 MG/DL
WBC # BLD AUTO: 9.34 THOUSAND/UL (ref 4.31–10.16)
WBC #/AREA URNS AUTO: ABNORMAL /HPF

## 2024-02-22 LAB
CREAT UR-MCNC: 103.4 MG/DL
MICROALBUMIN UR-MCNC: 5000 MG/L
MICROALBUMIN/CREAT 24H UR: 4836 MG/G CREATININE (ref 0–30)

## 2024-02-23 LAB
ALBUMIN SERPL ELPH-MCNC: 3.33 G/DL (ref 3.2–5.1)
ALBUMIN SERPL ELPH-MCNC: 51.2 % (ref 48–70)
ALBUMIN UR ELPH-MCNC: 72.3 %
ALPHA1 GLOB MFR UR ELPH: 4.8 %
ALPHA1 GLOB SERPL ELPH-MCNC: 0.33 G/DL (ref 0.15–0.47)
ALPHA1 GLOB SERPL ELPH-MCNC: 5 % (ref 1.8–7)
ALPHA2 GLOB MFR UR ELPH: 6.5 %
ALPHA2 GLOB SERPL ELPH-MCNC: 0.94 G/DL (ref 0.42–1.04)
ALPHA2 GLOB SERPL ELPH-MCNC: 14.5 % (ref 5.9–14.9)
B-GLOBULIN MFR UR ELPH: 7.2 %
BETA GLOB ABNORMAL SERPL ELPH-MCNC: 0.42 G/DL (ref 0.31–0.57)
BETA1 GLOB SERPL ELPH-MCNC: 6.4 % (ref 4.7–7.7)
BETA2 GLOB SERPL ELPH-MCNC: 8.2 % (ref 3.1–7.9)
BETA2+GAMMA GLOB SERPL ELPH-MCNC: 0.53 G/DL (ref 0.2–0.58)
GAMMA GLOB ABNORMAL SERPL ELPH-MCNC: 0.96 G/DL (ref 0.4–1.66)
GAMMA GLOB MFR UR ELPH: 9.2 %
GAMMA GLOB SERPL ELPH-MCNC: 14.7 % (ref 6.9–22.3)
IGG/ALB SER: 1.05 {RATIO} (ref 1.1–1.8)
INTERPRETATION UR IFE-IMP: NORMAL
INTERPRETATION UR IFE-IMP: NORMAL
PROT PATTERN SERPL ELPH-IMP: ABNORMAL
PROT PATTERN UR ELPH-IMP: NORMAL
PROT SERPL-MCNC: 6.5 G/DL (ref 6.4–8.2)
PROT UR-MCNC: 692.8 MG/DL

## 2024-02-23 PROCEDURE — 84165 PROTEIN E-PHORESIS SERUM: CPT | Performed by: STUDENT IN AN ORGANIZED HEALTH CARE EDUCATION/TRAINING PROGRAM

## 2024-02-23 PROCEDURE — 86335 IMMUNFIX E-PHORSIS/URINE/CSF: CPT | Performed by: STUDENT IN AN ORGANIZED HEALTH CARE EDUCATION/TRAINING PROGRAM

## 2024-02-23 PROCEDURE — 86334 IMMUNOFIX E-PHORESIS SERUM: CPT | Performed by: STUDENT IN AN ORGANIZED HEALTH CARE EDUCATION/TRAINING PROGRAM

## 2024-02-23 PROCEDURE — 84166 PROTEIN E-PHORESIS/URINE/CSF: CPT | Performed by: STUDENT IN AN ORGANIZED HEALTH CARE EDUCATION/TRAINING PROGRAM

## 2024-03-12 ENCOUNTER — REMOTE DEVICE CLINIC VISIT (OUTPATIENT)
Dept: CARDIOLOGY CLINIC | Facility: CLINIC | Age: 49
End: 2024-03-12
Payer: COMMERCIAL

## 2024-03-12 DIAGNOSIS — Z95.818 PRESENCE OF OTHER CARDIAC IMPLANTS AND GRAFTS: Primary | ICD-10-CM

## 2024-03-12 PROCEDURE — 93298 REM INTERROG DEV EVAL SCRMS: CPT | Performed by: INTERNAL MEDICINE

## 2024-03-12 NOTE — PROGRESS NOTES
"MDT LNQ22/ ACTIVE SYSTEM IS MRI CONDITIONAL   [employer] TRANSMISSION: LOOP RECORDER. PRESENTING RHYTHM NSR @ 90 BPM. BATTERY STATUS \"OK.\" NO PATIENT OR DEVICE ACTIVATED EPISODES. NORMAL DEVICE FUNCTION. DL   "

## 2024-03-15 ENCOUNTER — TELEPHONE (OUTPATIENT)
Dept: NEPHROLOGY | Facility: CLINIC | Age: 49
End: 2024-03-15

## 2024-03-15 NOTE — TELEPHONE ENCOUNTER
I called [facility] @ [phone] (Automated System) to check eligible for the patient and as of 3/15/2024 this patient has current active coverage as of 11/9/2023. Nori Pérez, .

## 2024-03-20 ENCOUNTER — HOSPITAL ENCOUNTER (OUTPATIENT)
Dept: VASCULAR ULTRASOUND | Facility: HOSPITAL | Age: 49
Discharge: HOME | End: 2024-03-20
Attending: INTERNAL MEDICINE
Payer: COMMERCIAL

## 2024-03-20 ENCOUNTER — OFFICE VISIT (OUTPATIENT)
Dept: NEPHROLOGY | Facility: CLINIC | Age: 49
End: 2024-03-20
Payer: COMMERCIAL

## 2024-03-20 VITALS
HEIGHT: 70 IN | RESPIRATION RATE: 16 BRPM | DIASTOLIC BLOOD PRESSURE: 84 MMHG | SYSTOLIC BLOOD PRESSURE: 160 MMHG | BODY MASS INDEX: 27.06 KG/M2 | OXYGEN SATURATION: 98 % | HEART RATE: 104 BPM | TEMPERATURE: 97.3 F | WEIGHT: 189 LBS

## 2024-03-20 DIAGNOSIS — N18.5 HYPERTENSIVE KIDNEY DISEASE WITH STAGE 5 CHRONIC KIDNEY DISEASE, NOT ON CHRONIC DIALYSIS (HCC): ICD-10-CM

## 2024-03-20 DIAGNOSIS — N25.81 SECONDARY HYPERPARATHYROIDISM OF RENAL ORIGIN (HCC): ICD-10-CM

## 2024-03-20 DIAGNOSIS — N18.5 STAGE 5 CHRONIC KIDNEY DISEASE NOT ON CHRONIC DIALYSIS (HCC): Primary | ICD-10-CM

## 2024-03-20 DIAGNOSIS — I10 ESSENTIAL HYPERTENSION: ICD-10-CM

## 2024-03-20 DIAGNOSIS — N18.5 STAGE 5 CHRONIC KIDNEY DISEASE NOT ON CHRONIC DIALYSIS (HCC): ICD-10-CM

## 2024-03-20 DIAGNOSIS — N25.81 HYPERPARATHYROIDISM DUE TO RENAL INSUFFICIENCY (HCC): ICD-10-CM

## 2024-03-20 DIAGNOSIS — I12.0 HYPERTENSIVE KIDNEY DISEASE WITH STAGE 5 CHRONIC KIDNEY DISEASE, NOT ON CHRONIC DIALYSIS (HCC): ICD-10-CM

## 2024-03-20 DIAGNOSIS — E87.20 METABOLIC ACIDOSIS: ICD-10-CM

## 2024-03-20 DIAGNOSIS — R80.9 NEPHROTIC RANGE PROTEINURIA: ICD-10-CM

## 2024-03-20 DIAGNOSIS — E55.9 VITAMIN D DEFICIENCY: ICD-10-CM

## 2024-03-20 PROBLEM — I12.9 HYPERTENSIVE CKD (CHRONIC KIDNEY DISEASE): Status: ACTIVE | Noted: 2024-03-20

## 2024-03-20 PROCEDURE — 93985 DUP-SCAN HEMO COMPL BI STD: CPT

## 2024-03-20 PROCEDURE — 99214 OFFICE O/P EST MOD 30 MIN: CPT | Performed by: INTERNAL MEDICINE

## 2024-03-20 RX ORDER — SODIUM BICARBONATE 650 MG/1
1300 TABLET ORAL 2 TIMES DAILY
Qty: 360 TABLET | Refills: 2 | Status: SHIPPED | OUTPATIENT
Start: 2024-03-20 | End: 2024-06-18

## 2024-03-20 RX ORDER — CALCITRIOL 0.25 UG/1
0.25 CAPSULE, LIQUID FILLED ORAL DAILY
Qty: 90 CAPSULE | Refills: 2 | Status: SHIPPED | OUTPATIENT
Start: 2024-03-20 | End: 2024-06-18

## 2024-03-20 RX ORDER — ERGOCALCIFEROL 1.25 MG/1
50000 CAPSULE ORAL WEEKLY
Qty: 12 CAPSULE | Refills: 0 | Status: SHIPPED | OUTPATIENT
Start: 2024-03-20 | End: 2024-06-06

## 2024-03-20 RX ORDER — HYDRALAZINE HYDROCHLORIDE 50 MG/1
50 TABLET, FILM COATED ORAL 2 TIMES DAILY
Qty: 180 TABLET | Refills: 2 | Status: SHIPPED | OUTPATIENT
Start: 2024-03-20 | End: 2024-06-18

## 2024-03-20 NOTE — PROGRESS NOTES
NEPHROLOGY OFFICE FOLLOW UP  Adam Gonzalez 49 y.o.male YOB: 1975 MRN: [id number]    Encounter: [id number] DATE: 3/20/2024    REASON FOR VISIT: Adam Gonzalez is a 49 y.o. male who is here on 3/20/2024 for Follow-up and Chronic Kidney Disease  .    HPI:    This is 49-year-old male with past medical history significant for hypertension, diabetes type 2, CVA, hyperlipidemia, tobacco abuse, returns to nephrology office for further management of CKD stage 4-5.    Upon review of old medical record from [facility] chart review, seems to me that patient has underlying progressive chronic kidney disease and previously known baseline creatinine was thought to be around 4.0    Patient's wife was also present at the time of consultation and history was also obtained from her and all the questions were answered    Patient has underlying nephrotic range proteinuria and workup suggested due to underlying diabetic nephropathy on top of hypertensive nephrosclerosis.  Patient was prescribed lisinopril in the past but according to patient he is no longer taking lisinopril at this point.    Patient does have a underlying hypertension and was prescribed amlodipine, metoprolol, hydralazine and lisinopril in the past.  Patient does not check blood pressure at home and according to patient as mentioned earlier, no longer taking lisinopril and taking hydralazine 25 mg once a day [instead of 3 times a day]    Patient also have underlying diabetes type 2 and currently taking insulin and Trulicity but does not check blood glucose level at home.    Patient smoked at least 1 pack of cigarettes per day in 20 years.    Patient has underlying secondary hyperparathyroidism of renal origin and was prescribed calcitriol in the past but according to patient he is no longer taking calcitriol at this point    Patient takes all the medications as prescribed and denies use of NSAIDs        REVIEW OF SYSTEMS:    Review of Systems   Constitutional:   Negative for chills and fever.   HENT:  Negative for nosebleeds.    Eyes:  Negative for photophobia and pain.   Respiratory:  Negative for choking.    Cardiovascular:  Negative for palpitations.   Gastrointestinal:  Negative for blood in stool.   Genitourinary:  Negative for hematuria.   Musculoskeletal:  Negative for neck stiffness.   Neurological:  Negative for seizures.   Psychiatric/Behavioral:  Negative for confusion and suicidal ideas.          PAST MEDICAL HISTORY:  Past Medical History:   Diagnosis Date    Diabetes mellitus (HCC)     Hypertension     Received intravenous tissue plasminogen activator (tPA) in emergency department 2/1/2022    Stroke (HCC)        PAST SURGICAL HISTORY:  History reviewed. No pertinent surgical history.    SOCIAL HISTORY:  Social History     Substance and Sexual Activity   Alcohol Use Not Currently    Comment: stopped 8/22     Social History     Substance and Sexual Activity   Drug Use No     Social History     Tobacco Use   Smoking Status Every Day    Current packs/day: 1.00    Average packs/day: 1 pack/day for 36.8 years (36.8 ttl pk-yrs)    Types: Cigarettes    Start date: 6/16/1987   Smokeless Tobacco Never   Tobacco Comments    states slowing it down, used Chantix-currently 1/2 pack/day, at least 35 pack years as of 2023       FAMILY HISTORY:  Family History   Problem Relation Age of Onset    No Known Problems Mother     No Known Problems Father     Pancreatic cancer Brother        ALLERGY:  No Known Allergies    MEDICATIONS:    Current Outpatient Medications:     amLODIPine (NORVASC) 10 mg tablet, Take 1 tablet (10 mg total) by mouth daily, Disp: 90 tablet, Rfl: 1    atorvastatin (LIPITOR) 80 mg tablet, Take 1 tablet (80 mg total) by mouth daily, Disp: 90 tablet, Rfl: 1    calcitriol (ROCALTROL) 0.25 mcg capsule, Take 1 capsule (0.25 mcg total) by mouth daily Monday, Wednesday, Friday, Disp: 90 capsule, Rfl: 2    clopidogrel (PLAVIX) 75 mg tablet, Take 1 tablet (75 mg  "total) by mouth daily, Disp: 90 tablet, Rfl: 1    dulaglutide (Trulicity) 0.75 MG/0.5ML injection, Inject 0.5 mL (0.75 mg total) under the skin every 7 days, Disp: 2 mL, Rfl: 2    ergocalciferol (VITAMIN D2) 50,000 units, Take 1 capsule (50,000 Units total) by mouth once a week for 12 doses, Disp: 12 capsule, Rfl: 0    hydrALAZINE (APRESOLINE) 50 mg tablet, Take 1 tablet (50 mg total) by mouth 2 (two) times a day, Disp: 180 tablet, Rfl: 2    Insulin Glargine Solostar (Basaglar KwikPen) 100 UNIT/ML SOPN, Inject 0.28 mL (28 Units total) under the skin daily, Disp: 15 mL, Rfl: 5    metoprolol succinate (TOPROL-XL) 50 mg 24 hr tablet, Take 1 tablet (50 mg total) by mouth daily, Disp: 90 tablet, Rfl: 1    sodium bicarbonate 650 mg tablet, Take 2 tablets (1,300 mg total) by mouth 2 (two) times a day, Disp: 360 tablet, Rfl: 2    Continuous Blood Gluc Sensor (FreeStyle Slime 2 Sensor) MISC, Change sensor every 14 days (Patient not taking: Reported on 11/16/2023), Disp: 2 each, Rfl: 3    PHYSICAL EXAM:  Vitals:    03/20/24 1114   BP: 160/84   Pulse: 104   Resp: 16   Temp: (!) 97.3 °F (36.3 °C)   TempSrc: Temporal   SpO2: 98%   Weight: 85.7 kg (189 lb)   Height: 5' 10\" (1.778 m)     Body mass index is 27.12 kg/m².    Physical Exam  Constitutional:       General: He is not in acute distress.  HENT:      Right Ear: External ear normal.   Eyes:      General: No scleral icterus.     Conjunctiva/sclera:      Right eye: No hemorrhage.  Neck:      Thyroid: No thyromegaly.      Vascular: No JVD.   Cardiovascular:      Rate and Rhythm: Normal rate.   Pulmonary:      Effort: Pulmonary effort is normal. No accessory muscle usage or respiratory distress.      Breath sounds: No wheezing.   Abdominal:      General: There is no distension.   Musculoskeletal:      Right ankle: No swelling.      Left ankle: No swelling.   Skin:     General: Skin is warm.      Coloration: Skin is not jaundiced.   Neurological:      Mental Status: He is alert. " He is not disoriented.   Psychiatric:         Speech: He is communicative.         Behavior: Behavior is not combative.         LAB RESULTS:  Results for orders placed or performed in visit on 02/01/24   POCT hemoglobin A1c   Result Value Ref Range    Hemoglobin A1C 7.8 (A) 6.5       ASSESSMENT and PLAN:  Adam was seen today for follow-up and chronic kidney disease.    Diagnoses and all orders for this visit:    Stage 5 chronic kidney disease not on chronic dialysis (HCC)  -     CBC and differential; Future  -     Basic metabolic panel; Future  -     UA (URINE) with reflex to Scope; Future  -     Albumin / creatinine urine ratio; Future  -     Phosphorus; Future  -     Uric acid; Future  -     PTH, intact; Future  -     Vitamin D 25 hydroxy; Future  -     Cancel: Ambulatory referral to renal transplant evaluation; Future  -     Ambulatory referral to Vascular Surgery; Future  -     VAS vessel mapping for hemodialysis upper; Future  -     sodium bicarbonate 650 mg tablet; Take 2 tablets (1,300 mg total) by mouth 2 (two) times a day    Hypertensive kidney disease with stage 5 chronic kidney disease, not on chronic dialysis (HCC)  -     Basic metabolic panel; Future  -     UA (URINE) with reflex to Scope; Future  -     Albumin / creatinine urine ratio; Future    Vitamin D deficiency  -     Vitamin D 25 hydroxy; Future  -     ergocalciferol (VITAMIN D2) 50,000 units; Take 1 capsule (50,000 Units total) by mouth once a week for 12 doses    Secondary hyperparathyroidism of renal origin (HCC)  -     Basic metabolic panel; Future  -     Phosphorus; Future  -     PTH, intact; Future  -     Vitamin D 25 hydroxy; Future    Metabolic acidosis  -     Basic metabolic panel; Future  -     sodium bicarbonate 650 mg tablet; Take 2 tablets (1,300 mg total) by mouth 2 (two) times a day    Nephrotic range proteinuria  -     Basic metabolic panel; Future  -     UA (URINE) with reflex to Scope; Future  -     Albumin / creatinine urine  ratio; Future    Hyperparathyroidism due to renal insufficiency (HCC)  -     calcitriol (ROCALTROL) 0.25 mcg capsule; Take 1 capsule (0.25 mcg total) by mouth daily Monday, Wednesday, Friday    Essential hypertension  -     hydrALAZINE (APRESOLINE) 50 mg tablet; Take 1 tablet (50 mg total) by mouth 2 (two) times a day      1.  CKD stage V.  Slowly progressive and suspected due to underlying diabetic nephropathy on top of hypertensive nephrosclerosis and nephrotic range proteinuria.    Upon review of old medical record from [facility] chart review, previously known baseline creatinine was thought to be around 4.0 but underlying chronic kidney disease seems slowly progressive and in the interim renal function has worsened to current creatinine of 4.42 with EGFR of 14.  Discussed at length with patient/family today that underlying chronic kidney disease is slowly progressive and would end up needing dialysis in near future.  Patient is currently not experiencing uremic symptoms.    Patient has attended kidney smart class earlier and I have discussed different dialysis modalities in length with patient during office visit today.  Patient is interested doing home hemodialysis and we will plan to refer patient to vascular surgeon for AV fistula creation.  I will also order bilateral upper extremity vein mapping.    Patient is also currently being followed by Dr. Darshan Benedict for renal transplant workup and was last seen by him in nephrology office on 2/9/2024. Encourage patient to continue to follow-up with Dr. Darshan Benedict and pursue renal transplant workup    Patient is currently smoking at least 1 pack of cigarettes per day for more than 20 years and counseling for smoking cessation provided but patient is not interested to quit at this point    Management of diabetes, hypertension and proteinuria as mentioned below.    Plan to avoid NSAIDs and recheck renal function before next visit    2.  Hypertension in chronic kidney  disease.  Today's blood pressure was elevated and above the goal.  According to patient he does not check blood pressure at home    Patient was prescribed lisinopril in the past but according to patient he is no longer taking lisinopril.  Patient is now taking hydralazine 25 mg once a day instead of 3 times a day.  Discussed in length with patient regarding importance of compliance of medication. Plan to increase hydralazine to 50 mg p.o. twice daily today and continue amlodipine 10 mg p.o. daily along with metoprolol XL 50 mg p.o. daily. Since GFR is slowly declining + current potassium level is 5.0 , I'm reluctant to restart ACE inhibitor at this point. Advised patient to follow low-salt diet.    3.  Diabetes type 2 in chronic kidney disease.  Goal HbA1c would be less than 7% for underlying chronic kidney disease [last known HbA1c 7.8%-2/1/2024].  Currently patient is on insulin and Trulicity and defer further management of diabetes to PCP.    4.  Vitamin D deficiency.  Current vitamin D level is less than 7 and also found to have hypocalcemia with current calcium level of 6.8 although patient is asymptomatic from hypocalcemia perspective.  Plan to start patient on ergocalciferol 50,000 units p.o. weekly x 12-week.  Recheck vitamin D level with the next visit.    5.  Secondary hyperparathyroidism of renal origin.  Current PTH level is 721 which is above the goal.  Patient was prescribed calcitriol in the past but according to patient he is no longer taking calcitriol and plan to start patient on Calcitrol 0.25 mcg p.o. daily today and recheck PTH level with the next visit and titrate the dose as indicated.    6.  Metabolic acidosis.  Secondary to underlying progressive chronic kidney disease.  Current bicarb is 19 with anion gap of 9.  Plan to start patient on sodium bicarb supplementation 1300 mg p.o. twice daily today.  Recheck bicarb level with the next visit.    7.  Proteinuria.  Nephrotic range, SPEP done  "earlier showed abnormal distribution in the gamma region but immunofixation showed no monoclonal spike.  UPEP also showed no M spike.  Current urine microalbumin to creatinine ratio is 4.836 g.  Urine analysis showed no microscopic hematuria [urine RBC 1-2 per high-power field] and suspect underlying nephrotic range proteinuria due to diabetic nephropathy on top of hypertension nephrosclerosis.  Discussed in length with patient today that current amount of proteinuria is a high risk factor for further deterioration of underlying chronic kidney disease. Previously patient was prescribed lisinopril but he is no longer taking at this point and I am reluctant to add ACE inhibitor/ARB in the light of current GFR.  Patient is also not a candidate for SGLT2 receptor inhibitor in the light of current GFR.  Aim to control diabetes and hypertension to really help control proteinuria at this point.    Returns to nephrology office with Shonda in 3 months.  Future labs ordered.  In the interim patient will be evaluated by vascular surgeon for AV fistula creation.      Portions of the record may have been created with voice recognition software. Occasional wrong word or \"sound a like\" substitutions may have occurred due to the inherent limitations of voice recognition software. Read the chart carefully and recognize, using context, where substitutions have occurred. If you have any questions, please contact the dictating provider.   "

## 2024-03-21 PROCEDURE — 93985 DUP-SCAN HEMO COMPL BI STD: CPT | Performed by: SURGERY

## 2024-04-01 ENCOUNTER — CONSULT (OUTPATIENT)
Dept: VASCULAR SURGERY | Facility: CLINIC | Age: 49
End: 2024-04-01
Payer: COMMERCIAL

## 2024-04-01 ENCOUNTER — TELEPHONE (OUTPATIENT)
Age: 49
End: 2024-04-01

## 2024-04-01 ENCOUNTER — TELEPHONE (OUTPATIENT)
Dept: VASCULAR SURGERY | Facility: CLINIC | Age: 49
End: 2024-04-01

## 2024-04-01 ENCOUNTER — PREP FOR PROCEDURE (OUTPATIENT)
Dept: VASCULAR SURGERY | Facility: CLINIC | Age: 49
End: 2024-04-01

## 2024-04-01 VITALS
SYSTOLIC BLOOD PRESSURE: 140 MMHG | HEART RATE: 86 BPM | BODY MASS INDEX: 27.06 KG/M2 | DIASTOLIC BLOOD PRESSURE: 82 MMHG | WEIGHT: 189 LBS | HEIGHT: 70 IN

## 2024-04-01 DIAGNOSIS — N18.5 STAGE 5 CHRONIC KIDNEY DISEASE NOT ON CHRONIC DIALYSIS (HCC): ICD-10-CM

## 2024-04-01 DIAGNOSIS — N18.5 STAGE 5 CHRONIC KIDNEY DISEASE NOT ON CHRONIC DIALYSIS (HCC): Primary | ICD-10-CM

## 2024-04-01 PROCEDURE — 99204 OFFICE O/P NEW MOD 45 MIN: CPT | Performed by: SURGERY

## 2024-04-01 RX ORDER — CHLORHEXIDINE GLUCONATE ORAL RINSE 1.2 MG/ML
15 SOLUTION DENTAL ONCE
Status: CANCELLED | OUTPATIENT
Start: 2024-04-01 | End: 2024-04-01

## 2024-04-01 NOTE — ASSESSMENT & PLAN NOTE
Lab Results   Component Value Date    EGFR 14 02/21/2024    EGFR 16 11/17/2023    EGFR 16 11/16/2023    CREATININE 4.42 (H) 02/21/2024    CREATININE 4.06 (H) 11/17/2023    CREATININE 3.93 (H) 11/16/2023   Chronic kidney disease stage V.  Not currently on dialysis.  Vein mapping suggests left brachiobasilic option.  Will perform for stage.  If on the day of surgery the basilic vein does not appear appropriate we did discuss transitioning to an arteriovenous graft placement on table.  The patient and wife are agreeable to this as noted in the consent.

## 2024-04-01 NOTE — H&P (VIEW-ONLY)
"Assessment/Plan:    Stage 5 chronic kidney disease not on chronic dialysis (HCC)  Lab Results   Component Value Date    EGFR 14 02/21/2024    EGFR 16 11/17/2023    EGFR 16 11/16/2023    CREATININE 4.42 (H) 02/21/2024    CREATININE 4.06 (H) 11/17/2023    CREATININE 3.93 (H) 11/16/2023   Chronic kidney disease stage V.  Not currently on dialysis.  Vein mapping suggests left brachiobasilic option.  Will perform for stage.  If on the day of surgery the basilic vein does not appear appropriate we did discuss transitioning to an arteriovenous graft placement on table.  The patient and wife are agreeable to this as noted in the consent.    Subjective:      Patient ID: Adam Gonzalez is a 49 y.o. male.    New patient, presents to review vm done 3/20. Patient is right handed, not currently on dialysis.     HPI  Mr. Gonzalez is a pleasant 49-year-old male who presents with his wife for evaluation for creation of arteriovenous access.  He is currently not on dialysis, chronic kidney disease stage V.  He is underwent vein mapping previously.  He is right-handed.  His left arm demonstrates an adequate cephalic vein.  He does have an adequate left basilic vein.  We discussed a left brachiobasilic fistula option.  He does know this would require a two-stage operation.  We discussed the risk and benefits of surgery and informed consent was obtained in the office setting.    Review of Systems   Constitutional: Negative.    HENT: Negative.     Eyes: Negative.    Respiratory: Negative.     Cardiovascular: Negative.    Gastrointestinal: Negative.    Endocrine: Negative.    Genitourinary: Negative.    Musculoskeletal: Negative.    Skin: Negative.    Allergic/Immunologic: Negative.    Neurological: Negative.    Hematological: Negative.    Psychiatric/Behavioral: Negative.         Objective:  /82 (BP Location: Left arm, Patient Position: Sitting, Cuff Size: Standard)   Pulse 86   Ht 5' 10\" (1.778 m)   Wt 85.7 kg (189 lb)   BMI 27.12 " kg/m²      Physical Exam  Constitutional:       Appearance: Normal appearance.   HENT:      Head: Normocephalic and atraumatic.      Nose: Nose normal. No rhinorrhea.   Eyes:      Extraocular Movements: Extraocular movements intact.      Pupils: Pupils are equal, round, and reactive to light.   Cardiovascular:      Rate and Rhythm: Normal rate and regular rhythm.      Pulses:           Radial pulses are 1+ on the left side.   Pulmonary:      Effort: Pulmonary effort is normal.      Breath sounds: No stridor.   Abdominal:      General: There is no distension.      Tenderness: There is no abdominal tenderness.   Musculoskeletal:         General: No tenderness. Normal range of motion.      Cervical back: Normal range of motion and neck supple.   Skin:     General: Skin is warm.      Capillary Refill: Capillary refill takes less than 2 seconds.      Coloration: Skin is not jaundiced.   Neurological:      General: No focal deficit present.      Mental Status: He is alert and oriented to person, place, and time.   Psychiatric:         Mood and Affect: Mood normal.         Behavior: Behavior normal.         Operative Scheduling Information:    Hospital:  [facility]    Physician:  Me    Surgery: [facility] AV fistula    Urgency:  Standard    Level:  Level 3: Outpatients to be scheduled for elective surgery than can be delayed up to 4 weeks without reasonable expectation of detriment to patient    Case Length:  2 hours    Post-op Bed:  Outpatient    OR Table:  Standard    Equipment Needs:  Product/Implant: AV graft available.    Medication Instructions:  Plavix:  Continue (do not hold)    Hydration:  No

## 2024-04-01 NOTE — TELEPHONE ENCOUNTER
REMINDER: Under Reason For Call, comments MUST be formatted as:   (Surgeon's Initials) / (Procedure)      Special Instructions / FYI: PATIENT NOT ON DIALYSIS    Consent: I certify that patient has signed, printed, timed, and dated their surgery consent.  I certify that the patient's LEGAL NAME and DATE OF BIRTH are written in the upper left corner on BOTH sides of the consent.  I certify that BOTH sides of the completed surgery consent have been scanned into the patient's Epic chart by myself on 4/1/2024.  Yes, I have LABELED the consent in Epic as Consent for Vascular Procedure.     For Surgical Clearances     Levels   1-3   ROUTE this encounter to [facility] Clearance Pool (AND)   [facility] Surgery Coordinator Pool     Level   4   ROUTE this encounter to [facility] Surgery Coordinator Pool       HYDRATION CLEARANCES   ONLY ROUTE TO  [facility] Clearance Pool       Yes, I have ROUTED this encounter to [facility] Surgery Coordinator and/or [facility] Clearance Pool.

## 2024-04-01 NOTE — PATIENT INSTRUCTIONS
1. Stage 5 chronic kidney disease not on chronic dialysis (HCC)  Assessment & Plan:  Lab Results   Component Value Date    EGFR 14 02/21/2024    EGFR 16 11/17/2023    EGFR 16 11/16/2023    CREATININE 4.42 (H) 02/21/2024    CREATININE 4.06 (H) 11/17/2023    CREATININE 3.93 (H) 11/16/2023   Chronic kidney disease stage V.  Not currently on dialysis.  Vein mapping suggests left brachiobasilic option.  Will perform for stage.  If on the day of surgery the basilic vein does not appear appropriate we did discuss transitioning to an arteriovenous graft placement on table.  The patient and wife are agreeable to this as noted in the consent.    Orders:  -     Ambulatory referral to Vascular Surgery  -     Case request operating room: CREATION FISTULA ARTERIOVENOUS (AV); Standing  -     Case request operating room: CREATION FISTULA ARTERIOVENOUS (AV)

## 2024-04-01 NOTE — TELEPHONE ENCOUNTER
Patient called, request the status of a Fax sent from [facility] on 03/28/2024, Upon chart review/ media, messages, contacted practice/clerical (junior) confirmed the fax has not yet been received. Confirmed update, and fax number with patient , patient states they will have it sent again.

## 2024-04-01 NOTE — TELEPHONE ENCOUNTER
Verified patient's insurance   CONFIRMED - Patient's insurance is [employer] [facility] [place]  Is patient requesting a call when authorization has been obtained? Patient did not request a call.    Surgery Date: 4/10/24  Primary Surgeon: CAP // Maxwell Nielsen (NPI: [id number])  Assisting Surgeon: Not Applicable (N/A)  Facility: [facility] (Tax: [id number] / NPI: [id number])  Inpatient / Outpatient: Outpatient  Level: 3    Clearance Received: No clearance ordered.  Consent Received: Yes, scanned into Epic on 4/1/24.  Medication Hold / Last Dose:  No hold on Plavix  IR Notified: Not Applicable (N/A)  Rep. Notified: Not Applicable (N/A)  Equipment Needs: Product/Implant: AV graft available.  Vas Lab Requested: Not Applicable (N/A)  Patient Contacted: 4/1/24    Diagnosis: N18.5  Procedure/ CPT Code(s): (AV) Arteriovenous Fistula // CPT: 73375    For varicose vein related procedures:   Last LEVDR: Not Applicable (N/A)  CEAP Classification: Not Applicable (N/A)  VCSS: Not Applicable (N/A)    Post Operative Date/ Time: 4/29/24 , 11am [facility] ([place]) with Maxwell Nielsen (NPI: [id number])     *Please review medication hold(s), PATs, and check H&P with patient.*  PATIENT WAS MAILED SURGERY/SHOWERING/DISCHARGE/COVID INSTRUCTIONS AFTER REVIEWING WITH THEM VIA PHONE CALL.

## 2024-04-01 NOTE — LETTER
April 1, 2024     Chris Emerson DO  [address]  [address]  [facility] [address]    Patient: Adam Gonzalez   YOB: 1975   Date of Visit: 4/1/2024       Dear Dr. Emerson:    Thank you for referring Adam Gonzalez to me for evaluation. Below are my notes for this consultation.    If you have questions, please do not hesitate to call me. I look forward to following your patient along with you.         Sincerely,        Maxwell Nielsen MD        CC: Tony Benedict MD  Adam Gonzalez    Maxwell Nielsen MD  4/1/2024 12:15 PM  Sign when Signing Visit  Assessment/Plan:    Stage 5 chronic kidney disease not on chronic dialysis (HCC)  Lab Results   Component Value Date    EGFR 14 02/21/2024    EGFR 16 11/17/2023    EGFR 16 11/16/2023    CREATININE 4.42 (H) 02/21/2024    CREATININE 4.06 (H) 11/17/2023    CREATININE 3.93 (H) 11/16/2023   Chronic kidney disease stage V.  Not currently on dialysis.  Vein mapping suggests left brachiobasilic option.  Will perform for stage.  If on the day of surgery the basilic vein does not appear appropriate we did discuss transitioning to an arteriovenous graft placement on table.  The patient and wife are agreeable to this as noted in the consent.    Subjective:      Patient ID: Adam Gonzalez is a 49 y.o. male.    New patient, presents to review vm done 3/20. Patient is right handed, not currently on dialysis.     HPI  Mr. Gonzalez is a pleasant 49-year-old male who presents with his wife for evaluation for creation of arteriovenous access.  He is currently not on dialysis, chronic kidney disease stage V.  He is underwent vein mapping previously.  He is right-handed.  His left arm demonstrates an adequate cephalic vein.  He does have an adequate left basilic vein.  We discussed a left brachiobasilic fistula option.  He does know this would require a two-stage operation.  We discussed the risk and benefits of surgery and informed consent was obtained in the  "office setting.    Review of Systems   Constitutional: Negative.    HENT: Negative.     Eyes: Negative.    Respiratory: Negative.     Cardiovascular: Negative.    Gastrointestinal: Negative.    Endocrine: Negative.    Genitourinary: Negative.    Musculoskeletal: Negative.    Skin: Negative.    Allergic/Immunologic: Negative.    Neurological: Negative.    Hematological: Negative.    Psychiatric/Behavioral: Negative.         Objective:  /82 (BP Location: Left arm, Patient Position: Sitting, Cuff Size: Standard)   Pulse 86   Ht 5' 10\" (1.778 m)   Wt 85.7 kg (189 lb)   BMI 27.12 kg/m²      Physical Exam  Constitutional:       Appearance: Normal appearance.   HENT:      Head: Normocephalic and atraumatic.      Nose: Nose normal. No rhinorrhea.   Eyes:      Extraocular Movements: Extraocular movements intact.      Pupils: Pupils are equal, round, and reactive to light.   Cardiovascular:      Rate and Rhythm: Normal rate and regular rhythm.      Pulses:           Radial pulses are 1+ on the left side.   Pulmonary:      Effort: Pulmonary effort is normal.      Breath sounds: No stridor.   Abdominal:      General: There is no distension.      Tenderness: There is no abdominal tenderness.   Musculoskeletal:         General: No tenderness. Normal range of motion.      Cervical back: Normal range of motion and neck supple.   Skin:     General: Skin is warm.      Capillary Refill: Capillary refill takes less than 2 seconds.      Coloration: Skin is not jaundiced.   Neurological:      General: No focal deficit present.      Mental Status: He is alert and oriented to person, place, and time.   Psychiatric:         Mood and Affect: Mood normal.         Behavior: Behavior normal.         Operative Scheduling Information:    Hospital:  [facility] [facility]    Physician:  Me    Surgery: LUE AV fistula    Urgency:  Standard    Level:  Level 3: Outpatients to be scheduled for elective surgery than can be delayed up to 4 weeks without " reasonable expectation of detriment to patient    Case Length:  2 hours    Post-op Bed:  Outpatient    OR Table:  Standard    Equipment Needs:  Product/Implant: AV graft available.    Medication Instructions:  Plavix:  Continue (do not hold)    Hydration:  No

## 2024-04-01 NOTE — PROGRESS NOTES
"Assessment/Plan:    Stage 5 chronic kidney disease not on chronic dialysis (HCC)  Lab Results   Component Value Date    EGFR 14 02/21/2024    EGFR 16 11/17/2023    EGFR 16 11/16/2023    CREATININE 4.42 (H) 02/21/2024    CREATININE 4.06 (H) 11/17/2023    CREATININE 3.93 (H) 11/16/2023   Chronic kidney disease stage V.  Not currently on dialysis.  Vein mapping suggests left brachiobasilic option.  Will perform for stage.  If on the day of surgery the basilic vein does not appear appropriate we did discuss transitioning to an arteriovenous graft placement on table.  The patient and wife are agreeable to this as noted in the consent.    Subjective:      Patient ID: Adam Gonzalez is a 49 y.o. male.    New patient, presents to review vm done 3/20. Patient is right handed, not currently on dialysis.     HPI  Mr. Gonzalez is a pleasant 49-year-old male who presents with his wife for evaluation for creation of arteriovenous access.  He is currently not on dialysis, chronic kidney disease stage V.  He is underwent vein mapping previously.  He is right-handed.  His left arm demonstrates an adequate cephalic vein.  He does have an adequate left basilic vein.  We discussed a left brachiobasilic fistula option.  He does know this would require a two-stage operation.  We discussed the risk and benefits of surgery and informed consent was obtained in the office setting.    Review of Systems   Constitutional: Negative.    HENT: Negative.     Eyes: Negative.    Respiratory: Negative.     Cardiovascular: Negative.    Gastrointestinal: Negative.    Endocrine: Negative.    Genitourinary: Negative.    Musculoskeletal: Negative.    Skin: Negative.    Allergic/Immunologic: Negative.    Neurological: Negative.    Hematological: Negative.    Psychiatric/Behavioral: Negative.         Objective:  /82 (BP Location: Left arm, Patient Position: Sitting, Cuff Size: Standard)   Pulse 86   Ht 5' 10\" (1.778 m)   Wt 85.7 kg (189 lb)   BMI 27.12 " kg/m²      Physical Exam  Constitutional:       Appearance: Normal appearance.   HENT:      Head: Normocephalic and atraumatic.      Nose: Nose normal. No rhinorrhea.   Eyes:      Extraocular Movements: Extraocular movements intact.      Pupils: Pupils are equal, round, and reactive to light.   Cardiovascular:      Rate and Rhythm: Normal rate and regular rhythm.      Pulses:           Radial pulses are 1+ on the left side.   Pulmonary:      Effort: Pulmonary effort is normal.      Breath sounds: No stridor.   Abdominal:      General: There is no distension.      Tenderness: There is no abdominal tenderness.   Musculoskeletal:         General: No tenderness. Normal range of motion.      Cervical back: Normal range of motion and neck supple.   Skin:     General: Skin is warm.      Capillary Refill: Capillary refill takes less than 2 seconds.      Coloration: Skin is not jaundiced.   Neurological:      General: No focal deficit present.      Mental Status: He is alert and oriented to person, place, and time.   Psychiatric:         Mood and Affect: Mood normal.         Behavior: Behavior normal.         Operative Scheduling Information:    Hospital:  [place]    Physician:  Me    Surgery: KVNG AV fistula    Urgency:  Standard    Level:  Level 3: Outpatients to be scheduled for elective surgery than can be delayed up to 4 weeks without reasonable expectation of detriment to patient    Case Length:  2 hours    Post-op Bed:  Outpatient    OR Table:  Standard    Equipment Needs:  Product/Implant: AV graft available.    Medication Instructions:  Plavix:  Continue (do not hold)    Hydration:  No

## 2024-04-02 ENCOUNTER — ANESTHESIA EVENT (OUTPATIENT)
Dept: PERIOP | Facility: HOSPITAL | Age: 49
End: 2024-04-02
Payer: COMMERCIAL

## 2024-04-02 NOTE — PRE-PROCEDURE INSTRUCTIONS
Pre-Surgery Instructions:   Medication Instructions    amLODIPine (NORVASC) 10 mg tablet Take day of surgery.    atorvastatin (LIPITOR) 80 mg tablet Take day of surgery.    calcitriol (ROCALTROL) 0.25 mcg capsule Hold day of surgery.    clopidogrel (PLAVIX) 75 mg tablet Take day of surgery.    dulaglutide (Trulicity) 0.75 MG/0.5ML injection Stop taking 7 days prior to surgery.    ergocalciferol (VITAMIN D2) 50,000 units Takes Mondays-will not take DOS    hydrALAZINE (APRESOLINE) 50 mg tablet Take day of surgery.    Insulin Glargine Solostar (Basaglar KwikPen) 100 UNIT/ML SOPN Take day of surgery.    metoprolol succinate (TOPROL-XL) 50 mg 24 hr tablet Take day of surgery.    sodium bicarbonate 650 mg tablet Hold day of surgery.   Medication instructions for day surgery reviewed. Please use only a sip of water to take your instructed medications. Avoid all over the counter vitamins, supplements and NSAIDS for one week prior to surgery per anesthesia guidelines. Tylenol is ok to take as needed.     You will receive a call one business day prior to surgery with an arrival time and hospital directions. If your surgery is scheduled on a Monday, the hospital will be calling you on the Friday prior to your surgery. If you have not heard from anyone by 8pm, please call the hospital supervisor through the hospital  at [phone]. ([place] [phone] or [place] [phone]).    Do not eat or drink anything after midnight the night before your surgery, including candy, mints, lifesavers, or chewing gum. Do not drink alcohol 24hrs before your surgery. Try not to smoke at least 24hrs before your surgery.       Follow the pre surgery showering instructions as listed in the “My Surgical Experience Booklet” or otherwise provided by your surgeon's office. Do not use a blade to shave the surgical area 1 week before surgery. It is okay to use a clean electric clippers up to 24 hours before surgery. Do not apply any  lotions, creams, including makeup, cologne, deodorant, or perfumes after showering on the day of your surgery. Do not use dry shampoo, hair spray, hair gel, or any type of hair products.     No contact lenses, eye make-up, or artificial eyelashes. Remove nail polish, including gel polish, and any artificial, gel, or acrylic nails if possible. Remove all jewelry including rings and body piercing jewelry.     Wear causal clothing that is easy to take on and off. Consider your type of surgery.    Keep any valuables, jewelry, piercings at home. Please bring any specially ordered equipment (sling, braces) if indicated.    Arrange for a responsible person to drive you to and from the hospital on the day of your surgery. Please confirm the visitor policy for the day of your procedure when you receive your phone call with an arrival time.     Call the surgeon's office with any new illnesses, exposures, or additional questions prior to surgery.    Please reference your “My Surgical Experience Booklet” for additional information to prepare for your upcoming surgery.

## 2024-04-04 ENCOUNTER — APPOINTMENT (OUTPATIENT)
Dept: LAB | Facility: CLINIC | Age: 49
End: 2024-04-04
Payer: COMMERCIAL

## 2024-04-04 DIAGNOSIS — N18.5 STAGE 5 CHRONIC KIDNEY DISEASE NOT ON CHRONIC DIALYSIS (HCC): ICD-10-CM

## 2024-04-04 LAB
ANION GAP SERPL CALCULATED.3IONS-SCNC: 7 MMOL/L (ref 4–13)
BUN SERPL-MCNC: 46 MG/DL (ref 5–25)
CALCIUM SERPL-MCNC: 7.2 MG/DL (ref 8.4–10.2)
CHLORIDE SERPL-SCNC: 107 MMOL/L (ref 96–108)
CO2 SERPL-SCNC: 19 MMOL/L (ref 21–32)
CREAT SERPL-MCNC: 4.56 MG/DL (ref 0.6–1.3)
ERYTHROCYTE [DISTWIDTH] IN BLOOD BY AUTOMATED COUNT: 13.6 % (ref 11.6–15.1)
GFR SERPL CREATININE-BSD FRML MDRD: 13 ML/MIN/1.73SQ M
GLUCOSE P FAST SERPL-MCNC: 290 MG/DL (ref 65–99)
HCT VFR BLD AUTO: 42.9 % (ref 36.5–49.3)
HGB BLD-MCNC: 13.7 G/DL (ref 12–17)
MCH RBC QN AUTO: 31.6 PG (ref 26.8–34.3)
MCHC RBC AUTO-ENTMCNC: 31.9 G/DL (ref 31.4–37.4)
MCV RBC AUTO: 99 FL (ref 82–98)
PLATELET # BLD AUTO: 312 THOUSANDS/UL (ref 149–390)
PMV BLD AUTO: 10.1 FL (ref 8.9–12.7)
POTASSIUM SERPL-SCNC: 5.3 MMOL/L (ref 3.5–5.3)
RBC # BLD AUTO: 4.34 MILLION/UL (ref 3.88–5.62)
SODIUM SERPL-SCNC: 133 MMOL/L (ref 135–147)
WBC # BLD AUTO: 8.36 THOUSAND/UL (ref 4.31–10.16)

## 2024-04-04 PROCEDURE — 36415 COLL VENOUS BLD VENIPUNCTURE: CPT

## 2024-04-04 PROCEDURE — 80048 BASIC METABOLIC PNL TOTAL CA: CPT

## 2024-04-04 PROCEDURE — 85027 COMPLETE CBC AUTOMATED: CPT

## 2024-04-10 ENCOUNTER — HOSPITAL ENCOUNTER (OUTPATIENT)
Facility: HOSPITAL | Age: 49
Setting detail: OUTPATIENT SURGERY
Discharge: HOME | End: 2024-04-10
Attending: SURGERY | Admitting: SURGERY
Payer: COMMERCIAL

## 2024-04-10 ENCOUNTER — ANESTHESIA (OUTPATIENT)
Dept: PERIOP | Facility: HOSPITAL | Age: 49
End: 2024-04-10
Payer: COMMERCIAL

## 2024-04-10 VITALS
SYSTOLIC BLOOD PRESSURE: 167 MMHG | WEIGHT: 191.58 LBS | HEIGHT: 70 IN | HEART RATE: 78 BPM | TEMPERATURE: 97.7 F | OXYGEN SATURATION: 95 % | BODY MASS INDEX: 27.43 KG/M2 | DIASTOLIC BLOOD PRESSURE: 86 MMHG | RESPIRATION RATE: 16 BRPM

## 2024-04-10 LAB
GLUCOSE SERPL-MCNC: 174 MG/DL (ref 65–140)
GLUCOSE SERPL-MCNC: 176 MG/DL (ref 65–140)

## 2024-04-10 PROCEDURE — 82948 REAGENT STRIP/BLOOD GLUCOSE: CPT

## 2024-04-10 RX ORDER — MIDAZOLAM HYDROCHLORIDE 2 MG/2ML
INJECTION, SOLUTION INTRAMUSCULAR; INTRAVENOUS AS NEEDED
Status: DISCONTINUED | OUTPATIENT
Start: 2024-04-10 | End: 2024-04-10

## 2024-04-10 RX ORDER — MAGNESIUM HYDROXIDE 1200 MG/15ML
LIQUID ORAL AS NEEDED
Status: DISCONTINUED | OUTPATIENT
Start: 2024-04-10 | End: 2024-04-10 | Stop reason: HOSPADM

## 2024-04-10 RX ORDER — CEFAZOLIN SODIUM 2 G/50ML
2000 SOLUTION INTRAVENOUS ONCE
Status: COMPLETED | OUTPATIENT
Start: 2024-04-10 | End: 2024-04-10

## 2024-04-10 RX ORDER — ONDANSETRON 2 MG/ML
4 INJECTION INTRAMUSCULAR; INTRAVENOUS ONCE AS NEEDED
Status: DISCONTINUED | OUTPATIENT
Start: 2024-04-10 | End: 2024-04-10 | Stop reason: HOSPADM

## 2024-04-10 RX ORDER — ROPIVACAINE HYDROCHLORIDE 5 MG/ML
INJECTION, SOLUTION EPIDURAL; INFILTRATION; PERINEURAL
Status: COMPLETED | OUTPATIENT
Start: 2024-04-10 | End: 2024-04-10

## 2024-04-10 RX ORDER — PROPOFOL 10 MG/ML
INJECTION, EMULSION INTRAVENOUS CONTINUOUS PRN
Status: DISCONTINUED | OUTPATIENT
Start: 2024-04-10 | End: 2024-04-10

## 2024-04-10 RX ORDER — FENTANYL CITRATE 50 UG/ML
INJECTION, SOLUTION INTRAMUSCULAR; INTRAVENOUS AS NEEDED
Status: DISCONTINUED | OUTPATIENT
Start: 2024-04-10 | End: 2024-04-10

## 2024-04-10 RX ORDER — LABETALOL HYDROCHLORIDE 5 MG/ML
5 INJECTION, SOLUTION INTRAVENOUS
Status: DISCONTINUED | OUTPATIENT
Start: 2024-04-10 | End: 2024-04-10 | Stop reason: HOSPADM

## 2024-04-10 RX ORDER — CHLORHEXIDINE GLUCONATE ORAL RINSE 1.2 MG/ML
15 SOLUTION DENTAL ONCE
Status: COMPLETED | OUTPATIENT
Start: 2024-04-10 | End: 2024-04-10

## 2024-04-10 RX ORDER — SODIUM CHLORIDE, SODIUM LACTATE, POTASSIUM CHLORIDE, CALCIUM CHLORIDE 600; 310; 30; 20 MG/100ML; MG/100ML; MG/100ML; MG/100ML
20 INJECTION, SOLUTION INTRAVENOUS CONTINUOUS
Status: DISCONTINUED | OUTPATIENT
Start: 2024-04-10 | End: 2024-04-10 | Stop reason: HOSPADM

## 2024-04-10 RX ORDER — PROPOFOL 10 MG/ML
INJECTION, EMULSION INTRAVENOUS AS NEEDED
Status: DISCONTINUED | OUTPATIENT
Start: 2024-04-10 | End: 2024-04-10

## 2024-04-10 RX ORDER — METOPROLOL TARTRATE 1 MG/ML
INJECTION, SOLUTION INTRAVENOUS AS NEEDED
Status: DISCONTINUED | OUTPATIENT
Start: 2024-04-10 | End: 2024-04-10

## 2024-04-10 RX ORDER — LIDOCAINE HYDROCHLORIDE 10 MG/ML
INJECTION, SOLUTION EPIDURAL; INFILTRATION; INTRACAUDAL; PERINEURAL AS NEEDED
Status: DISCONTINUED | OUTPATIENT
Start: 2024-04-10 | End: 2024-04-10

## 2024-04-10 RX ORDER — SODIUM CHLORIDE, SODIUM LACTATE, POTASSIUM CHLORIDE, CALCIUM CHLORIDE 600; 310; 30; 20 MG/100ML; MG/100ML; MG/100ML; MG/100ML
INJECTION, SOLUTION INTRAVENOUS CONTINUOUS PRN
Status: DISCONTINUED | OUTPATIENT
Start: 2024-04-10 | End: 2024-04-10

## 2024-04-10 RX ORDER — FENTANYL CITRATE/PF 50 MCG/ML
25 SYRINGE (ML) INJECTION
Status: DISCONTINUED | OUTPATIENT
Start: 2024-04-10 | End: 2024-04-10 | Stop reason: HOSPADM

## 2024-04-10 RX ADMIN — LIDOCAINE HYDROCHLORIDE 30 MG: 10 INJECTION, SOLUTION EPIDURAL; INFILTRATION; INTRACAUDAL; PERINEURAL at 11:58

## 2024-04-10 RX ADMIN — PROPOFOL 20 MG: 10 INJECTION, EMULSION INTRAVENOUS at 11:58

## 2024-04-10 RX ADMIN — METOROPROLOL TARTRATE 2 MG: 5 INJECTION, SOLUTION INTRAVENOUS at 12:36

## 2024-04-10 RX ADMIN — CHLORHEXIDINE GLUCONATE 0.12% ORAL RINSE 15 ML: 1.2 LIQUID ORAL at 10:32

## 2024-04-10 RX ADMIN — PROPOFOL 10 MG: 10 INJECTION, EMULSION INTRAVENOUS at 12:10

## 2024-04-10 RX ADMIN — HEPARIN SODIUM 3000 ML: 1000 INJECTION INTRAVENOUS; SUBCUTANEOUS at 12:33

## 2024-04-10 RX ADMIN — ROPIVACAINE HYDROCHLORIDE 20 ML: 5 INJECTION, SOLUTION EPIDURAL; INFILTRATION; PERINEURAL at 11:26

## 2024-04-10 RX ADMIN — METOROPROLOL TARTRATE 1 MG: 5 INJECTION, SOLUTION INTRAVENOUS at 12:40

## 2024-04-10 RX ADMIN — FENTANYL CITRATE 25 MCG: 50 INJECTION, SOLUTION INTRAMUSCULAR; INTRAVENOUS at 13:01

## 2024-04-10 RX ADMIN — LIDOCAINE HYDROCHLORIDE 20 MG: 10 INJECTION, SOLUTION EPIDURAL; INFILTRATION; INTRACAUDAL; PERINEURAL at 11:54

## 2024-04-10 RX ADMIN — CEFAZOLIN SODIUM 2000 MG: 2 SOLUTION INTRAVENOUS at 11:52

## 2024-04-10 RX ADMIN — FENTANYL CITRATE 50 MCG: 50 INJECTION, SOLUTION INTRAMUSCULAR; INTRAVENOUS at 11:21

## 2024-04-10 RX ADMIN — PHENYLEPHRINE HYDROCHLORIDE 50 MCG: 10 INJECTION INTRAVENOUS at 12:52

## 2024-04-10 RX ADMIN — SODIUM CHLORIDE, SODIUM LACTATE, POTASSIUM CHLORIDE, AND CALCIUM CHLORIDE: .6; .31; .03; .02 INJECTION, SOLUTION INTRAVENOUS at 11:00

## 2024-04-10 RX ADMIN — METOROPROLOL TARTRATE 1 MG: 5 INJECTION, SOLUTION INTRAVENOUS at 12:45

## 2024-04-10 RX ADMIN — MIDAZOLAM HYDROCHLORIDE 1 MG: 1 INJECTION, SOLUTION INTRAMUSCULAR; INTRAVENOUS at 11:21

## 2024-04-10 RX ADMIN — METOROPROLOL TARTRATE 1 MG: 5 INJECTION, SOLUTION INTRAVENOUS at 12:50

## 2024-04-10 RX ADMIN — MIDAZOLAM HYDROCHLORIDE 1 MG: 1 INJECTION, SOLUTION INTRAMUSCULAR; INTRAVENOUS at 11:54

## 2024-04-10 RX ADMIN — PROPOFOL 20 MG: 10 INJECTION, EMULSION INTRAVENOUS at 12:45

## 2024-04-10 RX ADMIN — FENTANYL CITRATE 25 MCG: 50 INJECTION, SOLUTION INTRAMUSCULAR; INTRAVENOUS at 12:36

## 2024-04-10 RX ADMIN — PROPOFOL 50 MCG/KG/MIN: 10 INJECTION, EMULSION INTRAVENOUS at 11:54

## 2024-04-10 RX ADMIN — PHENYLEPHRINE HYDROCHLORIDE 50 MCG: 10 INJECTION INTRAVENOUS at 12:41

## 2024-04-10 NOTE — OP NOTE
DATE OF PROCEDURE: 04/10/24    PERFORMING SERVICE: Vascular and Endovascular Surgery    ATTENDING SURGEON: Maxwell Nielsen MD    PREOPERATIVE DIAGNOSIS: CKD stage V, not yet on dialysis    POSTOPERATIVE DIAGNOSIS: Same    PROCEDURE NAME:   Left brachiocephalic arteriovenous access placement    ANESTHESIA: conscious sedation and regional block    EBL: Minimal    UOP: 0 cc    IVF: 150 cc     SPECIMENS: None    COMPLICATIONS: None    DRAINS: None    INDICATION: Adam Centeno is a pleasant 49-year-old male who presented to the outpatient setting for establishment of permanent dialysis access as he approaches end-stage renal disease.  He underwent vein mapping which suggested a left brachiobasilic option.  We discussed that there may be alternative options when we vein map him on the day of surgery.  On the day of surgery we have found an adequate left cephalic vein which would be a 1 stage procedure.  We have proceeded with a brachiocephalic fistula to the knowledge of Adriane, his significant other and listed healthcare contact.    INTRAOPERATIVE FINDINGS: Adequate left cephalic vein for anastomosis.  Maintenance of a left radial pulse upon completion.    ASSISTING SURGEON: Tamiko Ness PA-C assisted in the case.  There is no qualified surgical resident available for assistance.  A physician assistant was required during the procedure for retraction, tissue handling, dissection and suturing. I was present for the entirety of the case.      DESCRIPTION OF PROCEDURE:   The patient was transported to the operative suite where a timeout was performed confirming the patient, procedure and laterality.  Preoperative antibiotics were administered.  The patient was prepped and draped in usual sterile surgical fashion.  A second timeout was again performed.  Anesthesia was initiated.      An incision was made just proximal to the left antecubital fossa.  Blunt dissection and electrocautery allowed for isolation of  the cephalic vein.  We adjusted the field to dissect and control the brachial artery.  The artery was loosely controlled.  The vein was marked, transected and the distal vein was ligated.  The patient was administered IV heparin.  An arteriotomy was made with an 11 blade scalpel and extended with Potlalo's scissors.  The anastomosis was completed with 6-0 prolene suture.  The anastomosis was flushed prior to completion of the knots.  The distal vasculature was assessed to be intact.  Hemostasis was obtained in the operative field.  The subcutaneous tissues were reapproximated with vicryl suture.  The skin was closed with 4-0 monocryl suture and sealed with dermabond skin glue.      All counts were correct at the end of the case. The patient tolerated the procedure well, awoke from anesthesia uneventfully and was transported to the PACU in good condition.    Maxwell Nielsen MD  04/10/24  1:02 PM

## 2024-04-10 NOTE — ANESTHESIA PROCEDURE NOTES
Peripheral Block    Patient location during procedure: holding area  Start time: 4/10/2024 11:26 AM  Reason for block: primary anesthetic, at surgeon's request and post-op pain management  Staffing  Performed by: Yesica Foreman MD  Authorized by: Yesica Foreman MD    Preanesthetic Checklist  Completed: patient identified, IV checked, site marked, risks and benefits discussed, surgical consent, monitors and equipment checked, pre-op evaluation and timeout performed  Peripheral Block  Patient position: supine  Prep: ChloraPrep  Patient monitoring: continuous pulse oximetry, frequent blood pressure checks and heart rate  Block type: Supraclavicular  Laterality: left  Injection technique: single-shot  Procedures: ultrasound guided, Ultrasound guidance required for the procedure to increase accuracy and safety of medication placement and decrease risk of complications.  Ultrasound permanent image savedropivacaine (NAROPIN) 0.5 % injection 20 mL - Perineural   20 mL - 4/10/2024 11:26:00 AM  Needle  Needle type: Stimuplex   Needle gauge: 20 G  Needle length: 2 in  Needle localization: anatomical landmarks and ultrasound guidance  Assessment  Injection assessment: frequent aspiration, injected with ease, negative aspiration, no paresthesia on injection, incremental injection, needle tip visualized at all times, negative for heart rate change and no symptoms of intraneural/intravenous injection  Paresthesia pain: none  Post-procedure:  adhesive bandage applied  patient tolerated the procedure well with no immediate complications

## 2024-04-10 NOTE — ANESTHESIA PREPROCEDURE EVALUATION
Procedure:  CREATION FISTULA ARTERIOVENOUS (AV) (Left: Arm Upper)    Relevant Problems   ANESTHESIA (within normal limits)      CARDIO  Last took metoprolol 4/8 evening   (+) Aortic valve insufficiency ( mild-mod 2020 )   (+) Essential hypertension   (+) Hypercholesterolemia      ENDO   (+) Acquired hypothyroidism   (+) Secondary hyperparathyroidism of renal origin (HCC)   (+) Type 2 diabetes mellitus with circulatory disorder, with long-term current use of insulin (HCC)      GI/HEPATIC  Confirmed NPO appropriate      /RENAL   (+) Acute on chronic renal failure  (HCC)   (+) Hypertensive CKD (chronic kidney disease)   (+) Stage 5 chronic kidney disease not on chronic dialysis (HCC)      NEURO/PSYCH   (+) Bilateral Cerebrovascular accident (CVA) status post tPA   (+) Diabetic polyneuropathy associated with type 2 diabetes mellitus (HCC)      PULMONARY   (+) Current smoker   (-) URI (upper respiratory infection)      Neurology/Sleep   (+) History of CVA (cerebrovascular accident) (2021)        Physical Exam    Airway    Mallampati score: II  TM Distance: >3 FB  Neck ROM: full     Dental    upper dentures and lower dentures    Cardiovascular  Rhythm: regular, Rate: normal    Pulmonary   Breath sounds clear to auscultation    Other Findings        Anesthesia Plan  ASA Score- 3     Anesthesia Type- regional with ASA Monitors.         Additional Monitors:     Airway Plan:            Plan Factors-Exercise tolerance (METS): >4 METS.    Chart reviewed. EKG reviewed.  Existing labs reviewed.     Patient is a current smoker.  Patient instructed to abstain from smoking on day of procedure. Patient did not smoke on day of surgery.    Obstructive sleep apnea risk education given perioperatively.        Induction- intravenous.    Postoperative Plan-     Informed Consent- Anesthetic plan and risks discussed with patient.  I personally reviewed this patient with the CRNA. Discussed and agreed on the Anesthesia Plan with the  CRNA..          ANGIE 10/1/21:  SUMMARY  LEFT VENTRICLE:  Ejection fraction was estimated to be 60 %.  There were no regional wall motion abnormalities.  There was mild concentric hypertrophy.  ATRIAL SEPTUM:  No defect or patent foramen ovale was identified.  Contrast injection was performed. There was no right-to-left shunt, induced by abdominal compression.  AORTIC VALVE:  There was mild regurgitation.  TRICUSPID VALVE:  There was trace regurgitation.      Lab Results   Component Value Date    WBC 8.36 04/04/2024    HGB 13.7 04/04/2024    HCT 42.9 04/04/2024    MCV 99 (H) 04/04/2024     04/04/2024     Lab Results   Component Value Date    SODIUM 133 (L) 04/04/2024    K 5.3 04/04/2024     04/04/2024    CO2 19 (L) 04/04/2024    BUN 46 (H) 04/04/2024    CREATININE 4.56 (H) 04/04/2024    GLUC 192 (H) 11/17/2023    CALCIUM 7.2 (L) 04/04/2024     Lab Results   Component Value Date    ALT 5 (L) 09/29/2023    AST 10 (L) 09/29/2023    ALKPHOS 147 (H) 09/29/2023    BILITOT 0.2 03/10/2015     Lab Results   Component Value Date    INR 0.92 01/31/2022    INR 0.95 09/27/2021    INR 0.99 09/23/2019    PROTIME 12.0 01/31/2022    PROTIME 12.3 09/27/2021    PROTIME 13.1 09/23/2019     Lab Results   Component Value Date    HGBA1C 7.8 (A) 02/01/2024

## 2024-04-10 NOTE — INTERVAL H&P NOTE
H&P reviewed. After examining the patient I find no changes in the patients condition since the H&P had been written.    Vitals:    04/10/24 1012   BP: 164/85   Pulse: 82   Resp: 18   Temp: 97.6 °F (36.4 °C)   SpO2: 97%

## 2024-04-10 NOTE — ANESTHESIA POSTPROCEDURE EVALUATION
Post-Op Assessment Note    CV Status:  Stable  Pain Score: 0    Pain management: adequate       Mental Status:  Arousable and sleepy   Hydration Status:  Euvolemic   PONV Controlled:  Controlled   Airway Patency:  Patent  Two or more mitigation strategies used for obstructive sleep apnea   Post Op Vitals Reviewed: Yes    No anethesia notable event occurred.    Staff: CRNA               BP   131/80   Temp 97.4   Pulse 72   Resp 16   SpO2 94% RA

## 2024-04-10 NOTE — DISCHARGE INSTR - AVS FIRST PAGE
DISCHARGE INSTRUCTIONS  DIALYSIS FISTULA SURGERY    ACTIVITY:  Limit use of the operated arm to what is necessary for the first day after surgery. On the second day after surgery, you may start to increase use of your arm as tolerated.  Avoid heavy lifting (no more than 15 lbs) for the first one week.  You should start to exercise your hand on the side of the fistula by squeezing a stress ball or a rolled-up sock. This increases blood flow in your fistula and arm so your fistula will function better.    Feel for a thrill every day. The thrill is the vibration or pulse you feel over the fistula that means the blood is flowing through it. If you cannot feel a thrill, call our office ([phone]).    DIET:   Resume your normal diet.  Good nutrition is important for healing of your incision.    DRESSING:   You may have surgical glue at your surgical site.  There are stitches present under the skin which will absorb on their own.  The glue is used to cover the incision, assist in closure, and prevent contamination. This adhesive will darken and peel away on its own within one to two weeks. Do not pick at it.  If you have a dressing over your surgical site, remove this on the second day after surgery.      INCISION:   If you do not have a dialysis catheter in place, you may shower and get your incision wet.  Wash incision daily with soap and water, but do not rub or scrub the incision; rinse thoroughly and pat dry.  You may have stitches or staples to close your incision and it is okay for these to get wet.  Do not bathe in a tub or swim for the first 4 week following surgery or if you have any open wounds.  It is normal to have mild swelling or discoloration around the incision.   If increasing redness or pain develops, call our office immediately.  Numbness in the region of the incision may occur following the surgery.  This normally improves over six to twelve months.  If you have numbness or pain in your hand,  please call our office immediately.  DO NOT put any powders, creams, ointments, or lotions on your incision.     ARM SWELLING:    Most patients have some noticeable arm swelling after surgery.  This usually disappears within a few weeks.  If swelling is present, elevate the arm whenever possible.      RESTRICTIONS:   Do NOT have blood draws, IV's, or blood pressures performed on the operated arm.    FISTULA USE:    Your fistula will not be used until it has fully matured - approximately 6 to 12 weeks. If you are using a catheter for dialysis, this will not be removed until after your fistula has matured and is being used for dialysis without any issues.    FOLLOW UP STUDIES:  A Doppler ultrasound will be performed about 5-6 weeks after surgery.  Your surgeon will arrange this at your first postoperative visit.     FOLLOW UP APPOINTMENTS:  Making and keeping follow up appointments and ultrasound tests are important to your recovery.  If you have difficulty making it to or keeping your follow up appointments, call the office.    If you have increased pain, fever >101.5, increased drainage, redness or a bad smell at your surgery site, new coldness/numbness of your arm or leg, please call us immediately and GO directly to the ER.    PLEASE CALL THE OFFICE IF YOU HAVE ANY QUESTIONS  [phone]  [phone] [address], [address], [place] [address]  [address] [facility], [address], [place] [address]  [address] [place] [address]  [address] [facility], [address], [place] [address]  [address] [facility] [place] [address]  [address] [facility], [address]nd Floor, [address], [place] [address]  [address] [facility], [address], [place] [address]  [address] [facility], 1st Floor, [address], [place] [address]  [address], [place] [address]  [address] [address]

## 2024-04-12 ENCOUNTER — TELEPHONE (OUTPATIENT)
Dept: VASCULAR SURGERY | Facility: CLINIC | Age: 49
End: 2024-04-12

## 2024-04-12 NOTE — TELEPHONE ENCOUNTER
Vascular Nurse Navigator Post Op Phone Call    Post-Discharge phone call attempted to assess patient recovery after vascular surgery I left a message on answering machine. Will attempt to contact at later date.        Pt's chart was reviewed prior to call and leaving message.    Procedure: Left brachiocephalic arteriovenous access placement    Date of Procedure: 4/10/24    Surgeon: MD Tamiko Crisostomo PA-C          Anticoagulation pt was discharged on post op?: Clopidogrel (Plavix)    Statin pt was discharged on post op?:  Lipitor (atorvastatin)    Dialysis Days and Location:  Currently not on dialysis    Reminded pt of the following in message:    NEXT SCHEDULED OFFICE VISIT:  4/29/24 at 11 am with Dr. Nielsen at [facility]    To contact [facility] with any concerns.

## 2024-04-26 NOTE — PROGRESS NOTES
"Assessment/Plan:    Stage 5 chronic kidney disease not on chronic dialysis (HCC)  Lab Results   Component Value Date    EGFR 13 04/04/2024    EGFR 14 02/21/2024    EGFR 16 11/17/2023    CREATININE 4.56 (H) 04/04/2024    CREATININE 4.42 (H) 02/21/2024    CREATININE 4.06 (H) 11/17/2023   Well-healed fistula incision.  Strong thrill over the brachiocephalic fistula in the left upper extremity.  Strong radial pulse with no hand symptoms.  The patient has not required dialysis yet.  Will perform a duplex for maturity assessment in 2 weeks.    Subjective:      Patient ID: Adam Gonzalez is a 49 y.o. male.    Patient is s/p L BC AV fistula creation on 4/10/24 and presents today for post-op visit. + thrill/bruit. Not yet on dialysis.     MARIBEL Medina is a pleasant 49-year-old man accompanied by his wife to today's visit.  He had a brachiocephalic fistula placed on April 10.  Upon my evaluation he has a well-healed incision with no signs of infection and a palpable thrill over the anterolateral portion of the left upper arm.  He has a strong radial pulse and no hand symptoms.  He notes that he was numb longer than expected after the case however tolerated it quite well and is back to doing his normal activities.  He has no further questions at this time.  He has not required dialysis yet.  He understands that we will undergo a duplex evaluation in 2 weeks to assess for maturity.    Review of Systems   Constitutional: Negative.    HENT: Negative.     Eyes: Negative.    Respiratory: Negative.     Cardiovascular: Negative.    Gastrointestinal: Negative.    Endocrine: Negative.    Genitourinary: Negative.    Musculoskeletal: Negative.    Skin: Negative.    Allergic/Immunologic: Negative.    Neurological: Negative.    Hematological: Negative.    Psychiatric/Behavioral: Negative.           Objective:      /96 (BP Location: Right arm, Patient Position: Sitting)   Pulse 80   Ht 5' 10\" (1.778 m)   Wt 85.7 kg (189 lb)   BMI " 27.12 kg/m²          Physical Exam  Constitutional:       Appearance: Normal appearance.   HENT:      Head: Normocephalic and atraumatic.      Nose: Nose normal. No rhinorrhea.   Eyes:      Extraocular Movements: Extraocular movements intact.      Pupils: Pupils are equal, round, and reactive to light.   Cardiovascular:      Rate and Rhythm: Normal rate and regular rhythm.      Pulses:           Radial pulses are 2+ on the left side.      Comments: Thrill over fistula.  Well-healed incision.  Pulmonary:      Effort: Pulmonary effort is normal.      Breath sounds: No stridor.   Abdominal:      General: There is no distension.      Tenderness: There is no abdominal tenderness.   Musculoskeletal:         General: No tenderness. Normal range of motion.      Cervical back: Normal range of motion and neck supple.   Skin:     General: Skin is warm.      Capillary Refill: Capillary refill takes less than 2 seconds.      Coloration: Skin is not jaundiced.   Neurological:      General: No focal deficit present.      Mental Status: He is alert and oriented to person, place, and time.   Psychiatric:         Mood and Affect: Mood normal.         Behavior: Behavior normal.

## 2024-04-29 ENCOUNTER — OFFICE VISIT (OUTPATIENT)
Dept: VASCULAR SURGERY | Facility: CLINIC | Age: 49
End: 2024-04-29

## 2024-04-29 VITALS
HEART RATE: 80 BPM | DIASTOLIC BLOOD PRESSURE: 96 MMHG | WEIGHT: 189 LBS | BODY MASS INDEX: 27.06 KG/M2 | SYSTOLIC BLOOD PRESSURE: 164 MMHG | HEIGHT: 70 IN

## 2024-04-29 DIAGNOSIS — N18.5 STAGE 5 CHRONIC KIDNEY DISEASE NOT ON CHRONIC DIALYSIS (HCC): Primary | ICD-10-CM

## 2024-04-29 PROCEDURE — 99024 POSTOP FOLLOW-UP VISIT: CPT | Performed by: SURGERY

## 2024-04-29 NOTE — LETTER
April 29, 2024     Chris Emerson DO  [address]  [address]  [facility] [address]    Patient: Adam Gonzalez   YOB: 1975   Date of Visit: 4/29/2024       Dear Dr. Emerson:    Thank you for referring Adam Gonzalez to me for evaluation. Below are my notes for this consultation.    If you have questions, please do not hesitate to call me. I look forward to following your patient along with you.         Sincerely,        Maxwell Nielsen MD        CC: Adam Gonzalez    Maxwell Nielsen MD  4/29/2024 11:32 AM  Sign when Signing Visit  Assessment/Plan:    Stage 5 chronic kidney disease not on chronic dialysis (HCC)  Lab Results   Component Value Date    EGFR 13 04/04/2024    EGFR 14 02/21/2024    EGFR 16 11/17/2023    CREATININE 4.56 (H) 04/04/2024    CREATININE 4.42 (H) 02/21/2024    CREATININE 4.06 (H) 11/17/2023   Well-healed fistula incision.  Strong thrill over the brachiocephalic fistula in the left upper extremity.  Strong radial pulse with no hand symptoms.  The patient has not required dialysis yet.  Will perform a duplex for maturity assessment in 2 weeks.    Subjective:      Patient ID: Adam Gonzalez is a 49 y.o. male.    Patient is s/p L BC AV fistula creation on 4/10/24 and presents today for post-op visit. + thrill/bruit. Not yet on dialysis.     MARIBEL Medina is a pleasant 49-year-old man accompanied by his wife to today's visit.  He had a brachiocephalic fistula placed on April 10.  Upon my evaluation he has a well-healed incision with no signs of infection and a palpable thrill over the anterolateral portion of the left upper arm.  He has a strong radial pulse and no hand symptoms.  He notes that he was numb longer than expected after the case however tolerated it quite well and is back to doing his normal activities.  He has no further questions at this time.  He has not required dialysis yet.  He understands that we will undergo a duplex evaluation in 2 weeks to  "assess for maturity.    Review of Systems   Constitutional: Negative.    HENT: Negative.     Eyes: Negative.    Respiratory: Negative.     Cardiovascular: Negative.    Gastrointestinal: Negative.    Endocrine: Negative.    Genitourinary: Negative.    Musculoskeletal: Negative.    Skin: Negative.    Allergic/Immunologic: Negative.    Neurological: Negative.    Hematological: Negative.    Psychiatric/Behavioral: Negative.           Objective:      /96 (BP Location: Right arm, Patient Position: Sitting)   Pulse 80   Ht 5' 10\" (1.778 m)   Wt 85.7 kg (189 lb)   BMI 27.12 kg/m²          Physical Exam  Constitutional:       Appearance: Normal appearance.   HENT:      Head: Normocephalic and atraumatic.      Nose: Nose normal. No rhinorrhea.   Eyes:      Extraocular Movements: Extraocular movements intact.      Pupils: Pupils are equal, round, and reactive to light.   Cardiovascular:      Rate and Rhythm: Normal rate and regular rhythm.      Pulses:           Radial pulses are 2+ on the left side.      Comments: Thrill over fistula.  Well-healed incision.  Pulmonary:      Effort: Pulmonary effort is normal.      Breath sounds: No stridor.   Abdominal:      General: There is no distension.      Tenderness: There is no abdominal tenderness.   Musculoskeletal:         General: No tenderness. Normal range of motion.      Cervical back: Normal range of motion and neck supple.   Skin:     General: Skin is warm.      Capillary Refill: Capillary refill takes less than 2 seconds.      Coloration: Skin is not jaundiced.   Neurological:      General: No focal deficit present.      Mental Status: He is alert and oriented to person, place, and time.   Psychiatric:         Mood and Affect: Mood normal.         Behavior: Behavior normal.           "

## 2024-04-29 NOTE — PATIENT INSTRUCTIONS
1. Stage 5 chronic kidney disease not on chronic dialysis (HCC)  Assessment & Plan:  Lab Results   Component Value Date    EGFR 13 04/04/2024    EGFR 14 02/21/2024    EGFR 16 11/17/2023    CREATININE 4.56 (H) 04/04/2024    CREATININE 4.42 (H) 02/21/2024    CREATININE 4.06 (H) 11/17/2023   Well-healed fistula incision.  Strong thrill over the brachiocephalic fistula in the left upper extremity.  Strong radial pulse with no hand symptoms.  The patient has not required dialysis yet.  Will perform a duplex for maturity assessment in 2 weeks.    Orders:  -     VAS DIALYSIS ACCESS EVALUATION- LEFT AV(Upper); Future; Expected date: 05/13/2024

## 2024-04-29 NOTE — ASSESSMENT & PLAN NOTE
Lab Results   Component Value Date    EGFR 13 04/04/2024    EGFR 14 02/21/2024    EGFR 16 11/17/2023    CREATININE 4.56 (H) 04/04/2024    CREATININE 4.42 (H) 02/21/2024    CREATININE 4.06 (H) 11/17/2023   Well-healed fistula incision.  Strong thrill over the brachiocephalic fistula in the left upper extremity.  Strong radial pulse with no hand symptoms.  The patient has not required dialysis yet.  Will perform a duplex for maturity assessment in 2 weeks.

## 2024-05-16 ENCOUNTER — HOSPITAL ENCOUNTER (OUTPATIENT)
Dept: VASCULAR ULTRASOUND | Facility: HOSPITAL | Age: 49
Discharge: HOME | End: 2024-05-16
Attending: SURGERY
Payer: COMMERCIAL

## 2024-05-16 DIAGNOSIS — N18.5 STAGE 5 CHRONIC KIDNEY DISEASE NOT ON CHRONIC DIALYSIS (HCC): ICD-10-CM

## 2024-05-16 PROCEDURE — 93990 DOPPLER FLOW TESTING: CPT

## 2024-05-16 PROCEDURE — 93990 DOPPLER FLOW TESTING: CPT | Performed by: SURGERY

## 2024-05-21 ENCOUNTER — OFFICE VISIT (OUTPATIENT)
Dept: FAMILY MEDICINE CLINIC | Facility: CLINIC | Age: 49
End: 2024-05-21
Payer: COMMERCIAL

## 2024-05-21 VITALS
OXYGEN SATURATION: 100 % | HEART RATE: 86 BPM | SYSTOLIC BLOOD PRESSURE: 150 MMHG | TEMPERATURE: 97 F | BODY MASS INDEX: 28.12 KG/M2 | DIASTOLIC BLOOD PRESSURE: 80 MMHG | WEIGHT: 196 LBS

## 2024-05-21 DIAGNOSIS — I69.322 DYSARTHRIA AS LATE EFFECT OF CEREBROVASCULAR ACCIDENT (CVA): ICD-10-CM

## 2024-05-21 DIAGNOSIS — I69.398 IMPAIRED BALANCE AS LATE EFFECT OF CEREBROVASCULAR ACCIDENT (CVA): Primary | ICD-10-CM

## 2024-05-21 DIAGNOSIS — E03.9 ACQUIRED HYPOTHYROIDISM: ICD-10-CM

## 2024-05-21 DIAGNOSIS — R26.89 IMPAIRED BALANCE AS LATE EFFECT OF CEREBROVASCULAR ACCIDENT (CVA): Primary | ICD-10-CM

## 2024-05-21 DIAGNOSIS — Z95.818 IMPLANTABLE LOOP RECORDER PRESENT: ICD-10-CM

## 2024-05-21 DIAGNOSIS — E11.59 TYPE 2 DIABETES MELLITUS WITH OTHER CIRCULATORY COMPLICATION, WITH LONG-TERM CURRENT USE OF INSULIN (HCC): ICD-10-CM

## 2024-05-21 DIAGNOSIS — E11.42 DIABETIC POLYNEUROPATHY ASSOCIATED WITH TYPE 2 DIABETES MELLITUS (HCC): ICD-10-CM

## 2024-05-21 DIAGNOSIS — F17.200 CURRENT SMOKER: ICD-10-CM

## 2024-05-21 DIAGNOSIS — I63.411 CEREBROVASCULAR ACCIDENT (CVA) DUE TO EMBOLISM OF RIGHT MIDDLE CEREBRAL ARTERY (HCC): ICD-10-CM

## 2024-05-21 DIAGNOSIS — I10 ESSENTIAL HYPERTENSION: ICD-10-CM

## 2024-05-21 DIAGNOSIS — I35.1 NONRHEUMATIC AORTIC VALVE INSUFFICIENCY: ICD-10-CM

## 2024-05-21 DIAGNOSIS — N18.5 STAGE 5 CHRONIC KIDNEY DISEASE NOT ON CHRONIC DIALYSIS (HCC): ICD-10-CM

## 2024-05-21 DIAGNOSIS — Z00.00 ENCOUNTER FOR ANNUAL PHYSICAL EXAM: ICD-10-CM

## 2024-05-21 DIAGNOSIS — E78.00 HYPERCHOLESTEROLEMIA: ICD-10-CM

## 2024-05-21 DIAGNOSIS — Z12.11 SCREENING FOR COLON CANCER: ICD-10-CM

## 2024-05-21 DIAGNOSIS — Z79.4 TYPE 2 DIABETES MELLITUS WITH OTHER CIRCULATORY COMPLICATION, WITH LONG-TERM CURRENT USE OF INSULIN (HCC): ICD-10-CM

## 2024-05-21 LAB — SL AMB POCT HEMOGLOBIN AIC: 7.1 (ref ?–6.5)

## 2024-05-21 PROCEDURE — 99214 OFFICE O/P EST MOD 30 MIN: CPT | Performed by: FAMILY MEDICINE

## 2024-05-21 PROCEDURE — 99396 PREV VISIT EST AGE 40-64: CPT | Performed by: FAMILY MEDICINE

## 2024-05-21 PROCEDURE — 83036 HEMOGLOBIN GLYCOSYLATED A1C: CPT | Performed by: FAMILY MEDICINE

## 2024-05-21 RX ORDER — HYDRALAZINE HYDROCHLORIDE 50 MG/1
50 TABLET, FILM COATED ORAL 3 TIMES DAILY
Start: 2024-05-21 | End: 2024-08-19

## 2024-05-21 RX ORDER — NICOTINE 21 MG/24HR
1 PATCH, TRANSDERMAL 24 HOURS TRANSDERMAL EVERY 24 HOURS
Qty: 28 PATCH | Refills: 0 | Status: SHIPPED | OUTPATIENT
Start: 2024-05-21

## 2024-05-21 NOTE — ASSESSMENT & PLAN NOTE
A1c improved to 7.1, does not check sugars, consider increasing trulicity hesistatnt to change if patient is not checking sugars, abnormal foot exam discussed foot care with patient, consider podiatry referral if any difficulties    Lab Results   Component Value Date    HGBA1C 7.8 (A) 02/01/2024

## 2024-05-21 NOTE — ASSESSMENT & PLAN NOTE
Lab Results   Component Value Date    EGFR 13 04/04/2024    EGFR 14 02/21/2024    EGFR 16 11/17/2023    CREATININE 4.56 (H) 04/04/2024    CREATININE 4.42 (H) 02/21/2024    CREATININE 4.06 (H) 11/17/2023     Has fistula, following with nephrology and vascular surgery, likely start dialysis

## 2024-05-21 NOTE — ASSESSMENT & PLAN NOTE
Remain unctontrolled 150/80 on mycheck, increase hydralazine to TID, continue in nephrology and cardiology follow up, reports compliance with all medications since his nephrology visit

## 2024-05-21 NOTE — PROGRESS NOTES
Diabetic Foot Exam    Patient's shoes and socks removed.    Right Foot/Ankle   Right Foot Inspection  Skin Exam: skin normal and skin intact. No dry skin, no warmth, no callus, no erythema, no maceration, no abnormal color, no pre-ulcer, no ulcer and no callus.     Sensory   Monofilament testing: diminished    Vascular  Capillary refills: < 3 seconds  The right DP pulse is 1+. The right PT pulse is 1+.     Left Foot/Ankle  Left Foot Inspection  Skin Exam: skin normal and skin intact. No dry skin, no warmth, no erythema, no maceration, normal color, no pre-ulcer, no ulcer and no callus.     Sensory   Monofilament testing: diminished    Vascular  Capillary refills: < 3 seconds  The left DP pulse is 1+. The left PT pulse is 1+.     Assign Risk Category  No deformity present  Loss of protective sensation  Weak pulses  Risk: 2      Adult Annual Physical  Name: Adam Gonzalez      : 1975      MRN: [id number]  Encounter Provider: Geo Womack MD  Encounter Date: 2024   Encounter department: [facility]    Assessment & Plan   1. Impaired balance as late effect of cerebrovascular accident (CVA)  -     Vitamin B12; Future  2. Encounter for annual physical exam  -     Lipid panel  3. Essential hypertension  Assessment & Plan:  Remain unctontrolled 150/80 on mycheck, increase hydralazine to TID, continue in nephrology and cardiology follow up, reports compliance with all medications since his nephrology visit  Orders:  -     hydrALAZINE (APRESOLINE) 50 mg tablet; Take 1 tablet (50 mg total) by mouth 3 (three) times a day  4. Stage 5 chronic kidney disease not on chronic dialysis (HCC)  Assessment & Plan:  Lab Results   Component Value Date    EGFR 13 2024    EGFR 14 2024    EGFR 16 2023    CREATININE 4.56 (H) 2024    CREATININE 4.42 (H) 2024    CREATININE 4.06 (H) 2023     Has fistula, following with nephrology and vascular surgery, likely  start dialysis  5. Type 2 diabetes mellitus with other circulatory complication, with long-term current use of insulin (HCC)  -     Vitamin B12; Future  -     POCT hemoglobin A1c  6. Acquired hypothyroidism  7. Current smoker  Assessment & Plan:  Long discussion regarding cessation will rx for nicotine patches  Orders:  -     CT lung screening program; Future; Expected date: 05/21/2024  -     nicotine (NICODERM CQ) 14 mg/24hr TD 24 hr patch; Place 1 patch on the skin over 24 hours every 24 hours  8. Aortic valve insufficiency ( mild-mod 2020 )  9. Bilateral Cerebrovascular accident (CVA) status post tPA  Assessment & Plan:  Continue risk factor reduction  10. Diabetic polyneuropathy associated with type 2 diabetes mellitus (HCC)  Assessment & Plan:  A1c improved to 7.1, does not check sugars, consider increasing trulicity hesistatnt to change if patient is not checking sugars, abnormal foot exam discussed foot care with patient, consider podiatry referral if any difficulties    Lab Results   Component Value Date    HGBA1C 7.8 (A) 02/01/2024     11. 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 Hypercholesterolemia  Assessment & Plan:  Continue statin check lipid panel  13. Implantable loop recorder present  14. Screening for colon cancer  -     Cologuard    Immunizations and preventive care screenings were discussed with patient today. Appropriate education was printed on patient's after visit summary.    Discussed risks and benefits of prostate cancer screening. We discussed the controversial history of PSA screening for prostate cancer in the [place] as well as the risk of over detection and over treatment of prostate cancer by way of PSA screening.  The patient understands that PSA blood testing is an imperfect way to screen for prostate cancer and that elevated PSA levels in the blood may also be caused by infection, inflammation, prostatic trauma or manipulation, urological procedures,  or by benign prostatic enlargement.    The role of the digital rectal examination in prostate cancer screening was also discussed and I discussed with him that there is large interobserver variability in the findings of digital rectal examination.    Counseling:  Alcohol/drug use: discussed moderation in alcohol intake, the recommendations for healthy alcohol use, and avoidance of illicit drug use.  Dental Health: discussed importance of regular tooth brushing, flossing, and dental visits.  Injury prevention: discussed safety/seat belts, safety helmets, smoke detectors, carbon dioxide detectors, and smoking near bedding or upholstery.  Sexual health: discussed sexually transmitted diseases, partner selection, use of condoms, avoidance of unintended pregnancy, and contraceptive alternatives.  Exercise: the importance of regular exercise/physical activity was discussed. Recommend exercise 3-5 times per week for at least 30 minutes.       Depression Screening and Follow-up Plan: Patient was screened for depression during today's encounter. They screened negative with a PHQ-2 score of 0.    Tobacco Cessation Counseling: Tobacco cessation counseling was provided. The patient is sincerely urged to quit consumption of tobacco. He is ready to quit tobacco. Medication options and side effects of medication discussed. Patient refused medication.         History of Present Illness     Adult Annual Physical:  Patient presents for annual physical.     Diet and Physical Activity:  - Diet/Nutrition: well balanced diet.  - Exercise: walking.    Depression Screening:  - PHQ-2 Score: 0  - PHQ-9 Score: 0    Review of Systems   Constitutional:  Negative for activity change and appetite change.   Respiratory:  Negative for apnea and chest tightness.    Cardiovascular:  Negative for chest pain and palpitations.   Gastrointestinal:  Negative for abdominal distention and abdominal pain.   Musculoskeletal:  Negative for arthralgias and back  pain.     Medical History Reviewed by provider this encounter:  Tobacco  Allergies  Meds  Problems  Med Hx  Surg Hx  Fam Hx         Objective     /80 (BP Location: Right arm, Patient Position: Sitting, Cuff Size: Standard)   Pulse 86   Temp (!) 97 °F (36.1 °C) (Tympanic)   Wt 88.9 kg (196 lb)   SpO2 100%   BMI 28.12 kg/m²     Physical Exam  Constitutional:       Appearance: Normal appearance.   Cardiovascular:      Rate and Rhythm: Normal rate and regular rhythm.      Pulses: Pulses are weak.           Dorsalis pedis pulses are 1+ on the right side and 1+ on the left side.        Posterior tibial pulses are 1+ on the right side and 1+ on the left side.      Heart sounds: Normal heart sounds.   Pulmonary:      Effort: Pulmonary effort is normal.   Musculoskeletal:        Feet:    Feet:      Right foot:      Skin integrity: No ulcer, skin breakdown, erythema, warmth, callus or dry skin.      Left foot:      Skin integrity: No ulcer, skin breakdown, erythema, warmth, callus or dry skin.   Neurological:      Mental Status: He is alert.

## 2024-06-06 DIAGNOSIS — E55.9 VITAMIN D DEFICIENCY: ICD-10-CM

## 2024-06-06 RX ORDER — ERGOCALCIFEROL 1.25 MG/1
50000 CAPSULE ORAL WEEKLY
Qty: 12 CAPSULE | Refills: 0 | OUTPATIENT
Start: 2024-06-06

## 2024-06-29 DIAGNOSIS — E11.65 TYPE 2 DIABETES MELLITUS WITH HYPERGLYCEMIA, WITH LONG-TERM CURRENT USE OF INSULIN (HCC): ICD-10-CM

## 2024-06-29 DIAGNOSIS — Z79.4 TYPE 2 DIABETES MELLITUS WITH HYPERGLYCEMIA, WITH LONG-TERM CURRENT USE OF INSULIN (HCC): ICD-10-CM

## 2024-07-29 ENCOUNTER — TELEPHONE (OUTPATIENT)
Dept: NEPHROLOGY | Facility: CLINIC | Age: 49
End: 2024-07-29

## 2024-07-29 DIAGNOSIS — N18.5 STAGE 5 CHRONIC KIDNEY DISEASE NOT ON CHRONIC DIALYSIS (HCC): Primary | ICD-10-CM

## 2024-07-29 NOTE — TELEPHONE ENCOUNTER
Lm asking pt ot get labs done prior to 08/14 appt. Asked pt to call us back if any questions or concern.

## 2024-07-30 ENCOUNTER — TELEPHONE (OUTPATIENT)
Age: 49
End: 2024-07-30

## 2024-07-30 NOTE — TELEPHONE ENCOUNTER
PA for Continuous Glucose Sensor (FreeStyle Slime 2 Sensor) MISC  SUBMITTED     via    [x]CMM-KEY [id number]  []Surescripts-Case ID #    []Faxed to plan   []Other website    []Phone call Case ID #      clinical questions answered. Awaiting determination    Turnaround time for your insurance to make a decision on your Prior Authorization can take 7-21 business days.           NO NOTES TO SUBMIT. NOT SEEN IN OVER A YEAR

## 2024-08-13 ENCOUNTER — LAB (OUTPATIENT)
Dept: LAB | Facility: CLINIC | Age: 49
End: 2024-08-13
Payer: COMMERCIAL

## 2024-08-13 ENCOUNTER — TELEPHONE (OUTPATIENT)
Dept: NEPHROLOGY | Facility: CLINIC | Age: 49
End: 2024-08-13

## 2024-08-13 DIAGNOSIS — N18.5 STAGE 5 CHRONIC KIDNEY DISEASE NOT ON CHRONIC DIALYSIS (HCC): ICD-10-CM

## 2024-08-13 LAB
25(OH)D3 SERPL-MCNC: 10.1 NG/ML (ref 30–100)
ANION GAP SERPL CALCULATED.3IONS-SCNC: 9 MMOL/L (ref 4–13)
BACTERIA UR QL AUTO: ABNORMAL /HPF
BASOPHILS # BLD AUTO: 0.05 THOUSANDS/ÂΜL (ref 0–0.1)
BASOPHILS NFR BLD AUTO: 1 % (ref 0–1)
BILIRUB UR QL STRIP: NEGATIVE
BUN SERPL-MCNC: 44 MG/DL (ref 5–25)
CALCIUM SERPL-MCNC: 7.4 MG/DL (ref 8.4–10.2)
CHLORIDE SERPL-SCNC: 109 MMOL/L (ref 96–108)
CLARITY UR: CLEAR
CO2 SERPL-SCNC: 19 MMOL/L (ref 21–32)
COLOR UR: COLORLESS
CREAT SERPL-MCNC: 4.64 MG/DL (ref 0.6–1.3)
CREAT UR-MCNC: 69.4 MG/DL
EOSINOPHIL # BLD AUTO: 0.33 THOUSAND/ÂΜL (ref 0–0.61)
EOSINOPHIL NFR BLD AUTO: 4 % (ref 0–6)
ERYTHROCYTE [DISTWIDTH] IN BLOOD BY AUTOMATED COUNT: 14.6 % (ref 11.6–15.1)
GFR SERPL CREATININE-BSD FRML MDRD: 13 ML/MIN/1.73SQ M
GLUCOSE P FAST SERPL-MCNC: 238 MG/DL (ref 65–99)
GLUCOSE UR STRIP-MCNC: ABNORMAL MG/DL
HCT VFR BLD AUTO: 41.9 % (ref 36.5–49.3)
HGB BLD-MCNC: 12.9 G/DL (ref 12–17)
HGB UR QL STRIP.AUTO: ABNORMAL
IMM GRANULOCYTES # BLD AUTO: 0.04 THOUSAND/UL (ref 0–0.2)
IMM GRANULOCYTES NFR BLD AUTO: 1 % (ref 0–2)
KETONES UR STRIP-MCNC: NEGATIVE MG/DL
LEUKOCYTE ESTERASE UR QL STRIP: ABNORMAL
LYMPHOCYTES # BLD AUTO: 1.13 THOUSANDS/ÂΜL (ref 0.6–4.47)
LYMPHOCYTES NFR BLD AUTO: 13 % (ref 14–44)
MCH RBC QN AUTO: 30.5 PG (ref 26.8–34.3)
MCHC RBC AUTO-ENTMCNC: 30.8 G/DL (ref 31.4–37.4)
MCV RBC AUTO: 99 FL (ref 82–98)
MICROALBUMIN UR-MCNC: 3499.8 MG/L
MICROALBUMIN/CREAT 24H UR: 5043 MG/G CREATININE (ref 0–30)
MONOCYTES # BLD AUTO: 0.69 THOUSAND/ÂΜL (ref 0.17–1.22)
MONOCYTES NFR BLD AUTO: 8 % (ref 4–12)
NEUTROPHILS # BLD AUTO: 6.6 THOUSANDS/ÂΜL (ref 1.85–7.62)
NEUTS SEG NFR BLD AUTO: 73 % (ref 43–75)
NITRITE UR QL STRIP: NEGATIVE
NON-SQ EPI CELLS URNS QL MICRO: ABNORMAL /HPF
NRBC BLD AUTO-RTO: 0 /100 WBCS
PH UR STRIP.AUTO: 6.5 [PH]
PHOSPHATE SERPL-MCNC: 3.3 MG/DL (ref 2.7–4.5)
PLATELET # BLD AUTO: 293 THOUSANDS/UL (ref 149–390)
PMV BLD AUTO: 10.5 FL (ref 8.9–12.7)
POTASSIUM SERPL-SCNC: 5 MMOL/L (ref 3.5–5.3)
PROT UR STRIP-MCNC: ABNORMAL MG/DL
PTH-INTACT SERPL-MCNC: 740.6 PG/ML (ref 12–88)
RBC # BLD AUTO: 4.23 MILLION/UL (ref 3.88–5.62)
RBC #/AREA URNS AUTO: ABNORMAL /HPF
SODIUM SERPL-SCNC: 137 MMOL/L (ref 135–147)
SP GR UR STRIP.AUTO: 1.01 (ref 1–1.03)
URATE SERPL-MCNC: 6.6 MG/DL (ref 3.5–8.5)
UROBILINOGEN UR STRIP-ACNC: <2 MG/DL
WBC # BLD AUTO: 8.84 THOUSAND/UL (ref 4.31–10.16)
WBC #/AREA URNS AUTO: ABNORMAL /HPF

## 2024-08-13 PROCEDURE — 84550 ASSAY OF BLOOD/URIC ACID: CPT

## 2024-08-13 PROCEDURE — 84100 ASSAY OF PHOSPHORUS: CPT

## 2024-08-13 PROCEDURE — 80048 BASIC METABOLIC PNL TOTAL CA: CPT

## 2024-08-13 PROCEDURE — 81001 URINALYSIS AUTO W/SCOPE: CPT

## 2024-08-13 PROCEDURE — 82570 ASSAY OF URINE CREATININE: CPT

## 2024-08-13 PROCEDURE — 82043 UR ALBUMIN QUANTITATIVE: CPT

## 2024-08-13 PROCEDURE — 85025 COMPLETE CBC W/AUTO DIFF WBC: CPT

## 2024-08-13 PROCEDURE — 83970 ASSAY OF PARATHORMONE: CPT

## 2024-08-13 PROCEDURE — 36415 COLL VENOUS BLD VENIPUNCTURE: CPT

## 2024-08-13 PROCEDURE — 82306 VITAMIN D 25 HYDROXY: CPT

## 2024-08-13 NOTE — TELEPHONE ENCOUNTER
Patient has an appointment for tomorrow 08/14/2024 with  Shonda. Called patient to confirm that he had blood work done prior appointment. Per patient he stated he will be having labs done today.

## 2024-08-13 NOTE — PROGRESS NOTES
NEPHROLOGY OFFICE NOTE    Patient: Adam Gonzalez               Sex: male           YOB: 1975        Age:  49 y.o.       8/14/2024      BACKGROUND     I had the pleasure of seeing Adam Gonzalez in the nephrology office today for follow-up. He has a past medical history significant for hypertension, T2DM, CVA, hyperlipidemia, tobacco abuse, and chronic kidney disease stage 5.     He is currently following with our office and Dr. RASHI Benedict for management of chronic kidney disease. After review of the medical record, CKD slowly progressive with current creatinine levels fluctuating around 4.0 - 4.4.     He had attended [facility] class in the past and would prefer in home hemodialysis. He was referred to Vascular Surgery and underwent placement of left AVF on 4/10/2024.     He has underlying hypertension and currently maintained on amlodipine, hydralazine, and metoprolol.     He has underlying hypertension and currently maintained on insulin and Trulicity.     He presents with his wife for follow-up today.  Reports he has been doing overall well since last office visit with Dr. YOCASTA Benedict.    Most recent labs were obtained on 8/13/2024, which we have reviewed together.    SUBJECTIVE     He currently has no complaints at this time and is feeling well. Patient denies any chest pain, shortness of breath, or swelling.    REVIEW OF SYSTEMS     Review of Systems   Constitutional:  Negative for activity change, chills, fatigue and fever.   HENT:  Negative for trouble swallowing.    Respiratory:  Negative for shortness of breath.    Cardiovascular:  Negative for leg swelling.   Gastrointestinal:  Negative for constipation, diarrhea, nausea and vomiting.   Genitourinary:  Negative for difficulty urinating, dysuria, frequency and hematuria.   Musculoskeletal:  Negative for back pain.   Skin:  Negative for pallor.   Neurological:  Negative for dizziness, syncope, weakness and light-headedness.   Psychiatric/Behavioral:   Negative for sleep disturbance. The patient is not nervous/anxious.        OBJECTIVE     Current Weight: Weight - Scale: 86 kg (189 lb 9.6 oz)  Vitals:    08/14/24 1029   BP: 138/74   Pulse: 97   Resp: 18   Temp: 97.9 °F (36.6 °C)   SpO2: 99%     Body mass index is 27.2 kg/m².    CURRENT MEDICATIONS       Current Outpatient Medications:     amLODIPine (NORVASC) 10 mg tablet, Take 1 tablet (10 mg total) by mouth daily, Disp: 90 tablet, Rfl: 1    atorvastatin (LIPITOR) 80 mg tablet, Take 1 tablet (80 mg total) by mouth daily, Disp: 90 tablet, Rfl: 1    calcitriol (ROCALTROL) 0.5 MCG capsule, Take 1 capsule (0.5 mcg total) by mouth daily Monday, Wednesday, Friday, Disp: 90 capsule, Rfl: 2    clopidogrel (PLAVIX) 75 mg tablet, Take 1 tablet (75 mg total) by mouth daily, Disp: 90 tablet, Rfl: 1    Continuous Glucose Sensor (FreeStyle Slime 2 Sensor) MISC, apply 1 SENSOR to back OF UPPER ARM REMOVE AND REPLACE every 14 days use with DEVICE to MONITOR BLOOD SUGAR, Disp: 2 each, Rfl: 5    dulaglutide (Trulicity) 0.75 MG/0.5ML injection, Inject 0.5 mL (0.75 mg total) under the skin every 7 days, Disp: 2 mL, Rfl: 2    ergocalciferol (VITAMIN D2) 50,000 units, Take 1 capsule (50,000 Units total) by mouth once a week for 12 doses, Disp: 12 capsule, Rfl: 0    hydrALAZINE (APRESOLINE) 50 mg tablet, Take 1 tablet (50 mg total) by mouth 3 (three) times a day, Disp: , Rfl:     Insulin Glargine Solostar (Basaglar KwikPen) 100 UNIT/ML SOPN, Inject 0.28 mL (28 Units total) under the skin daily, Disp: 15 mL, Rfl: 5    metoprolol succinate (TOPROL-XL) 50 mg 24 hr tablet, Take 1 tablet (50 mg total) by mouth daily, Disp: 90 tablet, Rfl: 1    nicotine (NICODERM CQ) 14 mg/24hr TD 24 hr patch, Place 1 patch on the skin over 24 hours every 24 hours, Disp: 28 patch, Rfl: 0    sodium bicarbonate 650 mg tablet, Take 1 tablet (650 mg total) by mouth 2 (two) times a day, Disp: 180 tablet, Rfl: 2    PHYSICAL EXAMINATION     Physical Exam  Vitals  reviewed.   Constitutional:       General: He is not in acute distress.  HENT:      Head: Normocephalic.      Mouth/Throat:      Lips: Pink.      Mouth: Mucous membranes are moist.   Eyes:      General: Lids are normal. No scleral icterus.  Cardiovascular:      Rate and Rhythm: Normal rate and regular rhythm.      Heart sounds: S1 normal and S2 normal. No murmur heard.  Pulmonary:      Effort: Pulmonary effort is normal. No accessory muscle usage or respiratory distress.      Breath sounds: Normal breath sounds.   Abdominal:      General: There is no distension.      Tenderness: There is no abdominal tenderness.   Musculoskeletal:      Cervical back: Normal range of motion and neck supple. No tenderness.   Skin:     General: Skin is warm.      Coloration: Skin is not cyanotic or jaundiced.   Neurological:      General: No focal deficit present.      Mental Status: He is alert and oriented to person, place, and time.   Psychiatric:         Attention and Perception: Attention normal.         Speech: Speech normal.         Behavior: Behavior is cooperative.           LAB RESULTS     Results from last 7 days   Lab Units 08/13/24  1154   WBC Thousand/uL 8.84   HEMOGLOBIN g/dL 12.9   HEMATOCRIT % 41.9   PLATELETS Thousands/uL 293   SODIUM mmol/L 137   POTASSIUM mmol/L 5.0   CHLORIDE mmol/L 109*   CO2 mmol/L 19*   BUN mg/dL 44*   CREATININE mg/dL 4.64*   EGFR ml/min/1.73sq m 13   CALCIUM mg/dL 7.4*   PHOSPHORUS mg/dL 3.3         RADIOLOGY RESULTS      Results for orders placed during the hospital encounter of 09/27/21    XR chest 1 view portable    Narrative  CHEST    INDICATION:   stroke alert protocol.  Smoker.    COMPARISON:  4/22/2021    EXAM PERFORMED/VIEWS:  XR CHEST PORTABLE      FINDINGS:    Cardiomediastinal silhouette appears unremarkable.    The lungs are clear.  No pneumothorax or pleural effusion.    Osseous structures appear within normal limits for patient age.    Impression  No acute cardiopulmonary  disease.            Workstation performed: [id number]    Results for orders placed during the hospital encounter of 04/22/21    XR chest pa & lateral    Narrative  CHEST    INDICATION:   R60.0: Localized edema.    COMPARISON:  February 22, 2019    EXAM PERFORMED/VIEWS:  XR CHEST PA & LATERAL  The frontal view was performed utilizing dual energy radiographic technique.  Images: 4    FINDINGS:  Lungs are well-aerated.    Cardiomediastinal silhouette appears unremarkable.    The lungs are clear.  No pneumothorax or pleural effusion.    Osseous structures appear within normal limits for patient age.    Impression  No acute cardiopulmonary disease.            Workstation performed: [id number]    US Kidney and Bladder 11/29/2023:  FINDINGS:     KIDNEYS:  Symmetric and normal size.  Right kidney:  10.2 x 5.9 x 5.9 cm. Volume 186.8 mL  Left kidney:  10.8 x 7.6 x 5.0 cm.  Volume 214.5 mL     Right kidney  Normal echogenicity and contour.  No mass is identified.  No hydronephrosis.  No shadowing calculi.  No perinephric fluid collections.     Left kidney  Normal echogenicity and contour.  No mass is identified.  No hydronephrosis.  No shadowing calculi.  No perinephric fluid collections.     URETERS:  Nonvisualized.     BLADDER:  Normally distended.  No focal thickening or mass lesions.  Bilateral ureteral jets detected.  Prevoid: 45.8,  No significant post void volume. Measured post void volume in mL: 6.8        IMPRESSION:     No hydronephrosis  Normal size kidneys  No significant irregularity      ASSESSMENT/PLAN     Chronic Kidney Disease 5:  After review of the medical record, CKD slowly progressive with recent creatinine levels fluctuating around 4.0 - 4.4. Etiology of CKD multifactorial and suspected secondary to diabetic nephropathy, hypertensive nephrosclerosis, and nephrotic range proteinuria.     He attended [facility] class in the past and urgently preferred in home hemodialysis.  He reports to be that in  the interim he and his wife had a discussion and he would prefer to go in center for his hemodialysis treatments.     He was referred to VS and underwent placement of left arm AVF on 4/10/2024. Good thrill and bruit noted on examination today.    He was referred to Dr. MARIANELA Benedict for renal transplant evaluation through [facility].     Current creatinine level is 4.64 with an EGFR of 13.  Patient is clinically without uremic symptoms and no urgent indication for hemodialysis at this time.  Uremic symptoms were reviewed in detail with the patient and his wife and they were advised to contact our office should they develop.    I advise he continue to stay hydrated and avoid nephrotoxic agents such as NSAIDs.  Will plan to repeat CKD labs prior to follow-up.    Hypertension:  Currently maintained on amlodipine 10 mg daily, hydralazine 50 mg TID, and metoprolol 50 mg daily.  Blood pressure within acceptable range on the current regimen.  Advised to continue monitoring home blood pressures.    Type 2 Diabetes:  Currently maintained on insulin and Trulicity.  Recent hemoglobin A1c was 7.8.    Vitamin D Deficiency and Secondary Hyperparathyroidism:  Calcium 7.4, vitamin D 10.1, phosphorus 3.3, and .6. Received ergocalciferol 50,000 units x12 weeks, also maintained on calcitriol 0.25 mcg.  Vitamin D level remains low and PTH level above target.  Will reorder ergocalciferol 50,000 units x 12 doses and increase calcitriol to 0.5 mcg.  Repeat labs prior to follow-up.    Metabolic Acidosis:  Etiology secondary to progressive CKD.  Will initiate sodium bicarb 650 mg twice daily and repeat labs prior to follow-up.    Nephrotic Range Proteinuria:  Prior SPEP showed abnormal distribution in the gamma region, but immunofixation showed no monoclonal spike.  UPEP also showed no M spike. Suspect underlying nephrotic range proteinuria due to diabetic nephropathy on top of hypertension nephrosclerosis.     Current microalbumin  "to creatinine ratio 5043.  Given serum potassium of 5.0 and underlying progressive chronic kidney disease would not recommend initiation of ACE/ARB therapy or spironolactone due to risk of further renal impairment and hyperkalemia.  He is also not a candidate for GLT 2 inhibitor in the setting of EGFR less than 20.    Recommend Nephrology follow-up in 4 months.    YASMIN Gresham  Nephrology  8/14/2024      Portions of the record may have been created with voice recognition software. Occasional wrong word or \"sound a like\" substitutions may have occurred due to the inherent limitations of voice recognition software. Read the chart carefully and recognize, using context, where substitutions have occurred.   "

## 2024-08-14 ENCOUNTER — TELEPHONE (OUTPATIENT)
Dept: NEPHROLOGY | Facility: CLINIC | Age: 49
End: 2024-08-14

## 2024-08-14 ENCOUNTER — OFFICE VISIT (OUTPATIENT)
Dept: NEPHROLOGY | Facility: CLINIC | Age: 49
End: 2024-08-14

## 2024-08-14 VITALS
BODY MASS INDEX: 27.14 KG/M2 | RESPIRATION RATE: 18 BRPM | OXYGEN SATURATION: 99 % | HEART RATE: 97 BPM | HEIGHT: 70 IN | WEIGHT: 189.6 LBS | DIASTOLIC BLOOD PRESSURE: 74 MMHG | SYSTOLIC BLOOD PRESSURE: 138 MMHG | TEMPERATURE: 97.9 F

## 2024-08-14 DIAGNOSIS — I12.0 HYPERTENSIVE KIDNEY DISEASE WITH STAGE 5 CHRONIC KIDNEY DISEASE, NOT ON CHRONIC DIALYSIS (HCC): ICD-10-CM

## 2024-08-14 DIAGNOSIS — N25.81 SECONDARY HYPERPARATHYROIDISM OF RENAL ORIGIN (HCC): ICD-10-CM

## 2024-08-14 DIAGNOSIS — E87.20 METABOLIC ACIDOSIS: ICD-10-CM

## 2024-08-14 DIAGNOSIS — N25.81 HYPERPARATHYROIDISM DUE TO RENAL INSUFFICIENCY (HCC): ICD-10-CM

## 2024-08-14 DIAGNOSIS — E55.9 VITAMIN D DEFICIENCY: ICD-10-CM

## 2024-08-14 DIAGNOSIS — N18.5 HYPERTENSIVE KIDNEY DISEASE WITH STAGE 5 CHRONIC KIDNEY DISEASE, NOT ON CHRONIC DIALYSIS (HCC): ICD-10-CM

## 2024-08-14 DIAGNOSIS — E11.59 TYPE 2 DIABETES MELLITUS WITH OTHER CIRCULATORY COMPLICATION, WITH LONG-TERM CURRENT USE OF INSULIN (HCC): ICD-10-CM

## 2024-08-14 DIAGNOSIS — R80.9 NEPHROTIC RANGE PROTEINURIA: ICD-10-CM

## 2024-08-14 DIAGNOSIS — Z79.4 TYPE 2 DIABETES MELLITUS WITH OTHER CIRCULATORY COMPLICATION, WITH LONG-TERM CURRENT USE OF INSULIN (HCC): ICD-10-CM

## 2024-08-14 DIAGNOSIS — N18.5 STAGE 5 CHRONIC KIDNEY DISEASE NOT ON CHRONIC DIALYSIS (HCC): Primary | ICD-10-CM

## 2024-08-14 RX ORDER — SODIUM BICARBONATE 650 MG/1
650 TABLET ORAL 2 TIMES DAILY
Qty: 180 TABLET | Refills: 2 | Status: SHIPPED | OUTPATIENT
Start: 2024-08-14

## 2024-08-14 RX ORDER — ERGOCALCIFEROL 1.25 MG/1
50000 CAPSULE, LIQUID FILLED ORAL WEEKLY
Qty: 12 CAPSULE | Refills: 0 | Status: SHIPPED | OUTPATIENT
Start: 2024-08-14 | End: 2024-10-31

## 2024-08-14 RX ORDER — CALCITRIOL 0.5 UG/1
0.5 CAPSULE, LIQUID FILLED ORAL DAILY
Qty: 90 CAPSULE | Refills: 2 | Status: SHIPPED | OUTPATIENT
Start: 2024-08-14 | End: 2024-08-14

## 2024-08-14 RX ORDER — CALCITRIOL 0.5 UG/1
0.5 CAPSULE, LIQUID FILLED ORAL DAILY
Qty: 90 CAPSULE | Refills: 2 | Status: SHIPPED | OUTPATIENT
Start: 2024-08-14 | End: 2025-05-11

## 2024-08-14 NOTE — TELEPHONE ENCOUNTER
I called and spoke to the patient significant other Adriane and explained to her that YASMIN Chen did fill out  the [facility] Medical Certification Request Form and it was faxed back to [facility] and it was received. Adriane understood and was okay with it.

## 2024-08-14 NOTE — PATIENT INSTRUCTIONS
"Drink plenty of water, up to 64 ounces daily  Continue to avoid use of NSAIDs (e.g., Ibuprofen, Motrin, Aleve, Naproxen, Advil)  We will increase your calcitriol to 0.5 mcg once daily and continue vitamin D supplement.  We will start sodium bicarbonate 650mg twice daily for management of the high acid level in your blood.   Call our office if you develop nausea, vomiting, shortness of breath, poor appetite, decreased urine output, or lower extremity swelling.     Patient Education     Chronic kidney disease   The Basics   Written by the doctors and editors at [facility]   What is chronic kidney disease? -- Chronic kidney disease, or \"CKD,\" is when the kidneys stop working as well as they should. When they are working normally, the kidneys filter blood and remove waste and excess salt and water (figure 1).  In people with CKD, the kidneys slowly lose the ability to filter blood. In time, the kidneys can stop working completely. That is why it is so important to keep CKD from getting worse.  What are the symptoms of CKD? -- At first, CKD causes no symptoms. As the disease gets worse, it can:   Make your feet, ankles, or legs swell (called \"edema\")   Give you high blood pressure   Make you very tired   Damage your bones  Will I need tests? -- Yes. Your doctor will want to see you regularly. You will probably have appointments at least once a year, and you will get regular tests to check your kidneys. These include blood and urine tests.  If your CKD gets worse over time, you will probably need to see a \"nephrologist.\" This is a doctor who takes care of people with kidney disease.  Is there anything I can do to keep my kidneys from getting worse? -- Yes. If you have CKD, you can protect your kidneys if you:   Take all of your prescribed medicines every day, and follow all of your doctor's instructions for how to take them.   Keep your blood sugar in a healthy range, if you have diabetes.   Change your diet, if your doctor " "or nurse recommends to. They might suggest working with a dietitian (nutrition expert).   Quit smoking, if you smoke.   Lose weight, if you have excess body weight.   Avoid medicines that can harm the kidneys - One example is nonsteroidal antiinflammatory drugs (\"NSAIDs\"). These medicines include ibuprofen (sample brand names: Advil, Motrin) and naproxen (sample brand name: Aleve). There are other medicines that people with CKD need to avoid, too. Check with your doctor, nurse, or kidney specialist before starting any new medicines or supplements, even those you can buy without a prescription.  How is CKD treated? -- People in the early stages of CKD can take medicines to keep the disease from getting worse. For example, many people with CKD should take medicines known as \"ACE inhibitors\" or \"angiotensin receptor blockers.\" If your doctor or nurse prescribes these medicines, it is very important that you take them every day as directed. If they cause side effects or cost too much, tell your doctor or nurse. They might have solutions to offer.  What happens if my kidneys stop working completely? -- If your kidneys can no longer filter blood properly, you can choose between 3 different treatments to take over their job. Your choices are:   Kidney transplant - After transplant surgery, the new kidney can do the job of your own kidneys. If you have a kidney transplant, you will need to take medicines for the rest of your life to keep your body from reacting badly to the new kidney. (You only need 1 kidney to live.)   Hemodialysis - This is a procedure in which a dialysis machine takes over the job of the kidneys. The machine pumps blood out of the body, filters it, and returns it to the body. If you choose hemodialysis, you will need to be hooked up to the machine at least 3 times a week for several hours for the rest of your life. Before you start, you will also need to have surgery to prepare a blood vessel for " attachment to the machine.   Peritoneal dialysis - This involves piping a special fluid into the belly every day. If you choose peritoneal dialysis, you will need surgery to have a tube implanted in your belly. Then, you will have to learn how to pipe the fluid in and out through that tube.  How do I choose between the different treatment options? -- You and your doctor will need to work together to find a treatment that's right for you. Kidney transplant surgery is usually the best option for most people. But often, there are no kidneys available for transplant.  Ask your doctor to explain all of your options and how they might work for you. Then, talk openly with them about how you feel about all of the options. You might even decide that you do not want any treatment. That is your choice.  All topics are updated as new evidence becomes available and our peer review process is complete.  This topic retrieved from [employer] on: May 18, 2024.  Topic 18250 Version 34.0  Release: 32.4.3 - C32.137  © 2024 UpToDate, Inc. and/or its affiliates. All rights reserved.  figure 1: Anatomy of the urinary tract     Urine is made by the kidneys. It passes from the kidneys into the bladder through 2 tubes called the ureters. Then, it leaves the bladder through another tube called the urethra.  Graphic 03792 Version 8.0  Consumer Information Use and Disclaimer   Disclaimer: This generalized information is a limited summary of diagnosis, treatment, and/or medication information. It is not meant to be comprehensive and should be used as a tool to help the user understand and/or assess potential diagnostic and treatment options. It does NOT include all information about conditions, treatments, medications, side effects, or risks that may apply to a specific patient. It is not intended to be medical advice or a substitute for the medical advice, diagnosis, or treatment of a health care provider based on the health care provider's  examination and assessment of a patient's specific and unique circumstances. Patients must speak with a health care provider for complete information about their health, medical questions, and treatment options, including any risks or benefits regarding use of medications. This information does not endorse any treatments or medications as safe, effective, or approved for treating a specific patient. UpToDate, Inc. and its affiliates disclaim any warranty or liability relating to this information or the use thereof.The use of this information is governed by the Terms of Use, available at https://www.[employer]edu[employer].com/en/know/clinical-effectiveness-terms. 2024© UpToDate, Inc. and its affiliates and/or licensors. All rights reserved.  Copyright   © 2024 UpToDate, Inc. and/or its affiliates. All rights reserved.

## 2024-09-05 ENCOUNTER — TELEPHONE (OUTPATIENT)
Age: 49
End: 2024-09-05

## 2024-09-05 NOTE — TELEPHONE ENCOUNTER
Called spoke with patient advised office staff still have not received forms he is requesting to be filled out by YASMIN Chen. patient verbalized understanding and is okay with it. patient will be calling water company so they can  fax.

## 2024-09-05 NOTE — TELEPHONE ENCOUNTER
office has not received forms as of yet. office staff will call patient when we have receive them and are filled out by provider.

## 2024-09-05 NOTE — TELEPHONE ENCOUNTER
Called spoke with patient advised forms have been filled out and faxed to the water company as requested. also advised there is a copy of forms available for him to pick-up. patient verbalized understanding and is okay with it. forms are also entered to patient's chart.

## 2024-09-05 NOTE — TELEPHONE ENCOUNTER
Form is being fax to office for this patient from [employer]. It is under his wife account Adriane Velasco.     Needs form filled out.

## 2024-09-05 NOTE — TELEPHONE ENCOUNTER
Patient called back to see if office received forms yet and if it has been filled out yet. I informed pt that when form has been received and filled out he will be receiving a call from office staff. Pt is aware and confirmed.

## 2024-09-05 NOTE — TELEPHONE ENCOUNTER
Patient called in to see if we received the form yet from the [employer]. I advised we did not. Advised once we receive it we will let him know. Patient understands.

## 2024-09-23 ENCOUNTER — OFFICE VISIT (OUTPATIENT)
Dept: FAMILY MEDICINE CLINIC | Facility: CLINIC | Age: 49
End: 2024-09-23
Payer: COMMERCIAL

## 2024-09-23 VITALS
OXYGEN SATURATION: 99 % | WEIGHT: 193.8 LBS | BODY MASS INDEX: 27.75 KG/M2 | TEMPERATURE: 97.8 F | DIASTOLIC BLOOD PRESSURE: 78 MMHG | HEIGHT: 70 IN | HEART RATE: 91 BPM | RESPIRATION RATE: 20 BRPM | SYSTOLIC BLOOD PRESSURE: 132 MMHG

## 2024-09-23 DIAGNOSIS — I10 ESSENTIAL HYPERTENSION: ICD-10-CM

## 2024-09-23 DIAGNOSIS — I35.1 NONRHEUMATIC AORTIC VALVE INSUFFICIENCY: ICD-10-CM

## 2024-09-23 DIAGNOSIS — R29.898 FINE MOTOR SKILL LOSS: ICD-10-CM

## 2024-09-23 DIAGNOSIS — F17.200 CURRENT SMOKER: ICD-10-CM

## 2024-09-23 DIAGNOSIS — E11.42 DIABETIC POLYNEUROPATHY ASSOCIATED WITH TYPE 2 DIABETES MELLITUS (HCC): ICD-10-CM

## 2024-09-23 DIAGNOSIS — I69.398 IMPAIRED BALANCE AS LATE EFFECT OF CEREBROVASCULAR ACCIDENT (CVA): ICD-10-CM

## 2024-09-23 DIAGNOSIS — I69.322 DYSARTHRIA AS LATE EFFECT OF CEREBROVASCULAR ACCIDENT (CVA): ICD-10-CM

## 2024-09-23 DIAGNOSIS — E11.59 TYPE 2 DIABETES MELLITUS WITH OTHER CIRCULATORY COMPLICATION, WITH LONG-TERM CURRENT USE OF INSULIN (HCC): ICD-10-CM

## 2024-09-23 DIAGNOSIS — N18.5 HYPERTENSIVE KIDNEY DISEASE WITH STAGE 5 CHRONIC KIDNEY DISEASE, NOT ON CHRONIC DIALYSIS (HCC): ICD-10-CM

## 2024-09-23 DIAGNOSIS — R79.89 TSH ELEVATION: ICD-10-CM

## 2024-09-23 DIAGNOSIS — J44.89 CHRONIC BRONCHITIS WITH COPD (CHRONIC OBSTRUCTIVE PULMONARY DISEASE) (HCC): Primary | ICD-10-CM

## 2024-09-23 DIAGNOSIS — R29.818 FINE MOTOR SKILL LOSS: ICD-10-CM

## 2024-09-23 DIAGNOSIS — Z79.4 TYPE 2 DIABETES MELLITUS WITH OTHER CIRCULATORY COMPLICATION, WITH LONG-TERM CURRENT USE OF INSULIN (HCC): ICD-10-CM

## 2024-09-23 DIAGNOSIS — R26.89 IMPAIRED BALANCE AS LATE EFFECT OF CEREBROVASCULAR ACCIDENT (CVA): ICD-10-CM

## 2024-09-23 DIAGNOSIS — I12.0 HYPERTENSIVE KIDNEY DISEASE WITH STAGE 5 CHRONIC KIDNEY DISEASE, NOT ON CHRONIC DIALYSIS (HCC): ICD-10-CM

## 2024-09-23 DIAGNOSIS — Z86.73 HISTORY OF CVA (CEREBROVASCULAR ACCIDENT): ICD-10-CM

## 2024-09-23 LAB — SL AMB POCT HEMOGLOBIN AIC: 6.8 (ref ?–6.5)

## 2024-09-23 PROCEDURE — 99214 OFFICE O/P EST MOD 30 MIN: CPT | Performed by: FAMILY MEDICINE

## 2024-09-23 PROCEDURE — 83036 HEMOGLOBIN GLYCOSYLATED A1C: CPT | Performed by: FAMILY MEDICINE

## 2024-09-23 RX ORDER — BUDESONIDE AND FORMOTEROL FUMARATE DIHYDRATE 160; 4.5 UG/1; UG/1
2 AEROSOL RESPIRATORY (INHALATION) 2 TIMES DAILY
Qty: 10.2 G | Refills: 1 | Status: SHIPPED | OUTPATIENT
Start: 2024-09-23

## 2024-09-23 NOTE — PROGRESS NOTES
FAMILY PRACTICE OFFICE VISIT  Chris Emerson D.O.    [facility]  [facility]  [address]  [address]  [place], [address]      NAME: Adam Gonzalez  AGE: 49 y.o. SEX: male  : 1975   MRN: [id number]    DATE: 2024  TIME: 4:03 PM      Assessment and Plan         Patient Instructions   1. Bronchitis with COPD (chronic obstructive pulmonary disease)  Assessment & Plan:  Does have cough for the past few weeks, has degree COPD, long-term smoker, currently 1/2 pack/day.  Did not use patch, keep working on cutting down and quitting.  I would like him to do CT screening of lung, has order in system.  Call us if fevers or chills develop.  Can use Symbicort 2 puffs twice daily, rinse after use.  Orders:  -     budesonide-formoterol (Symbicort) 160-4.5 mcg/act inhaler; Inhale 2 puffs 2 (two) times a day Rinse mouth after use.  2. Type 2 diabetes mellitus with other circulatory complication, with long-term current use of insulin ([facility])  Assessment & Plan:  Lab Results   Component Value Date    HGBA1C 6.8 (A) 2024    HGBA1C 7.1 (A) 2024    HGBA1C 7.8 (A) 2024     Lab Results   Component Value Date    GLUF 238 (H) 2024    LDLCALC 112 (H) 2023    CREATININE 4.64 (H) 2024      A1c done here today has improved to 6.8, best in a few years, continues to monitor with continuous glucose monitor/shyam, no hypoglycemia, continue insulin/Trulicity etc. as is.    Orders:  -     POCT hemoglobin A1c  -     TSH, 3rd generation with Free T4 reflex  3. Hypertensive kidney disease with stage 5 chronic kidney disease, not on chronic dialysis ([facility])  -     Lipid panel  4. Essential hypertension  -     Lipid panel  5. Current smoker  6. History of CVA (cerebrovascular accident)  -     Lipid panel  7. 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)  8. 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 Aortic valve insufficiency ( mild-mod  )  10. Diabetic  polyneuropathy associated with type 2 diabetes mellitus (HCC)  11. Impaired balance as late effect of cerebrovascular accident (CVA)  12. TSH elevation in past  Assessment & Plan:  TSH in the past have been elevated, 1 year ago TSH was okay at 3.166, await redo.    Overall medically stable here today.  He is using medication as directed other than using hydralazine twice daily, blood pressure was fine here today at 132/78.  He will continue to see nephrology, saw them in August, sees them every 4 months.    Creatinine has been running in range of 4-4.4, nephrology plans to do blood work again prior to seeing him in December, I would like him to include fasting lipid panel along with TSH then.    Has noted a bit of swelling at vascular access left upper extremity, no significant redness or warmth, just observe.    Does have an order in the system for Cologuard, he should do that.    We will see him again in 4 months.  Call sooner if needed.  Currently declines flu shot, I would like him to get 1.  He is now on SSD      Chief Complaint     Chief Complaint   Patient presents with    Follow-up       History of Present Illness   Adam Gonzalez is a 49 y.o.-year-old male who is in for a routine follow-up.  Overall he has been feeling okay, did see nephrology in August, earlier this year had access placed left arm in anticipation of future dialysis.  He relates he is using his medication as directed including insulin other than uses hydralazine twice daily, not 3 times a day.  Has had a cough for about 2 weeks, no fevers or chills, clear mucus, no chest pain, no increased palpitations.    Still smoking about 1/2 pack/day.    No new stroke symptoms.  Now on SSD      Review of Systems   Review of Systems   Constitutional:  Negative for appetite change, fever and unexpected weight change.   HENT:  Negative for sore throat and trouble swallowing.    Respiratory:  Positive for cough. Negative for chest tightness.     Cardiovascular:  Negative for chest pain, palpitations and leg swelling.   Gastrointestinal:  Negative for abdominal pain, blood in stool, nausea and vomiting.        No acid reflux     No change in bowel   Genitourinary:  Negative for dysuria and hematuria.   Neurological:  Negative for dizziness, syncope, light-headedness and headaches.   Psychiatric/Behavioral:  Negative for behavioral problems and confusion.        Active Problem List     Patient Active Problem List   Diagnosis    Hypercholesterolemia    Nephrotic range proteinuria    Current smoker    Essential hypertension    Type 2 diabetes mellitus with circulatory disorder, with long-term current use of insulin ([facility])    Brachial plexus injury, left, sequela    Diabetic polyneuropathy associated with type 2 diabetes mellitus (HCC)    Aortic valve insufficiency ( mild-mod 2020 )    History of embolic stroke    History of CVA (cerebrovascular accident)    Bilateral Cerebrovascular accident (CVA) status post tPA    Acute on chronic renal failure  ([facility])    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)    TSH elevation in past    Stage 5 chronic kidney disease not on chronic dialysis ([facility])    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    Impaired balance as late effect of cerebrovascular accident (CVA)    Noncompliance with treatment plan    Secondary hyperparathyroidism of renal origin ([facility])    Implantable loop recorder present    Vitamin D deficiency    Hypertensive CKD (chronic kidney disease)    Metabolic acidosis    Chronic bronchitis with COPD (chronic obstructive pulmonary disease)       Past Medical History:  Reviewed    Past Surgical History:  Reviewed    Family History:  Reviewed    Social History:  Reviewed    Objective     Vitals:    09/23/24 1445   BP: 132/78   BP Location: Right arm   Patient Position: Sitting   Cuff Size: Standard   Pulse: 91   Resp: 20   Temp: 97.8 °F (36.6 °C)   TempSrc: Tympanic   SpO2: 99%   Weight: 87.9 kg (193  "lb 12.8 oz)   Height: 5' 10\" (1.778 m)     Body mass index is 27.81 kg/m².    BP Readings from Last 3 Encounters:   09/23/24 132/78   08/14/24 138/74   05/21/24 150/80       Wt Readings from Last 3 Encounters:   09/23/24 87.9 kg (193 lb 12.8 oz)   08/14/24 86 kg (189 lb 9.6 oz)   05/21/24 88.9 kg (196 lb)       Physical Exam  Constitutional:       Comments: Seated in chair no acute distress, afebrile, oxygenating okay on room air.  Speech remains much improved since visits after CVA, only mild ongoing impairment.  Neurologically at baseline   Eyes:      General: No scleral icterus.  Cardiovascular:      Rate and Rhythm: Normal rate and regular rhythm.      Heart sounds: Murmur heard.   Pulmonary:      Comments: Few scattered rhonchi  Abdominal:      Palpations: Abdomen is soft.      Tenderness: There is no abdominal tenderness.   Musculoskeletal:      Right lower leg: No edema.      Left lower leg: No edema.   Skin:     Coloration: Skin is not jaundiced.   Neurological:      Mental Status: Mental status is at baseline.   Psychiatric:         Behavior: Behavior normal.         ALLERGIES:  No Known Allergies    Current Medications     Current Outpatient Medications   Medication Sig Dispense Refill    amLODIPine (NORVASC) 10 mg tablet Take 1 tablet (10 mg total) by mouth daily 90 tablet 1    atorvastatin (LIPITOR) 80 mg tablet Take 1 tablet (80 mg total) by mouth daily 90 tablet 1    budesonide-formoterol (Symbicort) 160-4.5 mcg/act inhaler Inhale 2 puffs 2 (two) times a day Rinse mouth after use. 10.2 g 1    calcitriol (ROCALTROL) 0.5 MCG capsule Take 1 capsule (0.5 mcg total) by mouth daily Monday, Wednesday, Friday 90 capsule 2    clopidogrel (PLAVIX) 75 mg tablet Take 1 tablet (75 mg total) by mouth daily 90 tablet 1    Continuous Glucose Sensor (FreeStyle Slime 2 Sensor) MISC apply 1 SENSOR to back OF UPPER ARM REMOVE AND REPLACE every 14 days use with DEVICE to MONITOR BLOOD SUGAR 2 each 5    dulaglutide " (Trulicity) 0.75 MG/0.5ML injection Inject 0.5 mL (0.75 mg total) under the skin every 7 days 2 mL 2    ergocalciferol (VITAMIN D2) 50,000 units Take 1 capsule (50,000 Units total) by mouth once a week for 12 doses 12 capsule 0    hydrALAZINE (APRESOLINE) 50 mg tablet Take 1 tablet (50 mg total) by mouth 3 (three) times a day (Patient taking differently: Take 50 mg by mouth 2 (two) times a day)      Insulin Glargine Solostar (Basaglar KwikPen) 100 UNIT/ML SOPN Inject 0.28 mL (28 Units total) under the skin daily 15 mL 5    metoprolol succinate (TOPROL-XL) 50 mg 24 hr tablet Take 1 tablet (50 mg total) by mouth daily 90 tablet 1    nicotine (NICODERM CQ) 14 mg/24hr TD 24 hr patch Place 1 patch on the skin over 24 hours every 24 hours 28 patch 0    sodium bicarbonate 650 mg tablet Take 1 tablet (650 mg total) by mouth 2 (two) times a day 180 tablet 2     No current facility-administered medications for this visit.            Orders Placed This Encounter   Procedures    Lipid panel    TSH, 3rd generation with Free T4 reflex    POCT hemoglobin A1c         Chris Emerson DO

## 2024-09-23 NOTE — PATIENT INSTRUCTIONS
1. Bronchitis with COPD (chronic obstructive pulmonary disease)  Assessment & Plan:  Does have cough for the past few weeks, has degree COPD, long-term smoker, currently 1/2 pack/day.  Did not use patch, keep working on cutting down and quitting.  I would like him to do CT screening of lung, has order in system.  Call us if fevers or chills develop.  Can use Symbicort 2 puffs twice daily, rinse after use.  Orders:  -     budesonide-formoterol (Symbicort) 160-4.5 mcg/act inhaler; Inhale 2 puffs 2 (two) times a day Rinse mouth after use.  2. Type 2 diabetes mellitus with other circulatory complication, with long-term current use of insulin ([facility])  Assessment & Plan:  Lab Results   Component Value Date    HGBA1C 6.8 (A) 09/23/2024    HGBA1C 7.1 (A) 05/21/2024    HGBA1C 7.8 (A) 02/01/2024     Lab Results   Component Value Date    GLUF 238 (H) 08/13/2024    LDLCALC 112 (H) 09/29/2023    CREATININE 4.64 (H) 08/13/2024      A1c done here today has improved to 6.8, best in a few years, continues to monitor with continuous glucose monitor/shyam, no hypoglycemia, continue insulin/Trulicity etc. as is.    Orders:  -     POCT hemoglobin A1c  -     TSH, 3rd generation with Free T4 reflex  3. Hypertensive kidney disease with stage 5 chronic kidney disease, not on chronic dialysis (HCC)  -     Lipid panel  4. Essential hypertension  -     Lipid panel  5. Current smoker  6. History of CVA (cerebrovascular accident)  -     Lipid panel  7. 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)  8. 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 Aortic valve insufficiency ( mild-mod 2020 )  10. Diabetic polyneuropathy associated with type 2 diabetes mellitus (HCC)  11. Impaired balance as late effect of cerebrovascular accident (CVA)  12. TSH elevation in past  Assessment & Plan:  TSH in the past have been elevated, 1 year ago TSH was okay at 3.166, await redo.    Overall medically stable here today.  He is using medication as  directed other than using hydralazine twice daily, blood pressure was fine here today at 132/78.  He will continue to see nephrology, saw them in August, sees them every 4 months.    Creatinine has been running in range of 4-4.4, nephrology plans to do blood work again prior to seeing him in December, I would like him to include fasting lipid panel along with TSH then.    Has noted a bit of swelling at vascular access left upper extremity, no significant redness or warmth, just observe.    Does have an order in the system for Cologuard, he should do that.    We will see him again in 4 months.  Call sooner if needed.  Currently declines flu shot, I would like him to get 1.  He is now on SSD

## 2024-09-23 NOTE — ASSESSMENT & PLAN NOTE
Does have cough for the past few weeks, has degree COPD, long-term smoker, currently 1/2 pack/day.  Did not use patch, keep working on cutting down and quitting.  I would like him to do CT screening of lung, has order in system.  Call us if fevers or chills develop.  Can use Symbicort 2 puffs twice daily, rinse after use.

## 2024-09-23 NOTE — ASSESSMENT & PLAN NOTE
Lab Results   Component Value Date    HGBA1C 6.8 (A) 09/23/2024    HGBA1C 7.1 (A) 05/21/2024    HGBA1C 7.8 (A) 02/01/2024     Lab Results   Component Value Date    GLUF 238 (H) 08/13/2024    LDLCALC 112 (H) 09/29/2023    CREATININE 4.64 (H) 08/13/2024      A1c done here today has improved to 6.8, best in a few years, continues to monitor with continuous glucose monitor/shyam, no hypoglycemia, continue insulin/Trulicity etc. as is.

## 2024-09-24 ENCOUNTER — TELEPHONE (OUTPATIENT)
Age: 49
End: 2024-09-24

## 2024-09-24 NOTE — TELEPHONE ENCOUNTER
PA for budesonide-formoterol (Symbicort) 160-4.5 mcg SUBMITTED     via    [x][facility]-KEY: [id number]  []Surescripts-Case ID #   []Faxed to plan   []Other website   []Phone call Case ID #     Office notes sent, clinical questions answered. Awaiting determination    Turnaround time for your insurance to make a decision on your Prior Authorization can take 7-21 business days.

## 2024-09-24 NOTE — TELEPHONE ENCOUNTER
PA for SYMBICORT 160-4.5mcg  NOT REQUIRED     Reason (screenshot if applicable)             Pharmacy advised by    []Fax  [x]Phone call    Spoke with [facility]. Had [facility] reprocess rx for BRAND using CHET 9, pharmacy received paid claim, copay $3. Pharmacy will order medication in for tomorrow 09/25/24 and will notify patient when available for pick-up

## 2024-10-20 DIAGNOSIS — J44.89 CHRONIC BRONCHITIS WITH COPD (CHRONIC OBSTRUCTIVE PULMONARY DISEASE) (HCC): ICD-10-CM

## 2024-10-21 RX ORDER — BUDESONIDE AND FORMOTEROL FUMARATE DIHYDRATE 160; 4.5 UG/1; UG/1
AEROSOL RESPIRATORY (INHALATION)
Qty: 30.6 G | Refills: 1 | Status: SHIPPED | OUTPATIENT
Start: 2024-10-21

## 2024-10-31 DIAGNOSIS — E55.9 VITAMIN D DEFICIENCY: ICD-10-CM

## 2024-10-31 RX ORDER — ERGOCALCIFEROL 1.25 MG/1
50000 CAPSULE, LIQUID FILLED ORAL WEEKLY
Qty: 12 CAPSULE | Refills: 0 | Status: SHIPPED | OUTPATIENT
Start: 2024-10-31

## 2024-11-11 ENCOUNTER — TELEPHONE (OUTPATIENT)
Dept: NEPHROLOGY | Facility: CLINIC | Age: 49
End: 2024-11-11

## 2024-11-11 NOTE — TELEPHONE ENCOUNTER
I called [facility] Medical Certification Department and spoke to Marcia () and requested a Medical Certification Request form to be faxed over so Dr. RASHI Benedict can fill it out.      I also called and spoke to the patient spouse Adriane Velasco and explained to her that I had just got off the phone with Marcia () and she is faxing the form over and I explained that as soon as Dr. RASHI Benedict signs the form I will be faxing it back to [facility] Medical Certification Department for her. Azizamaximilian MCKINLEY Krista patient spouse understood and was okay with it.

## 2024-11-12 ENCOUNTER — TELEPHONE (OUTPATIENT)
Dept: NEPHROLOGY | Facility: CLINIC | Age: 49
End: 2024-11-12

## 2024-11-12 NOTE — TELEPHONE ENCOUNTER
I called over to [facility] @ [phone] and spoke to Gale to confirm the fax number that is on the [facility] Medical Certification Request of [phone] and according to Gale () that is not the correct fax number to fax the form back. I did explain to Gale () that is the number on the form that says to fax the form back to. Gale () did provide me another fax number to try and that  number is [phone] and that number did not work either.   I did call the patient and explained to the patient that we are having issue with our fax machine and for him to come and  the form and trying faxing it from another location. I also mailed the original out in the mail to [facility] [address] [place] [phone].

## 2024-11-12 NOTE — TELEPHONE ENCOUNTER
I called [facility] @ [phone] and spoke to Jocelyn () and request the Medical Certification Request form to be faxed again because the form that was faxed the first time did not come through all the way. Jocelyn () stated that she will refax the form again. The form was received and Dr. RASHI Benedict did fill it out and form was faxed backed over to [facility].     I also call the patient and left a message on machine stating that [facility] did fax over the form and Dr. RASHI Benedict did fill it out and it was faxed back over to [facility] and the form was also faxed into the patient chart.

## 2024-11-13 NOTE — TELEPHONE ENCOUNTER
I called and spoke to the patient wife Adriane and explained to her that I was able to finally get the Medical Certification Request form to go through the fax the machine twice. The patient wife Adriane stated that she just faxed the form over her self also.

## 2024-12-11 ENCOUNTER — TELEPHONE (OUTPATIENT)
Dept: NEPHROLOGY | Facility: CLINIC | Age: 49
End: 2024-12-11

## 2024-12-11 NOTE — TELEPHONE ENCOUNTER
Called left voice message to remind patient to get labs done 1 week prior to 12/27/24 scheduled follow-up appointment with Shonda Bauer NP. also advised as a reminder labs / testing will need to be done in the appropriate time for your scheduled appointment.

## 2024-12-18 DIAGNOSIS — I10 ESSENTIAL HYPERTENSION: ICD-10-CM

## 2024-12-18 RX ORDER — HYDRALAZINE HYDROCHLORIDE 50 MG/1
50 TABLET, FILM COATED ORAL 2 TIMES DAILY
Qty: 180 TABLET | Refills: 1 | Status: SHIPPED | OUTPATIENT
Start: 2024-12-18

## 2024-12-19 NOTE — PROGRESS NOTES
NEPHROLOGY OFFICE NOTE    Patient: Adam Gonzalez               Sex: male           YOB: 1975        Age:  49 y.o.       12/27/2024    ASSESSMENT/PLAN     Assessment & Plan  Hypertensive kidney disease with stage 5 chronic kidney disease, not on chronic dialysis ([facility])  Lab Results   Component Value Date    EGFR 11 12/24/2024    EGFR 13 08/13/2024    EGFR 13 04/04/2024    CREATININE 5.39 (H) 12/24/2024    CREATININE 4.64 (H) 08/13/2024    CREATININE 4.56 (H) 04/04/2024       Orders:    Basic metabolic panel; Future    Urinalysis with microscopic; Future    Albumin / creatinine urine ratio; Future    Currently maintained on amlodipine 10 mg daily, hydralazine 50 mg TID, and metoprolol 50 mg daily.  Blood pressure was initially elevated on office arrival, patient reports he ate a saltier breakfast and did smoke a cigarette before coming into the appointment today.  Follow-up blood pressure improved.    Smoking cessation encouraged.  Advised to begin monitoring home blood pressures and contact our office if consistently elevated.  Will continue to monitor at this time on current regimen.  Stage 5 chronic kidney disease not on chronic dialysis (HCC)  Lab Results   Component Value Date    EGFR 11 12/24/2024    EGFR 13 08/13/2024    EGFR 13 04/04/2024    CREATININE 5.39 (H) 12/24/2024    CREATININE 4.64 (H) 08/13/2024    CREATININE 4.56 (H) 04/04/2024       Orders:    Basic metabolic panel; Future    CBC and differential; Future    Basic metabolic panel; Future    Urinalysis with microscopic; Future    Albumin / creatinine urine ratio; Future    Phosphorus; Future    Uric acid; Future    PTH, intact; Future    Vitamin D 25 hydroxy; Future    After review of the medical record, CKD slowly progressive with recent creatinine levels fluctuating around 4.0 - 4.4. Etiology of CKD multifactorial and suspected secondary to diabetic nephropathy, hypertensive nephrosclerosis, and nephrotic range proteinuria.      He  attended [facility] class in the past and urgently preferred in home hemodialysis.  He reports to be that in the interim he and his wife had a discussion and he would prefer to go in center for his hemodialysis treatments.      He was referred to VS and underwent placement of left arm AVF on 4/10/2024.  He underwent follow-up dialysis access evaluation on 5/16/2024 which noted the brachiocephalic dialysis AV fistula appeared patent and mature.  No follow-up no documented from Dr. Nielsen.  Good thrill and bruit noted on examination today.     He was referred to Dr. MARIANELA Benedict for renal transplant evaluation through [facility].     In the interim, chronic kidney disease has been progressive with current creatinine level of 5.39 and eGFR of 11. No evidence of hyperkalemia patient is without uremic symptoms today.     Will continue to closely monitor renal function and repeat BMP in 1 month.   Type 2 diabetes mellitus with other circulatory complication, with long-term current use of insulin ([facility])    Lab Results   Component Value Date    HGBA1C 6.8 (A) 09/23/2024       Orders:    Basic metabolic panel; Future    Urinalysis with microscopic; Future    Albumin / creatinine urine ratio; Future    Currently maintained on insulin and Trulicity.  Most recent hemoglobin A1c was 6.8.  Advise importance of good glycemic control in the setting of underlying CKD and proteinuria.  Vitamin D deficiency    Orders:    ergocalciferol (VITAMIN D2) 50,000 units; Take 1 capsule (50,000 Units total) by mouth once a week    Vitamin D 25 hydroxy; Future    Phosphorous 4.0, calcium 6.9, .5, and vitamin D 13. Received ergocalciferol 50,000 units for 12 doses.  Will resume ergocalciferol given persistently low vitamin D levels.  Secondary hyperparathyroidism of renal origin ([facility])    Orders:    PTH, intact; Future    Phosphorous 4.0, calcium 6.9, and vitamin D 13. Maintained on calcitriol maintained at 0.5 mcg 3 times weekly.   Most recent PTH was 646.5, improved from prior reading of 740.6 on 8/13/2024.    Given ongoing low vitamin D will initiate ergocalciferol as above and continue current dose of calcitriol.  Metabolic acidosis    Orders:    Basic metabolic panel; Future    Etiology secondary to progressive CKD. Maintained on sodium bicarbonate 650 mg twice daily. Most recent serum bicarbonate level was 20 and improved.   Nephrotic range proteinuria    Orders:    Urinalysis with microscopic; Future    Albumin / creatinine urine ratio; Future    Prior SPEP showed abnormal distribution in the gamma region, but immunofixation showed no monoclonal spike.  UPEP also showed no M spike. Suspect underlying nephrotic range proteinuria due to diabetic nephropathy on top of hypertension nephrosclerosis.      Given progressive chronic kidney disease would not recommend initiation of ACE/ARB therapy or spironolactone due to risk of further renal impairment and hyperkalemia.  He is also not a candidate for GLT 2 inhibitor in the setting of EGFR less than 20.    BACKGROUND     I had the pleasure of seeing Adam Gonzalez in the nephrology office today for follow-up. He has a past medical history significant for hypertension, T2DM, CVA, hyperlipidemia, tobacco abuse, and chronic kidney disease stage 5.      He is currently following with our office and Dr. RASHI Benedict for management of chronic kidney disease. After review of the medical record, CKD slowly progressive with current creatinine levels fluctuating around 4.0 - 4.4.      He had attended [facility] class in the past and would prefer in home hemodialysis. He was referred to Vascular Surgery and underwent placement of left AVF on 4/10/2024.      He has underlying hypertension and currently maintained on amlodipine, hydralazine, and metoprolol.      He has underlying hypertension and currently maintained on insulin and Trulicity.     The last blood work was done on 12/24/2024, which we have reviewed  together.    SUBJECTIVE     He currently has no major complaints at this time and is feeling well.    REVIEW OF SYSTEMS     Review of Systems   Constitutional:  Negative for activity change and fever.   HENT:  Negative for trouble swallowing.    Respiratory:  Negative for shortness of breath.    Cardiovascular:  Negative for leg swelling.   Gastrointestinal:  Negative for nausea and vomiting.   Genitourinary:  Negative for difficulty urinating, dysuria, frequency and hematuria.   Musculoskeletal:  Negative for back pain.   Skin:  Negative for pallor.   Neurological:  Negative for dizziness, syncope, weakness and light-headedness.   Psychiatric/Behavioral:  Negative for sleep disturbance. The patient is not nervous/anxious.        OBJECTIVE     Current Weight: Weight - Scale: 85.9 kg (189 lb 6.4 oz)  Vitals:    12/27/24 1054   BP: 140/78   Pulse: 82   Resp: 18   Temp: 97.7 °F (36.5 °C)   SpO2: 100%     Body mass index is 27.18 kg/m².    CURRENT MEDICATIONS       Current Outpatient Medications:     amLODIPine (NORVASC) 10 mg tablet, Take 1 tablet (10 mg total) by mouth daily, Disp: 90 tablet, Rfl: 1    atorvastatin (LIPITOR) 80 mg tablet, Take 1 tablet (80 mg total) by mouth daily, Disp: 90 tablet, Rfl: 1    calcitriol (ROCALTROL) 0.5 MCG capsule, Take 1 capsule (0.5 mcg total) by mouth daily Monday, Wednesday, Friday, Disp: 90 capsule, Rfl: 2    clopidogrel (PLAVIX) 75 mg tablet, Take 1 tablet (75 mg total) by mouth daily, Disp: 90 tablet, Rfl: 1    Continuous Glucose Sensor (FreeStyle Slime 2 Sensor) MISC, apply 1 SENSOR to back OF UPPER ARM REMOVE AND REPLACE every 14 days use with DEVICE to MONITOR BLOOD SUGAR, Disp: 2 each, Rfl: 5    dulaglutide (Trulicity) 0.75 MG/0.5ML injection, Inject 0.5 mL (0.75 mg total) under the skin every 7 days, Disp: 2 mL, Rfl: 2    ergocalciferol (VITAMIN D2) 50,000 units, Take 1 capsule (50,000 Units total) by mouth once a week, Disp: 12 capsule, Rfl: 1    hydrALAZINE (APRESOLINE)  50 mg tablet, take 1 tablet by mouth twice a day, Disp: 180 tablet, Rfl: 1    Insulin Glargine Solostar (Basaglar KwikPen) 100 UNIT/ML SOPN, Inject 0.28 mL (28 Units total) under the skin daily, Disp: 15 mL, Rfl: 5    metoprolol succinate (TOPROL-XL) 50 mg 24 hr tablet, Take 1 tablet (50 mg total) by mouth daily, Disp: 90 tablet, Rfl: 1    nicotine (NICODERM CQ) 14 mg/24hr TD 24 hr patch, Place 1 patch on the skin over 24 hours every 24 hours, Disp: 28 patch, Rfl: 0    sodium bicarbonate 650 mg tablet, Take 1 tablet (650 mg total) by mouth 2 (two) times a day, Disp: 180 tablet, Rfl: 2    Symbicort 160-4.5 MCG/ACT inhaler, inhale 2 puffs by mouth and INTO THE LUNGS twice a day Rinse mouth after use, Disp: 30.6 g, Rfl: 1      PAST MEDICAL HISTORY     Past Medical History:   Diagnosis Date    Chronic kidney disease     Diabetes mellitus (HCC)     Hypertension     Received intravenous tissue plasminogen activator (tPA) in emergency department 02/01/2022    Stroke (HCC)     no deficits       PAST SURGICAL HISTORY     Past Surgical History:   Procedure Laterality Date    CARDIAC LOOP RECORDER  2023    PILONIDAL CYST EXCISION      RI ARTERIOVENOUS ANASTOMOSIS OPEN DIRECT Left 4/10/2024    Procedure: CREATION FISTULA BRACHIOCEPHALIC LEFT (AV);  Surgeon: Maxwell Nielsen MD;  Location: [facility];  Service: Vascular       ALLERGIES     No Known Allergies    SOCIAL HISTORY     Social History     Substance and Sexual Activity   Alcohol Use Not Currently    Comment: stopped 8/22     Social History     Substance and Sexual Activity   Drug Use Never     Social History     Tobacco Use   Smoking Status Every Day    Current packs/day: 0.50    Average packs/day: 1 pack/day for 37.5 years (36.5 ttl pk-yrs)    Types: Cigarettes    Start date: 6/16/1987    Passive exposure: Current   Smokeless Tobacco Never   Tobacco Comments    states slowing it down, used Chantix-currently 1/2 pack/day, at least 35 pack years as of 2023        FAMILY HISTORY     Family History   Problem Relation Age of Onset    No Known Problems Mother     No Known Problems Father     Pancreatic cancer Brother        PHYSICAL EXAMINATION     Physical Exam  Vitals reviewed.   Constitutional:       General: He is not in acute distress.  HENT:      Head: Normocephalic.      Mouth/Throat:      Lips: Pink.      Mouth: Mucous membranes are moist.   Eyes:      General: Lids are normal. No scleral icterus.  Cardiovascular:      Rate and Rhythm: Normal rate and regular rhythm.      Heart sounds: S1 normal and S2 normal. No murmur heard.  Pulmonary:      Effort: Pulmonary effort is normal. No accessory muscle usage or respiratory distress.      Breath sounds: Normal breath sounds.   Abdominal:      General: There is no distension.      Tenderness: There is no abdominal tenderness.   Musculoskeletal:      Cervical back: Normal range of motion and neck supple. No tenderness.      Right lower leg: No edema.      Left lower leg: No edema.   Skin:     General: Skin is warm.      Coloration: Skin is not cyanotic or jaundiced.   Neurological:      General: No focal deficit present.      Mental Status: He is alert and oriented to person, place, and time.   Psychiatric:         Attention and Perception: Attention normal.         Speech: Speech normal.         Behavior: Behavior is cooperative.           LAB RESULTS     Results from last 7 days   Lab Units 12/24/24  1040   WBC Thousand/uL 8.70   HEMOGLOBIN g/dL 13.5   HEMATOCRIT % 42.1   PLATELETS Thousands/uL 261   SODIUM mmol/L 138   POTASSIUM mmol/L 4.3   CHLORIDE mmol/L 110*   CO2 mmol/L 20*   BUN mg/dL 47*   CREATININE mg/dL 5.39*   EGFR ml/min/1.73sq m 11   CALCIUM mg/dL 6.9*   PHOSPHORUS mg/dL 4.0         RADIOLOGY RESULTS      Results for orders placed during the hospital encounter of 09/27/21    XR chest 1 view portable    Narrative  CHEST    INDICATION:   stroke alert protocol.  Smoker.    COMPARISON:  4/22/2021    EXAM  PERFORMED/VIEWS:  XR CHEST PORTABLE      FINDINGS:    Cardiomediastinal silhouette appears unremarkable.    The lungs are clear.  No pneumothorax or pleural effusion.    Osseous structures appear within normal limits for patient age.    Impression  No acute cardiopulmonary disease.            Workstation performed: [id number]    Results for orders placed during the hospital encounter of 04/22/21    XR chest pa & lateral    Narrative  CHEST    INDICATION:   R60.0: Localized edema.    COMPARISON:  February 22, 2019    EXAM PERFORMED/VIEWS:  XR CHEST PA & LATERAL  The frontal view was performed utilizing dual energy radiographic technique.  Images: 4    FINDINGS:  Lungs are well-aerated.    Cardiomediastinal silhouette appears unremarkable.    The lungs are clear.  No pneumothorax or pleural effusion.    Osseous structures appear within normal limits for patient age.    Impression  No acute cardiopulmonary disease.            Workstation performed: [id number]    Ultrasound kidney and bladder with PVR 11/29/2023:  FINDINGS:     KIDNEYS:  Symmetric and normal size.  Right kidney:  10.2 x 5.9 x 5.9 cm. Volume 186.8 mL  Left kidney:  10.8 x 7.6 x 5.0 cm.  Volume 214.5 mL     Right kidney  Normal echogenicity and contour.  No mass is identified.  No hydronephrosis.  No shadowing calculi.  No perinephric fluid collections.     Left kidney  Normal echogenicity and contour.  No mass is identified.  No hydronephrosis.  No shadowing calculi.  No perinephric fluid collections.     URETERS:  Nonvisualized.     BLADDER:  Normally distended.  No focal thickening or mass lesions.  Bilateral ureteral jets detected.  Prevoid: 45.8,  No significant post void volume. Measured post void volume in mL: 6.8        IMPRESSION:     No hydronephrosis  Normal size kidneys  No significant irregularity    Recommend Nephrology follow-up in 3-4 months.    YASMIN Gresham  Nephrology  12/27/2024      Portions of the record may have  "been created with voice recognition software. Occasional wrong word or \"sound a like\" substitutions may have occurred due to the inherent limitations of voice recognition software. Read the chart carefully and recognize, using context, where substitutions have occurred.   "

## 2024-12-24 ENCOUNTER — APPOINTMENT (OUTPATIENT)
Dept: LAB | Facility: CLINIC | Age: 49
End: 2024-12-24
Payer: COMMERCIAL

## 2024-12-24 DIAGNOSIS — Z79.4 TYPE 2 DIABETES MELLITUS WITH OTHER CIRCULATORY COMPLICATION, WITH LONG-TERM CURRENT USE OF INSULIN (HCC): ICD-10-CM

## 2024-12-24 DIAGNOSIS — R26.89 IMPAIRED BALANCE AS LATE EFFECT OF CEREBROVASCULAR ACCIDENT (CVA): ICD-10-CM

## 2024-12-24 DIAGNOSIS — I69.398 IMPAIRED BALANCE AS LATE EFFECT OF CEREBROVASCULAR ACCIDENT (CVA): ICD-10-CM

## 2024-12-24 DIAGNOSIS — E11.59 TYPE 2 DIABETES MELLITUS WITH OTHER CIRCULATORY COMPLICATION, WITH LONG-TERM CURRENT USE OF INSULIN (HCC): ICD-10-CM

## 2024-12-27 ENCOUNTER — OFFICE VISIT (OUTPATIENT)
Dept: NEPHROLOGY | Facility: CLINIC | Age: 49
End: 2024-12-27
Payer: COMMERCIAL

## 2024-12-27 VITALS
HEART RATE: 82 BPM | WEIGHT: 189.4 LBS | OXYGEN SATURATION: 100 % | BODY MASS INDEX: 27.11 KG/M2 | SYSTOLIC BLOOD PRESSURE: 140 MMHG | DIASTOLIC BLOOD PRESSURE: 78 MMHG | TEMPERATURE: 97.7 F | RESPIRATION RATE: 18 BRPM | HEIGHT: 70 IN

## 2024-12-27 DIAGNOSIS — E11.59 TYPE 2 DIABETES MELLITUS WITH OTHER CIRCULATORY COMPLICATION, WITH LONG-TERM CURRENT USE OF INSULIN (HCC): ICD-10-CM

## 2024-12-27 DIAGNOSIS — N18.5 HYPERTENSIVE KIDNEY DISEASE WITH STAGE 5 CHRONIC KIDNEY DISEASE, NOT ON CHRONIC DIALYSIS (HCC): Primary | ICD-10-CM

## 2024-12-27 DIAGNOSIS — N25.81 SECONDARY HYPERPARATHYROIDISM OF RENAL ORIGIN (HCC): ICD-10-CM

## 2024-12-27 DIAGNOSIS — E87.20 METABOLIC ACIDOSIS: ICD-10-CM

## 2024-12-27 DIAGNOSIS — N18.5 STAGE 5 CHRONIC KIDNEY DISEASE NOT ON CHRONIC DIALYSIS (HCC): ICD-10-CM

## 2024-12-27 DIAGNOSIS — I12.0 HYPERTENSIVE KIDNEY DISEASE WITH STAGE 5 CHRONIC KIDNEY DISEASE, NOT ON CHRONIC DIALYSIS (HCC): Primary | ICD-10-CM

## 2024-12-27 DIAGNOSIS — R80.9 NEPHROTIC RANGE PROTEINURIA: ICD-10-CM

## 2024-12-27 DIAGNOSIS — Z79.4 TYPE 2 DIABETES MELLITUS WITH OTHER CIRCULATORY COMPLICATION, WITH LONG-TERM CURRENT USE OF INSULIN (HCC): ICD-10-CM

## 2024-12-27 DIAGNOSIS — E55.9 VITAMIN D DEFICIENCY: ICD-10-CM

## 2024-12-27 PROCEDURE — 99214 OFFICE O/P EST MOD 30 MIN: CPT

## 2024-12-27 RX ORDER — ERGOCALCIFEROL 1.25 MG/1
50000 CAPSULE, LIQUID FILLED ORAL WEEKLY
Qty: 12 CAPSULE | Refills: 1 | Status: SHIPPED | OUTPATIENT
Start: 2024-12-27

## 2024-12-27 NOTE — ASSESSMENT & PLAN NOTE
Orders:    ergocalciferol (VITAMIN D2) 50,000 units; Take 1 capsule (50,000 Units total) by mouth once a week    Vitamin D 25 hydroxy; Future    Phosphorous 4.0, calcium 6.9, .5, and vitamin D 13. Received ergocalciferol 50,000 units for 12 doses.  Will resume ergocalciferol given persistently low vitamin D levels.

## 2024-12-27 NOTE — ASSESSMENT & PLAN NOTE
Lab Results   Component Value Date    EGFR 11 12/24/2024    EGFR 13 08/13/2024    EGFR 13 04/04/2024    CREATININE 5.39 (H) 12/24/2024    CREATININE 4.64 (H) 08/13/2024    CREATININE 4.56 (H) 04/04/2024       Orders:    Basic metabolic panel; Future    Urinalysis with microscopic; Future    Albumin / creatinine urine ratio; Future    Currently maintained on amlodipine 10 mg daily, hydralazine 50 mg TID, and metoprolol 50 mg daily.  Blood pressure was initially elevated on office arrival, patient reports he ate a saltier breakfast and did smoke a cigarette before coming into the appointment today.  Follow-up blood pressure improved.    Smoking cessation encouraged.  Advised to begin monitoring home blood pressures and contact our office if consistently elevated.  Will continue to monitor at this time on current regimen.

## 2024-12-27 NOTE — ASSESSMENT & PLAN NOTE
Orders:    PTH, intact; Future    Phosphorous 4.0, calcium 6.9, and vitamin D 13. Maintained on calcitriol maintained at 0.5 mcg 3 times weekly.  Most recent PTH was 646.5, improved from prior reading of 740.6 on 8/13/2024.    Given ongoing low vitamin D will initiate ergocalciferol as above and continue current dose of calcitriol.

## 2024-12-27 NOTE — LETTER
December 27, 2024     Chris Emerson DO  [address]  [address]  [facility] [address]    Patient: Adam Gonzalez   YOB: 1975   Date of Visit: 12/27/2024       Dear Dr. Emerson:    Thank you for referring Adam Gonzalez to me for evaluation. Below are my notes for this consultation.    If you have questions, please do not hesitate to call me. I look forward to following your patient along with you.         Sincerely,        YASMIN Gresham        CC: No Recipients    YASMIN Gresham  12/27/2024 11:29 AM  Sign when Signing Visit  NEPHROLOGY OFFICE NOTE    Patient: Adam Gonzalez               Sex: male           YOB: 1975        Age:  49 y.o.       12/27/2024    ASSESSMENT/PLAN     Assessment & Plan  Hypertensive kidney disease with stage 5 chronic kidney disease, not on chronic dialysis (HCC)  Lab Results   Component Value Date    EGFR 11 12/24/2024    EGFR 13 08/13/2024    EGFR 13 04/04/2024    CREATININE 5.39 (H) 12/24/2024    CREATININE 4.64 (H) 08/13/2024    CREATININE 4.56 (H) 04/04/2024       Orders:  •  Basic metabolic panel; Future  •  Urinalysis with microscopic; Future  •  Albumin / creatinine urine ratio; Future    Currently maintained on amlodipine 10 mg daily, hydralazine 50 mg TID, and metoprolol 50 mg daily.  Blood pressure was initially elevated on office arrival, patient reports he ate a salty of breakfast and did smoke a cigarette before coming into the appointment today.  Follow-up blood pressure improved.    Smoking cessation encouraged.  Advised to begin monitoring home blood pressures and contact our office if consistently elevated.  Will continue to monitor at this time on current regimen.  Stage 5 chronic kidney disease not on chronic dialysis (HCC)  Lab Results   Component Value Date    EGFR 11 12/24/2024    EGFR 13 08/13/2024    EGFR 13 04/04/2024    CREATININE 5.39 (H) 12/24/2024    CREATININE 4.64 (H) 08/13/2024    CREATININE 4.56 (H)  04/04/2024       Orders:  •  Basic metabolic panel; Future  •  CBC and differential; Future  •  Basic metabolic panel; Future  •  Urinalysis with microscopic; Future  •  Albumin / creatinine urine ratio; Future  •  Phosphorus; Future  •  Uric acid; Future  •  PTH, intact; Future  •  Vitamin D 25 hydroxy; Future    After review of the medical record, CKD slowly progressive with recent creatinine levels fluctuating around 4.0 - 4.4. Etiology of CKD multifactorial and suspected secondary to diabetic nephropathy, hypertensive nephrosclerosis, and nephrotic range proteinuria.      He attended [facility] class in the past and urgently preferred in home hemodialysis.  He reports to be that in the interim he and his wife had a discussion and he would prefer to go in center for his hemodialysis treatments.      He was referred to VS and underwent placement of left arm AVF on 4/10/2024.  He underwent follow-up dialysis access evaluation on 5/16/2024 which noted the brachiocephalic dialysis AV fistula appeared patent and mature.  No follow-up no documented from Dr. Nielsen.      He was referred to Dr. MARIANELA Benedict for renal transplant evaluation through [facility].     In the interim, chronic kidney disease has been progressive with current creatinine level of 5.39 and eGFR of 11. No evidence of hyperkalemia patient is without uremic symptoms today.     Will continue to closely monitor renal function and repeat BMP in 1 month.   Type 2 diabetes mellitus with other circulatory complication, with long-term current use of insulin ([facility])    Lab Results   Component Value Date    HGBA1C 6.8 (A) 09/23/2024       Orders:  •  Basic metabolic panel; Future  •  Urinalysis with microscopic; Future  •  Albumin / creatinine urine ratio; Future    Currently maintained on insulin and Trulicity.  Most recent hemoglobin A1c was 6.8.  Advise importance of good glycemic control in the setting of underlying CKD and proteinuria.  Vitamin D  deficiency    Orders:  •  ergocalciferol (VITAMIN D2) 50,000 units; Take 1 capsule (50,000 Units total) by mouth once a week  •  Vitamin D 25 hydroxy; Future    Phosphorous 4.0, calcium 6.9, .5, and vitamin D 13. Received ergocalciferol 50,000 units for 12 doses.  Will resume ergocalciferol given persistently low vitamin D levels.  Secondary hyperparathyroidism of renal origin (HCC)    Orders:  •  PTH, intact; Future    Phosphorous 4.0, calcium 6.9, and vitamin D 13. Maintained on calcitriol maintained at 0.5 mcg 3 times weekly.  Most recent PTH was 646.5, improved from prior reading of 740.6 on 8/13/2024.    Given ongoing low vitamin D will initiate ergocalciferol as above and continue current dose of calcitriol.  Metabolic acidosis    Orders:  •  Basic metabolic panel; Future    Etiology secondary to progressive CKD. Maintained on sodium bicarbonate 650 mg twice daily. Most recent serum bicarbonate level was 20 and improved.   Nephrotic range proteinuria    Orders:  •  Urinalysis with microscopic; Future  •  Albumin / creatinine urine ratio; Future    Prior SPEP showed abnormal distribution in the gamma region, but immunofixation showed no monoclonal spike.  UPEP also showed no M spike. Suspect underlying nephrotic range proteinuria due to diabetic nephropathy on top of hypertension nephrosclerosis.      Given progressive chronic kidney disease would not recommend initiation of ACE/ARB therapy or spironolactone due to risk of further renal impairment and hyperkalemia.  He is also not a candidate for GLT 2 inhibitor in the setting of EGFR less than 20.    BACKGROUND     I had the pleasure of seeing Adam Gonzalez in the nephrology office today for follow-up. He has a past medical history significant for hypertension, T2DM, CVA, hyperlipidemia, tobacco abuse, and chronic kidney disease stage 5.      He is currently following with our office and Dr. RASHI Benedict for management of chronic kidney disease. After review  of the medical record, CKD slowly progressive with current creatinine levels fluctuating around 4.0 - 4.4.      He had attended [facility] class in the past and would prefer in home hemodialysis. He was referred to Vascular Surgery and underwent placement of left AVF on 4/10/2024.      He has underlying hypertension and currently maintained on amlodipine, hydralazine, and metoprolol.      He has underlying hypertension and currently maintained on insulin and Trulicity.     The last blood work was done on 12/24/2024, which we have reviewed together.    SUBJECTIVE     He currently has no major complaints at this time and is feeling well.    REVIEW OF SYSTEMS     Review of Systems   Constitutional:  Negative for activity change and fever.   HENT:  Negative for trouble swallowing.    Respiratory:  Negative for shortness of breath.    Cardiovascular:  Negative for leg swelling.   Gastrointestinal:  Negative for nausea and vomiting.   Genitourinary:  Negative for difficulty urinating, dysuria, frequency and hematuria.   Musculoskeletal:  Negative for back pain.   Skin:  Negative for pallor.   Neurological:  Negative for dizziness, syncope, weakness and light-headedness.   Psychiatric/Behavioral:  Negative for sleep disturbance. The patient is not nervous/anxious.        OBJECTIVE     Current Weight: Weight - Scale: 85.9 kg (189 lb 6.4 oz)  Vitals:    12/27/24 1054   BP: 140/78   Pulse: 82   Resp: 18   Temp: 97.7 °F (36.5 °C)   SpO2: 100%     Body mass index is 27.18 kg/m².    CURRENT MEDICATIONS       Current Outpatient Medications:   •  amLODIPine (NORVASC) 10 mg tablet, Take 1 tablet (10 mg total) by mouth daily, Disp: 90 tablet, Rfl: 1  •  atorvastatin (LIPITOR) 80 mg tablet, Take 1 tablet (80 mg total) by mouth daily, Disp: 90 tablet, Rfl: 1  •  calcitriol (ROCALTROL) 0.5 MCG capsule, Take 1 capsule (0.5 mcg total) by mouth daily Monday, Wednesday, Friday, Disp: 90 capsule, Rfl: 2  •  clopidogrel (PLAVIX) 75 mg  tablet, Take 1 tablet (75 mg total) by mouth daily, Disp: 90 tablet, Rfl: 1  •  Continuous Glucose Sensor (FreeStyle Slime 2 Sensor) MISC, apply 1 SENSOR to back OF UPPER ARM REMOVE AND REPLACE every 14 days use with DEVICE to MONITOR BLOOD SUGAR, Disp: 2 each, Rfl: 5  •  dulaglutide (Trulicity) 0.75 MG/0.5ML injection, Inject 0.5 mL (0.75 mg total) under the skin every 7 days, Disp: 2 mL, Rfl: 2  •  ergocalciferol (VITAMIN D2) 50,000 units, Take 1 capsule (50,000 Units total) by mouth once a week, Disp: 12 capsule, Rfl: 1  •  hydrALAZINE (APRESOLINE) 50 mg tablet, take 1 tablet by mouth twice a day, Disp: 180 tablet, Rfl: 1  •  Insulin Glargine Solostar (Basaglar KwikPen) 100 UNIT/ML SOPN, Inject 0.28 mL (28 Units total) under the skin daily, Disp: 15 mL, Rfl: 5  •  metoprolol succinate (TOPROL-XL) 50 mg 24 hr tablet, Take 1 tablet (50 mg total) by mouth daily, Disp: 90 tablet, Rfl: 1  •  nicotine (NICODERM CQ) 14 mg/24hr TD 24 hr patch, Place 1 patch on the skin over 24 hours every 24 hours, Disp: 28 patch, Rfl: 0  •  sodium bicarbonate 650 mg tablet, Take 1 tablet (650 mg total) by mouth 2 (two) times a day, Disp: 180 tablet, Rfl: 2  •  Symbicort 160-4.5 MCG/ACT inhaler, inhale 2 puffs by mouth and INTO THE LUNGS twice a day Rinse mouth after use, Disp: 30.6 g, Rfl: 1      PAST MEDICAL HISTORY     Past Medical History:   Diagnosis Date   • Chronic kidney disease    • Diabetes mellitus (HCC)    • Hypertension    • Received intravenous tissue plasminogen activator (tPA) in emergency department 02/01/2022   • Stroke (HCC)     no deficits       PAST SURGICAL HISTORY     Past Surgical History:   Procedure Laterality Date   • CARDIAC LOOP RECORDER  2023   • PILONIDAL CYST EXCISION     • DC ARTERIOVENOUS ANASTOMOSIS OPEN DIRECT Left 4/10/2024    Procedure: CREATION FISTULA BRACHIOCEPHALIC LEFT (AV);  Surgeon: Maxwell Nielsen MD;  Location: [facility];  Service: Vascular       ALLERGIES     No Known  Allergies    SOCIAL HISTORY     Social History     Substance and Sexual Activity   Alcohol Use Not Currently    Comment: stopped 8/22     Social History     Substance and Sexual Activity   Drug Use Never     Social History     Tobacco Use   Smoking Status Every Day   • Current packs/day: 0.50   • Average packs/day: 1 pack/day for 37.5 years (36.5 ttl pk-yrs)   • Types: Cigarettes   • Start date: 6/16/1987   • Passive exposure: Current   Smokeless Tobacco Never   Tobacco Comments    states slowing it down, used Chantix-currently 1/2 pack/day, at least 35 pack years as of 2023       FAMILY HISTORY     Family History   Problem Relation Age of Onset   • No Known Problems Mother    • No Known Problems Father    • Pancreatic cancer Brother        PHYSICAL EXAMINATION     Physical Exam  Vitals reviewed.   Constitutional:       General: He is not in acute distress.  HENT:      Head: Normocephalic.      Mouth/Throat:      Lips: Pink.      Mouth: Mucous membranes are moist.   Eyes:      General: Lids are normal. No scleral icterus.  Cardiovascular:      Rate and Rhythm: Normal rate and regular rhythm.      Heart sounds: S1 normal and S2 normal. No murmur heard.  Pulmonary:      Effort: Pulmonary effort is normal. No accessory muscle usage or respiratory distress.      Breath sounds: Normal breath sounds.   Abdominal:      General: There is no distension.      Tenderness: There is no abdominal tenderness.   Musculoskeletal:      Cervical back: Normal range of motion and neck supple. No tenderness.      Right lower leg: No edema.      Left lower leg: No edema.   Skin:     General: Skin is warm.      Coloration: Skin is not cyanotic or jaundiced.   Neurological:      General: No focal deficit present.      Mental Status: He is alert and oriented to person, place, and time.   Psychiatric:         Attention and Perception: Attention normal.         Speech: Speech normal.         Behavior: Behavior is cooperative.           LAB  RESULTS     Results from last 7 days   Lab Units 12/24/24  1040   WBC Thousand/uL 8.70   HEMOGLOBIN g/dL 13.5   HEMATOCRIT % 42.1   PLATELETS Thousands/uL 261   SODIUM mmol/L 138   POTASSIUM mmol/L 4.3   CHLORIDE mmol/L 110*   CO2 mmol/L 20*   BUN mg/dL 47*   CREATININE mg/dL 5.39*   EGFR ml/min/1.73sq m 11   CALCIUM mg/dL 6.9*   PHOSPHORUS mg/dL 4.0         RADIOLOGY RESULTS      Results for orders placed during the hospital encounter of 09/27/21    XR chest 1 view portable    Narrative  CHEST    INDICATION:   stroke alert protocol.  Smoker.    COMPARISON:  4/22/2021    EXAM PERFORMED/VIEWS:  XR CHEST PORTABLE      FINDINGS:    Cardiomediastinal silhouette appears unremarkable.    The lungs are clear.  No pneumothorax or pleural effusion.    Osseous structures appear within normal limits for patient age.    Impression  No acute cardiopulmonary disease.            Workstation performed: [id number]    Results for orders placed during the hospital encounter of 04/22/21    XR chest pa & lateral    Narrative  CHEST    INDICATION:   R60.0: Localized edema.    COMPARISON:  February 22, 2019    EXAM PERFORMED/VIEWS:  XR CHEST PA & LATERAL  The frontal view was performed utilizing dual energy radiographic technique.  Images: 4    FINDINGS:  Lungs are well-aerated.    Cardiomediastinal silhouette appears unremarkable.    The lungs are clear.  No pneumothorax or pleural effusion.    Osseous structures appear within normal limits for patient age.    Impression  No acute cardiopulmonary disease.            Workstation performed: [id number]    Ultrasound kidney and bladder with PVR 11/29/2023:  FINDINGS:     KIDNEYS:  Symmetric and normal size.  Right kidney:  10.2 x 5.9 x 5.9 cm. Volume 186.8 mL  Left kidney:  10.8 x 7.6 x 5.0 cm.  Volume 214.5 mL     Right kidney  Normal echogenicity and contour.  No mass is identified.  No hydronephrosis.  No shadowing calculi.  No perinephric fluid collections.     Left kidney  Normal  "echogenicity and contour.  No mass is identified.  No hydronephrosis.  No shadowing calculi.  No perinephric fluid collections.     URETERS:  Nonvisualized.     BLADDER:  Normally distended.  No focal thickening or mass lesions.  Bilateral ureteral jets detected.  Prevoid: 45.8,  No significant post void volume. Measured post void volume in mL: 6.8        IMPRESSION:     No hydronephrosis  Normal size kidneys  No significant irregularity    Recommend Nephrology follow-up in 3-4 months.    YASMIN Gresham  Nephrology  12/27/2024      Portions of the record may have been created with voice recognition software. Occasional wrong word or \"sound a like\" substitutions may have occurred due to the inherent limitations of voice recognition software. Read the chart carefully and recognize, using context, where substitutions have occurred.   "

## 2024-12-27 NOTE — ASSESSMENT & PLAN NOTE
Orders:    Urinalysis with microscopic; Future    Albumin / creatinine urine ratio; Future    Prior SPEP showed abnormal distribution in the gamma region, but immunofixation showed no monoclonal spike.  UPEP also showed no M spike. Suspect underlying nephrotic range proteinuria due to diabetic nephropathy on top of hypertension nephrosclerosis.      Given progressive chronic kidney disease would not recommend initiation of ACE/ARB therapy or spironolactone due to risk of further renal impairment and hyperkalemia.  He is also not a candidate for GLT 2 inhibitor in the setting of EGFR less than 20.

## 2024-12-27 NOTE — ASSESSMENT & PLAN NOTE
Lab Results   Component Value Date    HGBA1C 6.8 (A) 09/23/2024       Orders:    Basic metabolic panel; Future    Urinalysis with microscopic; Future    Albumin / creatinine urine ratio; Future    Currently maintained on insulin and Trulicity.  Most recent hemoglobin A1c was 6.8.  Advise importance of good glycemic control in the setting of underlying CKD and proteinuria.

## 2024-12-27 NOTE — PATIENT INSTRUCTIONS
"Continue to drink plenty of water to stay hydrated  Avoid NSAIDs (example-ibuprofen, Motrin, Aleve, naproxen, Advil)  Please contact our office if you develop any of the following symptoms - worsening fatigue, decreased appetite, nausea, vomiting, hiccups, tremors, metallic taste in the mouth.  These are signs that renal function is declining.  Continue close follow-up with Dr. GALILEO Benedict for transplant evaluation.    Patient Education     Low-potassium diet   The Basics   Written by the doctors and editors at [facility]   What is potassium? -- Potassium is a mineral found in most foods. The body needs potassium to work normally. It keeps the heart beating and helps the nerves and muscles work. But people need only a certain amount of potassium. Too much or too little potassium in the body can cause problems.  Having too much potassium in the blood is called \"hyperkalemia.\" This can cause heart rhythm problems and muscle weakness.  Who might need to be on a low-potassium diet? -- People usually need to be on a low-potassium diet to treat or prevent hyperkalemia.  The most common causes of hyperkalemia are:   Certain medicines - Some medicines, including certain ones for high blood pressure and heart problems, might raise the level of potassium in the body.   Kidney disease - Normally, the kidneys filter blood and remove excess salt and water through urination (figure 1). They keep the level of potassium in the blood normal. When the kidneys don't work well or stop working, they can't get rid of the potassium in the urine. Then, too much potassium builds up in the blood.  Many people who get a treatment called \"dialysis\" for kidney disease need to be on a low-potassium diet. Dialysis is a treatment that takes over the job of the kidneys.  What does eating a low-potassium diet involve? -- Almost all foods have potassium. So the key is to:   Choose foods with low levels of potassium (table 1).   Avoid or eat only small " "amounts of foods with high levels of potassium (table 2).  Your doctor will probably recommend that you work with a dietitian (food expert) to help plan your meals. They will tell you how much potassium you should eat each day.  To figure out how much potassium you are eating, look at the food's nutrition label (figure 2). You need to look at the:   \"Potassium\" number - This tells you how much potassium is in 1 serving of the food. If you eat 1 serving, then you are eating this amount of potassium.   \"Serving size\" - This tells you how big a serving is. If you eat 2 servings, then you are eating 2 times the amount of potassium listed.  What are other ways to cut down on potassium? -- Here are some other ways to cut down on potassium:   Drain the liquid from canned fruits, vegetables, or meats before eating.   If you eat foods that have a lot of potassium, eat only small portions.   Reduce the amount of potassium in the vegetables you eat. You can do this for both frozen and raw vegetables. (If the vegetables are raw, peel and cut them up first.) To reduce the amount of potassium, soak the vegetables in warm unsalted water for at least 2 hours. Then, drain the water and rinse the vegetables in warm water. If you cook the vegetables, cook them in unsalted water.  All topics are updated as new evidence becomes available and our peer review process is complete.  This topic retrieved from [employer] on: Feb 26, 2024.  Topic 56378 Version 8.0  Release: 32.2.4 - C32.56  © 2024 UpToDate, Inc. and/or its affiliates. All rights reserved.  figure 1: Anatomy of the urinary tract     Urine is made by the kidneys. It passes from the kidneys into the bladder through 2 tubes called the ureters. Then, it leaves the bladder through another tube called the urethra.  Graphic 36275 Version 8.0  table 1: Some foods that are low in potassium  Fruits  Vegetables  Proteins    Apple juice  Apples and " applesauce  Blackberries  Blueberries  Cherries  Cranberries  Grapefruit  Grapes and grape juice  Peaches  Pears  Pineapple  Plums  Raspberries  Strawberries  Watermelon Alfalfa sprouts  Asparagus  Cabbage (cooked)  Carrots (cooked)  Cauliflower  Celery  Corn  Cucumber  Eggplant  Green beans  Green peas  Green peppers  Kale  Lettuce  [place]ra  Onions  Radish  Rhubarb  Spinach  Water chestnuts  Wax beans  Yellow squash  Zucchini Almonds  Cashews  Chicken  Eggs  Flax seed  Peanuts  Pumpkin seeds  Shrimp  [employer] seeds  Tuna  Turkey  Walnuts   Graphic 04480 Version 3.0  table 2: Some foods that are high in potassium  Fruits  Vegetables  Proteins  Other    Avocado  Bananas  Coconut  Cantaloupe and honeydew melons  Dates  Dried fruits  Figs  Kiwi  Abbe  Nectarines  Oranges and orange juice  Prunes and prune juice  Raisins Artichokes  Baked beans  Beets  Broccoli  [place] sprouts  Cabbage (raw)  Carrots (raw)  Chard  Olives  Potatoes (white and sweet)  Pickles  Pumpkin  [place]  Squash (acorn, butternut, be)  Tomatoes and tomato juice Black beans  Clams  Ground beef  Kidney beans  Lobster  Navy beans  [place] beans  [place]  Sardines  Scallops  Steak  [place] Chocolate  Dairy products  Granola  Milk  Peanut butter  Soups that are salt-free or low-sodium  Soy milk  Sports drinks  Tomato sauce  Wheat bran and bran products  Whole-grain bread  Yogurt   The foods on this list are high in potassium. People who need to follow a low-potassium diet should avoid or limit these foods.  Graphic 18047 Version 3.0  figure 2: Potassium on food labels     To figure out how much potassium you are eating, check the label to find out how much potassium is in 1 serving. If you are having more than 1 serving, multiply the amount of potassium by the number of servings you plan to eat. For instance, this whole can of soup has 2 servings. If you are going to eat the whole can, you should multiply 450 by 2. That means you will be having 900  milligrams (mg) of potassium.  Graphic 47381 Version 5.0  Consumer Information Use and Disclaimer   Disclaimer: This generalized information is a limited summary of diagnosis, treatment, and/or medication information. It is not meant to be comprehensive and should be used as a tool to help the user understand and/or assess potential diagnostic and treatment options. It does NOT include all information about conditions, treatments, medications, side effects, or risks that may apply to a specific patient. It is not intended to be medical advice or a substitute for the medical advice, diagnosis, or treatment of a health care provider based on the health care provider's examination and assessment of a patient's specific and unique circumstances. Patients must speak with a health care provider for complete information about their health, medical questions, and treatment options, including any risks or benefits regarding use of medications. This information does not endorse any treatments or medications as safe, effective, or approved for treating a specific patient. UpToDate, Inc. and its affiliates disclaim any warranty or liability relating to this information or the use thereof.The use of this information is governed by the Terms of Use, available at https://www.woltersDesmosuwer.com/en/know/clinical-effectiveness-terms. 2024© UpToDate, Inc. and its affiliates and/or licensors. All rights reserved.  Copyright   © 2024 UpToDate, Inc. and/or its affiliates. All rights reserved.

## 2024-12-27 NOTE — ASSESSMENT & PLAN NOTE
Orders:    Basic metabolic panel; Future    Etiology secondary to progressive CKD. Maintained on sodium bicarbonate 650 mg twice daily. Most recent serum bicarbonate level was 20 and improved.

## 2025-01-23 ENCOUNTER — TELEPHONE (OUTPATIENT)
Dept: NEPHROLOGY | Facility: CLINIC | Age: 50
End: 2025-01-23

## 2025-01-23 NOTE — TELEPHONE ENCOUNTER
Received a call from the pt asking if we received any paperwork that was faxed on 1-17-25 from [employer] regarding a medical certificate. There was nothing scanned under the media tab. I did ask what number it was faxed to and his significant other couldn't remember. I did a warm transfer to the office and spoke with Laurita who did check [employer] and there was nothing. The pt was given the fax number from the office of [phone] to use and they will get the form re-faxed. It will be under Adriane Valerio name with his name on the bill/paperwork as well. When received if it can be filled out for the pt.

## 2025-01-23 NOTE — TELEPHONE ENCOUNTER
Received a call from the Pod asking if we received any paperwork that was faxed on 1-17-25 from [employer] regarding a medical certificate. I checked [employer] to see if the [employer] Certificate was there and I did not see any information as of yet.   I gave the POD the office fax number [phone]   Paper work  will be under Adriane Valerio name with his name on the paperwork as well.

## 2025-01-23 NOTE — TELEPHONE ENCOUNTER
Called spoke with patient's spouse adriane. advised per Shonda Bauer NP. forms will need to be filled out by patient's PCP. Adriane verbalized understanding and will be calling PCP Dr. Chris Emerson, office phone # [phone] has been provided to patient's spouse adriane.

## 2025-02-03 ENCOUNTER — OFFICE VISIT (OUTPATIENT)
Dept: NEPHROLOGY | Facility: CLINIC | Age: 50
End: 2025-02-03
Payer: COMMERCIAL

## 2025-02-03 ENCOUNTER — OFFICE VISIT (OUTPATIENT)
Dept: FAMILY MEDICINE CLINIC | Facility: CLINIC | Age: 50
End: 2025-02-03
Payer: COMMERCIAL

## 2025-02-03 VITALS
SYSTOLIC BLOOD PRESSURE: 139 MMHG | HEIGHT: 70 IN | WEIGHT: 191.2 LBS | RESPIRATION RATE: 12 BRPM | BODY MASS INDEX: 27.37 KG/M2 | DIASTOLIC BLOOD PRESSURE: 80 MMHG | OXYGEN SATURATION: 98 % | HEART RATE: 84 BPM

## 2025-02-03 VITALS
BODY MASS INDEX: 27.35 KG/M2 | WEIGHT: 191 LBS | HEART RATE: 88 BPM | SYSTOLIC BLOOD PRESSURE: 152 MMHG | OXYGEN SATURATION: 98 % | DIASTOLIC BLOOD PRESSURE: 80 MMHG | HEIGHT: 70 IN

## 2025-02-03 DIAGNOSIS — Z01.818 PRE-TRANSPLANT EVALUATION FOR CKD (CHRONIC KIDNEY DISEASE): Primary | ICD-10-CM

## 2025-02-03 DIAGNOSIS — Z79.4 TYPE 2 DIABETES MELLITUS WITH OTHER CIRCULATORY COMPLICATION, WITH LONG-TERM CURRENT USE OF INSULIN (HCC): Primary | ICD-10-CM

## 2025-02-03 DIAGNOSIS — I63.411 CEREBROVASCULAR ACCIDENT (CVA) DUE TO EMBOLISM OF RIGHT MIDDLE CEREBRAL ARTERY (HCC): ICD-10-CM

## 2025-02-03 DIAGNOSIS — I10 ESSENTIAL HYPERTENSION: ICD-10-CM

## 2025-02-03 DIAGNOSIS — Z12.11 COLON CANCER SCREENING: ICD-10-CM

## 2025-02-03 DIAGNOSIS — Z86.73 HISTORY OF CVA (CEREBROVASCULAR ACCIDENT): ICD-10-CM

## 2025-02-03 DIAGNOSIS — R09.89 BRUIT OF LEFT CAROTID ARTERY: ICD-10-CM

## 2025-02-03 DIAGNOSIS — Z86.73 HISTORY OF EMBOLIC STROKE: ICD-10-CM

## 2025-02-03 DIAGNOSIS — E78.00 HYPERCHOLESTEROLEMIA: ICD-10-CM

## 2025-02-03 DIAGNOSIS — E11.59 TYPE 2 DIABETES MELLITUS WITH OTHER CIRCULATORY COMPLICATION, WITH LONG-TERM CURRENT USE OF INSULIN (HCC): Primary | ICD-10-CM

## 2025-02-03 DIAGNOSIS — E11.65 UNCONTROLLED TYPE 2 DIABETES MELLITUS WITH HYPERGLYCEMIA (HCC): ICD-10-CM

## 2025-02-03 DIAGNOSIS — N18.4 STAGE 4 CHRONIC KIDNEY DISEASE (HCC): ICD-10-CM

## 2025-02-03 LAB
LEFT EYE DIABETIC RETINOPATHY: ABNORMAL
LEFT EYE IMAGE QUALITY: ABNORMAL
LEFT EYE MACULAR EDEMA: ABNORMAL
LEFT EYE OTHER RETINOPATHY: ABNORMAL
RIGHT EYE DIABETIC RETINOPATHY: ABNORMAL
RIGHT EYE IMAGE QUALITY: ABNORMAL
RIGHT EYE MACULAR EDEMA: ABNORMAL
RIGHT EYE OTHER RETINOPATHY: ABNORMAL
SEVERITY (EYE EXAM): ABNORMAL
SL AMB POCT HEMOGLOBIN AIC: 7.2 (ref ?–6.5)

## 2025-02-03 PROCEDURE — 99214 OFFICE O/P EST MOD 30 MIN: CPT | Performed by: INTERNAL MEDICINE

## 2025-02-03 PROCEDURE — 83036 HEMOGLOBIN GLYCOSYLATED A1C: CPT | Performed by: INTERNAL MEDICINE

## 2025-02-03 RX ORDER — HYDRALAZINE HYDROCHLORIDE 50 MG/1
50 TABLET, FILM COATED ORAL 2 TIMES DAILY
Qty: 180 TABLET | Refills: 1 | Status: SHIPPED | OUTPATIENT
Start: 2025-02-03

## 2025-02-03 RX ORDER — METOPROLOL SUCCINATE 50 MG/1
50 TABLET, EXTENDED RELEASE ORAL DAILY
Qty: 90 TABLET | Refills: 1 | Status: SHIPPED | OUTPATIENT
Start: 2025-02-03

## 2025-02-03 RX ORDER — INSULIN GLARGINE 100 [IU]/ML
28 INJECTION, SOLUTION SUBCUTANEOUS DAILY
Qty: 15 ML | Refills: 5 | Status: SHIPPED | OUTPATIENT
Start: 2025-02-03

## 2025-02-03 RX ORDER — CLOPIDOGREL BISULFATE 75 MG/1
75 TABLET ORAL DAILY
Qty: 90 TABLET | Refills: 1 | Status: SHIPPED | OUTPATIENT
Start: 2025-02-03

## 2025-02-03 RX ORDER — ATORVASTATIN CALCIUM 80 MG/1
80 TABLET, FILM COATED ORAL DAILY
Qty: 90 TABLET | Refills: 1 | Status: SHIPPED | OUTPATIENT
Start: 2025-02-03

## 2025-02-03 RX ORDER — AMLODIPINE BESYLATE 10 MG/1
10 TABLET ORAL DAILY
Qty: 90 TABLET | Refills: 1 | Status: SHIPPED | OUTPATIENT
Start: 2025-02-03

## 2025-02-03 NOTE — PROGRESS NOTES
Diabetic Foot Exam    Patient's shoes and socks removed.    Right Foot/Ankle   Right Foot Inspection  Skin Exam: skin normal and skin intact. No dry skin, no warmth, no callus, no erythema, no maceration, no abnormal color, no pre-ulcer, no ulcer and no callus.     Toe Exam: ROM and strength within normal limits.     Sensory   Monofilament testing: diminished    Vascular  Capillary refills: < 3 seconds  The right DP pulse is 2+. The right PT pulse is 2+.     Left Foot/Ankle  Left Foot Inspection  Skin Exam: skin normal and skin intact. No dry skin, no warmth, no erythema, no maceration, normal color, no pre-ulcer, no ulcer and no callus.     Toe Exam: ROM and strength within normal limits.     Sensory   Monofilament testing: diminished    Vascular  Capillary refills: < 3 seconds  The left DP pulse is 2+. The left PT pulse is 2+.     Assign Risk Category  No deformity present  No loss of protective sensation  No weak pulses  Risk: 0

## 2025-02-03 NOTE — ASSESSMENT & PLAN NOTE
Continue current dose of atorvastatin.    Orders:    atorvastatin (LIPITOR) 80 mg tablet; Take 1 tablet (80 mg total) by mouth daily

## 2025-02-03 NOTE — ASSESSMENT & PLAN NOTE
Patient was not very compliant with his medications, discussed importance to use his medications as prescribed.  Hemoglobin A1c went up slightly but still acceptable.  Recheck in 3 months.  Lab Results   Component Value Date    HGBA1C 7.2 (A) 02/03/2025       Orders:    IRIS Diabetic eye exam    POCT hemoglobin A1c    Insulin Glargine Solostar (Basaglar Viry) 100 UNIT/ML SOPN; Inject 0.28 mL (28 Units total) under the skin daily

## 2025-02-03 NOTE — PATIENT INSTRUCTIONS
Please bring all your medications with you next time.    Please take hydralazine twice a day. Take amlodipine at night and metoprolol in the morning.    Please take your insulin daily at night. Trulicity once a week anytime during the day.    Please take Plavix every day it is very important that the blood thinner.    Please take atorvastatin daily at night for your cholesterol.

## 2025-02-03 NOTE — ASSESSMENT & PLAN NOTE
Blood pressure is overall slightly on the higher side, he was taking his hydralazine only once a day, he will restart to take it twice a day.  Continue metoprolol and amlodipine.    Orders:    amLODIPine (NORVASC) 10 mg tablet; Take 1 tablet (10 mg total) by mouth daily    hydrALAZINE (APRESOLINE) 50 mg tablet; Take 1 tablet (50 mg total) by mouth 2 (two) times a day    metoprolol succinate (TOPROL-XL) 50 mg 24 hr tablet; Take 1 tablet (50 mg total) by mouth daily

## 2025-02-03 NOTE — PROGRESS NOTES
Name: Adam Gonzalez      : 1975      MRN: [id number]  Encounter Provider: Evans Birmingham MD  Encounter Date: 2/3/2025   Encounter department: [facility]    Assessment & Plan  Type 2 diabetes mellitus with other circulatory complication, with long-term current use of insulin (HCC)  Patient was not very compliant with his medications, discussed importance to use his medications as prescribed.  Hemoglobin A1c went up slightly but still acceptable.  Recheck in 3 months.  Lab Results   Component Value Date    HGBA1C 7.2 (A) 2025       Orders:    IRIS Diabetic eye exam    POCT hemoglobin A1c    Insulin Glargine Solostar (Basaglar KwikPen) 100 UNIT/ML SOPN; Inject 0.28 mL (28 Units total) under the skin daily    Colon cancer screening    Orders:    Cologuard    Uncontrolled type 2 diabetes mellitus with hyperglycemia (HCC)    Lab Results   Component Value Date    HGBA1C 7.2 (A) 2025       Orders:    Insulin Glargine Solostar (Basaglar KwikPen) 100 UNIT/ML SOPN; Inject 0.28 mL (28 Units total) under the skin daily    Dulaglutide 0.75 MG/0.5ML SOAJ; Inject 0.75 mg under the skin once a week    atorvastatin (LIPITOR) 80 mg tablet; Take 1 tablet (80 mg total) by mouth daily    Essential hypertension  Blood pressure is overall slightly on the higher side, he was taking his hydralazine only once a day, he will restart to take it twice a day.  Continue metoprolol and amlodipine.    Orders:    amLODIPine (NORVASC) 10 mg tablet; Take 1 tablet (10 mg total) by mouth daily    hydrALAZINE (APRESOLINE) 50 mg tablet; Take 1 tablet (50 mg total) by mouth 2 (two) times a day    metoprolol succinate (TOPROL-XL) 50 mg 24 hr tablet; Take 1 tablet (50 mg total) by mouth daily    Bilateral Cerebrovascular accident (CVA) status post tPA    Orders:    clopidogrel (PLAVIX) 75 mg tablet; Take 1 tablet (75 mg total) by mouth daily    History of embolic stroke         History of CVA  (cerebrovascular accident)    Orders:    clopidogrel (PLAVIX) 75 mg tablet; Take 1 tablet (75 mg total) by mouth daily    atorvastatin (LIPITOR) 80 mg tablet; Take 1 tablet (80 mg total) by mouth daily    History of bilateral Cerebrovascular accident (CVA) status post tPA    Orders:    clopidogrel (PLAVIX) 75 mg tablet; Take 1 tablet (75 mg total) by mouth daily    Hypercholesterolemia  Continue current dose of atorvastatin.    Orders:    atorvastatin (LIPITOR) 80 mg tablet; Take 1 tablet (80 mg total) by mouth daily    Stage 4 chronic kidney disease (HCC)  Lab Results   Component Value Date    EGFR 11 12/24/2024    EGFR 13 08/13/2024    EGFR 13 04/04/2024    CREATININE 5.39 (H) 12/24/2024    CREATININE 4.64 (H) 08/13/2024    CREATININE 4.56 (H) 04/04/2024       Orders:    amLODIPine (NORVASC) 10 mg tablet; Take 1 tablet (10 mg total) by mouth daily    metoprolol succinate (TOPROL-XL) 50 mg 24 hr tablet; Take 1 tablet (50 mg total) by mouth daily         History of Present Illness     Patient came today for follow-up on his chronic medical problems.      Review of Systems   Constitutional:  Negative for chills, fatigue and fever.   Respiratory:  Positive for cough. Negative for chest tightness and shortness of breath.    Cardiovascular:  Negative for chest pain, palpitations and leg swelling.   Gastrointestinal:  Negative for abdominal distention, abdominal pain, blood in stool, constipation, diarrhea and nausea.   Genitourinary:  Negative for difficulty urinating and dysuria.   Musculoskeletal:  Negative for arthralgias, back pain, gait problem, joint swelling, myalgias and neck pain.   Skin:  Negative for rash.   Neurological:  Negative for dizziness, weakness, numbness and headaches.   Psychiatric/Behavioral:  Negative for agitation.      Past Medical History:   Diagnosis Date    Chronic kidney disease     Diabetes mellitus (HCC)     Hypertension     Received intravenous tissue plasminogen activator (tPA) in  emergency department 02/01/2022    Stroke (HCC)     no deficits     Past Surgical History:   Procedure Laterality Date    CARDIAC LOOP RECORDER  2023    PILONIDAL CYST EXCISION      PA ARTERIOVENOUS ANASTOMOSIS OPEN DIRECT Left 4/10/2024    Procedure: CREATION FISTULA BRACHIOCEPHALIC LEFT (AV);  Surgeon: Maxwell Nielsen MD;  Location: [facility];  Service: Vascular     Family History   Problem Relation Age of Onset    No Known Problems Mother     No Known Problems Father     Pancreatic cancer Brother      Social History     Tobacco Use    Smoking status: Every Day     Current packs/day: 0.50     Average packs/day: 1 pack/day for 37.6 years (36.6 ttl pk-yrs)     Types: Cigarettes     Start date: 6/16/1987     Passive exposure: Current    Smokeless tobacco: Never    Tobacco comments:     states slowing it down, used Chantix-currently 1/2 pack/day, at least 35 pack years as of 2023   Vaping Use    Vaping status: Never Used   Substance and Sexual Activity    Alcohol use: Not Currently     Comment: stopped 8/22    Drug use: Never    Sexual activity: Yes     Partners: Female     Birth control/protection: Condom Male     Current Outpatient Medications on File Prior to Visit   Medication Sig    calcitriol (ROCALTROL) 0.5 MCG capsule Take 1 capsule (0.5 mcg total) by mouth daily Monday, Wednesday, Friday    ergocalciferol (VITAMIN D2) 50,000 units Take 1 capsule (50,000 Units total) by mouth once a week    sodium bicarbonate 650 mg tablet Take 1 tablet (650 mg total) by mouth 2 (two) times a day    [DISCONTINUED] amLODIPine (NORVASC) 10 mg tablet Take 1 tablet (10 mg total) by mouth daily    [DISCONTINUED] atorvastatin (LIPITOR) 80 mg tablet Take 1 tablet (80 mg total) by mouth daily    [DISCONTINUED] clopidogrel (PLAVIX) 75 mg tablet Take 1 tablet (75 mg total) by mouth daily    [DISCONTINUED] dulaglutide (Trulicity) 0.75 MG/0.5ML injection Inject 0.5 mL (0.75 mg total) under the skin every 7 days     "[DISCONTINUED] hydrALAZINE (APRESOLINE) 50 mg tablet take 1 tablet by mouth twice a day    [DISCONTINUED] Insulin Glargine Solostar (Basaglar KwikPen) 100 UNIT/ML SOPN Inject 0.28 mL (28 Units total) under the skin daily    [DISCONTINUED] metoprolol succinate (TOPROL-XL) 50 mg 24 hr tablet Take 1 tablet (50 mg total) by mouth daily    Continuous Glucose Sensor (FreeStyle Slime 2 Sensor) MISC apply 1 SENSOR to back OF UPPER ARM REMOVE AND REPLACE every 14 days use with DEVICE to MONITOR BLOOD SUGAR    [DISCONTINUED] nicotine (NICODERM CQ) 14 mg/24hr TD 24 hr patch Place 1 patch on the skin over 24 hours every 24 hours (Patient not taking: Reported on 2/3/2025)    [DISCONTINUED] Symbicort 160-4.5 MCG/ACT inhaler inhale 2 puffs by mouth and INTO THE LUNGS twice a day Rinse mouth after use (Patient not taking: Reported on 2/3/2025)     No Known Allergies  Immunization History   Administered Date(s) Administered    COVID-19 MODERNA VACC 0.5 ML IM 08/28/2021, 09/23/2021    Tdap 07/24/2020     Objective   /80 (BP Location: Right arm, Patient Position: Sitting, Cuff Size: Large)   Pulse 84   Resp 12   Ht 5' 10\" (1.778 m)   Wt 86.7 kg (191 lb 3.2 oz)   SpO2 98%   BMI 27.43 kg/m²     Physical Exam  Constitutional:       General: He is not in acute distress.     Appearance: He is not ill-appearing or toxic-appearing.   Cardiovascular:      Heart sounds: Murmur heard.      No gallop.   Pulmonary:      Effort: No respiratory distress.      Breath sounds: No wheezing or rales.         "

## 2025-02-03 NOTE — ASSESSMENT & PLAN NOTE
Orders:    clopidogrel (PLAVIX) 75 mg tablet; Take 1 tablet (75 mg total) by mouth daily    atorvastatin (LIPITOR) 80 mg tablet; Take 1 tablet (80 mg total) by mouth daily

## 2025-02-03 NOTE — PATIENT INSTRUCTIONS
1) We will review your case at the transplant committee meeting and will review our decision with you in approximately 4 weeks over the phone.  2) If you do not receive a call from [facility] within 4 weeks, please call our local Nephrology office.  3) From a renal transplant evaluation purpose, we will see you once a year in our local office for medical follow-up.  4) Once we call you with our decision regarding further evaluation, you will receive further instruction over the phone and in the mail regarding the next steps to complete the transplant evaluation.  5) All of your non transplant evaluation follow up regarding your regular medical follow ups, your renal care follow ups, and any other specialists visits you are following with should continue as you are advised by those respective physicians and continue to follow with their management and care.  And if there is any emergency please go to the nearest urgent care or emergency room.  6) please update carotid ultrasound  7) talk to your PCP about colonoscopy after your cologuard  8) reestablish care with your heart doctor

## 2025-02-03 NOTE — PROGRESS NOTES
Name: Adam Gonzalez      : 1975      MRN: [id number]  Encounter Provider: Renny Bendeict MD  Encounter Date: 2/3/2025   Encounter department: [facility] [place]  :  Assessment & Plan  Pre-transplant evaluation for CKD (chronic kidney disease)    Plan   1. Proceed with standard age appropriate kidney transplant work up.  Overall, I saw the patient 1 year ago and he presents for follow-up for renal transplant evaluation and medical update.  Since last being seen, he has not completed transplantation evaluation or testing.  His most recent lab work with creatinine 5.4 mg/dL in 2024 with a GFR of 11.  He is following closely with his nephrologist regarding this.  His PTH is 646 which is slightly improving and he follows with his nephrologist regarding his secondary hyperparathyroidism.  Patient would benefit from a renal transplant evaluation completion.  He understands to follow closely with his current nephrologist to monitor his renal function and his eventual likely need for dialysis.  I had a detailed discussion with the patient and his significant other that he has not started or completed his evaluation with us since last year.  He understands.  He knows that he is not currently on the wait list and we cannot evaluate for listing until he completes all of the testing.  He states he is agreeable.  He does not provide a reason why he has not started his testing or return our phone calls from last year.  He also has a carotid bruit on left carotid exam.  Prior CT scan does not show significant disease but we will check vascular studies.  We reviewed in detail again today the benefits of transplantation versus dialysis and the benefits of living transplant and they should continue to look for living donors    3-history of CVA-patient was to reestablish care with cardiology team as patient has a loop recorder. Has not seen Cardiology since .  I have messaged his  cardiologist today who he saw in 2022 Dr. Moore to help the patient reestablish care.  I have also advised the patient that he needs to reestablish care  5-jqhxauuwykyqa-jhfqruyehtpd diabetes-most recent hemoglobin A1c February 1 was 7.8%. prior was above 10% 2 years ago and improved with improving diet. Patient does not have retinopathy nor neuropathy.   5-patient will need cardiology clearance.  Last echocardiogram was in 2020.  EF 60%.  Mild aortic regurgitation.  6 -family history - paternal aunt required dialysis  7 - colonoscopy - pt is due as now as 49. Pt and PCP have decided for cologuard and pt knows that colonoscopy is more optimal but he prefers cologuard first and then he will talk to his PCP  8-carotid bruit-left carotid.  Check carotid ultrasound.  Patient understands and we reviewed risk of carotid stenoses       Bruit of left carotid artery    Orders:    VAS carotid complete study; Future        History of Present Illness   HPI  Adam Gonzalez is a 49 y.o. male who presents follow-up medical of the visit for renal transplant.  No fevers, chills, nausea, vomiting, diarrhea.  Patient presents with significant other.  History obtained from: patient    Review of Systems   Constitutional: Negative.  Negative for appetite change and fatigue.   HENT: Negative.     Eyes: Negative.    Respiratory: Negative.  Negative for shortness of breath.    Cardiovascular: Negative.  Negative for leg swelling.   Gastrointestinal: Negative.    Endocrine: Negative.    Genitourinary: Negative.  Negative for difficulty urinating.   Musculoskeletal: Negative.    Allergic/Immunologic: Negative.    Neurological: Negative.    Hematological: Negative.    Psychiatric/Behavioral: Negative.     All other systems reviewed and are negative.    Pertinent Medical History     49 y.o. male [place] ancestry who is referred by Dr. YOCASTA Benedict and Shonda HOFFMAN, with a past medical history of CKD stage V , diabetes (diagnosed 10  years ago), hypertension (diagnosed 10 years ago), hyperlipidemia, CVA, current smoker since 9 yo attempting to quit (down to 1/2 ppd), no ETOH, no drugs, seen in the Nephrology Clinic for pre-transplant kidney evaluation.     Patient was lost to follow-up from prior nephrologist Dr. Johnson and last seen in 2022 and then reestablish care in November 2023 with his nephrolog team with Shonda HOFFMAN and native kidney disease was felt to be secondary to diabetes and hypertension.  And Dr. YOCASTA Benedict. patient has had a history of uncontrolled diabetes.  Patient also had insurance difficulties and therefore was not able to take his antihypertensives for a prolonged period until recently per the notes.    I saw the patient in February 2024 for initial medical intake for transplant evaluation.  He is now presenting February 2025 for yearly medical follow-up for transplant evaluation.  Since last being seen, I do not note that he has completed testing or other office visits with our team.  Since last being seen, chart reviewed and the following noted    April 2024-patient underwent AV fistula placement    Most recent lab work December 2024 with creatinine 5.4 mg/dL with GFR of 11.     Plan   1. Proceed with standard age appropriate kidney transplant work up.  Overall, I saw the patient 1 year ago and he presents for follow-up for renal transplant evaluation and medical update.  Since last being seen, he has not completed transplantation evaluation or testing.  His most recent lab work with creatinine 5.4 mg/dL in December 2024 with a GFR of 11.  He is following closely with his nephrologist regarding this.  His PTH is 646 which is slightly improving and he follows with his nephrologist regarding his secondary hyperparathyroidism.  Patient would benefit from a renal transplant evaluation completion.  He understands to follow closely with his current nephrologist to monitor his renal function and his eventual likely need  for dialysis.  I had a detailed discussion with the patient and his significant other that he has not started or completed his evaluation with us since last year.  He understands.  He knows that he is not currently on the wait list and we cannot evaluate for listing until he completes all of the testing.  He states he is agreeable.  He does not provide a reason why he has not started his testing or return our phone calls from last year.  He also has a carotid bruit on left carotid exam.  Prior CT scan does not show significant disease but we will check vascular studies.We reviewed in detail again today the benefits of transplantation versus dialysis and the benefits of living transplant and they should continue to look for living donors    3-history of CVA-patient was to reestablish care with cardiology team as patient has a loop recorder. Has not seen Cardiology since 2022.  I have messaged his cardiologist today who he saw in 2022 Dr. Moore to help the patient reestablish care.  I have also advised the patient that he needs to reestablish care  5-zospnqovfexss-lmmqvbhbarnn diabetes-most recent hemoglobin A1c February 1 was 7.8%. prior was above 10% 2 years ago and improved with improving diet. Patient does not have retinopathy nor neuropathy.   5-patient will need cardiology clearance.  Last echocardiogram was in 2020.  EF 60%.  Mild aortic regurgitation.  6 -family history - paternal aunt required dialysis  7 - colonoscopy - pt is due as now as 49. Pt and PCP have decided for cologuard and pt knows that colonoscopy is more optimal but he prefers cologuard first and then he will talk to his PCP  8-carotid bruit-left carotid.  Check carotid ultrasound.  Patient understands and we reviewed risk of carotid stenoses         Medical History Reviewed by provider this encounter:     .  Past Medical History   Past Medical History:   Diagnosis Date    Chronic kidney disease     Diabetes mellitus (HCC)     Hypertension      Received intravenous tissue plasminogen activator (tPA) in emergency department 02/01/2022    Stroke (HCC)     no deficits     Past Surgical History:   Procedure Laterality Date    CARDIAC LOOP RECORDER  2023    PILONIDAL CYST EXCISION      RI ARTERIOVENOUS ANASTOMOSIS OPEN DIRECT Left 4/10/2024    Procedure: CREATION FISTULA BRACHIOCEPHALIC LEFT (AV);  Surgeon: Maxwell Nielsen MD;  Location: [facility];  Service: Vascular     Family History   Problem Relation Age of Onset    No Known Problems Mother     No Known Problems Father     Pancreatic cancer Brother       reports that he has been smoking cigarettes. He started smoking about 37 years ago. He has a 36.6 pack-year smoking history. He has been exposed to tobacco smoke. He has never used smokeless tobacco. He reports that he does not currently use alcohol. He reports that he does not use drugs.  Current Outpatient Medications on File Prior to Visit   Medication Sig Dispense Refill    amLODIPine (NORVASC) 10 mg tablet Take 1 tablet (10 mg total) by mouth daily 90 tablet 1    atorvastatin (LIPITOR) 80 mg tablet Take 1 tablet (80 mg total) by mouth daily 90 tablet 1    calcitriol (ROCALTROL) 0.5 MCG capsule Take 1 capsule (0.5 mcg total) by mouth daily Monday, Wednesday, Friday 90 capsule 2    clopidogrel (PLAVIX) 75 mg tablet Take 1 tablet (75 mg total) by mouth daily 90 tablet 1    Continuous Glucose Sensor (FreeStyle Slime 2 Sensor) MISC apply 1 SENSOR to back OF UPPER ARM REMOVE AND REPLACE every 14 days use with DEVICE to MONITOR BLOOD SUGAR 2 each 5    dulaglutide (Trulicity) 0.75 MG/0.5ML injection Inject 0.5 mL (0.75 mg total) under the skin every 7 days 2 mL 2    ergocalciferol (VITAMIN D2) 50,000 units Take 1 capsule (50,000 Units total) by mouth once a week 12 capsule 1    hydrALAZINE (APRESOLINE) 50 mg tablet take 1 tablet by mouth twice a day 180 tablet 1    Insulin Glargine Solostar (Basaglar KwikPen) 100 UNIT/ML SOPN Inject 0.28 mL (28  Units total) under the skin daily 15 mL 5    metoprolol succinate (TOPROL-XL) 50 mg 24 hr tablet Take 1 tablet (50 mg total) by mouth daily 90 tablet 1    nicotine (NICODERM CQ) 14 mg/24hr TD 24 hr patch Place 1 patch on the skin over 24 hours every 24 hours (Patient not taking: Reported on 2/3/2025) 28 patch 0    sodium bicarbonate 650 mg tablet Take 1 tablet (650 mg total) by mouth 2 (two) times a day 180 tablet 2    Symbicort 160-4.5 MCG/ACT inhaler inhale 2 puffs by mouth and INTO THE LUNGS twice a day Rinse mouth after use (Patient not taking: Reported on 2/3/2025) 30.6 g 1     No current facility-administered medications on file prior to visit.   No Known Allergies   Current Outpatient Medications on File Prior to Visit   Medication Sig Dispense Refill    amLODIPine (NORVASC) 10 mg tablet Take 1 tablet (10 mg total) by mouth daily 90 tablet 1    atorvastatin (LIPITOR) 80 mg tablet Take 1 tablet (80 mg total) by mouth daily 90 tablet 1    calcitriol (ROCALTROL) 0.5 MCG capsule Take 1 capsule (0.5 mcg total) by mouth daily Monday, Wednesday, Friday 90 capsule 2    clopidogrel (PLAVIX) 75 mg tablet Take 1 tablet (75 mg total) by mouth daily 90 tablet 1    Continuous Glucose Sensor (FreeStyle Slime 2 Sensor) MISC apply 1 SENSOR to back OF UPPER ARM REMOVE AND REPLACE every 14 days use with DEVICE to MONITOR BLOOD SUGAR 2 each 5    dulaglutide (Trulicity) 0.75 MG/0.5ML injection Inject 0.5 mL (0.75 mg total) under the skin every 7 days 2 mL 2    ergocalciferol (VITAMIN D2) 50,000 units Take 1 capsule (50,000 Units total) by mouth once a week 12 capsule 1    hydrALAZINE (APRESOLINE) 50 mg tablet take 1 tablet by mouth twice a day 180 tablet 1    Insulin Glargine Solostar (Basaglar KwikPen) 100 UNIT/ML SOPN Inject 0.28 mL (28 Units total) under the skin daily 15 mL 5    metoprolol succinate (TOPROL-XL) 50 mg 24 hr tablet Take 1 tablet (50 mg total) by mouth daily 90 tablet 1    nicotine (NICODERM CQ) 14 mg/24hr TD 24  hr patch Place 1 patch on the skin over 24 hours every 24 hours (Patient not taking: Reported on 2/3/2025) 28 patch 0    sodium bicarbonate 650 mg tablet Take 1 tablet (650 mg total) by mouth 2 (two) times a day 180 tablet 2    Symbicort 160-4.5 MCG/ACT inhaler inhale 2 puffs by mouth and INTO THE LUNGS twice a day Rinse mouth after use (Patient not taking: Reported on 2/3/2025) 30.6 g 1     No current facility-administered medications on file prior to visit.      Social History     Tobacco Use    Smoking status: Every Day     Current packs/day: 0.50     Average packs/day: 1 pack/day for 37.6 years (36.6 ttl pk-yrs)     Types: Cigarettes     Start date: 6/16/1987     Passive exposure: Current    Smokeless tobacco: Never    Tobacco comments:     states slowing it down, used Chantix-currently 1/2 pack/day, at least 35 pack years as of 2023   Vaping Use    Vaping status: Never Used   Substance and Sexual Activity    Alcohol use: Not Currently     Comment: stopped 8/22    Drug use: Never    Sexual activity: Yes     Partners: Female     Birth control/protection: Condom Male        Objective   There were no vitals taken for this visit.     Physical Exam  Vitals and nursing note reviewed.   Constitutional:       General: He is not in acute distress.     Appearance: He is well-developed. He is not diaphoretic.   HENT:      Head: Normocephalic and atraumatic.   Eyes:      General: No scleral icterus.        Right eye: No discharge.         Left eye: No discharge.      Conjunctiva/sclera: Conjunctivae normal.   Neck:      Vascular: Carotid bruit present.      Comments: Left carotid bruit  Cardiovascular:      Rate and Rhythm: Normal rate and regular rhythm.      Heart sounds: Normal heart sounds. No murmur heard.     No friction rub. No gallop.   Pulmonary:      Effort: Pulmonary effort is normal. No respiratory distress.      Breath sounds: Normal breath sounds. No wheezing or rales.   Chest:      Chest wall: No tenderness.    Abdominal:      General: Bowel sounds are normal. There is no distension.      Palpations: Abdomen is soft.      Tenderness: There is no abdominal tenderness. There is no rebound.   Musculoskeletal:         General: No tenderness or deformity. Normal range of motion.      Cervical back: Normal range of motion and neck supple.      Right lower leg: No edema.      Left lower leg: No edema.   Skin:     General: Skin is warm and dry.      Coloration: Skin is not pale.      Findings: No erythema or rash.   Neurological:      Mental Status: He is alert and oriented to person, place, and time.      Cranial Nerves: No cranial nerve deficit.      Coordination: Coordination normal.   Psychiatric:         Behavior: Behavior normal.         Thought Content: Thought content normal.         Judgment: Judgment normal.            (4) excellent

## 2025-02-04 ENCOUNTER — TELEPHONE (OUTPATIENT)
Dept: CARDIOLOGY CLINIC | Facility: CLINIC | Age: 50
End: 2025-02-04

## 2025-02-04 NOTE — TELEPHONE ENCOUNTER
----- Message from Cesilia Moore MD sent at 2/3/2025  4:30 PM EST -----  Please schedule follow-up appointment with any provider on next available appointment    Thanks  ----- Message -----  From: Renny Benedict MD  Sent: 2/3/2025   1:20 PM EST  To: MD Lula Cary    Hope you are well    We are seeing pt for renal transplant evaluation. He has a loop recorder since CVA. He saw you last in 2022. He states he has q 6 mo loop recorder checks but I do not see f/u with you since 2022.     Do you need a new referral to have pt scheduled or will your office be able to schedule pt    silas

## 2025-02-06 ENCOUNTER — TELEPHONE (OUTPATIENT)
Age: 50
End: 2025-02-06

## 2025-02-06 NOTE — TELEPHONE ENCOUNTER
PA for Trulicity 0.75MG/0.5ML SUBMITTED to [place]    via    []CMM-KEY:   []Surescripts-Case ID #   []Availity-Auth ID # NDC #   []Faxed to plan   [x]Other website-PromptPA: [id number]   []Phone call Case ID #     [x]PA sent as URGENT    All office notes, labs and other pertaining documents and studies sent. Clinical questions answered. Awaiting determination from insurance company.     Turnaround time for your insurance to make a decision on your Prior Authorization can take 7-21 business days.

## 2025-02-06 NOTE — TELEPHONE ENCOUNTER
Received call from [employer] regarding PA Trulicity - APPROVED   Ref # [id number]    Stated she will also send approval via fax.

## 2025-02-06 NOTE — TELEPHONE ENCOUNTER
PA for Trulicity 0.75MG/0.5ML APPROVED     Date(s) approved 02/06/2025-02/06/2026    Case #[id number]     Patient advised by          [[email or web address] Message  []Phone call   []LMOM  []L/M to call office as no active Communication consent on file  [x]Unable to leave detailed message as VM not approved on Communication consent       Pharmacy advised by    [x]Fax  []Phone call    Approval letter scanned into [employer] Yes

## 2025-02-08 DIAGNOSIS — E11.59 TYPE 2 DIABETES MELLITUS WITH OTHER CIRCULATORY COMPLICATION, WITH LONG-TERM CURRENT USE OF INSULIN (HCC): ICD-10-CM

## 2025-02-08 DIAGNOSIS — Z79.4 TYPE 2 DIABETES MELLITUS WITH OTHER CIRCULATORY COMPLICATION, WITH LONG-TERM CURRENT USE OF INSULIN (HCC): ICD-10-CM

## 2025-02-08 DIAGNOSIS — E11.65 UNCONTROLLED TYPE 2 DIABETES MELLITUS WITH HYPERGLYCEMIA (HCC): ICD-10-CM

## 2025-02-10 DIAGNOSIS — Z79.4 TYPE 2 DIABETES MELLITUS WITH OTHER CIRCULATORY COMPLICATION, WITH LONG-TERM CURRENT USE OF INSULIN (HCC): Primary | ICD-10-CM

## 2025-02-10 DIAGNOSIS — E11.59 TYPE 2 DIABETES MELLITUS WITH OTHER CIRCULATORY COMPLICATION, WITH LONG-TERM CURRENT USE OF INSULIN (HCC): Primary | ICD-10-CM

## 2025-02-10 RX ORDER — INSULIN GLARGINE 100 [IU]/ML
INJECTION, SOLUTION SUBCUTANEOUS
Qty: 15 ML | Refills: 5 | OUTPATIENT
Start: 2025-02-10

## 2025-02-10 RX ORDER — INSULIN GLARGINE 100 [IU]/ML
28 INJECTION, SOLUTION SUBCUTANEOUS DAILY
Qty: 15 ML | Refills: 2 | Status: SHIPPED | OUTPATIENT
Start: 2025-02-10

## 2025-03-25 ENCOUNTER — TELEPHONE (OUTPATIENT)
Age: 50
End: 2025-03-25

## 2025-03-25 NOTE — TELEPHONE ENCOUNTER
Patient called in inquiring about paperwork that was faxed over from [employer] yesterday discussed with patient it can take anywhere between 24-48 hours for it to be received pt had no further questions

## 2025-03-26 NOTE — TELEPHONE ENCOUNTER
Patient called, request status of fax from "[employer] Advised fax had not been received. Patient request  a callback if and when fax is received and faxed back. Please advise patient at [phone], if any further questions.

## 2025-03-26 NOTE — TELEPHONE ENCOUNTER
Called both pt l/m then called pt wife s/w her advised we have not received anything for [employer]. Pt wife will send it over via [employer].

## 2025-03-26 NOTE — TELEPHONE ENCOUNTER
Pt called in to check if we have received the fax did check on chart note and relayed the same. Kindly keep him informed once received. Thanks

## 2025-03-26 NOTE — TELEPHONE ENCOUNTER
Patient called back looking for fax.  Spoke to clerical and nothing has come in.  I verified the fax and they will re send.

## 2025-03-26 NOTE — TELEPHONE ENCOUNTER
Adriane, sig other called,  is kindly requesting a return call, today 3/26, if possible to confirm receipt of paperwork sent to PCP via fax# [phone].  The paperwork is from [employer] and is very important we confirm fax is received.    Adriane's c/b# [phone].

## 2025-03-30 ENCOUNTER — HOSPITAL ENCOUNTER (INPATIENT)
Facility: HOSPITAL | Age: 50
LOS: 1 days | Discharge: HOME | DRG: 314 | End: 2025-04-02
Attending: EMERGENCY MEDICINE | Admitting: STUDENT IN AN ORGANIZED HEALTH CARE EDUCATION/TRAINING PROGRAM
Payer: COMMERCIAL

## 2025-03-30 ENCOUNTER — APPOINTMENT (EMERGENCY)
Dept: RADIOLOGY | Facility: HOSPITAL | Age: 50
DRG: 314 | End: 2025-03-30
Payer: COMMERCIAL

## 2025-03-30 DIAGNOSIS — L03.90 CELLULITIS: ICD-10-CM

## 2025-03-30 DIAGNOSIS — N18.5 ACUTE KIDNEY INJURY SUPERIMPOSED ON STAGE 5 CHRONIC KIDNEY DISEASE, NOT ON CHRONIC DIALYSIS (HCC): ICD-10-CM

## 2025-03-30 DIAGNOSIS — N17.9 ACUTE KIDNEY INJURY SUPERIMPOSED ON STAGE 5 CHRONIC KIDNEY DISEASE, NOT ON CHRONIC DIALYSIS (HCC): ICD-10-CM

## 2025-03-30 DIAGNOSIS — L08.9 DIABETIC FOOT INFECTION  (HCC): ICD-10-CM

## 2025-03-30 DIAGNOSIS — E11.628 DIABETIC FOOT INFECTION  (HCC): ICD-10-CM

## 2025-03-30 DIAGNOSIS — M86.9 OSTEOMYELITIS (HCC): Primary | ICD-10-CM

## 2025-03-30 DIAGNOSIS — N18.5 STAGE 5 CHRONIC KIDNEY DISEASE NOT ON CHRONIC DIALYSIS (HCC): ICD-10-CM

## 2025-03-30 LAB
ALBUMIN SERPL BCG-MCNC: 3.8 G/DL (ref 3.5–5)
ALP SERPL-CCNC: 157 U/L (ref 34–104)
ALT SERPL W P-5'-P-CCNC: 7 U/L (ref 7–52)
ANION GAP SERPL CALCULATED.3IONS-SCNC: 12 MMOL/L (ref 4–13)
APTT PPP: 28 SECONDS (ref 23–34)
AST SERPL W P-5'-P-CCNC: 11 U/L (ref 13–39)
BASOPHILS # BLD AUTO: 0.05 THOUSANDS/ÂΜL (ref 0–0.1)
BASOPHILS NFR BLD AUTO: 0 % (ref 0–1)
BILIRUB SERPL-MCNC: 0.32 MG/DL (ref 0.2–1)
BUN SERPL-MCNC: 52 MG/DL (ref 5–25)
CALCIUM SERPL-MCNC: 6.8 MG/DL (ref 8.4–10.2)
CHLORIDE SERPL-SCNC: 107 MMOL/L (ref 96–108)
CO2 SERPL-SCNC: 18 MMOL/L (ref 21–32)
CREAT SERPL-MCNC: 6.48 MG/DL (ref 0.6–1.3)
EOSINOPHIL # BLD AUTO: 0.19 THOUSAND/ÂΜL (ref 0–0.61)
EOSINOPHIL NFR BLD AUTO: 2 % (ref 0–6)
ERYTHROCYTE [DISTWIDTH] IN BLOOD BY AUTOMATED COUNT: 13.8 % (ref 11.6–15.1)
GFR SERPL CREATININE-BSD FRML MDRD: 8 ML/MIN/1.73SQ M
GLUCOSE SERPL-MCNC: 125 MG/DL (ref 65–140)
GLUCOSE SERPL-MCNC: 215 MG/DL (ref 65–140)
GLUCOSE SERPL-MCNC: 239 MG/DL (ref 65–140)
HCT VFR BLD AUTO: 40.7 % (ref 36.5–49.3)
HGB BLD-MCNC: 12.8 G/DL (ref 12–17)
IMM GRANULOCYTES # BLD AUTO: 0.03 THOUSAND/UL (ref 0–0.2)
IMM GRANULOCYTES NFR BLD AUTO: 0 % (ref 0–2)
INR PPP: 1.09 (ref 0.85–1.19)
LACTATE SERPL-SCNC: 0.9 MMOL/L (ref 0.5–2)
LYMPHOCYTES # BLD AUTO: 0.99 THOUSANDS/ÂΜL (ref 0.6–4.47)
LYMPHOCYTES NFR BLD AUTO: 8 % (ref 14–44)
MCH RBC QN AUTO: 30.8 PG (ref 26.8–34.3)
MCHC RBC AUTO-ENTMCNC: 31.4 G/DL (ref 31.4–37.4)
MCV RBC AUTO: 98 FL (ref 82–98)
MONOCYTES # BLD AUTO: 0.72 THOUSAND/ÂΜL (ref 0.17–1.22)
MONOCYTES NFR BLD AUTO: 6 % (ref 4–12)
NEUTROPHILS # BLD AUTO: 9.78 THOUSANDS/ÂΜL (ref 1.85–7.62)
NEUTS SEG NFR BLD AUTO: 84 % (ref 43–75)
NRBC BLD AUTO-RTO: 0 /100 WBCS
PLATELET # BLD AUTO: 322 THOUSANDS/UL (ref 149–390)
PMV BLD AUTO: 9.2 FL (ref 8.9–12.7)
POTASSIUM SERPL-SCNC: 4.6 MMOL/L (ref 3.5–5.3)
PROCALCITONIN SERPL-MCNC: 0.29 NG/ML
PROT SERPL-MCNC: 8.6 G/DL (ref 6.4–8.4)
PROTHROMBIN TIME: 14.8 SECONDS (ref 12.3–15)
RBC # BLD AUTO: 4.16 MILLION/UL (ref 3.88–5.62)
SODIUM SERPL-SCNC: 137 MMOL/L (ref 135–147)
WBC # BLD AUTO: 11.76 THOUSAND/UL (ref 4.31–10.16)

## 2025-03-30 PROCEDURE — 84145 PROCALCITONIN (PCT): CPT | Performed by: EMERGENCY MEDICINE

## 2025-03-30 PROCEDURE — 99284 EMERGENCY DEPT VISIT MOD MDM: CPT

## 2025-03-30 PROCEDURE — 83605 ASSAY OF LACTIC ACID: CPT | Performed by: EMERGENCY MEDICINE

## 2025-03-30 PROCEDURE — 85730 THROMBOPLASTIN TIME PARTIAL: CPT | Performed by: EMERGENCY MEDICINE

## 2025-03-30 PROCEDURE — 99223 1ST HOSP IP/OBS HIGH 75: CPT | Performed by: INTERNAL MEDICINE

## 2025-03-30 PROCEDURE — 96365 THER/PROPH/DIAG IV INF INIT: CPT

## 2025-03-30 PROCEDURE — 99285 EMERGENCY DEPT VISIT HI MDM: CPT | Performed by: EMERGENCY MEDICINE

## 2025-03-30 PROCEDURE — 85025 COMPLETE CBC W/AUTO DIFF WBC: CPT | Performed by: EMERGENCY MEDICINE

## 2025-03-30 PROCEDURE — 85610 PROTHROMBIN TIME: CPT | Performed by: EMERGENCY MEDICINE

## 2025-03-30 PROCEDURE — 73630 X-RAY EXAM OF FOOT: CPT

## 2025-03-30 PROCEDURE — 87040 BLOOD CULTURE FOR BACTERIA: CPT | Performed by: EMERGENCY MEDICINE

## 2025-03-30 PROCEDURE — 96367 TX/PROPH/DG ADDL SEQ IV INF: CPT

## 2025-03-30 PROCEDURE — 82948 REAGENT STRIP/BLOOD GLUCOSE: CPT

## 2025-03-30 PROCEDURE — 80053 COMPREHEN METABOLIC PANEL: CPT | Performed by: EMERGENCY MEDICINE

## 2025-03-30 PROCEDURE — 36415 COLL VENOUS BLD VENIPUNCTURE: CPT | Performed by: EMERGENCY MEDICINE

## 2025-03-30 RX ORDER — HEPARIN SODIUM 5000 [USP'U]/ML
5000 INJECTION, SOLUTION INTRAVENOUS; SUBCUTANEOUS EVERY 8 HOURS SCHEDULED
Status: DISCONTINUED | OUTPATIENT
Start: 2025-03-30 | End: 2025-04-02 | Stop reason: HOSPADM

## 2025-03-30 RX ORDER — SODIUM BICARBONATE 650 MG/1
650 TABLET ORAL 2 TIMES DAILY
Status: DISCONTINUED | OUTPATIENT
Start: 2025-03-30 | End: 2025-03-31

## 2025-03-30 RX ORDER — HYDRALAZINE HYDROCHLORIDE 25 MG/1
50 TABLET, FILM COATED ORAL 2 TIMES DAILY
Status: DISCONTINUED | OUTPATIENT
Start: 2025-03-30 | End: 2025-04-02 | Stop reason: HOSPADM

## 2025-03-30 RX ORDER — INSULIN GLARGINE 100 [IU]/ML
28 INJECTION, SOLUTION SUBCUTANEOUS
Status: DISCONTINUED | OUTPATIENT
Start: 2025-03-30 | End: 2025-04-01

## 2025-03-30 RX ORDER — DOCUSATE SODIUM 100 MG/1
100 CAPSULE, LIQUID FILLED ORAL 2 TIMES DAILY
Status: DISCONTINUED | OUTPATIENT
Start: 2025-03-30 | End: 2025-04-02 | Stop reason: HOSPADM

## 2025-03-30 RX ORDER — CALCITRIOL 0.25 UG/1
0.5 CAPSULE, LIQUID FILLED ORAL DAILY
Status: DISCONTINUED | OUTPATIENT
Start: 2025-03-31 | End: 2025-04-02 | Stop reason: HOSPADM

## 2025-03-30 RX ORDER — ONDANSETRON 2 MG/ML
4 INJECTION INTRAMUSCULAR; INTRAVENOUS EVERY 6 HOURS PRN
Status: DISCONTINUED | OUTPATIENT
Start: 2025-03-30 | End: 2025-04-02 | Stop reason: HOSPADM

## 2025-03-30 RX ORDER — CLOPIDOGREL BISULFATE 75 MG/1
75 TABLET ORAL DAILY
Status: DISCONTINUED | OUTPATIENT
Start: 2025-03-31 | End: 2025-04-02 | Stop reason: HOSPADM

## 2025-03-30 RX ORDER — ACETAMINOPHEN 325 MG/1
975 TABLET ORAL EVERY 8 HOURS PRN
Status: DISCONTINUED | OUTPATIENT
Start: 2025-03-30 | End: 2025-04-02 | Stop reason: HOSPADM

## 2025-03-30 RX ORDER — METOPROLOL SUCCINATE 50 MG/1
50 TABLET, EXTENDED RELEASE ORAL DAILY
Status: DISCONTINUED | OUTPATIENT
Start: 2025-03-31 | End: 2025-04-02 | Stop reason: HOSPADM

## 2025-03-30 RX ORDER — METRONIDAZOLE 500 MG/100ML
500 INJECTION, SOLUTION INTRAVENOUS EVERY 12 HOURS
Status: DISCONTINUED | OUTPATIENT
Start: 2025-03-30 | End: 2025-04-02 | Stop reason: HOSPADM

## 2025-03-30 RX ORDER — INSULIN LISPRO 100 [IU]/ML
6 INJECTION, SOLUTION INTRAVENOUS; SUBCUTANEOUS
Status: DISCONTINUED | OUTPATIENT
Start: 2025-03-31 | End: 2025-04-02 | Stop reason: HOSPADM

## 2025-03-30 RX ORDER — SIMETHICONE 80 MG
80 TABLET,CHEWABLE ORAL 4 TIMES DAILY PRN
Status: DISCONTINUED | OUTPATIENT
Start: 2025-03-30 | End: 2025-04-02 | Stop reason: HOSPADM

## 2025-03-30 RX ORDER — INSULIN LISPRO 100 [IU]/ML
1-6 INJECTION, SOLUTION INTRAVENOUS; SUBCUTANEOUS
Status: DISCONTINUED | OUTPATIENT
Start: 2025-03-30 | End: 2025-04-02 | Stop reason: HOSPADM

## 2025-03-30 RX ORDER — NICOTINE 21 MG/24HR
1 PATCH, TRANSDERMAL 24 HOURS TRANSDERMAL DAILY
Status: DISCONTINUED | OUTPATIENT
Start: 2025-03-31 | End: 2025-04-02 | Stop reason: HOSPADM

## 2025-03-30 RX ORDER — AMLODIPINE BESYLATE 10 MG/1
10 TABLET ORAL DAILY
Status: DISCONTINUED | OUTPATIENT
Start: 2025-03-31 | End: 2025-04-02 | Stop reason: HOSPADM

## 2025-03-30 RX ORDER — VANCOMYCIN HYDROCHLORIDE 750 MG/150ML
10 INJECTION, SOLUTION INTRAVENOUS ONCE
Status: COMPLETED | OUTPATIENT
Start: 2025-03-30 | End: 2025-03-30

## 2025-03-30 RX ORDER — INSULIN LISPRO 100 [IU]/ML
1-6 INJECTION, SOLUTION INTRAVENOUS; SUBCUTANEOUS
Status: DISCONTINUED | OUTPATIENT
Start: 2025-03-31 | End: 2025-04-02 | Stop reason: HOSPADM

## 2025-03-30 RX ORDER — ATORVASTATIN CALCIUM 40 MG/1
80 TABLET, FILM COATED ORAL DAILY
Status: DISCONTINUED | OUTPATIENT
Start: 2025-03-31 | End: 2025-04-02 | Stop reason: HOSPADM

## 2025-03-30 RX ADMIN — VANCOMYCIN HYDROCHLORIDE 1250 MG: 5 INJECTION, POWDER, LYOPHILIZED, FOR SOLUTION INTRAVENOUS at 20:03

## 2025-03-30 RX ADMIN — VANCOMYCIN HYDROCHLORIDE 750 MG: 750 INJECTION, SOLUTION INTRAVENOUS at 22:34

## 2025-03-30 RX ADMIN — SODIUM CHLORIDE 1000 ML: 0.9 INJECTION, SOLUTION INTRAVENOUS at 18:42

## 2025-03-30 RX ADMIN — ACETAMINOPHEN 975 MG: 325 TABLET, FILM COATED ORAL at 21:08

## 2025-03-30 RX ADMIN — DOCUSATE SODIUM 100 MG: 100 CAPSULE, LIQUID FILLED ORAL at 21:09

## 2025-03-30 RX ADMIN — INSULIN GLARGINE 28 UNITS: 100 INJECTION, SOLUTION SUBCUTANEOUS at 21:09

## 2025-03-30 RX ADMIN — CEFEPIME 2000 MG: 2 INJECTION, POWDER, FOR SOLUTION INTRAVENOUS at 18:40

## 2025-03-30 RX ADMIN — HYDRALAZINE HYDROCHLORIDE 50 MG: 25 TABLET ORAL at 21:08

## 2025-03-30 RX ADMIN — METRONIDAZOLE 500 MG: 500 INJECTION, SOLUTION INTRAVENOUS at 19:35

## 2025-03-30 RX ADMIN — HEPARIN SODIUM 5000 UNITS: 5000 INJECTION INTRAVENOUS; SUBCUTANEOUS at 21:09

## 2025-03-30 RX ADMIN — SODIUM BICARBONATE 650 MG: 650 TABLET ORAL at 21:12

## 2025-03-30 NOTE — ASSESSMENT & PLAN NOTE
Lab Results   Component Value Date    EGFR 8 03/30/2025    EGFR 11 12/24/2024    EGFR 13 08/13/2024    CREATININE 6.48 (H) 03/30/2025    CREATININE 5.39 (H) 12/24/2024    CREATININE 4.64 (H) 08/13/2024     Follows with outpatient nephrology  Ongoing discussions regarding pursuing transplant  Consult nephrology for additional recommendations and management  IV fluid hydration

## 2025-03-30 NOTE — ASSESSMENT & PLAN NOTE
Lab Results   Component Value Date    HGBA1C 7.2 (A) 02/03/2025     Recent Labs     03/30/25  1647 03/30/25  1736   POCGLU 239*  --    GLUC  --  215*     Hold home regimen while inpatient and resume on discharge  Diabetic diet  Insulin regimen  Humalog 6 units TID AC  Lantus 28 units HS  Glucose checks and Insulin correction ACHS  Goal -180 while admitted, adjusting insulin regimen as appropriate  Monitor for hypoglycemia and treat per protocol

## 2025-03-30 NOTE — H&P
H&P - Hospitalist   Name: Adam Gonzalez 50 y.o. male I MRN: [id number]  Unit/Bed#: CHANCE I Date of Admission: 3/30/2025   Date of Service: 3/30/2025 I Hospital Day: 0     Assessment & Plan  Acute kidney injury superimposed on stage 5 chronic kidney disease, not on chronic dialysis ([facility])  Lab Results   Component Value Date    EGFR 8 03/30/2025    EGFR 11 12/24/2024    EGFR 13 08/13/2024    CREATININE 6.48 (H) 03/30/2025    CREATININE 5.39 (H) 12/24/2024    CREATININE 4.64 (H) 08/13/2024     Follows with outpatient nephrology  Ongoing discussions regarding pursuing transplant  Consult nephrology for additional recommendations and management  IV fluid hydration  Diabetic foot infection  (HCC)  Known history of diabetes  Presented with concern for osteomyelitis of left pinky toe  Reported wound was present and slightly worsening in severity with associated foot swelling  X-ray on preliminary read shows findings concerning for bony erosion of the toe; pending formal radiology eval  On meeting sepsis or SIRS criteria at this time    Recent Labs     03/30/25  1736   WBC 11.76*     Trend WBC/fever curve  Obtain MRI of foot  Vancomycin IV with pharmacy consult  Consult podiatry with possible need to consult vascular surgery  Type 2 diabetes mellitus with circulatory disorder, with long-term current use of insulin ([facility])    Lab Results   Component Value Date    HGBA1C 7.2 (A) 02/03/2025     Recent Labs     03/30/25  1647 03/30/25  1736   POCGLU 239*  --    GLUC  --  215*     Hold home regimen while inpatient and resume on discharge  Diabetic diet  Insulin regimen  Humalog 6 units TID AC  Lantus 28 units HS  Glucose checks and Insulin correction ACHS  Goal -180 while admitted, adjusting insulin regimen as appropriate  Monitor for hypoglycemia and treat per protocol    Current smoker  Tobacco Use: High Risk (3/30/2025)    Patient History     Smoking Tobacco Use: Every Day     Smokeless Tobacco Use: Never     Passive  Exposure: Current     Tobacco cessation counseling provided for > 3 min  NRT ordered with patch and PRN nicorette gum        VTE Pharmacologic Prophylaxis: VTE Score: 3 Moderate Risk (Score 3-4) - Pharmacological DVT Prophylaxis Ordered: heparin.  Code Status: Level 1 - Full Code   Discussion with family: Patient declined call to .     Anticipated Length of Stay: Patient will be admitted on an inpatient basis with an anticipated length of stay of greater than 2 midnights secondary to plan as noted.    History of Present Illness   Chief Complaint:   Chief Complaint   Patient presents with    Ankle Swelling     Pt c/o left ankle swelling x 1 week, pt also has wound on left pinkie toe that was noticed yesterday          Adam Gonzalez is a 50 y.o. male with a PMH of DM2, CKD 5 not on dialysis, HTN who presents with worsening ankle swelling, specifically sent for infection and cellulitis of the left foot.  Reported worsening swelling and wound that is progressing over the past few days.  Presented to the ED for further evaluation and workup.  X-ray obtained pending formal read but does show signs of bony erosion of the toe, concerning for osteomyelitis.  No noted necrotizing tissue and did not meet sepsis or SIRS criteria.  Noted to have GATITO on CKD5 with creatinine of 6.4 with last creatinine seen at 5.4 in December 2024.  Nephrology was notified regarding admission in case patient required urgent dialysis.  Admitted for initiation of IV antibiotics along with gentle IV fluid hydration.    Review of Systems   Constitutional:  Negative for chills and fever.   HENT:  Negative for ear pain and sore throat.    Eyes:  Negative for pain and visual disturbance.   Respiratory:  Negative for cough and shortness of breath.    Cardiovascular:  Positive for leg swelling. Negative for chest pain and palpitations.   Gastrointestinal:  Negative for abdominal pain and vomiting.   Genitourinary:  Negative for dysuria and  hematuria.   Musculoskeletal:  Negative for arthralgias and back pain.   Skin:  Positive for wound. Negative for color change and rash.   Neurological:  Negative for seizures and syncope.   All other systems reviewed and are negative.      Historical Information   Past Medical History:   Diagnosis Date    Chronic kidney disease     Diabetes mellitus (HCC)     Hypertension     Received intravenous tissue plasminogen activator (tPA) in emergency department 02/01/2022    Stroke (HCC)     no deficits     Past Surgical History:   Procedure Laterality Date    CARDIAC LOOP RECORDER  2023    PILONIDAL CYST EXCISION      WA ARTERIOVENOUS ANASTOMOSIS OPEN DIRECT Left 4/10/2024    Procedure: CREATION FISTULA BRACHIOCEPHALIC LEFT (AV);  Surgeon: Maxwell Nielsen MD;  Location: [facility];  Service: Vascular     Social History     Tobacco Use    Smoking status: Every Day     Current packs/day: 0.50     Average packs/day: 1 pack/day for 37.8 years (36.7 ttl pk-yrs)     Types: Cigarettes     Start date: 6/16/1987     Passive exposure: Current    Smokeless tobacco: Never    Tobacco comments:     states slowing it down, used Chantix-currently 1/2 pack/day, at least 35 pack years as of 2023   Vaping Use    Vaping status: Never Used   Substance and Sexual Activity    Alcohol use: Not Currently     Comment: stopped 8/22    Drug use: Never    Sexual activity: Yes     Partners: Female     Birth control/protection: Condom Male     E-Cigarette/Vaping    E-Cigarette Use Never User      E-Cigarette/Vaping Substances    Nicotine No     THC No     CBD No     Flavoring No     Other No     Unknown No      Family History   Problem Relation Age of Onset    No Known Problems Mother     No Known Problems Father     Pancreatic cancer Brother      Social History:  Marital Status: Single   Patient Pre-hospital Living Situation: Home  Patient Pre-hospital Level of Mobility: unable to be assessed at time of evaluation  Patient Pre-hospital  Diet Restrictions: none    Meds/Allergies   I have reviewed home medications with patient personally.  Prior to Admission medications    Medication Sig Start Date End Date Taking? Authorizing Provider   amLODIPine (NORVASC) 10 mg tablet Take 1 tablet (10 mg total) by mouth daily 2/3/25   Evans Birmingham MD   atorvastatin (LIPITOR) 80 mg tablet Take 1 tablet (80 mg total) by mouth daily 2/3/25   Evans Birmingham MD   calcitriol (ROCALTROL) 0.5 MCG capsule Take 1 capsule (0.5 mcg total) by mouth daily Monday, Wednesday, Friday 8/14/24 5/11/25  YASMIN Gresham   clopidogrel (PLAVIX) 75 mg tablet Take 1 tablet (75 mg total) by mouth daily 2/3/25   Evans Birmingham MD   Continuous Glucose Sensor (FreeStyle Slime 2 Sensor) MISC apply 1 SENSOR to back OF UPPER ARM REMOVE AND REPLACE every 14 days use with DEVICE to MONITOR BLOOD SUGAR 6/29/24   Al Lawrence MD   Dulaglutide 0.75 MG/0.5ML SOAJ Inject 0.75 mg under the skin once a week 2/3/25   Evans Birmingham MD   ergocalciferol (VITAMIN D2) 50,000 units Take 1 capsule (50,000 Units total) by mouth once a week 12/27/24   YASMIN Gresham   hydrALAZINE (APRESOLINE) 50 mg tablet Take 1 tablet (50 mg total) by mouth 2 (two) times a day 2/3/25   Evans Birmingham MD   Insulin Glargine Solostar (Basaglar KwikPen) 100 UNIT/ML SOPN Inject 0.28 mL (28 Units total) under the skin daily 2/3/25   Evans Birmingham MD   Insulin Glargine Solostar (Basaglar KwikPen) 100 UNIT/ML SOPN Inject 0.28 mL (28 Units total) under the skin in the morning 2/10/25   Evans Birmingham MD   metoprolol succinate (TOPROL-XL) 50 mg 24 hr tablet Take 1 tablet (50 mg total) by mouth daily 2/3/25   Evans Birmingham MD   sodium bicarbonate 650 mg tablet Take 1 tablet (650 mg total) by mouth 2 (two) times a day 8/14/24   YASMIN Gresham     No Known Allergies    Objective :  Temp:  [99.6  °F (37.6 °C)] 99.6 °F (37.6 °C)  HR:  [100-102] 100  BP: (160-205)/(70-96) 160/70  Resp:  [18-19] 18  SpO2:  [100 %] 100 %  O2 Device: None (Room air)    Physical Exam  Vitals and nursing note reviewed.   Constitutional:       General: He is not in acute distress.     Appearance: He is well-developed. He is ill-appearing (Chronically). He is not diaphoretic.   HENT:      Head: Normocephalic and atraumatic.      Nose: Nose normal.   Eyes:      General: No scleral icterus.     Conjunctiva/sclera: Conjunctivae normal.      Pupils: Pupils are equal, round, and reactive to light.   Neck:      Thyroid: No thyromegaly.   Cardiovascular:      Rate and Rhythm: Normal rate and regular rhythm.      Heart sounds: Normal heart sounds. No murmur heard.  Pulmonary:      Effort: Pulmonary effort is normal.      Breath sounds: Normal breath sounds. No wheezing, rhonchi or rales.   Abdominal:      General: Bowel sounds are normal. There is no distension.      Palpations: Abdomen is soft.      Tenderness: There is no abdominal tenderness.   Musculoskeletal:         General: Tenderness present. No swelling or deformity.      Cervical back: Neck supple.   Skin:     General: Skin is warm and dry.      Findings: Lesion present.   Neurological:      Mental Status: He is alert and oriented to person, place, and time. Mental status is at baseline.   Psychiatric:         Behavior: Behavior normal.         Thought Content: Thought content normal.         Judgment: Judgment normal.          Lines/Drains:            Lab Results: I have reviewed the following results:  Results from last 7 days   Lab Units 03/30/25  1736   WBC Thousand/uL 11.76*   HEMOGLOBIN g/dL 12.8   HEMATOCRIT % 40.7   PLATELETS Thousands/uL 322   SEGS PCT % 84*   LYMPHO PCT % 8*   MONO PCT % 6   EOS PCT % 2     Results from last 7 days   Lab Units 03/30/25  1736   SODIUM mmol/L 137   POTASSIUM mmol/L 4.6   CHLORIDE mmol/L 107   CO2 mmol/L 18*   BUN mg/dL 52*   CREATININE  mg/dL 6.48*   ANION GAP mmol/L 12   CALCIUM mg/dL 6.8*   ALBUMIN g/dL 3.8   TOTAL BILIRUBIN mg/dL 0.32   ALK PHOS U/L 157*   ALT U/L 7   AST U/L 11*   GLUCOSE RANDOM mg/dL 215*     Results from last 7 days   Lab Units 03/30/25  1736   INR  1.09     Results from last 7 days   Lab Units 03/30/25  1647   POC GLUCOSE mg/dl 239*     Lab Results   Component Value Date    HGBA1C 7.2 (A) 02/03/2025    HGBA1C 6.8 (A) 09/23/2024    HGBA1C 7.1 (A) 05/21/2024     Results from last 7 days   Lab Units 03/30/25  1736   LACTIC ACID mmol/L 0.9   PROCALCITONIN ng/ml 0.29*       Imaging Results Review: I reviewed radiology reports from this admission including: xray(s).  Other Study Results Review: No additional pertinent studies reviewed.    Administrative Statements       ** Please Note: This note has been constructed using a voice recognition system. **

## 2025-03-30 NOTE — ASSESSMENT & PLAN NOTE
Tobacco Use: High Risk (3/30/2025)    Patient History     Smoking Tobacco Use: Every Day     Smokeless Tobacco Use: Never     Passive Exposure: Current     Tobacco cessation counseling provided for > 3 min  NRT ordered with patch and PRN nicorette gum

## 2025-03-30 NOTE — ASSESSMENT & PLAN NOTE
Known history of diabetes  Presented with concern for osteomyelitis of left pinky toe  Reported wound was present and slightly worsening in severity with associated foot swelling  X-ray on preliminary read shows findings concerning for bony erosion of the toe; pending formal radiology eval  On meeting sepsis or SIRS criteria at this time    Recent Labs     03/30/25  1736   WBC 11.76*     Trend WBC/fever curve  Obtain MRI of foot  Vancomycin IV with pharmacy consult  Consult podiatry with possible need to consult vascular surgery

## 2025-03-31 ENCOUNTER — APPOINTMENT (OUTPATIENT)
Dept: MRI IMAGING | Facility: HOSPITAL | Age: 50
DRG: 314 | End: 2025-03-31
Payer: COMMERCIAL

## 2025-03-31 ENCOUNTER — APPOINTMENT (OUTPATIENT)
Dept: VASCULAR ULTRASOUND | Facility: HOSPITAL | Age: 50
DRG: 314 | End: 2025-03-31
Payer: COMMERCIAL

## 2025-03-31 LAB
ALBUMIN SERPL BCG-MCNC: 3.1 G/DL (ref 3.5–5)
ALP SERPL-CCNC: 124 U/L (ref 34–104)
ALT SERPL W P-5'-P-CCNC: 5 U/L (ref 7–52)
ANION GAP SERPL CALCULATED.3IONS-SCNC: 7 MMOL/L (ref 4–13)
AST SERPL W P-5'-P-CCNC: 9 U/L (ref 13–39)
BASOPHILS # BLD AUTO: 0.03 THOUSANDS/ÂΜL (ref 0–0.1)
BASOPHILS NFR BLD AUTO: 0 % (ref 0–1)
BILIRUB SERPL-MCNC: 0.27 MG/DL (ref 0.2–1)
BUN SERPL-MCNC: 56 MG/DL (ref 5–25)
CALCIUM ALBUM COR SERPL-MCNC: 6.7 MG/DL (ref 8.3–10.1)
CALCIUM SERPL-MCNC: 6 MG/DL (ref 8.4–10.2)
CHLORIDE SERPL-SCNC: 114 MMOL/L (ref 96–108)
CO2 SERPL-SCNC: 18 MMOL/L (ref 21–32)
CREAT SERPL-MCNC: 6.39 MG/DL (ref 0.6–1.3)
EOSINOPHIL # BLD AUTO: 0.28 THOUSAND/ÂΜL (ref 0–0.61)
EOSINOPHIL NFR BLD AUTO: 3 % (ref 0–6)
ERYTHROCYTE [DISTWIDTH] IN BLOOD BY AUTOMATED COUNT: 13.8 % (ref 11.6–15.1)
GFR SERPL CREATININE-BSD FRML MDRD: 9 ML/MIN/1.73SQ M
GLUCOSE SERPL-MCNC: 106 MG/DL (ref 65–140)
GLUCOSE SERPL-MCNC: 116 MG/DL (ref 65–140)
GLUCOSE SERPL-MCNC: 134 MG/DL (ref 65–140)
GLUCOSE SERPL-MCNC: 150 MG/DL (ref 65–140)
GLUCOSE SERPL-MCNC: 98 MG/DL (ref 65–140)
HCT VFR BLD AUTO: 35.5 % (ref 36.5–49.3)
HGB BLD-MCNC: 10.9 G/DL (ref 12–17)
IMM GRANULOCYTES # BLD AUTO: 0.03 THOUSAND/UL (ref 0–0.2)
IMM GRANULOCYTES NFR BLD AUTO: 0 % (ref 0–2)
LYMPHOCYTES # BLD AUTO: 1.4 THOUSANDS/ÂΜL (ref 0.6–4.47)
LYMPHOCYTES NFR BLD AUTO: 14 % (ref 14–44)
MAGNESIUM SERPL-MCNC: 1.1 MG/DL (ref 1.9–2.7)
MCH RBC QN AUTO: 30.1 PG (ref 26.8–34.3)
MCHC RBC AUTO-ENTMCNC: 30.7 G/DL (ref 31.4–37.4)
MCV RBC AUTO: 98 FL (ref 82–98)
MONOCYTES # BLD AUTO: 0.84 THOUSAND/ÂΜL (ref 0.17–1.22)
MONOCYTES NFR BLD AUTO: 8 % (ref 4–12)
NEUTROPHILS # BLD AUTO: 7.61 THOUSANDS/ÂΜL (ref 1.85–7.62)
NEUTS SEG NFR BLD AUTO: 75 % (ref 43–75)
NRBC BLD AUTO-RTO: 0 /100 WBCS
PLATELET # BLD AUTO: 290 THOUSANDS/UL (ref 149–390)
PMV BLD AUTO: 9.8 FL (ref 8.9–12.7)
POTASSIUM SERPL-SCNC: 4.2 MMOL/L (ref 3.5–5.3)
PROT SERPL-MCNC: 6.8 G/DL (ref 6.4–8.4)
RBC # BLD AUTO: 3.62 MILLION/UL (ref 3.88–5.62)
SODIUM SERPL-SCNC: 139 MMOL/L (ref 135–147)
VANCOMYCIN SERPL-MCNC: 22.1 UG/ML (ref 10–20)
WBC # BLD AUTO: 10.19 THOUSAND/UL (ref 4.31–10.16)

## 2025-03-31 PROCEDURE — 93922 UPR/L XTREMITY ART 2 LEVELS: CPT | Performed by: SURGERY

## 2025-03-31 PROCEDURE — 80202 ASSAY OF VANCOMYCIN: CPT | Performed by: INTERNAL MEDICINE

## 2025-03-31 PROCEDURE — 93926 LOWER EXTREMITY STUDY: CPT

## 2025-03-31 PROCEDURE — 73718 MRI LOWER EXTREMITY W/O DYE: CPT

## 2025-03-31 PROCEDURE — 83735 ASSAY OF MAGNESIUM: CPT | Performed by: INTERNAL MEDICINE

## 2025-03-31 PROCEDURE — 99215 OFFICE O/P EST HI 40 MIN: CPT | Performed by: INTERNAL MEDICINE

## 2025-03-31 PROCEDURE — 80053 COMPREHEN METABOLIC PANEL: CPT | Performed by: INTERNAL MEDICINE

## 2025-03-31 PROCEDURE — 82948 REAGENT STRIP/BLOOD GLUCOSE: CPT

## 2025-03-31 PROCEDURE — 85025 COMPLETE CBC W/AUTO DIFF WBC: CPT | Performed by: INTERNAL MEDICINE

## 2025-03-31 PROCEDURE — 93926 LOWER EXTREMITY STUDY: CPT | Performed by: SURGERY

## 2025-03-31 RX ORDER — SODIUM BICARBONATE 650 MG/1
1300 TABLET ORAL 2 TIMES DAILY
Status: DISCONTINUED | OUTPATIENT
Start: 2025-03-31 | End: 2025-04-02

## 2025-03-31 RX ORDER — VANCOMYCIN HYDROCHLORIDE 750 MG/150ML
10 INJECTION, SOLUTION INTRAVENOUS DAILY PRN
Status: DISCONTINUED | OUTPATIENT
Start: 2025-04-01 | End: 2025-04-02 | Stop reason: HOSPADM

## 2025-03-31 RX ADMIN — INSULIN LISPRO 1 UNITS: 100 INJECTION, SOLUTION INTRAVENOUS; SUBCUTANEOUS at 17:43

## 2025-03-31 RX ADMIN — CALCITRIOL CAPSULES 0.25 MCG 0.5 MCG: 0.25 CAPSULE ORAL at 08:08

## 2025-03-31 RX ADMIN — INSULIN GLARGINE 28 UNITS: 100 INJECTION, SOLUTION SUBCUTANEOUS at 21:35

## 2025-03-31 RX ADMIN — SODIUM BICARBONATE 1300 MG: 650 TABLET ORAL at 17:42

## 2025-03-31 RX ADMIN — SODIUM BICARBONATE 650 MG: 650 TABLET ORAL at 08:08

## 2025-03-31 RX ADMIN — CLOPIDOGREL 75 MG: 75 TABLET ORAL at 08:08

## 2025-03-31 RX ADMIN — METRONIDAZOLE 500 MG: 500 INJECTION, SOLUTION INTRAVENOUS at 07:24

## 2025-03-31 RX ADMIN — INSULIN LISPRO 6 UNITS: 100 INJECTION, SOLUTION INTRAVENOUS; SUBCUTANEOUS at 12:01

## 2025-03-31 RX ADMIN — METRONIDAZOLE 500 MG: 500 INJECTION, SOLUTION INTRAVENOUS at 17:46

## 2025-03-31 RX ADMIN — AMLODIPINE BESYLATE 10 MG: 10 TABLET ORAL at 08:08

## 2025-03-31 RX ADMIN — HEPARIN SODIUM 5000 UNITS: 5000 INJECTION INTRAVENOUS; SUBCUTANEOUS at 21:35

## 2025-03-31 RX ADMIN — HYDRALAZINE HYDROCHLORIDE 50 MG: 25 TABLET ORAL at 08:08

## 2025-03-31 RX ADMIN — INSULIN LISPRO 6 UNITS: 100 INJECTION, SOLUTION INTRAVENOUS; SUBCUTANEOUS at 08:10

## 2025-03-31 RX ADMIN — METOPROLOL SUCCINATE 50 MG: 50 TABLET, EXTENDED RELEASE ORAL at 08:08

## 2025-03-31 RX ADMIN — HYDRALAZINE HYDROCHLORIDE 50 MG: 25 TABLET ORAL at 17:42

## 2025-03-31 RX ADMIN — INSULIN LISPRO 6 UNITS: 100 INJECTION, SOLUTION INTRAVENOUS; SUBCUTANEOUS at 17:43

## 2025-03-31 RX ADMIN — HEPARIN SODIUM 5000 UNITS: 5000 INJECTION INTRAVENOUS; SUBCUTANEOUS at 05:07

## 2025-03-31 RX ADMIN — ATORVASTATIN CALCIUM 80 MG: 40 TABLET, FILM COATED ORAL at 08:08

## 2025-03-31 RX ADMIN — HEPARIN SODIUM 5000 UNITS: 5000 INJECTION INTRAVENOUS; SUBCUTANEOUS at 15:28

## 2025-03-31 NOTE — PROGRESS NOTES
Adam Gonzalez is a 50 y.o. male who is currently ordered Vancomycin IV with management by the Pharmacy Consult service.  Relevant clinical data and objective / subjective history reviewed.  Vancomycin Assessment:  Indication and Goal AUC/Trough: Bone/joint infection (goal -600, trough >10)  Clinical Status:  new start  Micro:   pending  Renal Function:  SCr: 6.48 mg/dL  CrCl: 13.8 mL/min  Renal replacement: Not on dialysis but has been recommended  Days of Therapy: 1  Current Dose: 1250mg IV once  Vancomycin Plan:  New Dosing: then 750mg once after 1250mg finishes infusin to finish loading dose of 2000mg. Followed by 1250mg IV daily prn for random vancomycin level of 15mcg/ml or less  Pulse dosing  Estimated Trough: over 15 mcg/mL  Next Level: 3/31/25 AM labs  Renal Function Monitoring: Daily BMP and UOP  Pharmacy will continue to follow closely for s/sx of nephrotoxicity, infusion reactions and appropriateness of therapy.  BMP and CBC will be ordered per protocol. We will continue to follow the patient’s culture results and clinical progress daily.    Bonnie Erazo, Pharmacist

## 2025-03-31 NOTE — ASSESSMENT & PLAN NOTE
Lab Results   Component Value Date    EGFR 9 04/01/2025    EGFR 9 03/31/2025    EGFR 8 03/30/2025    CREATININE 6.02 (H) 04/01/2025    CREATININE 6.39 (H) 03/31/2025    CREATININE 6.48 (H) 03/30/2025     Follows with outpatient nephrology  Nephrology consulted overall creatinine remains stable no plan at this time to initiate dialysis

## 2025-03-31 NOTE — ASSESSMENT & PLAN NOTE
Lab Results   Component Value Date    EGFR 9 03/31/2025    EGFR 8 03/30/2025    EGFR 11 12/24/2024    CREATININE 6.39 (H) 03/31/2025    CREATININE 6.48 (H) 03/30/2025    CREATININE 5.39 (H) 12/24/2024   Patient with acute on chronic renal failure with slowly worsening kidney function with GFR of 9    Patient is quite stable asymptomatic    Does have a fistula although not ready to use    At this point I do not patient need any sort of dialysis and will monitor very closely

## 2025-03-31 NOTE — ASSESSMENT & PLAN NOTE
Lab Results   Component Value Date    HGBA1C 7.2 (A) 02/03/2025       Recent Labs     03/30/25  1647 03/30/25  2106 03/31/25  0804 03/31/25  1135   POCGLU 239* 125 134 98     Reasonable well-controlled  Blood Sugar Average: Last 72 hrs:  (P) 149

## 2025-03-31 NOTE — PLAN OF CARE
Problem: INFECTION - ADULT  Goal: Absence or prevention of progression during hospitalization  Description: INTERVENTIONS:- Assess and monitor for signs and symptoms of infection- Monitor lab/diagnostic results- Monitor all insertion sites, i.e. indwelling lines, tubes, and drains- Monitor endotracheal if appropriate and nasal secretions for changes in amount and color- [place] appropriate cooling/warming therapies per order- Administer medications as ordered- Instruct and encourage patient and family to use good hand hygiene technique- Identify and instruct in appropriate isolation precautions for identified infection/condition  Outcome: Progressing     Problem: PAIN - ADULT  Goal: Verbalizes/displays adequate comfort level or baseline comfort level  Description: Interventions:- Encourage patient to monitor pain and request assistance- Assess pain using appropriate pain scale- Administer analgesics based on type and severity of pain and evaluate response- Implement non-pharmacological measures as appropriate and evaluate response- Consider cultural and social influences on pain and pain management- Notify physician/advanced practitioner if interventions unsuccessful or patient reports new pain  Outcome: Progressing

## 2025-03-31 NOTE — OCCUPATIONAL THERAPY NOTE
Occupational Therapy         Patient Name: Adam Gonzalez  Today's Date: 3/31/2025           03/31/25 0747   Note Type   Note type Cancelled Session;Evaluation   Cancel Reasons Medical status   Additional Comments Orders received, chart reviewed. Pt is pending podiatry consult and MRI of foot. Will hold evaluation until patient is medically stable.

## 2025-03-31 NOTE — PLAN OF CARE
Problem: PAIN - ADULT  Goal: Verbalizes/displays adequate comfort level or baseline comfort level  Description: Interventions:- Encourage patient to monitor pain and request assistance- Assess pain using appropriate pain scale- Administer analgesics based on type and severity of pain and evaluate response- Implement non-pharmacological measures as appropriate and evaluate response- Consider cultural and social influences on pain and pain management- Notify physician/advanced practitioner if interventions unsuccessful or patient reports new pain  Outcome: Progressing     Problem: INFECTION - ADULT  Goal: Absence or prevention of progression during hospitalization  Description: INTERVENTIONS:- Assess and monitor for signs and symptoms of infection- Monitor lab/diagnostic results- Monitor all insertion sites, i.e. indwelling lines, tubes, and drains- Monitor endotracheal if appropriate and nasal secretions for changes in amount and color- [place] appropriate cooling/warming therapies per order- Administer medications as ordered- Instruct and encourage patient and family to use good hand hygiene technique- Identify and instruct in appropriate isolation precautions for identified infection/condition  Outcome: Progressing  Goal: Absence of fever/infection during neutropenic period  Description: INTERVENTIONS:- Monitor WBC  Outcome: Progressing     Problem: SAFETY ADULT  Goal: Patient will remain free of falls  Description: INTERVENTIONS:- Educate patient/family on patient safety including physical limitations- Instruct patient to call for assistance with activity - Consult OT/PT to assist with strengthening/mobility - Keep Call bell within reach- Keep bed low and locked with side rails adjusted as appropriate- Keep care items and personal belongings within reach- Initiate and maintain comfort rounds- Make Fall Risk Sign visible to staff- Offer Toileting every 2 Hours, in advance of need- Initiate/Maintain bed alarm-  Obtain necessary fall risk management equipment- Apply yellow socks and bracelet for high fall risk patients- Consider moving patient to room near nurses station  INTERVENTIONS:- Educate patient/family on patient safety including physical limitations- Instruct patient to call for assistance with activity - Consult OT/PT to assist with strengthening/mobility - Keep Call bell within reach- Keep bed low and locked with side rails adjusted as appropriate- Keep care items and personal belongings within reach- Initiate and maintain comfort rounds- Make Fall Risk Sign visible to staff- Offer Toileting every 3 Hours, in advance of need- Initiate/Maintain 3alarm- Obtain necessary fall risk management equipment: 3- Apply yellow socks and bracelet for high fall risk patients- Consider moving patient to room near nurses station  Outcome: Progressing  Goal: Maintain or return to baseline ADL function  Description: INTERVENTIONS:-  Assess patient's ability to carry out ADLs; assess patient's baseline for ADL function and identify physical deficits which impact ability to perform ADLs (bathing, care of mouth/teeth, toileting, grooming, dressing, etc.)- Assess/evaluate cause of self-care deficits - Assess range of motion- Assess patient's mobility; develop plan if impaired- Assess patient's need for assistive devices and provide as appropriate- Encourage maximum independence but intervene and supervise when necessary- Involve family in performance of ADLs- Assess for home care needs following discharge - Consider OT consult to assist with ADL evaluation and planning for discharge- Provide patient education as appropriate  Outcome: Progressing  Goal: Maintains/Returns to pre admission functional level  Description: INTERVENTIONS:- Perform AM-PAC 6 Click Basic Mobility/ Daily Activity assessment daily.- Set and communicate daily mobility goal to care team and patient/family/caregiver. - Collaborate with rehabilitation services on  mobility goals if consulted- Perform Range of Motion 3 times a day.- Reposition patient every 3 hours.- Dangle patient 3 times a day- Stand patient 3 times a day- Ambulate patient 3 times a day- Out of bed to chair 3 times a day - Out of bed for meals 3 times a day- Out of bed for toileting- Record patient progress and toleration of activity level   Outcome: Progressing     Problem: DISCHARGE PLANNING  Goal: Discharge to home or other facility with appropriate resources  Description: INTERVENTIONS:- Identify barriers to discharge w/patient and caregiver- Arrange for needed discharge resources and transportation as appropriate- Identify discharge learning needs (meds, wound care, etc.)- Arrange for interpretive services to assist at discharge as needed- Refer to Case Management Department for coordinating discharge planning if the patient needs post-hospital services based on physician/advanced practitioner order or complex needs related to functional status, cognitive ability, or social support system  Outcome: Progressing     Problem: Knowledge Deficit  Goal: Patient/family/caregiver demonstrates understanding of disease process, treatment plan, medications, and discharge instructions  Description: Complete learning assessment and assess knowledge base.Interventions:- Provide teaching at level of understanding- Provide teaching via preferred learning methods  Outcome: Progressing     Problem: Potential for Falls  Goal: Patient will remain free of falls  Description: INTERVENTIONS:- Educate patient/family on patient safety including physical limitations- Instruct patient to call for assistance with activity - Consult OT/PT to assist with strengthening/mobility - Keep Call bell within reach- Keep bed low and locked with side rails adjusted as appropriate- Keep care items and personal belongings within reach- Initiate and maintain comfort rounds- Make Fall Risk Sign visible to staff- Offer Toileting every 3 Hours, in  advance of need- Initiate/Maintain bed alarm- Obtain necessary fall risk management equipment- Apply yellow socks and bracelet for high fall risk patients- Consider moving patient to room near nurses station  INTERVENTIONS:- Educate patient/family on patient safety including physical limitations- Instruct patient to call for assistance with activity - Consult OT/PT to assist with strengthening/mobility - Keep Call bell within reach- Keep bed low and locked with side rails adjusted as appropriate- Keep care items and personal belongings within reach- Initiate and maintain comfort rounds- Make Fall Risk Sign visible to staff- Offer Toileting every 3 Hours, in advance of need- Initiate/Maintain bed alarm- Obtain necessary fall risk management equipment- Apply yellow socks and bracelet for high fall risk patients- Consider moving patient to room near nurses station  Outcome: Progressing     Problem: Nutrition/Hydration-ADULT  Goal: Nutrient/Hydration intake appropriate for improving, restoring or maintaining nutritional needs  Description: Monitor and assess patient's nutrition/hydration status for malnutrition. Collaborate with interdisciplinary team and initiate plan and interventions as ordered.  Monitor patient's weight and dietary intake as ordered or per policy. Utilize nutrition screening tool and intervene as necessary. Determine patient's food preferences and provide high-protein, high-caloric foods as appropriate. INTERVENTIONS:- Monitor oral intake, urinary output, labs, and treatment plans- Assess nutrition and hydration status and recommend course of action- Evaluate amount of meals eaten- Assist patient with eating if necessary - Allow adequate time for meals- Recommend/ encourage appropriate diets, oral nutritional supplements, and vitamin/mineral supplements- Order, calculate, and assess calorie counts as needed- Recommend, monitor, and adjust tube feedings and TPN/PPN based on assessed needs- Assess  need for intravenous fluids- Provide specific nutrition/hydration education as appropriate- Include patient/family/caregiver in decisions related to nutrition  Outcome: Progressing

## 2025-03-31 NOTE — ASSESSMENT & PLAN NOTE
Lab Results   Component Value Date    HGBA1C 7.2 (A) 02/03/2025     Recent Labs     03/30/25  1736 03/30/25  2106 03/31/25  0502 03/31/25  0804 04/01/25  0707 04/01/25  0830 04/01/25  0832 04/01/25  1121   POCGLU  --    < >  --    < > 68 115  --  175*   GLUC 215*  --  106  --   --   --  114  --     < > = values in this interval not displayed.     Hold home regimen while inpatient and resume on discharge  Diabetic diet  Insulin regimen  Humalog 6 units TID AC  Patient with hypoglycemia will decrease Lantus dose to 15 units  Glucose checks and Insulin correction ACHS  Goal -180 while admitted, adjusting insulin regimen as appropriate  Monitor for hypoglycemia and treat per protocol

## 2025-03-31 NOTE — PHYSICAL THERAPY NOTE
Physical Therapy Cancellation     Patient's Name: Adam Gonzalez    Admitting Diagnosis  Cellulitis [L03.90]  Osteomyelitis (HCC) [M86.9]  Diabetic foot infection  (HCC) [E11.628, L08.9]  Stage 5 chronic kidney disease not on chronic dialysis ([facility]) [N18.5]    Problem List  Patient Active Problem List   Diagnosis    Hypercholesterolemia    Nephrotic range proteinuria    Current smoker    Essential hypertension    Type 2 diabetes mellitus with circulatory disorder, with long-term current use of insulin (HCC)    Brachial plexus injury, left, sequela    Diabetic polyneuropathy associated with type 2 diabetes mellitus (HCC)    Aortic valve insufficiency ( mild-mod 2020 )    History of embolic stroke    History of CVA (cerebrovascular accident)    Bilateral Cerebrovascular accident (CVA) status post tPA    Acute on chronic renal failure  (HCC)    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)    TSH elevation in past    Acute kidney injury superimposed on stage 5 chronic kidney disease, not on chronic dialysis (HCC)    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    Impaired balance as late effect of cerebrovascular accident (CVA)    Noncompliance with treatment plan    Secondary hyperparathyroidism of renal origin (HCC)    Implantable loop recorder present    Vitamin D deficiency    Hypertensive CKD (chronic kidney disease)    Metabolic acidosis    Chronic bronchitis with COPD (chronic obstructive pulmonary disease) (HCC)    Diabetic foot infection  (HCC)       Past Medical History  Past Medical History:   Diagnosis Date    Chronic kidney disease     Diabetes mellitus (HCC)     Hypertension     Received intravenous tissue plasminogen activator (tPA) in emergency department 02/01/2022    Stroke ([facility])     no deficits       Past Surgical History  Past Surgical History:   Procedure Laterality Date    CARDIAC LOOP RECORDER  2023    PILONIDAL CYST EXCISION      [place] ARTERIOVENOUS ANASTOMOSIS OPEN DIRECT Left 4/10/2024     Procedure: CREATION FISTULA BRACHIOCEPHALIC LEFT (AV);  Surgeon: Maxwell Nielsen MD;  Location: [facility];  Service: Vascular        03/31/25 1421   PT Last Visit   PT Visit Date 03/31/25   Note Type   Note type Cancelled Session   Cancel Reasons Medical status   Additional Comments PT order received. Chart review performed. At this time, PT evaluation cancelled due to pt pending podiatry consult and foot MRI. PT will continue to follow and evaluate as patient is medically appropriate for skilled PT interventions.             Karuna Candelario, PT

## 2025-03-31 NOTE — ASSESSMENT & PLAN NOTE
Lab Results   Component Value Date    HGBA1C 7.2 (A) 02/03/2025       Recent Labs     03/30/25  1647 03/30/25  2106 03/31/25  0804 03/31/25  1135   POCGLU 239* 125 134 98       Blood Sugar Average: Last 72 hrs:  (P) 149  Glucose is fluctuating.  As a diabetic foot infection will be monitored by podiatrist and will be getting IV antibiotic

## 2025-03-31 NOTE — PROGRESS NOTES
Adam Gonzalez is a 50 y.o. male who is currently ordered Vancomycin IV with management by the Pharmacy Consult service.  Relevant clinical data and objective / subjective history reviewed.  Vancomycin Assessment:  Indication and Goal AUC/Trough: Bone/joint infection (goal -600, trough >10)  Clinical Status: stable  Micro:     Renal Function:  SCr: 6.39 mg/dL  CrCl: 14 mL/min  Renal replacement: Not on dialysis  Days of Therapy: 2  Current Dose: 1250mg IV daily PRN when vancomycin random level <15  Vancomycin Plan:  New Dosinmg IV daily PRN when vancomycin random level <15  Next Level: 25 AM level   Renal Function Monitoring: Daily BMP and UOP  Pharmacy will continue to follow closely for s/sx of nephrotoxicity, infusion reactions and appropriateness of therapy.  BMP and CBC will be ordered per protocol. We will continue to follow the patient’s culture results and clinical progress daily.    Zulema Abarca, Pharmacist

## 2025-03-31 NOTE — TELEPHONE ENCOUNTER
Pts significant other called back looking for fax and informed her it still is not in the system Pt will contact [employer] to re fax.

## 2025-03-31 NOTE — CONSULTS
Consultation - Nephrology   Adam Gonzalez 50 y.o. male MRN: [id number]  Unit/Bed#: -01 Encounter: [id number]    Referring PHYSICIAN: Shantanu Triana    REASON FOR THE CONSULTATION: Acute on chronic renal failure    DATE OF CONSULTATION: March 31, 2025    ADMISSION DIAGNOSIS: Diabetic foot infection  (HCC)     PLAN / RECOMMENDATIONS      Assessment & Plan  Acute kidney injury superimposed on stage 5 chronic kidney disease, not on chronic dialysis (HCC)  Lab Results   Component Value Date    EGFR 9 03/31/2025    EGFR 8 03/30/2025    EGFR 11 12/24/2024    CREATININE 6.39 (H) 03/31/2025    CREATININE 6.48 (H) 03/30/2025    CREATININE 5.39 (H) 12/24/2024   Patient with acute on chronic renal failure with slowly worsening kidney function with GFR of 9    Patient is quite stable asymptomatic    Does have a fistula although not ready to use    At this point I do not patient need any sort of dialysis and will monitor very closely  Diabetic foot infection  (HCC)  Lab Results   Component Value Date    HGBA1C 7.2 (A) 02/03/2025       Recent Labs     03/30/25  1647 03/30/25  2106 03/31/25  0804 03/31/25  1135   POCGLU 239* 125 134 98       Blood Sugar Average: Last 72 hrs:  (P) 149  Glucose is fluctuating.  As a diabetic foot infection will be monitored by podiatrist and will be getting IV antibiotic  Type 2 diabetes mellitus with circulatory disorder, with long-term current use of insulin (HCC)  Lab Results   Component Value Date    HGBA1C 7.2 (A) 02/03/2025       Recent Labs     03/30/25  1647 03/30/25  2106 03/31/25  0804 03/31/25  1135   POCGLU 239* 125 134 98     Reasonable well-controlled  Blood Sugar Average: Last 72 hrs:  (P) 149    Everything discussed with the patient at length.  Will monitor closely with    Thank you for the consultation to participate in patient's care. I have personally discussed my plan with the referring physician  CHIEF COMPLAINT     Patient came to the hospital with diabetic foot  infection    HPI     Overall feeling well and no acute complaint    No nausea no vomiting    Denies any urinary complaint    No chest pain no palpitation    PAST MEDICAL HISTORY     Past Medical History:   Diagnosis Date    Chronic kidney disease     Diabetes mellitus (HCC)     Hypertension     Received intravenous tissue plasminogen activator (tPA) in emergency department 02/01/2022    Stroke (HCC)     no deficits       PAST SURGICAL HISTORY     Past Surgical History:   Procedure Laterality Date    CARDIAC LOOP RECORDER  2023    PILONIDAL CYST EXCISION      [place] ARTERIOVENOUS ANASTOMOSIS OPEN DIRECT Left 4/10/2024    Procedure: CREATION FISTULA BRACHIOCEPHALIC LEFT (AV);  Surgeon: Maxwell Nielsen MD;  Location: [facility];  Service: Vascular       ALLERGIES     No Known Allergies    SOCIAL HISTORY     Social History     Substance and Sexual Activity   Alcohol Use Not Currently    Comment: stopped 8/22     Social History     Substance and Sexual Activity   Drug Use Never     Social History     Tobacco Use   Smoking Status Every Day    Current packs/day: 0.50    Average packs/day: 1 pack/day for 37.8 years (36.7 ttl pk-yrs)    Types: Cigarettes    Start date: 6/16/1987    Passive exposure: Current   Smokeless Tobacco Never   Tobacco Comments    states slowing it down, used Chantix-currently 1/2 pack/day, at least 35 pack years as of 2023       FAMILY HISTORY     Family History   Problem Relation Age of Onset    No Known Problems Mother     No Known Problems Father     Pancreatic cancer Brother        CURRENT MEDICATIONS       Current Facility-Administered Medications:     acetaminophen (TYLENOL) tablet 975 mg, 975 mg, Oral, Q8H PRN, Shantanu Parameswaran, DO, 975 mg at 03/30/25 2108    amLODIPine (NORVASC) tablet 10 mg, 10 mg, Oral, Daily, Shantanu Parameswaran, DO, 10 mg at 03/31/25 0808    atorvastatin (LIPITOR) tablet 80 mg, 80 mg, Oral, Daily, Shantanu Parameswaran, DO, 80 mg at 03/31/25 0808    calcitriol  (ROCALTROL) capsule 0.5 mcg, 0.5 mcg, Oral, Daily, Shantanu Alba, DO, 0.5 mcg at 03/31/25 0808    clopidogrel (PLAVIX) tablet 75 mg, 75 mg, Oral, Daily, Shantanu Hairstonan, DO, 75 mg at 03/31/25 0808    docusate sodium (COLACE) capsule 100 mg, 100 mg, Oral, BID, Shantanu Alba, DO, 100 mg at 03/30/25 2109    heparin (porcine) subcutaneous injection 5,000 Units, 5,000 Units, Subcutaneous, Q8H HAYLEE, 5,000 Units at 03/31/25 0507 **AND** [CANCELED] Platelet count, , , Once, Shantanu Alba DO    hydrALAZINE (APRESOLINE) tablet 50 mg, 50 mg, Oral, BID, Shantanu Alba, DO, 50 mg at 03/31/25 0808    insulin glargine (LANTUS) subcutaneous injection 28 Units 0.28 mL, 28 Units, Subcutaneous, HS, Shantanu Alba, DO, 28 Units at 03/30/25 2109    insulin lispro (HumALOG/ADMELOG) 100 units/mL subcutaneous injection 1-6 Units, 1-6 Units, Subcutaneous, TID AC **AND** Fingerstick Glucose (POCT), , , TID AC, Shantanu Hairstonan, DO    insulin lispro (HumALOG/ADMELOG) 100 units/mL subcutaneous injection 1-6 Units, 1-6 Units, Subcutaneous, HS, Shantanu Hairstonan, DO    insulin lispro (HumALOG/ADMELOG) 100 units/mL subcutaneous injection 6 Units, 6 Units, Subcutaneous, TID With Meals, Shantanu Alba, DO, 6 Units at 03/31/25 1201    metoprolol succinate (TOPROL-XL) 24 hr tablet 50 mg, 50 mg, Oral, Daily, Shantanu Hairstonan, DO, 50 mg at 03/31/25 0808    metroNIDAZOLE (FLAGYL) IVPB (premix) 500 mg 100 mL, 500 mg, Intravenous, Q12H, Shantanu Alba DO, Last Rate: 200 mL/hr at 03/31/25 0724, 500 mg at 03/31/25 0724    nicotine (NICODERM CQ) 21 mg/24 hr TD 24 hr patch 1 patch, 1 patch, Transdermal, Daily, Shantanu Alba DO    ondansetron (ZOFRAN) injection 4 mg, 4 mg, Intravenous, Q6H PRN, Shantanu Alba DO    simethicone (MYLICON) chewable tablet 80 mg, 80 mg, Oral, 4x Daily PRN, Shantanu Alba DO    sodium bicarbonate tablet 1,300 mg, 1,300 mg, Oral, BID, YASMIN Gresham    [START ON  4/1/2025] vancomycin (VANCOCIN) IVPB (premix in dextrose) 750 mg 150 mL, 10 mg/kg, Intravenous, Daily PRN, Shantanu Alba DO    REVIEW OF SYSTEMS     Review of Systems   Constitutional:  Negative for fatigue.   HENT:  Negative for congestion.    Eyes:  Negative for photophobia and pain.   Respiratory:  Negative for chest tightness and shortness of breath.    Cardiovascular:  Negative for chest pain and palpitations.   Gastrointestinal:  Negative for abdominal distention, abdominal pain and blood in stool.   Endocrine: Negative for polydipsia.   Genitourinary:  Negative for difficulty urinating, dysuria, flank pain, hematuria and urgency.   Musculoskeletal:  Negative for arthralgias and back pain.   Skin:  Negative for rash.   Neurological:  Negative for dizziness, light-headedness and headaches.   Hematological:  Does not bruise/bleed easily.   Psychiatric/Behavioral:  Negative for behavioral problems. The patient is not nervous/anxious.        LAB RESULTS        Results from last 7 days   Lab Units 03/31/25  0502 03/30/25  1736   WBC Thousand/uL 10.19* 11.76*   HEMOGLOBIN g/dL 10.9* 12.8   HEMATOCRIT % 35.5* 40.7   PLATELETS Thousands/uL 290 322   POTASSIUM mmol/L 4.2 4.6   CHLORIDE mmol/L 114* 107   CO2 mmol/L 18* 18*   BUN mg/dL 56* 52*   CREATININE mg/dL 6.39* 6.48*   EGFR ml/min/1.73sq m 9 8   CALCIUM mg/dL 6.0* 6.8*   MAGNESIUM mg/dL 1.1*  --        I have personally reviewed the old medical records and patient's previously known baseline creatinine level is ~6    RADIOLOGY RESULTS     Results for orders placed during the hospital encounter of 09/27/21    XR chest 1 view portable    Narrative  CHEST    INDICATION:   stroke alert protocol.  Smoker.    COMPARISON:  4/22/2021    EXAM PERFORMED/VIEWS:  XR CHEST PORTABLE      FINDINGS:    Cardiomediastinal silhouette appears unremarkable.    The lungs are clear.  No pneumothorax or pleural effusion.    Osseous structures appear within normal limits for patient  age.    Impression  No acute cardiopulmonary disease.            Workstation performed: [id number]    Results for orders placed during the hospital encounter of 04/22/21    XR chest pa & lateral    Narrative  CHEST    INDICATION:   R60.0: Localized edema.    COMPARISON:  February 22, 2019    EXAM PERFORMED/VIEWS:  XR CHEST PA & LATERAL  The frontal view was performed utilizing dual energy radiographic technique.  Images: 4    FINDINGS:  Lungs are well-aerated.    Cardiomediastinal silhouette appears unremarkable.    The lungs are clear.  No pneumothorax or pleural effusion.    Osseous structures appear within normal limits for patient age.    Impression  No acute cardiopulmonary disease.            Workstation performed: [id number]    No results found for this or any previous visit.    No results found for this or any previous visit.    No results found for this or any previous visit.    No results found for this or any previous visit.      OBJECTIVE     Current Weight: Weight - Scale: 86.6 kg (191 lb)  Vitals:    03/31/25 0805   BP: 149/81   Pulse: 92   Resp: 19   Temp: 98.5 °F (36.9 °C)   SpO2: 98%       Intake/Output Summary (Last 24 hours) at 3/31/2025 1203  Last data filed at 3/31/2025 0108  Gross per 24 hour   Intake 290 ml   Output 750 ml   Net -460 ml       PHYSICAL EXAMINATION     Physical Exam  Constitutional:       General: He is not in acute distress.     Appearance: He is well-developed.   HENT:      Head: Normocephalic.      Mouth/Throat:      Mouth: Mucous membranes are moist.   Eyes:      General: No scleral icterus.     Conjunctiva/sclera: Conjunctivae normal.   Neck:      Vascular: No JVD.   Cardiovascular:      Rate and Rhythm: Normal rate.      Heart sounds: Normal heart sounds.   Pulmonary:      Effort: Pulmonary effort is normal.      Breath sounds: No wheezing.   Abdominal:      Palpations: Abdomen is soft.      Tenderness: There is no abdominal tenderness.   Musculoskeletal:          "General: Normal range of motion.      Cervical back: Neck supple.   Skin:     General: Skin is warm.      Findings: No rash.   Neurological:      Mental Status: He is alert and oriented to person, place, and time.   Psychiatric:         Behavior: Behavior normal.          .     Devendra Brand MD  Nephrology  3/31/2025        Portions of the record may have been created with voice recognition software. Occasional wrong word or \"sound a like\" substitutions may have occurred due to the inherent limitations of voice recognition software. Read the chart carefully and recognize, using context, where substitutions have occurred.  "

## 2025-03-31 NOTE — ASSESSMENT & PLAN NOTE
Known history of diabetes  Presented with concern for osteomyelitis of left pinky toe  Reported wound was present and slightly worsening in severity with associated foot swelling  X-ray on preliminary read shows findings concerning for bony erosion of the toe; pending formal radiology eval  MRI pending  Podiatry consult pending  Not meeting sepsis or SIRS criteria at this time    Recent Labs     03/30/25  1736 03/31/25  0502 04/01/25  0832   WBC 11.76* 10.19* 10.95*     Trend WBC/fever curve  MRI of the foot: Osteomyelitis of the fifth middle and distal metatarsal with early acute osteomyelitis of the proximal phalanx  Vancomycin IV with pharmacy consult  Formal podiatry consult patient 2 OR tomorrow  Arterial vas study showing 50-75% stenosis of th mid SFA   Consulted Vascular no need for vascular intervention at this time patient can proceed to the OR via podiatry if wound which show to be not healing can discuss an angiogram at a later date

## 2025-03-31 NOTE — CASE MANAGEMENT
Case Management Progress Note    Patient name Adam Gonzalez  Location /-01 MRN [id number]  : 1975 Date 3/31/2025       LOS (days): 0  Geometric Mean LOS (GMLOS) (days):   Days to GMLOS:        OBJECTIVE:        Current admission status: Observation  Preferred Pharmacy:   [id number] - [place], [place] - [address]  [address]  [facility] [place] [address]  Phone: [phone] Fax: [phone]    Primary Care Provider: Evans Birmingham MD    Primary Insurance: [employer] [facility]  Secondary Insurance:     PROGRESS NOTE:  Patient reviewed with SLIM during interdisciplinary rounds.  Anticipated to be medically ready for d/c in 48-72hrs.  AMPAC is 24.  Patient is insured and has a PCP on file.  No OP CM referral indicators identified on chart review.  CM assessment pending at this time.  Will continue to follow for needs/planning.

## 2025-03-31 NOTE — UTILIZATION REVIEW
WAS OBSERVATION 3/30/25 @ 1954 CONVERTED TO INPATIENT ADMISSION 4/1/25 @ 1353 DUE TO CONTINUED STAY REQUIRED TO CARE FOR PATIENT WITH OSTEO requiring IV abx and surgery.     Initial Clinical Review    Admission: Date/Time/Statement:   Admission Orders (From admission, onward)       Ordered        04/01/25 1353  INPATIENT ADMISSION  Once            03/30/25 1954  Place in Observation  Once                          Orders Placed This Encounter   Procedures    INPATIENT ADMISSION     Standing Status:   Standing     Number of Occurrences:   1     Level of Care:   Med Surg [16]     Estimated length of stay:   More than 2 Midnights     Certification:   I certify that inpatient services are medically necessary for this patient for a duration of greater than two midnights. See H&P and MD Progress Notes for additional information about the patient's course of treatment.     ED Arrival Information       Expected   -    Arrival   3/30/2025 16:30    Acuity   Urgent              Means of arrival   Walk-In    Escorted by   Family Member    Service   Hospitalist    Admission type   Emergency              Arrival complaint   Leg Swelling             Chief Complaint   Patient presents with    Ankle Swelling     Pt c/o left ankle swelling x 1 week, pt also has wound on left pinkie toe that was noticed yesterday        Initial Presentation: 50 y.o. male who presented w/ family to [facility] ED. Admitted as Observation for evaluation and treatment of GATITO, Diabetic foot infection.      PMHx:  has a past medical history of Chronic kidney disease, Diabetes mellitus (HCC), Hypertension, Received intravenous tissue plasminogen activator (tPA) in emergency department (02/01/2022), and Stroke (Formerly [facility]).  Presented w/ worsening ankle swelling, specifically sent for infection and cellulitis of the left foot. On exam, lesion present. Abnormal Labs Bun/Creat 52/6.48, Alk phos 157, Procal 0.29, WBC 11.76. . X-ray on preliminary read shows  findings concerning for bony erosion of the toe; pending formal radiology eval. See below med list for meds given in the ED.     Plan: IV Vanco, Obtain MRI of foot, Trend WBC/fever curve, Hold home diabetic regimen. Diabetic diet, Humalog TID/AC, Lantus QHS, Glucose checks and insulin correction ACHS. Goal -180, adjust insulin as appropriate. Nephrology  consulted.    Date: 3/31/25   Day 2: REMAINS OBSERVATION   Overall feeling well. No acute complaints. Abnormal labs: CO2 18, Bun/Creat 56/6.39. Mg 1.1, Alk phos 124. WBC 10.19, H/H 10.9/35.5. Plan: Flagyl IV, MRI foot pending,       Nephrology: Acute on chronic renal failure. Pt is quite stable -asymptomatic. Has fistula although not ready to use. At this time, pt does not need any sort of dialysis. Will monitor very closely.     4/1/25 BED STATUS CHANGED TO INPATIENT  Pt denies acute complaints. On exam,   Open dried wound to the left fifth metatarsal noted swelling of metatarsal and surrounding tissue. MRI of the foot: Osteomyelitis of the fifth middle and distal metatarsal with early acute osteomyelitis of the proximal phalanx Abnormal labs: CO2 16, Bun/Creat 55/6.02, Ca 6.4, WBC 10.95, Hgb 11.5. Plan: Per Vascular no need for vascular intervention at this time. Continue IV Vanco, Pt to OR tomorrow per Podiatry -see below plans. NPO at MN. Decrease Lantus dose this evening. BS goal 140-180. No dialysis per Nephrology. F/U labs in am.     Podiatry:Left foot fifth digit wound to bone. +Osteo, plan: OR tomorrow for Amputation L foot 5th digit     Vascular: Left foot wound. MRI Acute Osteo.    LEAD 3/31/25-R DI 1.04/127/140; L DI 0.94/119/69 w/ 50-75% mid SFA stenosis. No vascular intervention at this time. Podiatry following. Continue Plavix and Statin. Strict glucose control for optimal wound healing.     Certification Statement: The patient will continue to require additional inpatient hospital stay due to ongoing treatment in setting of osteomyelitis of  left fifth metatarsal.    4/2/25 4/2/25 Operative Note:  Procedure(s):  Left - AMPUTATION TOE  Anesthesia Type:   IV Sedation with Anesthesia  Operative Findings:  Purulence, soft grey bone     Stable for discharge today. Will discharge home with PO Doxycycline given purulence seen during procedure despite surgical cure. Seen by PT/OT prior to discharge. Patient advised multiple times that he is to be non-weight bearing to the left foot.     ED Treatment-Medication Administration from 03/30/2025 1630 to 03/30/2025 2043         Date/Time Order Dose Route Action     03/30/2025 2003 vancomycin (VANCOCIN) 1,250 mg in sodium chloride 0.9 % 250 mL IVPB 1,250 mg Intravenous New Bag     03/30/2025 1840 cefepime (MAXIPIME) 2 g/50 mL dextrose IVPB 2,000 mg Intravenous New Bag     03/30/2025 1935 metroNIDAZOLE (FLAGYL) IVPB (premix) 500 mg 100 mL 500 mg Intravenous New Bag     03/30/2025 1842 sodium chloride 0.9 % bolus 1,000 mL 1,000 mL Intravenous New Bag            Scheduled Medications:  amLODIPine, 10 mg, Oral, Daily  atorvastatin, 80 mg, Oral, Daily  calcitriol, 0.5 mcg, Oral, Daily  clopidogrel, 75 mg, Oral, Daily  docusate sodium, 100 mg, Oral, BID  heparin (porcine), 5,000 Units, Subcutaneous, Q8H HAYLEE  hydrALAZINE, 50 mg, Oral, BID  insulin lispro, 1-6 Units, Subcutaneous, TID AC  insulin lispro, 1-6 Units, Subcutaneous, HS  insulin lispro, 6 Units, Subcutaneous, TID With Meals  metoprolol succinate, 50 mg, Oral, Daily  metroNIDAZOLE, 500 mg, Intravenous, Q12H  nicotine, 1 patch, Transdermal, Daily  sodium bicarbonate, 1,300 mg, Oral, BID  sodium bicarbonate tablet 650 mg  Dose: 650 mg  Freq: 2 times daily Route: PO  Start: 03/30/25 2015 End: 03/31/25 0822  insulin glargine (LANTUS) subcutaneous injection 28 Units 0.28 mL  Dose: 28 Units  Freq: Daily at bedtime Route: SC  Start: 03/30/25 2200 End: 04/01/25 1031  insulin glargine (LANTUS) subcutaneous injection 15 Units 0.15 mL  Dose: 15 Units  Freq: Daily at bedtime  Route: SC  Start: 04/01/25 2200    vancomycin (VANCOCIN) IVPB (premix in dextrose) 750 mg 150 mL  Dose: 750 mg  Freq: Once Route: IV  Last Dose: Stopped (04/02/25 1153)  Start: 04/02/25 1000 End: 04/02/25 1153    Continuous IV Infusions: NONE   sodium bicarbonate 75 mEq in sodium chloride 0.45 % 1,000 mL infusion, 100 mL/hr, Intravenous, Continuous      PRN Meds:  acetaminophen, 975 mg, Oral, Q8H PRN - given x1 3/30  ondansetron, 4 mg, Intravenous, Q6H PRN  simethicone, 80 mg, Oral, 4x Daily PRN  [START ON 4/1/2025] vancomycin, 10 mg/kg, Intravenous, Daily PRN      ED Triage Vitals   Temperature Pulse Respirations Blood Pressure SpO2 Pain Score   03/30/25 1640 03/30/25 1640 03/30/25 1640 03/30/25 [facility] 03/30/25 [facility] 03/30/25 2108   99.6 °F (37.6 °C) 102 19 (!) 205/96 100 % 3     Weight (last 2 days)       Date/Time Weight    03/31/25 08:05:18 86.6 (191)            Vital Signs (last 3 days)       Date/Time Temp Pulse Resp BP MAP (mmHg) SpO2 O2 Device Patient Position - Orthostatic VS Saritha Coma Scale Score Pain    04/02/25 0935 -- 84 -- 133/76 95 -- -- -- -- --    04/02/25 0930 98.4 °F (36.9 °C) 90 -- 135/77 96 100 % -- -- -- --    04/02/25 09:28:08 98.4 °F (36.9 °C) 84 -- 135/77 96 100 % -- -- -- --    04/02/25 0925 98.4 °F (36.9 °C) 85 -- 135/77 96 99 % -- -- -- --    04/02/25 0910 -- -- -- 137/79 98 -- -- -- -- --    04/02/25 0845 -- 27 -- 135/80 98 84 % -- -- -- --    04/02/25 08:37:23 -- -- -- 135/80 98 -- -- -- -- --    04/02/25 0815 97.6 °F (36.4 °C) 83 20 151/79 109 99 % None (Room air) -- 15 No Pain    04/02/25 0800 -- 79 21 122/60 87 98 % None (Room air) -- 15 No Pain    04/02/25 0755 98.3 °F (36.8 °C) 80 24 121/66 88 99 % None (Room air) -- 15 No Pain    04/02/25 0650 97.9 °F (36.6 °C) 84 18 170/81 -- 100 % None (Room air) -- -- No Pain    04/01/25 22:22:08 98.2 °F (36.8 °C) 91 -- 131/72 92 98 % -- -- -- --    04/01/25 2045 -- -- -- -- -- -- -- -- 15 No Pain    04/01/25 18:19:02 -- -- -- 136/69 91 -- --  -- -- --    04/01/25 15:18:04 98 °F (36.7 °C) 97 18 136/69 91 100 % None (Room air) Lying -- --    04/01/25 15:17:28 98 °F (36.7 °C) -- -- 136/69 91 -- -- -- -- --    04/01/25 0815 -- -- -- -- -- 98 % None (Room air) -- 15 No Pain    04/01/25 07:09:48 97.7 °F (36.5 °C) 90 18 139/69 92 99 % None (Room air) Lying -- --    03/31/25 22:20:10 98.1 °F (36.7 °C) 84 -- 124/74 91 98 % -- -- -- --    03/31/25 2025 -- -- -- -- -- 98 % None (Room air) -- 15 No Pain    03/31/25 15:01:36 99.1 °F (37.3 °C) -- 18 139/75 96 -- -- -- -- --    03/31/25 08:05:18 98.5 °F (36.9 °C) 92 19 149/81 104 98 % -- -- 15 No Pain    03/31/25 05:18:45 97.9 °F (36.6 °C) 86 -- 162/86 111 99 % -- -- -- --    03/31/25 0100 -- -- -- -- -- -- -- -- 15 --    03/31/25 0001 -- -- -- -- -- -- -- -- -- No Pain    03/30/25 22:43:24 98.4 °F (36.9 °C) 101 17 124/63 83 97 % None (Room air) Lying -- No Pain    03/30/25 2108 -- -- -- -- -- -- -- -- -- 3    03/30/25 21:02:46 98.2 °F (36.8 °C) 98 -- 200/98 132 98 % -- -- -- --    03/30/25 1845 -- 100 18 160/70 -- 100 % -- Lying -- --    03/30/25 1642 -- -- -- -- -- -- -- -- 15 --    03/30/25 1640 99.6 °F (37.6 °C) 102 19 205/96 -- 100 % None (Room air) Sitting -- --              Pertinent Labs/Diagnostic Test Results:   Radiology:  XR foot 3+ vw left   Final Interpretation by Elgin Vee MD (04/02 0854)      Resection of the fifth toe at the MTP joint.         Computerized Assisted Algorithm (CAA) may have been used to analyze all applicable images.         Workstation performed: [id number]         VAS DIALYSIS ACCESS EVALUATION- LEFT AV(Upper)   Final Interpretation by Saji Demarco MD (04/01 8882)      MRI foot/forefoot toes left wo contrast   Final Interpretation by Matt Rodriguez DO (04/01 4161)      Acute osteomyelitis involving the fifth middle and distal phalanges with early acute osteomyelitis involving the head of the fifth proximal phalanx            Workstation performed: [id number]          VAS ARTERIAL DUPLEX-LOWER LIMB UNILATERAL   Final Interpretation by Maxwell Nielsen MD (03/31 1459)      XR foot 3+ views LEFT   Final Interpretation by Bernabe Castillo MD (03/31 1457)      Findings suspicious for acute osteomyelitis of the fifth distal phalanx and possibly the middle phalanx. Suggest further characterization with MRI.      Computerized Assisted Algorithm (CAA) may have been used to analyze all applicable images.         The study was marked in EPIC for immediate notification.         Resident: Alley Black      I, the attending radiologist, have reviewed the images and agree with the final report above.      Workstation performed: [id number]         VAS DIALYSIS ACCESS EVALUATION- LEFT AV(Upper)    (Results Pending)     Results from last 7 days   Lab Units 04/02/25  0540 04/01/25  0832 03/31/25  0502 03/30/25  1736   WBC Thousand/uL 10.84* 10.95* 10.19* 11.76*   HEMOGLOBIN g/dL 10.6* 11.5* 10.9* 12.8   HEMATOCRIT % 33.3* 37.5 35.5* 40.7   PLATELETS Thousands/uL 286 281 290 322   TOTAL NEUT ABS Thousands/µL  --   --  7.61 9.78*         Results from last 7 days   Lab Units 04/02/25  0540 04/01/25 2010 04/01/25  0832 03/31/25  0502 03/30/25  1736   SODIUM mmol/L 138  --  138 139 137   POTASSIUM mmol/L 4.2  --  4.1 4.2 4.6   CHLORIDE mmol/L 111*  --  111* 114* 107   CO2 mmol/L 16*  --  16* 18* 18*   ANION GAP mmol/L 11  --  11 7 12   BUN mg/dL 61*  --  55* 56* 52*   CREATININE mg/dL 5.85*  --  6.02* 6.39* 6.48*   EGFR ml/min/1.73sq m 10  --  9 9 8   CALCIUM mg/dL 6.7*  --  6.4* 6.0* 6.8*   MAGNESIUM mg/dL 1.9 1.6* 0.9* 1.1*  --      Results from last 7 days   Lab Units 03/31/25  0502 03/30/25  1736   AST U/L 9* 11*   ALT U/L 5* 7   ALK PHOS U/L 124* 157*   TOTAL PROTEIN g/dL 6.8 8.6*   ALBUMIN g/dL 3.1* 3.8   TOTAL BILIRUBIN mg/dL 0.27 0.32     Results from last 7 days   Lab Units 04/02/25  1129 04/02/25  0759 04/02/25  0653 04/01/25  2125 04/01/25  1628 04/01/25  1121  04/01/25  0830 04/01/25  0707 03/31/25  2121 03/31/25  1636 03/31/25  1135 03/31/25  0804   POC GLUCOSE mg/dl 185* 72 65 171* 97 175* 115 68 116 150* 98 134     Results from last 7 days   Lab Units 04/02/25  0540 04/01/25  0832 03/31/25  0502 03/30/25  1736   GLUCOSE RANDOM mg/dL 65 114 106 215*         Results from last 7 days   Lab Units 03/30/25  1736   PROTIME seconds 14.8   INR  1.09   PTT seconds 28         Results from last 7 days   Lab Units 03/30/25  1736   PROCALCITONIN ng/ml 0.29*     Results from last 7 days   Lab Units 03/30/25  1736   LACTIC ACID mmol/L 0.9           Results from last 7 days   Lab Units 03/30/25  1738 03/30/25  1736   BLOOD CULTURE  No Growth at 48 hrs. No Growth at 48 hrs.             Results from last 7 days   Lab Units 04/02/25  0540 04/01/25  0512 03/31/25  0502   VANCOMYCIN RM ug/mL 11.6 15.5 22.1*       Past Medical History:   Diagnosis Date    Chronic kidney disease     Diabetes mellitus (HCC)     Hypertension     Received intravenous tissue plasminogen activator (tPA) in emergency department 02/01/2022    Stroke (HCC)     no deficits     Present on Admission:   Current smoker   Acute kidney injury superimposed on stage 5 chronic kidney disease, not on chronic dialysis (HCC)      Admitting Diagnosis: Cellulitis [L03.90]  Osteomyelitis (HCC) [M86.9]  Diabetic foot infection  (HCC) [E11.628, L08.9]  Stage 5 chronic kidney disease not on chronic dialysis ([facility]) [N18.5]  Age/Sex: 50 y.o. male    Network Utilization Review Department  ATTENTION: Please call with any questions or concerns to [phone] and carefully listen to the prompts so that you are directed to the right person. All voicemails are confidential.   For Discharge needs, contact Care Management DC Support Team at [phone] opt. 2  Send all requests for admission clinical reviews, approved or denied determinations and any other requests to dedicated fax number below belonging to the campus where the patient is  receiving treatment. List of dedicated fax numbers for the Facilities:  FACILITY NAME UR FAX NUMBER   ADMISSION DENIALS (Administrative/Medical Necessity) [phone]   DISCHARGE SUPPORT TEAM (NETWORK) [phone]   PARENT CHILD HEALTH (Maternity/NICU/Pediatrics) [phone]   [facility] [phone]   [facility] [phone]   [facility] [phone]   [facility] [phone]   [facility] [phone]   [facility] [phone]   [facility] [phone]   [facility] [phone]   [facility] [phone]   [facility] [phone]   [facility] [phone]   [facility] [phone]

## 2025-04-01 ENCOUNTER — APPOINTMENT (OUTPATIENT)
Dept: VASCULAR ULTRASOUND | Facility: HOSPITAL | Age: 50
DRG: 314 | End: 2025-04-01
Payer: COMMERCIAL

## 2025-04-01 PROBLEM — E83.42 HYPOMAGNESEMIA: Status: ACTIVE | Noted: 2025-04-01

## 2025-04-01 LAB
ANION GAP SERPL CALCULATED.3IONS-SCNC: 11 MMOL/L (ref 4–13)
BUN SERPL-MCNC: 55 MG/DL (ref 5–25)
CALCIUM SERPL-MCNC: 6.4 MG/DL (ref 8.4–10.2)
CHLORIDE SERPL-SCNC: 111 MMOL/L (ref 96–108)
CO2 SERPL-SCNC: 16 MMOL/L (ref 21–32)
CREAT SERPL-MCNC: 6.02 MG/DL (ref 0.6–1.3)
ERYTHROCYTE [DISTWIDTH] IN BLOOD BY AUTOMATED COUNT: 13.6 % (ref 11.6–15.1)
GFR SERPL CREATININE-BSD FRML MDRD: 9 ML/MIN/1.73SQ M
GLUCOSE SERPL-MCNC: 114 MG/DL (ref 65–140)
GLUCOSE SERPL-MCNC: 115 MG/DL (ref 65–140)
GLUCOSE SERPL-MCNC: 171 MG/DL (ref 65–140)
GLUCOSE SERPL-MCNC: 175 MG/DL (ref 65–140)
GLUCOSE SERPL-MCNC: 68 MG/DL (ref 65–140)
GLUCOSE SERPL-MCNC: 97 MG/DL (ref 65–140)
HCT VFR BLD AUTO: 37.5 % (ref 36.5–49.3)
HGB BLD-MCNC: 11.5 G/DL (ref 12–17)
MAGNESIUM SERPL-MCNC: 0.9 MG/DL (ref 1.9–2.7)
MAGNESIUM SERPL-MCNC: 1.6 MG/DL (ref 1.9–2.7)
MCH RBC QN AUTO: 29.6 PG (ref 26.8–34.3)
MCHC RBC AUTO-ENTMCNC: 30.7 G/DL (ref 31.4–37.4)
MCV RBC AUTO: 96 FL (ref 82–98)
PLATELET # BLD AUTO: 281 THOUSANDS/UL (ref 149–390)
PMV BLD AUTO: 9.2 FL (ref 8.9–12.7)
POTASSIUM SERPL-SCNC: 4.1 MMOL/L (ref 3.5–5.3)
RBC # BLD AUTO: 3.89 MILLION/UL (ref 3.88–5.62)
SODIUM SERPL-SCNC: 138 MMOL/L (ref 135–147)
VANCOMYCIN SERPL-MCNC: 15.5 UG/ML (ref 10–20)
WBC # BLD AUTO: 10.95 THOUSAND/UL (ref 4.31–10.16)

## 2025-04-01 PROCEDURE — 99406 BEHAV CHNG SMOKING 3-10 MIN: CPT | Performed by: SURGERY

## 2025-04-01 PROCEDURE — 82948 REAGENT STRIP/BLOOD GLUCOSE: CPT

## 2025-04-01 PROCEDURE — 80048 BASIC METABOLIC PNL TOTAL CA: CPT | Performed by: NURSE PRACTITIONER

## 2025-04-01 PROCEDURE — 99233 SBSQ HOSP IP/OBS HIGH 50: CPT | Performed by: NURSE PRACTITIONER

## 2025-04-01 PROCEDURE — 99223 1ST HOSP IP/OBS HIGH 75: CPT | Performed by: SURGERY

## 2025-04-01 PROCEDURE — 83735 ASSAY OF MAGNESIUM: CPT | Performed by: NURSE PRACTITIONER

## 2025-04-01 PROCEDURE — 85027 COMPLETE CBC AUTOMATED: CPT | Performed by: NURSE PRACTITIONER

## 2025-04-01 PROCEDURE — 99232 SBSQ HOSP IP/OBS MODERATE 35: CPT | Performed by: INTERNAL MEDICINE

## 2025-04-01 PROCEDURE — 80202 ASSAY OF VANCOMYCIN: CPT | Performed by: INTERNAL MEDICINE

## 2025-04-01 PROCEDURE — 93990 DOPPLER FLOW TESTING: CPT | Performed by: SURGERY

## 2025-04-01 PROCEDURE — 93990 DOPPLER FLOW TESTING: CPT

## 2025-04-01 RX ORDER — INSULIN GLARGINE 100 [IU]/ML
15 INJECTION, SOLUTION SUBCUTANEOUS
Status: DISCONTINUED | OUTPATIENT
Start: 2025-04-01 | End: 2025-04-02 | Stop reason: HOSPADM

## 2025-04-01 RX ORDER — MAGNESIUM SULFATE HEPTAHYDRATE 40 MG/ML
2 INJECTION, SOLUTION INTRAVENOUS ONCE
Status: DISCONTINUED | OUTPATIENT
Start: 2025-04-01 | End: 2025-04-01

## 2025-04-01 RX ORDER — MAGNESIUM SULFATE HEPTAHYDRATE 40 MG/ML
2 INJECTION, SOLUTION INTRAVENOUS
Status: COMPLETED | OUTPATIENT
Start: 2025-04-01 | End: 2025-04-01

## 2025-04-01 RX ADMIN — METRONIDAZOLE 500 MG: 500 INJECTION, SOLUTION INTRAVENOUS at 05:49

## 2025-04-01 RX ADMIN — INSULIN LISPRO 1 UNITS: 100 INJECTION, SOLUTION INTRAVENOUS; SUBCUTANEOUS at 21:40

## 2025-04-01 RX ADMIN — INSULIN LISPRO 1 UNITS: 100 INJECTION, SOLUTION INTRAVENOUS; SUBCUTANEOUS at 12:03

## 2025-04-01 RX ADMIN — SODIUM BICARBONATE 1300 MG: 650 TABLET ORAL at 18:16

## 2025-04-01 RX ADMIN — CLOPIDOGREL 75 MG: 75 TABLET ORAL at 08:25

## 2025-04-01 RX ADMIN — HEPARIN SODIUM 5000 UNITS: 5000 INJECTION INTRAVENOUS; SUBCUTANEOUS at 21:37

## 2025-04-01 RX ADMIN — SODIUM BICARBONATE 1300 MG: 650 TABLET ORAL at 08:25

## 2025-04-01 RX ADMIN — HEPARIN SODIUM 5000 UNITS: 5000 INJECTION INTRAVENOUS; SUBCUTANEOUS at 14:48

## 2025-04-01 RX ADMIN — METRONIDAZOLE 500 MG: 500 INJECTION, SOLUTION INTRAVENOUS at 21:25

## 2025-04-01 RX ADMIN — MAGNESIUM SULFATE HEPTAHYDRATE 2 G: 40 INJECTION, SOLUTION INTRAVENOUS at 18:55

## 2025-04-01 RX ADMIN — HYDRALAZINE HYDROCHLORIDE 50 MG: 25 TABLET ORAL at 08:25

## 2025-04-01 RX ADMIN — INSULIN LISPRO 6 UNITS: 100 INJECTION, SOLUTION INTRAVENOUS; SUBCUTANEOUS at 12:03

## 2025-04-01 RX ADMIN — DOCUSATE SODIUM 100 MG: 100 CAPSULE, LIQUID FILLED ORAL at 18:16

## 2025-04-01 RX ADMIN — METOPROLOL SUCCINATE 50 MG: 50 TABLET, EXTENDED RELEASE ORAL at 08:25

## 2025-04-01 RX ADMIN — HYDRALAZINE HYDROCHLORIDE 50 MG: 25 TABLET ORAL at 18:16

## 2025-04-01 RX ADMIN — ATORVASTATIN CALCIUM 80 MG: 40 TABLET, FILM COATED ORAL at 08:24

## 2025-04-01 RX ADMIN — AMLODIPINE BESYLATE 10 MG: 10 TABLET ORAL at 08:25

## 2025-04-01 RX ADMIN — HEPARIN SODIUM 5000 UNITS: 5000 INJECTION INTRAVENOUS; SUBCUTANEOUS at 05:18

## 2025-04-01 RX ADMIN — CALCITRIOL CAPSULES 0.25 MCG 0.5 MCG: 0.25 CAPSULE ORAL at 08:24

## 2025-04-01 RX ADMIN — MAGNESIUM SULFATE HEPTAHYDRATE 2 G: 40 INJECTION, SOLUTION INTRAVENOUS at 15:55

## 2025-04-01 RX ADMIN — INSULIN GLARGINE 15 UNITS: 100 INJECTION, SOLUTION SUBCUTANEOUS at 21:39

## 2025-04-01 RX ADMIN — DOCUSATE SODIUM 100 MG: 100 CAPSULE, LIQUID FILLED ORAL at 08:24

## 2025-04-01 NOTE — ASSESSMENT & PLAN NOTE
Lab Results   Component Value Date    HGBA1C 7.2 (A) 02/03/2025       Recent Labs     03/31/25  1636 03/31/25  2121 04/01/25  0707 04/01/25  0830   POCGLU 150* 116 68 115       Blood Sugar Average: Last 72 hrs:  (P) 130.625

## 2025-04-01 NOTE — ASSESSMENT & PLAN NOTE
Lab Results   Component Value Date    HGBA1C 7.2 (A) 02/03/2025       Recent Labs     03/31/25  1636 03/31/25  2121 04/01/25  0707 04/01/25  0830   POCGLU 150* 116 68 115   Osteomyelitis to the left foot with ulcer to bone  - Plan for amputation of the left foot fifth digit possible tomorrow  - Patient is agreeable  - Reviewed LEADs     CONCLUSION:  Impression:  RIGHT LOWER LIMB:  Ankle/Brachial index: 1.04 , which is in the normal category.  Metatarsal pressure of 127 mmHg  Great toe pressure of 140 mmHg, within healing range.  PVR/ PPG tracings are normal.     LEFT LOWER LIMB:  Diffuse disease noted throughout the femoral-popliteal l arteries with 50-75%  stenosis of the mid SFA  Evidence suggestive of diffuse Tibio-peroneal disease.  Ankle/Brachial index: 0.94 , which is in the normal category.  Metatarsal pressure of 119 mmHg  Great toe pressure of 69 mmHg, within healing range.  PVR/ PPG tracings are normal.     There is no prior report for comparison.    Reviewed MRI left foot     IMPRESSION:     Acute osteomyelitis involving the fifth middle and distal phalanges with early acute osteomyelitis involving the head of the fifth proximal phalanx     - NPO at midnight  - All risks, benefits, complications, and alternatives are discussed.  All questions are answered.  No guarantees are given or implied.     Blood Sugar Average: Last 72 hrs:  (P) 130.625

## 2025-04-01 NOTE — PROGRESS NOTES
Progress Note - Hospitalist   Name: Adam Gonzalez 50 y.o. male I MRN: [id number]  Unit/Bed#: -01 I Date of Admission: 3/30/2025   Date of Service: 4/1/2025 I Hospital Day: 0    Assessment & Plan  Acute kidney injury superimposed on stage 5 chronic kidney disease, not on chronic dialysis ([facility])  Lab Results   Component Value Date    EGFR 9 04/01/2025    EGFR 9 03/31/2025    EGFR 8 03/30/2025    CREATININE 6.02 (H) 04/01/2025    CREATININE 6.39 (H) 03/31/2025    CREATININE 6.48 (H) 03/30/2025     Follows with outpatient nephrology  Nephrology consulted overall creatinine remains stable no plan at this time to initiate dialysis  Diabetic foot infection  (HCC)  Known history of diabetes  Presented with concern for osteomyelitis of left pinky toe  Reported wound was present and slightly worsening in severity with associated foot swelling  X-ray on preliminary read shows findings concerning for bony erosion of the toe; pending formal radiology eval  MRI pending  Podiatry consult pending  Not meeting sepsis or SIRS criteria at this time    Recent Labs     03/30/25  1736 03/31/25  0502 04/01/25  0832   WBC 11.76* 10.19* 10.95*     Trend WBC/fever curve  MRI of the foot: Osteomyelitis of the fifth middle and distal metatarsal with early acute osteomyelitis of the proximal phalanx  Vancomycin IV with pharmacy consult  Formal podiatry consult patient 2 OR tomorrow  Arterial vas study showing 50-75% stenosis of th mid SFA   Consulted Vascular no need for vascular intervention at this time patient can proceed to the OR via podiatry if wound which show to be not healing can discuss an angiogram at a later date  Type 2 diabetes mellitus with circulatory disorder, with long-term current use of insulin ([facility])    Lab Results   Component Value Date    HGBA1C 7.2 (A) 02/03/2025     Recent Labs     03/30/25  1736 03/30/25  2106 03/31/25  0502 03/31/25  0804 04/01/25  0707 04/01/25  0830 04/01/25  0832 04/01/25  1121   POCGLU  --     < >  --    < > 68 115  --  175*   GLUC 215*  --  106  --   --   --  114  --     < > = values in this interval not displayed.     Hold home regimen while inpatient and resume on discharge  Diabetic diet  Insulin regimen  Humalog 6 units TID AC  Patient with hypoglycemia will decrease Lantus dose to 15 units  Glucose checks and Insulin correction ACHS  Goal -180 while admitted, adjusting insulin regimen as appropriate  Monitor for hypoglycemia and treat per protocol    Current smoker  Tobacco Use: High Risk (3/30/2025)    Patient History     Smoking Tobacco Use: Every Day     Smokeless Tobacco Use: Never     Passive Exposure: Current     Tobacco cessation counseling provided for > 3 min  NRT ordered with patch and PRN nicorette gum       VTE Pharmacologic Prophylaxis: VTE Score: 3 Moderate Risk (Score 3-4) - Pharmacological DVT Prophylaxis Ordered: heparin.    Mobility:   Basic Mobility Inpatient Raw Score: 24  JH-HLM Goal: 8: Walk 250 feet or more  JH-HLM Achieved: 7: Walk 25 feet or more  JH-HLM Goal NOT achieved. Continue with multidisciplinary rounding and encourage appropriate mobility to improve upon JH-HLM goals.    Patient Centered Rounds: I performed bedside rounds with nursing staff today.   Discussions with Specialists or Other Care Team Provider: Podiatry  Vascular surgery    Education and Discussions with Family / Patient: Updated  (wife) at bedside.    Current Length of Stay: 0 day(s)  Current Patient Status: Inpatient   Certification Statement: The patient will continue to require additional inpatient hospital stay due to ongoing treatment in setting of osteomyelitis of left fifth metatarsal  Discharge Plan: Anticipate discharge in 48 hrs to home with home services.    Code Status: Level 1 - Full Code    Subjective   Patient was stating he was hoping to discharge today but understands given the findings on MRI imaging that he needs to have vascular and podiatry recommendations for  his foot.  After discussions from podiatry and vascular patient is agreeable to the OR intervention came to bedside and spoke with the wife at length regarding the MRI findings need for ultrasound further OR intervention she is agreeable she states she is very frustrated she has been telling him for some time to come and get intervention and he is not been amendable to this.    Objective :  Temp:  [97.7 °F (36.5 °C)-99.1 °F (37.3 °C)] 97.7 °F (36.5 °C)  HR:  [84-90] 90  BP: (124-139)/(69-75) 139/69  Resp:  [18] 18  SpO2:  [98 %-99 %] 99 %  O2 Device: None (Room air)    Body mass index is 27.41 kg/m².     Input and Output Summary (last 24 hours):     Intake/Output Summary (Last 24 hours) at 4/1/2025 1409  Last data filed at 3/31/2025 1900  Gross per 24 hour   Intake --   Output 300 ml   Net -300 ml       Physical Exam  Vitals and nursing note reviewed.   Constitutional:       General: He is not in acute distress.     Appearance: He is well-developed.   HENT:      Head: Normocephalic and atraumatic.   Eyes:      Conjunctiva/sclera: Conjunctivae normal.   Cardiovascular:      Rate and Rhythm: Normal rate and regular rhythm.      Heart sounds: No murmur heard.  Pulmonary:      Effort: Pulmonary effort is normal. No respiratory distress.      Breath sounds: Normal breath sounds.   Abdominal:      Palpations: Abdomen is soft.      Tenderness: There is no abdominal tenderness.   Musculoskeletal:         General: Swelling present.      Cervical back: Neck supple.   Skin:     General: Skin is warm and dry.      Capillary Refill: Capillary refill takes less than 2 seconds.      Findings: Lesion present.      Comments: Open dried wound to the left fifth metatarsal noted swelling of metatarsal and surrounding tissue   Neurological:      Mental Status: He is alert and oriented to person, place, and time.   Psychiatric:         Mood and Affect: Mood normal.           Lines/Drains:              Lab Results: I have reviewed the  following results:   Results from last 7 days   Lab Units 04/01/25  0832 03/31/25  0502   WBC Thousand/uL 10.95* 10.19*   HEMOGLOBIN g/dL 11.5* 10.9*   HEMATOCRIT % 37.5 35.5*   PLATELETS Thousands/uL 281 290   SEGS PCT %  --  75   LYMPHO PCT %  --  14   MONO PCT %  --  8   EOS PCT %  --  3     Results from last 7 days   Lab Units 04/01/25  0832 03/31/25  0502   SODIUM mmol/L 138 139   POTASSIUM mmol/L 4.1 4.2   CHLORIDE mmol/L 111* 114*   CO2 mmol/L 16* 18*   BUN mg/dL 55* 56*   CREATININE mg/dL 6.02* 6.39*   ANION GAP mmol/L 11 7   CALCIUM mg/dL 6.4* 6.0*   ALBUMIN g/dL  --  3.1*   TOTAL BILIRUBIN mg/dL  --  0.27   ALK PHOS U/L  --  124*   ALT U/L  --  5*   AST U/L  --  9*   GLUCOSE RANDOM mg/dL 114 106     Results from last 7 days   Lab Units 03/30/25  1736   INR  1.09     Results from last 7 days   Lab Units 04/01/25  1121 04/01/25  0830 04/01/25  0707 03/31/25  2121 03/31/25  1636 03/31/25  1135 03/31/25  0804 03/30/25  2106 03/30/25  1647   POC GLUCOSE mg/dl 175* 115 68 116 150* 98 134 125 239*         Results from last 7 days   Lab Units 03/30/25  1736   LACTIC ACID mmol/L 0.9   PROCALCITONIN ng/ml 0.29*       Recent Cultures (last 7 days):   Results from last 7 days   Lab Units 03/30/25  1738 03/30/25  1736   BLOOD CULTURE  No Growth at 24 hrs. No Growth at 24 hrs.       Imaging Results Review: I reviewed radiology reports from this admission including: MRI foot.  Other Study Results Review: No additional pertinent studies reviewed.    Last 24 Hours Medication List:     Current Facility-Administered Medications:     acetaminophen (TYLENOL) tablet 975 mg, Q8H PRN    amLODIPine (NORVASC) tablet 10 mg, Daily    atorvastatin (LIPITOR) tablet 80 mg, Daily    calcitriol (ROCALTROL) capsule 0.5 mcg, Daily    clopidogrel (PLAVIX) tablet 75 mg, Daily    docusate sodium (COLACE) capsule 100 mg, BID    heparin (porcine) subcutaneous injection 5,000 Units, Q8H HAYLEE **AND** [CANCELED] Platelet count, Once    hydrALAZINE  (APRESOLINE) tablet 50 mg, BID    insulin glargine (LANTUS) subcutaneous injection 15 Units 0.15 mL, HS    insulin lispro (HumALOG/ADMELOG) 100 units/mL subcutaneous injection 1-6 Units, TID AC **AND** Fingerstick Glucose (POCT), TID AC    insulin lispro (HumALOG/ADMELOG) 100 units/mL subcutaneous injection 1-6 Units, HS    insulin lispro (HumALOG/ADMELOG) 100 units/mL subcutaneous injection 6 Units, TID With Meals    metoprolol succinate (TOPROL-XL) 24 hr tablet 50 mg, Daily    metroNIDAZOLE (FLAGYL) IVPB (premix) 500 mg 100 mL, Q12H, Last Rate: 500 mg (04/01/25 0549)    nicotine (NICODERM CQ) 21 mg/24 hr TD 24 hr patch 1 patch, Daily    ondansetron (ZOFRAN) injection 4 mg, Q6H PRN    simethicone (MYLICON) chewable tablet 80 mg, 4x Daily PRN    sodium bicarbonate tablet 1,300 mg, BID    vancomycin (VANCOCIN) IVPB (premix in dextrose) 750 mg 150 mL, Daily PRN    Administrative Statements   Today, Patient Was Seen By: YASMIN Clark  I have spent a total time of 50 minutes in caring for this patient on the day of the visit/encounter including Diagnostic results, Risks and benefits of tx options, Instructions for management, Patient and family education, Risk factor reductions, Counseling / Coordination of care, Documenting in the medical record, Reviewing/placing orders in the medical record (including tests, medications, and/or procedures), and Communicating with other healthcare professionals .    **Please Note: This note may have been constructed using a voice recognition system.**

## 2025-04-01 NOTE — QUICK NOTE
Magnesium level 0.9 currently we will give 2 doses of IV magnesium sulfate 2 g.  Repeat magnesium level post second dose this evening.  Replete as needed if level is sufficiently recovered repeat magnesium level in a.m.

## 2025-04-01 NOTE — ASSESSMENT & PLAN NOTE
Lab Results   Component Value Date    EGFR 9 04/01/2025    EGFR 9 03/31/2025    EGFR 8 03/30/2025    CREATININE 6.02 (H) 04/01/2025    CREATININE 6.39 (H) 03/31/2025    CREATININE 6.48 (H) 03/30/2025

## 2025-04-01 NOTE — PROGRESS NOTES
Adam Gonzalez is a 50 y.o. male who is currently ordered Vancomycin IV with management by the Pharmacy Consult service.  Relevant clinical data and objective / subjective history reviewed.  Vancomycin Assessment:  Indication and Goal AUC/Trough: Bone/joint infection (goal -600, trough >10)  Clinical Status: stable  Micro:     Renal Function:  SCr: 6.02 mg/dL  CrCl: 14.8 mL/min  Renal replacement: Not on dialysis  Days of Therapy: 3  Current Dose: 750mg IV daily PRN when vancomycin random level <15   Vancomycin Plan:  New Dosing: continue 750mg IV daily PRN when vancomycin random level <15   Next Level: 4/2/25 AM level   Renal Function Monitoring: Daily BMP and UOP  Pharmacy will continue to follow closely for s/sx of nephrotoxicity, infusion reactions and appropriateness of therapy.  BMP and CBC will be ordered per protocol. We will continue to follow the patient’s culture results and clinical progress daily.    Zulema Abarca, Pharmacist

## 2025-04-01 NOTE — PROGRESS NOTES
Progress Note - Nephrology   Name: Adam Gonzalez 50 y.o. male I MRN: [id number]  Unit/Bed#: -01 I Date of Admission: 3/30/2025   Date of Service: 4/1/2025 I Hospital Day: 0     Assessment & Plan  Acute kidney injury superimposed on stage 5 chronic kidney disease, not on chronic dialysis (HCC)  Lab Results   Component Value Date    EGFR 9 04/01/2025    EGFR 9 03/31/2025    EGFR 8 03/30/2025    CREATININE 6.02 (H) 04/01/2025    CREATININE 6.39 (H) 03/31/2025    CREATININE 6.48 (H) 03/30/2025     Patient has underlying chronic kidney disease stage V and is closely following with this provider as well as Dr. YOCASTA Benedict for management.  Renal function has been slowly progressive in the setting of diabetic nephropathy with nephrotic range proteinuria and hypertensive nephrosclerosis.    Patient underwent placement of left arm AV fistula on/10/2024 with follow-up access revealing the brachiocephalic fistula was patent and mature.  Good thrill and bruit on examination.    Creatinine level shows progressive but stable at 6.0 on labs this morning and patient is without uremic symptoms.  At present no acute indication for initiation of hemodialysis and will continue to monitor closely with daily BMP.    At this time would recommend avoidance of nephrotoxic agents such as NSAIDs or IV contrast as able.  Diabetic foot infection  (HCC)  Lab Results   Component Value Date    HGBA1C 7.2 (A) 02/03/2025       Recent Labs     03/31/25  2121 04/01/25  0707 04/01/25  0830 04/01/25  1121   POCGLU 116 68 115 175*       Blood Sugar Average: Last 72 hrs:  (P) [id number]    Currently being followed by podiatry as well as internal medicine.  Podiatry saw patient today with plans for left foot fifth digit amputation possibly tomorrow.    I have reviewed the nephrology recommendations including renal function monitoring, with SLIM, and we are in agreement with renal plan including the information outlined above. Nephrology service  will follow.    Subjective   Brief History of Admission -50-year-old male with a past medical history significant for CKD stage V not currently on dialysis, diabetes, hypertension, hyperlipidemia, and presented to our facility with a diabetic foot wound.    Patient seen at the bedside, no complaints but overall tired and has had multiple interruptions during the night.    Objective :  Temp:  [97.7 °F (36.5 °C)-99.1 °F (37.3 °C)] 97.7 °F (36.5 °C)  HR:  [84-90] 90  BP: (124-139)/(69-75) 139/69  Resp:  [18] 18  SpO2:  [98 %-99 %] 99 %  O2 Device: None (Room air)    Current Weight: Weight - Scale: 86.6 kg (191 lb)  First Weight: Weight - Scale: 86.6 kg (191 lb)  I/O         03/30 0701  03/31 0700 03/31 0701  04/01 0700 04/01 0701  04/02 0700    P.O. 240      IV Piggyback 50      Total Intake(mL/kg) 290      Urine (mL/kg/hr) 750 300 (0.1)     Total Output 750 300     Net -460 -300            Unmeasured Urine Occurrence  1 x           Physical Exam  Vitals and nursing note reviewed.   Constitutional:       General: He is not in acute distress.     Appearance: He is well-developed.   HENT:      Head: Normocephalic and atraumatic.   Eyes:      Conjunctiva/sclera: Conjunctivae normal.   Cardiovascular:      Rate and Rhythm: Normal rate.      Heart sounds: No murmur heard.  Pulmonary:      Effort: Pulmonary effort is normal. No respiratory distress.      Breath sounds: Normal breath sounds.   Abdominal:      Tenderness: There is no abdominal tenderness.   Musculoskeletal:      Cervical back: Neck supple.   Skin:     General: Skin is warm and dry.   Neurological:      Mental Status: He is alert.   Psychiatric:         Mood and Affect: Mood normal.         Medications:    Current Facility-Administered Medications:     acetaminophen (TYLENOL) tablet 975 mg, 975 mg, Oral, Q8H PRN, Shantanu Alba DO, 975 mg at 03/30/25 2108    amLODIPine (NORVASC) tablet 10 mg, 10 mg, Oral, Daily, Shantanu Alba DO, 10 mg at 04/01/25  0825    atorvastatin (LIPITOR) tablet 80 mg, 80 mg, Oral, Daily, Shantanu Hairstonan, DO, 80 mg at 04/01/25 0824    calcitriol (ROCALTROL) capsule 0.5 mcg, 0.5 mcg, Oral, Daily, Shantanuvikas Junioreswaran, DO, 0.5 mcg at 04/01/25 0824    clopidogrel (PLAVIX) tablet 75 mg, 75 mg, Oral, Daily, Shantanu Junioreswaran, DO, 75 mg at 04/01/25 0825    docusate sodium (COLACE) capsule 100 mg, 100 mg, Oral, BID, Shantanu Hairstonan, DO, 100 mg at 04/01/25 0824    heparin (porcine) subcutaneous injection 5,000 Units, 5,000 Units, Subcutaneous, Q8H HAYLEE, 5,000 Units at 04/01/25 0518 **AND** [CANCELED] Platelet count, , , Once, Shantanu Hairstonan, DO    hydrALAZINE (APRESOLINE) tablet 50 mg, 50 mg, Oral, BID, Shantanu Junioreswaran, DO, 50 mg at 04/01/25 0825    insulin glargine (LANTUS) subcutaneous injection 15 Units 0.15 mL, 15 Units, Subcutaneous, HS, YASMIN Clark    insulin lispro (HumALOG/ADMELOG) 100 units/mL subcutaneous injection 1-6 Units, 1-6 Units, Subcutaneous, TID AC, 1 Units at 04/01/25 1203 **AND** Fingerstick Glucose (POCT), , , TID AC, Shantanu Whitewaran, DO    insulin lispro (HumALOG/ADMELOG) 100 units/mL subcutaneous injection 1-6 Units, 1-6 Units, Subcutaneous, HS, Shantanu Whitewaran, DO    insulin lispro (HumALOG/ADMELOG) 100 units/mL subcutaneous injection 6 Units, 6 Units, Subcutaneous, TID With Meals, Shantanu Alba DO, 6 Units at 04/01/25 1203    metoprolol succinate (TOPROL-XL) 24 hr tablet 50 mg, 50 mg, Oral, Daily, Shantanu Junioreswaran, DO, 50 mg at 04/01/25 0825    metroNIDAZOLE (FLAGYL) IVPB (premix) 500 mg 100 mL, 500 mg, Intravenous, Q12H, Shantanu Alba DO, Last Rate: 200 mL/hr at 04/01/25 0549, 500 mg at 04/01/25 0549    nicotine (NICODERM CQ) 21 mg/24 hr TD 24 hr patch 1 patch, 1 patch, Transdermal, Daily, Shantanu Alba DO    ondansetron (ZOFRAN) injection 4 mg, 4 mg, Intravenous, Q6H PRN, Shantanu Alba DO    simethicone (MYLICON) chewable tablet 80 mg, 80 mg, Oral, 4x Daily PRN,  "Shantanu Alba,     sodium bicarbonate tablet 1,300 mg, 1,300 mg, Oral, BID, YASMIN Gresham, 1,300 mg at 04/01/25 0825    vancomycin (VANCOCIN) IVPB (premix in dextrose) 750 mg 150 mL, 10 mg/kg, Intravenous, Daily PRN, Shantanu Alba DO      Lab Results: I have reviewed the following results:  Results from last 7 days   Lab Units 04/01/25  0832 03/31/25  0502 03/30/25  1736   WBC Thousand/uL 10.95* 10.19* 11.76*   HEMOGLOBIN g/dL 11.5* 10.9* 12.8   HEMATOCRIT % 37.5 35.5* 40.7   PLATELETS Thousands/uL 281 290 322   POTASSIUM mmol/L 4.1 4.2 4.6   CHLORIDE mmol/L 111* 114* 107   CO2 mmol/L 16* 18* 18*   BUN mg/dL 55* 56* 52*   CREATININE mg/dL 6.02* 6.39* 6.48*   CALCIUM mg/dL 6.4* 6.0* 6.8*   MAGNESIUM mg/dL  --  1.1*  --    ALBUMIN g/dL  --  3.1* 3.8       Administrative Statements     Portions of the record may have been created with voice recognition software. Occasional wrong word or \"sound a like\" substitutions may have occurred due to the inherent limitations of voice recognition software. Read the chart carefully and recognize, using context, where substitutions have occurred.If you have any questions, please contact the dictating provider.  "

## 2025-04-01 NOTE — UTILIZATION REVIEW
NOTIFICATION OF OBSERVATION ADMISSION   AUTHORIZATION REQUEST   SERVICING FACILITY:   [place] [address]  Tax ID: [id number]  NPI: [id number] ATTENDING PROVIDER:  Attending Name and NPI#: Lester Cheema Md [[id number]]  Address: [address] [place] [address]  Phone: [phone]     ADMISSION INFORMATION:  Place of Service: [facility]  Place of Service Code: 22 CPT Code:   Admitting Diagnosis Code/Description:  Cellulitis [L03.90]  Osteomyelitis (HCC) [M86.9]  Diabetic foot infection  (HCC) [E11.628, L08.9]  Stage 5 chronic kidney disease not on chronic dialysis (HCC) [N18.5]  Observation Admission Date/Time: 03/30/2025 1954  Discharge Date/Time: No discharge date for patient encounter.     UTILIZATION REVIEW CONTACT:  Obdulia Cole, Utilization   Network Utilization Review Department  Phone: [phone]  Fax [phone]  Email: [email or web address]  Contact for approvals/pending authorizations, clinical reviews, and discharge.     PHYSICIAN ADVISORY SERVICES:  Medical Necessity Denial & Ehjk-qm-Vigb Review  Phone: [phone]  Fax: [phone]  Email: [email or web address]     DISCHARGE SUPPORT TEAM:  For Patients Discharge Needs & Updates  Phone: [phone] opt. 2 Fax: [phone]  Email: [email or web address]

## 2025-04-01 NOTE — PLAN OF CARE
Problem: PAIN - ADULT  Goal: Verbalizes/displays adequate comfort level or baseline comfort level  Description: Interventions:- Encourage patient to monitor pain and request assistance- Assess pain using appropriate pain scale- Administer analgesics based on type and severity of pain and evaluate response- Implement non-pharmacological measures as appropriate and evaluate response- Consider cultural and social influences on pain and pain management- Notify physician/advanced practitioner if interventions unsuccessful or patient reports new pain  Outcome: Progressing     Problem: INFECTION - ADULT  Goal: Absence or prevention of progression during hospitalization  Description: INTERVENTIONS:- Assess and monitor for signs and symptoms of infection- Monitor lab/diagnostic results- Monitor all insertion sites, i.e. indwelling lines, tubes, and drains- Monitor endotracheal if appropriate and nasal secretions for changes in amount and color- [place] appropriate cooling/warming therapies per order- Administer medications as ordered- Instruct and encourage patient and family to use good hand hygiene technique- Identify and instruct in appropriate isolation precautions for identified infection/condition  Outcome: Progressing  Goal: Absence of fever/infection during neutropenic period  Description: INTERVENTIONS:- Monitor WBC  Outcome: Progressing     Problem: SAFETY ADULT  Goal: Patient will remain free of falls  Description: INTERVENTIONS:- Educate patient/family on patient safety including physical limitations- Instruct patient to call for assistance with activity - Consult OT/PT to assist with strengthening/mobility - Keep Call bell within reach- Keep bed low and locked with side rails adjusted as appropriate- Keep care items and personal belongings within reach- Initiate and maintain comfort rounds- Make Fall Risk Sign visible to staff- Offer Toileting every  Hours, in advance of need- Initiate/Maintain alarm- Obtain  necessary fall risk management equipment: - Apply yellow socks and bracelet for high fall risk patients- Consider moving patient to room near nurses station  INTERVENTIONS:- Educate patient/family on patient safety including physical limitations- Instruct patient to call for assistance with activity - Consult OT/PT to assist with strengthening/mobility - Keep Call bell within reach- Keep bed low and locked with side rails adjusted as appropriate- Keep care items and personal belongings within reach- Initiate and maintain comfort rounds- Make Fall Risk Sign visible to staff- Offer Toileting every  Hours, in advance of need- Initiate/Maintain alarm- Obtain necessary fall risk management equipment: - Apply yellow socks and bracelet for high fall risk patients- Consider moving patient to room near nurses station  Outcome: Progressing  Goal: Maintain or return to baseline ADL function  Description: INTERVENTIONS:-  Assess patient's ability to carry out ADLs; assess patient's baseline for ADL function and identify physical deficits which impact ability to perform ADLs (bathing, care of mouth/teeth, toileting, grooming, dressing, etc.)- Assess/evaluate cause of self-care deficits - Assess range of motion- Assess patient's mobility; develop plan if impaired- Assess patient's need for assistive devices and provide as appropriate- Encourage maximum independence but intervene and supervise when necessary- Involve family in performance of ADLs- Assess for home care needs following discharge - Consider OT consult to assist with ADL evaluation and planning for discharge- Provide patient education as appropriate  Outcome: Progressing  Goal: Maintains/Returns to pre admission functional level  Description: INTERVENTIONS:- Perform AM-PAC 6 Click Basic Mobility/ Daily Activity assessment daily.- Set and communicate daily mobility goal to care team and patient/family/caregiver. - Collaborate with rehabilitation services on mobility  goals if consulted- Perform Range of Motion  times a day.- Reposition patient every hours.- Dangle patient  times a day- Stand patient  times a day- Ambulate patient  times a day- Out of bed to chair  times a day - Out of bed for meals  times a day- Out of bed for toileting- Record patient progress and toleration of activity level   Outcome: Progressing     Problem: DISCHARGE PLANNING  Goal: Discharge to home or other facility with appropriate resources  Description: INTERVENTIONS:- Identify barriers to discharge w/patient and caregiver- Arrange for needed discharge resources and transportation as appropriate- Identify discharge learning needs (meds, wound care, etc.)- Arrange for interpretive services to assist at discharge as needed- Refer to Case Management Department for coordinating discharge planning if the patient needs post-hospital services based on physician/advanced practitioner order or complex needs related to functional status, cognitive ability, or social support system  Outcome: Progressing     Problem: Knowledge Deficit  Goal: Patient/family/caregiver demonstrates understanding of disease process, treatment plan, medications, and discharge instructions  Description: Complete learning assessment and assess knowledge base.Interventions:- Provide teaching at level of understanding- Provide teaching via preferred learning methods  Outcome: Progressing     Problem: Potential for Falls  Goal: Patient will remain free of falls  Description: INTERVENTIONS:- Educate patient/family on patient safety including physical limitations- Instruct patient to call for assistance with activity - Consult OT/PT to assist with strengthening/mobility - Keep Call bell within reach- Keep bed low and locked with side rails adjusted as appropriate- Keep care items and personal belongings within reach- Initiate and maintain comfort rounds- Make Fall Risk Sign visible to staff- Offer Toileting every  Hours, in advance of need-  Initiate/Maintain alarm- Obtain necessary fall risk management equipment: - Apply yellow socks and bracelet for high fall risk patients- Consider moving patient to room near nurses station  INTERVENTIONS:- Educate patient/family on patient safety including physical limitations- Instruct patient to call for assistance with activity - Consult OT/PT to assist with strengthening/mobility - Keep Call bell within reach- Keep bed low and locked with side rails adjusted as appropriate- Keep care items and personal belongings within reach- Initiate and maintain comfort rounds- Make Fall Risk Sign visible to staff- Offer Toileting every  Hours, in advance of need- Initiate/Maintain alarm- Obtain necessary fall risk management equipment: - Apply yellow socks and bracelet for high fall risk patients- Consider moving patient to room near nurses station  Outcome: Progressing     Problem: Nutrition/Hydration-ADULT  Goal: Nutrient/Hydration intake appropriate for improving, restoring or maintaining nutritional needs  Description: Monitor and assess patient's nutrition/hydration status for malnutrition. Collaborate with interdisciplinary team and initiate plan and interventions as ordered.  Monitor patient's weight and dietary intake as ordered or per policy. Utilize nutrition screening tool and intervene as necessary. Determine patient's food preferences and provide high-protein, high-caloric foods as appropriate. INTERVENTIONS:- Monitor oral intake, urinary output, labs, and treatment plans- Assess nutrition and hydration status and recommend course of action- Evaluate amount of meals eaten- Assist patient with eating if necessary - Allow adequate time for meals- Recommend/ encourage appropriate diets, oral nutritional supplements, and vitamin/mineral supplements- Order, calculate, and assess calorie counts as needed- Recommend, monitor, and adjust tube feedings and TPN/PPN based on assessed needs- Assess need for intravenous  fluids- Provide specific nutrition/hydration education as appropriate- Include patient/family/caregiver in decisions related to nutrition  Outcome: Progressing

## 2025-04-01 NOTE — ASSESSMENT & PLAN NOTE
-Smokes 1 ppd; not ready to quit.   Tobacco use is a significant patient-modifiable risk factor for this patient’s vascular disease with multiple vascular comorbidities, and a significant risk factor for failure of and complications from any endovascular or surgical interventions.    I explained to the patient the effects of smoking including peripheral artery disease, coronary artery disease, cerebrovascular disease as well as cancer and chronic obstructive pulmonary disease. I asked the patient to stop smoking immediately. It is never too late to quit, and many studies show significant health benefits as well as economical savings after smoking cessation. I offered to the patient nicotine replacement therapy as well as referral to the smoking cessation program and access to the quit line 5-624-YVNGCPK or ambulatory referral to our network smoking cessation program.    Based on our conversation, this patient does not appear ready to quit    And declined my offer of nicotine replacement or tobacco cessation medications    The patient did not set a quit date. I will continue to  follow up on this issue at our next scheduled visit.     I spent approximately 5 minutes on tobacco cessation counseling with this patient.

## 2025-04-01 NOTE — ASSESSMENT & PLAN NOTE
Lab Results   Component Value Date    HGBA1C 7.2 (A) 02/03/2025       Recent Labs     03/31/25  2121 04/01/25  0707 04/01/25  0830 04/01/25  1121   POCGLU 116 68 115 175*       Blood Sugar Average: Last 72 hrs:  (P) 135.1145050437235228    Currently being followed by podiatry as well as internal medicine.  Podiatry saw patient today with plans for left foot fifth digit amputation possibly tomorrow.

## 2025-04-01 NOTE — ASSESSMENT & PLAN NOTE
Lab Results   Component Value Date    HGBA1C 7.2 (A) 02/03/2025       Recent Labs     03/31/25  2121 04/01/25  0707 04/01/25  0830 04/01/25  1121   POCGLU 116 68 115 175*       Blood Sugar Average: Last 72 hrs:  (P) [id number]    50 y.o. 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 He is currently admitted for GATITO and left 5th toe DFU with evidence of OM involving the 5th middle and distal phalanges and head of 5th proximal phalanx. Podiatry planning of left 5th toe amputation tomorrow. Vascular consulted for evaluation and recommendations prior to podiatric intervention 4/2.     Imaging reviewed-    LEAD 3/31/25-R DI 1.04/127/140; L DI 0.94/119/69 w/ 50-75% mid SFA stenosis.     MRI left foot 3/31/25- Acute osteomyelitis involving the fifth middle and distal phalanges with early acute osteomyelitis involving the head of the fifth proximal phalanx     Labs-  WBC 10.95  Hgb 11.5  Cr 6.02/GFR 9  Blood cx [id number]    Plan-  -1 week history of a left 5th toe ulceration of unknown etiology. +OM on MRI. Palpable DP pulse in L foot.    -THOM demonstrates adequate healing pressures in left foot despite a 50-75% stenosis in left mid SFA.   -Podiatry following. Recommend left 5th toe amputation tomorrow.  -No vascular intervention indicated at this time. Ok to proceed with surgery tomorrow as planned. Please reach out if there is poor intra-op bleeding or poor wound healing. Would have low threshold to perform angiogram at that time.   -Continue plavix, statin.   -LWC per podiatry.   -Smoking cessation.   -Strict glucose control for optimal wound healing.   -D/w SLIM  -D/w Dr. Demarco.

## 2025-04-01 NOTE — PLAN OF CARE
Problem: PAIN - ADULT  Goal: Verbalizes/displays adequate comfort level or baseline comfort level  Description: Interventions:- Encourage patient to monitor pain and request assistance- Assess pain using appropriate pain scale- Administer analgesics based on type and severity of pain and evaluate response- Implement non-pharmacological measures as appropriate and evaluate response- Consider cultural and social influences on pain and pain management- Notify physician/advanced practitioner if interventions unsuccessful or patient reports new pain  Outcome: Progressing     Problem: INFECTION - ADULT  Goal: Absence or prevention of progression during hospitalization  Description: INTERVENTIONS:- Assess and monitor for signs and symptoms of infection- Monitor lab/diagnostic results- Monitor all insertion sites, i.e. indwelling lines, tubes, and drains- Monitor endotracheal if appropriate and nasal secretions for changes in amount and color- [place] appropriate cooling/warming therapies per order- Administer medications as ordered- Instruct and encourage patient and family to use good hand hygiene technique- Identify and instruct in appropriate isolation precautions for identified infection/condition  Outcome: Progressing  Goal: Absence of fever/infection during neutropenic period  Description: INTERVENTIONS:- Monitor WBC  Outcome: Progressing     Problem: SAFETY ADULT  Goal: Patient will remain free of falls  Description: INTERVENTIONS:- Educate patient/family on patient safety including physical limitations- Instruct patient to call for assistance with activity - Consult OT/PT to assist with strengthening/mobility - Keep Call bell within reach- Keep bed low and locked with side rails adjusted as appropriate- Keep care items and personal belongings within reach- Initiate and maintain comfort rounds- Make Fall Risk Sign visible to staff- Offer Toileting every 2 Hours, in advance of need- Initiate/Maintain bed/chair  alarm- Obtain necessary fall risk management equipment. - Apply yellow socks and bracelet for high fall risk patients- Consider moving patient to room near nurses station  INTERVENTIONS:- Educate patient/family on patient safety including physical limitations- Instruct patient to call for assistance with activity - Consult OT/PT to assist with strengthening/mobility - Keep Call bell within reach- Keep bed low and locked with side rails adjusted as appropriate- Keep care items and personal belongings within reach- Initiate and maintain comfort rounds -Apply yellow socks and bracelet for high fall risk patients- Consider moving patient to room near nurses station  Outcome: Progressing  Goal: Maintain or return to baseline ADL function  Description: INTERVENTIONS:-  Assess patient's ability to carry out ADLs; assess patient's baseline for ADL function and identify physical deficits which impact ability to perform ADLs (bathing, care of mouth/teeth, toileting, grooming, dressing, etc.)- Assess/evaluate cause of self-care deficits - Assess range of motion- Assess patient's mobility; develop plan if impaired- Assess patient's need for assistive devices and provide as appropriate- Encourage maximum independence but intervene and supervise when necessary- Involve family in performance of ADLs- Assess for home care needs following discharge - Consider OT consult to assist with ADL evaluation and planning for discharge- Provide patient education as appropriate  Outcome: Progressing  Goal: Maintains/Returns to pre admission functional level  Description: INTERVENTIONS:- Perform AM-PAC 6 Click Basic Mobility/ Daily Activity assessment daily.- Set and communicate daily mobility goal to care team and patient/family/caregiver. - Collaborate with rehabilitation services on mobility goals if consulted- Perform Range of Motion 3 times a day.- Reposition patient every 2 hours.- Dangle patient 3 times a day- Stand patient 3 times a  day- Ambulate patient 3 times a day- Out of bed to chair 3 times a day - Out of bed for meals 3 times a day- Out of bed for toileting- Record patient progress and toleration of activity level   Outcome: Progressing     Problem: DISCHARGE PLANNING  Goal: Discharge to home or other facility with appropriate resources  Description: INTERVENTIONS:- Identify barriers to discharge w/patient and caregiver- Arrange for needed discharge resources and transportation as appropriate- Identify discharge learning needs (meds, wound care, etc.)- Arrange for interpretive services to assist at discharge as needed- Refer to Case Management Department for coordinating discharge planning if the patient needs post-hospital services based on physician/advanced practitioner order or complex needs related to functional status, cognitive ability, or social support system  Outcome: Progressing     Problem: Knowledge Deficit  Goal: Patient/family/caregiver demonstrates understanding of disease process, treatment plan, medications, and discharge instructions  Description: Complete learning assessment and assess knowledge base.Interventions:- Provide teaching at level of understanding- Provide teaching via preferred learning methods  Outcome: Progressing     Problem: Potential for Falls  Goal: Patient will remain free of falls  Description: INTERVENTIONS:- Educate patient/family on patient safety including physical limitations- Instruct patient to call for assistance with activity - Consult OT/PT to assist with strengthening/mobility - Keep Call bell within reach- Keep bed low and locked with side rails adjusted as appropriate- Keep care items and personal belongings within reach- Initiate and maintain comfort rounds- Make Fall Risk Sign visible to staff- Offer  Apply yellow socks and bracelet for high fall risk patients- Consider moving patient to room near nurses station  INTERVENTIONS:- Educate patient/family on patient safety including  physical limitations- Instruct patient to call for assistance with activity - Consult OT/PT to assist with strengthening/mobility - Keep Call bell within reach- Keep bed low and locked with side rails adjusted as appropriate- Keep care items and personal belongings within reach- Initiate and maintain comfort rounds -apply yellow socks and bracelet for high fall risk patients- Consider moving patient to room near nurses station  Outcome: Progressing     Problem: Nutrition/Hydration-ADULT  Goal: Nutrient/Hydration intake appropriate for improving, restoring or maintaining nutritional needs  Description: Monitor and assess patient's nutrition/hydration status for malnutrition. Collaborate with interdisciplinary team and initiate plan and interventions as ordered.  Monitor patient's weight and dietary intake as ordered or per policy. Utilize nutrition screening tool and intervene as necessary. Determine patient's food preferences and provide high-protein, high-caloric foods as appropriate. INTERVENTIONS:- Monitor oral intake, urinary output, labs, and treatment plans- Assess nutrition and hydration status and recommend course of action- Evaluate amount of meals eaten- Assist patient with eating if necessary - Allow adequate time for meals- Recommend/ encourage appropriate diets, oral nutritional supplements, and vitamin/mineral supplements- Order, calculate, and assess calorie counts as needed- Recommend, monitor, and adjust tube feedings and TPN/PPN based on assessed needs- Assess need for intravenous fluids- Provide specific nutrition/hydration education as appropriate- Include patient/family/caregiver in decisions related to nutrition  Outcome: Progressing

## 2025-04-01 NOTE — CASE MANAGEMENT
Case Management Assessment & Discharge Planning Note    Patient name Adam Gonzalez  Location /-01 MRN [id number]  : 1975 Date 2025       Current Admission Date: 3/30/2025  Current Admission Diagnosis:Diabetic foot infection  (HCC)   Patient Active Problem List    Diagnosis Date Noted Date Diagnosed    Hypomagnesemia 2025     Diabetic foot infection  (HCC) 2025     Chronic bronchitis with COPD (chronic obstructive pulmonary disease) ([facility]) 2024     Hypertensive CKD (chronic kidney disease) 2024     Metabolic acidosis 2024     Vitamin D deficiency 11/15/2023     Secondary hyperparathyroidism of renal origin ([facility]) 10/01/2023     Implantable loop recorder present 10/01/2023     Noncompliance with treatment plan 2023     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 2022     Impaired balance as late effect of cerebrovascular accident (CVA) 2022     Acute kidney injury superimposed on stage 5 chronic kidney disease, not on chronic dialysis ([facility]) 2022     TSH elevation in past 2022     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) 2022     Bilateral Cerebrovascular accident (CVA) status post tPA 2022     Acute on chronic renal failure  (HCC) 2022     History of CVA (cerebrovascular accident) 2022     History of embolic stroke 2021     Aortic valve insufficiency ( mild-mod  ) 2020     Diabetic polyneuropathy associated with type 2 diabetes mellitus ([facility]) 2020     Brachial plexus injury, left, sequela 10/14/2019     Type 2 diabetes mellitus with circulatory disorder, with long-term current use of insulin ([facility]) 2019     Essential hypertension 2018     Current smoker 2018     Hypercholesterolemia 2015     Nephrotic range proteinuria 2015       LOS (days): 0  Geometric Mean LOS (GMLOS) (days):   Days to GMLOS:     OBJECTIVE:    Risk of Unplanned Readmission  Score: 13.76         Current admission status: Inpatient       Preferred Pharmacy:   [id number] - [place] - [address]  [address]  [place] [address]  Phone: [phone] Fax: [phone]    Primary Care Provider: Evans Birmingham MD    [place] [employer] [facility] [place]:     ASSESSMENT:  Active Health Care Proxies    There are no active Health Care Proxies on file.       Advance Directives  Does patient have a Health Care POA?: No  Was patient offered paperwork?: Yes (declined)  Does patient have Advance Directives?: No  Was patient offered paperwork?: Yes (declined)  Primary Contact: Adriane Valerio (Significant Other)  [phone] (Mobile)         Readmission Root Cause  30 Day Readmission: No    Patient Information  Admitted from:: Home  Mental Status: Alert  During Assessment patient was accompanied by: Not accompanied during assessment  Assessment information provided by:: Patient  Primary Caregiver: Self  Support Systems: Spouse/significant other, Daughter  County of Residence: [place]  What city do you live in?: [place]  Home entry access options. Select all that apply.: No steps to enter home  Type of Current Residence: 2 story home  Upon entering residence, is there a bedroom on the main floor (no further steps)?: Yes  Upon entering residence, is there a bathroom on the main floor (no further steps)?: Yes  Living Arrangements: Lives w/ Spouse/significant other  Is patient a ?: No    Activities of Daily Living Prior to Admission  Functional Status: Independent  Completes ADLs independently?: Yes  Ambulates independently?: Yes  Does patient use assisted devices?: No  Does patient currently own DME?: No  Does patient have a history of Outpatient Therapy (PT/OT)?: Yes  Does the patient have a history of Short-Term Rehab?: No  Does patient have a history of HHC?: No  Does patient currently have HHC?: No         Patient Information  Continued  Income Source: SSI/SSD  Does patient have prescription coverage?: Yes  Can the patient afford their medications and any related supplies (such as glucometers or test strips)?: Yes  Does patient receive dialysis treatments?: No  Does patient have a history of substance abuse?: No  Does patient have a history of Mental Health Diagnosis?: No         Means of Transportation  Means of Transport to [facility] [place]:: Drives Self      Social Determinants of Health (SDOH)      Flowsheet Row Most Recent Value   Housing Stability    In the last 12 months, was there a time when you were not able to pay the mortgage or rent on time? N   In the past 12 months, how many times have you moved where you were living? 0   At any time in the past 12 months, were you homeless or living in a shelter (including now)? N   Transportation Needs    In the past 12 months, has lack of transportation kept you from medical appointments or from getting medications? no   In the past 12 months, has lack of transportation kept you from meetings, work, or from getting things needed for daily living? No   Food Insecurity    Within the past 12 months, you worried that your food would run out before you got the money to buy more. Never true   Within the past 12 months, the food you bought just didn't last and you didn't have money to get more. Never true   Utilities    In the past 12 months has the electric, gas, oil, or water company threatened to shut off services in your home? No            DISCHARGE DETAILS:    Discharge planning discussed with:: pt  Freedom of Choice: Yes  Comments - Freedom of Choice: Met w pt at bedside; introduced self and explained role of CM, including arranging DME, STR, home health, out pt tx.  Advised pt that CM will make appropriate referrals based on treatment team recommendations and MD orders, and he can consider recommended plan of care and choose from available vendors.  CM contacted family/caregiver?: No- see comments  (pt alert and oriented adult)  Were Treatment Team discharge recommendations reviewed with patient/caregiver?:  (none at present)          Contacts  Patient Contacts: Adriane Valerio (Significant Other)  [phone] (Mobile)  Relationship to Patient:: Family         DME Referral Provided  Referral made for DME?: No    Other Referral/Resources/Interventions Provided:  Referral Comments: Pt lives w s.o., his daughter, and two dogs.  S.O. is employed, pt receives SSI.  Pt admitted for diabetic foot infection;  per SLIM rounds this AM, pt has osteo and may have to start HMD during this hospitalization.  Podiatry, nephrology and vascular following.  MRI pending; IV MgSO4, IV abx.  CM will continue to follow.         Treatment Team Recommendation: Other (TBD)  Discharge Destination Plan:: Other (TBD)  Transport at Discharge : Family

## 2025-04-01 NOTE — ASSESSMENT & PLAN NOTE
Lab Results   Component Value Date    HGBA1C 7.2 (A) 02/03/2025       Recent Labs     03/31/25  2121 04/01/25  0707 04/01/25  0830 04/01/25  1121   POCGLU 116 68 115 175*       Blood Sugar Average: Last 72 hrs:  (P) 135.0478469848400040

## 2025-04-01 NOTE — TELEPHONE ENCOUNTER
Patient significant other called in to check status of [employer] paperwork warm transfer to [facility] clerical successful

## 2025-04-01 NOTE — TELEPHONE ENCOUNTER
Pt sign other, Adriane called.  Is kindly asking if PCP (RS) can complete the paperwork from [employer] (media file 1/23/2023) and return via fax to  Fax# [phone]    Once faxed to [employer], please call Adriane zhou/carlos# [phone].

## 2025-04-01 NOTE — ASSESSMENT & PLAN NOTE
Lab Results   Component Value Date    EGFR 9 04/01/2025    EGFR 9 03/31/2025    EGFR 8 03/30/2025    CREATININE 6.02 (H) 04/01/2025    CREATININE 6.39 (H) 03/31/2025    CREATININE 6.48 (H) 03/30/2025   -Hx of CKDV not on HD.   -s/p FCOE BC AVF 4/10/24  -HD duplex 5/2024 showed patent,matured AVF with adequate flow volumes >600 ml/min, however inadequate diameters of <6 mm throughout the arm.  -Nephrology following. No urgent need for dialysis at this time and will continue to monitor closely.    -Will order updated HD duplex to reassess AVF to see if it is ready to be used if patient would require initiation of dialysis.

## 2025-04-01 NOTE — CONSULTS
Consultation - Podiatry   Name: Adam Gonzalez 50 y.o. male I MRN: [id number]  Unit/Bed#: -01 I Date of Admission: 3/30/2025   Date of Service: 4/1/2025 I Hospital Day: 0  Inpatient consult to Podiatry  Consult performed by: Adriane Anna DPM  Consult ordered by: Shantanu Alba DO        Physician Requesting Evaluation: Lester Cheema MD   Reason for Evaluation / Principal Problem: Let foot fifth digit wound to bone.      Assessment & Plan  Diabetic foot infection  (HCC)  Lab Results   Component Value Date    HGBA1C 7.2 (A) 02/03/2025       Recent Labs     03/31/25  1636 03/31/25  2121 04/01/25  0707 04/01/25  0830   POCGLU 150* 116 68 115   Osteomyelitis to the left foot with ulcer to bone  - Plan for amputation of the left foot fifth digit possible tomorrow  - Patient is agreeable  - Reviewed LEADs     CONCLUSION:  Impression:  RIGHT LOWER LIMB:  Ankle/Brachial index: 1.04 , which is in the normal category.  Metatarsal pressure of 127 mmHg  Great toe pressure of 140 mmHg, within healing range.  PVR/ PPG tracings are normal.     LEFT LOWER LIMB:  Diffuse disease noted throughout the femoral-popliteal l arteries with 50-75%  stenosis of the mid SFA  Evidence suggestive of diffuse Tibio-peroneal disease.  Ankle/Brachial index: 0.94 , which is in the normal category.  Metatarsal pressure of 119 mmHg  Great toe pressure of 69 mmHg, within healing range.  PVR/ PPG tracings are normal.     There is no prior report for comparison.    Reviewed MRI left foot     IMPRESSION:     Acute osteomyelitis involving the fifth middle and distal phalanges with early acute osteomyelitis involving the head of the fifth proximal phalanx     - NPO at midnight  - All risks, benefits, complications, and alternatives are discussed.  All questions are answered.  No guarantees are given or implied.     Blood Sugar Average: Last 72 hrs:  (P) 130.625    Current smoker    Type 2 diabetes mellitus with circulatory disorder, with long-term  current use of insulin ([facility])  Lab Results   Component Value Date    HGBA1C 7.2 (A) 02/03/2025       Recent Labs     03/31/25  1636 03/31/25  2121 04/01/25  0707 04/01/25  0830   POCGLU 150* 116 68 115       Blood Sugar Average: Last 72 hrs:  (P) 130.625    Acute kidney injury superimposed on stage 5 chronic kidney disease, not on chronic dialysis ([facility])  Lab Results   Component Value Date    EGFR 9 04/01/2025    EGFR 9 03/31/2025    EGFR 8 03/30/2025    CREATININE 6.02 (H) 04/01/2025    CREATININE 6.39 (H) 03/31/2025    CREATININE 6.48 (H) 03/30/2025         History of Present Illness   Adam Gonzalez is a 50 y.o. male who presents with open lesion to the left foot fifth digit.  Patient is unsure how it started.  He was hopeful that it will heal.    Review of Systems   Constitutional:  Negative for activity change.   HENT:  Negative for congestion.    Eyes:  Negative for discharge.   Respiratory:  Negative for chest tightness.    Cardiovascular:  Negative for chest pain.   Gastrointestinal:  Negative for abdominal distention.   Endocrine: Negative for cold intolerance.   Genitourinary:  Negative for difficulty urinating.   Musculoskeletal:  Negative for arthralgias.   Skin:  Positive for wound.   Allergic/Immunologic: Negative for environmental allergies.   Neurological:  Negative for dizziness.   Hematological:  Negative for adenopathy.   Psychiatric/Behavioral:  Negative for agitation.          Objective :  Temp:  [97.7 °F (36.5 °C)-99.1 °F (37.3 °C)] 97.7 °F (36.5 °C)  HR:  [84-90] 90  BP: (124-139)/(69-75) 139/69  Resp:  [18] 18  SpO2:  [98 %-99 %] 99 %  O2 Device: None (Room air)    Physical Exam  Feet:      Left foot:      Skin integrity: Ulcer, skin breakdown and erythema present.      +3 pitting edema, pulses 1/4, no pedal hair  Open wound to the left foot fifth digit toe bone  No sensation. No POP.       Media Information      Document Information    Clinical Image - Mobile Device      03/30/2025 20:44    Attached To:   Hospital Encounter on 3/30/25   Source Information    DO Kush Franco 2nd Floor Med Surg   Document History

## 2025-04-01 NOTE — CONSULTS
Consultation - Vascular Surgery   Name: Adam Gonzalez 50 y.o. male I MRN: [id number]  Unit/Bed#: -01 I Date of Admission: 3/30/2025   Date of Service: 4/1/2025 I Hospital Day: 0   Inpatient consult to Vascular Surgery  Consult performed by: Tamiko Ness PA-C  Consult ordered by: YASMIN Clark        Physician Requesting Evaluation: Lester Cheema MD   Reason for Evaluation / Principal Problem: Left foot wound     Assessment & Plan  Diabetic foot infection  (HCC)  Lab Results   Component Value Date    HGBA1C 7.2 (A) 02/03/2025       Recent Labs     03/31/25  2121 04/01/25  0707 04/01/25  0830 04/01/25  1121   POCGLU 116 68 115 175*       Blood Sugar Average: Last 72 hrs:  (P) 135.9907385609622778    50 y.o. 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 He is currently admitted for GATITO and left 5th toe DFU with evidence of OM involving the 5th middle and distal phalanges and head of 5th proximal phalanx. Podiatry planning of left 5th toe amputation tomorrow. Vascular consulted for evaluation and recommendations prior to podiatric intervention 4/2.     Imaging reviewed-    LEAD 3/31/25-R DI 1.04/127/140; L DI 0.94/119/69 w/ 50-75% mid SFA stenosis.     MRI left foot 3/31/25- Acute osteomyelitis involving the fifth middle and distal phalanges with early acute osteomyelitis involving the head of the fifth proximal phalanx     Labs-  WBC 10.95  Hgb 11.5  Cr 6.02/GFR 9  Blood cx [id number]    Plan-  -1 week history of a left 5th toe ulceration of unknown etiology. +OM on MRI. Palpable DP pulse in L foot.    -THOM demonstrates adequate healing pressures in left foot despite a 50-75% stenosis in left mid SFA.   -Podiatry following. Recommend left 5th toe amputation tomorrow.  -No vascular intervention indicated at this time. Ok to proceed with surgery tomorrow as planned. Please reach out if there is poor intra-op bleeding or poor wound healing.  Would have low threshold to perform angiogram at that time.   -Continue plavix, statin.   -LWC per podiatry.   -Smoking cessation.   -Strict glucose control for optimal wound healing.   -D/w SLIM  -D/w Dr. Demarco.          Current smoker  -Smokes 1 ppd; not ready to quit.   Tobacco use is a significant patient-modifiable risk factor for this patient’s vascular disease with multiple vascular comorbidities, and a significant risk factor for failure of and complications from any endovascular or surgical interventions.    I explained to the patient the effects of smoking including peripheral artery disease, coronary artery disease, cerebrovascular disease as well as cancer and chronic obstructive pulmonary disease. I asked the patient to stop smoking immediately. It is never too late to quit, and many studies show significant health benefits as well as economical savings after smoking cessation. I offered to the patient nicotine replacement therapy as well as referral to the smoking cessation program and access to the quit line 4-735-XQPIDKB or ambulatory referral to our network smoking cessation program.    Based on our conversation, this patient does not appear ready to quit    And declined my offer of nicotine replacement or tobacco cessation medications    The patient did not set a quit date. I will continue to  follow up on this issue at our next scheduled visit.     I spent approximately 5 minutes on tobacco cessation counseling with this patient.    Type 2 diabetes mellitus with circulatory disorder, with long-term current use of insulin (HCC)  Lab Results   Component Value Date    HGBA1C 7.2 (A) 02/03/2025       Recent Labs     03/31/25  2121 04/01/25  0707 04/01/25  0830 04/01/25  1121   POCGLU 116 68 115 175*       Blood Sugar Average: Last 72 hrs:  (P) 135.4527347902202415    Acute kidney injury superimposed on stage 5 chronic kidney disease, not on chronic dialysis (HCC)  Lab Results   Component Value Date     EGFR 9 04/01/2025    EGFR 9 03/31/2025    EGFR 8 03/30/2025    CREATININE 6.02 (H) 04/01/2025    CREATININE 6.39 (H) 03/31/2025    CREATININE 6.48 (H) 03/30/2025   -Hx of CKDV not on HD.   -s/p LUE BC AVF 4/10/24  -HD duplex 5/2024 showed patent,matured AVF with adequate flow volumes >600 ml/min, however inadequate diameters of <6 mm throughout the arm.  -Nephrology following. No urgent need for dialysis at this time and will continue to monitor closely.    -Will order updated HD duplex to reassess AVF to see if it is ready to be used if patient would require initiation of dialysis.       History of Present Illness   Adam Gonzalez is a 50 y.o. male who presents with a 1 week history of a left 5th toe wound. He denies any pain in the left foot and does not know how the wound first started. He denies any prior history of non healing wounds. He reports he would get pain in then top of the left foot when he would walk as soon as he would start walking. He denied any calf or thigh cramping. He denies any rest pain. He is a smoker and smokes 1 ppd and does not wish to quit. He denies any prior vascular surgeries. He denies any recent fevers or chills. He reports swelling in the left foot has improved on admission. He does not require any assistive devices and gets around independently.     He is on plavix (CVA) and atorvastatin.     Review of Systems   Constitutional: Negative.    HENT: Negative.     Eyes: Negative.    Respiratory: Negative.     Cardiovascular:  Positive for leg swelling.   Gastrointestinal: Negative.    Endocrine: Negative.    Genitourinary: Negative.    Musculoskeletal: Negative.    Skin:  Positive for wound.   Allergic/Immunologic: Negative.    Neurological: Negative.    Hematological: Negative.    Psychiatric/Behavioral: Negative.       Medical History Review: I have reviewed the patient's PMH, PSH, Social History, Family History, Meds, and Allergies     Objective :  Temp:  [97.7 °F (36.5 °C)-99.1 °F  (37.3 °C)] 97.7 °F (36.5 °C)  HR:  [84-90] 90  BP: (124-139)/(69-75) 139/69  Resp:  [18] 18  SpO2:  [98 %-99 %] 99 %  O2 Device: None (Room air)    I/O         03/30 0701  03/31 0700 03/31 0701  04/01 0700 04/01 0701 04/02 0700    P.O. 240      IV Piggyback 50      Total Intake(mL/kg) 290      Urine (mL/kg/hr) 750 300 (0.1)     Total Output 750 300     Net -460 -300            Unmeasured Urine Occurrence  1 x             Physical Exam  Vitals and nursing note reviewed.   Constitutional:       Appearance: Normal appearance.   HENT:      Head: Normocephalic and atraumatic.   Cardiovascular:      Rate and Rhythm: Normal rate and regular rhythm.      Heart sounds: Normal heart sounds.   Pulmonary:      Effort: Pulmonary effort is normal.      Breath sounds: Normal breath sounds.   Abdominal:      General: Bowel sounds are normal.      Palpations: Abdomen is soft.   Musculoskeletal:         General: Swelling present. Normal range of motion.      Cervical back: Normal range of motion and neck supple.      Left lower leg: Edema present.   Skin:     General: Skin is warm and dry.      Findings: No erythema.      Comments: Left 5th toe ulceration is dry without overt signs of infection +odor.    Neurological:      General: No focal deficit present.      Mental Status: He is alert and oriented to person, place, and time.   Psychiatric:         Mood and Affect: Mood normal.         Behavior: Behavior normal.         Thought Content: Thought content normal.         Judgment: Judgment normal.                  Lab Results: I have reviewed the following results:  Recent Labs     03/30/25  1736 03/31/25  0502 04/01/25  0832   WBC 11.76* 10.19* 10.95*   HGB 12.8 10.9* 11.5*   HCT 40.7 35.5* 37.5    290 281   SODIUM 137 139 138   K 4.6 4.2 4.1    114* 111*   CO2 18* 18* 16*   BUN 52* 56* 55*   CREATININE 6.48* 6.39* 6.02*   GLUC 215* 106 114   MG  --  1.1*  --    AST 11* 9*  --    ALT 7 5*  --    ALB 3.8 3.1*  --     TBILI 0.32 0.27  --    ALKPHOS 157* 124*  --    PTT 28  --   --    INR 1.09  --   --    LACTICACID 0.9  --   --        Imaging Results Review: I reviewed radiology reports from this admission including: Ultrasound(s).  Other Study Results Review: No additional pertinent studies reviewed.    VTE Prophylaxis: VTE covered by:  heparin (porcine), Subcutaneous, 5,000 Units at 04/01/25 0518

## 2025-04-01 NOTE — ASSESSMENT & PLAN NOTE
Lab Results   Component Value Date    EGFR 9 04/01/2025    EGFR 9 03/31/2025    EGFR 8 03/30/2025    CREATININE 6.02 (H) 04/01/2025    CREATININE 6.39 (H) 03/31/2025    CREATININE 6.48 (H) 03/30/2025     Patient has underlying chronic kidney disease stage V and is closely following with this provider as well as Dr. YOCASTA Benedict for management.  Renal function has been slowly progressive in the setting of diabetic nephropathy with nephrotic range proteinuria and hypertensive nephrosclerosis.    Patient underwent placement of left arm AV fistula on/10/2024 with follow-up access revealing the brachiocephalic fistula was patent and mature.  Good thrill and bruit on examination.    Creatinine level shows progressive but stable at 6.0 on labs this morning and patient is without uremic symptoms.  At present no acute indication for initiation of hemodialysis and will continue to monitor closely with daily BMP.    At this time would recommend avoidance of nephrotoxic agents such as NSAIDs or IV contrast as able.

## 2025-04-02 ENCOUNTER — ANESTHESIA EVENT (INPATIENT)
Dept: PERIOP | Facility: HOSPITAL | Age: 50
DRG: 314 | End: 2025-04-02
Payer: COMMERCIAL

## 2025-04-02 ENCOUNTER — APPOINTMENT (INPATIENT)
Dept: RADIOLOGY | Facility: HOSPITAL | Age: 50
DRG: 314 | End: 2025-04-02
Payer: COMMERCIAL

## 2025-04-02 ENCOUNTER — ANESTHESIA (INPATIENT)
Dept: PERIOP | Facility: HOSPITAL | Age: 50
DRG: 314 | End: 2025-04-02
Payer: COMMERCIAL

## 2025-04-02 VITALS
RESPIRATION RATE: 18 BRPM | WEIGHT: 191 LBS | HEIGHT: 70 IN | TEMPERATURE: 98.4 F | SYSTOLIC BLOOD PRESSURE: 144 MMHG | DIASTOLIC BLOOD PRESSURE: 80 MMHG | BODY MASS INDEX: 27.35 KG/M2 | HEART RATE: 88 BPM | OXYGEN SATURATION: 95 %

## 2025-04-02 PROBLEM — E83.42 HYPOMAGNESEMIA: Status: RESOLVED | Noted: 2025-04-01 | Resolved: 2025-04-02

## 2025-04-02 LAB
ANION GAP SERPL CALCULATED.3IONS-SCNC: 11 MMOL/L (ref 4–13)
BUN SERPL-MCNC: 61 MG/DL (ref 5–25)
CALCIUM SERPL-MCNC: 6.7 MG/DL (ref 8.4–10.2)
CHLORIDE SERPL-SCNC: 111 MMOL/L (ref 96–108)
CO2 SERPL-SCNC: 16 MMOL/L (ref 21–32)
CREAT SERPL-MCNC: 5.85 MG/DL (ref 0.6–1.3)
DME PARACHUTE DELIVERY DATE REQUESTED: NORMAL
DME PARACHUTE DELIVERY DATE REQUESTED: NORMAL
DME PARACHUTE DELIVERY NOTE: NORMAL
DME PARACHUTE DELIVERY NOTE: NORMAL
DME PARACHUTE ITEM DESCRIPTION: NORMAL
DME PARACHUTE ITEM DESCRIPTION: NORMAL
DME PARACHUTE ORDER STATUS: NORMAL
DME PARACHUTE ORDER STATUS: NORMAL
DME PARACHUTE SUPPLIER NAME: NORMAL
DME PARACHUTE SUPPLIER NAME: NORMAL
DME PARACHUTE SUPPLIER PHONE: NORMAL
DME PARACHUTE SUPPLIER PHONE: NORMAL
ERYTHROCYTE [DISTWIDTH] IN BLOOD BY AUTOMATED COUNT: 13.6 % (ref 11.6–15.1)
GFR SERPL CREATININE-BSD FRML MDRD: 10 ML/MIN/1.73SQ M
GLUCOSE SERPL-MCNC: 137 MG/DL (ref 65–140)
GLUCOSE SERPL-MCNC: 185 MG/DL (ref 65–140)
GLUCOSE SERPL-MCNC: 65 MG/DL (ref 65–140)
GLUCOSE SERPL-MCNC: 65 MG/DL (ref 65–140)
GLUCOSE SERPL-MCNC: 72 MG/DL (ref 65–140)
HCT VFR BLD AUTO: 33.3 % (ref 36.5–49.3)
HGB BLD-MCNC: 10.6 G/DL (ref 12–17)
MAGNESIUM SERPL-MCNC: 1.9 MG/DL (ref 1.9–2.7)
MCH RBC QN AUTO: 30.5 PG (ref 26.8–34.3)
MCHC RBC AUTO-ENTMCNC: 31.8 G/DL (ref 31.4–37.4)
MCV RBC AUTO: 96 FL (ref 82–98)
PLATELET # BLD AUTO: 286 THOUSANDS/UL (ref 149–390)
PMV BLD AUTO: 9.7 FL (ref 8.9–12.7)
POTASSIUM SERPL-SCNC: 4.2 MMOL/L (ref 3.5–5.3)
RBC # BLD AUTO: 3.47 MILLION/UL (ref 3.88–5.62)
SODIUM SERPL-SCNC: 138 MMOL/L (ref 135–147)
VANCOMYCIN SERPL-MCNC: 11.6 UG/ML (ref 10–20)
WBC # BLD AUTO: 10.84 THOUSAND/UL (ref 4.31–10.16)

## 2025-04-02 PROCEDURE — 82948 REAGENT STRIP/BLOOD GLUCOSE: CPT

## 2025-04-02 PROCEDURE — 73630 X-RAY EXAM OF FOOT: CPT

## 2025-04-02 PROCEDURE — 80202 ASSAY OF VANCOMYCIN: CPT | Performed by: INTERNAL MEDICINE

## 2025-04-02 PROCEDURE — 97163 PT EVAL HIGH COMPLEX 45 MIN: CPT

## 2025-04-02 PROCEDURE — 0Y6Y0Z0 DETACHMENT AT LEFT 5TH TOE, COMPLETE, OPEN APPROACH: ICD-10-PCS | Performed by: PODIATRIST

## 2025-04-02 PROCEDURE — 99232 SBSQ HOSP IP/OBS MODERATE 35: CPT | Performed by: INTERNAL MEDICINE

## 2025-04-02 PROCEDURE — 80048 BASIC METABOLIC PNL TOTAL CA: CPT

## 2025-04-02 PROCEDURE — 88305 TISSUE EXAM BY PATHOLOGIST: CPT | Performed by: PATHOLOGY

## 2025-04-02 PROCEDURE — 85027 COMPLETE CBC AUTOMATED: CPT | Performed by: NURSE PRACTITIONER

## 2025-04-02 PROCEDURE — 99239 HOSP IP/OBS DSCHRG MGMT >30: CPT | Performed by: NURSE PRACTITIONER

## 2025-04-02 PROCEDURE — 83735 ASSAY OF MAGNESIUM: CPT | Performed by: NURSE PRACTITIONER

## 2025-04-02 PROCEDURE — 88311 DECALCIFY TISSUE: CPT | Performed by: PATHOLOGY

## 2025-04-02 PROCEDURE — 97167 OT EVAL HIGH COMPLEX 60 MIN: CPT

## 2025-04-02 RX ORDER — LIDOCAINE HYDROCHLORIDE 10 MG/ML
INJECTION, SOLUTION EPIDURAL; INFILTRATION; INTRACAUDAL; PERINEURAL AS NEEDED
Status: DISCONTINUED | OUTPATIENT
Start: 2025-04-02 | End: 2025-04-02 | Stop reason: HOSPADM

## 2025-04-02 RX ORDER — VANCOMYCIN HYDROCHLORIDE 750 MG/150ML
750 INJECTION, SOLUTION INTRAVENOUS ONCE
Status: COMPLETED | OUTPATIENT
Start: 2025-04-02 | End: 2025-04-02

## 2025-04-02 RX ORDER — PROPOFOL 10 MG/ML
INJECTION, EMULSION INTRAVENOUS CONTINUOUS PRN
Status: DISCONTINUED | OUTPATIENT
Start: 2025-04-02 | End: 2025-04-02

## 2025-04-02 RX ORDER — ONDANSETRON 2 MG/ML
4 INJECTION INTRAMUSCULAR; INTRAVENOUS ONCE AS NEEDED
Status: CANCELLED | OUTPATIENT
Start: 2025-04-02

## 2025-04-02 RX ORDER — BUPIVACAINE HYDROCHLORIDE 5 MG/ML
INJECTION, SOLUTION EPIDURAL; INTRACAUDAL; PERINEURAL AS NEEDED
Status: DISCONTINUED | OUTPATIENT
Start: 2025-04-02 | End: 2025-04-02 | Stop reason: HOSPADM

## 2025-04-02 RX ORDER — FENTANYL CITRATE/PF 50 MCG/ML
25 SYRINGE (ML) INJECTION
Refills: 0 | Status: CANCELLED | OUTPATIENT
Start: 2025-04-02

## 2025-04-02 RX ORDER — MAGNESIUM HYDROXIDE 1200 MG/15ML
LIQUID ORAL AS NEEDED
Status: DISCONTINUED | OUTPATIENT
Start: 2025-04-02 | End: 2025-04-02 | Stop reason: HOSPADM

## 2025-04-02 RX ORDER — DOXYCYCLINE 100 MG/1
100 TABLET ORAL 2 TIMES DAILY
Qty: 14 TABLET | Refills: 0 | Status: SHIPPED | OUTPATIENT
Start: 2025-04-02 | End: 2025-04-09

## 2025-04-02 RX ORDER — PROPOFOL 10 MG/ML
INJECTION, EMULSION INTRAVENOUS AS NEEDED
Status: DISCONTINUED | OUTPATIENT
Start: 2025-04-02 | End: 2025-04-02

## 2025-04-02 RX ADMIN — CLOPIDOGREL 75 MG: 75 TABLET ORAL at 08:51

## 2025-04-02 RX ADMIN — METOPROLOL SUCCINATE 50 MG: 50 TABLET, EXTENDED RELEASE ORAL at 08:51

## 2025-04-02 RX ADMIN — HYDRALAZINE HYDROCHLORIDE 50 MG: 25 TABLET ORAL at 08:51

## 2025-04-02 RX ADMIN — DOCUSATE SODIUM 100 MG: 100 CAPSULE, LIQUID FILLED ORAL at 08:51

## 2025-04-02 RX ADMIN — HEPARIN SODIUM 5000 UNITS: 5000 INJECTION INTRAVENOUS; SUBCUTANEOUS at 15:34

## 2025-04-02 RX ADMIN — INSULIN LISPRO 1 UNITS: 100 INJECTION, SOLUTION INTRAVENOUS; SUBCUTANEOUS at 12:04

## 2025-04-02 RX ADMIN — NICOTINE 1 PATCH: 21 PATCH, EXTENDED RELEASE TRANSDERMAL at 08:51

## 2025-04-02 RX ADMIN — AMLODIPINE BESYLATE 10 MG: 10 TABLET ORAL at 08:51

## 2025-04-02 RX ADMIN — ATORVASTATIN CALCIUM 80 MG: 40 TABLET, FILM COATED ORAL at 08:51

## 2025-04-02 RX ADMIN — SODIUM BICARBONATE 1300 MG: 650 TABLET ORAL at 08:51

## 2025-04-02 RX ADMIN — PROPOFOL 30 MG: 10 INJECTION, EMULSION INTRAVENOUS at 07:28

## 2025-04-02 RX ADMIN — VANCOMYCIN HYDROCHLORIDE 750 MG: 750 INJECTION, SOLUTION INTRAVENOUS at 10:08

## 2025-04-02 RX ADMIN — INSULIN LISPRO 6 UNITS: 100 INJECTION, SOLUTION INTRAVENOUS; SUBCUTANEOUS at 12:04

## 2025-04-02 RX ADMIN — PROPOFOL 50 MCG/KG/MIN: 10 INJECTION, EMULSION INTRAVENOUS at 07:30

## 2025-04-02 RX ADMIN — PROPOFOL 50 MG: 10 INJECTION, EMULSION INTRAVENOUS at 07:30

## 2025-04-02 RX ADMIN — CALCITRIOL CAPSULES 0.25 MCG 0.5 MCG: 0.25 CAPSULE ORAL at 08:51

## 2025-04-02 RX ADMIN — SODIUM BICARBONATE 100 ML/HR: 84 INJECTION INTRAVENOUS at 11:52

## 2025-04-02 NOTE — CASE MANAGEMENT
Case Management Discharge Planning Note    Patient name Adam Gonzalez  Location /-01 MRN [id number]  : 1975 Date 2025       Current Admission Date: 3/30/2025  Current Admission Diagnosis:Diabetic foot infection  (HCC)   Patient Active Problem List    Diagnosis Date Noted Date Diagnosed    Diabetic foot infection  (HCC) 2025     Chronic bronchitis with COPD (chronic obstructive pulmonary disease) (HCC) 2024     Hypertensive CKD (chronic kidney disease) 2024     Metabolic acidosis 2024     Vitamin D deficiency 11/15/2023     Secondary hyperparathyroidism of renal origin ([facility]) 10/01/2023     Implantable loop recorder present 10/01/2023     Noncompliance with treatment plan 2023     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 2022     Impaired balance as late effect of cerebrovascular accident (CVA) 2022     Acute kidney injury superimposed on stage 5 chronic kidney disease, not on chronic dialysis ([facility]) 2022     TSH elevation in past 2022     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) 2022     Bilateral Cerebrovascular accident (CVA) status post tPA 2022     Acute on chronic renal failure  (HCC) 2022     History of CVA (cerebrovascular accident) 2022     History of embolic stroke 2021     Aortic valve insufficiency ( mild-mod  ) 2020     Diabetic polyneuropathy associated with type 2 diabetes mellitus ([facility]) 2020     Brachial plexus injury, left, sequela 10/14/2019     Type 2 diabetes mellitus with circulatory disorder, with long-term current use of insulin ([facility]) 2019     Essential hypertension 2018     Current smoker 2018     Hypercholesterolemia 2015     Nephrotic range proteinuria 2015       LOS (days): 1  Geometric Mean LOS (GMLOS) (days):   Days to GMLOS:     OBJECTIVE:  Risk of Unplanned Readmission Score: 14.27         Current admission status:  Inpatient   Preferred Pharmacy:   [id number] - [facility] [place], [place] - [address]  [address]  [facility] [place] [address]  Phone: [phone] Fax: [phone]    Primary Care Provider: Evans Birmingham MD    Primary Insurance: SAVANNAH HAYWARD LEANDRO  Secondary Insurance:     DISCHARGE DETAILS:                                          Other Referral/Resources/Interventions Provided:  Referral Comments: Met w pt to deliver RW.  Discussed d/c recommendations from therapy;  pt reports he prefers outpt therapy.  Given [facility] Resources Overview Guide showing outpt rehab locations.  Will have to request rx for same tomorrow.  Pt reports he has not heard from [facility] re: status of tub bench order;  CM has yet to receive response to texted inquiry sent via [employer] earlier.  CM will f/u w [facility] via T/C tomorrow, and will reach out to pt to update.  S.O. at bedside for pt transport to home.  Nurse Eli updated.         Treatment Team Recommendation: Home with Home Health Care, Outpatient Rehab  Discharge Destination Plan:: Outpatient Rehab  Transport at Discharge : Family

## 2025-04-02 NOTE — ASSESSMENT & PLAN NOTE
Chronic, uncontrolled, as evidenced by a hemoglobin A1C of 7.2  Patient denies any hypoglycemia episodes at home on current regimen of 28 units basaglar daily - can continue this dosing.  Continue Dulaglutide as well

## 2025-04-02 NOTE — CASE MANAGEMENT
Case Management Discharge Planning Note    Patient name Adam Gonzalez  Location /-01 MRN [id number]  : 1975 Date 2025       Current Admission Date: 3/30/2025  Current Admission Diagnosis:Diabetic foot infection  (HCC)   Patient Active Problem List    Diagnosis Date Noted Date Diagnosed    Diabetic foot infection  (HCC) 2025     Chronic bronchitis with COPD (chronic obstructive pulmonary disease) (HCC) 2024     Hypertensive CKD (chronic kidney disease) 2024     Metabolic acidosis 2024     Vitamin D deficiency 11/15/2023     Secondary hyperparathyroidism of renal origin ([facility]) 10/01/2023     Implantable loop recorder present 10/01/2023     Noncompliance with treatment plan 2023     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 2022     Impaired balance as late effect of cerebrovascular accident (CVA) 2022     Acute kidney injury superimposed on stage 5 chronic kidney disease, not on chronic dialysis (HCC) 2022     TSH elevation in past 2022     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) 2022     Bilateral Cerebrovascular accident (CVA) status post tPA 2022     Acute on chronic renal failure  (HCC) 2022     History of CVA (cerebrovascular accident) 2022     History of embolic stroke 2021     Aortic valve insufficiency ( mild-mod  ) 2020     Diabetic polyneuropathy associated with type 2 diabetes mellitus ([facility]) 2020     Brachial plexus injury, left, sequela 10/14/2019     Type 2 diabetes mellitus with circulatory disorder, with long-term current use of insulin ([facility]) 2019     Essential hypertension 2018     Current smoker 2018     Hypercholesterolemia 2015     Nephrotic range proteinuria 2015       LOS (days): 1  Geometric Mean LOS (GMLOS) (days):   Days to GMLOS:     OBJECTIVE:  Risk of Unplanned Readmission Score: 14.24         Current admission status:  Inpatient   Preferred Pharmacy:   [id number] - [facility] [place] - [address]  [address]  [facility] [place] [address]  Phone: [phone] Fax: [phone]    Primary Care Provider: Evans Birmingham MD    Primary Insurance: SAVANNAH HAYWARD LEANDRO  Secondary Insurance:     DISCHARGE DETAILS:                                          Other Referral/Resources/Interventions Provided:  Referral Comments: Pt went to OR this AM for amp of 5th L toe.  Plan is for pt d/c today;  PT/OT to see prior to d/c.  PT has already recommended walker for pt;  he is requesting crutches.  PT/Karuna made aware.         Treatment Team Recommendation: Home  Discharge Destination Plan:: Home  Transport at Discharge : Family

## 2025-04-02 NOTE — PLAN OF CARE
Problem: OCCUPATIONAL THERAPY ADULT  Goal: Performs self-care activities at highest level of function for planned discharge setting.  See evaluation for individualized goals.  Description: Treatment Interventions: ADL retraining, Functional transfer training, Endurance training, Equipment evaluation/education, Compensatory technique education, Energy conservation  Equipment Recommended: Shower transfer bench ($$)       See flowsheet documentation for full assessment, interventions and recommendations.   Outcome: Progressing  Note: Limitation: Decreased ADL status, Decreased Safe judgement during ADL, Decreased endurance, Decreased self-care trans, Decreased high-level ADLs  Prognosis: Good  Assessment: Patient is a 50 y.o. male seen for OT evaluation s/p admit to [facility] on 3/30/2025 w/Diabetic foot infection  (HCC). PMHx and co-morbidities affecting patient's functional performance at time of assessment include: current smoker, DM2, GATITO, HTN, COPD. Orders placed for OT evaluation and treatment.  Performed at least two patient identifiers during session including name and wristband. Pt reports residing with significant other in 2  w/ 1 SHARRI. Pt reports being independent w/ ADLs and IADLs at baseline. Pt reports ambulating with no AD at baseline. Personal factors affecting patient at time of initial evaluation include: difficulty performing ADLs and difficulty performing IADLs. Upon evaluation, patient requires supervision and minimal  assist for UB ADLs, moderate assist for LB ADLs, transfers and functional ambulation in room with contact guard assist, with the use of Rolling Walker.  Patient is oriented x 4.  Occupational performance is affected by the following deficits: dynamic sit/ stand balance deficit with poor standing tolerance time for self care and functional mobility, decreased activity tolerance, and decreased safety awareness, and weight bearing status.  Patient to benefit from  continued Occupational Therapy treatment while in the hospital to address deficits as defined above and maximize level of functional independence with ADLs and functional mobility. Occupational Performance areas to address include: grooming , bathing/ shower, dressing, toilet hygiene, transfer to all surfaces, functional ambulation, and functional mobility. From OT standpoint, recommendation at time of d/c would be Level 3.     Rehab Resource Intensity Level, OT: III (Minimum Resource Intensity)

## 2025-04-02 NOTE — PHYSICAL THERAPY NOTE
Physical Therapy Evaluation    Patient's Name: Adam Gonzalez    Admitting Diagnosis  Cellulitis [L03.90]  Osteomyelitis (HCC) [M86.9]  Diabetic foot infection  (HCC) [E11.628, L08.9]  Stage 5 chronic kidney disease not on chronic dialysis ([facility]) [N18.5]    Problem List  Patient Active Problem List   Diagnosis    Hypercholesterolemia    Nephrotic range proteinuria    Current smoker    Essential hypertension    Type 2 diabetes mellitus with circulatory disorder, with long-term current use of insulin (HCC)    Brachial plexus injury, left, sequela    Diabetic polyneuropathy associated with type 2 diabetes mellitus (HCC)    Aortic valve insufficiency ( mild-mod 2020 )    History of embolic stroke    History of CVA (cerebrovascular accident)    Bilateral Cerebrovascular accident (CVA) status post tPA    Acute on chronic renal failure  ([facility])    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)    TSH elevation in past    Acute kidney injury superimposed on stage 5 chronic kidney disease, not on chronic dialysis (HCC)    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    Impaired balance as late effect of cerebrovascular accident (CVA)    Noncompliance with treatment plan    Secondary hyperparathyroidism of renal origin (HCC)    Implantable loop recorder present    Vitamin D deficiency    Hypertensive CKD (chronic kidney disease)    Metabolic acidosis    Chronic bronchitis with COPD (chronic obstructive pulmonary disease) (HCC)    Diabetic foot infection  (HCC)       Past Medical History  Past Medical History:   Diagnosis Date    Chronic kidney disease     Diabetes mellitus (HCC)     Hypertension     Received intravenous tissue plasminogen activator (tPA) in emergency department 02/01/2022    Stroke ([facility])     no deficits       Past Surgical History  Past Surgical History:   Procedure Laterality Date    CARDIAC LOOP RECORDER  2023    PILONIDAL CYST EXCISION      LA ARTERIOVENOUS ANASTOMOSIS OPEN DIRECT Left 4/10/2024     Procedure: CREATION FISTULA BRACHIOCEPHALIC LEFT (AV);  Surgeon: Maxwell Nielsen MD;  Location: [facility];  Service: Vascular    TOE AMPUTATION Left 4/2/2025    Procedure: AMPUTATION TOE;  Surgeon: Adriane Anna DPM;  Location: [facility];  Service: Podiatry       Recent Imaging  XR foot 3+ vw left   Final Result by Elgin Vee MD (04/02 0854)      Resection of the fifth toe at the MTP joint.         Computerized Assisted Algorithm (CAA) may have been used to analyze all applicable images.         Workstation performed: [id number]         VAS DIALYSIS ACCESS EVALUATION- LEFT AV(Upper)   Final Result by Saji Demarco MD (04/01 2332)      MRI foot/forefoot toes left wo contrast   Final Result by Matt Rodriguez DO (04/01 0351)      Acute osteomyelitis involving the fifth middle and distal phalanges with early acute osteomyelitis involving the head of the fifth proximal phalanx            Workstation performed: [id number]         VAS ARTERIAL DUPLEX-LOWER LIMB UNILATERAL   Final Result by Maxwell Nielsen MD (03/31 2918)      XR foot 3+ views LEFT   Final Result by Bernabe Castillo MD (03/31 6537)      Findings suspicious for acute osteomyelitis of the fifth distal phalanx and possibly the middle phalanx. Suggest further characterization with MRI.      Computerized Assisted Algorithm (CAA) may have been used to analyze all applicable images.         The study was marked in EPIC for immediate notification.         Resident: Alley Black      I, the attending radiologist, have reviewed the images and agree with the final report above.      Workstation performed: [id number]         VAS DIALYSIS ACCESS EVALUATION- LEFT AV(Upper)    (Results Pending)       Recent Vital Signs  Vitals:    04/02/25 0925 04/02/25 0928 04/02/25 0930 04/02/25 0935   BP: 135/77 135/77 135/77 133/76   Pulse: 85 84 90 84   Resp:       Temp: 98.4 °F (36.9 °C) 98.4 °F (36.9 °C) 98.4 °F (36.9 °C)    TempSrc:        SpO2: 99% 100% 100%    Weight:       Height:              04/02/25 1448   PT Last Visit   PT Visit Date 04/02/25   Note Type   Note type Evaluation   Pain Assessment   Pain Assessment Tool 0-10   Pain Score 4   Pain Location/Orientation Orientation: Left;Location: Foot   Restrictions/Precautions   Weight Bearing Precautions Per Order Yes   LLE Weight Bearing Per Order NWB   Braces or Orthoses Other (Comment)  (surgical shoe)   Other Precautions Chair Alarm;Bed Alarm;Multiple lines;Fall Risk   Home Living   Type of Home House   Home Layout Two level;Performs ADLs on one level;Able to live on main level with bedroom/bathroom;Stairs to enter without rails  (1 SHARRI with no handrail)   Bathroom Shower/Tub Tub/shower unit   Bathroom Toilet Standard   Bathroom Equipment Hand-held shower   Bathroom Accessibility Accessible   Home Equipment   (none)   Additional Comments ambulating with no AD at baseline   Prior Function   Level of [place] Independent with ADLs;Independent with functional mobility;Independent with IADLS   Lives With Significant other   Receives Help From Family   IADLs Independent with driving;Independent with meal prep;Independent with medication management   Falls in the last 6 months 0   Vocational On disability   General   Family/Caregiver Present Yes   Cognition   Overall Cognitive Status WFL   Arousal/Participation Alert   Orientation Level Oriented X4   Memory Within functional limits   Following Commands Follows all commands and directions without difficulty   Comments Pt agreeable to PT session   RLE Assessment   RLE Assessment WFL  (4/5)   LLE Assessment   LLE Assessment WFL  (4/5)   Vision-Basic Assessment   Current Vision Wears glasses all the time   Coordination   Sensation WFL   Light Touch   RLE Light Touch Grossly intact   LLE Light Touch Grossly intact   Bed Mobility   Additional Comments pt OOB upon arrival and at end of session   Transfers   Sit to Stand   (CGA)   Additional  items Assist x 1;Armrests;Increased time required;Verbal cues   Stand to Sit   (CGA)   Additional items Assist x 1;Armrests;Increased time required;Verbal cues   Additional Comments with RW, Pt provided visual demonstration on how to complete transfer while maintaining NWB prior to attempts. during transfers pt would heel WB despite max VC and education.   Ambulation/Elevation   Gait pattern Excessively slow;Short stride  (hop to pattern used on maintain NWB status on L LE)   Gait Assistance   (CGA)   Additional items Assist x 1;Verbal cues   Assistive Device Rolling walker   Distance 15'   Ambulation/Elevation Additional Comments Pt able to maintain NWB 75% of time during session   Balance   Static Sitting Normal   Dynamic Sitting Good   Static Standing Fair +   Dynamic Standing Fair   Ambulatory Fair -   Activity Tolerance   Activity Tolerance Patient tolerated treatment well   Medical Staff Made Aware OT Sol[place], Pt seen as co-evaluation due to pt's co-morbidities, clinically unstable presentation and present impairments   Nurse Made Aware Spoke to RN   Assessment   Prognosis Good   Problem List Decreased strength;Decreased endurance;Impaired balance;Decreased mobility;Pain  (podiatry restrictions (NWB LLE))   Assessment Adam Gonzalez is a 50 y.o. male admitted to [facility] on 3/30/2025 for Diabetic foot infection  (HCC), GATITO. Pt  has a past medical history of Chronic kidney disease, Diabetes mellitus (HCC), Hypertension, Received intravenous tissue plasminogen activator (tPA) in emergency department (02/01/2022), and Stroke ([facility]).. Order placed for PT eval and tx. PT was consulted and pt was seen on 4/2/2025 for mobility assessment and d/c planning. Chart review and two person identifiers were completed.   Currently pt presents with decreased strength , decreased static sitting balance , decreased dynamic sitting balance , decreased static standing balance, decreased dynamic standing balance , decreased gait speed,  decreased step length , and decreased muscular endurance . Due to these impairments, they will require assistance to perform bed mobility, sit to stand , ambulation, stair negotiation, and transfers. Pt is currently functioning at a contact guard assistance x1 level for transfers, contact guard assistance x1 level for ambulation with Rolling Walker. These activity limitations significantly impact their ability to participate in previous home and community roles and responsibilities  and ambulation in home. The patient's AM-PAC Basic Mobility Inpatient Short Form Raw Score is 19. PT recommends level III minimum resource intensity. They will benefit from skilled therapy to to reduce the risk of falls and to maximize functional potential.   Barriers to Discharge Other (Comment)  (decline in functional mobility)   Goals   STG Expiration Date 04/12/25   Short Term Goal #1 Within 10 days patient will complete: 1) Bed mobility skills with modified independent assistance to facilitate safe return to previous living environment 2) Functional transfers with modified independent assistance to facilitate safe return to previous living environment  3) Ambulation with least restrictive AD modified independent assistance without LOB and stable vitals for safe ambulation home/ community distances. 4) Stair training up/down flight 2 step/s with appropriate rail/s and modified independent assistance for safe access to previous living environment. 5) Improve balance grades to fair + to reduce risk of falls. 6)Improve LE strength grades by 1 to increase independence w/ transfers and gait.  7) PT for ongoing pt and family education; DME needs and D/C planning to promote highest level of function in least restrictive environment.   Plan   Treatment/Interventions Functional transfer training;LE strengthening/ROM;Therapeutic exercise;Endurance training;Patient/family training;Equipment eval/education;Bed mobility;Gait training;Continued  evaluation;Spoke to nursing;OT   PT Frequency 1-2x/wk   Discharge Recommendation   Rehab Resource Intensity Level, PT III (Minimum Resource Intensity)   Equipment Recommended Walker   Walker Package Recommended Wheeled walker   Change/add to Walker Package? No   Additional Comments Recommending tub transfer bench for safety with ADLs and to maintain NWB while completing ADLs   AM-PAC Basic Mobility Inpatient   Turning in Flat Bed Without Bedrails 4   Lying on Back to Sitting on Edge of Flat Bed Without Bedrails 4   Moving Bed to Chair 3   Standing Up From Chair Using Arms 3   Walk in Room 3   Climb 3-5 Stairs With Railing 2   Basic Mobility Inpatient Raw Score 19   Basic Mobility Standardized Score 42.48   [facility] Highest Level Of Mobility   -HL Goal 6: Walk 10 steps or more   -HLM Achieved 6: Walk 10 steps or more   End of Consult   Patient Position at End of Consult Bedside chair;Bed/Chair alarm activated;All needs within reach       Recommendations                                                                                                              Pt will benefit from continued skilled IP PT to address the above mentioned impairments in order to maximize recovery and increase functional independence when completing mobility and ADLs. See flow sheet for goals and POC.     PT Evaluation Time: [id number]    Benjie Khan, PT, DPT

## 2025-04-02 NOTE — OP NOTE
OPERATIVE REPORT  PATIENT NAME: Adam Gonzalez    :  1975  MRN: [id number]  Pt Location: [facility] OR ROOM 01    SURGERY DATE: 2025    Surgeons and Role:     * Adriane Anna DPM - Primary    Preop Diagnosis:  Osteomyelitis (HCC) [M86.9]  Diabetic foot infection  (HCC) [E11.628, L08.9]    Post-Op Diagnosis Codes:     * Osteomyelitis (HCC) [M86.9]     * Diabetic foot infection  (HCC) [E11.628, L08.9]    Procedure(s):  Left - AMPUTATION TOE    Specimen(s):  ID Type Source Tests Collected by Time Destination   1 : Left 5th Toe Tissue Toe, Left TISSUE EXAM Adriane Anna DPM 2025 0739        Estimated Blood Loss:   Minimal    Drains:  * No LDAs found *    Anesthesia Type:   IV Sedation with Anesthesia    Operative Indications:  Osteomyelitis (HCC) [M86.9]  Diabetic foot infection  (HCC) [E11.628, L08.9]      Operative Findings:  Purulence, soft grey bone       Complications:   None    Procedure and Technique:  Under mild sedation, the patient was brought back to the OR and remained supine on the stretcher.  Once MAC sedation was achieved, a local injection of 10 ccs of a 1:1 mixture of 1% lidocaine plain and 0.5% marcaine plain.  The left foot is then scrubbed, prepped, and draped in the usual manner.  Attention is directed to the left foot fifth digit.  The toe is noted to be open at the distal end with exposed bone.  There is purulence.  Using a skin marker, an incision line is delineated circumferentially about the left foot fifth digit.  Using a 15 blade, an incision is made deep to bone at the MPJ.  The toe is debrided away.  It is passed off en toto.  The open area is flushed with copious amounts of normal sterile saline.  The deep tissues are examined and are noted to have good bleeding.  The bone of the metatarsal is hard and white.  The deep tissues are reapproximated using 3.0 vicryl.  The skin is reapproximated using 3.0 nylon.  The foot is dressed with xeroform, DSD, and an ACE.  He is awoken from [facility]  sedation with all VS stable and NVS intact to pre-exam status. He is transferred to PACU where he will recover for a short time and then return to his room.      Patient Disposition:  PACU              SIGNATURE: Adriane Anna DPM  DATE: April 2, 2025  TIME: 7:53 AM

## 2025-04-02 NOTE — TELEPHONE ENCOUNTER
Pts girlfriend called again and asked if we can please call her back and inform her if the paperwork for [employer] was completed and sent. Please advise [phone] thank you.

## 2025-04-02 NOTE — TELEPHONE ENCOUNTER
"4/2 2:40pm: Called Adriane (Significant Other) to let her know that the completed paperwork was faxed to [employer], and also scanned into PT's chart. Adriane verbalized understanding, thanks the practice \"for all their help.\" No further actions needed.  "

## 2025-04-02 NOTE — DISCHARGE SUMMARY
Discharge Summary - Hospitalist   Name: Adam Gonzalez 50 y.o. male I MRN: [id number]  Unit/Bed#: -01 I Date of Admission: 3/30/2025   Date of Service: 4/2/2025 I Hospital Day: 1     Assessment & Plan  Diabetic foot infection  (HCC)  Patient presented to the ED with left ankle swelling as well as concern for progressing wound symptoms to the left 5th toe.  Initial x-ray imaging of the left foot showed concerns of left 5th toe osteomyelitis, MRI was completed as well for confirmation.  MRI L Foot/Forefoot (3/31/25): Acute osteomyelitis involving the fifth middle and distal phalanges with early acute osteomyelitis involving the head of the fifth proximal phalanx   Podiatry consulted, appreciate ongoing input  POD 0 from left 5th toe amputation  Continue with NWB L Foot  Will need PT/OT Eval prior to discharge, but otherwise clear for discharge from podiatry standpoint  Will give 7 day course of Doxycycline 100 mg BID  Vascular consult  LEADs obtained, noted to have adequate healing pressures in left foot despite a 50-75% stenosis in left mid SFA  No vascular intervention recommended at this time, will monitor healing post procedure, if should have any issues healing, can move forward with angiogram.  Acute kidney injury superimposed on stage 5 chronic kidney disease, not on chronic dialysis (HCC)  Present on admission, found to have a creatinine of 6.48 on admission  Nephrology consulted  No indication for HD at this time  Follow-up with primary Nephrologist  Type 2 diabetes mellitus with circulatory disorder, with long-term current use of insulin (HCC)  Chronic, uncontrolled, as evidenced by a hemoglobin A1C of 7.2  Patient denies any hypoglycemia episodes at home on current regimen of 28 units basaglar daily - can continue this dosing.  Continue Dulaglutide as well  Current smoker  Smokes 1 ppd  Counseled on importance of smoking cessation  Hypomagnesemia (Resolved: 4/2/2025)  Resolved     Medical Problems        Resolved Problems  Date Reviewed: 12/27/2024          Resolved    Hypomagnesemia 4/2/2025     Resolved by  YASMIN Pelaez        Discharging Physician / Practitioner: YSAMIN Pelaez  PCP: Evans Birmingham MD  Admission Date:   Admission Orders (From admission, onward)       Ordered        04/01/25 1353  INPATIENT ADMISSION  Once            03/30/25 1954  Place in Observation  Once                          Discharge Date: 04/02/25    Consultations During Hospital Stay:  IP CONSULT TO NEPHROLOGY  IP CONSULT TO PODIATRY  IP CONSULT TO PHARMACY  IP CONSULT TO VASCULAR SURGERY    Procedures Performed:   Left foot, 5th toe amputation (4/2/25)    Significant Findings / Test Results:   XR foot 3+ vw left   Final Result by Elgin Vee MD (04/02 0854)      Resection of the fifth toe at the MTP joint.         Computerized Assisted Algorithm (CAA) may have been used to analyze all applicable images.         Workstation performed: [id number]         VAS DIALYSIS ACCESS EVALUATION- LEFT AV(Upper)   Final Result by Saji Demarco MD (04/01 2292)      MRI foot/forefoot toes left wo contrast   Final Result by Matt Rodriguez DO (04/01 9371)      Acute osteomyelitis involving the fifth middle and distal phalanges with early acute osteomyelitis involving the head of the fifth proximal phalanx            Workstation performed: [id number]         VAS ARTERIAL DUPLEX-LOWER LIMB UNILATERAL   Final Result by Maxwell Nielsen MD (03/31 3381)      XR foot 3+ views LEFT   Final Result by Bernabe Castillo MD (03/31 9265)      Findings suspicious for acute osteomyelitis of the fifth distal phalanx and possibly the middle phalanx. Suggest further characterization with MRI.      Computerized Assisted Algorithm (CAA) may have been used to analyze all applicable images.         The study was marked in EPIC for immediate notification.         Resident: Alley Black      I, the attending  "radiologist, have reviewed the images and agree with the final report above.      Workstation performed: [id number]         VAS DIALYSIS ACCESS EVALUATION- LEFT AV(Upper)    (Results Pending)       Incidental Findings:   None     Test Results Pending at Discharge (will require follow up):   Pathology from 5th toe, left foot     Outpatient Tests Requested:  Follow-up with PCP within 7 days  Follow-up with Podiatry  Follow-up with Nephrology as previously scheduled    Complications:  None    Reason for Admission: Left 5th toe osteomyelitis    Hospital Course:   Adam Gonzalez is a 50 y.o. male patient with past medical history of diabetes mellitus type 2, CKD stg 5 not on HD, HTN who originally presented to the hospital on 3/30/2025 due to left ankle swelling, left foot concerning of swelling and wound changes to 5th toe.  Left foot x-ray completed on admission concerning for osteomyelitis therefore admitted for Podiatry evaluation.  MRI also requested and completed of the left foot/forefoot - noting + osteomyelitis to the left 5th toe.  Podiatry performed left foot, 5th toe amputation today, 4/2.  He was cleared for discharge from podiatry standpoint. Will discharge home with PO Doxycycline given purulence seen during procedure despite surgical cure.  Seen by PT/OT prior to discharge.  Patient advised multiple times that he is to be non-weight bearing to the left foot which was also discussed with patients wife, Adriane via phone.    Please see above list of diagnoses and related plan for additional information.     Condition at Discharge: stable    Discharge Day Visit / Exam:   Subjective:  \"I feel great, I want to go home.\"    Vitals: Blood Pressure: 133/76 (04/02/25 0935)  Pulse: 84 (04/02/25 0935)  Temperature: 98.4 °F (36.9 °C) (04/02/25 0930)  Temp Source: Temporal (04/02/25 0650)  Respirations: 20 (04/02/25 0815)  Height: 5' 10\" (177.8 cm) (03/30/25 2243)  Weight - Scale: 86.6 kg (191 lb) (03/31/25 " 0805)  SpO2: 100 % (04/02/25 0930)    Physical Exam  Vitals and nursing note reviewed.   Constitutional:       General: He is not in acute distress.  Cardiovascular:      Rate and Rhythm: Normal rate.      Pulses: Normal pulses.   Pulmonary:      Effort: Pulmonary effort is normal.   Abdominal:      General: Bowel sounds are normal. There is no distension.      Palpations: Abdomen is soft.      Tenderness: There is no abdominal tenderness.   Musculoskeletal:         General: Normal range of motion.   Skin:     General: Skin is warm and dry.      Comments: L foot dressing CDI   Neurological:      Mental Status: He is alert and oriented to person, place, and time.   Psychiatric:         Mood and Affect: Mood normal.          Discussion with Family: Updated  (wife) via phone. Adriane @ 1116 am    Discharge instructions/Information to patient and family:   See after visit summary for information provided to patient and family.      Provisions for Follow-Up Care:  See after visit summary for information related to follow-up care and any pertinent home health orders.      Mobility at time of Discharge:   Basic Mobility Inpatient Raw Score: 24  JH-HLM Goal: 8: Walk 250 feet or more  JH-HLM Achieved: 8: Walk 250 feet ot more  HLM Goal achieved. Continue to encourage appropriate mobility.     Disposition:   Home    Planned Readmission: None    Discharge Medications:  See after visit summary for reconciled discharge medications provided to patient and/or family.      Administrative Statements   Discharge Statement:  I have spent a total time of 35 minutes in caring for this patient on the day of the visit/encounter. >30 minutes of time was spent on: Diagnostic results, Risks and benefits of tx options, Instructions for management, Patient and family education, Importance of tx compliance, Risk factor reductions, Impressions, Counseling / Coordination of care, Documenting in the medical record, and Communicating  with other healthcare professionals .    **Please Note: This note may have been constructed using a voice recognition system**   Vaccine status unknown

## 2025-04-02 NOTE — TELEPHONE ENCOUNTER
4/1 3:30pm: Spoke w/Adriane (Wife), was able to obtain the phone number to [email or web address] Spoke w/Richelle, [email or web address] Billing Dept, and gave her our fax number so that she could fax us the Standard Medical Certificate Form. Form was received, placed in PCP's folder for review and completion. Last OV was 2/3/25. Please fax completed form to [phone].

## 2025-04-02 NOTE — ASSESSMENT & PLAN NOTE
Lab Results   Component Value Date    EGFR 10 04/02/2025    EGFR 9 04/01/2025    EGFR 9 03/31/2025    CREATININE 5.85 (H) 04/02/2025    CREATININE 6.02 (H) 04/01/2025    CREATININE 6.39 (H) 03/31/2025     Renal function is stable overall    No need for dialysis

## 2025-04-02 NOTE — CASE MANAGEMENT
Case Management Discharge Planning Note    Patient name Adam Gonzalez  Location /-01 MRN [id number]  : 1975 Date 2025       Current Admission Date: 3/30/2025  Current Admission Diagnosis:Diabetic foot infection  (HCC)   Patient Active Problem List    Diagnosis Date Noted Date Diagnosed    Diabetic foot infection  (HCC) 2025     Chronic bronchitis with COPD (chronic obstructive pulmonary disease) (HCC) 2024     Hypertensive CKD (chronic kidney disease) 2024     Metabolic acidosis 2024     Vitamin D deficiency 11/15/2023     Secondary hyperparathyroidism of renal origin ([facility]) 10/01/2023     Implantable loop recorder present 10/01/2023     Noncompliance with treatment plan 2023     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 2022     Impaired balance as late effect of cerebrovascular accident (CVA) 2022     Acute kidney injury superimposed on stage 5 chronic kidney disease, not on chronic dialysis (HCC) 2022     TSH elevation in past 2022     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) 2022     Bilateral Cerebrovascular accident (CVA) status post tPA 2022     Acute on chronic renal failure  (HCC) 2022     History of CVA (cerebrovascular accident) 2022     History of embolic stroke 2021     Aortic valve insufficiency ( mild-mod  ) 2020     Diabetic polyneuropathy associated with type 2 diabetes mellitus ([facility]) 2020     Brachial plexus injury, left, sequela 10/14/2019     Type 2 diabetes mellitus with circulatory disorder, with long-term current use of insulin ([facility]) 2019     Essential hypertension 2018     Current smoker 2018     Hypercholesterolemia 2015     Nephrotic range proteinuria 2015       LOS (days): 1  Geometric Mean LOS (GMLOS) (days):   Days to GMLOS:     OBJECTIVE:  Risk of Unplanned Readmission Score: 14.24         Current admission status:  Inpatient   Preferred Pharmacy:   [id number] - [facility] - [address]  [address]  [place] [address]  Phone: [phone] Fax: [phone]    Primary Care Provider: Evans Birmingham MD    Primary Insurance: SAVANNAH [employer]AYWARD LEANDRO  Secondary Insurance:     DISCHARGE DETAILS:                                          Other Referral/Resources/Interventions Provided:  Referral Comments: Made aware by PT/OT that pt needs RW and tub bench. Order placed w [employer] via [employer]; waiting for insurance auth.  See recommendation for Level III for pt;  blanket HH referrals made, will need to discuss w pt.         Treatment Team Recommendation: Home with Home Health Care, Outpatient Rehab  Discharge Destination Plan:: Home with Home Health Care, Outpatient Rehab  Transport at Discharge : Family

## 2025-04-02 NOTE — ASSESSMENT & PLAN NOTE
Present on admission, found to have a creatinine of 6.48 on admission  Nephrology consulted  No indication for HD at this time  Follow-up with primary Nephrologist

## 2025-04-02 NOTE — TELEPHONE ENCOUNTER
Patient's partner, Adriane, calling for an update on paper work from [employer]. Informed Adriane that the paperwork had been received and scanned into patient, Adam's, chart. Adriane requests a call back once the paperwork is filled out by PCP and faxed back to [employer] at [phone]. Please call Adriane at [phone] once forms have been faxed back to [employer]. Thank you!

## 2025-04-02 NOTE — OCCUPATIONAL THERAPY NOTE
Occupational Therapy Evaluation        Patient Name: Adam Gonzalez  Today's Date: 4/2/2025 04/02/25 1434   OT Last Visit   OT Visit Date 04/02/25   Note Type   Note type Evaluation   Pain Assessment   Pain Assessment Tool 0-10   Pain Score 4   Pain Location/Orientation Orientation: Left;Location: Foot   Restrictions/Precautions   Weight Bearing Precautions Per Order Yes   LLE Weight Bearing Per Order NWB   Braces or Orthoses Other (Comment)  (surgical shoe)   Other Precautions Chair Alarm;Bed Alarm;Multiple lines;Pain   Home Living   Type of Home House   Home Layout Two level;Performs ADLs on one level;Able to live on main level with bedroom/bathroom;Stairs to enter without rails  (1 SHARRI)   Bathroom Shower/Tub Tub/shower unit   Bathroom Toilet Standard   Bathroom Equipment Hand-held shower   Bathroom Accessibility Accessible   Home Equipment Other (Comment)  (none reported)   Additional Comments ambulating with no AD at baseline   Prior Function   Level of [place] Independent with ADLs;Independent with functional mobility;Independent with IADLS   Lives With Significant other   Receives Help From Family   IADLs Independent with driving;Independent with meal prep;Independent with medication management   Falls in the last 6 months 0   Vocational On disability   Lifestyle   Autonomy Pt reports residing with significant other in 2 SH w/ 1 SHARRI. Pt reports being independent w/ ADLs and IADLs at baseline. Pt reports ambulating with no AD at baseline.   Reciprocal Relationships Supportive Significant Other   General   Family/Caregiver Present Yes   ADL   Where Assessed Chair  (ADL levels based on functional performance during OT evaluation.)   Eating Assistance 6  Modified independent   Eating Deficit Setup   Grooming Assistance 5  Supervision/Setup   Grooming Deficit Setup;Supervision/safety;Increased time to complete   UB Bathing Assistance 5  Supervision/Setup   UB Bathing Deficit  Setup;Supervision/safety;Verbal cueing;Increased time to complete   LB Bathing Assistance 4  Minimal Assistance   LB Bathing Deficit Setup;Supervision/safety;Verbal cueing;Increased time to complete   UB Dressing Assistance 5  Supervision/Setup   UB Dressing Deficit Setup;Verbal cueing;Supervision/safety;Increased time to complete;Thread RUE;Thread LUE   LB Dressing Assistance 3  Moderate Assistance   LB Dressing Deficit Setup;Verbal cueing;Supervision/safety;Increased time to complete;Don/doff R sock;Thread RLE into pants;Thread LLE into pants;Pull up over hips   Toileting Assistance  5  Supervision/Setup   Toileting Deficit Setup;Verbal cueing;Supervison/safety;Increased time to complete   Functional Assistance 5  Supervision/Setup  (CGA)   Bed Mobility   Additional Comments pt OOB upon arrival and at end of session   Transfers   Sit to Stand   (CGA)   Additional items Assist x 1;Armrests;Increased time required;Verbal cues   Stand to Sit   (CGA)   Additional items Assist x 1;Increased time required;Verbal cues   Additional Comments w/ RW   Functional Mobility   Functional Mobility   (CGA)   Additional Comments CGA x1 w/ RW. Pt able to maintain NWB 75% of time during ambulation in room.   Additional items Rolling walker   Balance   Static Sitting Normal   Dynamic Sitting Good   Static Standing Fair +   Dynamic Standing Fair   Activity Tolerance   Activity Tolerance Patient tolerated treatment well   Medical Staff Made Aware Pt seen as a co-eval with PT due to the patient's co-morbidities, clinically unstable presentation, and present impairments which are a regression from the patient's baseline.  (PT Benjie)   Nurse Made Aware Spoke to RN   RUE Assessment   RUE Assessment WFL   LUE Assessment   LUE Assessment WFL   Hand Function   Gross Motor Coordination Functional   Fine Motor Coordination Functional   Sensation   Light Touch No apparent deficits  (BUEs)   Vision-Basic Assessment   Current Vision Wears glasses  all the time   Cognition   Overall Cognitive Status WFL   Arousal/Participation Alert;Responsive;Cooperative   Attention Within functional limits   Orientation Level Oriented X4   Memory Within functional limits   Following Commands Follows all commands and directions without difficulty   Assessment   Limitation Decreased ADL status;Decreased Safe judgement during ADL;Decreased endurance;Decreased self-care trans;Decreased high-level ADLs   Prognosis Good   Assessment Patient is a 50 y.o. male seen for OT evaluation s/p admit to [facility] on 3/30/2025 w/Diabetic foot infection  (HCC). PMHx and co-morbidities affecting patient's functional performance at time of assessment include: current smoker, DM2, GATITO, HTN, COPD. Orders placed for OT evaluation and treatment.  Performed at least two patient identifiers during session including name and wristband. Pt reports residing with significant other in 2  w/ 1 SHARRI. Pt reports being independent w/ ADLs and IADLs at baseline. Pt reports ambulating with no AD at baseline. Personal factors affecting patient at time of initial evaluation include: difficulty performing ADLs and difficulty performing IADLs. Upon evaluation, patient requires supervision and minimal  assist for UB ADLs, moderate assist for LB ADLs, transfers and functional ambulation in room with contact guard assist, with the use of Rolling Walker.  Patient is oriented x 4.  Occupational performance is affected by the following deficits: dynamic sit/ stand balance deficit with poor standing tolerance time for self care and functional mobility, decreased activity tolerance, and decreased safety awareness, and weight bearing status.  Patient to benefit from continued Occupational Therapy treatment while in the hospital to address deficits as defined above and maximize level of functional independence with ADLs and functional mobility. Occupational Performance areas to address include: grooming ,  bathing/ shower, dressing, toilet hygiene, transfer to all surfaces, functional ambulation, and functional mobility. From OT standpoint, recommendation at time of d/c would be Level 3.   Plan   Treatment Interventions ADL retraining;Functional transfer training;Endurance training;Equipment evaluation/education;Compensatory technique education;Energy conservation   Goal Expiration Date 04/16/25   OT Treatment Day 0   OT Frequency 2-3x/wk   Discharge Recommendation   Rehab Resource Intensity Level, OT III (Minimum Resource Intensity)   Equipment Recommended Shower transfer bench ($$)   AM-PAC Daily Activity Inpatient   Lower Body Dressing 2   Bathing 3   Toileting 3   Upper Body Dressing 4   Grooming 4   Eating 4   Daily Activity Raw Score 20   Daily Activity Standardized Score (Calc for Raw Score >=11) 42.03   AM-PAC Applied Cognition Inpatient   Following a Speech/Presentation 4   Understanding Ordinary Conversation 4   Taking Medications 4   Remembering Where Things Are Placed or Put Away 4   Remembering List of 4-5 Errands 4   Taking Care of Complicated Tasks 4   Applied Cognition Raw Score 24   Applied Cognition Standardized Score 62.21   Barthel Index   Feeding 10   Bathing 0   Grooming Score 5   Dressing Score 5   Bladder Score 10   Bowels Score 10   Toilet Use Score 5   Transfers (Bed/Chair) Score 10   Mobility (Level Surface) Score 0   Stairs Score 0   Barthel Index Score 55   Modified Lia Scale   Modified [place] Scale 3   End of Consult   Patient Position at End of Consult Bedside chair;Bed/Chair alarm activated;All needs within reach   Nurse Communication Nurse aware of consult       Occupational therapy Goals: In 2-4 days    1- Patient will verbalize and demonstrate use of energy conservation/ deep breathing technique and work simplification skills during functional activity with no verbal cues.  2- Patient will verbalize and demonstrate good body mechanics and joint protection techniques during ADLs/  IADLs with no verbal cues.  3- Patient will increase OOB/ sitting tolerance to 4-6 hours per day for increased participation in self care and leisure tasks with no s/s of exertion.  4- Patient will identify s/s of exertion during ADL and functional mobility with no verbal cues.  5-Patient will verbalize/ demonstrate compensatory strategies to recover from exertion with no verbal cues.   6-Patient will increase standing tolerance time to 10 minutes with No UE support to complete sink level ADLs @ Mod I level.      DERICK Serrato, OTR/L

## 2025-04-02 NOTE — PLAN OF CARE
Problem: PAIN - ADULT  Goal: Verbalizes/displays adequate comfort level or baseline comfort level  Description: Interventions:- Encourage patient to monitor pain and request assistance- Assess pain using appropriate pain scale- Administer analgesics based on type and severity of pain and evaluate response- Implement non-pharmacological measures as appropriate and evaluate response- Consider cultural and social influences on pain and pain management- Notify physician/advanced practitioner if interventions unsuccessful or patient reports new pain  Outcome: Progressing     Problem: INFECTION - ADULT  Goal: Absence or prevention of progression during hospitalization  Description: INTERVENTIONS:- Assess and monitor for signs and symptoms of infection- Monitor lab/diagnostic results- Monitor all insertion sites, i.e. indwelling lines, tubes, and drains- Monitor endotracheal if appropriate and nasal secretions for changes in amount and color- [place] appropriate cooling/warming therapies per order- Administer medications as ordered- Instruct and encourage patient and family to use good hand hygiene technique- Identify and instruct in appropriate isolation precautions for identified infection/condition  Outcome: Progressing  Goal: Absence of fever/infection during neutropenic period  Description: INTERVENTIONS:- Monitor WBC  Outcome: Progressing     Problem: SAFETY ADULT  Goal: Patient will remain free of falls  Description: INTERVENTIONS:- Educate patient/family on patient safety including physical limitations- Instruct patient to call for assistance with activity - Consult OT/PT to assist with strengthening/mobility - Keep Call bell within reach- Keep bed low and locked with side rails adjusted as appropriate- Keep care items and personal belongings within reach- Initiate and maintain comfort rounds- Make Fall Risk Sign visible to staff- Offer Toileting every 2 Hours, in advance of need- Initiate/Maintain bed alarm-  Obtain necessary fall risk management equipment- Apply yellow socks and bracelet for high fall risk patients- Consider moving patient to room near nurses station  INTERVENTIONS:- Educate patient/family on patient safety including physical limitations- Instruct patient to call for assistance with activity - Consult OT/PT to assist with strengthening/mobility - Keep Call bell within reach- Keep bed low and locked with side rails adjusted as appropriate- Keep care items and personal belongings within reach- Initiate and maintain comfort rounds- Make Fall Risk Sign visible to staff- Offer Toileting every 2 Hours, in advance of need- Initiate/Maintain bed alarm- Obtain necessary fall risk management equipment- Apply yellow socks and bracelet for high fall risk patients- Consider moving patient to room near nurses station  Outcome: Progressing  Goal: Maintain or return to baseline ADL function  Description: INTERVENTIONS:-  Assess patient's ability to carry out ADLs; assess patient's baseline for ADL function and identify physical deficits which impact ability to perform ADLs (bathing, care of mouth/teeth, toileting, grooming, dressing, etc.)- Assess/evaluate cause of self-care deficits - Assess range of motion- Assess patient's mobility; develop plan if impaired- Assess patient's need for assistive devices and provide as appropriate- Encourage maximum independence but intervene and supervise when necessary- Involve family in performance of ADLs- Assess for home care needs following discharge - Consider OT consult to assist with ADL evaluation and planning for discharge- Provide patient education as appropriate  Outcome: Progressing  Goal: Maintains/Returns to pre admission functional level  Description: INTERVENTIONS:- Perform AM-PAC 6 Click Basic Mobility/ Daily Activity assessment daily.- Set and communicate daily mobility goal to care team and patient/family/caregiver. - Collaborate with rehabilitation services on  mobility goals if consulted- Perform Range of Motion 3 times a day.- Reposition patient every 2 hours.- Dangle patient 3 times a day- Stand patient 3 times a day- Ambulate patient 3 times a day- Out of bed to chair 3 times a day - Out of bed for meals 3 times a day- Out of bed for toileting- Record patient progress and toleration of activity level   Outcome: Progressing     Problem: DISCHARGE PLANNING  Goal: Discharge to home or other facility with appropriate resources  Description: INTERVENTIONS:- Identify barriers to discharge w/patient and caregiver- Arrange for needed discharge resources and transportation as appropriate- Identify discharge learning needs (meds, wound care, etc.)- Arrange for interpretive services to assist at discharge as needed- Refer to Case Management Department for coordinating discharge planning if the patient needs post-hospital services based on physician/advanced practitioner order or complex needs related to functional status, cognitive ability, or social support system  Outcome: Progressing     Problem: Knowledge Deficit  Goal: Patient/family/caregiver demonstrates understanding of disease process, treatment plan, medications, and discharge instructions  Description: Complete learning assessment and assess knowledge base.Interventions:- Provide teaching at level of understanding- Provide teaching via preferred learning methods  Outcome: Progressing     Problem: Potential for Falls  Goal: Patient will remain free of falls  Description: INTERVENTIONS:- Educate patient/family on patient safety including physical limitations- Instruct patient to call for assistance with activity - Consult OT/PT to assist with strengthening/mobility - Keep Call bell within reach- Keep bed low and locked with side rails adjusted as appropriate- Keep care items and personal belongings within reach- Initiate and maintain comfort rounds- Make Fall Risk Sign visible to staff- Offer Toileting every 2 Hours, in  advance of need- Initiate/Maintain bed alarm- Obtain necessary fall risk management equipment - Apply yellow socks and bracelet for high fall risk patients- Consider moving patient to room near nurses station  INTERVENTIONS:- Educate patient/family on patient safety including physical limitations- Instruct patient to call for assistance with activity - Consult OT/PT to assist with strengthening/mobility - Keep Call bell within reach- Keep bed low and locked with side rails adjusted as appropriate- Keep care items and personal belongings within reach- Initiate and maintain comfort rounds- Make Fall Risk Sign visible to staff- Offer Toileting every 2 Hours, in advance of need- Initiate/Maintain bed alarm- Obtain necessary fall risk management equipment - Apply yellow socks and bracelet for high fall risk patients- Consider moving patient to room near nurses station  Outcome: Progressing     Problem: Nutrition/Hydration-ADULT  Goal: Nutrient/Hydration intake appropriate for improving, restoring or maintaining nutritional needs  Description: Monitor and assess patient's nutrition/hydration status for malnutrition. Collaborate with interdisciplinary team and initiate plan and interventions as ordered.  Monitor patient's weight and dietary intake as ordered or per policy. Utilize nutrition screening tool and intervene as necessary. Determine patient's food preferences and provide high-protein, high-caloric foods as appropriate. INTERVENTIONS:- Monitor oral intake, urinary output, labs, and treatment plans- Assess nutrition and hydration status and recommend course of action- Evaluate amount of meals eaten- Assist patient with eating if necessary - Allow adequate time for meals- Recommend/ encourage appropriate diets, oral nutritional supplements, and vitamin/mineral supplements- Order, calculate, and assess calorie counts as needed- Recommend, monitor, and adjust tube feedings and TPN/PPN based on assessed needs- Assess  need for intravenous fluids- Provide specific nutrition/hydration education as appropriate- Include patient/family/caregiver in decisions related to nutrition  Outcome: Progressing

## 2025-04-02 NOTE — ASSESSMENT & PLAN NOTE
Patient presented to the ED with left ankle swelling as well as concern for progressing wound symptoms to the left 5th toe.  Initial x-ray imaging of the left foot showed concerns of left 5th toe osteomyelitis, MRI was completed as well for confirmation.  MRI L Foot/Forefoot (3/31/25): Acute osteomyelitis involving the fifth middle and distal phalanges with early acute osteomyelitis involving the head of the fifth proximal phalanx   Podiatry consulted, appreciate ongoing input  POD 0 from left 5th toe amputation  Continue with NWB L Foot  Will need PT/OT Eval prior to discharge, but otherwise clear for discharge from podiatry standpoint  Will give 7 day course of Doxycycline 100 mg BID  Vascular consult  LEADs obtained, noted to have adequate healing pressures in left foot despite a 50-75% stenosis in left mid SFA  No vascular intervention recommended at this time, will monitor healing post procedure, if should have any issues healing, can move forward with angiogram.

## 2025-04-02 NOTE — ASSESSMENT & PLAN NOTE
Lab Results   Component Value Date    HGBA1C 7.2 (A) 02/03/2025       Recent Labs     04/01/25  2125 04/02/25  0653 04/02/25  0759 04/02/25  1129   POCGLU 171* 65 72 185*       Blood Sugar Average: Last 72 hrs:  (P) [id number]    Status post toe amputation    Tolerating well

## 2025-04-02 NOTE — PLAN OF CARE
Problem: PHYSICAL THERAPY ADULT  Goal: Performs mobility at highest level of function for planned discharge setting.  See evaluation for individualized goals.  Description: Treatment/Interventions: Functional transfer training, LE strengthening/ROM, Therapeutic exercise, Endurance training, Patient/family training, Equipment eval/education, Bed mobility, Gait training, Continued evaluation, Spoke to nursing, OT  Equipment Recommended: Walker       See flowsheet documentation for full assessment, interventions and recommendations.  Note: Prognosis: Good  Problem List: Decreased strength, Decreased endurance, Impaired balance, Decreased mobility, Pain (podiatry restrictions (NWB LLE))  Assessment: Adam Gonzalez is a 50 y.o. male admitted to [facility] on 3/30/2025 for Diabetic foot infection  (HCC), [place]. Pt  has a past medical history of Chronic kidney disease, Diabetes mellitus (HCC), Hypertension, Received intravenous tissue plasminogen activator (tPA) in emergency department (02/01/2022), and Stroke ([facility]).. Order placed for PT eval and tx. PT was consulted and pt was seen on 4/2/2025 for mobility assessment and d/c planning. Chart review and two person identifiers were completed.   Currently pt presents with decreased strength , decreased static sitting balance , decreased dynamic sitting balance , decreased static standing balance, decreased dynamic standing balance , decreased gait speed, decreased step length , and decreased muscular endurance . Due to these impairments, they will require assistance to perform bed mobility, sit to stand , ambulation, stair negotiation, and transfers. Pt is currently functioning at a contact guard assistance x1 level for transfers, contact guard assistance x1 level for ambulation with Rolling Walker. These activity limitations significantly impact their ability to participate in previous home and community roles and responsibilities  and ambulation in home. The patient's AM-PAC Basic  Mobility Inpatient Short Form Raw Score is 19. PT recommends level III minimum resource intensity. They will benefit from skilled therapy to to reduce the risk of falls and to maximize functional potential.  Barriers to Discharge: Other (Comment) (decline in functional mobility)     Rehab Resource Intensity Level, PT: III (Minimum Resource Intensity)    See flowsheet documentation for full assessment.

## 2025-04-02 NOTE — ANESTHESIA POSTPROCEDURE EVALUATION
Post-Op Assessment Note    CV Status:  Stable    Pain management: adequate       Mental Status:  Alert and awake   Hydration Status:  Euvolemic   PONV Controlled:  Controlled   Airway Patency:  Patent     Post Op Vitals Reviewed: Yes    No anethesia notable event occurred.    Staff: CRNA           Last Filed PACU Vitals:  Vitals Value Taken Time   Temp 97.9    Pulse 80 04/02/25 0754   /69    Resp 22 04/02/25 0754   SpO2 99 % 04/02/25 0754   Vitals shown include unfiled device data.    Modified Itz:     Vitals Value Taken Time   Activity 2 04/02/25 0815   Respiration 2 04/02/25 0815   Circulation 2 04/02/25 0815   Consciousness 2 04/02/25 0815   Oxygen Saturation 2 04/02/25 0815     Modified Itz Score: 10

## 2025-04-02 NOTE — PROGRESS NOTES
Adam Gonzalez is a 50 y.o. male who is currently ordered Vancomycin IV with management by the Pharmacy Consult service.  Relevant clinical data and objective / subjective history reviewed.  Vancomycin Assessment:  Indication and Goal AUC/Trough: Bone/joint infection (goal -600, trough >10)  Clinical Status: stable  Micro:     Renal Function:  SCr: 5.85 mg/dL  CrCl: 15.3 mL/min  Renal replacement: Not on dialysis  Days of Therapy: 4  Current Dose: 750mg IV daily prn random vanco level <15  Vancomycin Plan:  New Dosing: continue  Next Level: 4/3  Renal Function Monitoring: Daily BMP and UOP  Pharmacy will continue to follow closely for s/sx of nephrotoxicity, infusion reactions and appropriateness of therapy.  BMP and CBC will be ordered per protocol. We will continue to follow the patient’s culture results and clinical progress daily.    Suellen Zaman, Pharmacist

## 2025-04-02 NOTE — PLAN OF CARE
Problem: PAIN - ADULT  Goal: Verbalizes/displays adequate comfort level or baseline comfort level  Description: Interventions:- Encourage patient to monitor pain and request assistance- Assess pain using appropriate pain scale- Administer analgesics based on type and severity of pain and evaluate response- Implement non-pharmacological measures as appropriate and evaluate response- Consider cultural and social influences on pain and pain management- Notify physician/advanced practitioner if interventions unsuccessful or patient reports new pain  Outcome: Progressing     Problem: INFECTION - ADULT  Goal: Absence or prevention of progression during hospitalization  Description: INTERVENTIONS:- Assess and monitor for signs and symptoms of infection- Monitor lab/diagnostic results- Monitor all insertion sites, i.e. indwelling lines, tubes, and drains- Monitor endotracheal if appropriate and nasal secretions for changes in amount and color- [place] appropriate cooling/warming therapies per order- Administer medications as ordered- Instruct and encourage patient and family to use good hand hygiene technique- Identify and instruct in appropriate isolation precautions for identified infection/condition  Outcome: Progressing  Goal: Absence of fever/infection during neutropenic period  Description: INTERVENTIONS:- Monitor WBC  Outcome: Progressing     Problem: SAFETY ADULT  Goal: Patient will remain free of falls  Description: INTERVENTIONS:- Educate patient/family on patient safety including physical limitations- Instruct patient to call for assistance with activity - Consult OT/PT to assist with strengthening/mobility - Keep Call bell within reach- Keep bed low and locked with side rails adjusted as appropriate- Keep care items and personal belongings within reach- Initiate and maintain comfort rounds- Make Fall Risk Sign visible to staff- Offer Toileting every 2 Hours, in advance of need- Initiate/Maintain alarm-  Obtain necessary fall risk management equipment  - Apply yellow socks and bracelet for high fall risk patients- Consider moving patient to room near nurses station  INTERVENTIONS:- Educate patient/family on patient safety including physical limitations- Instruct patient to call for assistance with activity - Consult OT/PT to assist with strengthening/mobility - Keep Call bell within reach- Keep bed low and locked with side rails adjusted as appropriate- Keep care items and personal belongings within reach- Initiate and maintain comfort rounds- Make Fall Risk Sign visible to staff- Offer Toileting every 2 Hours, in advance of need- Initiate/Maintain alarm- Obtain necessary fall risk management equipment:- Apply yellow socks and bracelet for high fall risk patients- Consider moving patient to room near nurses station  Outcome: Progressing  Goal: Maintain or return to baseline ADL function  Description: INTERVENTIONS:-  Assess patient's ability to carry out ADLs; assess patient's baseline for ADL function and identify physical deficits which impact ability to perform ADLs (bathing, care of mouth/teeth, toileting, grooming, dressing, etc.)- Assess/evaluate cause of self-care deficits - Assess range of motion- Assess patient's mobility; develop plan if impaired- Assess patient's need for assistive devices and provide as appropriate- Encourage maximum independence but intervene and supervise when necessary- Involve family in performance of ADLs- Assess for home care needs following discharge - Consider OT consult to assist with ADL evaluation and planning for discharge- Provide patient education as appropriate  Outcome: Progressing  Goal: Maintains/Returns to pre admission functional level  Description: INTERVENTIONS:- Perform AM-PAC 6 Click Basic Mobility/ Daily Activity assessment daily.- Set and communicate daily mobility goal to care team and patient/family/caregiver. - Collaborate with rehabilitation services on  mobility goals if consulted- Perform Range of Motion 3 times a day.- Reposition patient every 2 hours.- Dangle patient 3 times a day- Stand patient 3 times a day- Ambulate patient 3 times a day- Out of bed to chair 3 times a day - Out of bed for meals 3 times a day- Out of bed for toileting- Record patient progress and toleration of activity level   Outcome: Progressing     Problem: DISCHARGE PLANNING  Goal: Discharge to home or other facility with appropriate resources  Description: INTERVENTIONS:- Identify barriers to discharge w/patient and caregiver- Arrange for needed discharge resources and transportation as appropriate- Identify discharge learning needs (meds, wound care, etc.)- Arrange for interpretive services to assist at discharge as needed- Refer to Case Management Department for coordinating discharge planning if the patient needs post-hospital services based on physician/advanced practitioner order or complex needs related to functional status, cognitive ability, or social support system  Outcome: Progressing     Problem: Knowledge Deficit  Goal: Patient/family/caregiver demonstrates understanding of disease process, treatment plan, medications, and discharge instructions  Description: Complete learning assessment and assess knowledge base.Interventions:- Provide teaching at level of understanding- Provide teaching via preferred learning methods  Outcome: Progressing     Problem: Potential for Falls  Goal: Patient will remain free of falls  Description: INTERVENTIONS:- Educate patient/family on patient safety including physical limitations- Instruct patient to call for assistance with activity - Consult OT/PT to assist with strengthening/mobility - Keep Call bell within reach- Keep bed low and locked with side rails adjusted as appropriate- Keep care items and personal belongings within reach- Initiate and maintain comfort rounds- Make Fall Risk Sign visible to staff- Offer Toileting every 2 Hours, in  advance of need- Initiate/Maintain alarm- Obtain necessary fall risk management equipment- Apply yellow socks and bracelet for high fall risk patients- Consider moving patient to room near nurses station  INTERVENTIONS:- Educate patient/family on patient safety including physical limitations- Instruct patient to call for assistance with activity - Consult OT/PT to assist with strengthening/mobility - Keep Call bell within reach- Keep bed low and locked with side rails adjusted as appropriate- Keep care items and personal belongings within reach- Initiate and maintain comfort rounds- Make Fall Risk Sign visible to staff- Offer Toileting every 2 Hours, in advance of need- Initiate/Maintain alarm- Obtain necessary fall risk management equipment:- Apply yellow socks and bracelet for high fall risk patients- Consider moving patient to room near nurses station  Outcome: Progressing     Problem: Nutrition/Hydration-ADULT  Goal: Nutrient/Hydration intake appropriate for improving, restoring or maintaining nutritional needs  Description: Monitor and assess patient's nutrition/hydration status for malnutrition. Collaborate with interdisciplinary team and initiate plan and interventions as ordered.  Monitor patient's weight and dietary intake as ordered or per policy. Utilize nutrition screening tool and intervene as necessary. Determine patient's food preferences and provide high-protein, high-caloric foods as appropriate. INTERVENTIONS:- Monitor oral intake, urinary output, labs, and treatment plans- Assess nutrition and hydration status and recommend course of action- Evaluate amount of meals eaten- Assist patient with eating if necessary - Allow adequate time for meals- Recommend/ encourage appropriate diets, oral nutritional supplements, and vitamin/mineral supplements- Order, calculate, and assess calorie counts as needed- Recommend, monitor, and adjust tube feedings and TPN/PPN based on assessed needs- Assess need for  intravenous fluids- Provide specific nutrition/hydration education as appropriate- Include patient/family/caregiver in decisions related to nutrition  Outcome: Progressing

## 2025-04-02 NOTE — ANESTHESIA PREPROCEDURE EVALUATION
Procedure:  AMPUTATION TOE (Left: Toe)    Relevant Problems   CARDIO   (+) Aortic valve insufficiency ( mild-mod 2020 )   (+) Essential hypertension   (+) Hypercholesterolemia      ENDO   (+) Secondary hyperparathyroidism of renal origin (HCC)   (+) Type 2 diabetes mellitus with circulatory disorder, with long-term current use of insulin (HCC)      /RENAL   (+) Acute kidney injury superimposed on stage 5 chronic kidney disease, not on chronic dialysis ([facility])   (+) Hypertensive CKD (chronic kidney disease)      NEURO/PSYCH   (+) Bilateral Cerebrovascular accident (CVA) status post tPA   (+) Diabetic polyneuropathy associated with type 2 diabetes mellitus (HCC)      Behavioral Health   (+) Current smoker      Orthopedic/Musculoskeletal   (+) Diabetic foot infection  (HCC)      Respiratory/Allergy   (+) Chronic bronchitis with COPD (chronic obstructive pulmonary disease) ([facility])   CVA in 2021   Lactate normal   Latest Reference Range & Units Most Recent   Hemoglobin 12.0 - 17.0 g/dL 10.6 (L)  4/2/25 05:40   (L): Data is abnormally low   Latest Reference Range & Units Most Recent   Potassium 3.5 - 5.3 mmol/L 4.2  4/2/25 05:40   Chloride 96 - 108 mmol/L 111 (H)  4/2/25 05:40   Carbon Dioxide 21 - 32 mmol/L 16 (L)  4/2/25 05:40   BUN 5 - 25 mg/dL 61 (H)  4/2/25 05:40   Creatinine 0.60 - 1.30 mg/dL 5.85 (H)  4/2/25 05:40   Calcium 8.4 - 10.2 mg/dL 6.7 (L)  4/2/25 05:40   (H): Data is abnormally high  (L): Data is abnormally low    Physical Exam    Airway    Mallampati score: III  TM Distance: >3 FB  Neck ROM: full     Dental   Comment: Denies loose teeth upper dentures and lower dentures    Cardiovascular  Cardiovascular exam normal    Pulmonary  Pulmonary exam normal     Other Findings  Portions of exam deferred due to low yield and/or unknown COVID status      Anesthesia Plan  ASA Score- 3     Anesthesia Type- IV sedation with anesthesia with ASA Monitors.         Additional Monitors:     Airway Plan:            Plan  Factors-Exercise tolerance (METS): >4 METS.    Chart reviewed.   Existing labs reviewed. Patient summary reviewed.    Patient is a current smoker.              Induction- intravenous.    Postoperative Plan-     Perioperative Resuscitation Plan - Level 1 - Full Code.       Informed Consent- Anesthetic plan and risks discussed with patient.  I personally reviewed this patient with the CRNA. Discussed and agreed on the Anesthesia Plan with the CRNA..      NPO Status:  Vitals Value Taken Time   Date of last liquid 04/01/25 04/02/25 0651   Time of last liquid 1800 04/02/25 0651   Date of last solid 04/01/25 04/02/25 0651   Time of last solid 1800 04/02/25 0651

## 2025-04-02 NOTE — PROGRESS NOTES
Progress Note - Nephrology   Name: Adam Gonzalez 50 y.o. male I MRN: [id number]  Unit/Bed#: -01 I Date of Admission: 3/30/2025   Date of Service: 4/2/2025 I Hospital Day: 1    Assessment & Plan  Acute kidney injury superimposed on stage 5 chronic kidney disease, not on chronic dialysis (HCC)  Lab Results   Component Value Date    EGFR 10 04/02/2025    EGFR 9 04/01/2025    EGFR 9 03/31/2025    CREATININE 5.85 (H) 04/02/2025    CREATININE 6.02 (H) 04/01/2025    CREATININE 6.39 (H) 03/31/2025     Renal function is stable overall    No need for dialysis  Diabetic foot infection  (HCC)  Lab Results   Component Value Date    HGBA1C 7.2 (A) 02/03/2025       Recent Labs     04/01/25 2125 04/02/25  0653 04/02/25  0759 04/02/25  1129   POCGLU 171* 65 72 185*       Blood Sugar Average: Last 72 hrs:  (P) [id number]    Status post toe amputation    Tolerating well        I have reviewed the nephrology recommendations including need for monitoring kidney function, with Slim, and we are in agreement with renal plan including the information outlined above.     Subjective   Brief History of Admission -diabetic toe infection    Overall feeling better    No acute complaint    No chest pain no palpitation    No nausea no vomiting    Objective :  Temp:  [97.6 °F (36.4 °C)-98.4 °F (36.9 °C)] 98.4 °F (36.9 °C)  HR:  [27-97] 84  BP: (121-170)/(60-81) 133/76  Resp:  [18-24] 20  SpO2:  [84 %-100 %] 100 %  O2 Device: None (Room air)    Current Weight: Weight - Scale: 86.6 kg (191 lb)  First Weight: Weight - Scale: 86.6 kg (191 lb)  I/O         03/31 0701 04/01 0700 04/01 0701 04/02 0700 04/02 0701 04/03 0700    P.O.   720    IV Piggyback       Total Intake(mL/kg)   720 (8.3)    Urine (mL/kg/hr) 300 (0.1) 1450 (0.7)     Total Output 300 1450     Net -300 -1450 +720           Unmeasured Urine Occurrence 1 x            Physical Exam  Constitutional:       General: He is not in acute distress.     Appearance: He is  well-developed.   HENT:      Head: Normocephalic.      Mouth/Throat:      Mouth: Mucous membranes are moist.   Eyes:      General: No scleral icterus.     Conjunctiva/sclera: Conjunctivae normal.   Neck:      Vascular: No JVD.   Cardiovascular:      Rate and Rhythm: Normal rate.      Heart sounds: Normal heart sounds.   Pulmonary:      Effort: Pulmonary effort is normal.      Breath sounds: No wheezing.   Abdominal:      Palpations: Abdomen is soft.      Tenderness: There is no abdominal tenderness.   Musculoskeletal:         General: Normal range of motion.      Cervical back: Neck supple.   Skin:     General: Skin is warm.      Findings: No rash.   Neurological:      Mental Status: He is alert and oriented to person, place, and time.   Psychiatric:         Behavior: Behavior normal.         Medications:    Current Facility-Administered Medications:     acetaminophen (TYLENOL) tablet 975 mg, 975 mg, Oral, Q8H PRN, Adriane Hope, DPM, 975 mg at 03/30/25 2108    amLODIPine (NORVASC) tablet 10 mg, 10 mg, Oral, Daily, Adriane Hope, DPM, 10 mg at 04/02/25 0851    atorvastatin (LIPITOR) tablet 80 mg, 80 mg, Oral, Daily, Adriane Hope, DPM, 80 mg at 04/02/25 0851    calcitriol (ROCALTROL) capsule 0.5 mcg, 0.5 mcg, Oral, Daily, Adriane Hope, DPM, 0.5 mcg at 04/02/25 0851    clopidogrel (PLAVIX) tablet 75 mg, 75 mg, Oral, Daily, Adriane Hope, DPM, 75 mg at 04/02/25 0851    docusate sodium (COLACE) capsule 100 mg, 100 mg, Oral, BID, Adriane Hope, DPM, 100 mg at 04/02/25 0851    heparin (porcine) subcutaneous injection 5,000 Units, 5,000 Units, Subcutaneous, Q8H HAYLEE, 5,000 Units at 04/01/25 2137 **AND** [CANCELED] Platelet count, , , Once, Shantanu Alba DO    hydrALAZINE (APRESOLINE) tablet 50 mg, 50 mg, Oral, BID, Adriane Hope, DPM, 50 mg at 04/02/25 0851    insulin glargine (LANTUS) subcutaneous injection 15 Units 0.15 mL, 15 Units, Subcutaneous, HS, Adriane Anna DPM, 15 Units at 04/01/25 6596    insulin lispro  (HumALOG/ADMELOG) 100 units/mL subcutaneous injection 1-6 Units, 1-6 Units, Subcutaneous, TID AC, 1 Units at 04/02/25 1204 **AND** Fingerstick Glucose (POCT), , , TID AC, Adriane Anna DPM    insulin lispro (HumALOG/ADMELOG) 100 units/mL subcutaneous injection 1-6 Units, 1-6 Units, Subcutaneous, HS, Adriane Anna DPM, 1 Units at 04/01/25 2140    insulin lispro (HumALOG/ADMELOG) 100 units/mL subcutaneous injection 6 Units, 6 Units, Subcutaneous, TID With Meals, Adriane Anna DPM, 6 Units at 04/02/25 1204    metoprolol succinate (TOPROL-XL) 24 hr tablet 50 mg, 50 mg, Oral, Daily, Adriane Anna DPM, 50 mg at 04/02/25 0851    metroNIDAZOLE (FLAGYL) IVPB (premix) 500 mg 100 mL, 500 mg, Intravenous, Q12H, Adriane Anna DPM, Last Rate: 200 mL/hr at 04/01/25 2125, 500 mg at 04/01/25 2125    nicotine (NICODERM CQ) 21 mg/24 hr TD 24 hr patch 1 patch, 1 patch, Transdermal, Daily, Adriane Anna DPM, 1 patch at 04/02/25 0851    ondansetron (ZOFRAN) injection 4 mg, 4 mg, Intravenous, Q6H PRN, Adriane Anna DPM    simethicone (MYLICON) chewable tablet 80 mg, 80 mg, Oral, 4x Daily PRN, Adriane Anna DPM    sodium bicarbonate 75 mEq in sodium chloride 0.45 % 1,000 mL infusion, 100 mL/hr, Intravenous, Continuous, YASMIN [place], Last Rate: 100 mL/hr at 04/02/25 1152, 100 mL/hr at 04/02/25 1152    vancomycin (VANCOCIN) IVPB (premix in dextrose) 750 mg 150 mL, 10 mg/kg, Intravenous, Daily PRN, Adriane Anna DPM      Lab Results: I have reviewed the following results:  Results from last 7 days   Lab Units 04/02/25  0540 04/01/25 2010 04/01/25  0832 03/31/25  0502 03/30/25  1736   WBC Thousand/uL 10.84*  --  10.95* 10.19* 11.76*   HEMOGLOBIN g/dL 10.6*  --  11.5* 10.9* 12.8   HEMATOCRIT % 33.3*  --  37.5 35.5* 40.7   PLATELETS Thousands/uL 286  --  281 290 322   POTASSIUM mmol/L 4.2  --  4.1 4.2 4.6   CHLORIDE mmol/L 111*  --  111* 114* 107   CO2 mmol/L 16*  --  16* 18* 18*   BUN mg/dL 61*  --  55* 56* 52*  "  CREATININE mg/dL 5.85*  --  6.02* 6.39* 6.48*   CALCIUM mg/dL 6.7*  --  6.4* 6.0* 6.8*   MAGNESIUM mg/dL 1.9 1.6* 0.9* 1.1*  --    ALBUMIN g/dL  --   --   --  3.1* 3.8       Administrative Statements     Portions of the record may have been created with voice recognition software. Occasional wrong word or \"sound a like\" substitutions may have occurred due to the inherent limitations of voice recognition software. Read the chart carefully and recognize, using context, where substitutions have occurred.If you have any questions, please contact the dictating provider.  "

## 2025-04-03 ENCOUNTER — PATIENT OUTREACH (OUTPATIENT)
Dept: CASE MANAGEMENT | Facility: OTHER | Age: 50
End: 2025-04-03

## 2025-04-03 ENCOUNTER — TRANSITIONAL CARE MANAGEMENT (OUTPATIENT)
Dept: FAMILY MEDICINE CLINIC | Facility: CLINIC | Age: 50
End: 2025-04-03

## 2025-04-03 NOTE — CASE MANAGEMENT
Case Management Discharge Planning Note    Patient name Adam Gonzalez  Location /-01 MRN [id number]  : 1975 Date 4/3/2025       Current Admission Date: 3/30/2025  Current Admission Diagnosis:Diabetic foot infection  (HCC)   Patient Active Problem List    Diagnosis Date Noted Date Diagnosed    Diabetic foot infection  (HCC) 2025     Chronic bronchitis with COPD (chronic obstructive pulmonary disease) (HCC) 2024     Hypertensive CKD (chronic kidney disease) 2024     Metabolic acidosis 2024     Vitamin D deficiency 11/15/2023     Secondary hyperparathyroidism of renal origin ([facility]) 10/01/2023     Implantable loop recorder present 10/01/2023     Noncompliance with treatment plan 2023     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 2022     Impaired balance as late effect of cerebrovascular accident (CVA) 2022     Acute kidney injury superimposed on stage 5 chronic kidney disease, not on chronic dialysis ([facility]) 2022     TSH elevation in past 2022     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) 2022     Bilateral Cerebrovascular accident (CVA) status post tPA 2022     Acute on chronic renal failure  (HCC) 2022     History of CVA (cerebrovascular accident) 2022     History of embolic stroke 2021     Aortic valve insufficiency ( mild-mod  ) 2020     Diabetic polyneuropathy associated with type 2 diabetes mellitus ([facility]) 2020     Brachial plexus injury, left, sequela 10/14/2019     Type 2 diabetes mellitus with circulatory disorder, with long-term current use of insulin ([facility]) 2019     Essential hypertension 2018     Current smoker 2018     Hypercholesterolemia 2015     Nephrotic range proteinuria 2015       LOS (days): 1  Geometric Mean LOS (GMLOS) (days):   Days to GMLOS:     OBJECTIVE:  Risk of Unplanned Readmission Score: 14.29         Current admission status:  Inpatient   Preferred Pharmacy:   [id number] - [facility] [place] - [address]  [address]  [facility] [place] [address]  Phone: [phone] Fax: [phone]    Primary Care Provider: Evans Birmingham MD    Primary Insurance: [employer] LEANDRO  Secondary Insurance:     DISCHARGE DETAILS:                                          Other Referral/Resources/Interventions Provided:  Referral Comments: Dr. Cheema added outpt PT/OT rx to pt's d/c paperwork.  Per [place], order for tub bench is being processed and sent to pt's home.

## 2025-04-03 NOTE — PROGRESS NOTES
HRR referral received. Chart reviewed.  Adam was admitted to [facility] 3/30-4/2 with a diabetic foot infection, type 2 diabetes, GATITO on CKD stage 5 and smoker.Amputation of left 5th toe on 4/2  He discharged home to self care.    Message left on patient's voicemail with my contact information.

## 2025-04-04 ENCOUNTER — PATIENT OUTREACH (OUTPATIENT)
Dept: CASE MANAGEMENT | Facility: OTHER | Age: 50
End: 2025-04-04

## 2025-04-04 PROCEDURE — 88305 TISSUE EXAM BY PATHOLOGIST: CPT | Performed by: PATHOLOGY

## 2025-04-04 PROCEDURE — 88311 DECALCIFY TISSUE: CPT | Performed by: PATHOLOGY

## 2025-04-04 NOTE — PROGRESS NOTES
Message left on Adam's voicemail with my contact information.  Unable to reach letter sent to Adam's My chart.

## 2025-04-04 NOTE — LETTER
Date: 04/04/25    Dear Adam Gonzalez,   My name is Caroline, I am a registered nurse care manager working with [facility].  I have not been able to reach you and would like to set a time that I can talk with you over the phone.  My work is to help patients that have complex medical conditions get the care they need. This includes patients who may have been in the hospital or emergency room.    Please call me with any questions you may have at [phone]. I look forward to speaking with you.  Sincerely,  Caroline Mensah RN  Outpatient Care Manager

## 2025-04-04 NOTE — TELEPHONE ENCOUNTER
4/4 9:18am: Completed [employer] paperwork was re-faxed per PT request. Spoke w/Cat, Billing Dept, who confirmed that fax was received by [employer] on 4/2/25 at 3:09pm & documentation of the receipt is in the account. NO FURTHER ACTIONS NEEDED regarding completing and/or re-faxing the Standard Medical Certificate Form.

## 2025-04-05 LAB
BACTERIA BLD CULT: NORMAL
BACTERIA BLD CULT: NORMAL
DME PARACHUTE DELIVERY DATE ACTUAL: NORMAL
DME PARACHUTE DELIVERY DATE REQUESTED: NORMAL
DME PARACHUTE DELIVERY NOTE: NORMAL
DME PARACHUTE ITEM DESCRIPTION: NORMAL
DME PARACHUTE ORDER STATUS: NORMAL
DME PARACHUTE SUPPLIER NAME: NORMAL
DME PARACHUTE SUPPLIER PHONE: NORMAL

## 2025-04-05 NOTE — ED PROVIDER NOTES
Time reflects when diagnosis was documented in both MDM as applicable and the Disposition within this note       Time User Action Codes Description Comment    3/30/2025  7:52 PM Ortiz Montes Add [M86.9] Osteomyelitis (HCC)     3/30/2025  7:52 PM Ortiz Montes Add [L03.90] Cellulitis     3/30/2025  7:54 PM Shantanu Alba Add [N18.5] Stage 5 chronic kidney disease not on chronic dialysis (HCC)     3/30/2025  8:01 PM Shantanu Alba Add [E11.628,  L08.9] Diabetic foot infection  (HCC)     4/2/2025  7:41 AM Farooq Holley Modify [M86.9] Osteomyelitis (HCC)     4/2/2025  7:41 AM Farooq Holley Modify [E11.628,  L08.9] Diabetic foot infection  (HCC)     4/3/2025  3:27 PM Lester Cheema Modify [M86.9] Osteomyelitis (HCC)     4/3/2025  3:27 PM Lester Cheema Modify [E11.628,  L08.9] Diabetic foot infection  (HCC)     4/3/2025  3:27 PM Lester Cheema Add [N17.9,  N18.5] Acute kidney injury superimposed on stage 5 chronic kidney disease, not on chronic dialysis (HCC)           ED Disposition       ED Disposition   Admit    Condition   Stable    Date/Time   Sun Mar 30, 2025  7:52 PM    Comment   Case was discussed with BURKE and the patient's admission status was agreed to be Admission Status: inpatient status to the service of Dr. Alba.               Assessment & Plan       Medical Decision Making  50-year-old male diabetic with foot swelling.  Clinically he appears to have a infected diabetic foot ulcer with some cellulitis up to the midfoot.  I highly suspect given the amount of soft tissue erosion on the pinky that he will have bony involvement/osteomyelitis.  Will check x-ray labs but anticipate admission for IV antibiotics and podiatry consult currently he does not have any clinical signs or symptoms of necrotizing soft tissue infection.    Amount and/or Complexity of Data Reviewed  Labs: ordered.    Risk  Decision regarding hospitalization.             Medications   vancomycin (VANCOCIN) 1,250 mg in sodium chloride 0.9 %  250 mL IVPB (1,250 mg Intravenous New Bag 3/30/25 2003)   cefepime (MAXIPIME) 2 g/50 mL dextrose IVPB (0 mg Intravenous Stopped 3/30/25 1936)   sodium chloride 0.9 % bolus 1,000 mL (1,000 mL Intravenous New Bag 3/30/25 1842)       ED Risk Strat Scores                            SBIRT 20yo+      Flowsheet Row Most Recent Value   Initial Alcohol Screen: US AUDIT-C     1. How often do you have a drink containing alcohol? 0 Filed at: 03/30/2025 1641   2. How many drinks containing alcohol do you have on a typical day you are drinking?  0 Filed at: 03/30/2025 1641   3a. Male UNDER 65: How often do you have five or more drinks on one occasion? 0 Filed at: 03/30/2025 1641   Audit-C Score 0 Filed at: 03/30/2025 1641   GRETEL: How many times in the past year have you...    Used an illegal drug or used a prescription medication for non-medical reasons? Never Filed at: 03/30/2025 1641                            History of Present Illness       Chief Complaint   Patient presents with    Ankle Swelling     Pt c/o left ankle swelling x 1 week, pt also has wound on left pinkie toe that was noticed yesterday        Past Medical History:   Diagnosis Date    Chronic kidney disease     Diabetes mellitus (HCC)     Hypertension     Received intravenous tissue plasminogen activator (tPA) in emergency department 02/01/2022    Stroke (HCC)     no deficits      Past Surgical History:   Procedure Laterality Date    CARDIAC LOOP RECORDER  2023    PILONIDAL CYST EXCISION      NE ARTERIOVENOUS ANASTOMOSIS OPEN DIRECT Left 4/10/2024    Procedure: CREATION FISTULA BRACHIOCEPHALIC LEFT (AV);  Surgeon: Maxwell Nielsen MD;  Location: [facility];  Service: Vascular    TOE AMPUTATION Left 4/2/2025    Procedure: AMPUTATION TOE;  Surgeon: Adriane Anna DPM;  Location: [facility];  Service: Podiatry      Family History   Problem Relation Age of Onset    No Known Problems Mother     No Known Problems Father     Pancreatic cancer Brother        Social History     Tobacco Use    Smoking status: Every Day     Current packs/day: 0.50     Average packs/day: 1 pack/day for 37.8 years (36.7 ttl pk-yrs)     Types: Cigarettes     Start date: 6/16/1987     Passive exposure: Current    Smokeless tobacco: Never    Tobacco comments:     states slowing it down, used Chantix-currently 1/2 pack/day, at least 35 pack years as of 2023   Vaping Use    Vaping status: Never Used   Substance Use Topics    Alcohol use: Not Currently     Comment: stopped 8/22    Drug use: Never      E-Cigarette/Vaping    E-Cigarette Use Never User       E-Cigarette/Vaping Substances    Nicotine No     THC No     CBD No     Flavoring No     Other No     Unknown No       I have reviewed and agree with the history as documented.     50-year-old male presented to the emergency department evaluation of foot swelling.  Patient has some pain and swelling of his foot.  Patient has a diabetic foot ulcer on the pinky of his left toe, he states that it has been present for a long time, then he noticed that there was some redness and tenderness of the skin coming up proximally into the foot over the past week.  He has no fevers chills or malaise.        Review of Systems   Constitutional:  Negative for appetite change, chills, fatigue and fever.   HENT:  Negative for sneezing and sore throat.    Eyes:  Negative for visual disturbance.   Respiratory:  Negative for cough, choking, chest tightness, shortness of breath and wheezing.    Cardiovascular:  Negative for chest pain and palpitations.   Gastrointestinal:  Negative for abdominal pain, constipation, diarrhea, nausea and vomiting.   Genitourinary:  Negative for difficulty urinating and dysuria.   Skin:  Positive for rash and wound.   Neurological:  Negative for dizziness, weakness, light-headedness, numbness and headaches.   All other systems reviewed and are negative.          Objective       ED Triage Vitals   Temperature Pulse Blood Pressure  Respirations SpO2 Patient Position - Orthostatic VS   03/30/25 1640 03/30/25 1640 03/30/25 1640 03/30/25 [facility] 03/30/25 1640 03/30/25 1640   99.6 °F (37.6 °C) 102 (!) 205/96 19 100 % Sitting      Temp Source Heart Rate Source BP Location FiO2 (%) Pain Score    03/30/25 1640 03/30/25 1640 03/30/25 1640 -- 03/30/25 2108    Oral Monitor Right arm  3      Vitals      Date and Time Temp Pulse SpO2 Resp BP Pain Score FACES Pain Rating User   04/02/25 1600 -- 88 95 % 18 144/80 -- -- DII   04/02/25 1448 -- -- -- -- -- 4 -- HS   04/02/25 1434 -- -- -- -- -- 4 -- EM   04/02/25 0935 -- 84 -- -- 133/76 -- -- JA   04/02/25 0930 98.4 °F (36.9 °C) 90 100 % -- 135/77 -- -- JA   04/02/25 0928 98.4 °F (36.9 °C) 84 100 % -- 135/77 -- -- DII   04/02/25 0925 98.4 °F (36.9 °C) 85 99 % -- 135/77 -- -- JA   04/02/25 0910 -- -- -- -- 137/79 -- -- JA   04/02/25 0900 -- -- 98 % -- -- No Pain -- RC   04/02/25 0845 -- 27 84 % -- 135/80 -- -- JA   04/02/25 0837 -- -- -- -- 135/80 -- -- DII   04/02/25 0815 97.6 °F (36.4 °C) 83 99 % 20 151/79 No Pain -- PH   04/02/25 0800 -- 79 98 % 21 122/60 No Pain -- PH   04/02/25 0755 98.3 °F (36.8 °C) 80 99 % 24 121/66 No Pain -- PH   04/02/25 0650 97.9 °F (36.6 °C) 84 100 % 18 170/81 No Pain -- BC   04/01/25 2222 98.2 °F (36.8 °C) 91 98 % -- 131/72 -- -- DII   04/01/25 2045 -- -- -- -- -- No Pain -- EM   04/01/25 1819 -- -- -- -- 136/69 -- -- DII   04/01/25 1518 -- -- -- 18 -- -- -- IR   04/01/25 1518 98 °F (36.7 °C) 97 100 % -- 136/69 -- -- DII   04/01/25 1517 98 °F (36.7 °C) -- -- -- 136/69 -- -- DII   04/01/25 0815 -- -- 98 % -- -- No Pain -- RC   04/01/25 0709 -- -- -- 18 -- -- -- VR   04/01/25 0709 97.7 °F (36.5 °C) 90 99 % -- 139/69 -- -- DII   03/31/25 2220 98.1 °F (36.7 °C) 84 98 % -- 124/74 -- -- DII   03/31/25 2025 -- -- 98 % -- -- No Pain -- ES   03/31/25 1501 99.1 °F (37.3 °C) -- -- 18 139/75 -- -- DII   03/31/25 0805 -- -- -- -- -- No Pain -- ML   03/31/25 0805 98.5 °F (36.9 °C) 92 98 % 19 149/81  -- -- DII   03/31/25 0518 97.9 °F (36.6 °C) 86 99 % -- 162/86 -- -- DII   03/31/25 0001 -- -- -- -- -- No Pain -- EF   03/30/25 2243 -- -- -- 17 -- No Pain -- EF   03/30/25 2243 98.4 °F (36.9 °C) 101 97 % -- 124/63 -- -- DII   03/30/25 2108 -- -- -- -- -- 3 -- EF   03/30/25 2102 98.2 °F (36.8 °C) 98 98 % -- 200/98 -- -- DII   03/30/25 1845 -- 100 100 % 18 160/70 -- -- CC   03/30/25 1640 -- 102 100 % 19 205/96 -- -- AC   03/30/25 1640 99.6 °F (37.6 °C) -- -- -- -- -- -- CC            Physical Exam  Vitals and nursing note reviewed.   Constitutional:       General: He is not in acute distress.     Appearance: He is well-developed. He is not diaphoretic.   HENT:      Head: Normocephalic and atraumatic.   Eyes:      Pupils: Pupils are equal, round, and reactive to light.   Neck:      Vascular: No JVD.      Trachea: No tracheal deviation.   Cardiovascular:      Rate and Rhythm: Normal rate and regular rhythm.      Heart sounds: Normal heart sounds. No murmur heard.     No friction rub. No gallop.   Pulmonary:      Effort: Pulmonary effort is normal. No respiratory distress.      Breath sounds: Normal breath sounds. No wheezing or rales.   Abdominal:      General: Bowel sounds are normal. There is no distension.      Palpations: Abdomen is soft.      Tenderness: There is no abdominal tenderness. There is no guarding or rebound.   Skin:     General: Skin is warm and dry.      Coloration: Skin is not pale.   Neurological:      Mental Status: He is alert and oriented to person, place, and time.      Cranial Nerves: No cranial nerve deficit.      Motor: No abnormal muscle tone.   Psychiatric:         Behavior: Behavior normal.         Results Reviewed       Procedure Component Value Units Date/Time    Blood culture #1 [[id number]] Collected: 03/30/25 6756    Lab Status: Final result Specimen: Blood from Arm, Right Updated: 04/05/25 0201     Blood Culture No Growth After 5 Days.    Blood culture #2 [[id number]] Collected:  03/30/25 1738    Lab Status: Final result Specimen: Blood from Hand, Right Updated: 04/05/25 0201     Blood Culture No Growth After 5 Days.    Comprehensive metabolic panel [[id number]]  (Abnormal) Collected: 03/31/25 0502    Lab Status: Final result Specimen: Blood from Hand, Right Updated: 03/31/25 0641     Sodium 139 mmol/L      Potassium 4.2 mmol/L      Chloride 114 mmol/L      CO2 18 mmol/L      ANION GAP 7 mmol/L      BUN 56 mg/dL      Creatinine 6.39 mg/dL      Glucose 106 mg/dL      Calcium 6.0 mg/dL      Corrected Calcium 6.7 mg/dL      AST 9 U/L      ALT 5 U/L      Alkaline Phosphatase 124 U/L      Total Protein 6.8 g/dL      Albumin 3.1 g/dL      Total Bilirubin 0.27 mg/dL      eGFR 9 ml/min/1.73sq m     Narrative:      verified by repeat analysis.  National Kidney Disease Foundation guidelines for Chronic Kidney Disease (CKD):     Stage 1 with normal or high GFR (GFR > 90 mL/min/1.73 square meters)    Stage 2 Mild CKD (GFR = 60-89 mL/min/1.73 square meters)    Stage 3A Moderate CKD (GFR = 45-59 mL/min/1.73 square meters)    Stage 3B Moderate CKD (GFR = 30-44 mL/min/1.73 square meters)    Stage 4 Severe CKD (GFR = 15-29 mL/min/1.73 square meters)    Stage 5 End Stage CKD (GFR <15 mL/min/1.73 square meters)  Note: GFR calculation is accurate only with a steady state creatinine    Magnesium [[id number]]  (Abnormal) Collected: 03/31/25 0502    Lab Status: Final result Specimen: Blood from Hand, Right Updated: 03/31/25 0641     Magnesium 1.1 mg/dL     Narrative:      verified by repeat analysis.    CBC and differential [[id number]]  (Abnormal) Collected: 03/31/25 0502    Lab Status: Final result Specimen: Blood from Hand, Right Updated: 03/31/25 0555     WBC 10.19 Thousand/uL      RBC 3.62 Million/uL      Hemoglobin 10.9 g/dL      Hematocrit 35.5 %      MCV 98 fL      MCH 30.1 pg      MCHC 30.7 g/dL      RDW 13.8 %      MPV 9.8 fL      Platelets 290 Thousands/uL      nRBC 0 /100 WBCs      Segmented % 75 %       Immature Grans % 0 %      Lymphocytes % 14 %      Monocytes % 8 %      Eosinophils Relative 3 %      Basophils Relative 0 %      Absolute Neutrophils 7.61 Thousands/µL      Absolute Immature Grans 0.03 Thousand/uL      Absolute Lymphocytes 1.40 Thousands/µL      Absolute Monocytes 0.84 Thousand/µL      Eosinophils Absolute 0.28 Thousand/µL      Basophils Absolute 0.03 Thousands/µL     Procalcitonin [[id number]]  (Abnormal) Collected: 03/30/25 1736    Lab Status: Final result Specimen: Blood from Arm, Right Updated: 03/30/25 1811     Procalcitonin 0.29 ng/ml     Comprehensive metabolic panel [[id number]]  (Abnormal) Collected: 03/30/25 1736    Lab Status: Final result Specimen: Blood from Arm, Right Updated: 03/30/25 1802     Sodium 137 mmol/L      Potassium 4.6 mmol/L      Chloride 107 mmol/L      CO2 18 mmol/L      ANION GAP 12 mmol/L      BUN 52 mg/dL      Creatinine 6.48 mg/dL      Glucose 215 mg/dL      Calcium 6.8 mg/dL      AST 11 U/L      ALT 7 U/L      Alkaline Phosphatase 157 U/L      Total Protein 8.6 g/dL      Albumin 3.8 g/dL      Total Bilirubin 0.32 mg/dL      eGFR 8 ml/min/1.73sq m     Narrative:      National Kidney Disease Foundation guidelines for Chronic Kidney Disease (CKD):     Stage 1 with normal or high GFR (GFR > 90 mL/min/1.73 square meters)    Stage 2 Mild CKD (GFR = 60-89 mL/min/1.73 square meters)    Stage 3A Moderate CKD (GFR = 45-59 mL/min/1.73 square meters)    Stage 3B Moderate CKD (GFR = 30-44 mL/min/1.73 square meters)    Stage 4 Severe CKD (GFR = 15-29 mL/min/1.73 square meters)    Stage 5 End Stage CKD (GFR <15 mL/min/1.73 square meters)  Note: GFR calculation is accurate only with a steady state creatinine    Lactic acid [[id number]]  (Normal) Collected: 03/30/25 1736    Lab Status: Final result Specimen: Blood from Arm, Right Updated: 03/30/25 1801     LACTIC ACID 0.9 mmol/L     Narrative:      Result may be elevated if tourniquet was used during collection.    Protime-INR  [[id number]]  (Normal) Collected: 03/30/25 1736    Lab Status: Final result Specimen: Blood from Arm, Right Updated: 03/30/25 1757     Protime 14.8 seconds      INR 1.09    Narrative:      INR Therapeutic Range    Indication                                             INR Range      Atrial Fibrillation                                               2.0-3.0  Hypercoagulable State                                    2.0.2.3  Left Ventricular Asist Device                            2.0-3.0  Mechanical Heart Valve                                  -    Aortic(with afib, MI, embolism, HF, LA enlargement,    and/or coagulopathy)                                     2.0-3.0 (2.5-3.5)     Mitral                                                             2.5-3.5  Prosthetic/Bioprosthetic Heart Valve               2.0-3.0  Venous thromboembolism (VTE: VT, PE        2.0-3.0    APTT [[id number]]  (Normal) Collected: 03/30/25 1736    Lab Status: Final result Specimen: Blood from Arm, Right Updated: 03/30/25 1757     PTT 28 seconds     CBC and differential [[id number]]  (Abnormal) Collected: 03/30/25 1736    Lab Status: Final result Specimen: Blood from Arm, Right Updated: 03/30/25 1745     WBC 11.76 Thousand/uL      RBC 4.16 Million/uL      Hemoglobin 12.8 g/dL      Hematocrit 40.7 %      MCV 98 fL      MCH 30.8 pg      MCHC 31.4 g/dL      RDW 13.8 %      MPV 9.2 fL      Platelets 322 Thousands/uL      nRBC 0 /100 WBCs      Segmented % 84 %      Immature Grans % 0 %      Lymphocytes % 8 %      Monocytes % 6 %      Eosinophils Relative 2 %      Basophils Relative 0 %      Absolute Neutrophils 9.78 Thousands/µL      Absolute Immature Grans 0.03 Thousand/uL      Absolute Lymphocytes 0.99 Thousands/µL      Absolute Monocytes 0.72 Thousand/µL      Eosinophils Absolute 0.19 Thousand/µL      Basophils Absolute 0.05 Thousands/µL     Fingerstick Glucose (POCT) [[id number]]  (Abnormal) Collected: 03/30/25 1647    Lab Status: Final result  Specimen: Blood Updated: 03/30/25 1648     POC Glucose 239 mg/dl             XR foot 3+ vw left   Final Interpretation by Elgin Vee MD (04/02 0854)      Resection of the fifth toe at the MTP joint.         Computerized Assisted Algorithm (CAA) may have been used to analyze all applicable images.         Workstation performed: [id number]         VAS DIALYSIS ACCESS EVALUATION- LEFT AV(Upper)   Final Interpretation by Saji Demarco MD (04/01 2762)      MRI foot/forefoot toes left wo contrast   Final Interpretation by Matt Rodriguez DO (04/01 4951)      Acute osteomyelitis involving the fifth middle and distal phalanges with early acute osteomyelitis involving the head of the fifth proximal phalanx            Workstation performed: [id number]         VAS ARTERIAL DUPLEX-LOWER LIMB UNILATERAL   Final Interpretation by Maxwell Nielsen MD (03/31 7192)      XR foot 3+ views LEFT   Final Interpretation by Bernabe Castillo MD (03/31 9329)      Findings suspicious for acute osteomyelitis of the fifth distal phalanx and possibly the middle phalanx. Suggest further characterization with MRI.      Computerized Assisted Algorithm (CAA) may have been used to analyze all applicable images.         The study was marked in EPIC for immediate notification.         Resident: Alley Black I, the attending radiologist, have reviewed the images and agree with the final report above.      Workstation performed: [id number]             Procedures    ED Medication and Procedure Management   Prior to Admission Medications   Prescriptions Last Dose Informant Patient Reported? Taking?   Continuous Glucose Sensor (FreeStyle Slime 2 Sensor) MISC  Self No No   Sig: apply 1 SENSOR to back OF UPPER ARM REMOVE AND REPLACE every 14 days use with DEVICE to MONITOR BLOOD SUGAR   Dulaglutide 0.75 MG/0.5ML SOAJ  Self No No   Sig: Inject 0.75 mg under the skin once a week   Insulin Glargine Solostar (Basaglar KwikPen)  100 UNIT/ML SOPN  Self No No   Sig: Inject 0.28 mL (28 Units total) under the skin daily   Insulin Glargine Solostar (Basaglar KwikPen) 100 UNIT/ML SOPN   No No   Sig: Inject 0.28 mL (28 Units total) under the skin in the morning   amLODIPine (NORVASC) 10 mg tablet  Self No No   Sig: Take 1 tablet (10 mg total) by mouth daily   atorvastatin (LIPITOR) 80 mg tablet  Self No No   Sig: Take 1 tablet (80 mg total) by mouth daily   calcitriol (ROCALTROL) 0.5 MCG capsule  Self No No   Sig: Take 1 capsule (0.5 mcg total) by mouth daily Monday, Wednesday, Friday   clopidogrel (PLAVIX) 75 mg tablet  Self No No   Sig: Take 1 tablet (75 mg total) by mouth daily   ergocalciferol (VITAMIN D2) 50,000 units  Self No No   Sig: Take 1 capsule (50,000 Units total) by mouth once a week   hydrALAZINE (APRESOLINE) 50 mg tablet  Self No No   Sig: Take 1 tablet (50 mg total) by mouth 2 (two) times a day   metoprolol succinate (TOPROL-XL) 50 mg 24 hr tablet  Self No No   Sig: Take 1 tablet (50 mg total) by mouth daily   sodium bicarbonate 650 mg tablet  Self No No   Sig: Take 1 tablet (650 mg total) by mouth 2 (two) times a day      Facility-Administered Medications: None     Discharge Medication List as of 4/2/2025  5:21 PM        START taking these medications    Details   doxycycline (ADOXA) 100 MG tablet Take 1 tablet (100 mg total) by mouth 2 (two) times a day for 7 days, Starting Wed 4/2/2025, Until Wed 4/9/2025, Normal           CONTINUE these medications which have NOT CHANGED    Details   amLODIPine (NORVASC) 10 mg tablet Take 1 tablet (10 mg total) by mouth daily, Starting Mon 2/3/2025, Normal      atorvastatin (LIPITOR) 80 mg tablet Take 1 tablet (80 mg total) by mouth daily, Starting Mon 2/3/2025, Normal      calcitriol (ROCALTROL) 0.5 MCG capsule Take 1 capsule (0.5 mcg total) by mouth daily Monday, Wednesday, Friday, Starting Wed 8/14/2024, Until Sun 5/11/2025, Normal      clopidogrel (PLAVIX) 75 mg tablet Take 1 tablet (75 mg  total) by mouth daily, Starting Mon 2/3/2025, Normal      Continuous Glucose Sensor (FreeStyle Slime 2 Sensor) MISC apply 1 SENSOR to back OF UPPER ARM REMOVE AND REPLACE every 14 days use with DEVICE to MONITOR BLOOD SUGAR, Normal      Dulaglutide 0.75 MG/0.5ML SOAJ Inject 0.75 mg under the skin once a week, Starting Mon 2/3/2025, Normal      ergocalciferol (VITAMIN D2) 50,000 units Take 1 capsule (50,000 Units total) by mouth once a week, Starting Fri 12/27/2024, Normal      hydrALAZINE (APRESOLINE) 50 mg tablet Take 1 tablet (50 mg total) by mouth 2 (two) times a day, Starting Mon 2/3/2025, Normal      !! Insulin Glargine Solostar (Basaglar KwikPen) 100 UNIT/ML SOPN Inject 0.28 mL (28 Units total) under the skin daily, Starting Mon 2/3/2025, Normal      !! Insulin Glargine Solostar (Basaglar KwikPen) 100 UNIT/ML SOPN Inject 0.28 mL (28 Units total) under the skin in the morning, Starting Mon 2/10/2025, Normal      metoprolol succinate (TOPROL-XL) 50 mg 24 hr tablet Take 1 tablet (50 mg total) by mouth daily, Starting Mon 2/3/2025, Normal      sodium bicarbonate 650 mg tablet Take 1 tablet (650 mg total) by mouth 2 (two) times a day, Starting Wed 8/14/2024, Normal       !! - Potential duplicate medications found. Please discuss with provider.        Outpatient Discharge Orders   Ambulatory Referral to Physical Therapy   Standing Status: Future Standing Exp. Date: 04/03/26      Weight Bearing Restrictions     Call provider for:  redness, tenderness, or signs of infection (pain, swelling, redness, odor or green/yellow discharge around incision site)     Call provider for:  severe uncontrolled pain     ED SEPSIS DOCUMENTATION   Time reflects when diagnosis was documented in both MDM as applicable and the Disposition within this note       Time User Action Codes Description Comment    3/30/2025  7:52 PM Ortiz Montes [M86.9] Osteomyelitis (HCC)     3/30/2025  7:52 PM Ortiz Montes [L03.90] Cellulitis      3/30/2025  7:54 PM Shantanu Alba Add [N18.5] Stage 5 chronic kidney disease not on chronic dialysis (formerly [facility])     3/30/2025  8:01 PM Shantanu Alba Add [E11.628,  L08.9] Diabetic foot infection  (HCC)     4/2/2025  7:41 AM Farooq Holley Modify [M86.9] Osteomyelitis (formerly [facility])     4/2/2025  7:41 AM Farooq Holley Modify [E11.628,  L08.9] Diabetic foot infection  (HCC)     4/3/2025  3:27 PM Lester Cheema Modify [M86.9] Osteomyelitis (formerly [facility])     4/3/2025  3:27 PM Lester Cheema Modify [E11.628,  L08.9] Diabetic foot infection  (formerly [facility])     4/3/2025  3:27 PM Lester Cheema Add [N17.9,  N18.5] Acute kidney injury superimposed on stage 5 chronic kidney disease, not on chronic dialysis (formerly [facility])                  Ortiz Montes MD  04/05/25 0439

## 2025-04-07 LAB
DME PARACHUTE DELIVERY DATE ACTUAL: NORMAL
DME PARACHUTE DELIVERY DATE REQUESTED: NORMAL
DME PARACHUTE DELIVERY NOTE: NORMAL
DME PARACHUTE ITEM DESCRIPTION: NORMAL
DME PARACHUTE ORDER STATUS: NORMAL
DME PARACHUTE SUPPLIER NAME: NORMAL
DME PARACHUTE SUPPLIER PHONE: NORMAL

## 2025-04-09 ENCOUNTER — TELEPHONE (OUTPATIENT)
Dept: NEPHROLOGY | Facility: CLINIC | Age: 50
End: 2025-04-09

## 2025-04-10 ENCOUNTER — OFFICE VISIT (OUTPATIENT)
Dept: FAMILY MEDICINE CLINIC | Facility: CLINIC | Age: 50
End: 2025-04-10
Payer: COMMERCIAL

## 2025-04-10 ENCOUNTER — VBI (OUTPATIENT)
Dept: ADMINISTRATIVE | Facility: OTHER | Age: 50
End: 2025-04-10

## 2025-04-10 VITALS
WEIGHT: 195.6 LBS | DIASTOLIC BLOOD PRESSURE: 76 MMHG | HEART RATE: 76 BPM | OXYGEN SATURATION: 99 % | SYSTOLIC BLOOD PRESSURE: 132 MMHG | BODY MASS INDEX: 28.07 KG/M2

## 2025-04-10 DIAGNOSIS — M86.9 TOE OSTEOMYELITIS (HCC): ICD-10-CM

## 2025-04-10 DIAGNOSIS — Z79.4 TYPE 2 DIABETES MELLITUS WITH OTHER CIRCULATORY COMPLICATION, WITH LONG-TERM CURRENT USE OF INSULIN (HCC): Primary | ICD-10-CM

## 2025-04-10 DIAGNOSIS — Z12.5 SCREENING FOR PROSTATE CANCER: ICD-10-CM

## 2025-04-10 DIAGNOSIS — Z13.29 SCREENING FOR HYPOTHYROIDISM: ICD-10-CM

## 2025-04-10 DIAGNOSIS — E78.2 MIXED HYPERLIPIDEMIA: ICD-10-CM

## 2025-04-10 DIAGNOSIS — Z11.59 NEED FOR HEPATITIS C SCREENING TEST: ICD-10-CM

## 2025-04-10 DIAGNOSIS — E11.59 TYPE 2 DIABETES MELLITUS WITH OTHER CIRCULATORY COMPLICATION, WITH LONG-TERM CURRENT USE OF INSULIN (HCC): Primary | ICD-10-CM

## 2025-04-10 PROCEDURE — 99495 TRANSJ CARE MGMT MOD F2F 14D: CPT | Performed by: INTERNAL MEDICINE

## 2025-04-10 NOTE — ASSESSMENT & PLAN NOTE
Continue current meds.  Recheck hemoglobin A1c.  Lab Results   Component Value Date    HGBA1C 7.2 (A) 02/03/2025       Orders:    Hemoglobin A1C; Future

## 2025-04-10 NOTE — TELEPHONE ENCOUNTER
04/10/25 8:39 AM     Chart reviewed for CRC: Colonoscopy was/were not submitted to the patient's insurance.     Tiana Sawant MA   [facility]

## 2025-04-10 NOTE — PROGRESS NOTES
Name: Adam Gonzalez      : 1975      MRN: [id number]  Encounter Provider: Evans Birmingham MD  Encounter Date: 4/10/2025   Encounter department: [facility]    Assessment & Plan  Type 2 diabetes mellitus with other circulatory complication, with long-term current use of insulin (HCC)  Continue current meds.  Recheck hemoglobin A1c.  Lab Results   Component Value Date    HGBA1C 7.2 (A) 2025       Orders:    Hemoglobin A1C; Future    Mixed hyperlipidemia  Continue atorvastatin recheck hemoglobin A1c.  Orders:    Lipid panel; Future    Screening for prostate cancer    Orders:    PSA, Total Screen; Future    Screening for hypothyroidism    Orders:    TSH, 3rd generation with Free T4 reflex; Future    Need for hepatitis C screening test    Orders:    Hepatitis C Antibody; Future    Toe osteomyelitis (HCC)  Left fifth toe osteomyelitis status post amputation, continue with doxycycline, patient was taking 100 mg of doxycycline daily, discussed that it should be taken twice a day.  He showed understanding.  He will follow-up with podiatry, patient reported that he has appointment tomorrow.              History of Present Illness     Patient came today for TCM visit after recent admission to the hospital due to left fifth toe osteomyelitis, status post amputation.      Review of Systems   Constitutional:  Negative for chills and fever.   Respiratory:  Negative for cough, shortness of breath and wheezing.    Cardiovascular:  Negative for chest pain, palpitations and leg swelling.     Past Medical History:   Diagnosis Date    Chronic kidney disease     Diabetes mellitus (HCC)     Hypertension     Received intravenous tissue plasminogen activator (tPA) in emergency department 2022    Stroke (HCC)     no deficits     Past Surgical History:   Procedure Laterality Date    CARDIAC LOOP RECORDER      PILONIDAL CYST EXCISION      IN ARTERIOVENOUS ANASTOMOSIS OPEN DIRECT  Left 4/10/2024    Procedure: CREATION FISTULA BRACHIOCEPHALIC LEFT (AV);  Surgeon: Maxwell Nielsen MD;  Location: [facility];  Service: Vascular    TOE AMPUTATION Left 4/2/2025    Procedure: AMPUTATION TOE;  Surgeon: Adriane Anna DPM;  Location: [facility];  Service: Podiatry     Family History   Problem Relation Age of Onset    No Known Problems Mother     No Known Problems Father     Pancreatic cancer Brother      Social History     Tobacco Use    Smoking status: Every Day     Current packs/day: 0.50     Average packs/day: 1 pack/day for 37.8 years (36.7 ttl pk-yrs)     Types: Cigarettes     Start date: 6/16/1987     Passive exposure: Current    Smokeless tobacco: Never    Tobacco comments:     states slowing it down, used Chantix-currently 1/2 pack/day, at least 35 pack years as of 2023   Vaping Use    Vaping status: Never Used   Substance and Sexual Activity    Alcohol use: Not Currently     Comment: stopped 8/22    Drug use: Never    Sexual activity: Yes     Partners: Female     Birth control/protection: Condom Male     Current Outpatient Medications on File Prior to Visit   Medication Sig    amLODIPine (NORVASC) 10 mg tablet Take 1 tablet (10 mg total) by mouth daily    atorvastatin (LIPITOR) 80 mg tablet Take 1 tablet (80 mg total) by mouth daily    calcitriol (ROCALTROL) 0.5 MCG capsule Take 1 capsule (0.5 mcg total) by mouth daily Monday, Wednesday, Friday    clopidogrel (PLAVIX) 75 mg tablet Take 1 tablet (75 mg total) by mouth daily    Dulaglutide 0.75 MG/0.5ML SOAJ Inject 0.75 mg under the skin once a week    ergocalciferol (VITAMIN D2) 50,000 units Take 1 capsule (50,000 Units total) by mouth once a week    hydrALAZINE (APRESOLINE) 50 mg tablet Take 1 tablet (50 mg total) by mouth 2 (two) times a day    Insulin Glargine Solostar (Basaglar KwikPen) 100 UNIT/ML SOPN Inject 0.28 mL (28 Units total) under the skin daily    Insulin Glargine Solostar (Basaglar KwikPen) 100 UNIT/ML SOPN Inject  0.28 mL (28 Units total) under the skin in the morning    metoprolol succinate (TOPROL-XL) 50 mg 24 hr tablet Take 1 tablet (50 mg total) by mouth daily    sodium bicarbonate 650 mg tablet Take 1 tablet (650 mg total) by mouth 2 (two) times a day    [] doxycycline (ADOXA) 100 MG tablet Take 1 tablet (100 mg total) by mouth 2 (two) times a day for 7 days    [DISCONTINUED] Continuous Glucose Sensor (FreeStyle Slime 2 Sensor) MISC apply 1 SENSOR to back OF UPPER ARM REMOVE AND REPLACE every 14 days use with DEVICE to MONITOR BLOOD SUGAR (Patient not taking: Reported on 4/10/2025)     No Known Allergies  Immunization History   Administered Date(s) Administered    COVID-19 MODERNA VACC 0.5 ML IM 2021, 2021    Tdap 2020     Objective   /76   Pulse 76   Wt 88.7 kg (195 lb 9.6 oz)   SpO2 99%   BMI 28.07 kg/m²     Physical Exam  Constitutional:       General: He is not in acute distress.     Appearance: He is not ill-appearing or toxic-appearing.   Cardiovascular:      Heart sounds: No murmur heard.     No gallop.   Pulmonary:      Effort: No respiratory distress.      Breath sounds: No wheezing or rales.   Skin:     Comments: Dressing on the left foot.

## 2025-04-10 NOTE — ASSESSMENT & PLAN NOTE
Left fifth toe osteomyelitis status post amputation, continue with doxycycline, patient was taking 100 mg of doxycycline daily, discussed that it should be taken twice a day.  He showed understanding.  He will follow-up with podiatry, patient reported that he has appointment tomorrow.

## 2025-04-21 ENCOUNTER — APPOINTMENT (EMERGENCY)
Dept: RADIOLOGY | Facility: HOSPITAL | Age: 50
DRG: 349 | End: 2025-04-21
Payer: COMMERCIAL

## 2025-04-21 ENCOUNTER — HOSPITAL ENCOUNTER (INPATIENT)
Facility: HOSPITAL | Age: 50
LOS: 1 days | Discharge: HOME | DRG: 349 | End: 2025-04-22
Attending: EMERGENCY MEDICINE | Admitting: INTERNAL MEDICINE
Payer: COMMERCIAL

## 2025-04-21 DIAGNOSIS — T14.8XXA INFECTED WOUND: ICD-10-CM

## 2025-04-21 DIAGNOSIS — M25.472 LEFT ANKLE SWELLING: Primary | ICD-10-CM

## 2025-04-21 DIAGNOSIS — L08.9 INFECTED WOUND: ICD-10-CM

## 2025-04-21 DIAGNOSIS — L03.90 CELLULITIS: ICD-10-CM

## 2025-04-21 DIAGNOSIS — N17.9 ACUTE KIDNEY INJURY SUPERIMPOSED ON STAGE 5 CHRONIC KIDNEY DISEASE, NOT ON CHRONIC DIALYSIS (HCC): ICD-10-CM

## 2025-04-21 DIAGNOSIS — N18.5 ACUTE KIDNEY INJURY SUPERIMPOSED ON STAGE 5 CHRONIC KIDNEY DISEASE, NOT ON CHRONIC DIALYSIS (HCC): ICD-10-CM

## 2025-04-21 DIAGNOSIS — E83.51 HYPOCALCEMIA: ICD-10-CM

## 2025-04-21 DIAGNOSIS — N17.9 AKI (ACUTE KIDNEY INJURY) (HCC): ICD-10-CM

## 2025-04-21 DIAGNOSIS — M79.89 SWELLING OF LEFT FOOT: ICD-10-CM

## 2025-04-21 PROBLEM — N18.9 HYPOCALCEMIA DUE TO CHRONIC KIDNEY DISEASE: Status: ACTIVE | Noted: 2025-04-21

## 2025-04-21 LAB
ALBUMIN SERPL BCG-MCNC: 3.5 G/DL (ref 3.5–5)
ALBUMIN SERPL BCG-MCNC: 3.7 G/DL (ref 3.5–5)
ALP SERPL-CCNC: 145 U/L (ref 34–104)
ALP SERPL-CCNC: 160 U/L (ref 34–104)
ALT SERPL W P-5'-P-CCNC: 12 U/L (ref 7–52)
ALT SERPL W P-5'-P-CCNC: 8 U/L (ref 7–52)
ANION GAP SERPL CALCULATED.3IONS-SCNC: 10 MMOL/L (ref 4–13)
ANION GAP SERPL CALCULATED.3IONS-SCNC: 9 MMOL/L (ref 4–13)
AST SERPL W P-5'-P-CCNC: 13 U/L (ref 13–39)
AST SERPL W P-5'-P-CCNC: 14 U/L (ref 13–39)
ATRIAL RATE: 88 BPM
BASOPHILS # BLD AUTO: 0.04 THOUSANDS/ÂΜL (ref 0–0.1)
BASOPHILS # BLD AUTO: 0.04 THOUSANDS/ÂΜL (ref 0–0.1)
BASOPHILS NFR BLD AUTO: 0 % (ref 0–1)
BASOPHILS NFR BLD AUTO: 1 % (ref 0–1)
BILIRUB SERPL-MCNC: 0.26 MG/DL (ref 0.2–1)
BILIRUB SERPL-MCNC: 0.31 MG/DL (ref 0.2–1)
BUN SERPL-MCNC: 64 MG/DL (ref 5–25)
BUN SERPL-MCNC: 67 MG/DL (ref 5–25)
CALCIUM SERPL-MCNC: 5.9 MG/DL (ref 8.4–10.2)
CALCIUM SERPL-MCNC: 5.9 MG/DL (ref 8.4–10.2)
CHLORIDE SERPL-SCNC: 108 MMOL/L (ref 96–108)
CHLORIDE SERPL-SCNC: 111 MMOL/L (ref 96–108)
CO2 SERPL-SCNC: 17 MMOL/L (ref 21–32)
CO2 SERPL-SCNC: 18 MMOL/L (ref 21–32)
CREAT SERPL-MCNC: 6.21 MG/DL (ref 0.6–1.3)
CREAT SERPL-MCNC: 6.26 MG/DL (ref 0.6–1.3)
CRP SERPL QL: <1 MG/L
EOSINOPHIL # BLD AUTO: 0.32 THOUSAND/ÂΜL (ref 0–0.61)
EOSINOPHIL # BLD AUTO: 0.38 THOUSAND/ÂΜL (ref 0–0.61)
EOSINOPHIL NFR BLD AUTO: 4 % (ref 0–6)
EOSINOPHIL NFR BLD AUTO: 4 % (ref 0–6)
ERYTHROCYTE [DISTWIDTH] IN BLOOD BY AUTOMATED COUNT: 14.4 % (ref 11.6–15.1)
ERYTHROCYTE [DISTWIDTH] IN BLOOD BY AUTOMATED COUNT: 14.4 % (ref 11.6–15.1)
GFR SERPL CREATININE-BSD FRML MDRD: 9 ML/MIN/1.73SQ M
GFR SERPL CREATININE-BSD FRML MDRD: 9 ML/MIN/1.73SQ M
GLUCOSE SERPL-MCNC: 110 MG/DL (ref 65–140)
GLUCOSE SERPL-MCNC: 130 MG/DL (ref 65–140)
GLUCOSE SERPL-MCNC: 131 MG/DL (ref 65–140)
GLUCOSE SERPL-MCNC: 144 MG/DL (ref 65–140)
GLUCOSE SERPL-MCNC: 147 MG/DL (ref 65–140)
GLUCOSE SERPL-MCNC: 207 MG/DL (ref 65–140)
GLUCOSE SERPL-MCNC: 220 MG/DL (ref 65–140)
HCT VFR BLD AUTO: 37 % (ref 36.5–49.3)
HCT VFR BLD AUTO: 37.2 % (ref 36.5–49.3)
HGB BLD-MCNC: 11.4 G/DL (ref 12–17)
HGB BLD-MCNC: 11.5 G/DL (ref 12–17)
IMM GRANULOCYTES # BLD AUTO: 0.02 THOUSAND/UL (ref 0–0.2)
IMM GRANULOCYTES # BLD AUTO: 0.05 THOUSAND/UL (ref 0–0.2)
IMM GRANULOCYTES NFR BLD AUTO: 0 % (ref 0–2)
IMM GRANULOCYTES NFR BLD AUTO: 1 % (ref 0–2)
LACTATE SERPL-SCNC: 1.4 MMOL/L (ref 0.5–2)
LYMPHOCYTES # BLD AUTO: 1.06 THOUSANDS/ÂΜL (ref 0.6–4.47)
LYMPHOCYTES # BLD AUTO: 1.41 THOUSANDS/ÂΜL (ref 0.6–4.47)
LYMPHOCYTES NFR BLD AUTO: 12 % (ref 14–44)
LYMPHOCYTES NFR BLD AUTO: 14 % (ref 14–44)
MAGNESIUM SERPL-MCNC: 1.1 MG/DL (ref 1.9–2.7)
MCH RBC QN AUTO: 30.2 PG (ref 26.8–34.3)
MCH RBC QN AUTO: 30.3 PG (ref 26.8–34.3)
MCHC RBC AUTO-ENTMCNC: 30.6 G/DL (ref 31.4–37.4)
MCHC RBC AUTO-ENTMCNC: 31.1 G/DL (ref 31.4–37.4)
MCV RBC AUTO: 98 FL (ref 82–98)
MCV RBC AUTO: 99 FL (ref 82–98)
MONOCYTES # BLD AUTO: 0.57 THOUSAND/ÂΜL (ref 0.17–1.22)
MONOCYTES # BLD AUTO: 0.81 THOUSAND/ÂΜL (ref 0.17–1.22)
MONOCYTES NFR BLD AUTO: 7 % (ref 4–12)
MONOCYTES NFR BLD AUTO: 8 % (ref 4–12)
NEUTROPHILS # BLD AUTO: 6.75 THOUSANDS/ÂΜL (ref 1.85–7.62)
NEUTROPHILS # BLD AUTO: 7.19 THOUSANDS/ÂΜL (ref 1.85–7.62)
NEUTS SEG NFR BLD AUTO: 73 % (ref 43–75)
NEUTS SEG NFR BLD AUTO: 76 % (ref 43–75)
NRBC BLD AUTO-RTO: 0 /100 WBCS
NRBC BLD AUTO-RTO: 0 /100 WBCS
P AXIS: 70 DEGREES
PHOSPHATE SERPL-MCNC: 4.5 MG/DL (ref 2.7–4.5)
PLATELET # BLD AUTO: 244 THOUSANDS/UL (ref 149–390)
PLATELET # BLD AUTO: 260 THOUSANDS/UL (ref 149–390)
PMV BLD AUTO: 9.9 FL (ref 8.9–12.7)
PMV BLD AUTO: 9.9 FL (ref 8.9–12.7)
POTASSIUM SERPL-SCNC: 3.9 MMOL/L (ref 3.5–5.3)
POTASSIUM SERPL-SCNC: 4.1 MMOL/L (ref 3.5–5.3)
PR INTERVAL: 136 MS
PROCALCITONIN SERPL-MCNC: 0.25 NG/ML
PROT SERPL-MCNC: 7 G/DL (ref 6.4–8.4)
PROT SERPL-MCNC: 7.8 G/DL (ref 6.4–8.4)
QRS AXIS: 77 DEGREES
QRSD INTERVAL: 90 MS
QT INTERVAL: 430 MS
QTC INTERVAL: 520 MS
RBC # BLD AUTO: 3.77 MILLION/UL (ref 3.88–5.62)
RBC # BLD AUTO: 3.79 MILLION/UL (ref 3.88–5.62)
SODIUM SERPL-SCNC: 136 MMOL/L (ref 135–147)
SODIUM SERPL-SCNC: 137 MMOL/L (ref 135–147)
T WAVE AXIS: 96 DEGREES
TSH SERPL DL<=0.05 MIU/L-ACNC: 2.92 UIU/ML (ref 0.45–4.5)
VENTRICULAR RATE: 88 BPM
WBC # BLD AUTO: 8.76 THOUSAND/UL (ref 4.31–10.16)
WBC # BLD AUTO: 9.88 THOUSAND/UL (ref 4.31–10.16)

## 2025-04-21 PROCEDURE — 99284 EMERGENCY DEPT VISIT MOD MDM: CPT

## 2025-04-21 PROCEDURE — 99222 1ST HOSP IP/OBS MODERATE 55: CPT | Performed by: INTERNAL MEDICINE

## 2025-04-21 PROCEDURE — 96374 THER/PROPH/DIAG INJ IV PUSH: CPT

## 2025-04-21 PROCEDURE — 82948 REAGENT STRIP/BLOOD GLUCOSE: CPT

## 2025-04-21 PROCEDURE — 83605 ASSAY OF LACTIC ACID: CPT

## 2025-04-21 PROCEDURE — 85025 COMPLETE CBC W/AUTO DIFF WBC: CPT

## 2025-04-21 PROCEDURE — 84100 ASSAY OF PHOSPHORUS: CPT

## 2025-04-21 PROCEDURE — 80053 COMPREHEN METABOLIC PANEL: CPT

## 2025-04-21 PROCEDURE — 93005 ELECTROCARDIOGRAM TRACING: CPT

## 2025-04-21 PROCEDURE — 84145 PROCALCITONIN (PCT): CPT

## 2025-04-21 PROCEDURE — 93010 ELECTROCARDIOGRAM REPORT: CPT | Performed by: STUDENT IN AN ORGANIZED HEALTH CARE EDUCATION/TRAINING PROGRAM

## 2025-04-21 PROCEDURE — 82306 VITAMIN D 25 HYDROXY: CPT | Performed by: INTERNAL MEDICINE

## 2025-04-21 PROCEDURE — 73630 X-RAY EXAM OF FOOT: CPT

## 2025-04-21 PROCEDURE — 36415 COLL VENOUS BLD VENIPUNCTURE: CPT

## 2025-04-21 PROCEDURE — 86140 C-REACTIVE PROTEIN: CPT

## 2025-04-21 PROCEDURE — 84100 ASSAY OF PHOSPHORUS: CPT | Performed by: INTERNAL MEDICINE

## 2025-04-21 PROCEDURE — 99223 1ST HOSP IP/OBS HIGH 75: CPT | Performed by: INTERNAL MEDICINE

## 2025-04-21 PROCEDURE — 99285 EMERGENCY DEPT VISIT HI MDM: CPT

## 2025-04-21 PROCEDURE — 84443 ASSAY THYROID STIM HORMONE: CPT

## 2025-04-21 PROCEDURE — 87040 BLOOD CULTURE FOR BACTERIA: CPT

## 2025-04-21 PROCEDURE — 83735 ASSAY OF MAGNESIUM: CPT

## 2025-04-21 RX ORDER — METOPROLOL SUCCINATE 50 MG/1
50 TABLET, EXTENDED RELEASE ORAL DAILY
Status: DISCONTINUED | OUTPATIENT
Start: 2025-04-21 | End: 2025-04-22 | Stop reason: HOSPADM

## 2025-04-21 RX ORDER — INSULIN GLARGINE 100 [IU]/ML
28 INJECTION, SOLUTION SUBCUTANEOUS EVERY 12 HOURS SCHEDULED
Status: DISCONTINUED | OUTPATIENT
Start: 2025-04-21 | End: 2025-04-22 | Stop reason: HOSPADM

## 2025-04-21 RX ORDER — CALCIUM GLUCONATE 20 MG/ML
1 INJECTION, SOLUTION INTRAVENOUS ONCE
Status: COMPLETED | OUTPATIENT
Start: 2025-04-21 | End: 2025-04-21

## 2025-04-21 RX ORDER — HYDRALAZINE HYDROCHLORIDE 20 MG/ML
10 INJECTION INTRAMUSCULAR; INTRAVENOUS EVERY 6 HOURS PRN
Status: DISCONTINUED | OUTPATIENT
Start: 2025-04-21 | End: 2025-04-22 | Stop reason: HOSPADM

## 2025-04-21 RX ORDER — ACETAMINOPHEN 325 MG/1
975 TABLET ORAL EVERY 6 HOURS PRN
Status: DISCONTINUED | OUTPATIENT
Start: 2025-04-21 | End: 2025-04-22 | Stop reason: HOSPADM

## 2025-04-21 RX ORDER — CLOPIDOGREL BISULFATE 75 MG/1
75 TABLET ORAL DAILY
Status: DISCONTINUED | OUTPATIENT
Start: 2025-04-21 | End: 2025-04-22 | Stop reason: HOSPADM

## 2025-04-21 RX ORDER — POLYETHYLENE GLYCOL 3350 17 G/17G
17 POWDER, FOR SOLUTION ORAL DAILY PRN
Status: DISCONTINUED | OUTPATIENT
Start: 2025-04-21 | End: 2025-04-22 | Stop reason: HOSPADM

## 2025-04-21 RX ORDER — NICOTINE 21 MG/24HR
1 PATCH, TRANSDERMAL 24 HOURS TRANSDERMAL DAILY
Status: DISCONTINUED | OUTPATIENT
Start: 2025-04-21 | End: 2025-04-22 | Stop reason: HOSPADM

## 2025-04-21 RX ORDER — INSULIN LISPRO 100 [IU]/ML
1-5 INJECTION, SOLUTION INTRAVENOUS; SUBCUTANEOUS
Status: DISCONTINUED | OUTPATIENT
Start: 2025-04-21 | End: 2025-04-22 | Stop reason: HOSPADM

## 2025-04-21 RX ORDER — CEFAZOLIN SODIUM 1 G/50ML
1000 SOLUTION INTRAVENOUS EVERY 12 HOURS
Status: DISCONTINUED | OUTPATIENT
Start: 2025-04-21 | End: 2025-04-21

## 2025-04-21 RX ORDER — MAGNESIUM SULFATE HEPTAHYDRATE 40 MG/ML
4 INJECTION, SOLUTION INTRAVENOUS ONCE
Status: COMPLETED | OUTPATIENT
Start: 2025-04-21 | End: 2025-04-21

## 2025-04-21 RX ORDER — METRONIDAZOLE 500 MG/100ML
500 INJECTION, SOLUTION INTRAVENOUS ONCE
Status: COMPLETED | OUTPATIENT
Start: 2025-04-21 | End: 2025-04-21

## 2025-04-21 RX ORDER — MAGNESIUM HYDROXIDE/ALUMINUM HYDROXICE/SIMETHICONE 120; 1200; 1200 MG/30ML; MG/30ML; MG/30ML
30 SUSPENSION ORAL EVERY 6 HOURS PRN
Status: DISCONTINUED | OUTPATIENT
Start: 2025-04-21 | End: 2025-04-22 | Stop reason: HOSPADM

## 2025-04-21 RX ORDER — HYDRALAZINE HYDROCHLORIDE 25 MG/1
50 TABLET, FILM COATED ORAL 2 TIMES DAILY
Status: DISCONTINUED | OUTPATIENT
Start: 2025-04-21 | End: 2025-04-22 | Stop reason: HOSPADM

## 2025-04-21 RX ORDER — CALCITRIOL 0.25 UG/1
0.5 CAPSULE, LIQUID FILLED ORAL
Status: DISCONTINUED | OUTPATIENT
Start: 2025-04-21 | End: 2025-04-22 | Stop reason: HOSPADM

## 2025-04-21 RX ORDER — ATORVASTATIN CALCIUM 40 MG/1
80 TABLET, FILM COATED ORAL
Status: DISCONTINUED | OUTPATIENT
Start: 2025-04-21 | End: 2025-04-22 | Stop reason: HOSPADM

## 2025-04-21 RX ORDER — HEPARIN SODIUM 5000 [USP'U]/ML
5000 INJECTION, SOLUTION INTRAVENOUS; SUBCUTANEOUS EVERY 8 HOURS SCHEDULED
Status: DISCONTINUED | OUTPATIENT
Start: 2025-04-21 | End: 2025-04-22 | Stop reason: HOSPADM

## 2025-04-21 RX ORDER — AMLODIPINE BESYLATE 10 MG/1
10 TABLET ORAL DAILY
Status: DISCONTINUED | OUTPATIENT
Start: 2025-04-21 | End: 2025-04-22 | Stop reason: HOSPADM

## 2025-04-21 RX ORDER — ONDANSETRON 2 MG/ML
4 INJECTION INTRAMUSCULAR; INTRAVENOUS EVERY 6 HOURS PRN
Status: DISCONTINUED | OUTPATIENT
Start: 2025-04-21 | End: 2025-04-21

## 2025-04-21 RX ORDER — SODIUM BICARBONATE 650 MG/1
650 TABLET ORAL 2 TIMES DAILY
Status: DISCONTINUED | OUTPATIENT
Start: 2025-04-21 | End: 2025-04-22

## 2025-04-21 RX ADMIN — CALCIUM GLUCONATE 1 G: 20 INJECTION, SOLUTION INTRAVENOUS at 02:53

## 2025-04-21 RX ADMIN — SODIUM BICARBONATE 650 MG: 650 TABLET ORAL at 17:53

## 2025-04-21 RX ADMIN — METRONIDAZOLE 500 MG: 500 INJECTION, SOLUTION INTRAVENOUS at 03:17

## 2025-04-21 RX ADMIN — AMLODIPINE BESYLATE 10 MG: 10 TABLET ORAL at 08:59

## 2025-04-21 RX ADMIN — INSULIN LISPRO 1 UNITS: 100 INJECTION, SOLUTION INTRAVENOUS; SUBCUTANEOUS at 16:11

## 2025-04-21 RX ADMIN — CALCIUM GLUCONATE 1 G: 20 INJECTION, SOLUTION INTRAVENOUS at 06:15

## 2025-04-21 RX ADMIN — HEPARIN SODIUM 5000 UNITS: 5000 INJECTION INTRAVENOUS; SUBCUTANEOUS at 14:51

## 2025-04-21 RX ADMIN — HEPARIN SODIUM 5000 UNITS: 5000 INJECTION INTRAVENOUS; SUBCUTANEOUS at 08:59

## 2025-04-21 RX ADMIN — ATORVASTATIN CALCIUM 80 MG: 40 TABLET, FILM COATED ORAL at 16:11

## 2025-04-21 RX ADMIN — HYDRALAZINE HYDROCHLORIDE 50 MG: 25 TABLET ORAL at 08:59

## 2025-04-21 RX ADMIN — CLOPIDOGREL 75 MG: 75 TABLET ORAL at 08:59

## 2025-04-21 RX ADMIN — MAGNESIUM SULFATE HEPTAHYDRATE 4 G: 40 INJECTION, SOLUTION INTRAVENOUS at 07:41

## 2025-04-21 RX ADMIN — CALCITRIOL CAPSULES 0.25 MCG 0.5 MCG: 0.25 CAPSULE ORAL at 08:59

## 2025-04-21 RX ADMIN — VANCOMYCIN HYDROCHLORIDE 1250 MG: 5 INJECTION, POWDER, LYOPHILIZED, FOR SOLUTION INTRAVENOUS at 04:01

## 2025-04-21 RX ADMIN — NICOTINE 1 PATCH: 14 PATCH, EXTENDED RELEASE TRANSDERMAL at 04:03

## 2025-04-21 RX ADMIN — SODIUM BICARBONATE 650 MG: 650 TABLET ORAL at 08:59

## 2025-04-21 RX ADMIN — CEFEPIME 1000 MG: 2 INJECTION, POWDER, FOR SOLUTION INTRAVENOUS at 02:36

## 2025-04-21 RX ADMIN — METOPROLOL SUCCINATE 50 MG: 50 TABLET, EXTENDED RELEASE ORAL at 08:59

## 2025-04-21 RX ADMIN — INSULIN GLARGINE 28 UNITS: 100 INJECTION, SOLUTION SUBCUTANEOUS at 22:46

## 2025-04-21 RX ADMIN — HEPARIN SODIUM 5000 UNITS: 5000 INJECTION INTRAVENOUS; SUBCUTANEOUS at 22:46

## 2025-04-21 RX ADMIN — INSULIN GLARGINE 28 UNITS: 100 INJECTION, SOLUTION SUBCUTANEOUS at 08:52

## 2025-04-21 RX ADMIN — HYDRALAZINE HYDROCHLORIDE 50 MG: 25 TABLET ORAL at 17:53

## 2025-04-21 NOTE — CASE MANAGEMENT
Case Management Discharge Planning Note    Patient name Adam Gonzalez  Location /-01 MRN [id number]  : 1975 Date 2025       Current Admission Date: 2025  Current Admission Diagnosis:Swelling of left foot   Patient Active Problem List    Diagnosis Date Noted Date Diagnosed    Swelling of left foot 2025     Hypocalcemia due to chronic kidney disease 2025     Toe osteomyelitis (HCC) 04/10/2025     Diabetic foot infection  ([facility]) 2025     Chronic bronchitis with COPD (chronic obstructive pulmonary disease) ([facility]) 2024     Hypertensive CKD (chronic kidney disease) 2024     Metabolic acidosis 2024     Vitamin D deficiency 11/15/2023     Secondary hyperparathyroidism of renal origin ([facility]) 10/01/2023     Implantable loop recorder present 10/01/2023     Noncompliance with treatment plan 2023     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 2022     Impaired balance as late effect of cerebrovascular accident (CVA) 2022     Acute kidney injury superimposed on stage 5 chronic kidney disease, not on chronic dialysis (HCC) 2022     TSH elevation in past 2022     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) 2022     Bilateral Cerebrovascular accident (CVA) status post tPA 2022     Acute on chronic renal failure  (HCC) 2022     History of CVA (cerebrovascular accident) 2022     History of embolic stroke 2021     Aortic valve insufficiency ( mild-mod  ) 2020     Diabetic polyneuropathy associated with type 2 diabetes mellitus ([facility]) 2020     Brachial plexus injury, left, sequela 10/14/2019     Type 2 diabetes mellitus with circulatory disorder, with long-term current use of insulin ([facility]) 2019     Essential hypertension 2018     Current smoker 2018     Hypercholesterolemia 2015     Nephrotic range proteinuria 2015       LOS (days): 0  Geometric Mean LOS  (GMLOS) (days):   Days to GMLOS:     OBJECTIVE:  Risk of Unplanned Readmission Score: 20.94         Current admission status: Inpatient   Preferred Pharmacy:   [id number] - [place] - [address]  [address]  [place] [address]  Phone: [phone] Fax: [phone]    Primary Care Provider: Evans Birmingham MD    Primary Insurance: [facility]  Secondary Insurance:     DISCHARGE DETAILS:     CM reviewed SDOH done by nursing, patient has no needs with housing stability, transportation, food insecurity. Patient was a positive with utilities.  SDOH declined wanting assistance. Ampac score is 24. CM will continue to follow for any needs that may require cm involvement.

## 2025-04-21 NOTE — ASSESSMENT & PLAN NOTE
Lab Results   Component Value Date    EGFR 9 04/21/2025    EGFR 9 04/21/2025    EGFR 10 04/02/2025    CREATININE 6.21 (H) 04/21/2025    CREATININE 6.26 (H) 04/21/2025    CREATININE 5.85 (H) 04/02/2025   Reasonably well-controlled

## 2025-04-21 NOTE — ASSESSMENT & PLAN NOTE
Lab Results   Component Value Date    EGFR 9 04/21/2025    EGFR 9 04/21/2025    EGFR 10 04/02/2025    CREATININE 6.21 (H) 04/21/2025    CREATININE 6.26 (H) 04/21/2025    CREATININE 5.85 (H) 04/02/2025   Patient kidney function is overall stable with some fluctuation.  Does have stage IV CKD and has AV fistula and will go inside hemodialysis which required    At this point I do not see any need for urgent dialysis we will continue to monitor with you

## 2025-04-21 NOTE — ASSESSMENT & PLAN NOTE
Could be secondary to chronic kidney disease stage V  On admission, calcium 5.9, electrolyte was replaced in the ER  Monitor electrolyte daily and replace as needed

## 2025-04-21 NOTE — ASSESSMENT & PLAN NOTE
"Lab Results   Component Value Date    HGBA1C 7.2 (A) 02/03/2025       No results for input(s): \"POCGLU\" in the last 72 hours.    Blood Sugar Average: Last 72 hrs:    Blood glucose on   Continue with Lantus 28 units twice daily  Currently on Trulicity once a week, will hold for now  Sliding scale  Pablo/CHO diet  Hypoglycemia protocol  "

## 2025-04-21 NOTE — ASSESSMENT & PLAN NOTE
Lab Results   Component Value Date    EGFR 9 04/21/2025    EGFR 10 04/02/2025    EGFR 9 04/01/2025    CREATININE 6.26 (H) 04/21/2025    CREATININE 5.85 (H) 04/02/2025    CREATININE 6.02 (H) 04/01/2025   Creatinine has been rising since last admission, per chart review last creatinine on admission 6.48, creatinine prior to this admission on 4/2/2025 5.85, on admission 6.28  Progressively worsening probably due to diabetic neuropathy  Patient was seen by nephrology on last hospitalization and there was no indication for HD  Avoid nephrotoxic medication, NSAIDs  Follows with Dr. YOCASTA Benedict for management as outpatient  Nephrology consult in place

## 2025-04-21 NOTE — H&P
H&P - Hospitalist   Name: Adam Gonzalez 50 y.o. male I MRN: [id number]  Unit/Bed#: ED 19 I Date of Admission: 4/21/2025   Date of Service: 4/21/2025 I Hospital Day: 0     Assessment & Plan  Swelling of left foot  Adam Gonzalez present in the ER accopanied by family due to concer of foot swelling.  Patient was recently discharged on March 30, 2025 due to diabetic foot infection/acute osteomyelitis involving the fifth middle and distal phalanges, underwent fifth toe amputation and vascular surgery was consulted during that admission.  Per patient report he was supposed to follow-up with podiatrist as outpatient, unfortunately he has not seen podiatry since discharge, completed doxycycline course  Per spouse report at bedside, she has noted swelling in bilateral lower extremity, denies any drainage, pain or any other symptoms  X-ray of the left foot is pending  On assessment site is dry, warm and intact, there is no drainage, sutures are in place  The patient is afebrile and hypertensive, blood work does not show any leukocytosis, lactic is negative procalcitonin is negative.  There is low suspicion for infection  Received vancomycin, cefepime and Flagyl in the ER, since there is low suspicion from infection will hold on antibiotics for now  Monitor hemodynamics  Trend WBC with daily CBC  Wound care consult per spouse request  Podiatry consult in place  Acute kidney injury superimposed on stage 5 chronic kidney disease, not on chronic dialysis (HCC)  Lab Results   Component Value Date    EGFR 9 04/21/2025    EGFR 10 04/02/2025    EGFR 9 04/01/2025    CREATININE 6.26 (H) 04/21/2025    CREATININE 5.85 (H) 04/02/2025    CREATININE 6.02 (H) 04/01/2025   Creatinine has been rising since last admission, per chart review last creatinine on admission 6.48, creatinine prior to this admission on 4/2/2025 5.85, on admission 6.28  Progressively worsening probably due to diabetic neuropathy  Patient was seen by nephrology on last  "hospitalization and there was no indication for HD  Avoid nephrotoxic medication, NSAIDs  Follows with Dr. YOCASTA Benedict for management as outpatient  Nephrology consult in place  Hypocalcemia due to chronic kidney disease  Could be secondary to chronic kidney disease stage V  On admission, calcium 5.9, electrolyte was replaced in the ER  Monitor electrolyte daily and replace as needed  Hypertensive CKD (chronic kidney disease)  Lab Results   Component Value Date    EGFR 9 04/21/2025    EGFR 10 04/02/2025    EGFR 9 04/01/2025    CREATININE 6.26 (H) 04/21/2025    CREATININE 5.85 (H) 04/02/2025    CREATININE 6.02 (H) 04/01/2025   BP systolic greater than 200 on admission  Spouse reports noncompliance with antihypertensive home regimen  Continue with home regimen  Hydralazine as needed for systolic greater than 170 and diastolic greater than 100  Monitor BP per unit protocol when needed  Diabetic polyneuropathy associated with type 2 diabetes mellitus (HCC)  Lab Results   Component Value Date    HGBA1C 7.2 (A) 02/03/2025       No results for input(s): \"POCGLU\" in the last 72 hours.    Blood Sugar Average: Last 72 hrs:    Blood glucose on   Continue with Lantus 28 units twice daily  Currently on Trulicity once a week, will hold for now  Sliding scale  Pablo/CHO diet  Hypoglycemia protocol      VTE Pharmacologic Prophylaxis: VTE Score: 4 Moderate Risk (Score 3-4) - Pharmacological DVT Prophylaxis Ordered: heparin.  Code Status: Level 1 - Full Code   Discussion with family: Updated  (wife) at bedside.    Anticipated Length of Stay: Patient will be admitted on an inpatient basis with an anticipated length of stay of greater than 2 midnights secondary to swelling of the foot, pending nephrology, podiatrist and wound care nurse consult.    History of Present Illness   Chief Complaint:   Chief Complaint   Patient presents with    Ankle Swelling     Pt arrives ambulatory with a c/o left ankle swelling x1 week. Pt " reports having his 5th toe amputated approx 3 weeks ago. Pt is a type 2 diabetic.           Adam Gonzalez is a 50 y.o. male with a PMH of chronic kidney disease stage V, diabetes type 2 on Lantus and Trulicity at home, hypertension, smoker who presents with swelling of the left foot.  Adam Olson is a 50-year-old male who recently underwent fifth toe amputation due to acute osteomyelitis, the patient finished doxycycline course and was discharged in in stable condition home.  Patient has returned to the ER due to a new swelling of the left lower extremity, the patient denies any pain, discharge or any other abnormality.  On assessment left lower extremity swelling is appreciated, no discoloration or any other abnormality noted on assessment.  The patient has been admitted as inpatient for further management and treatment.    Review of Systems   Constitutional:  Negative for chills and fever.   HENT:  Negative for ear pain and sore throat.    Eyes:  Negative for pain and visual disturbance.   Respiratory:  Negative for cough and shortness of breath.    Cardiovascular:  Positive for leg swelling. Negative for chest pain and palpitations.   Gastrointestinal:  Negative for abdominal pain and vomiting.   Genitourinary:  Negative for dysuria and hematuria.   Musculoskeletal:  Negative for arthralgias and back pain.   Skin:  Negative for color change and rash.   Neurological:  Negative for seizures and syncope.   All other systems reviewed and are negative.      Historical Information   Past Medical History:   Diagnosis Date    Chronic kidney disease     Diabetes mellitus (HCC)     Hypertension     Received intravenous tissue plasminogen activator (tPA) in emergency department 02/01/2022    Stroke (HCC)     no deficits     Past Surgical History:   Procedure Laterality Date    CARDIAC LOOP RECORDER  2023    PILONIDAL CYST EXCISION      [place] ARTERIOVENOUS ANASTOMOSIS OPEN DIRECT Left 4/10/2024    Procedure: CREATION FISTULA  BRACHIOCEPHALIC LEFT (AV);  Surgeon: Maxwell Nielsen MD;  Location: [facility];  Service: Vascular    TOE AMPUTATION Left 4/2/2025    Procedure: AMPUTATION TOE;  Surgeon: Adriane Anna DPM;  Location: [facility];  Service: Podiatry     Social History     Tobacco Use    Smoking status: Every Day     Current packs/day: 0.50     Average packs/day: 1 pack/day for 37.8 years (36.7 ttl pk-yrs)     Types: Cigarettes     Start date: 6/16/1987     Passive exposure: Current    Smokeless tobacco: Never    Tobacco comments:     states slowing it down, used Chantix-currently 1/2 pack/day, at least 35 pack years as of 2023   Vaping Use    Vaping status: Never Used   Substance and Sexual Activity    Alcohol use: Not Currently     Comment: stopped 8/22    Drug use: Never    Sexual activity: Yes     Partners: Female     Birth control/protection: Condom Male     E-Cigarette/Vaping    E-Cigarette Use Never User      E-Cigarette/Vaping Substances    Nicotine No     THC No     CBD No     Flavoring No     Other No     Unknown No      Family History   Problem Relation Age of Onset    No Known Problems Mother     No Known Problems Father     Pancreatic cancer Brother      Social History:  Marital Status: Single   Occupation: Unknown  Patient Pre-hospital Living Situation: Home  Patient Pre-hospital Level of Mobility: walks  Patient Pre-hospital Diet Restrictions: None    Meds/Allergies   I have reviewed home medications with patient family member.  Medication reconciliation was done with the spouse at bedside.  Prior to Admission medications    Medication Sig Start Date End Date Taking? Authorizing Provider   amLODIPine (NORVASC) 10 mg tablet Take 1 tablet (10 mg total) by mouth daily 2/3/25   Evans Birmingham MD   atorvastatin (LIPITOR) 80 mg tablet Take 1 tablet (80 mg total) by mouth daily 2/3/25   Evans Birmingham MD   calcitriol (ROCALTROL) 0.5 MCG capsule Take 1 capsule (0.5 mcg total) by mouth daily  Monday, Wednesday, Friday 8/14/24 5/11/25  YASMIN Gresham   clopidogrel (PLAVIX) 75 mg tablet Take 1 tablet (75 mg total) by mouth daily 2/3/25   Evans Birmingham MD   Dulaglutide 0.75 MG/0.5ML SOAJ Inject 0.75 mg under the skin once a week 2/3/25   Evans Birmingham MD   ergocalciferol (VITAMIN D2) 50,000 units Take 1 capsule (50,000 Units total) by mouth once a week 12/27/24   YASMIN Gresham   hydrALAZINE (APRESOLINE) 50 mg tablet Take 1 tablet (50 mg total) by mouth 2 (two) times a day 2/3/25   Evans Birmingham MD   Insulin Glargine Solostar (Basaglar KwikPen) 100 UNIT/ML SOPN Inject 0.28 mL (28 Units total) under the skin daily 2/3/25   Evans Birmingham MD   Insulin Glargine Solostar (Basaglar KwikPen) 100 UNIT/ML SOPN Inject 0.28 mL (28 Units total) under the skin in the morning 2/10/25   Evans Birmingham MD   metoprolol succinate (TOPROL-XL) 50 mg 24 hr tablet Take 1 tablet (50 mg total) by mouth daily 2/3/25   Evans Birmingham MD   sodium bicarbonate 650 mg tablet Take 1 tablet (650 mg total) by mouth 2 (two) times a day 8/14/24   YASMIN Gresham     No Known Allergies    Objective :  Temp:  [97.8 °F (36.6 °C)] 97.8 °F (36.6 °C)  HR:  [91] 91  BP: (204)/(93) 204/93  Resp:  [18] 18  SpO2:  [100 %] 100 %  O2 Device: None (Room air)    Physical Exam  Vitals and nursing note reviewed.   Constitutional:       General: He is not in acute distress.     Appearance: Normal appearance. He is well-developed.   HENT:      Head: Normocephalic and atraumatic.   Eyes:      Conjunctiva/sclera: Conjunctivae normal.   Cardiovascular:      Rate and Rhythm: Normal rate and regular rhythm.      Heart sounds: No murmur heard.  Pulmonary:      Effort: Pulmonary effort is normal. No respiratory distress.      Breath sounds: Normal breath sounds.   Abdominal:      General: Bowel sounds are normal.      Palpations: Abdomen is soft.       Tenderness: There is no abdominal tenderness.   Musculoskeletal:         General: Swelling present.      Cervical back: Neck supple.      Left lower leg: Edema present.   Skin:     General: Skin is warm and dry.      Capillary Refill: Capillary refill takes less than 2 seconds.   Neurological:      General: No focal deficit present.      Mental Status: He is alert and oriented to person, place, and time. Mental status is at baseline.   Psychiatric:         Mood and Affect: Mood normal.          Lines/Drains:            Lab Results: I have reviewed the following results:  Results from last 7 days   Lab Units 04/21/25  0129   WBC Thousand/uL 8.76   HEMOGLOBIN g/dL 11.5*   HEMATOCRIT % 37.0   PLATELETS Thousands/uL 260   SEGS PCT % 76*   LYMPHO PCT % 12*   MONO PCT % 7   EOS PCT % 4     Results from last 7 days   Lab Units 04/21/25  0129   SODIUM mmol/L 136   POTASSIUM mmol/L 3.9   CHLORIDE mmol/L 108   CO2 mmol/L 18*   BUN mg/dL 67*   CREATININE mg/dL 6.26*   ANION GAP mmol/L 10   CALCIUM mg/dL 5.9*   ALBUMIN g/dL 3.7   TOTAL BILIRUBIN mg/dL 0.31   ALK PHOS U/L 160*   ALT U/L 12   AST U/L 14   GLUCOSE RANDOM mg/dL 220*             Lab Results   Component Value Date    HGBA1C 7.2 (A) 02/03/2025    HGBA1C 6.8 (A) 09/23/2024    HGBA1C 7.1 (A) 05/21/2024     Results from last 7 days   Lab Units 04/21/25  0129   LACTIC ACID mmol/L 1.4   PROCALCITONIN ng/ml 0.25       Imaging Results Review: I personally reviewed the following image studies/reports in PACS and discussed pertinent findings with Radiology: X-ray of the left foot. My interpretation of the radiology images/reports is: No gas noted on imaging.  Other Study Results Review: EKG was reviewed.     Administrative Statements   I have spent a total time of 75 minutes in caring for this patient on the day of the visit/encounter including Diagnostic results, Prognosis, Risks and benefits of tx options, Instructions for management, Patient and family education, Importance  of tx compliance, Risk factor reductions, Impressions, Counseling / Coordination of care, Documenting in the medical record, Reviewing/placing orders in the medical record (including tests, medications, and/or procedures), Obtaining or reviewing history  , and Communicating with other healthcare professionals .    ** Please Note: This note has been constructed using a voice recognition system. **

## 2025-04-21 NOTE — ED PROVIDER NOTES
Time reflects when diagnosis was documented in both MDM as applicable and the Disposition within this note       Time User Action Codes Description Comment    4/21/2025  2:46 AM Al Natarajan [M25.472] Left ankle swelling     4/21/2025  2:46 AM Al Natarajan [L03.90] Cellulitis     4/21/2025  2:46 AM Al Natarajan [T14.8XXA,  L08.9] Infected wound     4/21/2025  2:46 AM Al Natarajan [E83.51] Hypocalcemia     4/21/2025  2:46 AM Al Natarajan [N17.9] GATITO (acute kidney injury) (HCC)     4/21/2025  3:18 AM Karla Macias [N17.9,  N18.5] Acute kidney injury superimposed on stage 5 chronic kidney disease, not on chronic dialysis (HCC)           ED Disposition       ED Disposition   Admit    Condition   Stable    Date/Time   Mon Apr 21, 2025  2:45 AM    Comment   Case was discussed with YASMIN Garrett and the patient's admission status was agreed to be Admission Status: inpatient status to the service of Dr. Vicente.               Assessment & Plan       Medical Decision Making  Patient is a 50 y.o. male with a PMH of chronic kidney disease stage V, diabetes type 2 on Lantus and Trulicity at home, hypertension, smoker who presents with swelling of the left foot swelling x 1 week.  Patient recently underwent fifth toe amputation due to acute osteomyelitis, the patient finished doxycycline course and was discharged in in stable condition home.  Patient has returned to the ER due to a new swelling of the left lower extremity, the patient denies any pain, discharge or any other abnormality. Skin wound with clear leakage with surrounding pallor in place finding where his left fifth digit should be.  Clinical image below. DDx including but not limited to: wound infection, wound dehiscence, cellulitis, abscess, osteomyelitis, necrotizing fasciitis, bleeding, seroma.  CBC with lactic, Pro-Pablo, CRP within normal limits.  CMP shows hypocalcemia at 5.9.  With worsening creatinine at 6.21 and BUN  of 64.  Due to acute findings of infection and swelling, patient started on vancomycin, cefepime, and metronidazole.  Discussed findings with internal medicine, patient admitted for further evaluation of potential wound infection.  Discussed finding with patient, patient agrees to admission at this time.    Amount and/or Complexity of Data Reviewed  Labs: ordered.  Radiology: ordered.    Risk  Prescription drug management.  Decision regarding hospitalization.             Medications   amLODIPine (NORVASC) tablet 10 mg (has no administration in time range)   atorvastatin (LIPITOR) tablet 80 mg (has no administration in time range)   calcitriol (ROCALTROL) capsule 0.5 mcg (has no administration in time range)   clopidogrel (PLAVIX) tablet 75 mg (has no administration in time range)   hydrALAZINE (APRESOLINE) tablet 50 mg (has no administration in time range)   insulin glargine (LANTUS) subcutaneous injection 28 Units 0.28 mL (has no administration in time range)   metoprolol succinate (TOPROL-XL) 24 hr tablet 50 mg (has no administration in time range)   sodium bicarbonate tablet 650 mg (has no administration in time range)   acetaminophen (TYLENOL) tablet 975 mg (has no administration in time range)   polyethylene glycol (MIRALAX) packet 17 g (has no administration in time range)   aluminum-magnesium hydroxide-simethicone (MAALOX) oral suspension 30 mL (has no administration in time range)   nicotine (NICODERM CQ) 14 mg/24hr TD 24 hr patch 1 patch (1 patch Transdermal Medication Applied 4/21/25 1727)   heparin (porcine) subcutaneous injection 5,000 Units (has no administration in time range)   hydrALAZINE (APRESOLINE) injection 10 mg (has no administration in time range)   insulin lispro (HumALOG/ADMELOG) 100 units/mL subcutaneous injection 1-5 Units (has no administration in time range)   insulin lispro (HumALOG/ADMELOG) 100 units/mL subcutaneous injection 1-5 Units (has no administration in time range)    trimethobenzamide (TIGAN) IM injection 200 mg (has no administration in time range)   calcium gluconate 1 g in sodium chloride 0.9% 50 mL (premix) (0 g Intravenous Stopped 4/21/25 0317)   vancomycin (VANCOCIN) 1,250 mg in sodium chloride 0.9 % 250 mL IVPB (1,250 mg Intravenous New Bag 4/21/25 0401)   cefepime (MAXIPIME) 1 g/50 mL dextrose IVPB (0 mg Intravenous Stopped 4/21/25 0250)   metroNIDAZOLE (FLAGYL) IVPB (premix) 500 mg 100 mL (0 mg Intravenous Stopped 4/21/25 0347)       ED Risk Strat Scores                    No data recorded        SBIRT 22yo+      Flowsheet Row Most Recent Value   Initial Alcohol Screen: US AUDIT-C     1. How often do you have a drink containing alcohol? 0 Filed at: 04/21/2025 0102   2. How many drinks containing alcohol do you have on a typical day you are drinking?  0 Filed at: 04/21/2025 0102   3a. Male UNDER 65: How often do you have five or more drinks on one occasion? 0 Filed at: 04/21/2025 0102   Audit-C Score 0 Filed at: 04/21/2025 0102   GRETEL: How many times in the past year have you...    Used an illegal drug or used a prescription medication for non-medical reasons? Never Filed at: 04/21/2025 0102                            History of Present Illness       Chief Complaint   Patient presents with    Ankle Swelling     Pt arrives ambulatory with a c/o left ankle swelling x1 week. Pt reports having his 5th toe amputated approx 3 weeks ago. Pt is a type 2 diabetic.         Past Medical History:   Diagnosis Date    Chronic kidney disease     Diabetes mellitus (HCC)     Hypertension     Received intravenous tissue plasminogen activator (tPA) in emergency department 02/01/2022    Stroke (HCC)     no deficits      Past Surgical History:   Procedure Laterality Date    CARDIAC LOOP RECORDER  2023    PILONIDAL CYST EXCISION      [place] ARTERIOVENOUS ANASTOMOSIS OPEN DIRECT Left 4/10/2024    Procedure: CREATION FISTULA BRACHIOCEPHALIC LEFT (AV);  Surgeon: Maxwell Nielsen MD;   Location: [facility];  Service: Vascular    TOE AMPUTATION Left 4/2/2025    Procedure: AMPUTATION TOE;  Surgeon: Adriane Anna DPM;  Location: [facility];  Service: Podiatry      Family History   Problem Relation Age of Onset    No Known Problems Mother     No Known Problems Father     Pancreatic cancer Brother       Social History     Tobacco Use    Smoking status: Every Day     Current packs/day: 0.50     Average packs/day: 1 pack/day for 37.8 years (36.7 ttl pk-yrs)     Types: Cigarettes     Start date: 6/16/1987     Passive exposure: Current    Smokeless tobacco: Never    Tobacco comments:     states slowing it down, used Chantix-currently 1/2 pack/day, at least 35 pack years as of 2023   Vaping Use    Vaping status: Never Used   Substance Use Topics    Alcohol use: Never     Comment: stopped 8/22    Drug use: Never      E-Cigarette/Vaping    E-Cigarette Use Never User       E-Cigarette/Vaping Substances    Nicotine No     THC No     CBD No     Flavoring No     Other No     Unknown No       I have reviewed and agree with the history as documented.     Patient is a 50 y.o. male with a PMH of chronic kidney disease stage V, diabetes type 2 on Lantus and Trulicity at home, hypertension, smoker who presents with swelling of the left foot swelling x 1 week.  Patient recently underwent fifth toe amputation due to acute osteomyelitis, the patient finished doxycycline course and was discharged in in stable condition home.  Patient has returned to the ER due to a new swelling of the left lower extremity, the patient denies any pain, discharge or any other abnormality.       Ankle Swelling  Associated symptoms: no back pain and no fever        Review of Systems   Constitutional:  Negative for chills and fever.   HENT:  Negative for congestion, ear pain, rhinorrhea, sinus pressure, sinus pain and sore throat.    Eyes:  Negative for pain, discharge and visual disturbance.   Respiratory:  Negative for cough and shortness of  breath.    Cardiovascular:  Positive for leg swelling. Negative for chest pain and palpitations.   Gastrointestinal:  Negative for abdominal pain, constipation, diarrhea, nausea and vomiting.   Genitourinary:  Negative for dysuria and hematuria.   Musculoskeletal:  Negative for arthralgias and back pain.   Skin:  Positive for wound. Negative for color change, pallor and rash.   Neurological:  Negative for dizziness, seizures, syncope, weakness, light-headedness and headaches.   All other systems reviewed and are negative.          Objective       ED Triage Vitals   Temperature Pulse Blood Pressure Respirations SpO2 Patient Position - Orthostatic VS   04/21/25 0101 04/21/25 0101 04/21/25 0101 04/21/25 0101 04/21/25 0101 04/21/25 0101   97.8 °F (36.6 °C) 91 (!) 204/93 18 100 % Sitting      Temp Source Heart Rate Source BP Location FiO2 (%) Pain Score    04/21/25 0101 04/21/25 0101 04/21/25 0101 -- 04/21/25 0438    Oral Monitor Right arm  No Pain      Vitals      Date and Time Temp Pulse SpO2 Resp BP Pain Score FACES Pain Rating User   04/21/25 0438 -- -- -- -- -- No Pain -- JG   04/21/25 0432 -- -- -- 18 -- -- -- KE   04/21/25 0432 98.2 °F (36.8 °C) 89 99 % -- 184/95 -- -- DII   04/21/25 0320 -- 89 98 % 18 181/88 -- -- MAXIMILIANO   04/21/25 0101 97.8 °F (36.6 °C) 91 100 % 18 204/93 -- -- ML            Physical Exam  Vitals and nursing note reviewed.   Constitutional:       General: He is not in acute distress.     Appearance: He is well-developed. He is not ill-appearing.   HENT:      Head: Normocephalic and atraumatic.      Right Ear: There is no impacted cerumen.      Left Ear: There is no impacted cerumen.      Nose: No congestion or rhinorrhea.      Mouth/Throat:      Pharynx: No oropharyngeal exudate or posterior oropharyngeal erythema.   Eyes:      General:         Right eye: No discharge.         Left eye: No discharge.      Extraocular Movements: Extraocular movements intact.      Conjunctiva/sclera: Conjunctivae  normal.      Pupils: Pupils are equal, round, and reactive to light.   Cardiovascular:      Rate and Rhythm: Normal rate and regular rhythm.      Heart sounds: No murmur heard.     No friction rub. No gallop.   Pulmonary:      Effort: Pulmonary effort is normal. No respiratory distress.      Breath sounds: Normal breath sounds. No stridor. No wheezing, rhonchi or rales.   Chest:      Chest wall: No tenderness.   Abdominal:      General: There is no distension.      Palpations: Abdomen is soft.      Tenderness: There is no abdominal tenderness. There is no right CVA tenderness, left CVA tenderness or guarding.   Musculoskeletal:         General: No swelling.      Cervical back: Neck supple. No rigidity or tenderness.      Left lower leg: Edema present.      Comments: 2+ pitting edema of the left lower extremity up into the mid calf.   Lymphadenopathy:      Cervical: No cervical adenopathy.   Skin:     General: Skin is warm and dry.      Capillary Refill: Capillary refill takes less than 2 seconds.      Coloration: Skin is not jaundiced or pale.      Findings: No bruising, erythema or lesion.      Comments: Skin wound with clear leakage with surrounding pallor in place finding where his left fifth digit should be.  Clinical image below.   Neurological:      Mental Status: He is alert.   Psychiatric:         Mood and Affect: Mood normal.         Results Reviewed       Procedure Component Value Units Date/Time    Comprehensive metabolic panel [[id number]]  (Abnormal) Collected: 04/21/25 0434    Lab Status: Final result Specimen: Blood from Hand, Right Updated: 04/21/25 0556     Sodium 137 mmol/L      Potassium 4.1 mmol/L      Chloride 111 mmol/L      CO2 17 mmol/L      ANION GAP 9 mmol/L      BUN 64 mg/dL      Creatinine 6.21 mg/dL      Glucose 147 mg/dL      Calcium 5.9 mg/dL      AST 13 U/L      ALT 8 U/L      Alkaline Phosphatase 145 U/L      Total Protein 7.0 g/dL      Albumin 3.5 g/dL      Total Bilirubin 0.26 mg/dL       eGFR 9 ml/min/1.73sq m     Narrative:      National Kidney Disease Foundation guidelines for Chronic Kidney Disease (CKD):     Stage 1 with normal or high GFR (GFR > 90 mL/min/1.73 square meters)    Stage 2 Mild CKD (GFR = 60-89 mL/min/1.73 square meters)    Stage 3A Moderate CKD (GFR = 45-59 mL/min/1.73 square meters)    Stage 3B Moderate CKD (GFR = 30-44 mL/min/1.73 square meters)    Stage 4 Severe CKD (GFR = 15-29 mL/min/1.73 square meters)    Stage 5 End Stage CKD (GFR <15 mL/min/1.73 square meters)  Note: GFR calculation is accurate only with a steady state creatinine    Magnesium [[id number]]  (Abnormal) Collected: 04/21/25 0434    Lab Status: Final result Specimen: Blood from Hand, Right Updated: 04/21/25 0537     Magnesium 1.1 mg/dL     Phosphorus [[id number]]  (Normal) Collected: 04/21/25 0434    Lab Status: Final result Specimen: Blood from Hand, Right Updated: 04/21/25 0537     Phosphorus 4.5 mg/dL     TSH, 3rd generation [[id number]]  (Normal) Collected: 04/21/25 0434    Lab Status: Final result Specimen: Blood from Hand, Right Updated: 04/21/25 0528     TSH 3RD GENERATON 2.915 uIU/mL     CBC and differential [[id number]]  (Abnormal) Collected: 04/21/25 0434    Lab Status: Final result Specimen: Blood from Hand, Right Updated: 04/21/25 0504     WBC 9.88 Thousand/uL      RBC 3.77 Million/uL      Hemoglobin 11.4 g/dL      Hematocrit 37.2 %      MCV 99 fL      MCH 30.2 pg      MCHC 30.6 g/dL      RDW 14.4 %      MPV 9.9 fL      Platelets 244 Thousands/uL      nRBC 0 /100 WBCs      Segmented % 73 %      Immature Grans % 1 %      Lymphocytes % 14 %      Monocytes % 8 %      Eosinophils Relative 4 %      Basophils Relative 0 %      Absolute Neutrophils 7.19 Thousands/µL      Absolute Immature Grans 0.05 Thousand/uL      Absolute Lymphocytes 1.41 Thousands/µL      Absolute Monocytes 0.81 Thousand/µL      Eosinophils Absolute 0.38 Thousand/µL      Basophils Absolute 0.04 Thousands/µL     Comprehensive metabolic  panel [[id number]]  (Abnormal) Collected: 04/21/25 0129    Lab Status: Final result Specimen: Blood from Arm, Right Updated: 04/21/25 0211     Sodium 136 mmol/L      Potassium 3.9 mmol/L      Chloride 108 mmol/L      CO2 18 mmol/L      ANION GAP 10 mmol/L      BUN 67 mg/dL      Creatinine 6.26 mg/dL      Glucose 220 mg/dL      Calcium 5.9 mg/dL      AST 14 U/L      ALT 12 U/L      Alkaline Phosphatase 160 U/L      Total Protein 7.8 g/dL      Albumin 3.7 g/dL      Total Bilirubin 0.31 mg/dL      eGFR 9 ml/min/1.73sq m     Narrative:      Verified by repeat analysis.  National Kidney Disease Foundation guidelines for Chronic Kidney Disease (CKD):     Stage 1 with normal or high GFR (GFR > 90 mL/min/1.73 square meters)    Stage 2 Mild CKD (GFR = 60-89 mL/min/1.73 square meters)    Stage 3A Moderate CKD (GFR = 45-59 mL/min/1.73 square meters)    Stage 3B Moderate CKD (GFR = 30-44 mL/min/1.73 square meters)    Stage 4 Severe CKD (GFR = 15-29 mL/min/1.73 square meters)    Stage 5 End Stage CKD (GFR <15 mL/min/1.73 square meters)  Note: GFR calculation is accurate only with a steady state creatinine    C-reactive protein [[id number]]  (Normal) Collected: 04/21/25 0129    Lab Status: Final result Specimen: Blood from Arm, Right Updated: 04/21/25 0206     CRP <1.0 mg/L     Narrative:      Verified by repeat analysis.    Procalcitonin [[id number]]  (Normal) Collected: 04/21/25 0129    Lab Status: Final result Specimen: Blood from Arm, Right Updated: 04/21/25 0201     Procalcitonin 0.25 ng/ml     Lactic acid, plasma (w/reflex if result > 2.0) [[id number]]  (Normal) Collected: 04/21/25 0129    Lab Status: Final result Specimen: Blood from Arm, Right Updated: 04/21/25 0150     LACTIC ACID 1.4 mmol/L     Narrative:      Result may be elevated if tourniquet was used during collection.    CBC and differential [[id number]]  (Abnormal) Collected: 04/21/25 0129    Lab Status: Final result Specimen: Blood from Arm, Right Updated: 04/21/25  0143     WBC 8.76 Thousand/uL      RBC 3.79 Million/uL      Hemoglobin 11.5 g/dL      Hematocrit 37.0 %      MCV 98 fL      MCH 30.3 pg      MCHC 31.1 g/dL      RDW 14.4 %      MPV 9.9 fL      Platelets 260 Thousands/uL      nRBC 0 /100 WBCs      Segmented % 76 %      Immature Grans % 0 %      Lymphocytes % 12 %      Monocytes % 7 %      Eosinophils Relative 4 %      Basophils Relative 1 %      Absolute Neutrophils 6.75 Thousands/µL      Absolute Immature Grans 0.02 Thousand/uL      Absolute Lymphocytes 1.06 Thousands/µL      Absolute Monocytes 0.57 Thousand/µL      Eosinophils Absolute 0.32 Thousand/µL      Basophils Absolute 0.04 Thousands/µL     Blood culture #1 [[id number]] Collected: 04/21/25 0133    Lab Status: In process Specimen: Blood from Hand, Right Updated: 04/21/25 0136    Blood culture #2 [[id number]] Collected: 04/21/25 0129    Lab Status: In process Specimen: Blood from Arm, Right Updated: 04/21/25 0134            XR foot 3+ views LEFT    (Results Pending)       Procedures    ED Medication and Procedure Management   Prior to Admission Medications   Prescriptions Last Dose Informant Patient Reported? Taking?   Dulaglutide 0.75 MG/0.5ML SOAJ Past Week Self No Yes   Sig: Inject 0.75 mg under the skin once a week   Insulin Glargine Solostar (Basaglar KwikPen) 100 UNIT/ML SOPN 4/20/2025 Self No Yes   Sig: Inject 0.28 mL (28 Units total) under the skin daily   Insulin Glargine Solostar (Basaglar KwikPen) 100 UNIT/ML SOPN   No No   Sig: Inject 0.28 mL (28 Units total) under the skin in the morning   amLODIPine (NORVASC) 10 mg tablet 4/20/2025 Self No Yes   Sig: Take 1 tablet (10 mg total) by mouth daily   atorvastatin (LIPITOR) 80 mg tablet 4/20/2025 Self No Yes   Sig: Take 1 tablet (80 mg total) by mouth daily   calcitriol (ROCALTROL) 0.5 MCG capsule 4/20/2025 Self No Yes   Sig: Take 1 capsule (0.5 mcg total) by mouth daily Monday, Wednesday, Friday   clopidogrel (PLAVIX) 75 mg tablet 4/20/2025 Self No Yes    Sig: Take 1 tablet (75 mg total) by mouth daily   ergocalciferol (VITAMIN D2) 50,000 units Past Week Self No Yes   Sig: Take 1 capsule (50,000 Units total) by mouth once a week   hydrALAZINE (APRESOLINE) 50 mg tablet 4/20/2025 Self No Yes   Sig: Take 1 tablet (50 mg total) by mouth 2 (two) times a day   metoprolol succinate (TOPROL-XL) 50 mg 24 hr tablet 4/20/2025 Self No Yes   Sig: Take 1 tablet (50 mg total) by mouth daily   sodium bicarbonate 650 mg tablet 4/20/2025 Self No Yes   Sig: Take 1 tablet (650 mg total) by mouth 2 (two) times a day      Facility-Administered Medications: None     Current Discharge Medication List        CONTINUE these medications which have NOT CHANGED    Details   amLODIPine (NORVASC) 10 mg tablet Take 1 tablet (10 mg total) by mouth daily  Qty: 90 tablet, Refills: 1    Comments: Patient email: [email or web address]  Associated Diagnoses: Essential hypertension; Stage 4 chronic kidney disease (HCC)      atorvastatin (LIPITOR) 80 mg tablet Take 1 tablet (80 mg total) by mouth daily  Qty: 90 tablet, Refills: 1    Comments: Patient email: [email or web address]  Associated Diagnoses: Uncontrolled type 2 diabetes mellitus with hyperglycemia (HCC); History of CVA (cerebrovascular accident); Hypercholesterolemia      calcitriol (ROCALTROL) 0.5 MCG capsule Take 1 capsule (0.5 mcg total) by mouth daily Monday, Wednesday, Friday  Qty: 90 capsule, Refills: 2    Associated Diagnoses: Hyperparathyroidism due to renal insufficiency (HCC)      clopidogrel (PLAVIX) 75 mg tablet Take 1 tablet (75 mg total) by mouth daily  Qty: 90 tablet, Refills: 1    Comments: Patient email: [email or web address]  Associated Diagnoses: History of CVA (cerebrovascular accident); Cerebrovascular accident (CVA) due to embolism of right middle cerebral artery (HCC)      Dulaglutide 0.75 MG/0.5ML SOAJ Inject 0.75 mg under the skin once a week  Qty: 6 mL, Refills: 0    Associated Diagnoses: Uncontrolled type 2 diabetes  mellitus with hyperglycemia (HCC)      ergocalciferol (VITAMIN D2) 50,000 units Take 1 capsule (50,000 Units total) by mouth once a week  Qty: 12 capsule, Refills: 1    Associated Diagnoses: Vitamin D deficiency      hydrALAZINE (APRESOLINE) 50 mg tablet Take 1 tablet (50 mg total) by mouth 2 (two) times a day  Qty: 180 tablet, Refills: 1    Comments: Patient email: [email or web address]  Associated Diagnoses: Essential hypertension      !! Insulin Glargine Solostar (Basaglar KwikPen) 100 UNIT/ML SOPN Inject 0.28 mL (28 Units total) under the skin daily  Qty: 15 mL, Refills: 5    Comments: Can use Basaglar, let us know if not covered  Associated Diagnoses: Type 2 diabetes mellitus with other circulatory complication, with long-term current use of insulin (HCC); Uncontrolled type 2 diabetes mellitus with hyperglycemia (HCC)      metoprolol succinate (TOPROL-XL) 50 mg 24 hr tablet Take 1 tablet (50 mg total) by mouth daily  Qty: 90 tablet, Refills: 1    Comments: Patient email: [email or web address]  Associated Diagnoses: Essential hypertension; Stage 4 chronic kidney disease (HCC)      sodium bicarbonate 650 mg tablet Take 1 tablet (650 mg total) by mouth 2 (two) times a day  Qty: 180 tablet, Refills: 2    Associated Diagnoses: Stage 5 chronic kidney disease not on chronic dialysis (HCC); Metabolic acidosis      !! Insulin Glargine Solostar (Basaglar KwikPen) 100 UNIT/ML SOPN Inject 0.28 mL (28 Units total) under the skin in the morning  Qty: 15 mL, Refills: 2    Associated Diagnoses: Type 2 diabetes mellitus with other circulatory complication, with long-term current use of insulin (HCC)       !! - Potential duplicate medications found. Please discuss with provider.        No discharge procedures on file.  ED SEPSIS DOCUMENTATION   Time reflects when diagnosis was documented in both MDM as applicable and the Disposition within this note       Time User Action Codes Description Comment    4/21/2025  2:46 AM Delgado  Al Bee [M25.472] Left ankle swelling     4/21/2025  2:46 AM Al Natarajan [L03.90] Cellulitis     4/21/2025  2:46 AM Al Natarajan [T14.8XXA,  L08.9] Infected wound     4/21/2025  2:46 AM Al Natarajan [E83.51] Hypocalcemia     4/21/2025  2:46 AM Al Natarajan [N17.9] GATITO (acute kidney injury) (HCC)     4/21/2025  3:18 AM Karla Macias [N17.9,  N18.5] Acute kidney injury superimposed on stage 5 chronic kidney disease, not on chronic dialysis ([facility])                  Al Natarajan PA-C  04/21/25 0712

## 2025-04-21 NOTE — ASSESSMENT & PLAN NOTE
Lab Results   Component Value Date    EGFR 9 04/21/2025    EGFR 10 04/02/2025    EGFR 9 04/01/2025    CREATININE 6.26 (H) 04/21/2025    CREATININE 5.85 (H) 04/02/2025    CREATININE 6.02 (H) 04/01/2025   BP systolic greater than 200 on admission  Spouse reports noncompliance with antihypertensive home regimen  Continue with home regimen  Hydralazine as needed for systolic greater than 170 and diastolic greater than 100  Monitor BP per unit protocol when needed

## 2025-04-21 NOTE — ASSESSMENT & PLAN NOTE
Patient has right leg swelling and complaining of pain there.  Will be seen by podiatrist for any infection

## 2025-04-21 NOTE — CONSULTS
Consultation - Nephrology   Adam Gonzalez 50 y.o. male MRN: [id number]  Unit/Bed#: -01 Encounter: [id number]    Referring PHYSICIAN: Karla Macias     REASON FOR THE CONSULTATION: CKD stage V    DATE OF CONSULTATION: April 21, 2025    ADMISSION DIAGNOSIS: Swelling of left foot     PLAN / RECOMMENDATIONS      Assessment & Plan  Acute kidney injury superimposed on stage 5 chronic kidney disease, not on chronic dialysis (HCC)  Lab Results   Component Value Date    EGFR 9 04/21/2025    EGFR 9 04/21/2025    EGFR 10 04/02/2025    CREATININE 6.21 (H) 04/21/2025    CREATININE 6.26 (H) 04/21/2025    CREATININE 5.85 (H) 04/02/2025   Patient kidney function is overall stable with some fluctuation.  Does have stage IV CKD and has AV fistula and will go inside hemodialysis which required    At this point I do not see any need for urgent dialysis we will continue to monitor with you  Swelling of left foot  Patient has right leg swelling and complaining of pain there.  Will be seen by podiatrist for any infection   Diabetic polyneuropathy associated with type 2 diabetes mellitus (HCC)  Lab Results   Component Value Date    HGBA1C 7.2 (A) 02/03/2025     Stable  Recent Labs     04/21/25  0730 04/21/25  1114   POCGLU 130 144*       Blood Sugar Average: Last 72 hrs:  (P) 137    Hypertensive CKD (chronic kidney disease)  Lab Results   Component Value Date    EGFR 9 04/21/2025    EGFR 9 04/21/2025    EGFR 10 04/02/2025    CREATININE 6.21 (H) 04/21/2025    CREATININE 6.26 (H) 04/21/2025    CREATININE 5.85 (H) 04/02/2025   Reasonably well-controlled  Hypocalcemia due to chronic kidney disease    Patient has a secondary hyperparathyroidism.  On calcitriol.  Compliance discussed with the patient.  Will monitor closely.  Will also check vitamin D level    Thank you for the consultation to participate in patient's care. I have personally discussed my plan with the referring physician  CHIEF COMPLAINT     Admitted with a leg swelling with  possible cellulitis    HPI     Patient with stage V CKD being monitored by Dr. Benedict as outpatient    Patient has AV fistula placed and will go on dialysis when required    Came to the hospital with worsening leg swelling    Patient was supposed to see a podiatrist was put on hold for follow-up    Came with the pain and swelling    No fever no chills    Other than pain no other acute complaint    No chest pain no palpitation    No nausea no vomiting no anorexia    PAST MEDICAL HISTORY     Past Medical History:   Diagnosis Date    Chronic kidney disease     Diabetes mellitus (HCC)     Hypertension     Received intravenous tissue plasminogen activator (tPA) in emergency department 02/01/2022    Stroke (HCC)     no deficits       PAST SURGICAL HISTORY     Past Surgical History:   Procedure Laterality Date    CARDIAC LOOP RECORDER  2023    PILONIDAL CYST EXCISION      TX ARTERIOVENOUS ANASTOMOSIS OPEN DIRECT Left 4/10/2024    Procedure: CREATION FISTULA BRACHIOCEPHALIC LEFT (AV);  Surgeon: Maxwell Nielsen MD;  Location: [facility];  Service: Vascular    TOE AMPUTATION Left 4/2/2025    Procedure: AMPUTATION TOE;  Surgeon: Adriane Anna DPM;  Location: [facility];  Service: Podiatry       ALLERGIES     No Known Allergies    SOCIAL HISTORY     Social History     Substance and Sexual Activity   Alcohol Use Never    Comment: stopped 8/22     Social History     Substance and Sexual Activity   Drug Use Never     Social History     Tobacco Use   Smoking Status Every Day    Current packs/day: 0.50    Average packs/day: 1 pack/day for 37.8 years (36.7 ttl pk-yrs)    Types: Cigarettes    Start date: 6/16/1987    Passive exposure: Current   Smokeless Tobacco Never   Tobacco Comments    states slowing it down, used Chantix-currently 1/2 pack/day, at least 35 pack years as of 2023       FAMILY HISTORY     Family History   Problem Relation Age of Onset    No Known Problems Mother     No Known Problems Father      Pancreatic cancer Brother        CURRENT MEDICATIONS       Current Facility-Administered Medications:     acetaminophen (TYLENOL) tablet 975 mg, 975 mg, Oral, Q6H PRN, YASMIN Garrett    aluminum-magnesium hydroxide-simethicone (MAALOX) oral suspension 30 mL, 30 mL, Oral, Q6H PRN, YASMIN Garrett    amLODIPine (NORVASC) tablet 10 mg, 10 mg, Oral, Daily, YASMIN Garrett, 10 mg at 04/21/25 0859    atorvastatin (LIPITOR) tablet 80 mg, 80 mg, Oral, Daily With Dinner, YASMIN Garrett    calcitriol (ROCALTROL) capsule 0.5 mcg, 0.5 mcg, Oral, Once per day on Monday Wednesday Friday, YASMIN Garrett, 0.5 mcg at 04/21/25 0859    clopidogrel (PLAVIX) tablet 75 mg, 75 mg, Oral, Daily, YASMIN Garrett, 75 mg at 04/21/25 0859    heparin (porcine) subcutaneous injection 5,000 Units, 5,000 Units, Subcutaneous, Q8H HAYLEE, 5,000 Units at 04/21/25 0859 **AND** [CANCELED] Platelet count, , , Once, YASMIN Garrett    hydrALAZINE (APRESOLINE) injection 10 mg, 10 mg, Intravenous, Q6H PRN, AYSMIN Garrett    hydrALAZINE (APRESOLINE) tablet 50 mg, 50 mg, Oral, BID, YASMIN Garrett, 50 mg at 04/21/25 0859    insulin glargine (LANTUS) subcutaneous injection 28 Units 0.28 mL, 28 Units, Subcutaneous, Q12H HAYLEE, YASMIN Garrett, 28 Units at 04/21/25 0852    insulin lispro (HumALOG/ADMELOG) 100 units/mL subcutaneous injection 1-5 Units, 1-5 Units, Subcutaneous, TID AC **AND** Fingerstick Glucose (POCT), , , TID AC, YASMIN Garrett    insulin lispro (HumALOG/ADMELOG) 100 units/mL subcutaneous injection 1-5 Units, 1-5 Units, Subcutaneous, HS, YASMIN Garrett    metoprolol succinate (TOPROL-XL) 24 hr tablet 50 mg, 50 mg, Oral, Daily, YASMIN Garrett, 50 mg at 04/21/25 0859    nicotine (NICODERM CQ) 14 mg/24hr TD 24 hr patch 1 patch, 1 patch, Transdermal, Daily, YASMIN Garrett, 1 patch at 04/21/25 0403    polyethylene glycol (MIRALAX) packet 17 g, 17 g, Oral, Daily PRN, YASMIN Garrett    sodium bicarbonate  tablet 650 mg, 650 mg, Oral, BID, YASMIN Garrett, 650 mg at 04/21/25 0859    trimethobenzamide (TIGAN) IM injection 200 mg, 200 mg, Intramuscular, Q6H PRN, YASMIN Garrett    REVIEW OF SYSTEMS     Review of Systems   Constitutional:  Negative for fatigue.   HENT:  Negative for congestion.    Eyes:  Negative for photophobia and pain.   Respiratory:  Negative for chest tightness and shortness of breath.    Cardiovascular:  Positive for leg swelling. Negative for chest pain and palpitations.   Gastrointestinal:  Negative for abdominal distention, abdominal pain and blood in stool.   Endocrine: Negative for polydipsia.   Genitourinary:  Negative for difficulty urinating, dysuria, flank pain, hematuria and urgency.   Musculoskeletal:  Negative for arthralgias and back pain.   Skin:  Negative for rash.   Neurological:  Negative for dizziness, light-headedness and headaches.   Hematological:  Does not bruise/bleed easily.   Psychiatric/Behavioral:  Negative for behavioral problems. The patient is not nervous/anxious.        LAB RESULTS        Results from last 7 days   Lab Units 04/21/25  0434 04/21/25  0129   WBC Thousand/uL 9.88 8.76   HEMOGLOBIN g/dL 11.4* 11.5*   HEMATOCRIT % 37.2 37.0   PLATELETS Thousands/uL 244 260   POTASSIUM mmol/L 4.1 3.9   CHLORIDE mmol/L 111* 108   CO2 mmol/L 17* 18*   BUN mg/dL 64* 67*   CREATININE mg/dL 6.21* 6.26*   EGFR ml/min/1.73sq m 9 9   CALCIUM mg/dL 5.9* 5.9*   MAGNESIUM mg/dL 1.1*  --    PHOSPHORUS mg/dL 4.5  --        I have personally reviewed the old medical records and patient's previously known baseline creatinine level is ~6.2    RADIOLOGY RESULTS     Results for orders placed during the hospital encounter of 09/27/21    XR chest 1 view portable    Narrative  CHEST    INDICATION:   stroke alert protocol.  Smoker.    COMPARISON:  4/22/2021    EXAM PERFORMED/VIEWS:  XR CHEST PORTABLE      FINDINGS:    Cardiomediastinal silhouette appears unremarkable.    The lungs are  clear.  No pneumothorax or pleural effusion.    Osseous structures appear within normal limits for patient age.    Impression  No acute cardiopulmonary disease.            Workstation performed: [id number]    Results for orders placed during the hospital encounter of 04/22/21    XR chest pa & lateral    Narrative  CHEST    INDICATION:   R60.0: Localized edema.    COMPARISON:  February 22, 2019    EXAM PERFORMED/VIEWS:  XR CHEST PA & LATERAL  The frontal view was performed utilizing dual energy radiographic technique.  Images: 4    FINDINGS:  Lungs are well-aerated.    Cardiomediastinal silhouette appears unremarkable.    The lungs are clear.  No pneumothorax or pleural effusion.    Osseous structures appear within normal limits for patient age.    Impression  No acute cardiopulmonary disease.            Workstation performed: [id number]    No results found for this or any previous visit.    No results found for this or any previous visit.    No results found for this or any previous visit.    No results found for this or any previous visit.      OBJECTIVE     Current Weight: Weight - Scale: 87.9 kg (193 lb 12.6 oz)  Vitals:    04/21/25 1105   BP: 135/71   Pulse: 87   Resp:    Temp: 98.1 °F (36.7 °C)   SpO2: 98%       Intake/Output Summary (Last 24 hours) at 4/21/2025 1159  Last data filed at 4/21/2025 0900  Gross per 24 hour   Intake 230 ml   Output --   Net 230 ml       PHYSICAL EXAMINATION     Physical Exam  Constitutional:       General: He is not in acute distress.     Appearance: He is well-developed.   HENT:      Head: Normocephalic.      Mouth/Throat:      Mouth: Mucous membranes are moist.   Eyes:      General: No scleral icterus.     Conjunctiva/sclera: Conjunctivae normal.   Neck:      Vascular: No JVD.   Cardiovascular:      Rate and Rhythm: Normal rate.      Heart sounds: Normal heart sounds.   Pulmonary:      Effort: Pulmonary effort is normal.      Breath sounds: No wheezing.   Abdominal:       "Palpations: Abdomen is soft.      Tenderness: There is no abdominal tenderness.   Musculoskeletal:         General: Normal range of motion.      Cervical back: Neck supple.   Skin:     General: Skin is warm.      Findings: No rash.   Neurological:      Mental Status: He is alert and oriented to person, place, and time.   Psychiatric:         Behavior: Behavior normal.          .     Devendra Brand MD  Nephrology  4/21/2025        Portions of the record may have been created with voice recognition software. Occasional wrong word or \"sound a like\" substitutions may have occurred due to the inherent limitations of voice recognition software. Read the chart carefully and recognize, using context, where substitutions have occurred.  "

## 2025-04-21 NOTE — WOUND OSTOMY CARE
Consult Note - Wound   Adam Gonzalez 50 y.o. male MRN: [id number]  Unit/Bed#: -01 Encounter: [id number]        Assessment :   Patient admitted to [facility] due to swelling of foot. History of CKD, diabetes, HTN, stroke. Wound care nurse consulted for wound. Patient is agreeable to assessment, alert and oriented x4, continent of bowel and bladder, turns self independently for offloading/repositioning, ambulates independently.     1. Patient declined assessment of sacrum and buttock-states there are no wounds or skin changes present.     2. Left lateral foot surgical site- Left 5th toe amputation site, surgery per chart review was 4/2. Podiatry consult is pending. Wound is oval in shape, 100% well adhered brown eschar, no drainage noted. Orin-wound is dry, intact, calloused, no warmth, no swelling.     No induration, fluctuance, odor, warmth, redness, or purulence noted to the above noted wound. Patient tolerated well, denies pain to the wound.See flow sheets for more detailed assessment findings. Will follow along.    Skin care plans:  1-Hydraguard/Silicone Cream to bilateral sacrum, buttock, and heels BID and PRN  2-Elevate heels to offload pressure.  3-Ehob cushion in chair when out of bed.  4-Moisturize skin daily with skin nourishing cream.  5-Left foot surgical site (site of 5th toe amputation)- paint wound with betadine daily, may keep open to air or cover with dry dressing.     Wound 04/02/25 Surgical Closed Surgical Incision Toe D5, fifth Anterior;Left (Active)   Wound Image   04/21/25 1126   Wound Description Dry;Brown;Eschar 04/21/25 1126   Non-staged Wound Description Not applicable 04/21/25 1126   Wound Length (cm) 2 cm 04/21/25 1126   Wound Width (cm) 0.8 cm 04/21/25 1126   Wound Depth (cm) 0 cm 04/21/25 1126   Wound Surface Area (cm^2) 1.6 cm^2 04/21/25 1126   Wound Volume (cm^3) 0 cm^3 04/21/25 1126   Calculated Wound Volume (cm^3) 0 cm^3 04/21/25 1126   Drainage Amount None 04/21/25 1126   Orin-wound  Assessment Dry;Intact;Callus 04/21/25 1126   Treatments Cleansed;Site care 04/21/25 1126   Dressing Other (Comment) 04/21/25 1126   Wound packed? No 04/21/25 1126   Dressing Changed New 04/21/25 1126   Patient Tolerance Tolerated well 04/21/25 1126     Contact through [employer] Secure Chat with any questions  Wound Care will continue to follow while inpatient    Eli ROJON RN CWON  Wound and Ostomy care

## 2025-04-21 NOTE — PLAN OF CARE
Problem: PAIN - ADULT  Goal: Verbalizes/displays adequate comfort level or baseline comfort level  Description: Interventions:- Encourage patient to monitor pain and request assistance- Assess pain using appropriate pain scale- Administer analgesics based on type and severity of pain and evaluate response- Implement non-pharmacological measures as appropriate and evaluate response- Consider cultural and social influences on pain and pain management- Notify physician/advanced practitioner if interventions unsuccessful or patient reports new pain  Outcome: Progressing     Problem: INFECTION - ADULT  Goal: Absence or prevention of progression during hospitalization  Description: INTERVENTIONS:- Assess and monitor for signs and symptoms of infection- Monitor lab/diagnostic results- Monitor all insertion sites, i.e. indwelling lines, tubes, and drains- Monitor endotracheal if appropriate and nasal secretions for changes in amount and color- Celina appropriate cooling/warming therapies per order- Administer medications as ordered- Instruct and encourage patient and family to use good hand hygiene technique- Identify and instruct in appropriate isolation precautions for identified infection/condition  Outcome: Progressing     Problem: SAFETY ADULT  Goal: Patient will remain free of falls  Description: INTERVENTIONS:- Educate patient/family on patient safety including physical limitations- Instruct patient to call for assistance with activity - Consult OT/PT to assist with strengthening/mobility - Keep Call bell within reach- Keep bed low and locked with side rails adjusted as appropriate- Keep care items and personal belongings within reach- Initiate and maintain comfort rounds- Make Fall Risk Sign visible to staff-   Outcome: Progressing  Goal: Maintain or return to baseline ADL function  Description: INTERVENTIONS:-  Assess patient's ability to carry out ADLs; assess patient's baseline for ADL function and identify  physical deficits which impact ability to perform ADLs (bathing, care of mouth/teeth, toileting, grooming, dressing, etc.)- Assess/evaluate cause of self-care deficits - Assess range of motion- Assess patient's mobility; develop plan if impaired- Assess patient's need for assistive devices and provide as appropriate- Encourage maximum independence but intervene and supervise when necessary- Involve family in performance of ADLs- Assess for home care needs following discharge - Consider OT consult to assist with ADL evaluation and planning for discharge- Provide patient education as appropriate  Outcome: Progressing  Goal: Maintains/Returns to pre admission functional level  Description: INTERVENTIONS:- Perform AM-PAC 6 Click Basic Mobility/ Daily Activity assessment daily.- Set and communicate daily mobility goal to care team and patient/family/caregiver. - Collaborate with rehabilitation services on mobility goals if consulted-  Outcome: Progressing     Problem: DISCHARGE PLANNING  Goal: Discharge to home or other facility with appropriate resources  Description: INTERVENTIONS:- Identify barriers to discharge w/patient and caregiver- Arrange for needed discharge resources and transportation as appropriate- Identify discharge learning needs (meds, wound care, etc.)- Arrange for interpretive services to assist at discharge as needed- Refer to Case Management Department for coordinating discharge planning if the patient needs post-hospital services based on physician/advanced practitioner order or complex needs related to functional status, cognitive ability, or social support system  Outcome: Progressing     Problem: Knowledge Deficit  Goal: Patient/family/caregiver demonstrates understanding of disease process, treatment plan, medications, and discharge instructions  Description: Complete learning assessment and assess knowledge base.Interventions:- Provide teaching at level of understanding- Provide teaching via  preferred learning methods  Outcome: Progressing

## 2025-04-21 NOTE — ASSESSMENT & PLAN NOTE
Lab Results   Component Value Date    HGBA1C 7.2 (A) 02/03/2025     Stable  Recent Labs     04/21/25  0730 04/21/25  1114   POCGLU 130 144*       Blood Sugar Average: Last 72 hrs:  (P) 137

## 2025-04-22 VITALS
BODY MASS INDEX: 27.74 KG/M2 | HEART RATE: 83 BPM | HEIGHT: 70 IN | RESPIRATION RATE: 18 BRPM | TEMPERATURE: 97.9 F | WEIGHT: 193.78 LBS | SYSTOLIC BLOOD PRESSURE: 144 MMHG | DIASTOLIC BLOOD PRESSURE: 72 MMHG | OXYGEN SATURATION: 100 %

## 2025-04-22 LAB
25(OH)D3 SERPL-MCNC: 8.6 NG/ML (ref 30–100)
ANION GAP SERPL CALCULATED.3IONS-SCNC: 11 MMOL/L (ref 4–13)
BACTERIA UR QL AUTO: NORMAL /HPF
BASOPHILS # BLD AUTO: 0.04 THOUSANDS/ÂΜL (ref 0–0.1)
BASOPHILS NFR BLD AUTO: 0 % (ref 0–1)
BILIRUB UR QL STRIP: NEGATIVE
BUN SERPL-MCNC: 65 MG/DL (ref 5–25)
CALCIUM SERPL-MCNC: 6.7 MG/DL (ref 8.4–10.2)
CHLORIDE SERPL-SCNC: 111 MMOL/L (ref 96–108)
CLARITY UR: CLEAR
CO2 SERPL-SCNC: 16 MMOL/L (ref 21–32)
COLOR UR: COLORLESS
CREAT SERPL-MCNC: 6.06 MG/DL (ref 0.6–1.3)
EOSINOPHIL # BLD AUTO: 0.32 THOUSAND/ÂΜL (ref 0–0.61)
EOSINOPHIL NFR BLD AUTO: 3 % (ref 0–6)
ERYTHROCYTE [DISTWIDTH] IN BLOOD BY AUTOMATED COUNT: 14.3 % (ref 11.6–15.1)
GFR SERPL CREATININE-BSD FRML MDRD: 9 ML/MIN/1.73SQ M
GLUCOSE SERPL-MCNC: 178 MG/DL (ref 65–140)
GLUCOSE SERPL-MCNC: 74 MG/DL (ref 65–140)
GLUCOSE SERPL-MCNC: 95 MG/DL (ref 65–140)
GLUCOSE UR STRIP-MCNC: ABNORMAL MG/DL
HCT VFR BLD AUTO: 33.7 % (ref 36.5–49.3)
HGB BLD-MCNC: 10.7 G/DL (ref 12–17)
HGB UR QL STRIP.AUTO: ABNORMAL
IMM GRANULOCYTES # BLD AUTO: 0.04 THOUSAND/UL (ref 0–0.2)
IMM GRANULOCYTES NFR BLD AUTO: 0 % (ref 0–2)
KETONES UR STRIP-MCNC: NEGATIVE MG/DL
LEUKOCYTE ESTERASE UR QL STRIP: NEGATIVE
LYMPHOCYTES # BLD AUTO: 1.1 THOUSANDS/ÂΜL (ref 0.6–4.47)
LYMPHOCYTES NFR BLD AUTO: 11 % (ref 14–44)
MCH RBC QN AUTO: 30.7 PG (ref 26.8–34.3)
MCHC RBC AUTO-ENTMCNC: 31.8 G/DL (ref 31.4–37.4)
MCV RBC AUTO: 97 FL (ref 82–98)
MONOCYTES # BLD AUTO: 0.83 THOUSAND/ÂΜL (ref 0.17–1.22)
MONOCYTES NFR BLD AUTO: 8 % (ref 4–12)
NEUTROPHILS # BLD AUTO: 7.75 THOUSANDS/ÂΜL (ref 1.85–7.62)
NEUTS SEG NFR BLD AUTO: 78 % (ref 43–75)
NITRITE UR QL STRIP: NEGATIVE
NON-SQ EPI CELLS URNS QL MICRO: NORMAL /HPF
NRBC BLD AUTO-RTO: 0 /100 WBCS
PH UR STRIP.AUTO: 6 [PH]
PHOSPHATE SERPL-MCNC: 5.3 MG/DL (ref 2.7–4.5)
PLATELET # BLD AUTO: 232 THOUSANDS/UL (ref 149–390)
PMV BLD AUTO: 9.9 FL (ref 8.9–12.7)
POTASSIUM SERPL-SCNC: 4.3 MMOL/L (ref 3.5–5.3)
PROT UR STRIP-MCNC: ABNORMAL MG/DL
PTH-INTACT SERPL-MCNC: 647.9 PG/ML (ref 12–88)
RBC # BLD AUTO: 3.49 MILLION/UL (ref 3.88–5.62)
RBC #/AREA URNS AUTO: NORMAL /HPF
SODIUM SERPL-SCNC: 138 MMOL/L (ref 135–147)
SP GR UR STRIP.AUTO: 1.01 (ref 1–1.03)
UROBILINOGEN UR STRIP-ACNC: <2 MG/DL
WBC # BLD AUTO: 10.08 THOUSAND/UL (ref 4.31–10.16)
WBC #/AREA URNS AUTO: NORMAL /HPF

## 2025-04-22 PROCEDURE — 99239 HOSP IP/OBS DSCHRG MGMT >30: CPT | Performed by: FAMILY MEDICINE

## 2025-04-22 PROCEDURE — 99232 SBSQ HOSP IP/OBS MODERATE 35: CPT | Performed by: INTERNAL MEDICINE

## 2025-04-22 PROCEDURE — 85025 COMPLETE CBC W/AUTO DIFF WBC: CPT | Performed by: INTERNAL MEDICINE

## 2025-04-22 PROCEDURE — 83970 ASSAY OF PARATHORMONE: CPT | Performed by: INTERNAL MEDICINE

## 2025-04-22 PROCEDURE — 82948 REAGENT STRIP/BLOOD GLUCOSE: CPT

## 2025-04-22 PROCEDURE — 81001 URINALYSIS AUTO W/SCOPE: CPT | Performed by: INTERNAL MEDICINE

## 2025-04-22 PROCEDURE — 80048 BASIC METABOLIC PNL TOTAL CA: CPT | Performed by: INTERNAL MEDICINE

## 2025-04-22 RX ORDER — SODIUM BICARBONATE 650 MG/1
1300 TABLET ORAL 2 TIMES DAILY
Status: DISCONTINUED | OUTPATIENT
Start: 2025-04-22 | End: 2025-04-22 | Stop reason: HOSPADM

## 2025-04-22 RX ORDER — DOXYCYCLINE 100 MG/1
100 TABLET ORAL 2 TIMES DAILY
Qty: 14 TABLET | Refills: 0 | Status: SHIPPED | OUTPATIENT
Start: 2025-04-22 | End: 2025-04-29

## 2025-04-22 RX ADMIN — METOPROLOL SUCCINATE 50 MG: 50 TABLET, EXTENDED RELEASE ORAL at 10:26

## 2025-04-22 RX ADMIN — HYDRALAZINE HYDROCHLORIDE 50 MG: 25 TABLET ORAL at 10:26

## 2025-04-22 RX ADMIN — AMLODIPINE BESYLATE 10 MG: 10 TABLET ORAL at 10:27

## 2025-04-22 RX ADMIN — NICOTINE 1 PATCH: 14 PATCH, EXTENDED RELEASE TRANSDERMAL at 10:30

## 2025-04-22 RX ADMIN — SODIUM BICARBONATE 1300 MG: 650 TABLET ORAL at 10:26

## 2025-04-22 RX ADMIN — CLOPIDOGREL 75 MG: 75 TABLET ORAL at 10:27

## 2025-04-22 RX ADMIN — HEPARIN SODIUM 5000 UNITS: 5000 INJECTION INTRAVENOUS; SUBCUTANEOUS at 05:24

## 2025-04-22 RX ADMIN — INSULIN GLARGINE 28 UNITS: 100 INJECTION, SOLUTION SUBCUTANEOUS at 10:30

## 2025-04-22 NOTE — ASSESSMENT & PLAN NOTE
Lab Results   Component Value Date    HGBA1C 7.2 (A) 02/03/2025       Recent Labs     04/21/25  1553 04/21/25  1839 04/21/25  2136 04/22/25  0819   POCGLU 207* 131 110 95       Blood Sugar Average: Last 72 hrs:  (P) 136.3255302114509010

## 2025-04-22 NOTE — CONSULTS
Consultation - Podiatry   Name: Adam Gonzalez 50 y.o. male I MRN: [id number]  Unit/Bed#: -01 I Date of Admission: 4/21/2025   Date of Service: 4/22/2025 I Hospital Day: 1  Inpatient consult to Podiatry  Consult performed by: Adriane Anna DPM  Consult ordered by: YASMIN Garrett        Physician Requesting Evaluation: Bal Benedict MD   Reason for Evaluation / Principal Problem: Open wound to the left foot fifth digit amputation site     Assessment & Plan  Swelling of left foot  - Patient is noncompliant with his healthcare  - Patient attended one followup appointment after surgery and no-showed every office visit after  - Sutures are removed bedside today, patient tolerated well all VS stable and NVS intact to pre-exam status   - The incision site is fully dehisced due to patient weightbearing to the left foot   - The wound is dressed with xeroform and DSD  - Patient will need close followup upon discharge and wound center attendence  - Discussed need for tight sugar control  - Discussed wound care  - Discussed signs and symptoms of infection, patient is to call with these and seek immediate medical treatment  - Patient is at high risk for loss of limb, sepsis, loss of life due to noncompliance with followup   - Will follow while in house  Diabetic polyneuropathy associated with type 2 diabetes mellitus (HCC)  Lab Results   Component Value Date    HGBA1C 7.2 (A) 02/03/2025       Recent Labs     04/21/25  1553 04/21/25  1839 04/21/25  2136 04/22/25  0819   POCGLU 207* 131 110 95       Blood Sugar Average: Last 72 hrs:  (P) 136.2906219755673719    Acute kidney injury superimposed on stage 5 chronic kidney disease, not on chronic dialysis (HCC)  Lab Results   Component Value Date    EGFR 9 04/22/2025    EGFR 9 04/21/2025    EGFR 9 04/21/2025    CREATININE 6.06 (H) 04/22/2025    CREATININE 6.21 (H) 04/21/2025    CREATININE 6.26 (H) 04/21/2025     Hypertensive CKD (chronic kidney disease)  Lab Results  "  Component Value Date    EGFR 9 04/22/2025    EGFR 9 04/21/2025    EGFR 9 04/21/2025    CREATININE 6.06 (H) 04/22/2025    CREATININE 6.21 (H) 04/21/2025    CREATININE 6.26 (H) 04/21/2025     Hypocalcemia due to chronic kidney disease        History of Present Illness   Adam Gonzalez is a 50 y.o. male who presents with open wound to the left foot fifth digit.  Patient only attended one post op F/U.  Patient states that he \"completely forgot to come back:\"  He states that he thought that the \"sutures just dissolved on their own.\"  He states that he came to the hospital due to swelling and redness to the left foot.    Review of Systems   Constitutional:  Negative for activity change.   HENT:  Negative for congestion.    Eyes:  Negative for discharge.   Respiratory:  Negative for apnea.    Cardiovascular:  Negative for chest pain.   Gastrointestinal:  Negative for abdominal distention.   Endocrine: Negative for cold intolerance.   Genitourinary:  Negative for difficulty urinating.   Musculoskeletal:  Negative for arthralgias.   Skin:  Positive for wound.   Allergic/Immunologic: Negative for environmental allergies.   Neurological:  Negative for dizziness.   Hematological:  Negative for adenopathy.   Psychiatric/Behavioral:  Negative for agitation.      Medical History Review: I have reviewed the patient's PMH, PSH, Social History, Family History, Meds, and Allergies     Objective :  Temp:  [97.9 °F (36.6 °C)-98.3 °F (36.8 °C)] 97.9 °F (36.6 °C)  HR:  [83-90] 84  BP: (113-147)/(59-86) 113/65  Resp:  [18-20] 18  SpO2:  [96 %-100 %] 96 %  O2 Device: None (Room air)    Physical Exam  Cardiovascular:      Pulses:           Dorsalis pedis pulses are 2+ on the left side.        Posterior tibial pulses are 2+ on the left side.   Feet:      Left foot:      Skin integrity: Ulcer, skin breakdown, warmth and dry skin present.      Toenail Condition: Left toenails are abnormally thick and long. Fungal disease present.       No POP, " sensation is absent, no babinski  Open wound to the left foot after sutures are removed  3 cm x 2 cm x 0.8 cm  100% slough tissue  No purulence, no fluctuence, no tracking     Lab Results: I have reviewed the following results:CBC/BMP:   .     04/22/25  0503   WBC 10.08   HGB 10.7*   HCT 33.7*      SODIUM 138   K 4.3   *   CO2 16*   BUN 65*   CREATININE 6.06*   GLUC 74      XR FOOT 3+ VW LEFT     INDICATION: foot swelling. Amputation of the fifth toe about 3 weeks ago. Swelling at the ankle     COMPARISON: 4/2/2025     FINDINGS:     No acute fracture or dislocation.  Fifth toe absent. Fifth metatarsal head appears satisfactory  No significant degenerative changes.     No lytic or blastic osseous lesion.     There is mild swelling of the foot and ankle. Atherosclerotic calcifications are noted. No soft tissue gas. No visible osteomyelitis     IMPRESSION:     Mild swelling. Atherosclerosis.     Media Information      Document Information    Clinical Image - Mobile Device      04/21/2025 01:12   Attached To:   Hospital Encounter on 4/21/25   Source Information    Al Natarajan PA-C  [facility]   Document History               Media Information      Document Information    Clinical Image - Mobile Device      04/21/2025 03:07   Attached To:   Hospital Encounter on 4/21/25   Source Information    YASMIN Garrett  [facility]   Document History         Media Information      Document Information    Clinical Image - Mobile Device   Wound 04/02/25 Surgical Closed Surgical Incision Toe D5, fifth Anterior;Left   04/21/2025 11:26   Attached To:   Hospital Encounter on 4/21/25   Source Information    Eli Torres RN  [facility]   Document History

## 2025-04-22 NOTE — ASSESSMENT & PLAN NOTE
Lab Results   Component Value Date    EGFR 9 04/22/2025    EGFR 9 04/21/2025    EGFR 9 04/21/2025    CREATININE 6.06 (H) 04/22/2025    CREATININE 6.21 (H) 04/21/2025    CREATININE 6.26 (H) 04/21/2025

## 2025-04-22 NOTE — ASSESSMENT & PLAN NOTE
Lab Results   Component Value Date    EGFR 9 04/22/2025    EGFR 9 04/21/2025    EGFR 9 04/21/2025    CREATININE 6.06 (H) 04/22/2025    CREATININE 6.21 (H) 04/21/2025    CREATININE 6.26 (H) 04/21/2025   Creatinine has been rising since last admission, per chart review last creatinine on admission 6.48, creatinine prior to this admission on 4/2/2025 5.85, on admission 6.28  Progressively worsening probably due to diabetic neuropathy  Nephrology consultation appreciated.  Discussed with them okay for discharge on home medications.  Noncompliance  Follows with Dr. YOCASTA Benedict for management as outpatient  Nephrology consult in place

## 2025-04-22 NOTE — ASSESSMENT & PLAN NOTE
Lab Results   Component Value Date    HGBA1C 7.2 (A) 02/03/2025     Stable  Recent Labs     04/21/25  1839 04/21/25  2136 04/22/25  0819 04/22/25  1134   POCGLU 131 110 95 178*       Blood Sugar Average: Last 72 hrs:  (P) 142.6878489243735227

## 2025-04-22 NOTE — UTILIZATION REVIEW
NOTIFICATION OF INPATIENT ADMISSION   AUTHORIZATION REQUEST   SERVICING FACILITY:   [place] [address]  Tax ID: [id number]  NPI: [id number] ATTENDING PROVIDER:  Attending Name and NPI#: Bal Benedict Md [[id number]]  Address: [address] [place] [address]  Phone: [phone]     ADMISSION INFORMATION:  Place of Service: Inpatient [facility]  Place of Service Code: 21  Inpatient Admission Date/Time: 4/21/25  2:47 AM  Discharge Date/Time: No discharge date for patient encounter.  Admitting Diagnosis Code/Description:  Hypocalcemia [E83.51]  Infected wound [T14.8XXA, L08.9]  Cellulitis [L03.90]  Foot swelling [M79.89]  Left ankle swelling [M25.472]  GATITO (acute kidney injury) (HCC) [N17.9]  Acute kidney injury superimposed on stage 5 chronic kidney disease, not on chronic dialysis (HCC) [N17.9, N18.5]     UTILIZATION REVIEW CONTACT:  Obdulia Cole, Utilization   Network Utilization Review Department  Phone: [phone]  Fax [phone]  Email: [email or web address]  Contact for approvals/pending authorizations, clinical reviews, and discharge.     PHYSICIAN ADVISORY SERVICES:  Medical Necessity Denial & Ojfr-xk-Kwxy Review  Phone: [phone]  Fax: [phone]  Email: [email or web address]     DISCHARGE SUPPORT TEAM:  For Patients Discharge Needs & Updates  Phone: [phone] opt. 2 Fax: [phone]  Email: [email or web address]

## 2025-04-22 NOTE — DISCHARGE SUMMARY
Discharge Summary - Hospitalist   Name: Adam Gonzalez 50 y.o. male I MRN: [id number]  Unit/Bed#: -01 I Date of Admission: 4/21/2025   Date of Service: 4/22/2025 I Hospital Day: 1     Assessment & Plan  Swelling of left foot  Adam Gonzalez present in the ER accopanied by family due to concer of foot swelling.  Patient was recently discharged on March 30, 2025 due to diabetic foot infection/acute osteomyelitis involving the fifth middle and distal phalanges, underwent fifth toe amputation and vascular surgery was consulted during that admission.  Per patient report he was supposed to follow-up with podiatrist as outpatient, unfortunately he has not seen podiatry since discharge, completed doxycycline course  Per spouse report at bedside, she has noted swelling in bilateral lower extremity, denies any drainage, pain or any other symptoms  X-ray of the left foot is pending  Discussed with podiatry.  They took the sutures out.  There is a small wound they recommend antibiotics for 1 week.  Dr. Anna stated from her standpoint patient can be discharged  Acute kidney injury superimposed on stage 5 chronic kidney disease, not on chronic dialysis (HCC)  Lab Results   Component Value Date    EGFR 9 04/22/2025    EGFR 9 04/21/2025    EGFR 9 04/21/2025    CREATININE 6.06 (H) 04/22/2025    CREATININE 6.21 (H) 04/21/2025    CREATININE 6.26 (H) 04/21/2025   Creatinine has been rising since last admission, per chart review last creatinine on admission 6.48, creatinine prior to this admission on 4/2/2025 5.85, on admission 6.28  Progressively worsening probably due to diabetic neuropathy  Nephrology consultation appreciated.  Discussed with them okay for discharge on home medications.  Noncompliance  Follows with Dr. YOCASTA Benedict for management as outpatient  Nephrology consult in place  Hypocalcemia due to chronic kidney disease  Could be secondary to chronic kidney disease stage V  Per nephrology outpatient follow-up  Hypertensive  CKD (chronic kidney disease)  Lab Results   Component Value Date    EGFR 9 04/22/2025    EGFR 9 04/21/2025    EGFR 9 04/21/2025    CREATININE 6.06 (H) 04/22/2025    CREATININE 6.21 (H) 04/21/2025    CREATININE 6.26 (H) 04/21/2025   BP systolic greater than 200 on admission  Spouse reports noncompliance with antihypertensive home regimen  Continue with home regimen  Stable  Diabetic polyneuropathy associated with type 2 diabetes mellitus (HCC)  Lab Results   Component Value Date    HGBA1C 7.2 (A) 02/03/2025       Recent Labs     04/21/25  1553 04/21/25  1839 04/21/25  2136 04/22/25  0819   POCGLU 207* 131 110 95       Blood Sugar Average: Last 72 hrs:  (P) 136.5068823762911025  Blood glucose on   Continue with Lantus 28 units twice daily  Continue home medications on discharge which includes the Trulicity     Medical Problems       Resolved Problems  Date Reviewed: 4/22/2025   None       Discharging Physician / Practitioner: Bal Benedict MD  PCP: Evans Birmingham MD  Admission Date:   Admission Orders (From admission, onward)       Ordered        04/21/25 0247  INPATIENT ADMISSION  Once                          Discharge Date: 04/22/25    Consultations During Hospital Stay:  Nephrology  Podiatry    Procedures Performed:   Suture removal  Mild swelling in the left foot    Significant Findings / Test Results:   None    Incidental Findings:   None      Test Results Pending at Discharge (will require follow up):   None     Outpatient Tests Requested:  Follow-up with blood work including a BMP per nephrology recommendations    Complications: None    Reason for Admission: Foot swelling    Hospital Course:   Adam Gonzalez is a 50 y.o. male patient who originally presented to the hospital on 4/21/2025 due to foot swelling    History presenting illness:Adam Gonzalez is a 50 y.o. male with a PMH of chronic kidney disease stage V, diabetes type 2 on Lantus and Trulicity at home, hypertension, smoker who  "presents with swelling of the left foot.  Adam Olson is a 50-year-old male who recently underwent fifth toe amputation due to acute osteomyelitis, the patient finished doxycycline course and was discharged in in stable condition home.  Patient has returned to the ER due to a new swelling of the left lower extremity, the patient denies any pain, discharge or any other abnormality.  On assessment left lower extremity swelling is appreciated, no discoloration or any other abnormality noted on assessment.  The patient has been admitted as inpatient for further management and treatment.     Hospital course: Patient was admitted for above reasons.  The patient was seen in consultation by nephrology from their standpoint patient has a working fistula but is not yet requiring dialysis.  Creatinine on discharge was 6.02.  Okay from their standpoint to be discharged.  Patient with noncompliance with medications.  In regards to the patient's foot swelling with the recent surgery Dr. Anna saw the patient.  She removed some sutures in small little wound for which she recommended 7 days of oral antibiotics as an outpatient but okay from her standpoint to be discharged.  Patient is eager and anxious to be discharged and will be discharged today.          Please see above list of diagnoses and related plan for additional information.     Condition at Discharge: good    Discharge Day Visit / Exam:   Subjective: Patient seen and examined.  Doing okay  Vitals: Blood Pressure: 113/65 (04/22/25 0806)  Pulse: 84 (04/22/25 0806)  Temperature: 97.9 °F (36.6 °C) (04/22/25 0806)  Temp Source: Oral (04/21/25 1915)  Respirations: 18 (04/21/25 2231)  Height: 5' 10\" (177.8 cm) (04/21/25 1515)  Weight - Scale: 87.9 kg (193 lb 12.6 oz) (04/21/25 0427)  SpO2: 96 % (04/22/25 0806)  Physical Exam   General Appearance:    Alert, cooperative, no distress, appears stated age                               Lungs:     Clear to auscultation bilaterally, " respirations unlabored       Heart:    Regular rate and rhythm, S1 and S2 normal, no murmur, rub    or gallop   Abdomen:     Soft, non-tender, bowel sounds active all four quadrants,     no masses, no organomegaly           Extremities: Dressing on foot                   ;l    Discussion with Family:  Patient.     Discharge instructions/Information to patient and family:   See after visit summary for information provided to patient and family.      Provisions for Follow-Up Care:  See after visit summary for information related to follow-up care and any pertinent home health orders.      Mobility at time of Discharge:   Basic Mobility Inpatient Raw Score: 24  JH-HLM Goal: 8: Walk 250 feet or more  JH-HLM Achieved: 7: Walk 25 feet or more  HLM Goal NOT achieved. Continue to encourage mobility in post discharge setting.     Disposition:   Home    Planned Readmission: None anticipated    Discharge Medications:  See after visit summary for reconciled discharge medications provided to patient and/or family.      Administrative Statements   Discharge Statement:  I have spent a total time of 40 minutes in caring for this patient on the day of the visit/encounter. >30 minutes of time was spent on: Diagnostic results, Risks and benefits of tx options, Instructions for management, Patient and family education, Importance of tx compliance, Risk factor reductions, Impressions, Counseling / Coordination of care, Documenting in the medical record, Reviewing / ordering tests, medicine, procedures  , and Communicating with other healthcare professionals .    **Please Note: This note may have been constructed using a voice recognition system**

## 2025-04-22 NOTE — ASSESSMENT & PLAN NOTE
Adam Gonzalez present in the ER accopanied by family due to concer of foot swelling.  Patient was recently discharged on March 30, 2025 due to diabetic foot infection/acute osteomyelitis involving the fifth middle and distal phalanges, underwent fifth toe amputation and vascular surgery was consulted during that admission.  Per patient report he was supposed to follow-up with podiatrist as outpatient, unfortunately he has not seen podiatry since discharge, completed doxycycline course  Per spouse report at bedside, she has noted swelling in bilateral lower extremity, denies any drainage, pain or any other symptoms  X-ray of the left foot is pending  Discussed with podiatry.  They took the sutures out.  There is a small wound they recommend antibiotics for 1 week.  Dr. Anna stated from her standpoint patient can be discharged

## 2025-04-22 NOTE — UTILIZATION REVIEW
Initial Clinical Review    Admission: Date/Time/Statement:   Admission Orders (From admission, onward)       Ordered        04/21/25 0247  INPATIENT ADMISSION  Once                          Orders Placed This Encounter   Procedures    INPATIENT ADMISSION     Standing Status:   Standing     Number of Occurrences:   1     Level of Care:   Med Surg [16]     Estimated length of stay:   More than 2 Midnights     Certification:   I certify that inpatient services are medically necessary for this patient for a duration of greater than two midnights. See H&P and MD Progress Notes for additional information about the patient's course of treatment.     ED Arrival Information       Expected   -    Arrival   4/21/2025 00:55    Acuity   Urgent              Means of arrival   Walk-In    Escorted by   Family Member    Service   Hospitalist    Admission type   Emergency              Arrival complaint   Left Foot Swollen             Chief Complaint   Patient presents with    Ankle Swelling     Pt arrives ambulatory with a c/o left ankle swelling x1 week. Pt reports having his 5th toe amputated approx 3 weeks ago. Pt is a type 2 diabetic.         Initial Presentation: 50 y.o. male to ED from home w /  PMH of chronic kidney disease stage V, diabetes type 2 on Lantus and Trulicity at home, hypertension, smoker who presents with swelling of the left foot. recently underwent fifth toe amputation due to acute osteomyelitis, the patient finished doxycycline course and was discharged in in stable condition home. Returned to the ER due to a new swelling of the left lower extremity . Given vanco , cefepime and flagyl in ED . Admitted IP status w/ swelling L foot plan to trend wbc, wound care consult , podiatry consult . GATITO Cr 6.26 Progressively worsening probably due to diabetic neuropathy , nephrology consult . Hypocalcemia 5.9 replace and monitor . HTN cont home regimen . DM SSI and monitor .     Anticipated Length of Stay/Certification  Statement:   Patient will be admitted on an inpatient basis with an anticipated length of stay of greater than 2 midnights secondary to swelling of the foot, pending nephrology, podiatrist and wound care nurse consult.    4/21 Nephrology Consult   t kidney function is overall stable with some fluctuation. Does have stage IV CKD and has AV fistula and will go inside hemodialysis which required . No need for urgent dialysis . secondary hyperparathyroidism. On calcitriol. Monitor Ca and check Vit D level .      Date: 4/22   Day 2:     4/22 Podiatry Consult   Noncompliant w/ healthcare . No show in OP office after sx . Sutures are removed bedside today .   incision site is fully dehisced due to patient weightbearing to the left foot    The wound is dressed with xeroform and DSD   Patient will need close followup upon discharge and wound center attendance.                ED Treatment-Medication Administration from 04/21/2025 0054 to 04/21/2025 0423         Date/Time Order Dose Route Action     04/21/2025 0253 calcium gluconate 1 g in sodium chloride 0.9% 50 mL (premix) 1 g Intravenous New Bag     04/21/2025 0401 vancomycin (VANCOCIN) 1,250 mg in sodium chloride 0.9 % 250 mL IVPB 1,250 mg Intravenous New Bag     04/21/2025 0236 cefepime (MAXIPIME) 1 g/50 mL dextrose IVPB 1,000 mg Intravenous New Bag     04/21/2025 0317 metroNIDAZOLE (FLAGYL) IVPB (premix) 500 mg 100 mL 500 mg Intravenous New Bag     04/21/2025 0403 nicotine (NICODERM CQ) 14 mg/24hr TD 24 hr patch 1 patch 1 patch Transdermal Medication Applied            Scheduled Medications:  amLODIPine, 10 mg, Oral, Daily  atorvastatin, 80 mg, Oral, Daily With Dinner  calcitriol, 0.5 mcg, Oral, Once per day on Monday Wednesday Friday  clopidogrel, 75 mg, Oral, Daily  heparin (porcine), 5,000 Units, Subcutaneous, Q8H HAYLEE  hydrALAZINE, 50 mg, Oral, BID  insulin glargine, 28 Units, Subcutaneous, Q12H HAYLEE  insulin lispro, 1-5 Units, Subcutaneous, TID AC  insulin lispro, 1-5  Units, Subcutaneous, HS  metoprolol succinate, 50 mg, Oral, Daily  nicotine, 1 patch, Transdermal, Daily  sodium bicarbonate, 650 mg, Oral, BID      Continuous IV Infusions:     PRN Meds:  acetaminophen, 975 mg, Oral, Q6H PRN  aluminum-magnesium hydroxide-simethicone, 30 mL, Oral, Q6H PRN  hydrALAZINE, 10 mg, Intravenous, Q6H PRN  polyethylene glycol, 17 g, Oral, Daily PRN  trimethobenzamide, 200 mg, Intramuscular, Q6H PRN      ED Triage Vitals   Temperature Pulse Respirations Blood Pressure SpO2 Pain Score   04/21/25 0101 04/21/25 0101 04/21/25 0101 04/21/25 0101 04/21/25 0101 04/21/25 0438   97.8 °F (36.6 °C) 91 18 (!) 204/93 100 % No Pain     Weight (last 2 days)       Date/Time Weight    04/21/25 0427 87.9 (193.78)            Vital Signs (last 3 days)       Date/Time Temp Pulse Resp BP MAP (mmHg) SpO2 O2 Device Patient Position - Orthostatic VS Pain    04/22/25 0806 97.9 °F (36.6 °C) 84 -- 113/65 81 96 % -- -- --    04/22/25 03:11:52 98 °F (36.7 °C) 83 -- 147/85 106 98 % -- -- --    04/21/25 22:31:29 -- 86 18 143/86 105 100 % -- Lying --    04/21/25 1915 98.3 °F (36.8 °C) 90 18 142/85 104 97 % None (Room air) Lying No Pain    04/21/25 15:15:33 98.2 °F (36.8 °C) 87 20 119/59 79 98 % None (Room air) Lying --    04/21/25 1223 -- -- -- -- -- 98 % -- -- No Pain    04/21/25 11:05:30 98.1 °F (36.7 °C) 87 -- 135/71 92 98 % -- -- --    04/21/25 07:30:53 98 °F (36.7 °C) 88 -- 132/71 91 99 % -- -- --    04/21/25 0438 -- -- -- -- -- -- -- -- No Pain    04/21/25 0432 98.2 °F (36.8 °C) 89 18 184/95 125 99 % None (Room air) Lying --    04/21/25 0320 -- 89 18 181/88 126 98 % -- -- --    04/21/25 0101 97.8 °F (36.6 °C) 91 18 204/93 -- 100 % None (Room air) Sitting --              Pertinent Labs/Diagnostic Test Results:   Radiology:  XR foot 3+ views LEFT   Final Interpretation by Elgin Vee MD (04/21 8353)      Mild swelling. Atherosclerosis.         Computerized Assisted Algorithm (CAA) may have been used to  analyze all applicable images.         Workstation performed: [id number]           Cardiology:  ECG 12 lead   Final Result by Rodger Fay MD (04/21 1647)   Normal sinus rhythm   Prolonged QT   Abnormal ECG   When compared with ECG of 31-Jan-2022 15:23,   QT has lengthened   Confirmed by Rodger Fay ([id number]) on 4/21/2025 4:47:54 PM        GI:  No orders to display           Results from last 7 days   Lab Units 04/22/25  0503 04/21/25  0434 04/21/25  0129   WBC Thousand/uL 10.08 9.88 8.76   HEMOGLOBIN g/dL 10.7* 11.4* 11.5*   HEMATOCRIT % 33.7* 37.2 37.0   PLATELETS Thousands/uL 232 244 260   TOTAL NEUT ABS Thousands/µL 7.75* 7.19 6.75         Results from last 7 days   Lab Units 04/22/25  0503 04/21/25  0434 04/21/25  0129   SODIUM mmol/L 138 137 136   POTASSIUM mmol/L 4.3 4.1 3.9   CHLORIDE mmol/L 111* 111* 108   CO2 mmol/L 16* 17* 18*   ANION GAP mmol/L 11 9 10   BUN mg/dL 65* 64* 67*   CREATININE mg/dL 6.06* 6.21* 6.26*   EGFR ml/min/1.73sq m 9 9 9   CALCIUM mg/dL 6.7* 5.9* 5.9*   MAGNESIUM mg/dL  --  1.1*  --    PHOSPHORUS mg/dL  --  5.3*  4.5  --      Results from last 7 days   Lab Units 04/21/25  0434 04/21/25  0129   AST U/L 13 14   ALT U/L 8 12   ALK PHOS U/L 145* 160*   TOTAL PROTEIN g/dL 7.0 7.8   ALBUMIN g/dL 3.5 3.7   TOTAL BILIRUBIN mg/dL 0.26 0.31     Results from last 7 days   Lab Units 04/21/25  2136 04/21/25  1839 04/21/25  1553 04/21/25  1114 04/21/25  0730   POC GLUCOSE mg/dl 110 131 207* 144* 130     Results from last 7 days   Lab Units 04/22/25  0503 04/21/25  0434 04/21/25  0129   GLUCOSE RANDOM mg/dL 74 147* 220*       Results from last 7 days   Lab Units 04/21/25  0434   TSH 3RD GENERATON uIU/mL 2.915     Results from last 7 days   Lab Units 04/21/25  0129   PROCALCITONIN ng/ml 0.25     Results from last 7 days   Lab Units 04/21/25  0129   LACTIC ACID mmol/L 1.4       Results from last 7 days   Lab Units 04/21/25  0129   CRP mg/L <1.0       Results from last 7 days   Lab Units  04/22/25  0503   CLARITY UA  Clear   COLOR UA  Colorless   SPEC GRAV UA  1.010   PH UA  6.0   GLUCOSE UA mg/dl 100 (1/10%)*   KETONES UA mg/dl Negative   BLOOD UA  Small*   PROTEIN UA mg/dl 300 (3+)*   NITRITE UA  Negative   BILIRUBIN UA  Negative   UROBILINOGEN UA (BE) mg/dl <2.0   LEUKOCYTES UA  Negative   WBC UA /hpf 1-2   RBC UA /hpf 1-2   BACTERIA UA /hpf None Seen   EPITHELIAL CELLS WET PREP /hpf Occasional       Results from last 7 days   Lab Units 04/21/25  0133 04/21/25  0129   BLOOD CULTURE  No Growth at 24 hrs. No Growth at 24 hrs.       Past Medical History:   Diagnosis Date    Chronic kidney disease     Diabetes mellitus (HCC)     Hypertension     Received intravenous tissue plasminogen activator (tPA) in emergency department 02/01/2022    Stroke ([facility])     no deficits     Present on Admission:   Swelling of left foot   Diabetic polyneuropathy associated with type 2 diabetes mellitus (HCC)   Hypertensive CKD (chronic kidney disease)   Acute kidney injury superimposed on stage 5 chronic kidney disease, not on chronic dialysis ([facility])   Hypocalcemia due to chronic kidney disease      Admitting Diagnosis: Hypocalcemia [E83.51]  Infected wound [T14.8XXA, L08.9]  Cellulitis [L03.90]  Foot swelling [M79.89]  Left ankle swelling [M25.472]  GATITO (acute kidney injury) (HCC) [N17.9]  Acute kidney injury superimposed on stage 5 chronic kidney disease, not on chronic dialysis (HCC) [N17.9, N18.5]  Age/Sex: 50 y.o. male    Network Utilization Review Department  ATTENTION: Please call with any questions or concerns to [phone] and carefully listen to the prompts so that you are directed to the right person. All voicemails are confidential.   For Discharge needs, contact Care Management DC Support Team at [phone] opt. 2  Send all requests for admission clinical reviews, approved or denied determinations and any other requests to dedicated fax number below belonging to the campus where the patient is receiving  treatment. List of dedicated fax numbers for the Facilities:  FACILITY NAME UR FAX NUMBER   ADMISSION DENIALS (Administrative/Medical Necessity) [phone]   DISCHARGE SUPPORT TEAM (NETWORK) [phone]   PARENT CHILD HEALTH (Maternity/NICU/Pediatrics) [phone]   [facility] [phone]   [facility] [phone]   [facility] [phone]   [facility] [phone]   [facility] [phone]   [facility] [phone]   [facility] [phone]   [facility] [phone]   [facility] [phone]   [facility] [phone]   [facility] [phone]   [facility] [phone]

## 2025-04-22 NOTE — PROGRESS NOTES
Progress Note - Nephrology   Name: Adam Gonzalez 50 y.o. male I MRN: [id number]  Unit/Bed#: -01 I Date of Admission: 4/21/2025   Date of Service: 4/22/2025 I Hospital Day: 1    Assessment & Plan  Acute kidney injury superimposed on stage 5 chronic kidney disease, not on chronic dialysis (HCC)  Lab Results   Component Value Date    EGFR 9 04/22/2025    EGFR 9 04/21/2025    EGFR 9 04/21/2025    CREATININE 6.06 (H) 04/22/2025    CREATININE 6.21 (H) 04/21/2025    CREATININE 6.26 (H) 04/21/2025   Renal function is stable    No acute complaint    No need for dialysis in hospital  Swelling of left foot  Stable being seen by podiatrist  Diabetic polyneuropathy associated with type 2 diabetes mellitus (HCC)  Lab Results   Component Value Date    HGBA1C 7.2 (A) 02/03/2025     Stable  Recent Labs     04/21/25  1839 04/21/25  2136 04/22/25  0819 04/22/25  1134   POCGLU 131 110 95 178*       Blood Sugar Average: Last 72 hrs:  (P) 142.4568661729377272    Hypertensive CKD (chronic kidney disease)  Lab Results   Component Value Date    EGFR 9 04/22/2025    EGFR 9 04/21/2025    EGFR 9 04/21/2025    CREATININE 6.06 (H) 04/22/2025    CREATININE 6.21 (H) 04/21/2025    CREATININE 6.26 (H) 04/21/2025   Reasonably well-controlled  Hypocalcemia due to chronic kidney disease    Better with calcitriol and advised patient to take it regularly        I have reviewed the nephrology recommendations including monitoring of his kidney function, with slim, and we are in agreement with renal plan including the information outlined above. Ok for discharge from Nephrology service perspective.    Subjective   Brief History of Admission -admitted with leg swelling    Feeling better    No acute complaint    No chest pain no palpitation    No nausea no vomiting    Denies any urinary complaint    Objective :  Temp:  [97.9 °F (36.6 °C)-98.3 °F (36.8 °C)] 97.9 °F (36.6 °C)  HR:  [83-90] 83  BP: (113-147)/(59-86) 144/72  Resp:  [18-20] 18  SpO2:  [96  %-100 %] 100 %  O2 Device: None (Room air)    Current Weight: Weight - Scale: 87.9 kg (193 lb 12.6 oz)  First Weight: Weight - Scale: 87.9 kg (193 lb 12.6 oz)  I/O         04/20 0701 04/21 0700 04/21 0701  04/22 0700 04/22 0701 04/23 0700    P.O.  660 360    IV Piggyback 50      Total Intake(mL/kg) 50 (0.6) 660 (7.5) 360 (4.1)    Urine (mL/kg/hr)  150 (0.1)     Total Output  150     Net +50 +510 +360                 Physical Exam  Constitutional:       General: He is not in acute distress.     Appearance: He is well-developed.   HENT:      Head: Normocephalic.      Mouth/Throat:      Mouth: Mucous membranes are moist.   Eyes:      General: No scleral icterus.     Conjunctiva/sclera: Conjunctivae normal.   Neck:      Vascular: No JVD.   Cardiovascular:      Rate and Rhythm: Normal rate.      Heart sounds: Normal heart sounds.   Pulmonary:      Effort: Pulmonary effort is normal.      Breath sounds: No wheezing.   Abdominal:      Palpations: Abdomen is soft.      Tenderness: There is no abdominal tenderness.   Musculoskeletal:         General: Normal range of motion.      Cervical back: Neck supple.   Skin:     General: Skin is warm.      Findings: No rash.   Neurological:      Mental Status: He is alert and oriented to person, place, and time.   Psychiatric:         Behavior: Behavior normal.         Medications:    Current Facility-Administered Medications:     acetaminophen (TYLENOL) tablet 975 mg, 975 mg, Oral, Q6H PRN, YASMIN Garrett    aluminum-magnesium hydroxide-simethicone (MAALOX) oral suspension 30 mL, 30 mL, Oral, Q6H PRN, YASMIN Garrett    amLODIPine (NORVASC) tablet 10 mg, 10 mg, Oral, Daily, YASMIN Garrett, 10 mg at 04/22/25 1027    atorvastatin (LIPITOR) tablet 80 mg, 80 mg, Oral, Daily With Dinner, YASMIN Garrett, 80 mg at 04/21/25 1611    calcitriol (ROCALTROL) capsule 0.5 mcg, 0.5 mcg, Oral, Once per day on Monday Wednesday Friday, YASMIN Garrett, 0.5 mcg at 04/21/25 9962     clopidogrel (PLAVIX) tablet 75 mg, 75 mg, Oral, Daily, YASMIN Garrett, 75 mg at 04/22/25 1027    heparin (porcine) subcutaneous injection 5,000 Units, 5,000 Units, Subcutaneous, Q8H HAYLEE, 5,000 Units at 04/22/25 0524 **AND** [CANCELED] Platelet count, , , Once, YASMIN Garrett    hydrALAZINE (APRESOLINE) injection 10 mg, 10 mg, Intravenous, Q6H PRN, YASMIN Garrett    hydrALAZINE (APRESOLINE) tablet 50 mg, 50 mg, Oral, BID, YASMIN Garrett, 50 mg at 04/22/25 1026    insulin glargine (LANTUS) subcutaneous injection 28 Units 0.28 mL, 28 Units, Subcutaneous, Q12H HAYLEE, YASMIN Garrett, 28 Units at 04/22/25 1030    insulin lispro (HumALOG/ADMELOG) 100 units/mL subcutaneous injection 1-5 Units, 1-5 Units, Subcutaneous, TID AC, 1 Units at 04/21/25 1611 **AND** Fingerstick Glucose (POCT), , , TID AC, YASMIN Garrett    insulin lispro (HumALOG/ADMELOG) 100 units/mL subcutaneous injection 1-5 Units, 1-5 Units, Subcutaneous, HS, YASMIN Garrett    metoprolol succinate (TOPROL-XL) 24 hr tablet 50 mg, 50 mg, Oral, Daily, YASMIN Garrett, 50 mg at 04/22/25 1026    nicotine (NICODERM CQ) 14 mg/24hr TD 24 hr patch 1 patch, 1 patch, Transdermal, Daily, YASMIN Garrett, 1 patch at 04/22/25 1030    polyethylene glycol (MIRALAX) packet 17 g, 17 g, Oral, Daily PRN, YASMIN Garrett    sodium bicarbonate tablet 1,300 mg, 1,300 mg, Oral, BID, Devendra Brand MD, 1,300 mg at 04/22/25 1026    trimethobenzamide (TIGAN) IM injection 200 mg, 200 mg, Intramuscular, Q6H PRN, YASMIN Garrett      Lab Results: I have reviewed the following results:  Results from last 7 days   Lab Units 04/22/25  0503 04/21/25  0434 04/21/25  0129   WBC Thousand/uL 10.08 9.88 8.76   HEMOGLOBIN g/dL 10.7* 11.4* 11.5*   HEMATOCRIT % 33.7* 37.2 37.0   PLATELETS Thousands/uL 232 244 260   POTASSIUM mmol/L 4.3 4.1 3.9   CHLORIDE mmol/L 111* 111* 108   CO2 mmol/L 16* 17* 18*   BUN mg/dL 65* 64* 67*   CREATININE mg/dL 6.06* 6.21* 6.26*  "  CALCIUM mg/dL 6.7* 5.9* 5.9*   MAGNESIUM mg/dL  --  1.1*  --    PHOSPHORUS mg/dL  --  5.3*  4.5  --    ALBUMIN g/dL  --  3.5 3.7       Administrative Statements     Portions of the record may have been created with voice recognition software. Occasional wrong word or \"sound a like\" substitutions may have occurred due to the inherent limitations of voice recognition software. Read the chart carefully and recognize, using context, where substitutions have occurred.If you have any questions, please contact the dictating provider.  "

## 2025-04-22 NOTE — ASSESSMENT & PLAN NOTE
- Patient is noncompliant with his healthcare  - Patient attended one followup appointment after surgery and no-showed every office visit after  - Sutures are removed bedside today, patient tolerated well all VS stable and NVS intact to pre-exam status   - The incision site is fully dehisced due to patient weightbearing to the left foot   - The wound is dressed with xeroform and DSD  - Patient will need close followup upon discharge and wound center attendence  - Discussed need for tight sugar control  - Discussed wound care  - Discussed signs and symptoms of infection, patient is to call with these and seek immediate medical treatment  - Patient is at high risk for loss of limb, sepsis, loss of life due to noncompliance with followup   - Will follow while in house

## 2025-04-22 NOTE — ASSESSMENT & PLAN NOTE
Lab Results   Component Value Date    EGFR 9 04/22/2025    EGFR 9 04/21/2025    EGFR 9 04/21/2025    CREATININE 6.06 (H) 04/22/2025    CREATININE 6.21 (H) 04/21/2025    CREATININE 6.26 (H) 04/21/2025   Renal function is stable    No acute complaint    No need for dialysis in hospital

## 2025-04-22 NOTE — ASSESSMENT & PLAN NOTE
Lab Results   Component Value Date    EGFR 9 04/22/2025    EGFR 9 04/21/2025    EGFR 9 04/21/2025    CREATININE 6.06 (H) 04/22/2025    CREATININE 6.21 (H) 04/21/2025    CREATININE 6.26 (H) 04/21/2025   BP systolic greater than 200 on admission  Spouse reports noncompliance with antihypertensive home regimen  Continue with home regimen  Stable

## 2025-04-22 NOTE — ASSESSMENT & PLAN NOTE
Lab Results   Component Value Date    HGBA1C 7.2 (A) 02/03/2025       Recent Labs     04/21/25  1553 04/21/25  1839 04/21/25  2136 04/22/25  0819   POCGLU 207* 131 110 95       Blood Sugar Average: Last 72 hrs:  (P) 136.1713642007631357  Blood glucose on   Continue with Lantus 28 units twice daily  Continue home medications on discharge which includes the Trulicity

## 2025-04-22 NOTE — ASSESSMENT & PLAN NOTE
Lab Results   Component Value Date    EGFR 9 04/22/2025    EGFR 9 04/21/2025    EGFR 9 04/21/2025    CREATININE 6.06 (H) 04/22/2025    CREATININE 6.21 (H) 04/21/2025    CREATININE 6.26 (H) 04/21/2025   Reasonably well-controlled

## 2025-04-23 ENCOUNTER — TRANSITIONAL CARE MANAGEMENT (OUTPATIENT)
Dept: FAMILY MEDICINE CLINIC | Facility: CLINIC | Age: 50
End: 2025-04-23

## 2025-04-23 DIAGNOSIS — Z13.9 ENCOUNTER FOR SCREENING INVOLVING SOCIAL DETERMINANTS OF HEALTH (SDOH): Primary | ICD-10-CM

## 2025-04-23 NOTE — UTILIZATION REVIEW
NOTIFICATION OF ADMISSION DISCHARGE   This is a Notification of Discharge from [facility]. Please be advised that this patient has been discharge from our facility. Below you will find the admission and discharge date and time including the patient’s disposition.   UTILIZATION REVIEW CONTACT:  Utilization Review Assistants  Network Utilization Review Department  Phone: [phone] x carefully listen to the prompts. All voicemails are confidential.  Email: [email or web address]     ADMISSION INFORMATION  PRESENTATION DATE: 4/21/2025 12:58 AM  OBERVATION ADMISSION DATE: N/A  INPATIENT ADMISSION DATE: 4/21/25  2:47 AM   DISCHARGE DATE: 4/22/2025  4:43 PM   DISPOSITION:Home/Self Care    Network Utilization Review Department  ATTENTION: Please call with any questions or concerns to [phone] and carefully listen to the prompts so that you are directed to the right person. All voicemails are confidential.   For Discharge needs, contact Care Management DC Support Team at [phone] opt. 2  Send all requests for admission clinical reviews, approved or denied determinations and any other requests to dedicated fax number below belonging to the campus where the patient is receiving treatment. List of dedicated fax numbers for the Facilities:  FACILITY NAME UR FAX NUMBER   ADMISSION DENIALS (Administrative/Medical Necessity) [phone]   DISCHARGE SUPPORT TEAM ([facility]) [phone]   PARENT CHILD HEALTH (Maternity/NICU/Pediatrics) [phone]   [facility] [phone]   [facility] [phone]   [facility] [phone]   [facility] [phone]   Critical access hospital [phone]   [facility] [phone]   [facility] [phone]   [facility] [phone]   [facility]  [facility] [phone]   [facility] [phone]   [facility] [phone]   [facility] [phone]

## 2025-04-24 ENCOUNTER — PATIENT OUTREACH (OUTPATIENT)
Dept: CASE MANAGEMENT | Facility: OTHER | Age: 50
End: 2025-04-24

## 2025-04-24 PROBLEM — N18.5 STAGE 5 CHRONIC KIDNEY DISEASE NOT ON CHRONIC DIALYSIS (HCC): Status: ACTIVE | Noted: 2019-10-10

## 2025-04-24 NOTE — PROGRESS NOTES
JOEY CM received referral from OP CM AC for SDOH utilities. IP CM documented pt refused assistance for utilities. JOEY LOPEZ reviewed with lead OP SW CM and was directed to outreach pt to offer assistance.    JOEY LOPEZ placed call to pt to introduce self and discuss needs. Pt did not answer. JOEY LPOEZ left a message and will try to reach pt again within one week if pt does not call back sooner.

## 2025-04-25 ENCOUNTER — TELEPHONE (OUTPATIENT)
Dept: NEPHROLOGY | Facility: CLINIC | Age: 50
End: 2025-04-25

## 2025-04-25 NOTE — TELEPHONE ENCOUNTER
I called and spoke to the patient and explained that he No Showed for his nephrology follow up appointment with Dr. RASHI Benedict on Thursday 4/24/2025. The patient stated that he over slept. I reschedule the patient for 8/19/2025 with Dr. RASHI Benedict. I asked if the patient had his blood work done and the patient stated that he did not. I did explain to the patient to have his blood work done sooner then later and to give us a call so when his results come in we can have Dr. RASHI Benedict review them and if the patient needs to be seen sooner then Dr. RASHI Benedict will make that determination. The patient understood and was okay with it.

## 2025-04-26 LAB
BACTERIA BLD CULT: NORMAL
BACTERIA BLD CULT: NORMAL

## 2025-04-29 ENCOUNTER — PATIENT OUTREACH (OUTPATIENT)
Dept: CASE MANAGEMENT | Facility: OTHER | Age: 50
End: 2025-04-29

## 2025-04-29 NOTE — PROGRESS NOTES
JOEY LOPEZ placed call to pt to f/u on referral. Spoke with pt and introduced self and explained role and reason for calling.    Pt shared that he is caught up on bills at this time. Pt stated he does not have any needs for JOEY LOPEZ to assist with at this time, but will discuss with his wife. Pt stated he will call back if he needs any assistance.    Referral closed at this time.

## 2025-05-05 ENCOUNTER — OFFICE VISIT (OUTPATIENT)
Dept: FAMILY MEDICINE CLINIC | Facility: CLINIC | Age: 50
End: 2025-05-05
Payer: COMMERCIAL

## 2025-05-05 ENCOUNTER — APPOINTMENT (OUTPATIENT)
Dept: LAB | Facility: CLINIC | Age: 50
End: 2025-05-05
Payer: COMMERCIAL

## 2025-05-05 VITALS
DIASTOLIC BLOOD PRESSURE: 78 MMHG | HEART RATE: 68 BPM | WEIGHT: 195 LBS | OXYGEN SATURATION: 97 % | SYSTOLIC BLOOD PRESSURE: 139 MMHG | BODY MASS INDEX: 27.98 KG/M2

## 2025-05-05 DIAGNOSIS — N25.81 SECONDARY HYPERPARATHYROIDISM OF RENAL ORIGIN (HCC): ICD-10-CM

## 2025-05-05 DIAGNOSIS — Z79.4 TYPE 2 DIABETES MELLITUS WITH OTHER CIRCULATORY COMPLICATION, WITH LONG-TERM CURRENT USE OF INSULIN (HCC): Primary | ICD-10-CM

## 2025-05-05 DIAGNOSIS — Z12.5 SCREENING FOR PROSTATE CANCER: ICD-10-CM

## 2025-05-05 DIAGNOSIS — I10 ESSENTIAL HYPERTENSION: ICD-10-CM

## 2025-05-05 DIAGNOSIS — F17.210 SMOKING GREATER THAN 20 PACK YEARS: ICD-10-CM

## 2025-05-05 DIAGNOSIS — E78.00 HYPERCHOLESTEROLEMIA: ICD-10-CM

## 2025-05-05 DIAGNOSIS — E55.9 VITAMIN D DEFICIENCY: ICD-10-CM

## 2025-05-05 DIAGNOSIS — R80.9 NEPHROTIC RANGE PROTEINURIA: ICD-10-CM

## 2025-05-05 DIAGNOSIS — E11.59 TYPE 2 DIABETES MELLITUS WITH OTHER CIRCULATORY COMPLICATION, WITH LONG-TERM CURRENT USE OF INSULIN (HCC): Primary | ICD-10-CM

## 2025-05-05 DIAGNOSIS — I12.0 HYPERTENSIVE KIDNEY DISEASE WITH STAGE 5 CHRONIC KIDNEY DISEASE, NOT ON CHRONIC DIALYSIS (HCC): ICD-10-CM

## 2025-05-05 DIAGNOSIS — Z13.29 SCREENING FOR HYPOTHYROIDISM: ICD-10-CM

## 2025-05-05 DIAGNOSIS — N18.5 STAGE 5 CHRONIC KIDNEY DISEASE NOT ON CHRONIC DIALYSIS (HCC): ICD-10-CM

## 2025-05-05 DIAGNOSIS — N18.5 HYPERTENSIVE KIDNEY DISEASE WITH STAGE 5 CHRONIC KIDNEY DISEASE, NOT ON CHRONIC DIALYSIS (HCC): ICD-10-CM

## 2025-05-05 DIAGNOSIS — Z11.59 NEED FOR HEPATITIS C SCREENING TEST: ICD-10-CM

## 2025-05-05 DIAGNOSIS — E87.20 METABOLIC ACIDOSIS: ICD-10-CM

## 2025-05-05 DIAGNOSIS — Z79.4 TYPE 2 DIABETES MELLITUS WITH OTHER CIRCULATORY COMPLICATION, WITH LONG-TERM CURRENT USE OF INSULIN (HCC): ICD-10-CM

## 2025-05-05 DIAGNOSIS — Z12.11 COLON CANCER SCREENING: ICD-10-CM

## 2025-05-05 DIAGNOSIS — E11.59 TYPE 2 DIABETES MELLITUS WITH OTHER CIRCULATORY COMPLICATION, WITH LONG-TERM CURRENT USE OF INSULIN (HCC): ICD-10-CM

## 2025-05-05 LAB
25(OH)D3 SERPL-MCNC: 7.8 NG/ML (ref 30–100)
ANION GAP SERPL CALCULATED.3IONS-SCNC: 14 MMOL/L (ref 4–13)
BACTERIA UR QL AUTO: ABNORMAL /HPF
BASOPHILS # BLD AUTO: 0.04 THOUSANDS/ÂΜL (ref 0–0.1)
BASOPHILS NFR BLD AUTO: 1 % (ref 0–1)
BILIRUB UR QL STRIP: NEGATIVE
BUN SERPL-MCNC: 73 MG/DL (ref 5–25)
CALCIUM SERPL-MCNC: 6 MG/DL (ref 8.4–10.2)
CHLORIDE SERPL-SCNC: 109 MMOL/L (ref 96–108)
CLARITY UR: CLEAR
CO2 SERPL-SCNC: 17 MMOL/L (ref 21–32)
COLOR UR: COLORLESS
CREAT SERPL-MCNC: 6.52 MG/DL (ref 0.6–1.3)
CREAT UR-MCNC: 55.6 MG/DL
EOSINOPHIL # BLD AUTO: 0.28 THOUSAND/ÂΜL (ref 0–0.61)
EOSINOPHIL NFR BLD AUTO: 3 % (ref 0–6)
ERYTHROCYTE [DISTWIDTH] IN BLOOD BY AUTOMATED COUNT: 15.2 % (ref 11.6–15.1)
GFR SERPL CREATININE-BSD FRML MDRD: 8 ML/MIN/1.73SQ M
GLUCOSE P FAST SERPL-MCNC: 219 MG/DL (ref 65–99)
GLUCOSE UR STRIP-MCNC: ABNORMAL MG/DL
HCT VFR BLD AUTO: 40.4 % (ref 36.5–49.3)
HGB BLD-MCNC: 11.7 G/DL (ref 12–17)
HGB UR QL STRIP.AUTO: ABNORMAL
IMM GRANULOCYTES # BLD AUTO: 0.03 THOUSAND/UL (ref 0–0.2)
IMM GRANULOCYTES NFR BLD AUTO: 0 % (ref 0–2)
KETONES UR STRIP-MCNC: NEGATIVE MG/DL
LEUKOCYTE ESTERASE UR QL STRIP: NEGATIVE
LYMPHOCYTES # BLD AUTO: 1.01 THOUSANDS/ÂΜL (ref 0.6–4.47)
LYMPHOCYTES NFR BLD AUTO: 12 % (ref 14–44)
MCH RBC QN AUTO: 30.5 PG (ref 26.8–34.3)
MCHC RBC AUTO-ENTMCNC: 29 G/DL (ref 31.4–37.4)
MCV RBC AUTO: 105 FL (ref 82–98)
MICROALBUMIN UR-MCNC: 2896.2 MG/L
MICROALBUMIN/CREAT 24H UR: 5209 MG/G CREATININE (ref 0–30)
MONOCYTES # BLD AUTO: 0.63 THOUSAND/ÂΜL (ref 0.17–1.22)
MONOCYTES NFR BLD AUTO: 7 % (ref 4–12)
NEUTROPHILS # BLD AUTO: 6.66 THOUSANDS/ÂΜL (ref 1.85–7.62)
NEUTS SEG NFR BLD AUTO: 77 % (ref 43–75)
NITRITE UR QL STRIP: NEGATIVE
NON-SQ EPI CELLS URNS QL MICRO: ABNORMAL /HPF
NRBC BLD AUTO-RTO: 0 /100 WBCS
PH UR STRIP.AUTO: 6.5 [PH]
PHOSPHATE SERPL-MCNC: 7.9 MG/DL (ref 2.7–4.5)
PLATELET # BLD AUTO: 242 THOUSANDS/UL (ref 149–390)
PMV BLD AUTO: 10.3 FL (ref 8.9–12.7)
POTASSIUM SERPL-SCNC: 5.1 MMOL/L (ref 3.5–5.3)
PROT UR STRIP-MCNC: ABNORMAL MG/DL
PSA SERPL-MCNC: 0.48 NG/ML (ref 0–4)
PTH-INTACT SERPL-MCNC: 724.3 PG/ML (ref 12–88)
RBC # BLD AUTO: 3.84 MILLION/UL (ref 3.88–5.62)
RBC #/AREA URNS AUTO: ABNORMAL /HPF
SODIUM SERPL-SCNC: 140 MMOL/L (ref 135–147)
SP GR UR STRIP.AUTO: 1.01 (ref 1–1.03)
TSH SERPL DL<=0.05 MIU/L-ACNC: 2.56 UIU/ML (ref 0.45–4.5)
URATE SERPL-MCNC: 7.1 MG/DL (ref 3.5–8.5)
UROBILINOGEN UR STRIP-ACNC: <2 MG/DL
WBC # BLD AUTO: 8.65 THOUSAND/UL (ref 4.31–10.16)
WBC #/AREA URNS AUTO: ABNORMAL /HPF

## 2025-05-05 PROCEDURE — 86803 HEPATITIS C AB TEST: CPT

## 2025-05-05 PROCEDURE — 83036 HEMOGLOBIN GLYCOSYLATED A1C: CPT

## 2025-05-05 PROCEDURE — 84443 ASSAY THYROID STIM HORMONE: CPT

## 2025-05-05 PROCEDURE — 82570 ASSAY OF URINE CREATININE: CPT

## 2025-05-05 PROCEDURE — 82306 VITAMIN D 25 HYDROXY: CPT

## 2025-05-05 PROCEDURE — 82043 UR ALBUMIN QUANTITATIVE: CPT

## 2025-05-05 PROCEDURE — 84100 ASSAY OF PHOSPHORUS: CPT

## 2025-05-05 PROCEDURE — G0103 PSA SCREENING: HCPCS

## 2025-05-05 PROCEDURE — 83970 ASSAY OF PARATHORMONE: CPT

## 2025-05-05 PROCEDURE — 81001 URINALYSIS AUTO W/SCOPE: CPT

## 2025-05-05 PROCEDURE — 80048 BASIC METABOLIC PNL TOTAL CA: CPT

## 2025-05-05 PROCEDURE — 36415 COLL VENOUS BLD VENIPUNCTURE: CPT

## 2025-05-05 PROCEDURE — 99214 OFFICE O/P EST MOD 30 MIN: CPT | Performed by: INTERNAL MEDICINE

## 2025-05-05 PROCEDURE — 84550 ASSAY OF BLOOD/URIC ACID: CPT

## 2025-05-05 PROCEDURE — 85025 COMPLETE CBC W/AUTO DIFF WBC: CPT

## 2025-05-05 NOTE — PROGRESS NOTES
Name: Adam Gonzalez      : 1975      MRN: [id number]  Encounter Provider: Evans Birmingham MD  Encounter Date: 2025   Encounter department: [facility] PRIMARY CARE    Assessment & Plan  Smoking greater than 20 pack years  He will consider to quit, he is cutting back right now.  He will consider CT scan screening.       Type 2 diabetes mellitus with other circulatory complication, with long-term current use of insulin (HCC)  Recheck hemoglobin A1c, continue current meds.  Lab Results   Component Value Date    HGBA1C 7.2 (A) 2025       Orders:    Hemoglobin A1C; Future    Essential hypertension  Blood pressure is overall controlled.  Continue meds.       Colon cancer screening    Orders:    Cologuard    Hypercholesterolemia  Recheck lipid panel.  Continue current medications.            History of Present Illness     Patient came today for follow-up on his chronic medical problems.      Review of Systems   Constitutional:  Negative for chills and fever.   Respiratory:  Negative for cough, shortness of breath and wheezing.    Cardiovascular:  Negative for chest pain, palpitations and leg swelling.     Past Medical History:   Diagnosis Date    Chronic kidney disease     Diabetes mellitus (HCC)     Hypertension     Received intravenous tissue plasminogen activator (tPA) in emergency department 2022    Stroke (HCC)     no deficits     Past Surgical History:   Procedure Laterality Date    CARDIAC LOOP RECORDER      PILONIDAL CYST EXCISION      UT ARTERIOVENOUS ANASTOMOSIS OPEN DIRECT Left 4/10/2024    Procedure: CREATION FISTULA BRACHIOCEPHALIC LEFT (AV);  Surgeon: Maxwell Nielsen MD;  Location: [facility];  Service: Vascular    TOE AMPUTATION Left 2025    Procedure: AMPUTATION TOE;  Surgeon: Adriane Anna DPM;  Location: [facility];  Service: Podiatry     Family History   Problem Relation Age of Onset    No Known Problems Mother     No Known Problems  Father     Pancreatic cancer Brother      Social History     Tobacco Use    Smoking status: Every Day     Current packs/day: 0.50     Average packs/day: 1 pack/day for 37.9 years (36.7 ttl pk-yrs)     Types: Cigarettes     Start date: 6/16/1987     Passive exposure: Current    Smokeless tobacco: Never    Tobacco comments:     states slowing it down, used Chantix-currently 1/2 pack/day, at least 35 pack years as of 2023   Vaping Use    Vaping status: Never Used   Substance and Sexual Activity    Alcohol use: Never     Comment: stopped 8/22    Drug use: Never    Sexual activity: Yes     Partners: Female     Birth control/protection: Condom Male     Current Outpatient Medications on File Prior to Visit   Medication Sig    amLODIPine (NORVASC) 10 mg tablet Take 1 tablet (10 mg total) by mouth daily    atorvastatin (LIPITOR) 80 mg tablet Take 1 tablet (80 mg total) by mouth daily    calcitriol (ROCALTROL) 0.5 MCG capsule Take 1 capsule (0.5 mcg total) by mouth daily Monday, Wednesday, Friday    clopidogrel (PLAVIX) 75 mg tablet Take 1 tablet (75 mg total) by mouth daily    Dulaglutide 0.75 MG/0.5ML SOAJ Inject 0.75 mg under the skin once a week    ergocalciferol (VITAMIN D2) 50,000 units Take 1 capsule (50,000 Units total) by mouth once a week    hydrALAZINE (APRESOLINE) 50 mg tablet Take 1 tablet (50 mg total) by mouth 2 (two) times a day    Insulin Glargine Solostar (Basaglar KwikPen) 100 UNIT/ML SOPN Inject 0.28 mL (28 Units total) under the skin daily    Insulin Glargine Solostar (Basaglar KwikPen) 100 UNIT/ML SOPN Inject 0.28 mL (28 Units total) under the skin in the morning    metoprolol succinate (TOPROL-XL) 50 mg 24 hr tablet Take 1 tablet (50 mg total) by mouth daily    sodium bicarbonate 650 mg tablet Take 1 tablet (650 mg total) by mouth 2 (two) times a day     No Known Allergies  Immunization History   Administered Date(s) Administered    COVID-19 MODERNA VACC 0.5 ML IM 08/28/2021, 09/23/2021    Tdap  07/24/2020     Objective   /78   Pulse 68   Wt 88.5 kg (195 lb)   SpO2 97%   BMI 27.98 kg/m²     Physical Exam  Constitutional:       General: He is not in acute distress.     Appearance: He is not toxic-appearing.   Cardiovascular:      Heart sounds: Murmur heard.      No gallop.   Pulmonary:      Effort: No respiratory distress.      Breath sounds: No wheezing or rales.

## 2025-05-06 LAB
EST. AVERAGE GLUCOSE BLD GHB EST-MCNC: 154 MG/DL
HBA1C MFR BLD: 7 %
HCV AB SER QL: NORMAL

## 2025-05-09 ENCOUNTER — TELEPHONE (OUTPATIENT)
Dept: FAMILY MEDICINE CLINIC | Facility: CLINIC | Age: 50
End: 2025-05-09

## 2025-05-09 ENCOUNTER — RESULTS FOLLOW-UP (OUTPATIENT)
Dept: FAMILY MEDICINE CLINIC | Facility: CLINIC | Age: 50
End: 2025-05-09

## 2025-05-18 ENCOUNTER — HOSPITAL ENCOUNTER (EMERGENCY)
Facility: HOSPITAL | Age: 50
Discharge: HOME | End: 2025-05-18
Attending: EMERGENCY MEDICINE
Payer: COMMERCIAL

## 2025-05-18 ENCOUNTER — APPOINTMENT (EMERGENCY)
Dept: RADIOLOGY | Facility: HOSPITAL | Age: 50
End: 2025-05-18
Payer: COMMERCIAL

## 2025-05-18 VITALS
DIASTOLIC BLOOD PRESSURE: 81 MMHG | HEART RATE: 84 BPM | BODY MASS INDEX: 28.72 KG/M2 | RESPIRATION RATE: 18 BRPM | SYSTOLIC BLOOD PRESSURE: 164 MMHG | OXYGEN SATURATION: 97 % | TEMPERATURE: 97.7 F | WEIGHT: 200.18 LBS

## 2025-05-18 DIAGNOSIS — R06.00 DYSPNEA: Primary | ICD-10-CM

## 2025-05-18 DIAGNOSIS — E83.51 HYPOCALCEMIA: ICD-10-CM

## 2025-05-18 LAB
2HR DELTA HS TROPONIN: -9 NG/L
ALBUMIN SERPL BCG-MCNC: 3.9 G/DL (ref 3.5–5)
ALP SERPL-CCNC: 155 U/L (ref 34–104)
ALT SERPL W P-5'-P-CCNC: 10 U/L (ref 7–52)
ANION GAP SERPL CALCULATED.3IONS-SCNC: 10 MMOL/L (ref 4–13)
AST SERPL W P-5'-P-CCNC: 13 U/L (ref 13–39)
BASOPHILS # BLD AUTO: 0.04 THOUSANDS/ÂΜL (ref 0–0.1)
BASOPHILS NFR BLD AUTO: 0 % (ref 0–1)
BILIRUB SERPL-MCNC: 0.31 MG/DL (ref 0.2–1)
BNP SERPL-MCNC: 612 PG/ML (ref 0–100)
BUN SERPL-MCNC: 75 MG/DL (ref 5–25)
CALCIUM SERPL-MCNC: 5.7 MG/DL (ref 8.4–10.2)
CARDIAC TROPONIN I PNL SERPL HS: 23 NG/L (ref ?–50)
CARDIAC TROPONIN I PNL SERPL HS: 32 NG/L (ref ?–50)
CHLORIDE SERPL-SCNC: 111 MMOL/L (ref 96–108)
CO2 SERPL-SCNC: 16 MMOL/L (ref 21–32)
CREAT SERPL-MCNC: 6.43 MG/DL (ref 0.6–1.3)
D DIMER PPP FEU-MCNC: 1.01 UG/ML FEU
EOSINOPHIL # BLD AUTO: 0.2 THOUSAND/ÂΜL (ref 0–0.61)
EOSINOPHIL NFR BLD AUTO: 2 % (ref 0–6)
ERYTHROCYTE [DISTWIDTH] IN BLOOD BY AUTOMATED COUNT: 14.9 % (ref 11.6–15.1)
GFR SERPL CREATININE-BSD FRML MDRD: 9 ML/MIN/1.73SQ M
GLUCOSE SERPL-MCNC: 150 MG/DL (ref 65–140)
HCT VFR BLD AUTO: 37.6 % (ref 36.5–49.3)
HGB BLD-MCNC: 12 G/DL (ref 12–17)
IMM GRANULOCYTES # BLD AUTO: 0.04 THOUSAND/UL (ref 0–0.2)
IMM GRANULOCYTES NFR BLD AUTO: 0 % (ref 0–2)
LYMPHOCYTES # BLD AUTO: 1.11 THOUSANDS/ÂΜL (ref 0.6–4.47)
LYMPHOCYTES NFR BLD AUTO: 10 % (ref 14–44)
MCH RBC QN AUTO: 30.9 PG (ref 26.8–34.3)
MCHC RBC AUTO-ENTMCNC: 31.9 G/DL (ref 31.4–37.4)
MCV RBC AUTO: 97 FL (ref 82–98)
MONOCYTES # BLD AUTO: 0.78 THOUSAND/ÂΜL (ref 0.17–1.22)
MONOCYTES NFR BLD AUTO: 7 % (ref 4–12)
NEUTROPHILS # BLD AUTO: 9.03 THOUSANDS/ÂΜL (ref 1.85–7.62)
NEUTS SEG NFR BLD AUTO: 81 % (ref 43–75)
NRBC BLD AUTO-RTO: 0 /100 WBCS
PLATELET # BLD AUTO: 221 THOUSANDS/UL (ref 149–390)
PMV BLD AUTO: 9.8 FL (ref 8.9–12.7)
POTASSIUM SERPL-SCNC: 4.8 MMOL/L (ref 3.5–5.3)
PROT SERPL-MCNC: 7.5 G/DL (ref 6.4–8.4)
RBC # BLD AUTO: 3.88 MILLION/UL (ref 3.88–5.62)
SODIUM SERPL-SCNC: 137 MMOL/L (ref 135–147)
WBC # BLD AUTO: 11.2 THOUSAND/UL (ref 4.31–10.16)

## 2025-05-18 PROCEDURE — 99285 EMERGENCY DEPT VISIT HI MDM: CPT | Performed by: EMERGENCY MEDICINE

## 2025-05-18 PROCEDURE — 99285 EMERGENCY DEPT VISIT HI MDM: CPT

## 2025-05-18 PROCEDURE — 84484 ASSAY OF TROPONIN QUANT: CPT | Performed by: EMERGENCY MEDICINE

## 2025-05-18 PROCEDURE — 93005 ELECTROCARDIOGRAM TRACING: CPT

## 2025-05-18 PROCEDURE — 85379 FIBRIN DEGRADATION QUANT: CPT | Performed by: EMERGENCY MEDICINE

## 2025-05-18 PROCEDURE — 36415 COLL VENOUS BLD VENIPUNCTURE: CPT | Performed by: EMERGENCY MEDICINE

## 2025-05-18 PROCEDURE — 80053 COMPREHEN METABOLIC PANEL: CPT | Performed by: EMERGENCY MEDICINE

## 2025-05-18 PROCEDURE — 71046 X-RAY EXAM CHEST 2 VIEWS: CPT

## 2025-05-18 PROCEDURE — 96365 THER/PROPH/DIAG IV INF INIT: CPT

## 2025-05-18 PROCEDURE — 83880 ASSAY OF NATRIURETIC PEPTIDE: CPT | Performed by: EMERGENCY MEDICINE

## 2025-05-18 PROCEDURE — 85025 COMPLETE CBC W/AUTO DIFF WBC: CPT | Performed by: EMERGENCY MEDICINE

## 2025-05-18 RX ORDER — CALCIUM CARBONATE 500 MG/1
4 TABLET, CHEWABLE ORAL DAILY
Qty: 360 TABLET | Refills: 0 | Status: SHIPPED | OUTPATIENT
Start: 2025-05-18

## 2025-05-18 RX ORDER — CALCIUM GLUCONATE 20 MG/ML
2 INJECTION, SOLUTION INTRAVENOUS ONCE
Status: COMPLETED | OUTPATIENT
Start: 2025-05-18 | End: 2025-05-18

## 2025-05-18 RX ADMIN — CALCIUM GLUCONATE 2 G: 20 INJECTION, SOLUTION INTRAVENOUS at 16:09

## 2025-05-18 NOTE — ED PROVIDER NOTES
Time reflects when diagnosis was documented in both MDM as applicable and the Disposition within this note       Time User Action Codes Description Comment    5/18/2025  4:40 PM Philip Saini Add [R06.00] Dyspnea     5/18/2025  4:40 PM Philip Saini Add [E83.51] Hypocalcemia           ED Disposition       ED Disposition   Discharge    Condition   Stable    Date/Time   Sun May 18, 2025  4:40 PM    Comment   Adam Gonzalez discharge to home/self care.                   Assessment & Plan       Medical Decision Making  Chest x-ray showed cardiomegaly.  Bedside ultrasound did not show pericardial effusion.  No significant pulmonary edema.  No pneumonia.  No pneumothorax.  EKG was without ischemia.  Troponin was mildly elevated, likely secondary to renal failure.  Doubt acute myocardial infarction.  There is no chest pain.  D-dimer was at thousand.  However, Wells criteria is 0.  As patient is low risk, D-dimer 1000 is likely negative.  Would like to avoid IV contrast CAT scan as patient has renal failure and is not yet on dialysis.  Dyspnea has resolved.  No respiratory distress.  Lungs are clear.  Normal oxygen saturations.  Potassium was okay.  Creatinine is at baseline.  There was hypocalcemia.  History of the same.  Consulted with nephrology.  Recommended 2 g of calcium gluconate and discharged on Tums.  Appropriate for discharge and outpatient management.    Amount and/or Complexity of Data Reviewed  Labs: ordered. Decision-making details documented in ED Course.  Radiology: ordered and independent interpretation performed. Decision-making details documented in ED Course.  ECG/medicine tests: ordered and independent interpretation performed. Decision-making details documented in ED Course.  Discussion of management or test interpretation with external provider(s): Nephrology    Risk  Prescription drug management.  Decision regarding hospitalization.             Medications   calcium gluconate 2 g in sodium  "chloride 0.9% 100 mL (premix) (2 g Intravenous New Bag 5/18/25 1609)       ED Risk Strat Scores                    No data recorded        SBIRT 20yo+      Flowsheet Row Most Recent Value   Initial Alcohol Screen: US AUDIT-C     1. How often do you have a drink containing alcohol? 0 Filed at: 05/18/2025 1232   2. How many drinks containing alcohol do you have on a typical day you are drinking?  0 Filed at: 05/18/2025 1232   3a. Male UNDER 65: How often do you have five or more drinks on one occasion? 0 Filed at: 05/18/2025 1232   Audit-C Score 0 Filed at: 05/18/2025 1232   GRETEL: How many times in the past year have you...    Used an illegal drug or used a prescription medication for non-medical reasons? Never Filed at: 05/18/2025 1232            Wells' Criteria for PE      Flowsheet Row Most Recent Value   Wells' Criteria for PE    Clinical signs and symptoms of DVT 0 Filed at: 05/18/2025 1459   PE is primary diagnosis or equally likely 0 Filed at: 05/18/2025 1459   HR >100 0 Filed at: 05/18/2025 1459   Immobilization at least 3 days or Surgery in the previous 4 weeks 0 Filed at: 05/18/2025 1459   Previous, objectively diagnosed PE or DVT 0 Filed at: 05/18/2025 1459   Hemoptysis 0 Filed at: 05/18/2025 1459   Malignancy with treatment within 6 months or palliative 0 Filed at: 05/18/2025 1459   Wells' Criteria Total 0 Filed at: 05/18/2025 1459                        History of Present Illness       Chief Complaint   Patient presents with    Shortness of Breath     Reports \"trouble catching my breath this morning upon waking up.\" Denies CP. Denies recent flu like symptoms. Hx CKD, has not started dialysis yet.        Past Medical History:   Diagnosis Date    Chronic kidney disease     Diabetes mellitus (HCC)     Hypertension     Received intravenous tissue plasminogen activator (tPA) in emergency department 02/01/2022    Stroke (HCC)     no deficits      Past Surgical History:   Procedure Laterality Date    CARDIAC " LOOP RECORDER  2023    PILONIDAL CYST EXCISION      [place] ARTERIOVENOUS ANASTOMOSIS OPEN DIRECT Left 4/10/2024    Procedure: CREATION FISTULA BRACHIOCEPHALIC LEFT (AV);  Surgeon: Maxwell Nielsen MD;  Location: [facility];  Service: Vascular    TOE AMPUTATION Left 4/2/2025    Procedure: AMPUTATION TOE;  Surgeon: Adriane Anna DPM;  Location: [facility];  Service: Podiatry      Family History   Problem Relation Age of Onset    No Known Problems Mother     No Known Problems Father     Pancreatic cancer Brother       Social History[1]   E-Cigarette/Vaping    E-Cigarette Use Never User       E-Cigarette/Vaping Substances    Nicotine No     THC No     CBD No     Flavoring No     Other No     Unknown No       I have reviewed and agree with the history as documented.     Patient is a 50-year-old male.  History of COPD.  History of chronic kidney disease.  Has fistula in left arm in preparation for dialysis.  History of CVA.  History of diabetes.  Patient is a smoker.  Patient awoke today with shortness of breath.  No chest pain.  He does have a cough productive of clear sputum.  No hemoptysis.  No fever or chills.  No peripheral edema.  No calf pain or unilateral leg swelling.  He feels better now than he did this morning, but still feels short of breath.        Review of Systems   Constitutional:  Negative for chills and fever.   HENT:  Negative for rhinorrhea and sore throat.    Eyes:  Negative for pain, redness and visual disturbance.   Respiratory:  Positive for cough and shortness of breath.    Cardiovascular:  Negative for chest pain and leg swelling.   Gastrointestinal:  Negative for abdominal pain, diarrhea and vomiting.   Endocrine: Negative for polydipsia and polyuria.   Genitourinary:  Negative for dysuria, frequency and hematuria.   Musculoskeletal:  Negative for back pain and neck pain.   Skin:  Negative for rash and wound.   Allergic/Immunologic: Negative for immunocompromised state.   Neurological:   Negative for weakness, numbness and headaches.   Psychiatric/Behavioral:  Negative for hallucinations and suicidal ideas.    All other systems reviewed and are negative.          Objective       ED Triage Vitals   Temperature Pulse Blood Pressure Respirations SpO2 Patient Position - Orthostatic VS   05/18/25 1227 05/18/25 1227 05/18/25 1227 05/18/25 1227 05/18/25 1227 05/18/25 1330   97.7 °F (36.5 °C) 87 155/70 20 100 % Lying      Temp Source Heart Rate Source BP Location FiO2 (%) Pain Score    05/18/25 1227 05/18/25 1227 05/18/25 1330 -- 05/18/25 1403    Oral Monitor Right arm  No Pain      Vitals      Date and Time Temp Pulse SpO2 Resp BP Pain Score FACES Pain Rating User   05/18/25 1600 -- 85 100 % 18 157/81 -- -- CC   05/18/25 1530 -- 85 97 % 18 148/72 -- -- CC   05/18/25 1500 -- 84 98 % 18 135/63 -- -- AA   05/18/25 1403 -- -- -- -- -- No Pain -- AA   05/18/25 1330 -- 80 100 % 22 156/85 -- -- AA   05/18/25 1227 97.7 °F (36.5 °C) 87 100 % 20 155/70 -- -- TF            Physical Exam  Vitals reviewed.   Constitutional:       General: He is not in acute distress.     Appearance: He is not toxic-appearing.   HENT:      Head: Normocephalic and atraumatic.      Nose: Nose normal.      Mouth/Throat:      Mouth: Mucous membranes are moist.     Eyes:      General:         Right eye: No discharge.         Left eye: No discharge.      Conjunctiva/sclera: Conjunctivae normal.       Cardiovascular:      Rate and Rhythm: Normal rate and regular rhythm.      Pulses: Normal pulses.      Heart sounds: Normal heart sounds. No murmur heard.     No friction rub. No gallop.   Pulmonary:      Effort: Pulmonary effort is normal. No respiratory distress.      Breath sounds: Normal breath sounds. No stridor. No wheezing, rhonchi or rales.   Abdominal:      General: Bowel sounds are normal. There is no distension.      Palpations: Abdomen is soft.      Tenderness: There is no abdominal tenderness. There is no right CVA tenderness, left  CVA tenderness, guarding or rebound.     Musculoskeletal:         General: No swelling, tenderness, deformity or signs of injury. Normal range of motion.      Cervical back: Normal range of motion and neck supple. No rigidity.      Right lower leg: No edema.      Left lower leg: No edema.      Comments: No calf pain or unilateral leg swelling     Skin:     General: Skin is warm and dry.      Coloration: Skin is not jaundiced or pale.      Findings: No bruising, erythema or rash.     Neurological:      General: No focal deficit present.      Mental Status: He is alert and oriented to person, place, and time.      Cranial Nerves: No facial asymmetry.      Sensory: No sensory deficit.      Motor: Motor function is intact.     Psychiatric:         Mood and Affect: Mood normal.         Behavior: Behavior normal.         Results Reviewed       Procedure Component Value Units Date/Time    HS Troponin I 2hr [[id number]]  (Normal) Collected: 05/18/25 1556    Lab Status: Final result Specimen: Blood from Arm, Right Updated: 05/18/25 1633     hs TnI 2hr 23 ng/L      Delta 2hr hsTnI -9 ng/L     HS Troponin I 4hr [[id number]]     Lab Status: No result Specimen: Blood     Comprehensive metabolic panel [[id number]]  (Abnormal) Collected: 05/18/25 1352    Lab Status: Final result Specimen: Blood from Arm, Right Updated: 05/18/25 1501     Sodium 137 mmol/L      Potassium 4.8 mmol/L      Chloride 111 mmol/L      CO2 16 mmol/L      ANION GAP 10 mmol/L      BUN 75 mg/dL      Creatinine 6.43 mg/dL      Glucose 150 mg/dL      Calcium 5.7 mg/dL      AST 13 U/L      ALT 10 U/L      Alkaline Phosphatase 155 U/L      Total Protein 7.5 g/dL      Albumin 3.9 g/dL      Total Bilirubin 0.31 mg/dL      eGFR 9 ml/min/1.73sq m     Narrative:      verified by repeat analysis.  [employer] guidelines for Chronic Kidney Disease (CKD):     Stage 1 with normal or high GFR (GFR > 90 mL/min/1.73 square meters)    Stage 2 Mild CKD (GFR  = 60-89 mL/min/1.73 square meters)    Stage 3A Moderate CKD (GFR = 45-59 mL/min/1.73 square meters)    Stage 3B Moderate CKD (GFR = 30-44 mL/min/1.73 square meters)    Stage 4 Severe CKD (GFR = 15-29 mL/min/1.73 square meters)    Stage 5 End Stage CKD (GFR <15 mL/min/1.73 square meters)  Note: GFR calculation is accurate only with a steady state creatinine    HS Troponin 0hr (reflex protocol) [[id number]]  (Normal) Collected: 05/18/25 1352    Lab Status: Final result Specimen: Blood from Arm, Right Updated: 05/18/25 1426     hs TnI 0hr 32 ng/L     B-Type Natriuretic Peptide(BNP) [[id number]]  (Abnormal) Collected: 05/18/25 1352    Lab Status: Final result Specimen: Blood from Arm, Right Updated: 05/18/25 1425      pg/mL     D-Dimer [[id number]]  (Abnormal) Collected: 05/18/25 1352    Lab Status: Final result Specimen: Blood from Arm, Right Updated: 05/18/25 1416     D-Dimer, Quant 1.01 ug/ml FEU     Narrative:      In the evaluation for possible pulmonary embolism, in the appropriate (Well's Score of 4 or less) patient, the age adjusted d-dimer cutoff for this patient can be calculated as:    Age x 0.01 (in ug/mL) for Age-adjusted D-dimer exclusion threshold for a patient over 50 years.    CBC and differential [[id number]]  (Abnormal) Collected: 05/18/25 1352    Lab Status: Final result Specimen: Blood from Arm, Right Updated: 05/18/25 1400     WBC 11.20 Thousand/uL      RBC 3.88 Million/uL      Hemoglobin 12.0 g/dL      Hematocrit 37.6 %      MCV 97 fL      MCH 30.9 pg      MCHC 31.9 g/dL      RDW 14.9 %      MPV 9.8 fL      Platelets 221 Thousands/uL      nRBC 0 /100 WBCs      Segmented % 81 %      Immature Grans % 0 %      Lymphocytes % 10 %      Monocytes % 7 %      Eosinophils Relative 2 %      Basophils Relative 0 %      Absolute Neutrophils 9.03 Thousands/µL      Absolute Immature Grans 0.04 Thousand/uL      Absolute Lymphocytes 1.11 Thousands/µL      Absolute Monocytes 0.78 Thousand/µL      Eosinophils  Absolute 0.20 Thousand/µL      Basophils Absolute 0.04 Thousands/µL             XR chest 2 views   ED Interpretation by Philip Saini MD (05/18 2833)   Cardiomegaly.  Mild increased vascular congestion.          ECG 12 Lead Documentation Only    Date/Time: 5/18/2025 1:54 PM    Performed by: Philip Saini MD  Authorized by: Philip Saini MD    ECG reviewed by me, the ED Provider: yes    Patient location:  ED  Comments:      Normal sinus rhythm.  Septal infarct, age undetermined.  Prolonged QT.  Abnormal EKG.  Similar to prior EKG.  No STEMI.      ED Medication and Procedure Management   Prior to Admission Medications   Prescriptions Last Dose Informant Patient Reported? Taking?   Dulaglutide 0.75 MG/0.5ML SOAJ More than a month Self No No   Sig: Inject 0.75 mg under the skin once a week   Insulin Glargine Solostar (Basaglar KwikPen) 100 UNIT/ML SOPN  Self No No   Sig: Inject 0.28 mL (28 Units total) under the skin daily   Insulin Glargine Solostar (Basaglar KwikPen) 100 UNIT/ML SOPN   No No   Sig: Inject 0.28 mL (28 Units total) under the skin in the morning   amLODIPine (NORVASC) 10 mg tablet  Self No No   Sig: Take 1 tablet (10 mg total) by mouth daily   atorvastatin (LIPITOR) 80 mg tablet  Self No No   Sig: Take 1 tablet (80 mg total) by mouth daily   calcitriol (ROCALTROL) 0.5 MCG capsule  Self No No   Sig: Take 1 capsule (0.5 mcg total) by mouth daily Monday, Wednesday, Friday   clopidogrel (PLAVIX) 75 mg tablet  Self No No   Sig: Take 1 tablet (75 mg total) by mouth daily   ergocalciferol (VITAMIN D2) 50,000 units  Self No No   Sig: Take 1 capsule (50,000 Units total) by mouth once a week   hydrALAZINE (APRESOLINE) 50 mg tablet  Self No No   Sig: Take 1 tablet (50 mg total) by mouth 2 (two) times a day   metoprolol succinate (TOPROL-XL) 50 mg 24 hr tablet  Self No No   Sig: Take 1 tablet (50 mg total) by mouth daily   sodium bicarbonate 650 mg tablet  Self No No   Sig: Take 1 tablet (650 mg total) by  mouth 2 (two) times a day      Facility-Administered Medications: None     Patient's Medications   Discharge Prescriptions    CALCIUM CARBONATE (TUMS) 500 MG CHEWABLE TABLET    Chew 4 tablets (2,000 mg total) daily       Start Date: 5/18/2025 End Date: --       Order Dose: 2,000 mg       Quantity: 360 tablet    Refills: 0     No discharge procedures on file.  ED SEPSIS DOCUMENTATION   Time reflects when diagnosis was documented in both MDM as applicable and the Disposition within this note       Time User Action Codes Description Comment    5/18/2025  4:40 PM Philip Saini [R06.00] Dyspnea     5/18/2025  4:40 PM Philip Saini [E83.51] Hypocalcemia                    [1]   Social History  Tobacco Use    Smoking status: Every Day     Current packs/day: 0.50     Average packs/day: 1 pack/day for 37.9 years (36.7 ttl pk-yrs)     Types: Cigarettes     Start date: 6/16/1987     Passive exposure: Current    Smokeless tobacco: Never    Tobacco comments:     states slowing it down, used Chantix-currently 1/2 pack/day, at least 35 pack years as of 2023   Vaping Use    Vaping status: Never Used   Substance Use Topics    Alcohol use: Never     Comment: stopped 8/22    Drug use: Never        Philip Saini MD  05/18/25 5529

## 2025-05-19 LAB
ATRIAL RATE: 82 BPM
P AXIS: 48 DEGREES
PR INTERVAL: 144 MS
QRS AXIS: 51 DEGREES
QRSD INTERVAL: 88 MS
QT INTERVAL: 438 MS
QTC INTERVAL: 511 MS
T WAVE AXIS: 94 DEGREES
VENTRICULAR RATE: 82 BPM

## 2025-05-19 PROCEDURE — 93010 ELECTROCARDIOGRAM REPORT: CPT | Performed by: INTERNAL MEDICINE

## 2025-06-04 ENCOUNTER — HOSPITAL ENCOUNTER (INPATIENT)
Facility: HOSPITAL | Age: 50
LOS: 2 days | Discharge: HOME | End: 2025-06-06
Attending: EMERGENCY MEDICINE | Admitting: INTERNAL MEDICINE
Payer: COMMERCIAL

## 2025-06-04 ENCOUNTER — APPOINTMENT (OUTPATIENT)
Dept: RADIOLOGY | Facility: HOSPITAL | Age: 50
End: 2025-06-04
Payer: COMMERCIAL

## 2025-06-04 ENCOUNTER — APPOINTMENT (EMERGENCY)
Dept: RADIOLOGY | Facility: HOSPITAL | Age: 50
End: 2025-06-04
Payer: COMMERCIAL

## 2025-06-04 ENCOUNTER — APPOINTMENT (EMERGENCY)
Dept: VASCULAR ULTRASOUND | Facility: HOSPITAL | Age: 50
End: 2025-06-04
Payer: COMMERCIAL

## 2025-06-04 DIAGNOSIS — N17.9 ACUTE KIDNEY INJURY SUPERIMPOSED ON STAGE 5 CHRONIC KIDNEY DISEASE, NOT ON CHRONIC DIALYSIS (HCC): ICD-10-CM

## 2025-06-04 DIAGNOSIS — R06.00 ACUTE DYSPNEA: Primary | ICD-10-CM

## 2025-06-04 DIAGNOSIS — N18.9 HYPOCALCEMIA DUE TO CHRONIC KIDNEY DISEASE: ICD-10-CM

## 2025-06-04 DIAGNOSIS — E83.51 HYPOCALCEMIA DUE TO CHRONIC KIDNEY DISEASE: ICD-10-CM

## 2025-06-04 DIAGNOSIS — F17.200 CURRENT SMOKER: ICD-10-CM

## 2025-06-04 DIAGNOSIS — E83.42 HYPOMAGNESEMIA: ICD-10-CM

## 2025-06-04 DIAGNOSIS — R74.8 CARDIAC ENZYMES ELEVATED: ICD-10-CM

## 2025-06-04 DIAGNOSIS — R06.02 SHORTNESS OF BREATH: ICD-10-CM

## 2025-06-04 DIAGNOSIS — N18.5 ACUTE KIDNEY INJURY SUPERIMPOSED ON STAGE 5 CHRONIC KIDNEY DISEASE, NOT ON CHRONIC DIALYSIS (HCC): ICD-10-CM

## 2025-06-04 PROBLEM — E87.8 ELECTROLYTE ABNORMALITY: Status: ACTIVE | Noted: 2025-04-01

## 2025-06-04 LAB
2HR DELTA HS TROPONIN: -10 NG/L
ALBUMIN SERPL BCG-MCNC: 3.6 G/DL (ref 3.5–5)
ALP SERPL-CCNC: 153 U/L (ref 34–104)
ALT SERPL W P-5'-P-CCNC: 17 U/L (ref 7–52)
ANION GAP SERPL CALCULATED.3IONS-SCNC: 11 MMOL/L (ref 4–13)
APTT PPP: 83 SECONDS (ref 23–34)
AST SERPL W P-5'-P-CCNC: 18 U/L (ref 13–39)
BASOPHILS # BLD AUTO: 0.03 THOUSANDS/ÂΜL (ref 0–0.1)
BASOPHILS NFR BLD AUTO: 0 % (ref 0–1)
BILIRUB SERPL-MCNC: 0.33 MG/DL (ref 0.2–1)
BNP SERPL-MCNC: 427 PG/ML (ref 0–100)
BUN SERPL-MCNC: 70 MG/DL (ref 5–25)
CALCIUM SERPL-MCNC: 5.9 MG/DL (ref 8.4–10.2)
CARDIAC TROPONIN I PNL SERPL HS: 44 NG/L (ref ?–50)
CARDIAC TROPONIN I PNL SERPL HS: 54 NG/L (ref ?–50)
CHLORIDE SERPL-SCNC: 108 MMOL/L (ref 96–108)
CO2 SERPL-SCNC: 18 MMOL/L (ref 21–32)
CREAT SERPL-MCNC: 6.77 MG/DL (ref 0.6–1.3)
EOSINOPHIL # BLD AUTO: 0.25 THOUSAND/ÂΜL (ref 0–0.61)
EOSINOPHIL NFR BLD AUTO: 3 % (ref 0–6)
ERYTHROCYTE [DISTWIDTH] IN BLOOD BY AUTOMATED COUNT: 14.7 % (ref 11.6–15.1)
GFR SERPL CREATININE-BSD FRML MDRD: 8 ML/MIN/1.73SQ M
GLUCOSE SERPL-MCNC: 181 MG/DL (ref 65–140)
GLUCOSE SERPL-MCNC: 467 MG/DL (ref 65–140)
HCT VFR BLD AUTO: 35.2 % (ref 36.5–49.3)
HGB BLD-MCNC: 11.2 G/DL (ref 12–17)
IMM GRANULOCYTES # BLD AUTO: 0.03 THOUSAND/UL (ref 0–0.2)
IMM GRANULOCYTES NFR BLD AUTO: 0 % (ref 0–2)
INR PPP: 1.23 (ref 0.85–1.19)
LYMPHOCYTES # BLD AUTO: 0.98 THOUSANDS/ÂΜL (ref 0.6–4.47)
LYMPHOCYTES NFR BLD AUTO: 10 % (ref 14–44)
MAGNESIUM SERPL-MCNC: 1.1 MG/DL (ref 1.9–2.7)
MCH RBC QN AUTO: 30.4 PG (ref 26.8–34.3)
MCHC RBC AUTO-ENTMCNC: 31.8 G/DL (ref 31.4–37.4)
MCV RBC AUTO: 95 FL (ref 82–98)
MONOCYTES # BLD AUTO: 0.71 THOUSAND/ÂΜL (ref 0.17–1.22)
MONOCYTES NFR BLD AUTO: 7 % (ref 4–12)
NEUTROPHILS # BLD AUTO: 7.99 THOUSANDS/ÂΜL (ref 1.85–7.62)
NEUTS SEG NFR BLD AUTO: 80 % (ref 43–75)
NRBC BLD AUTO-RTO: 0 /100 WBCS
PLATELET # BLD AUTO: 222 THOUSANDS/UL (ref 149–390)
PMV BLD AUTO: 9.7 FL (ref 8.9–12.7)
POTASSIUM SERPL-SCNC: 3.7 MMOL/L (ref 3.5–5.3)
PROT SERPL-MCNC: 7.1 G/DL (ref 6.4–8.4)
PROTHROMBIN TIME: 16.2 SECONDS (ref 12.3–15)
RBC # BLD AUTO: 3.69 MILLION/UL (ref 3.88–5.62)
SODIUM SERPL-SCNC: 137 MMOL/L (ref 135–147)
WBC # BLD AUTO: 9.99 THOUSAND/UL (ref 4.31–10.16)

## 2025-06-04 PROCEDURE — 83735 ASSAY OF MAGNESIUM: CPT | Performed by: EMERGENCY MEDICINE

## 2025-06-04 PROCEDURE — 93970 EXTREMITY STUDY: CPT

## 2025-06-04 PROCEDURE — 71046 X-RAY EXAM CHEST 2 VIEWS: CPT

## 2025-06-04 PROCEDURE — 36415 COLL VENOUS BLD VENIPUNCTURE: CPT

## 2025-06-04 PROCEDURE — 80053 COMPREHEN METABOLIC PANEL: CPT

## 2025-06-04 PROCEDURE — 84484 ASSAY OF TROPONIN QUANT: CPT

## 2025-06-04 PROCEDURE — 82948 REAGENT STRIP/BLOOD GLUCOSE: CPT

## 2025-06-04 PROCEDURE — 99285 EMERGENCY DEPT VISIT HI MDM: CPT | Performed by: EMERGENCY MEDICINE

## 2025-06-04 PROCEDURE — 93005 ELECTROCARDIOGRAM TRACING: CPT

## 2025-06-04 PROCEDURE — 85730 THROMBOPLASTIN TIME PARTIAL: CPT | Performed by: EMERGENCY MEDICINE

## 2025-06-04 PROCEDURE — 83880 ASSAY OF NATRIURETIC PEPTIDE: CPT

## 2025-06-04 PROCEDURE — 85610 PROTHROMBIN TIME: CPT | Performed by: EMERGENCY MEDICINE

## 2025-06-04 PROCEDURE — 85025 COMPLETE CBC W/AUTO DIFF WBC: CPT

## 2025-06-04 PROCEDURE — 99223 1ST HOSP IP/OBS HIGH 75: CPT

## 2025-06-04 PROCEDURE — 96365 THER/PROPH/DIAG IV INF INIT: CPT

## 2025-06-04 PROCEDURE — 99285 EMERGENCY DEPT VISIT HI MDM: CPT

## 2025-06-04 PROCEDURE — 96374 THER/PROPH/DIAG INJ IV PUSH: CPT

## 2025-06-04 RX ORDER — HYDRALAZINE HYDROCHLORIDE 25 MG/1
50 TABLET, FILM COATED ORAL 2 TIMES DAILY
Status: DISCONTINUED | OUTPATIENT
Start: 2025-06-04 | End: 2025-06-06 | Stop reason: HOSPADM

## 2025-06-04 RX ORDER — CALCIUM GLUCONATE 20 MG/ML
1 INJECTION, SOLUTION INTRAVENOUS ONCE
Status: COMPLETED | OUTPATIENT
Start: 2025-06-04 | End: 2025-06-04

## 2025-06-04 RX ORDER — INSULIN LISPRO 100 [IU]/ML
1-6 INJECTION, SOLUTION INTRAVENOUS; SUBCUTANEOUS
Status: DISCONTINUED | OUTPATIENT
Start: 2025-06-04 | End: 2025-06-06 | Stop reason: HOSPADM

## 2025-06-04 RX ORDER — HEPARIN SODIUM 1000 [USP'U]/ML
6800 INJECTION, SOLUTION INTRAVENOUS; SUBCUTANEOUS ONCE
Status: COMPLETED | OUTPATIENT
Start: 2025-06-04 | End: 2025-06-04

## 2025-06-04 RX ORDER — CLOPIDOGREL BISULFATE 75 MG/1
75 TABLET ORAL DAILY
Status: DISCONTINUED | OUTPATIENT
Start: 2025-06-05 | End: 2025-06-06 | Stop reason: HOSPADM

## 2025-06-04 RX ORDER — ALBUTEROL SULFATE 90 UG/1
2 INHALANT RESPIRATORY (INHALATION) EVERY 6 HOURS PRN
Status: DISCONTINUED | OUTPATIENT
Start: 2025-06-04 | End: 2025-06-05

## 2025-06-04 RX ORDER — ATORVASTATIN CALCIUM 40 MG/1
80 TABLET, FILM COATED ORAL DAILY
Status: DISCONTINUED | OUTPATIENT
Start: 2025-06-05 | End: 2025-06-06 | Stop reason: HOSPADM

## 2025-06-04 RX ORDER — AMLODIPINE BESYLATE 10 MG/1
10 TABLET ORAL DAILY
Status: DISCONTINUED | OUTPATIENT
Start: 2025-06-05 | End: 2025-06-06 | Stop reason: HOSPADM

## 2025-06-04 RX ORDER — ASPIRIN 81 MG/1
324 TABLET, CHEWABLE ORAL ONCE
Status: COMPLETED | OUTPATIENT
Start: 2025-06-04 | End: 2025-06-04

## 2025-06-04 RX ORDER — INSULIN GLARGINE 100 [IU]/ML
14 INJECTION, SOLUTION SUBCUTANEOUS EVERY MORNING
Status: DISCONTINUED | OUTPATIENT
Start: 2025-06-05 | End: 2025-06-06 | Stop reason: HOSPADM

## 2025-06-04 RX ORDER — METOPROLOL SUCCINATE 50 MG/1
50 TABLET, EXTENDED RELEASE ORAL DAILY
Status: DISCONTINUED | OUTPATIENT
Start: 2025-06-05 | End: 2025-06-06 | Stop reason: HOSPADM

## 2025-06-04 RX ORDER — MAGNESIUM SULFATE HEPTAHYDRATE 40 MG/ML
4 INJECTION, SOLUTION INTRAVENOUS ONCE
Status: COMPLETED | OUTPATIENT
Start: 2025-06-04 | End: 2025-06-05

## 2025-06-04 RX ORDER — CALCIUM CARBONATE 500 MG/1
1000 TABLET, CHEWABLE ORAL DAILY PRN
Status: DISCONTINUED | OUTPATIENT
Start: 2025-06-04 | End: 2025-06-06 | Stop reason: HOSPADM

## 2025-06-04 RX ORDER — INSULIN LISPRO 100 [IU]/ML
1-6 INJECTION, SOLUTION INTRAVENOUS; SUBCUTANEOUS
Status: DISCONTINUED | OUTPATIENT
Start: 2025-06-05 | End: 2025-06-06 | Stop reason: HOSPADM

## 2025-06-04 RX ORDER — SODIUM BICARBONATE 650 MG/1
650 TABLET ORAL 2 TIMES DAILY
Status: DISCONTINUED | OUTPATIENT
Start: 2025-06-04 | End: 2025-06-06 | Stop reason: HOSPADM

## 2025-06-04 RX ORDER — ACETAMINOPHEN 325 MG/1
650 TABLET ORAL EVERY 6 HOURS PRN
Status: DISCONTINUED | OUTPATIENT
Start: 2025-06-04 | End: 2025-06-06 | Stop reason: HOSPADM

## 2025-06-04 RX ORDER — NICOTINE 21 MG/24HR
1 PATCH, TRANSDERMAL 24 HOURS TRANSDERMAL DAILY
Status: DISCONTINUED | OUTPATIENT
Start: 2025-06-05 | End: 2025-06-06 | Stop reason: HOSPADM

## 2025-06-04 RX ORDER — HEPARIN SODIUM 10000 [USP'U]/100ML
3-30 INJECTION, SOLUTION INTRAVENOUS
Status: DISCONTINUED | OUTPATIENT
Start: 2025-06-04 | End: 2025-06-05

## 2025-06-04 RX ADMIN — SODIUM BICARBONATE 650 MG: 650 TABLET ORAL at 22:27

## 2025-06-04 RX ADMIN — MAGNESIUM SULFATE 4 G: 4 INJECTION INTRAVENOUS at 20:48

## 2025-06-04 RX ADMIN — HEPARIN SODIUM 6800 UNITS: 1000 INJECTION, SOLUTION INTRAVENOUS; SUBCUTANEOUS at 21:21

## 2025-06-04 RX ADMIN — INSULIN LISPRO 6 UNITS: 100 INJECTION, SOLUTION INTRAVENOUS; SUBCUTANEOUS at 23:43

## 2025-06-04 RX ADMIN — CALCIUM GLUCONATE 1 G: 20 INJECTION, SOLUTION INTRAVENOUS at 20:16

## 2025-06-04 RX ADMIN — HYDRALAZINE HYDROCHLORIDE 50 MG: 25 TABLET ORAL at 22:27

## 2025-06-04 RX ADMIN — HEPARIN SODIUM 18 UNITS/KG/HR: 10000 INJECTION, SOLUTION INTRAVENOUS at 21:25

## 2025-06-04 RX ADMIN — ASPIRIN 324 MG: 81 TABLET, CHEWABLE ORAL at 20:14

## 2025-06-05 ENCOUNTER — TELEPHONE (OUTPATIENT)
Age: 50
End: 2025-06-05

## 2025-06-05 ENCOUNTER — TELEPHONE (OUTPATIENT)
Dept: NEPHROLOGY | Facility: CLINIC | Age: 50
End: 2025-06-05

## 2025-06-05 ENCOUNTER — APPOINTMENT (INPATIENT)
Dept: NON INVASIVE DIAGNOSTICS | Facility: HOSPITAL | Age: 50
End: 2025-06-05
Payer: COMMERCIAL

## 2025-06-05 ENCOUNTER — APPOINTMENT (INPATIENT)
Dept: NUCLEAR MEDICINE | Facility: HOSPITAL | Age: 50
End: 2025-06-05
Payer: COMMERCIAL

## 2025-06-05 PROBLEM — N18.5 CHRONIC KIDNEY DISEASE, STAGE V (HCC): Status: ACTIVE | Noted: 2025-06-05

## 2025-06-05 LAB
4HR DELTA HS TROPONIN: -13 NG/L
ALBUMIN SERPL BCG-MCNC: 3.3 G/DL (ref 3.5–5)
ALP SERPL-CCNC: 136 U/L (ref 34–104)
ALT SERPL W P-5'-P-CCNC: 15 U/L (ref 7–52)
ANION GAP SERPL CALCULATED.3IONS-SCNC: 10 MMOL/L (ref 4–13)
AORTIC ROOT: 3.8 CM
APTT PPP: 150 SECONDS (ref 23–34)
APTT PPP: 39 SECONDS (ref 23–34)
APTT PPP: 42 SECONDS (ref 23–34)
ASCENDING AORTA: 3.7 CM
AST SERPL W P-5'-P-CCNC: 15 U/L (ref 13–39)
ATRIAL RATE: 80 BPM
ATRIAL RATE: 85 BPM
AV REGURGITATION PRESSURE HALF TIME: 292 MS
BASOPHILS # BLD AUTO: 0.02 THOUSANDS/ÂΜL (ref 0–0.1)
BASOPHILS NFR BLD AUTO: 0 % (ref 0–1)
BILIRUB SERPL-MCNC: 0.24 MG/DL (ref 0.2–1)
BSA FOR ECHO PROCEDURE: 2.06 M2
BUN SERPL-MCNC: 73 MG/DL (ref 5–25)
CALCIUM ALBUM COR SERPL-MCNC: 6.2 MG/DL (ref 8.3–10.1)
CALCIUM SERPL-MCNC: 5.6 MG/DL (ref 8.4–10.2)
CARDIAC TROPONIN I PNL SERPL HS: 41 NG/L (ref ?–50)
CHLORIDE SERPL-SCNC: 110 MMOL/L (ref 96–108)
CO2 SERPL-SCNC: 18 MMOL/L (ref 21–32)
CREAT SERPL-MCNC: 7.31 MG/DL (ref 0.6–1.3)
E WAVE DECELERATION TIME: 164 MS
E/A RATIO: 1.42
EOSINOPHIL # BLD AUTO: 0.08 THOUSAND/ÂΜL (ref 0–0.61)
EOSINOPHIL NFR BLD AUTO: 1 % (ref 0–6)
ERYTHROCYTE [DISTWIDTH] IN BLOOD BY AUTOMATED COUNT: 14.6 % (ref 11.6–15.1)
FRACTIONAL SHORTENING: 31 (ref 28–44)
GFR SERPL CREATININE-BSD FRML MDRD: 7 ML/MIN/1.73SQ M
GLUCOSE SERPL-MCNC: 259 MG/DL (ref 65–140)
GLUCOSE SERPL-MCNC: 301 MG/DL (ref 65–140)
GLUCOSE SERPL-MCNC: 331 MG/DL (ref 65–140)
GLUCOSE SERPL-MCNC: 367 MG/DL (ref 65–140)
GLUCOSE SERPL-MCNC: 391 MG/DL (ref 65–140)
GLUCOSE SERPL-MCNC: 480 MG/DL (ref 65–140)
GLUCOSE SERPL-MCNC: 593 MG/DL (ref 65–140)
HCT VFR BLD AUTO: 32.2 % (ref 36.5–49.3)
HGB BLD-MCNC: 10.5 G/DL (ref 12–17)
IMM GRANULOCYTES # BLD AUTO: 0.06 THOUSAND/UL (ref 0–0.2)
IMM GRANULOCYTES NFR BLD AUTO: 1 % (ref 0–2)
INTERVENTRICULAR SEPTUM IN DIASTOLE (PARASTERNAL SHORT AXIS VIEW): 1.2 CM
INTERVENTRICULAR SEPTUM: 1.2 CM (ref 0.6–1.1)
LAAS-AP2: 21.6 CM2
LAAS-AP4: 20.9 CM2
LEFT ATRIUM AREA SYSTOLE SINGLE PLANE A4C: 20 CM2
LEFT ATRIUM SIZE: 4.3 CM
LEFT ATRIUM VOLUME (MOD BIPLANE): 69 ML
LEFT ATRIUM VOLUME INDEX (MOD BIPLANE): 33.5 ML/M2
LEFT INTERNAL DIMENSION IN SYSTOLE: 3.8 CM (ref 2.1–4)
LEFT VENTRICULAR INTERNAL DIMENSION IN DIASTOLE: 5.5 CM (ref 3.5–6)
LEFT VENTRICULAR POSTERIOR WALL IN END DIASTOLE: 1.1 CM
LEFT VENTRICULAR STROKE VOLUME: 86 ML
LV EF US.2D.A4C+ESTIMATED: 52 %
LVSV (TEICH): 86 ML
LYMPHOCYTES # BLD AUTO: 0.52 THOUSANDS/ÂΜL (ref 0.6–4.47)
LYMPHOCYTES NFR BLD AUTO: 5 % (ref 14–44)
MAGNESIUM SERPL-MCNC: 1.8 MG/DL (ref 1.9–2.7)
MCH RBC QN AUTO: 30.9 PG (ref 26.8–34.3)
MCHC RBC AUTO-ENTMCNC: 32.6 G/DL (ref 31.4–37.4)
MCV RBC AUTO: 95 FL (ref 82–98)
MONOCYTES # BLD AUTO: 0.19 THOUSAND/ÂΜL (ref 0.17–1.22)
MONOCYTES NFR BLD AUTO: 2 % (ref 4–12)
MV E'TISSUE VEL-SEP: 10 CM/S
MV PEAK A VEL: 0.8 M/S
MV PEAK E VEL: 114 CM/S
MV STENOSIS PRESSURE HALF TIME: 48 MS
MV VALVE AREA P 1/2 METHOD: 4.58
NEUTROPHILS # BLD AUTO: 10.75 THOUSANDS/ÂΜL (ref 1.85–7.62)
NEUTS SEG NFR BLD AUTO: 91 % (ref 43–75)
NRBC BLD AUTO-RTO: 0 /100 WBCS
P AXIS: 59 DEGREES
P AXIS: 70 DEGREES
PLATELET # BLD AUTO: 192 THOUSANDS/UL (ref 149–390)
PMV BLD AUTO: 9.6 FL (ref 8.9–12.7)
POTASSIUM SERPL-SCNC: 4 MMOL/L (ref 3.5–5.3)
PR INTERVAL: 140 MS
PR INTERVAL: 140 MS
PROT SERPL-MCNC: 6.5 G/DL (ref 6.4–8.4)
QRS AXIS: 62 DEGREES
QRS AXIS: 68 DEGREES
QRSD INTERVAL: 94 MS
QRSD INTERVAL: 96 MS
QT INTERVAL: 418 MS
QT INTERVAL: 446 MS
QTC INTERVAL: 497 MS
QTC INTERVAL: 514 MS
RBC # BLD AUTO: 3.4 MILLION/UL (ref 3.88–5.62)
RIGHT ATRIUM AREA SYSTOLE A4C: 17.4 CM2
RIGHT VENTRICLE ID DIMENSION: 4.2 CM
SL CV AV DECELERATION TIME RETROGRADE: 1006 MS
SL CV AV PEAK GRADIENT RETROGRADE: 92 MMHG
SL CV LEFT ATRIUM LENGTH A2C: 5.8 CM
SL CV LV EF: 65
SL CV PED ECHO LEFT VENTRICLE DIASTOLIC VOLUME (MOD BIPLANE) 2D: 148 ML
SL CV PED ECHO LEFT VENTRICLE SYSTOLIC VOLUME (MOD BIPLANE) 2D: 63 ML
SODIUM SERPL-SCNC: 138 MMOL/L (ref 135–147)
T WAVE AXIS: 110 DEGREES
T WAVE AXIS: 114 DEGREES
TR MAX PG: 30 MMHG
TR PEAK VELOCITY: 2.7 M/S
TRICUSPID ANNULAR PLANE SYSTOLIC EXCURSION: 3 CM
TRICUSPID VALVE PEAK REGURGITATION VELOCITY: 2.74 M/S
VENTRICULAR RATE: 80 BPM
VENTRICULAR RATE: 85 BPM
WBC # BLD AUTO: 11.62 THOUSAND/UL (ref 4.31–10.16)

## 2025-06-05 PROCEDURE — 84484 ASSAY OF TROPONIN QUANT: CPT

## 2025-06-05 PROCEDURE — 87340 HEPATITIS B SURFACE AG IA: CPT | Performed by: INTERNAL MEDICINE

## 2025-06-05 PROCEDURE — 85025 COMPLETE CBC W/AUTO DIFF WBC: CPT

## 2025-06-05 PROCEDURE — 93306 TTE W/DOPPLER COMPLETE: CPT

## 2025-06-05 PROCEDURE — 86704 HEP B CORE ANTIBODY TOTAL: CPT | Performed by: INTERNAL MEDICINE

## 2025-06-05 PROCEDURE — 99223 1ST HOSP IP/OBS HIGH 75: CPT | Performed by: INTERNAL MEDICINE

## 2025-06-05 PROCEDURE — 85730 THROMBOPLASTIN TIME PARTIAL: CPT

## 2025-06-05 PROCEDURE — 80053 COMPREHEN METABOLIC PANEL: CPT

## 2025-06-05 PROCEDURE — 99232 SBSQ HOSP IP/OBS MODERATE 35: CPT | Performed by: INTERNAL MEDICINE

## 2025-06-05 PROCEDURE — A9540 TC99M MAA: HCPCS

## 2025-06-05 PROCEDURE — 83735 ASSAY OF MAGNESIUM: CPT

## 2025-06-05 PROCEDURE — A9558 XE133 XENON 10MCI: HCPCS

## 2025-06-05 PROCEDURE — 94664 DEMO&/EVAL PT USE INHALER: CPT

## 2025-06-05 PROCEDURE — 94760 N-INVAS EAR/PLS OXIMETRY 1: CPT

## 2025-06-05 PROCEDURE — 93010 ELECTROCARDIOGRAM REPORT: CPT | Performed by: STUDENT IN AN ORGANIZED HEALTH CARE EDUCATION/TRAINING PROGRAM

## 2025-06-05 PROCEDURE — 82948 REAGENT STRIP/BLOOD GLUCOSE: CPT

## 2025-06-05 PROCEDURE — 94640 AIRWAY INHALATION TREATMENT: CPT

## 2025-06-05 PROCEDURE — 93306 TTE W/DOPPLER COMPLETE: CPT | Performed by: INTERNAL MEDICINE

## 2025-06-05 PROCEDURE — 86706 HEP B SURFACE ANTIBODY: CPT | Performed by: INTERNAL MEDICINE

## 2025-06-05 PROCEDURE — 78582 LUNG VENTILAT&PERFUS IMAGING: CPT

## 2025-06-05 PROCEDURE — 85730 THROMBOPLASTIN TIME PARTIAL: CPT | Performed by: INTERNAL MEDICINE

## 2025-06-05 PROCEDURE — 86803 HEPATITIS C AB TEST: CPT | Performed by: INTERNAL MEDICINE

## 2025-06-05 RX ORDER — INSULIN LISPRO 100 [IU]/ML
5 INJECTION, SOLUTION INTRAVENOUS; SUBCUTANEOUS ONCE
Status: COMPLETED | OUTPATIENT
Start: 2025-06-05 | End: 2025-06-05

## 2025-06-05 RX ORDER — CALCIUM CARBONATE 500 MG/1
1000 TABLET, CHEWABLE ORAL 2 TIMES DAILY
Status: DISCONTINUED | OUTPATIENT
Start: 2025-06-05 | End: 2025-06-06 | Stop reason: HOSPADM

## 2025-06-05 RX ORDER — METHYLPREDNISOLONE SODIUM SUCCINATE 40 MG/ML
40 INJECTION, POWDER, LYOPHILIZED, FOR SOLUTION INTRAMUSCULAR; INTRAVENOUS ONCE
Status: COMPLETED | OUTPATIENT
Start: 2025-06-05 | End: 2025-06-05

## 2025-06-05 RX ORDER — ERGOCALCIFEROL 1.25 MG/1
50000 CAPSULE, LIQUID FILLED ORAL WEEKLY
Status: DISCONTINUED | OUTPATIENT
Start: 2025-06-05 | End: 2025-06-06 | Stop reason: HOSPADM

## 2025-06-05 RX ORDER — CALCIUM GLUCONATE 20 MG/ML
1 INJECTION, SOLUTION INTRAVENOUS ONCE
Status: COMPLETED | OUTPATIENT
Start: 2025-06-05 | End: 2025-06-05

## 2025-06-05 RX ORDER — TORSEMIDE 20 MG/1
80 TABLET ORAL DAILY
Status: DISCONTINUED | OUTPATIENT
Start: 2025-06-05 | End: 2025-06-06 | Stop reason: HOSPADM

## 2025-06-05 RX ORDER — METHYLPREDNISOLONE SODIUM SUCCINATE 40 MG/ML
20 INJECTION, POWDER, LYOPHILIZED, FOR SOLUTION INTRAMUSCULAR; INTRAVENOUS ONCE
Status: DISCONTINUED | OUTPATIENT
Start: 2025-06-05 | End: 2025-06-05

## 2025-06-05 RX ORDER — INSULIN LISPRO 100 [IU]/ML
7 INJECTION, SOLUTION INTRAVENOUS; SUBCUTANEOUS ONCE
Status: COMPLETED | OUTPATIENT
Start: 2025-06-05 | End: 2025-06-05

## 2025-06-05 RX ORDER — ALBUTEROL SULFATE 0.83 MG/ML
2.5 SOLUTION RESPIRATORY (INHALATION) EVERY 6 HOURS PRN
Status: DISCONTINUED | OUTPATIENT
Start: 2025-06-05 | End: 2025-06-06 | Stop reason: HOSPADM

## 2025-06-05 RX ORDER — CALCITRIOL 0.25 UG/1
0.5 CAPSULE, LIQUID FILLED ORAL DAILY
Status: DISCONTINUED | OUTPATIENT
Start: 2025-06-05 | End: 2025-06-06 | Stop reason: HOSPADM

## 2025-06-05 RX ADMIN — INSULIN GLARGINE 14 UNITS: 100 INJECTION, SOLUTION SUBCUTANEOUS at 09:29

## 2025-06-05 RX ADMIN — SODIUM BICARBONATE 650 MG: 650 TABLET ORAL at 09:11

## 2025-06-05 RX ADMIN — METOPROLOL SUCCINATE 50 MG: 50 TABLET, EXTENDED RELEASE ORAL at 09:11

## 2025-06-05 RX ADMIN — HYDRALAZINE HYDROCHLORIDE 50 MG: 25 TABLET ORAL at 17:07

## 2025-06-05 RX ADMIN — ERGOCALCIFEROL 50000 UNITS: 1.25 CAPSULE, LIQUID FILLED ORAL at 13:46

## 2025-06-05 RX ADMIN — INSULIN LISPRO 6 UNITS: 100 INJECTION, SOLUTION INTRAVENOUS; SUBCUTANEOUS at 12:43

## 2025-06-05 RX ADMIN — ALBUTEROL SULFATE 2.5 MG: 2.5 SOLUTION RESPIRATORY (INHALATION) at 02:57

## 2025-06-05 RX ADMIN — INSULIN LISPRO 4 UNITS: 100 INJECTION, SOLUTION INTRAVENOUS; SUBCUTANEOUS at 08:02

## 2025-06-05 RX ADMIN — HEPARIN SODIUM 19 UNITS/KG/HR: 10000 INJECTION, SOLUTION INTRAVENOUS at 11:13

## 2025-06-05 RX ADMIN — AMLODIPINE BESYLATE 10 MG: 10 TABLET ORAL at 09:11

## 2025-06-05 RX ADMIN — INSULIN LISPRO 5 UNITS: 100 INJECTION, SOLUTION INTRAVENOUS; SUBCUTANEOUS at 14:09

## 2025-06-05 RX ADMIN — CALCIUM GLUCONATE 1 G: 20 INJECTION, SOLUTION INTRAVENOUS at 13:21

## 2025-06-05 RX ADMIN — INSULIN LISPRO 6 UNITS: 100 INJECTION, SOLUTION INTRAVENOUS; SUBCUTANEOUS at 21:41

## 2025-06-05 RX ADMIN — CALCITRIOL CAPSULES 0.25 MCG 0.5 MCG: 0.25 CAPSULE ORAL at 11:23

## 2025-06-05 RX ADMIN — CLOPIDOGREL 75 MG: 75 TABLET ORAL at 09:11

## 2025-06-05 RX ADMIN — ALBUTEROL SULFATE 2 PUFF: 90 AEROSOL, METERED RESPIRATORY (INHALATION) at 02:26

## 2025-06-05 RX ADMIN — HYDRALAZINE HYDROCHLORIDE 50 MG: 25 TABLET ORAL at 09:12

## 2025-06-05 RX ADMIN — CALCIUM CARBONATE (ANTACID) CHEW TAB 500 MG 1000 MG: 500 CHEW TAB at 11:20

## 2025-06-05 RX ADMIN — INSULIN LISPRO 7 UNITS: 100 INJECTION, SOLUTION INTRAVENOUS; SUBCUTANEOUS at 12:43

## 2025-06-05 RX ADMIN — CALCIUM CARBONATE (ANTACID) CHEW TAB 500 MG 1000 MG: 500 CHEW TAB at 17:04

## 2025-06-05 RX ADMIN — METHYLPREDNISOLONE SODIUM SUCCINATE 40 MG: 40 INJECTION, POWDER, FOR SOLUTION INTRAMUSCULAR; INTRAVENOUS at 03:02

## 2025-06-05 RX ADMIN — INSULIN LISPRO 6 UNITS: 100 INJECTION, SOLUTION INTRAVENOUS; SUBCUTANEOUS at 17:08

## 2025-06-05 RX ADMIN — ATORVASTATIN CALCIUM 80 MG: 40 TABLET, FILM COATED ORAL at 09:11

## 2025-06-05 RX ADMIN — TORSEMIDE 80 MG: 20 TABLET ORAL at 13:34

## 2025-06-05 RX ADMIN — NICOTINE 1 PATCH: 21 PATCH, EXTENDED RELEASE TRANSDERMAL at 09:11

## 2025-06-05 RX ADMIN — SODIUM BICARBONATE 650 MG: 650 TABLET ORAL at 17:04

## 2025-06-05 NOTE — ED PROVIDER NOTES
Time reflects when diagnosis was documented in both MDM as applicable and the Disposition within this note       Time User Action Codes Description Comment    6/4/2025  8:58 PM Digna Orlando Add [R06.00] Acute dyspnea     6/4/2025  8:58 PM Digna Orlando Add [R74.8] Cardiac enzymes elevated     6/4/2025  8:58 PM Digna Orlando Add [E83.51,  N18.9] Hypocalcemia due to chronic kidney disease     6/4/2025  8:58 PM Digna Orlando Add [E83.42] Hypomagnesemia           ED Disposition       ED Disposition   Admit    Condition   Stable    Date/Time   Wed Jun 4, 2025  8:58 PM    Comment   Case was discussed with BURKE and the patient's admission status was agreed to be Admission Status: inpatient status to the service of Dr. Loco .               Assessment & Plan       Medical Decision Making  50-year-old male presents to the ED for acute dyspnea.  Patient had similar dyspnea a few weeks ago and was seen in the ED at that time.  Patient does have baseline chronic kidney disease with GFR less than 10 at baseline but not on dialysis as of yet.  Differential diagnosis includes acute PE, acute pulmonary edema from renal dysfunction, new onset heart failure, acute coronary syndrome, anxiety or panic attack, pleural effusion.  Prior to my evaluation, patient had first noted cardiac workup initiated by nursing and troponin did come back acutely elevated at 54.  BNP also in the 400s.  During patient's prior ED visit on 5/18, troponin was within normal limits at that time, 32 and 2-hour troponin was 23.  Patient did have elevated BNP at that time as well in the 600s.  He did have elevated D-dimer as well greater than 1.0 however CTA not performed due to poor baseline renal function.  Will hold off on any IV fluids and await chest x-ray as CHF/pulmonary edema considered.  Will discuss with internal medicine on whether or not to give heparin for now until we can get VQ scan in the morning.  Will order bilateral venous duplex  for now.  Will give aspirin 325 mg.    Amount and/or Complexity of Data Reviewed  Labs: ordered. Decision-making details documented in ED Course.  Radiology: ordered and independent interpretation performed. Decision-making details documented in ED Course.  ECG/medicine tests: ordered and independent interpretation performed. Decision-making details documented in ED Course.    Risk  OTC drugs.  Prescription drug management.  Decision regarding hospitalization.        ED Course as of 06/04/25 2155 Wed Jun 04, 2025 2003 Calcium(!!): 5.9  Calcium chronically low.  Will give 1 g calcium gluconate.   2004 GFR, Calculated: 8   2004 Creatinine(!): 6.77  Patient's renal function is at baseline.   2004 B-Type Natriuretic Peptide(BNP)(!)   2012 MAGNESIUM(!): 1.1  Will give 4g IV magnesium sulfate to start.    2045 Ramila Lake, vascular tech, reported that bilateral venous duplex was negative for DVT and SVT bilaterally.  He had some enlarged lymph nodes in the bilateral groin but otherwise US was unremarkable.   2050 Updated patient about rest of workup including the chronically low calcium as well as the significantly low magnesium level.  Updated him about the negative venous duplex.  Will admit at this time.   2056 Spoke with internal medicine AP who did recommend covering with heparin drip until PE can be ruled out with VQ scan.   2100 Updated patient about plan to start heparin drip.  Patient agreeable.       Medications   magnesium sulfate 4 g/100 mL IVPB (premix) 4 g (4 g Intravenous New Bag 6/4/25 2048)   heparin (porcine) 25,000 units in 0.45% NaCl 250 mL infusion (premix) (18 Units/kg/hr × 85 kg (Order-Specific) Intravenous New Bag 6/4/25 2125)   amLODIPine (NORVASC) tablet 10 mg (has no administration in time range)   atorvastatin (LIPITOR) tablet 80 mg (has no administration in time range)   clopidogrel (PLAVIX) tablet 75 mg (has no administration in time range)   insulin glargine (LANTUS) subcutaneous  injection 14 Units 0.14 mL (has no administration in time range)   hydrALAZINE (APRESOLINE) tablet 50 mg (has no administration in time range)   metoprolol succinate (TOPROL-XL) 24 hr tablet 50 mg (has no administration in time range)   sodium bicarbonate tablet 650 mg (has no administration in time range)   calcium carbonate (TUMS) chewable tablet 1,000 mg (has no administration in time range)   acetaminophen (TYLENOL) tablet 650 mg (has no administration in time range)   nicotine (NICODERM CQ) 21 mg/24 hr TD 24 hr patch 1 patch (has no administration in time range)   insulin lispro (HumALOG/ADMELOG) 100 units/mL subcutaneous injection 1-6 Units (has no administration in time range)   insulin lispro (HumALOG/ADMELOG) 100 units/mL subcutaneous injection 1-6 Units (has no administration in time range)   aspirin chewable tablet 324 mg (324 mg Oral Given 6/4/25 2014)   calcium gluconate 1 g in sodium chloride 0.9% 50 mL (premix) (0 g Intravenous Stopped 6/4/25 2056)   heparin (porcine) injection 6,800 Units (6,800 Units Intravenous Given 6/4/25 2121)       ED Risk Strat Scores   HEART Risk Score      Flowsheet Row Most Recent Value   Heart Score Risk Calculator    History 0 Filed at: 06/04/2025 2155   ECG 0 Filed at: 06/04/2025 2155   Age 1 Filed at: 06/04/2025 2155   Risk Factors 2 Filed at: 06/04/2025 2155   Troponin 2 Filed at: 06/04/2025 2155   HEART Score 5 Filed at: 06/04/2025 2155          HEART Risk Score      Flowsheet Row Most Recent Value   Heart Score Risk Calculator    History 0 Filed at: 06/04/2025 2155   ECG 0 Filed at: 06/04/2025 2155   Age 1 Filed at: 06/04/2025 2155   Risk Factors 2 Filed at: 06/04/2025 2155   Troponin 2 Filed at: 06/04/2025 2155   HEART Score 5 Filed at: 06/04/2025 2155                      No data recorded        SBIRT 22yo+      Flowsheet Row Most Recent Value   Initial Alcohol Screen: US AUDIT-C     1. How often do you have a drink containing alcohol? 0 Filed at: 06/04/2025 1843    2. How many drinks containing alcohol do you have on a typical day you are drinking?  0 Filed at: 06/04/2025 1847   3a. Male UNDER 65: How often do you have five or more drinks on one occasion? 0 Filed at: 06/04/2025 1847   Audit-C Score 0 Filed at: 06/04/2025 1847   GRETEL: How many times in the past year have you...    Used an illegal drug or used a prescription medication for non-medical reasons? Never Filed at: 06/04/2025 1847                            History of Present Illness       Chief Complaint   Patient presents with    Shortness of Breath     Pt to ER from home for reports of dyspnea at a rest over the last several hours of unknown origin.        Past Medical History[1]   Past Surgical History[2]   Family History[3]   Social History[4]   E-Cigarette/Vaping    E-Cigarette Use Never User       E-Cigarette/Vaping Substances    Nicotine No     THC No     CBD No     Flavoring No     Other No     Unknown No       I have reviewed and agree with the history as documented.     Patient is a 50-year-old male with past medical history of chronic kidney disease stage IV/V not yet on dialysis, diabetes, hypertension, prior stroke without any residual neurologic deficits, presents to the emergency department for acute dyspnea that started while he was laying in bed at 1 PM today.  Patient denies that he was doing anything exertional when it started.  He reports he still feels mildly short of breath but overall it is improved compared to when it started.  He denies any associated diaphoresis, recent fevers or chills.  He reports a chronic cough but no worsening and denies any production of phlegm or any hemoptysis.  Denies any chest pain or pressure, palpitations, abdominal pain, back pain, nausea, vomiting, change in bowel habits, new urinary symptoms, skin rash or color change, leg pain or swelling, new focal neurologic deficits.  Denies any history of asthma, COPD or any known cardiac disease.  Patient recently seen  in the ED on 5/18 for dyspnea.  He had elevated D-dimer at that time just greater than 1.0 however due to his poor baseline kidney function, CTA chest was not performed.  Patient denies any history of DVT or PE.  He denies any recent immobilization, recent surgery.  He does report that he spends a lot of time in the car driving.      History provided by:  Patient   used: No    Shortness of Breath  Associated symptoms: no abdominal pain, no chest pain, no cough, no diaphoresis, no ear pain, no fever, no headaches, no neck pain, no rash, no sore throat, no vomiting and no wheezing        Review of Systems   Constitutional:  Negative for chills, diaphoresis and fever.   HENT:  Negative for congestion, ear pain, rhinorrhea and sore throat.    Respiratory:  Positive for shortness of breath. Negative for cough, chest tightness and wheezing.    Cardiovascular:  Negative for chest pain, palpitations and leg swelling.   Gastrointestinal:  Negative for abdominal pain, constipation, diarrhea, nausea and vomiting.   Genitourinary:  Negative for dysuria, flank pain, frequency and hematuria.   Musculoskeletal:  Negative for back pain, neck pain and neck stiffness.   Skin:  Negative for color change, pallor, rash and wound.   Allergic/Immunologic: Negative for immunocompromised state.   Neurological:  Negative for dizziness, syncope, weakness, light-headedness, numbness and headaches.   Hematological:  Negative for adenopathy.   Psychiatric/Behavioral:  Negative for confusion and decreased concentration.    All other systems reviewed and are negative.          Objective       ED Triage Vitals   Temperature Pulse Blood Pressure Respirations SpO2 Patient Position - Orthostatic VS   06/04/25 1848 06/04/25 1848 06/04/25 1848 06/04/25 1848 06/04/25 1848 06/04/25 1848   98.4 °F (36.9 °C) 84 141/83 (!) 24 100 % Sitting      Temp Source Heart Rate Source BP Location FiO2 (%) Pain Score    06/04/25 1848 -- 06/04/25 1848  -- 06/04/25 1847    Oral  Left arm  No Pain      Vitals      Date and Time Temp Pulse SpO2 Resp BP Pain Score FACES Pain Rating User   06/04/25 2154 98.1 °F (36.7 °C) -- -- -- 137/73 -- -- BE   06/04/25 1930 -- 83 99 % 20 147/80 -- --    06/04/25 1848 98.4 °F (36.9 °C) 84 100 % 24 141/83 -- -- SA   06/04/25 1847 -- -- -- -- -- No Pain -- AM            Physical Exam  Vitals and nursing note reviewed.   Constitutional:       General: He is not in acute distress.     Appearance: Normal appearance. He is well-developed. He is not ill-appearing, toxic-appearing or diaphoretic.   HENT:      Head: Normocephalic and atraumatic.      Right Ear: External ear normal.      Left Ear: External ear normal.      Nose: Nose normal.      Mouth/Throat:      Mouth: Mucous membranes are moist.      Pharynx: Oropharynx is clear.     Eyes:      Extraocular Movements: Extraocular movements intact.      Conjunctiva/sclera: Conjunctivae normal.     Neck:      Vascular: No JVD.     Cardiovascular:      Rate and Rhythm: Normal rate and regular rhythm.      Pulses: Normal pulses.      Heart sounds: Normal heart sounds. No murmur heard.     No friction rub. No gallop.   Pulmonary:      Effort: Pulmonary effort is normal. No respiratory distress.      Breath sounds: Normal breath sounds. No wheezing, rhonchi or rales.   Abdominal:      General: Bowel sounds are normal. There is no distension.      Palpations: Abdomen is soft.      Tenderness: There is no abdominal tenderness. There is no guarding or rebound.     Musculoskeletal:         General: No swelling or tenderness. Normal range of motion.      Cervical back: Normal range of motion and neck supple. No rigidity.      Right lower leg: No edema.      Left lower leg: No edema.     Skin:     General: Skin is warm and dry.      Coloration: Skin is not pale.      Findings: No erythema or rash.     Neurological:      General: No focal deficit present.      Mental Status: He is alert and oriented  to person, place, and time.      Sensory: No sensory deficit.      Motor: No weakness.     Psychiatric:         Mood and Affect: Mood normal.         Behavior: Behavior normal.         Results Reviewed       Procedure Component Value Units Date/Time    HS Troponin I 2hr [[id number]]  (Normal) Collected: 06/04/25 2101    Lab Status: Final result Specimen: Blood Updated: 06/04/25 2126     hs TnI 2hr 44 ng/L      Delta 2hr hsTnI -10 ng/L     APTT [[id number]]  (Abnormal) Collected: 06/04/25 1853    Lab Status: Final result Specimen: Blood from Arm, Right Updated: 06/04/25 2122     PTT 83 seconds     Protime-INR [[id number]]  (Abnormal) Collected: 06/04/25 1853    Lab Status: Final result Specimen: Blood from Arm, Right Updated: 06/04/25 2122     Protime 16.2 seconds      INR 1.23    Narrative:      INR Therapeutic Range    Indication                                             INR Range      Atrial Fibrillation                                               2.0-3.0  Hypercoagulable State                                    2.0.2.3  Left Ventricular Asist Device                            2.0-3.0  Mechanical Heart Valve                                  -    Aortic(with afib, MI, embolism, HF, LA enlargement,    and/or coagulopathy)                                     2.0-3.0 (2.5-3.5)     Mitral                                                             2.5-3.5  Prosthetic/Bioprosthetic Heart Valve               2.0-3.0  Venous thromboembolism (VTE: VT, PE        2.0-3.0    APTT [[id number]]     Lab Status: No result Specimen: Blood     Magnesium [[id number]]  (Abnormal) Collected: 06/04/25 1853    Lab Status: Final result Specimen: Blood from Arm, Right Updated: 06/04/25 2006     Magnesium 1.1 mg/dL     HS Troponin I 4hr [[id number]]     Lab Status: No result Specimen: Blood     Comprehensive metabolic panel [[id number]]  (Abnormal) Collected: 06/04/25 1853    Lab Status: Final result Specimen: Blood from Arm, Right Updated:  06/04/25 1959     Sodium 137 mmol/L      Potassium 3.7 mmol/L      Chloride 108 mmol/L      CO2 18 mmol/L      ANION GAP 11 mmol/L      BUN 70 mg/dL      Creatinine 6.77 mg/dL      Glucose 181 mg/dL      Calcium 5.9 mg/dL      AST 18 U/L      ALT 17 U/L      Alkaline Phosphatase 153 U/L      Total Protein 7.1 g/dL      Albumin 3.6 g/dL      Total Bilirubin 0.33 mg/dL      eGFR 8 ml/min/1.73sq m     Narrative:      National Kid[employer] Foundation guidelines for Chronic Kidney Disease (CKD):     Stage 1 with normal or high GFR (GFR > 90 mL/min/1.73 square meters)    Stage 2 Mild CKD (GFR = 60-89 mL/min/1.73 square meters)    Stage 3A Moderate CKD (GFR = 45-59 mL/min/1.73 square meters)    Stage 3B Moderate CKD (GFR = 30-44 mL/min/1.73 square meters)    Stage 4 Severe CKD (GFR = 15-29 mL/min/1.73 square meters)    Stage 5 End Stage CKD (GFR <15 mL/min/1.73 square meters)  Note: GFR calculation is accurate only with a steady state creatinine    HS Troponin 0hr (reflex protocol) [[id number]]  (Abnormal) Collected: 06/04/25 1853    Lab Status: Final result Specimen: Blood from Arm, Right Updated: 06/04/25 1936     hs TnI 0hr 54 ng/L     B-Type Natriuretic Peptide(BNP) [[id number]]  (Abnormal) Collected: 06/04/25 1853    Lab Status: Final result Specimen: Blood from Arm, Right Updated: 06/04/25 1934      pg/mL     CBC and differential [[id number]]  (Abnormal) Collected: 06/04/25 1853    Lab Status: Final result Specimen: Blood from Arm, Right Updated: 06/04/25 1911     WBC 9.99 Thousand/uL      RBC 3.69 Million/uL      Hemoglobin 11.2 g/dL      Hematocrit 35.2 %      MCV 95 fL      MCH 30.4 pg      MCHC 31.8 g/dL      RDW 14.7 %      MPV 9.7 fL      Platelets 222 Thousands/uL      nRBC 0 /100 WBCs      Segmented % 80 %      Immature Grans % 0 %      Lymphocytes % 10 %      Monocytes % 7 %      Eosinophils Relative 3 %      Basophils Relative 0 %      Absolute Neutrophils 7.99 Thousands/µL      Absolute Immature  Grans 0.03 Thousand/uL      Absolute Lymphocytes 0.98 Thousands/µL      Absolute Monocytes 0.71 Thousand/µL      Eosinophils Absolute 0.25 Thousand/µL      Basophils Absolute 0.03 Thousands/µL             XR chest 2 views   ED Interpretation by Digna Orlando DO (06/04 2020)   No acute abnormality in the chest.       VAS VENOUS DUPLEX - LOWER LIMB BILATERAL    (Results Pending)   NM inpatient order    (Results Pending)       ECG 12 Lead Documentation Only    Date/Time: 6/4/2025 6:52 PM    Performed by: Digna Orlando DO  Authorized by: Digna Orlando DO    ECG reviewed by me, the ED Provider: yes    Patient location:  ED  Previous ECG:     Previous ECG:  Compared to current    Comparison ECG info:  5-  Rate:     ECG rate:  80    ECG rate assessment: normal    Rhythm:     Rhythm: sinus rhythm    Ectopy:     Ectopy: none    QRS:     QRS axis:  Normal    QRS intervals:  Normal  Conduction:     Conduction: normal    ST segments:     ST segments:  Normal  T waves:     T waves: inverted      Inverted:  I and aVL  Other findings:     Other findings: LVH    ECG 12 Lead Documentation Only    Date/Time: 6/4/2025 8:01 PM    Performed by: Digna Orlando DO  Authorized by: Digna Orlando DO    ECG reviewed by me, the ED Provider: yes    Patient location:  ED  Previous ECG:     Previous ECG:  Compared to current    Similarity:  No change  Rate:     ECG rate:  85    ECG rate assessment: normal    Rhythm:     Rhythm: sinus rhythm    Ectopy:     Ectopy: none    QRS:     QRS axis:  Normal    QRS intervals:  Normal  Conduction:     Conduction: normal    ST segments:     ST segments:  Normal  T waves:     T waves: non-specific    Other findings:     Other findings: prolonged qTc interval        ED Medication and Procedure Management   Prior to Admission Medications   Prescriptions Last Dose Informant Patient Reported? Taking?   Dulaglutide 0.75 MG/0.5ML SOAJ  Self No No   Sig:  Inject 0.75 mg under the skin once a week   Insulin Glargine Solostar (Basaglar KwikPen) 100 UNIT/ML SOPN  Self No No   Sig: Inject 0.28 mL (28 Units total) under the skin daily   Insulin Glargine Solostar (Basaglar KwikPen) 100 UNIT/ML SOPN   No No   Sig: Inject 0.28 mL (28 Units total) under the skin in the morning   amLODIPine (NORVASC) 10 mg tablet  Self No No   Sig: Take 1 tablet (10 mg total) by mouth daily   atorvastatin (LIPITOR) 80 mg tablet  Self No No   Sig: Take 1 tablet (80 mg total) by mouth daily   calcitriol (ROCALTROL) 0.5 MCG capsule  Self No No   Sig: Take 1 capsule (0.5 mcg total) by mouth daily Monday, Wednesday, Friday   calcium carbonate (TUMS) 500 mg chewable tablet   No No   Sig: Chew 4 tablets (2,000 mg total) daily   clopidogrel (PLAVIX) 75 mg tablet  Self No No   Sig: Take 1 tablet (75 mg total) by mouth daily   ergocalciferol (VITAMIN D2) 50,000 units  Self No No   Sig: Take 1 capsule (50,000 Units total) by mouth once a week   hydrALAZINE (APRESOLINE) 50 mg tablet  Self No No   Sig: Take 1 tablet (50 mg total) by mouth 2 (two) times a day   metoprolol succinate (TOPROL-XL) 50 mg 24 hr tablet  Self No No   Sig: Take 1 tablet (50 mg total) by mouth daily   sodium bicarbonate 650 mg tablet  Self No No   Sig: Take 1 tablet (650 mg total) by mouth 2 (two) times a day      Facility-Administered Medications: None     Current Discharge Medication List        CONTINUE these medications which have NOT CHANGED    Details   amLODIPine (NORVASC) 10 mg tablet Take 1 tablet (10 mg total) by mouth daily  Qty: 90 tablet, Refills: 1    Comments: Patient email: [email or web address]  Associated Diagnoses: Essential hypertension; Stage 4 chronic kidney disease (HCC)      atorvastatin (LIPITOR) 80 mg tablet Take 1 tablet (80 mg total) by mouth daily  Qty: 90 tablet, Refills: 1    Comments: Patient email: [email or web address]  Associated Diagnoses: Uncontrolled type 2 diabetes mellitus with hyperglycemia  (Formerly [facility]); History of CVA (cerebrovascular accident); Hypercholesterolemia      calcitriol (ROCALTROL) 0.5 MCG capsule Take 1 capsule (0.5 mcg total) by mouth daily Monday, Wednesday, Friday  Qty: 90 capsule, Refills: 2    Associated Diagnoses: Hyperparathyroidism due to renal insufficiency (Formerly [facility])      calcium carbonate (TUMS) 500 mg chewable tablet Chew 4 tablets (2,000 mg total) daily  Qty: 360 tablet, Refills: 0    Associated Diagnoses: Hypocalcemia      clopidogrel (PLAVIX) 75 mg tablet Take 1 tablet (75 mg total) by mouth daily  Qty: 90 tablet, Refills: 1    Comments: Patient email: [email or web address]  Associated Diagnoses: History of CVA (cerebrovascular accident); Cerebrovascular accident (CVA) due to embolism of right middle cerebral artery (Formerly [facility])      Dulaglutide 0.75 MG/0.5ML SOAJ Inject 0.75 mg under the skin once a week  Qty: 6 mL, Refills: 0    Associated Diagnoses: Uncontrolled type 2 diabetes mellitus with hyperglycemia (Formerly [facility])      ergocalciferol (VITAMIN D2) 50,000 units Take 1 capsule (50,000 Units total) by mouth once a week  Qty: 12 capsule, Refills: 1    Associated Diagnoses: Vitamin D deficiency      hydrALAZINE (APRESOLINE) 50 mg tablet Take 1 tablet (50 mg total) by mouth 2 (two) times a day  Qty: 180 tablet, Refills: 1    Comments: Patient email: [email or web address]  Associated Diagnoses: Essential hypertension      !! Insulin Glargine Solostar (Basaglar KwikPen) 100 UNIT/ML SOPN Inject 0.28 mL (28 Units total) under the skin daily  Qty: 15 mL, Refills: 5    Comments: Can use Basaglar, let us know if not covered  Associated Diagnoses: Type 2 diabetes mellitus with other circulatory complication, with long-term current use of insulin (Formerly [facility]); Uncontrolled type 2 diabetes mellitus with hyperglycemia (Formerly [facility])      !! Insulin Glargine Solostar (Basaglar KwikPen) 100 UNIT/ML SOPN Inject 0.28 mL (28 Units total) under the skin in the morning  Qty: 15 mL, Refills: 2    Associated Diagnoses: Type 2  diabetes mellitus with other circulatory complication, with long-term current use of insulin (Formerly [facility])      metoprolol succinate (TOPROL-XL) 50 mg 24 hr tablet Take 1 tablet (50 mg total) by mouth daily  Qty: 90 tablet, Refills: 1    Comments: Patient email: [email or web address]  Associated Diagnoses: Essential hypertension; Stage 4 chronic kidney disease (HCC)      sodium bicarbonate 650 mg tablet Take 1 tablet (650 mg total) by mouth 2 (two) times a day  Qty: 180 tablet, Refills: 2    Associated Diagnoses: Stage 5 chronic kidney disease not on chronic dialysis (HCC); Metabolic acidosis       !! - Potential duplicate medications found. Please discuss with provider.        No discharge procedures on file.  ED SEPSIS DOCUMENTATION   Time reflects when diagnosis was documented in both MDM as applicable and the Disposition within this note       Time User Action Codes Description Comment    6/4/2025  8:58 PM Digna Orlando Add [R06.00] Acute dyspnea     6/4/2025  8:58 PM Digna Orlando Add [R74.8] Cardiac enzymes elevated     6/4/2025  8:58 PM Digna Orlando Add [E83.51,  N18.9] Hypocalcemia due to chronic kidney disease     6/4/2025  8:58 PM Digna Orlando Add [E83.42] Hypomagnesemia                    [1]   Past Medical History:  Diagnosis Date    Chronic kidney disease     Diabetes mellitus (HCC)     Hypertension     Received intravenous tissue plasminogen activator (tPA) in emergency department 02/01/2022    Stroke (Formerly [facility])     no deficits   [2]   Past Surgical History:  Procedure Laterality Date    CARDIAC LOOP RECORDER  2023    PILONIDAL CYST EXCISION      NH ARTERIOVENOUS ANASTOMOSIS OPEN DIRECT Left 4/10/2024    Procedure: CREATION FISTULA BRACHIOCEPHALIC LEFT (AV);  Surgeon: Maxwell Nielsen MD;  Location: [facility];  Service: Vascular    TOE AMPUTATION Left 4/2/2025    Procedure: AMPUTATION TOE;  Surgeon: Adriane Anna DPM;  Location: [facility];  Service: Podiatry   [3]   Family  History  Problem Relation Name Age of Onset    No Known Problems Mother      No Known Problems Father      Pancreatic cancer Brother     [4]   Social History  Tobacco Use    Smoking status: Every Day     Current packs/day: 0.50     Average packs/day: 1 pack/day for 38.0 years (36.8 ttl pk-yrs)     Types: Cigarettes     Start date: 6/16/1987     Passive exposure: Current    Smokeless tobacco: Never    Tobacco comments:     states slowing it down, used Chantix-currently 1/2 pack/day, at least 35 pack years as of 2023   Vaping Use    Vaping status: Never Used   Substance Use Topics    Alcohol use: Never     Comment: stopped 8/22    Drug use: Never        Digna Orlando,   06/04/25 1581

## 2025-06-05 NOTE — TELEPHONE ENCOUNTER
Received a call from Jocelin with [facility].     She stated that they did receive the initial information for placement. It was sent to [facility] in [facility]. It's for Monday, Wednesday, and Friday, shift 3.    They are still in need to the Hep B Panel results. Those can be faxed to [phone]. And, the medical questionnaire still needs to be completed.    Ref id# for patient: [id number]    Callback: Mendeznata WagnerClaudio - [phone]    Thanks!

## 2025-06-05 NOTE — UTILIZATION REVIEW
NOTIFICATION OF INPATIENT ADMISSION   AUTHORIZATION REQUEST   SERVICING FACILITY:   [place] [address]  Tax ID: [id number]  NPI: [id number] ATTENDING PROVIDER:  Attending Name and NPI#: Baljinder Loco Md [[id number]]  Address: [address] [place] [address]  Phone: [phone]     ADMISSION INFORMATION:  Place of Service: Inpatient [facility]  Place of Service Code: 21  Inpatient Admission Date/Time: 6/4/25  8:59 PM  Discharge Date/Time: No discharge date for patient encounter.  Admitting Diagnosis Code/Description:  Shortness of breath [R06.02]  Hypomagnesemia [E83.42]  Cardiac enzymes elevated [R74.8]  Acute dyspnea [R06.00]  Hypocalcemia due to chronic kidney disease [E83.51, N18.9]     UTILIZATION REVIEW CONTACT:  Obdulia Cole, Utilization   Network Utilization Review Department  Phone: [phone]  Fax [phone]  Email: [email or web address]  Contact for approvals/pending authorizations, clinical reviews, and discharge.     PHYSICIAN ADVISORY SERVICES:  Medical Necessity Denial & Jcgp-zm-Qzgi Review  Phone: [phone]  Fax: [phone]  Email: [email or web address]     DISCHARGE SUPPORT TEAM:  For Patients Discharge Needs & Updates  Phone: [phone] opt. [phone] Fax: [phone]  Email: [email or web address]

## 2025-06-05 NOTE — NURSING NOTE
Patient complain of difficulty breathing, O2 saturation was 98-99 on room air. Patient received albuterol treatment and place on oxygen for comfort. Hospitalist made aware and order neb treatment and solumedrol.     Hospitalist was given update on patient progress after the treatment was given. Patient still complaining of difficulty breathing

## 2025-06-05 NOTE — ASSESSMENT & PLAN NOTE
Magnesium 1.1, replaced in the ED.  Calcium 5.9, given calcium gluconate in ED.  CMP and mag in AM.

## 2025-06-05 NOTE — PROGRESS NOTES
Progress Note - Hospitalist   Name: Adam Gonzalez 50 y.o. male I MRN: [id number]  Unit/Bed#: -01 I Date of Admission: 6/4/2025   Date of Service: 6/5/2025 I Hospital Day: 1    Assessment & Plan  Shortness of breath  Presented with shortness of breath  Noted to have elevated D-dimer  Unable to obtain CTA given kidney function  Follow-up VQ scan  Continue heparin infusion for now    Essential hypertension  \Blood pressure controlled  Continue amlodipine, metoprolol and hydralazine.    Type 2 diabetes mellitus with circulatory disorder, with long-term current use of insulin ([facility])    Lab Results   Component Value Date    HGBA1C 7.0 (H) 05/05/2025   Lantus dose decreased to 14 units daily, patient currently takes 28 units daily; may increase upon reevaluation of patient.  Sliding scale ordered for coverage.  Monitor blood glucose  (P) 384  Acute kidney injury superimposed on stage 5 chronic kidney disease, not on chronic dialysis ([facility])  Lab Results   Component Value Date    EGFR 7 06/05/2025    EGFR 8 06/04/2025    EGFR 9 05/18/2025    CREATININE 7.31 (H) 06/05/2025    CREATININE 6.77 (H) 06/04/2025    CREATININE 6.43 (H) 05/18/2025   Patient has history of chronic kidney disease, has fistula placed about a year ago; but patient does not do dialysis treatments.  Creatinine noted to be trending up to 7.3  Still reports adequate urine output  Nephrology consulted  Current smoker  Patient has been greater than 20-year pack a day smoker.  Per patient's last family medicine visit, patient is contemplating quitting and slowing down on the amount of cigarettes smoked during the day.  Encourage smoking cessation.  Nicotine patch ordered.  Electrolyte abnormality  Magnesium 1.1, replaced in the ED.  Calcium 5.9, given calcium gluconate in ED.  Continue to replete aggressively    VTE Pharmacologic Prophylaxis: VTE Score: 4 Moderate Risk (Score 3-4) - Pharmacological DVT Prophylaxis Ordered: heparin drip.    Mobility:   Basic  Mobility Inpatient Raw Score: 23  JH-HLM Goal: 7: Walk 25 feet or more  JH-HLM Achieved: 7: Walk 25 feet or more  JH-HLM Goal achieved. Continue to encourage appropriate mobility.    Patient Centered Rounds: I performed bedside rounds with nursing staff today.   Discussions with Specialists or Other Care Team Provider: cm, nursing    Education and Discussions with Family / Patient: Patient declined call to .     Current Length of Stay: 1 day(s)  Current Patient Status: Inpatient   Certification Statement: The patient will continue to require additional inpatient hospital stay due to see below  Discharge Plan: Pending VQ scan.  Possible discharge in next 24 hours    Code Status: Level 1 - Full Code    Subjective   Denies fevers, chills, cough.  Shortness of breath improved    Objective :  Temp:  [98.1 °F (36.7 °C)-98.4 °F (36.9 °C)] 98.1 °F (36.7 °C)  HR:  [81-88] 87  BP: (120-159)/(58-90) 159/90  Resp:  [16-24] 18  SpO2:  [98 %-100 %] 99 %  O2 Device: None (Room air)    Body mass index is 27.87 kg/m².     Input and Output Summary (last 24 hours):     Intake/Output Summary (Last 24 hours) at 6/5/2025 1018  Last data filed at 6/5/2025 0718  Gross per 24 hour   Intake 1372.37 ml   Output --   Net 1372.37 ml       Physical Exam  Constitutional:       General: He is not in acute distress.     Appearance: He is well-developed. He is not diaphoretic.   HENT:      Head: Normocephalic and atraumatic.      Nose: Nose normal.      Mouth/Throat:      Pharynx: No oropharyngeal exudate.     Eyes:      General: No scleral icterus.     Conjunctiva/sclera: Conjunctivae normal.       Cardiovascular:      Rate and Rhythm: Normal rate and regular rhythm.      Heart sounds: Normal heart sounds. No murmur heard.     No friction rub. No gallop.   Pulmonary:      Effort: Pulmonary effort is normal. No respiratory distress.      Breath sounds: Normal breath sounds. No wheezing or rales.   Chest:      Chest wall: No tenderness.    Abdominal:      General: Bowel sounds are normal. There is no distension.      Palpations: Abdomen is soft.      Tenderness: There is no abdominal tenderness. There is no guarding.     Musculoskeletal:         General: No tenderness or deformity. Normal range of motion.      Cervical back: Normal range of motion and neck supple.     Skin:     General: Skin is warm and dry.      Findings: No erythema.     Neurological:      Mental Status: He is alert. Mental status is at baseline.           Lines/Drains:        Telemetry:  Telemetry Orders (From admission, onward)               24 Hour Telemetry Monitoring  Continuous x 24 Hours (Telem)        Expiring   Question:  Reason for 24 Hour Telemetry  Answer:  Pulmonary Embolism                     Telemetry Reviewed: Normal Sinus Rhythm  Indication for Continued Telemetry Use: No indication for continued use. Will discontinue.                Lab Results: I have reviewed the following results:   Results from last 7 days   Lab Units 06/05/25  0617   WBC Thousand/uL 11.62*   HEMOGLOBIN g/dL 10.5*   HEMATOCRIT % 32.2*   PLATELETS Thousands/uL 192   SEGS PCT % 91*   LYMPHO PCT % 5*   MONO PCT % 2*   EOS PCT % 1     Results from last 7 days   Lab Units 06/05/25  0617   SODIUM mmol/L 138   POTASSIUM mmol/L 4.0   CHLORIDE mmol/L 110*   CO2 mmol/L 18*   BUN mg/dL 73*   CREATININE mg/dL 7.31*   ANION GAP mmol/L 10   CALCIUM mg/dL 5.6*   ALBUMIN g/dL 3.3*   TOTAL BILIRUBIN mg/dL 0.24   ALK PHOS U/L 136*   ALT U/L 15   AST U/L 15   GLUCOSE RANDOM mg/dL 259*     Results from last 7 days   Lab Units 06/04/25  1853   INR  1.23*     Results from last 7 days   Lab Units 06/05/25  0709 06/04/25  2326   POC GLUCOSE mg/dl 301* 467*               Recent Cultures (last 7 days):         Imaging Results Review: I personally reviewed the following image studies/reports in PACS and discussed pertinent findings with Radiology: chest xray. My interpretation of the radiology images/reports is:   .  Other Study Results Review: EKG was reviewed.     Last 24 Hours Medication List:     Current Facility-Administered Medications:     acetaminophen (TYLENOL) tablet 650 mg, Q6H PRN    albuterol inhalation solution 2.5 mg, Q6H PRN    amLODIPine (NORVASC) tablet 10 mg, Daily    atorvastatin (LIPITOR) tablet 80 mg, Daily    calcium carbonate (TUMS) chewable tablet 1,000 mg, Daily PRN    calcium gluconate 1 g in sodium chloride 0.9% 50 mL (premix), Once    clopidogrel (PLAVIX) tablet 75 mg, Daily    heparin (porcine) 25,000 units in 0.45% NaCl 250 mL infusion (premix), Titrated, Last Rate: 15 Units/kg/hr (06/05/25 0254)    hydrALAZINE (APRESOLINE) tablet 50 mg, BID    insulin glargine (LANTUS) subcutaneous injection 14 Units 0.14 mL, QAM    insulin lispro (HumALOG/ADMELOG) 100 units/mL subcutaneous injection 1-6 Units, TID AC **AND** Fingerstick Glucose (POCT), TID AC    insulin lispro (HumALOG/ADMELOG) 100 units/mL subcutaneous injection 1-6 Units, HS    metoprolol succinate (TOPROL-XL) 24 hr tablet 50 mg, Daily    nicotine (NICODERM CQ) 21 mg/24 hr TD 24 hr patch 1 patch, Daily    sodium bicarbonate tablet 650 mg, BID    Administrative Statements   Today, Patient Was Seen By: Baljinder Loco MD  I have spent a total time of 30 minutes in caring for this patient on the day of the visit/encounter including Diagnostic results.    **Please Note: This note may have been constructed using a voice recognition system.**

## 2025-06-05 NOTE — ASSESSMENT & PLAN NOTE
Noted on admission.  Now resolved.  Noted to have mild pulmonary venous congestion.  Will keep patient on torsemide 80 mg daily.

## 2025-06-05 NOTE — ASSESSMENT & PLAN NOTE
Patient has been greater than 20-year pack a day smoker.  Per patient's last family medicine visit, patient is contemplating quitting and slowing down on the amount of cigarettes smoked during the day.  Encourage smoking cessation.  Nicotine patch ordered.

## 2025-06-05 NOTE — ASSESSMENT & PLAN NOTE
Patient noted to have non-anion gap metabolic acidosis with serum bicarbonate of 18 emanating from CKD.  Currently on sodium bicarb tablets.

## 2025-06-05 NOTE — ASSESSMENT & PLAN NOTE
Patient presents ED with complaints of dyspnea which started at 1 PM while lying in bed. Per patient symptoms have improved throughout the day, but not unresolved.  Patient was recently here for similar symptoms on 5/18/2025.  When patient was here on May 18, 2025, patient's troponins were elevated, new elevation; and BNP was in the 600s.  During that time his D-dimer slightly elevated at 1.01.  Per ED provider, did not repeat D-dimer today due to patient's renal function and unable to do a CTA scan.  Patient placed on a heparin drip, as preventative measure.  ED provider gave aspirin, calcium gluconate, and initiated heparin drip.  Current vital signs: /80, HR 83, , RR 20, temp 98.4 °F and oxygen saturation 99% on room air.  EKG-normal sinus rhythm.  Chest x-ray-pending.  Troponin 54, 44, pending third troponin.  Delta -10.  BNP-427.  Bilateral lower extremity venous duplex-pending results.    Plan  Telemetry ordered.  Monitor vital signs.  Echo ordered.  Respiratory protocol.  CBC, mag and CMP in AM.

## 2025-06-05 NOTE — UTILIZATION REVIEW
Initial Clinical Review    Admission: Date/Time/Statement:   Admission Orders (From admission, onward)       Ordered        06/04/25 2058  INPATIENT ADMISSION  Once                          Orders Placed This Encounter   Procedures    INPATIENT ADMISSION     Standing Status:   Standing     Number of Occurrences:   1     Level of Care:   Med Surg [16]     Bed request comments:   tele     Estimated length of stay:   More than 2 Midnights     Certification:   I certify that inpatient services are medically necessary for this patient for a duration of greater than two midnights. See H&P and MD Progress Notes for additional information about the patient's course of treatment.     ED Arrival Information       Expected   -    Arrival   6/4/2025 18:28    Acuity   Urgent              Means of arrival   Walk-In    Escorted by   Spouse    Service   Hospitalist    Admission type   Emergency              Arrival complaint   Shortness of Breath             Chief Complaint   Patient presents with    Shortness of Breath     Pt to ER from home for reports of dyspnea at a rest over the last several hours of unknown origin.        Initial Presentation: 50 y.o. male to ED from home w/ PMH of chronic kidney disease, hypertension, diabetes mellitus, stroke and smoker who presents with complaints of dyspnea which started at 1 PM while lying in bed. Admitted IP status w/ SOB plan tele , echo , cbc , mg and cmp in am, heparin gtt . HTN cont amlodipine, lopressor and hydralazine . DM SSI and monitor. GATITO CR 6.77 cbc , cmp and mg in am . Mg 1.1 , Pablo 5.9 replaced in ED .     Anticipated Length of Stay/Certification Statement:  Patient will be admitted on an inpatient basis with an anticipated length of stay of greater than 2 midnights secondary to shortness of breath, concern for PE need further workup.     Date: 6/5   Day 2: SOB w/ elevated D-dimer . Unable to obtain CTA given kidney function . F/u VQ scan - low probability . Cont heparin  infusion for now. O2 sat 99% on RA .     6/5 Nephrology Consult    presence of hypocalcemia emanating from advance CKD, presence of uremic symptom patient is agreeable to initiate hemodialysis though in the outpatient setting. obtain hepatitis panel as a precursor to initiate hemodialysis as outpatient.     ED Treatment-Medication Administration from 06/04/2025 1828 to 06/04/2025 2151         Date/Time Order Dose Route Action     06/04/2025 2014 aspirin chewable tablet 324 mg 324 mg Oral Given     06/04/2025 2016 calcium gluconate 1 g in sodium chloride 0.9% 50 mL (premix) 1 g Intravenous New Bag     06/04/2025 2048 magnesium sulfate 4 g/100 mL IVPB (premix) 4 g 4 g Intravenous New Bag     06/04/2025 2121 heparin (porcine) injection 6,800 Units 6,800 Units Intravenous Given     06/04/2025 2125 heparin (porcine) 25,000 units in 0.45% NaCl 250 mL infusion (premix) 18 Units/kg/hr Intravenous New Bag            Scheduled Medications:  amLODIPine, 10 mg, Oral, Daily  atorvastatin, 80 mg, Oral, Daily  calcium gluconate, 1 g, Intravenous, Once  clopidogrel, 75 mg, Oral, Daily  hydrALAZINE, 50 mg, Oral, BID  insulin glargine, 14 Units, Subcutaneous, QAM  insulin lispro, 1-6 Units, Subcutaneous, TID AC  insulin lispro, 1-6 Units, Subcutaneous, HS  metoprolol succinate, 50 mg, Oral, Daily  nicotine, 1 patch, Transdermal, Daily  sodium bicarbonate, 650 mg, Oral, BID      Continuous IV Infusions:  heparin (porcine), 3-30 Units/kg/hr (Order-Specific), Intravenous, Titrated      PRN Meds:  acetaminophen, 650 mg, Oral, Q6H PRN  albuterol, 2.5 mg, Nebulization, Q6H PRN  calcium carbonate, 1,000 mg, Oral, Daily PRN      ED Triage Vitals   Temperature Pulse Respirations Blood Pressure SpO2 Pain Score   06/04/25 1848 06/04/25 1848 06/04/25 1848 06/04/25 1848 06/04/25 1848 06/04/25 1847   98.4 °F (36.9 °C) 84 (!) 24 141/83 100 % No Pain     Weight (last 2 days)       Date/Time Weight    06/05/25 1149 88.1 (194.23)    06/05/25 0600 88.1  (194.23)    06/04/25 2154 88.1 (194.23)    06/04/25 1846 89 (196.21)            Vital Signs (last 3 days)       Date/Time Temp Pulse Resp BP MAP (mmHg) SpO2 O2 Device Patient Position - Orthostatic VS Pain    06/05/25 1149 -- 86 -- 159/90 -- -- -- -- --    06/05/25 09:14:08 -- 87 -- 159/90 113 99 % -- -- --    06/05/25 0911 -- 81 -- 159/90 -- -- -- -- --    06/05/25 0717 -- 82 -- 120/63 82 98 % -- -- No Pain    06/05/25 07:10:42 -- 85 18 120/63 82 98 % -- -- --    06/05/25 0257 -- -- -- -- -- 100 % -- -- --    06/05/25 0000 -- -- -- -- -- 98 % -- -- No Pain    06/04/25 2339 -- 88 18 -- -- 98 % -- -- --    06/04/25 2200 -- 86 -- -- -- 99 % None (Room air) -- No Pain    06/04/25 2154 98.1 °F (36.7 °C) 86 16 137/73 -- -- -- -- --    06/04/25 2100 -- 85 19 130/58 84 -- -- -- --    06/04/25 1930 -- 83 20 147/80 106 99 % None (Room air) Sitting --    06/04/25 1848 98.4 °F (36.9 °C) 84 24 141/83 100 100 % None (Room air) Sitting --    06/04/25 1847 -- -- -- -- -- -- -- -- No Pain              Pertinent Labs/Diagnostic Test Results:   Radiology:  NM lung ventilation / perfusion   Final Interpretation by Preston Allen MD (06/05 1241)      The probability for pulmonary embolus is low.      Workstation performed: [id number]         XR chest 2 views   ED Interpretation by Digna Orlando DO (06/04 2020)   No acute abnormality in the chest.      Final Interpretation by Axel Vincent MD (06/05 8162)      Subtle prominence of pulmonary vasculature suggesting mild pulmonary vascular congestive change. No focal consolidation.            Workstation performed: [id number]          VAS VENOUS DUPLEX - LOWER LIMB BILATERAL    (Results Pending)     Cardiology:  Echo complete w/ contrast if indicated   Final Result by Deuce Cummins MD (06/05 1203)         Overall preserved left ventricular systolic function of 65% with no    evidence of having any regional wall motion abnormity's with an underlying    mild concentric left  ventricular hypertrophy.   Eccentric calcification of the aortic valve right coronary cusp with mild    to moderate aortic regurgitation.   Mild thickening of the mitral valve leaflets with trace mitral    regurgitation.   Normal right ventricular systolic function, size and pressure.   Mildly dilated left atrium.   The ascending arctic root is mildly dilated 3.8 cm with normal ascending    aorta.   Grade 1 diastolic dysfunction is noted.   Echocardiographic evidence of hypertensive heart disease is suggested    clinical correlation is recommended.            ECG 12 lead    by Interface, Ris Results In (06/04 2003)      ECG 12 lead    by Interface, Ris Results In (06/04 1852)        GI:  No orders to display           Results from last 7 days   Lab Units 06/05/25  0617 06/04/25  1853   WBC Thousand/uL 11.62* 9.99   HEMOGLOBIN g/dL 10.5* 11.2*   HEMATOCRIT % 32.2* 35.2*   PLATELETS Thousands/uL 192 222   TOTAL NEUT ABS Thousands/µL 10.75* 7.99*         Results from last 7 days   Lab Units 06/05/25 0617 06/04/25  1853   SODIUM mmol/L 138 137   POTASSIUM mmol/L 4.0 3.7   CHLORIDE mmol/L 110* 108   CO2 mmol/L 18* 18*   ANION GAP mmol/L 10 11   BUN mg/dL 73* 70*   CREATININE mg/dL 7.31* 6.77*   EGFR ml/min/1.73sq m 7 8   CALCIUM mg/dL 5.6* 5.9*   MAGNESIUM mg/dL 1.8* 1.1*     Results from last 7 days   Lab Units 06/05/25  0617 06/04/25  1853   AST U/L 15 18   ALT U/L 15 17   ALK PHOS U/L 136* 153*   TOTAL PROTEIN g/dL 6.5 7.1   ALBUMIN g/dL 3.3* 3.6   TOTAL BILIRUBIN mg/dL 0.24 0.33     Results from last 7 days   Lab Units 06/05/25  1157 06/05/25  0709 06/04/25  2326   POC GLUCOSE mg/dl 593* 301* 467*     Results from last 7 days   Lab Units 06/05/25  0617 06/04/25  1853   GLUCOSE RANDOM mg/dL 259* 181*         Results from last 7 days   Lab Units 06/05/25  0042 06/04/25  2101 06/04/25  1853   HS TNI 0HR ng/L  --   --  54*   HS TNI 2HR ng/L  --  44  --    HSTNI D2 ng/L  --  -10  --    HS TNI 4HR ng/L 41  --   --     HSTNI D4 ng/L -13  --   --        Results from last 7 days   Lab Units 06/05/25  0858 06/05/25  0617 06/05/25  0042 06/04/25  1853   PROTIME seconds  --   --   --  16.2*   INR   --   --   --  1.23*   PTT seconds 39* 42* 150* 83*         Results from last 7 days   Lab Units 06/04/25  1853   BNP pg/mL 427*         Past Medical History[1]  Present on Admission:   Current smoker   Essential hypertension   Acute kidney injury superimposed on stage 5 chronic kidney disease, not on chronic dialysis (HCC)   Electrolyte abnormality   Metabolic acidosis      Admitting Diagnosis: Shortness of breath [R06.02]  Hypomagnesemia [E83.42]  Cardiac enzymes elevated [R74.8]  Acute dyspnea [R06.00]  Hypocalcemia due to chronic kidney disease [E83.51, N18.9]  Age/Sex: 50 y.o. male    Network Utilization Review Department  ATTENTION: Please call with any questions or concerns to [phone] and carefully listen to the prompts so that you are directed to the right person. All voicemails are confidential.   For Discharge needs, contact Care Management DC Support Team at [phone] opt. 2  Send all requests for admission clinical reviews, approved or denied determinations and any other requests to dedicated fax number below belonging to the campus where the patient is receiving treatment. List of dedicated fax numbers for the Facilities:  FACILITY NAME UR FAX NUMBER   ADMISSION DENIALS (Administrative/Medical Necessity) [phone]   DISCHARGE SUPPORT TEAM (NETWORK) [phone]   PARENT CHILD HEALTH (Maternity/NICU/Pediatrics) [phone]   [facility] [phone]   [facility] [phone]   [facility] [phone]   [facility] [phone]   [facility] [phone]   [facility] [phone]   [facility] [phone]   [facility] [phone]   [facility] [phone]   [facility] [phone]   [facility] [phone]   [facility] [phone]              [1]   Past Medical History:  Diagnosis Date    Chronic kidney disease     Diabetes mellitus (HCC)     Hypertension     Received intravenous tissue plasminogen activator (tPA) in emergency department 02/01/2022    Stroke (HCC)     no deficits

## 2025-06-05 NOTE — TELEPHONE ENCOUNTER
Liz Thomas    Receive a called from the POD stating that the [facility] was on the phone in reference of  some orders entered in patient's chart .Patient is on the 4th floor at the [facility]. The [facility] mentioned your name. [facility]'s call was disconnected.

## 2025-06-05 NOTE — ASSESSMENT & PLAN NOTE
Lab Results   Component Value Date    HGBA1C 7.0 (H) 05/05/2025       Recent Labs     06/04/25  2326 06/05/25  0709 06/05/25  1157   POCGLU 467* 301* 593*     Blood glucose noted to be significantly elevated.

## 2025-06-05 NOTE — ASSESSMENT & PLAN NOTE
Lab Results   Component Value Date    HGBA1C 7.0 (H) 05/05/2025   Lantus dose decreased to 14 units daily, patient currently takes 28 units daily; may increase upon reevaluation of patient.  Sliding scale ordered for coverage.  Monitor blood glucose  (P) 384

## 2025-06-05 NOTE — CONSULTS
Consultation    Nephrology   Adam Gonzalez 50 y.o. male MRN: [id number]  Unit/Bed#: -01 Encounter: [id number]    History of Present Illness   Physician Requesting Consult: Baljinder Loco MD  Reason for Consult : -Chronic kidney disease stage V        Assessment & Plan  Chronic kidney disease, stage V (HCC)  Lab Results   Component Value Date    EGFR 7 06/05/2025    EGFR 8 06/04/2025    EGFR 9 05/18/2025    CREATININE 7.31 (H) 06/05/2025    CREATININE 6.77 (H) 06/04/2025    CREATININE 6.43 (H) 05/18/2025     Patient had progressed to chronic disease stage V earlier this year based on lab test.  Etiology has been diabetic nephropathy and hypertensive nephrosclerosis  Present with a creatinine of 6.77 mg/dL which has worsened to 7.31 mg/dL.  With presence of hypocalcemia emanating from advance CKD, presence of uremic symptom patient is agreeable to initiate hemodialysis though in the outpatient setting.  Patient had a AV fistula created in the year 2024 which has adequate thrill and bruit.  There is no urgent indication to initiate renal replacement therapy during this hospitalization.  Will obtain hepatitis panel as a precursor to initiate hemodialysis as outpatient.  Shortness of breath  Noted on admission.  Now resolved.  Noted to have mild pulmonary venous congestion.  Will keep patient on torsemide 80 mg daily.  Current smoker  Cessation of smoking reiterated.  Essential hypertension  Blood pressure is elevated 159/90.  Currently on amlodipine, hydralazine and metoprolol.  Type 2 diabetes mellitus with circulatory disorder, with long-term current use of insulin ([facility])  Lab Results   Component Value Date    HGBA1C 7.0 (H) 05/05/2025       Recent Labs     06/04/25  2326 06/05/25  0709 06/05/25  1157   POCGLU 467* 301* 593*     Blood glucose noted to be significantly elevated.  Electrolyte abnormality  Patient with prior history of hypocalcemia emanating from advanced CKD.  Patient is noted to be on calcium  carbonate 2000 mg daily.  Noted to have a PTH intact of 724.3 obtained in May 2025.  Increase calcitriol to 0.5 mcg p.o. daily.  Initiated patient on ergocalciferol 50,000 is 1 tablet every 7 days for low 25-hydroxy vitamin D levels noted.  Metabolic acidosis  Patient noted to have non-anion gap metabolic acidosis with serum bicarbonate of 18 emanating from CKD.  Currently on sodium bicarb tablets.        Thanks for the consult  Will continue to follow  Please call with questions/ concerns.  Above-mentioned orders and Plan in terms of treatment of renal failure were discussed with the team in depth and they agree.  Please secure chat the Nephrologist on call for this campus if you have any Nephrological questions or concerns.    Malia Elliott MD, 6/5/2025, 12:00 PM          HISTORY OF PRESENT ILLNESS:   Adam Gonzalez is a 50 y.o.  male with pmh of multiple comorbidities including chronic kidney disease stage V, hypertension, diabetes mellitus type 2, neuropathy, dyslipidemia, secondary hyperparathyroidism of renal origin who was admitted for shortness of breath.  Patient is being ruled out for pulmonary embolism and is currently on heparin gtt.  Patient also underwent VQ scan to rule out pulmonary embolism with results pending.  Patient has history advanced CKD with lab abnormalities that include presence of hypocalcemia.  Patient was noted to have a serum calcium of 5.9 mg/dl on admission. Nephrology has been consulted for management of advanced CKD and associated abnormalities.  Patient reports improvement in breathing.  Reports presence of some uremic symptom including intermittent nausea and vomiting  History obtained from the patient    Inpatient consult to Nephrology  Consult performed by: Malia Elliott MD  Consult ordered by: Baljinder Loco MD          Review of Systems     Historical Information   Problem List[1]  Past Medical History[2]  Past Surgical History[3]  Social History   Social History     Substance  and Sexual Activity   Alcohol Use Never    Comment: stopped 8/22     Social History     Substance and Sexual Activity   Drug Use Never     Tobacco Use History[4]  Family History[5]    Meds/Allergies   current meds:   Current Facility-Administered Medications:     acetaminophen (TYLENOL) tablet 650 mg, Q6H PRN    albuterol inhalation solution 2.5 mg, Q6H PRN    amLODIPine (NORVASC) tablet 10 mg, Daily    atorvastatin (LIPITOR) tablet 80 mg, Daily    calcitriol (ROCALTROL) capsule 0.5 mcg, Daily    calcium carbonate (TUMS) chewable tablet 1,000 mg, Daily PRN    calcium carbonate (TUMS) chewable tablet 1,000 mg, BID    calcium gluconate 1 g in sodium chloride 0.9% 50 mL (premix), Once    clopidogrel (PLAVIX) tablet 75 mg, Daily    ergocalciferol (VITAMIN D2) capsule 50,000 Units, Weekly    heparin (porcine) 25,000 units in 0.45% NaCl 250 mL infusion (premix), Titrated, Last Rate: 19 Units/kg/hr (06/05/25 1113)    hydrALAZINE (APRESOLINE) tablet 50 mg, BID    insulin glargine (LANTUS) subcutaneous injection 14 Units 0.14 mL, QAM    insulin lispro (HumALOG/ADMELOG) 100 units/mL subcutaneous injection 1-6 Units, TID AC **AND** Fingerstick Glucose (POCT), TID AC    insulin lispro (HumALOG/ADMELOG) 100 units/mL subcutaneous injection 1-6 Units, HS    metoprolol succinate (TOPROL-XL) 24 hr tablet 50 mg, Daily    nicotine (NICODERM CQ) 21 mg/24 hr TD 24 hr patch 1 patch, Daily    sodium bicarbonate tablet 650 mg, BID    Scheduled Meds:  Current Facility-Administered Medications   Medication Dose Route Frequency Provider Last Rate    acetaminophen  650 mg Oral Q6H PRN YASMIN Swift      albuterol  2.5 mg Nebulization Q6H PRN YASMIN Garrett      amLODIPine  10 mg Oral Daily YASMIN Swift      atorvastatin  80 mg Oral Daily YASMIN Swift      calcitriol  0.5 mcg Oral Daily Malia Elliott MD      calcium carbonate  1,000 mg Oral Daily PRN YASMIN Swift       "calcium carbonate  1,000 mg Oral BID Malia Elliott MD      calcium gluconate  1 g Intravenous Once Baljinder Loco MD      clopidogrel  75 mg Oral Daily YASMIN Swift      ergocalciferol  50,000 Units Oral Weekly Malia Elliott MD      heparin (porcine)  3-30 Units/kg/hr (Order-Specific) Intravenous Titrated YASMIN Swift 19 Units/kg/hr (25 1113)    hydrALAZINE  50 mg Oral BID YASMIN Swift      insulin glargine  14 Units Subcutaneous QAM YASMIN Swift      insulin lispro  1-6 Units Subcutaneous TID AC YASMIN Swift      insulin lispro  1-6 Units Subcutaneous HS YASMIN Swift      metoprolol succinate  50 mg Oral Daily YASMIN Swift      nicotine  1 patch Transdermal Daily YASMIN Swift      sodium bicarbonate  650 mg Oral BID YASMIN Swift         PRN Meds:.  acetaminophen    albuterol    calcium carbonate    Continuous Infusions:heparin (porcine), 3-30 Units/kg/hr (Order-Specific), Last Rate: 19 Units/kg/hr (25 1113)        Allergies[6]      Invasive Devices:     Invasive Devices       Peripheral Intravenous Line  Duration             Peripheral IV 25 Right;Ventral (anterior);Lower Forearm <1 day    Peripheral IV 25 Right;Ventral (anterior);Lower Forearm <1 day              Line  Duration             Hemodialysis Access 04/10/24 Left Upper arm 420 days                      PHYSICAL EXAM  /90   Pulse 86   Temp 98.1 °F (36.7 °C) (Oral)   Resp 18   Ht 5' 10\" (1.778 m)   Wt 88.1 kg (194 lb 3.6 oz)   SpO2 99%   BMI 27.87 kg/m²   Temp (24hrs), Av.3 °F (36.8 °C), Min:98.1 °F (36.7 °C), Max:98.4 °F (36.9 °C)        Intake/Output Summary (Last 24 hours) at 2025 1200  Last data filed at 2025 0830  Gross per 24 hour   Intake 1692.37 ml   Output --   Net 1692.37 ml       I/O last 24 hours:  In: 1692.4 [P.O.:1520; " "I.V.:122.4; IV Piggyback:50]  Out: -           Current Weight: Weight - Scale: 88.1 kg (194 lb 3.6 oz)  First Weight: Weight - Scale: 89 kg (196 lb 3.4 oz)  Physical Exam      LABORATORY:    Results from last 7 days   Lab Units 06/05/25  0617 06/04/25  1853   WBC Thousand/uL 11.62* 9.99   HEMOGLOBIN g/dL 10.5* 11.2*   HEMATOCRIT % 32.2* 35.2*   PLATELETS Thousands/uL 192 222   POTASSIUM mmol/L 4.0 3.7   CHLORIDE mmol/L 110* 108   CO2 mmol/L 18* 18*   BUN mg/dL 73* 70*   CREATININE mg/dL 7.31* 6.77*   CALCIUM mg/dL 5.6* 5.9*   MAGNESIUM mg/dL 1.8* 1.1*      rest all reviewed    RADIOLOGY:  XR chest 2 views   ED Interpretation by Digna Orlando DO (06/04 2020)   No acute abnormality in the chest.      Final Result by Axel Vincent MD (06/05 0825)      Subtle prominence of pulmonary vasculature suggesting mild pulmonary vascular congestive change. No focal consolidation.            Workstation performed: [id number]          VAS VENOUS DUPLEX - LOWER LIMB BILATERAL    (Results Pending)   NM lung ventilation / perfusion    (Results Pending)     Rest all reviewed    Portions of the record may have been created with voice recognition software. Occasional wrong word or \"sound a like\" substitutions may have occurred due to the inherent limitations of voice recognition software. Read the chart carefully and recognize, using context, where substitutions have occurred.If you have any questions, please contact the dictating provider.       [1]   Patient Active Problem List  Diagnosis    Hypercholesterolemia    Other proteinuria    Current smoker    Essential hypertension    Type 2 diabetes mellitus with circulatory disorder, with long-term current use of insulin (HCC)    Stage 5 chronic kidney disease not on chronic dialysis (HCC)    Brachial plexus injury, left, sequela    Diabetic polyneuropathy associated with type 2 diabetes mellitus (HCC)    Aortic valve insufficiency ( mild-mod 2020 )    History of embolic " stroke    History of CVA (cerebrovascular accident)    Bilateral Cerebrovascular accident (CVA) status post tPA    Acute on chronic renal failure  (HCC)    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)    TSH elevation in past    Acute kidney injury superimposed on stage 5 chronic kidney disease, not on chronic dialysis (HCC)    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    Impaired balance as late effect of cerebrovascular accident (CVA)    Noncompliance with treatment plan    Secondary hyperparathyroidism of renal origin (HCC)    Implantable loop recorder present    Vitamin D deficiency    Hypertensive CKD (chronic kidney disease)    Metabolic acidosis    Chronic bronchitis with COPD (chronic obstructive pulmonary disease) (HCC)    Diabetic foot infection  (HCC)    Electrolyte abnormality    Toe osteomyelitis (HCC)    Swelling of left foot    Hypocalcemia due to chronic kidney disease    Shortness of breath   [2]   Past Medical History:  Diagnosis Date    Chronic kidney disease     Diabetes mellitus (HCC)     Hypertension     Received intravenous tissue plasminogen activator (tPA) in emergency department 02/01/2022    Stroke ([facility])     no deficits   [3]   Past Surgical History:  Procedure Laterality Date    CARDIAC LOOP RECORDER  2023    PILONIDAL CYST EXCISION      CT ARTERIOVENOUS ANASTOMOSIS OPEN DIRECT Left 4/10/2024    Procedure: CREATION FISTULA BRACHIOCEPHALIC LEFT (AV);  Surgeon: Maxwell Nielsen MD;  Location: [facility];  Service: Vascular    TOE AMPUTATION Left 4/2/2025    Procedure: AMPUTATION TOE;  Surgeon: Adriane Anna DPM;  Location: [facility];  Service: Podiatry   [4]   Social History  Tobacco Use   Smoking Status Every Day    Current packs/day: 0.50    Average packs/day: 1 pack/day for 38.0 years (36.8 ttl pk-yrs)    Types: Cigarettes    Start date: 6/16/1987    Passive exposure: Current   Smokeless Tobacco Never   Tobacco Comments    states slowing it down, used  Chantix-currently 1/2 pack/day, at least 35 pack years as of 2023   [5]   Family History  Problem Relation Name Age of Onset    No Known Problems Mother      No Known Problems Father      Pancreatic cancer Brother     [6] No Known Allergies

## 2025-06-05 NOTE — TELEPHONE ENCOUNTER
Called spoke with Vanessa Snyder from [facility] admission. Advised lab results for Hep B panel still pending. Advised we will fax as soon as we get result. Vanessa verbalized understanding and is okay with it.

## 2025-06-05 NOTE — ASSESSMENT & PLAN NOTE
Lab Results   Component Value Date    EGFR 8 06/04/2025    EGFR 9 05/18/2025    EGFR 8 05/05/2025    CREATININE 6.77 (H) 06/04/2025    CREATININE 6.43 (H) 05/18/2025    CREATININE 6.52 (H) 05/05/2025   Patient has history of chronic kidney disease, has fistula placed about a year ago; but patient does not do dialysis treatments.  CBC, CMP and mag in AM.

## 2025-06-05 NOTE — ASSESSMENT & PLAN NOTE
Blood pressure and heart rate stable.  /80, HR 83.  Continue amlodipine, metoprolol and hydralazine.  Monitor BP and HR.

## 2025-06-05 NOTE — ASSESSMENT & PLAN NOTE
Magnesium 1.1, replaced in the ED.  Calcium 5.9, given calcium gluconate in ED.  Continue to replete aggressively

## 2025-06-05 NOTE — ASSESSMENT & PLAN NOTE
Presented with shortness of breath  Noted to have elevated D-dimer  Unable to obtain CTA given kidney function  Follow-up VQ scan  Continue heparin infusion for now

## 2025-06-05 NOTE — ASSESSMENT & PLAN NOTE
Lab Results   Component Value Date    EGFR 7 06/05/2025    EGFR 8 06/04/2025    EGFR 9 05/18/2025    CREATININE 7.31 (H) 06/05/2025    CREATININE 6.77 (H) 06/04/2025    CREATININE 6.43 (H) 05/18/2025     Patient had progressed to chronic disease stage V earlier this year based on lab test.  Etiology has been diabetic nephropathy and hypertensive nephrosclerosis  Present with a creatinine of 6.77 mg/dL which has worsened to 7.31 mg/dL.  With presence of hypocalcemia emanating from advance CKD, presence of uremic symptom patient is agreeable to initiate hemodialysis though in the outpatient setting.  Patient had a AV fistula created in the year 2024 which has adequate thrill and bruit.  There is no urgent indication to initiate renal replacement therapy during this hospitalization.  Will obtain hepatitis panel as a precursor to initiate hemodialysis as outpatient.

## 2025-06-05 NOTE — ASSESSMENT & PLAN NOTE
Lab Results   Component Value Date    HGBA1C 7.0 (H) 05/05/2025   Lantus dose decreased to 14 units daily, patient currently takes 28 units daily; may increase upon reevaluation of patient.  Sliding scale ordered for coverage.  Capillary glucose check and meals and at bedtime.  Monitor patient for signs and symptoms of hypo and hyperglycemia.

## 2025-06-05 NOTE — PLAN OF CARE
Problem: PAIN - ADULT  Goal: Verbalizes/displays adequate comfort level or baseline comfort level  Description: Interventions:  - Encourage patient to monitor pain and request assistance  - Assess pain using appropriate pain scale  - Administer analgesics as ordered based on type and severity of pain and evaluate response  - Implement non-pharmacological measures as appropriate and evaluate response  - Consider cultural and social influences on pain and pain management  - Notify physician/advanced practitioner if interventions unsuccessful or patient reports new pain  - Educate patient/family on pain management process including their role and importance of  reporting pain   - Provide non-pharmacologic/complimentary pain relief interventions  6/4/2025 2241 by Chanel Cuadra RN  Outcome: Progressing  6/4/2025 2240 by Chanel Cuadra RN  Outcome: Progressing     Problem: Nutrition/Hydration-ADULT  Goal: Nutrient/Hydration intake appropriate for improving, restoring or maintaining nutritional needs  Description: Monitor and assess patient's nutrition/hydration status for malnutrition. Collaborate with interdisciplinary team and initiate plan and interventions as ordered.  Monitor patient's weight and dietary intake as ordered or per policy. Utilize nutrition screening tool and intervene as necessary. Determine patient's food preferences and provide high-protein, high-caloric foods as appropriate.     INTERVENTIONS:  - Monitor oral intake, urinary output, labs, and treatment plans  - Assess nutrition and hydration status and recommend course of action  - Evaluate amount of meals eaten  - Assist patient with eating if necessary   - Allow adequate time for meals  - Recommend/ encourage appropriate diets, oral nutritional supplements, and vitamin/mineral supplements  - Order, calculate, and assess calorie counts as needed  - Recommend, monitor, and adjust tube feedings and TPN/PPN based on assessed needs  - Assess need for  Goal Outcome Evaluation:      Patient was seen for dysphagia evaluation per MD order. Nursing reported that she had difficulty with potassium pill earlier today.  Patient is currently on regular diet at this time. Patient reports history of CVA 2019. No apparent facial droop was detected. Patient had pneumonia x 1. No history of esophageal stricture. Patient has had GERD before but does not take routine medication for condition at this time. Patient reports that she receives a regular diet at the assisted living facility where she resides. However, she reports having no appetite. Most recent chest x-ray revealed no acute cardiopulmonary abnormality. Patient reports that she has upper and lower dentures but the dentures are ill fitted and therefore, she does not wear them during meals. She is edentulous during this assessment. Oral mechanism examination revealed patient demonstrated full ROM and mobility of lingual and labial structures. No facial weakness was detected. Lingual and jaw strength are adequate. Palatal movement was present. Noted positive gag reflex. Properly positioned patient upright in bed near 90 degree hip flexion. Provided trials of thins via straw x 6. No overt s/s of aspiration was observed. No wet vocal quality was detected. Given digital palpation, swallow was timely. Provided trials of mixed consistency x 2, STC x 3 and regular x 3. Patient demonstrated adequate munching pattern but did display some trace to mild residue with all consistencies attempted. Given liquid wash, patient would eventually clear oral cavity or she would spit out the remaining amounts that did not clear. Patient exhibited prolong munching particularly with regular consistency. No clearing of throat, cough and/or vocal changes were identified.  It is likely patient would benefit from downgrading to mechanical ground diet at this time. ST will follow to assure safety and adequacy of recommended diet and independent use  intravenous fluids  - Provide specific nutrition/hydration education as appropriate  - Include patient/family/caregiver in decisions related to nutrition  Outcome: Progressing      of safe swallow strategies                          SLP Swallowing Diagnosis: mild-moderate, oral dysphagia (07/06/24 1600)

## 2025-06-05 NOTE — ASSESSMENT & PLAN NOTE
Patient with prior history of hypocalcemia emanating from advanced CKD.  Patient is noted to be on calcium carbonate 2000 mg daily.  Noted to have a PTH intact of 724.3 obtained in May 2025.  Increase calcitriol to 0.5 mcg p.o. daily.  Initiated patient on ergocalciferol 50,000 is 1 tablet every 7 days for low 25-hydroxy vitamin D levels noted.

## 2025-06-05 NOTE — PLAN OF CARE
Problem: PAIN - ADULT  Goal: Verbalizes/displays adequate comfort level or baseline comfort level  Description: Interventions:  - Encourage patient to monitor pain and request assistance  - Assess pain using appropriate pain scale  - Administer analgesics as ordered based on type and severity of pain and evaluate response  - Implement non-pharmacological measures as appropriate and evaluate response  - Consider cultural and social influences on pain and pain management  - Notify physician/advanced practitioner if interventions unsuccessful or patient reports new pain  - Educate patient/family on pain management process including their role and importance of  reporting pain   - Provide non-pharmacologic/complimentary pain relief interventions  Outcome: Progressing     Problem: SAFETY ADULT  Goal: Patient will remain free of falls  Description: INTERVENTIONS:  - Educate patient/family on patient safety including physical limitations  - Instruct patient to call for assistance with activity   - Consider consulting OT/PT to assist with strengthening/mobility based on AM PAC & JH-HLM score  - Consult OT/PT to assist with strengthening/mobility   - Keep Call bell within reach  - Keep bed low and locked with side rails adjusted as appropriate  - Keep care items and personal belongings within reach  - Initiate and maintain comfort rounds  - Make Fall Risk Sign visible to staff  - Offer Toileting every 2 Hours, in advance of need  - Initiate/Maintain alarm  - Obtain necessary fall risk management equipment:   - Apply yellow socks and bracelet for high fall risk patients  - Consider moving patient to room near nurses station  Outcome: Progressing    Assess and monitor patient respiratory  and cardiac stability.  Monitor and treat vital sign, SOB. Continue to administer IV heparin for treating blood Clot.

## 2025-06-05 NOTE — ASSESSMENT & PLAN NOTE
Lab Results   Component Value Date    EGFR 7 06/05/2025    EGFR 8 06/04/2025    EGFR 9 05/18/2025    CREATININE 7.31 (H) 06/05/2025    CREATININE 6.77 (H) 06/04/2025    CREATININE 6.43 (H) 05/18/2025   Patient has history of chronic kidney disease, has fistula placed about a year ago; but patient does not do dialysis treatments.  Creatinine noted to be trending up to 7.3  Still reports adequate urine output  Nephrology consulted

## 2025-06-05 NOTE — RESPIRATORY THERAPY NOTE
"RT Protocol Note  Adam Gonzalez 50 y.o. male MRN: [id number]  Unit/Bed#: -01 Encounter: [id number]    Assessment    Principal Problem:    Shortness of breath  Active Problems:    Current smoker    Essential hypertension    Type 2 diabetes mellitus with circulatory disorder, with long-term current use of insulin (HCC)    Acute kidney injury superimposed on stage 5 chronic kidney disease, not on chronic dialysis (HCC)    Electrolyte abnormality      Home Pulmonary Medications:     06/04/25 3677   Respiratory Protocol   Protocol Initiated? Yes   Protocol Selection Respiratory   Language Barrier? No   Medical & Social History Reviewed? Yes   Diagnostic Studies Reviewed? Yes   Physical Assessment Performed? Yes   Respiratory Plan No distress/Pulmonary history   Respiratory Assessment   Assessment Type Assess only   General Appearance Alert;Awake   Respiratory Pattern Dyspnea with exertion   Chest Assessment Chest expansion symmetrical   Bilateral Breath Sounds Diminished   Cough None   Resp Comments Pt admitted for SOB. Pt does not uses inhalers or nebs at home. No home O2 or cpap. Hx of COPD, will order prn inhalers.   O2 Device RA   Additional Assessments   Pulse 88   Respirations 18   SpO2 98 %            Past Medical History[1]  Social History[1]    Subjective         Objective    Physical Exam:   Assessment Type: Assess only  General Appearance: Alert, Awake  Respiratory Pattern: Dyspnea with exertion  Chest Assessment: Chest expansion symmetrical  Bilateral Breath Sounds: Diminished  Cough: None  O2 Device: RA    Vitals:  Blood pressure 137/73, pulse 88, temperature 98.1 °F (36.7 °C), temperature source Oral, resp. rate 18, height 5' 10\" (1.778 m), weight 88.1 kg (194 lb 3.6 oz), SpO2 98%.          Imaging and other studies: Results Review Statement: No pertinent imaging studies reviewed.    O2 Device: RA     Plan    Respiratory Plan: No distress/Pulmonary history        Resp Comments: Pt admitted for SOB. Pt " does not uses inhalers or nebs at home. No home O2 or cpap. Hx of COPD, will order prn inhalers.        [1]   Past Medical History:  Diagnosis Date    Chronic kidney disease     Diabetes mellitus (HCC)     Hypertension     Received intravenous tissue plasminogen activator (tPA) in emergency department 2022    Stroke (HCC)     no deficits   [1]   Social History  Socioeconomic History    Marital status: Single   Tobacco Use    Smoking status: Every Day     Current packs/day: 0.50     Average packs/day: 1 pack/day for 38.0 years (36.8 ttl pk-yrs)     Types: Cigarettes     Start date: 1987     Passive exposure: Current    Smokeless tobacco: Never    Tobacco comments:     states slowing it down, used Chantix-currently 1/2 pack/day, at least 35 pack years as of    Vaping Use    Vaping status: Never Used   Substance and Sexual Activity    Alcohol use: Never     Comment: stopped     Drug use: Never    Sexual activity: Yes     Partners: Female     Birth control/protection: Condom Male     Social Drivers of Health     Food Insecurity: No Food Insecurity (2025)    Nursing - Inadequate Food Risk Classification     Worried About Running Out of Food in the Last Year: Never true     Ran Out of Food in the Last Year: Never true     Ran Out of Food in the Last Year: Never true   Transportation Needs: No Transportation Needs (2025)    Nursing - Transportation Risk Classification     Lack of Transportation: No    Received from [employer]   Intimate Partner Violence: Unknown (2025)    Nursing IPS     Physically Hurt by Someone: No     Hurt or Threatened by Someone: No   Housing Stability: Unknown (2025)    Nursing: Inadequate Housing Risk Classification     Unable to Pay for Housing in the Last Year: No     Has Housin

## 2025-06-06 VITALS
SYSTOLIC BLOOD PRESSURE: 119 MMHG | HEIGHT: 70 IN | RESPIRATION RATE: 18 BRPM | BODY MASS INDEX: 27.77 KG/M2 | TEMPERATURE: 97.1 F | DIASTOLIC BLOOD PRESSURE: 60 MMHG | OXYGEN SATURATION: 96 % | HEART RATE: 80 BPM | WEIGHT: 194 LBS

## 2025-06-06 LAB
ALBUMIN SERPL BCG-MCNC: 3.2 G/DL (ref 3.5–5)
ALP SERPL-CCNC: 120 U/L (ref 34–104)
ALT SERPL W P-5'-P-CCNC: 16 U/L (ref 7–52)
ANION GAP SERPL CALCULATED.3IONS-SCNC: 13 MMOL/L (ref 4–13)
AST SERPL W P-5'-P-CCNC: 15 U/L (ref 13–39)
BASOPHILS # BLD AUTO: 0.02 THOUSANDS/ÂΜL (ref 0–0.1)
BASOPHILS NFR BLD AUTO: 0 % (ref 0–1)
BILIRUB SERPL-MCNC: 0.23 MG/DL (ref 0.2–1)
BUN SERPL-MCNC: 91 MG/DL (ref 5–25)
CALCIUM ALBUM COR SERPL-MCNC: 6.8 MG/DL (ref 8.3–10.1)
CALCIUM SERPL-MCNC: 6.2 MG/DL (ref 8.4–10.2)
CHLORIDE SERPL-SCNC: 108 MMOL/L (ref 96–108)
CO2 SERPL-SCNC: 16 MMOL/L (ref 21–32)
CREAT SERPL-MCNC: 7.31 MG/DL (ref 0.6–1.3)
EOSINOPHIL # BLD AUTO: 0.06 THOUSAND/ÂΜL (ref 0–0.61)
EOSINOPHIL NFR BLD AUTO: 1 % (ref 0–6)
ERYTHROCYTE [DISTWIDTH] IN BLOOD BY AUTOMATED COUNT: 14.4 % (ref 11.6–15.1)
GFR SERPL CREATININE-BSD FRML MDRD: 7 ML/MIN/1.73SQ M
GLUCOSE SERPL-MCNC: 115 MG/DL (ref 65–140)
GLUCOSE SERPL-MCNC: 120 MG/DL (ref 65–140)
GLUCOSE SERPL-MCNC: 160 MG/DL (ref 65–140)
HBV CORE AB SER QL: NORMAL
HBV SURFACE AB SER-ACNC: <3 MIU/ML
HBV SURFACE AG SER QL: NORMAL
HCT VFR BLD AUTO: 30.5 % (ref 36.5–49.3)
HCV AB SER QL: NORMAL
HGB BLD-MCNC: 10 G/DL (ref 12–17)
IMM GRANULOCYTES # BLD AUTO: 0.07 THOUSAND/UL (ref 0–0.2)
IMM GRANULOCYTES NFR BLD AUTO: 1 % (ref 0–2)
LYMPHOCYTES # BLD AUTO: 1.41 THOUSANDS/ÂΜL (ref 0.6–4.47)
LYMPHOCYTES NFR BLD AUTO: 11 % (ref 14–44)
MAGNESIUM SERPL-MCNC: 1.4 MG/DL (ref 1.9–2.7)
MCH RBC QN AUTO: 30.5 PG (ref 26.8–34.3)
MCHC RBC AUTO-ENTMCNC: 32.8 G/DL (ref 31.4–37.4)
MCV RBC AUTO: 93 FL (ref 82–98)
MONOCYTES # BLD AUTO: 0.9 THOUSAND/ÂΜL (ref 0.17–1.22)
MONOCYTES NFR BLD AUTO: 7 % (ref 4–12)
NEUTROPHILS # BLD AUTO: 10.63 THOUSANDS/ÂΜL (ref 1.85–7.62)
NEUTS SEG NFR BLD AUTO: 80 % (ref 43–75)
NRBC BLD AUTO-RTO: 0 /100 WBCS
PLATELET # BLD AUTO: 208 THOUSANDS/UL (ref 149–390)
PMV BLD AUTO: 10 FL (ref 8.9–12.7)
POTASSIUM SERPL-SCNC: 3.5 MMOL/L (ref 3.5–5.3)
PROT SERPL-MCNC: 6.2 G/DL (ref 6.4–8.4)
RBC # BLD AUTO: 3.28 MILLION/UL (ref 3.88–5.62)
SODIUM SERPL-SCNC: 137 MMOL/L (ref 135–147)
WBC # BLD AUTO: 13.09 THOUSAND/UL (ref 4.31–10.16)

## 2025-06-06 PROCEDURE — 80053 COMPREHEN METABOLIC PANEL: CPT | Performed by: INTERNAL MEDICINE

## 2025-06-06 PROCEDURE — 85025 COMPLETE CBC W/AUTO DIFF WBC: CPT | Performed by: INTERNAL MEDICINE

## 2025-06-06 PROCEDURE — 83735 ASSAY OF MAGNESIUM: CPT | Performed by: INTERNAL MEDICINE

## 2025-06-06 PROCEDURE — 99232 SBSQ HOSP IP/OBS MODERATE 35: CPT | Performed by: INTERNAL MEDICINE

## 2025-06-06 PROCEDURE — 99239 HOSP IP/OBS DSCHRG MGMT >30: CPT | Performed by: INTERNAL MEDICINE

## 2025-06-06 PROCEDURE — 82948 REAGENT STRIP/BLOOD GLUCOSE: CPT

## 2025-06-06 PROCEDURE — 94664 DEMO&/EVAL PT USE INHALER: CPT

## 2025-06-06 PROCEDURE — 93970 EXTREMITY STUDY: CPT | Performed by: SURGERY

## 2025-06-06 RX ORDER — TORSEMIDE 20 MG/1
80 TABLET ORAL DAILY
Qty: 120 TABLET | Refills: 0 | Status: SHIPPED | OUTPATIENT
Start: 2025-06-07

## 2025-06-06 RX ORDER — NICOTINE 21 MG/24HR
1 PATCH, TRANSDERMAL 24 HOURS TRANSDERMAL DAILY
Qty: 28 PATCH | Refills: 0 | Status: SHIPPED | OUTPATIENT
Start: 2025-06-07

## 2025-06-06 RX ORDER — CALCIUM CARBONATE 500 MG/1
1000 TABLET, CHEWABLE ORAL 2 TIMES DAILY
Qty: 120 TABLET | Refills: 0 | Status: SHIPPED | OUTPATIENT
Start: 2025-06-06

## 2025-06-06 RX ORDER — CALCITRIOL 0.5 UG/1
0.5 CAPSULE, LIQUID FILLED ORAL DAILY
Qty: 30 CAPSULE | Refills: 0 | Status: SHIPPED | OUTPATIENT
Start: 2025-06-07

## 2025-06-06 RX ORDER — CALCIUM CARBONATE 500 MG/1
1000 TABLET, CHEWABLE ORAL DAILY PRN
Qty: 30 TABLET | Refills: 0 | Status: SHIPPED | OUTPATIENT
Start: 2025-06-06

## 2025-06-06 RX ADMIN — CLOPIDOGREL 75 MG: 75 TABLET ORAL at 09:02

## 2025-06-06 RX ADMIN — INSULIN LISPRO 1 UNITS: 100 INJECTION, SOLUTION INTRAVENOUS; SUBCUTANEOUS at 12:06

## 2025-06-06 RX ADMIN — CALCITRIOL CAPSULES 0.25 MCG 0.5 MCG: 0.25 CAPSULE ORAL at 09:02

## 2025-06-06 RX ADMIN — SODIUM BICARBONATE 650 MG: 650 TABLET ORAL at 09:02

## 2025-06-06 RX ADMIN — NICOTINE 1 PATCH: 21 PATCH, EXTENDED RELEASE TRANSDERMAL at 09:02

## 2025-06-06 RX ADMIN — CALCIUM CARBONATE (ANTACID) CHEW TAB 500 MG 1000 MG: 500 CHEW TAB at 09:02

## 2025-06-06 RX ADMIN — ATORVASTATIN CALCIUM 80 MG: 40 TABLET, FILM COATED ORAL at 09:02

## 2025-06-06 RX ADMIN — INSULIN GLARGINE 14 UNITS: 100 INJECTION, SOLUTION SUBCUTANEOUS at 09:02

## 2025-06-06 NOTE — ASSESSMENT & PLAN NOTE
Lab Results   Component Value Date    HGBA1C 7.0 (H) 05/05/2025   Lantus dose decreased to 14 units daily, patient currently takes 28 units daily; may increase upon reevaluation of patient.  Sliding scale ordered for coverage.  Monitor blood glucose  (P) 381.25

## 2025-06-06 NOTE — ASSESSMENT & PLAN NOTE
Lab Results   Component Value Date    EGFR 7 06/06/2025    EGFR 7 06/05/2025    EGFR 8 06/04/2025    CREATININE 7.31 (H) 06/06/2025    CREATININE 7.31 (H) 06/05/2025    CREATININE 6.77 (H) 06/04/2025

## 2025-06-06 NOTE — DISCHARGE SUMMARY
Discharge Summary - Hospitalist   Name: Adam Gonzalez 50 y.o. male I MRN: [id number]  Unit/Bed#: -01 I Date of Admission: 6/4/2025   Date of Service: 6/6/2025 I Hospital Day: 2     Assessment & Plan  Shortness of breath  Presented with shortness of breath  Noted to have elevated D-dimer  Shortness of breath resolved  VQ scan ruled out PE  Medically cleared for discharge    Essential hypertension  \Blood pressure controlled  Continue amlodipine, metoprolol and hydralazine.    Type 2 diabetes mellitus with circulatory disorder, with long-term current use of insulin ([facility])    Lab Results   Component Value Date    HGBA1C 7.0 (H) 05/05/2025   Lantus dose decreased to 14 units daily, patient currently takes 28 units daily; may increase upon reevaluation of patient.  Sliding scale ordered for coverage.  Monitor blood glucose  (P) 381.25  Acute kidney injury superimposed on stage 5 chronic kidney disease, not on chronic dialysis (Formerly [facility])  Lab Results   Component Value Date    EGFR 7 06/06/2025    EGFR 7 06/05/2025    EGFR 8 06/04/2025    CREATININE 7.31 (H) 06/06/2025    CREATININE 7.31 (H) 06/05/2025    CREATININE 6.77 (H) 06/04/2025   Patient has history of chronic kidney disease, has fistula placed about a year ago; but patient does not do dialysis treatments.  Creatinine noted to be trending up to 7.3  Nephrology consulted  Planning for initiation of dialysis as an outpatient  Current smoker  Patient has been greater than 20-year pack a day smoker.  Per patient's last family medicine visit, patient is contemplating quitting and slowing down on the amount of cigarettes smoked during the day.  Encourage smoking cessation.  Nicotine patch ordered.  Electrolyte abnormality  Magnesium 1.1, replaced in the ED.  Calcium 5.9, given calcium gluconate in ED.  Continue to replete aggressively  Metabolic acidosis    Chronic kidney disease, stage V (Formerly [facility])  Lab Results   Component Value Date    EGFR 7 06/06/2025    EGFR 7 06/05/2025     EGFR 8 06/04/2025    CREATININE 7.31 (H) 06/06/2025    CREATININE 7.31 (H) 06/05/2025    CREATININE 6.77 (H) 06/04/2025     See above    Discharging Physician / Practitioner: Baljinder Loco MD  PCP: Evans Birmingham MD  Admission Date:   Admission Orders (From admission, onward)       Ordered        06/04/25 2058  INPATIENT ADMISSION  Once                          Discharge Date: 06/06/25    Medical Problems       Resolved Problems  Date Reviewed: 6/5/2025   None         Consultations During Hospital Stay:  Nephrology      Procedures Performed:    none     Significant Findings / Test Results:   NM lung ventilation / perfusion  Result Date: 6/5/2025  Impression: The probability for pulmonary embolus is low. Workstation performed: [id number]     XR chest 2 views  Result Date: 6/5/2025  Impression: Subtle prominence of pulmonary vasculature suggesting mild pulmonary vascular congestive change. No focal consolidation. Workstation performed: [id number]      Incidental Findings:   none     Test Results Pending at Discharge (will require follow up):   none     Outpatient Tests Requested:  none    Complications:  none    Reason for Admission: Shortness of breath    Hospital Course:     Adam Gonzalez is a 50 y.o. male patient who originally presented to the hospital on 6/4/2025 with past medical history significant type 2 diabetes, CKD, hypertension initially presented shortness of breath.  Had VQ scan which ruled out PE.  SOB improved and resolved.  Follow-up outpatient with nephrology to initiate dialysis and primary care doctor approxi-1 to 2 weeks      Please see above list of diagnoses and related plan for additional information.     Condition at Discharge: stable     Discharge Day Visit / Exam:     Subjective:   Denies fevers, chills, cough, abdominal pain   Vitals: Blood Pressure: 119/60 (06/06/25 0905)  Pulse: 80 (06/06/25 0905)  Temperature: (!) 97.1 °F (36.2 °C) (06/06/25 0729)  Temp Source: Oral  "(06/05/25 2204)  Respirations: 18 (06/06/25 0729)  Height: 5' 10\" (177.8 cm) (06/05/25 1149)  Weight - Scale: 88 kg (194 lb) (06/06/25 0554)  SpO2: 96 % (06/06/25 0905)  Exam:   Physical Exam  Constitutional:       General: He is not in acute distress.     Appearance: He is well-developed. He is not diaphoretic.   HENT:      Head: Normocephalic and atraumatic.      Nose: Nose normal.      Mouth/Throat:      Pharynx: No oropharyngeal exudate.     Eyes:      General: No scleral icterus.     Conjunctiva/sclera: Conjunctivae normal.       Cardiovascular:      Rate and Rhythm: Normal rate and regular rhythm.      Heart sounds: Normal heart sounds. No murmur heard.     No friction rub. No gallop.   Pulmonary:      Effort: Pulmonary effort is normal. No respiratory distress.      Breath sounds: Normal breath sounds. No wheezing or rales.   Chest:      Chest wall: No tenderness.   Abdominal:      General: Bowel sounds are normal. There is no distension.      Palpations: Abdomen is soft.      Tenderness: There is no abdominal tenderness. There is no guarding.     Musculoskeletal:         General: No tenderness or deformity. Normal range of motion.      Cervical back: Normal range of motion and neck supple.     Skin:     General: Skin is warm and dry.      Findings: No erythema.     Neurological:      Mental Status: He is alert. Mental status is at baseline.         Discussion with Family: pt    Discharge instructions/Information to patient and family:   See after visit summary for information provided to patient and family.      Provisions for Follow-Up Care:  See after visit summary for information related to follow-up care and any pertinent home health orders.      Disposition:     Home    For Discharges to [facility]:   Not Applicable to this Patient - Not Applicable to this Patient    Planned Readmission: none     Discharge Statement:  I spent 60 minutes discharging the patient. This time was spent on the " day of discharge. I had direct contact with the patient on the day of discharge. Greater than 50% of the total time was spent examining patient, answering all patient questions, arranging and discussing plan of care with patient as well as directly providing post-discharge instructions.  Additional time then spent on discharge activities.    Discharge Medications:  See after visit summary for reconciled discharge medications provided to patient and family.      ** Please Note: This note has been constructed using a voice recognition system **

## 2025-06-06 NOTE — PLAN OF CARE
Problem: PAIN - ADULT  Goal: Verbalizes/displays adequate comfort level or baseline comfort level  Description: Interventions:  - Encourage patient to monitor pain and request assistance  - Assess pain using appropriate pain scale  - Administer analgesics as ordered based on type and severity of pain and evaluate response  - Implement non-pharmacological measures as appropriate and evaluate response  - Consider cultural and social influences on pain and pain management  - Notify physician/advanced practitioner if interventions unsuccessful or patient reports new pain  - Educate patient/family on pain management process including their role and importance of  reporting pain   - Provide non-pharmacologic/complimentary pain relief interventions  6/6/2025 1256 by Shamika Briseno  Outcome: Adequate for Discharge  6/6/2025 1157 by Shamika Briseno  Outcome: Progressing     Problem: INFECTION - ADULT  Goal: Absence or prevention of progression during hospitalization  Description: INTERVENTIONS:  - Assess and monitor for signs and symptoms of infection  - Monitor lab/diagnostic results  - Monitor all insertion sites, i.e. indwelling lines, tubes, and drains  - Monitor endotracheal if appropriate and nasal secretions for changes in amount and color  - [place] appropriate cooling/warming therapies per order  - Administer medications as ordered  - Instruct and encourage patient and family to use good hand hygiene technique  - Identify and instruct in appropriate isolation precautions for identified infection/condition  6/6/2025 1256 by Shamika Briseno  Outcome: Adequate for Discharge  6/6/2025 1157 by Shamika Briseno  Outcome: Progressing  Goal: Absence of fever/infection during neutropenic period  Description: INTERVENTIONS:  - Monitor WBC  - Perform strict hand hygiene  - Limit to healthy visitors only  - No plants, dried, fresh or silk flowers with christie in patient room  6/6/2025 1256 by Shamika  Suzanna  Outcome: Adequate for Discharge  6/6/2025 1157 by Shamika Briseno  Outcome: Progressing     Problem: SAFETY ADULT  Goal: Patient will remain free of falls  Description: INTERVENTIONS:  - Educate patient/family on patient safety including physical limitations  - Instruct patient to call for assistance with activity   - Consider consulting OT/PT to assist with strengthening/mobility based on AM PAC & JH-HLM score  - Consult OT/PT to assist with strengthening/mobility   - Keep Call bell within reach  - Keep bed low and locked with side rails adjusted as appropriate  - Keep care items and personal belongings within reach  - Initiate and maintain comfort rounds  - Make Fall Risk Sign visible to staff  - Offer Toileting every  Hours, in advance of need  - Initiate/Maintain alarm  - Obtain necessary fall risk management equipment:   - Apply yellow socks and bracelet for high fall risk patients  - Consider moving patient to room near nurses station  6/6/2025 1256 by Shamika Briseno  Outcome: Adequate for Discharge  6/6/2025 1157 by Shamika Briseno  Outcome: Progressing  Goal: Maintain or return to baseline ADL function  Description: INTERVENTIONS:  -  Assess patient's ability to carry out ADLs; assess patient's baseline for ADL function and identify physical deficits which impact ability to perform ADLs (bathing, care of mouth/teeth, toileting, grooming, dressing, etc.)  - Assess/evaluate cause of self-care deficits   - Assess range of motion  - Assess patient's mobility; develop plan if impaired  - Assess patient's need for assistive devices and provide as appropriate  - Encourage maximum independence but intervene and supervise when necessary  - Involve family in performance of ADLs  - Assess for home care needs following discharge   - Consider OT consult to assist with ADL evaluation and planning for discharge  - Provide patient education as appropriate  - Monitor functional capacity and physical  performance, use of AM PAC & -HLM   - Monitor gait, balance and fatigue with ambulation    6/6/2025 1256 by Shamika Briseno  Outcome: Adequate for Discharge  6/6/2025 1157 by Shamika Briseno  Outcome: Progressing  Goal: Maintains/Returns to pre admission functional level  Description: INTERVENTIONS:  - Perform AM-PAC 6 Click Basic Mobility/ Daily Activity assessment daily.  - Set and communicate daily mobility goal to care team and patient/family/caregiver.   - Collaborate with rehabilitation services on mobility goals if consulted  - Perform Range of Motion  times a day.  - Reposition patient every  hours.  - Dangle patient  times a day  - Stand patient  times a day  - Ambulate patient  times a day  - Out of bed to chair  times a day   - Out of bed for meals  times a day  - Out of bed for toileting  - Record patient progress and toleration of activity level   6/6/2025 1256 by Shamika Briseno  Outcome: Adequate for Discharge  6/6/2025 1157 by Shamika Briseno  Outcome: Progressing     Problem: DISCHARGE PLANNING  Goal: Discharge to home or other facility with appropriate resources  Description: INTERVENTIONS:  - Identify barriers to discharge w/patient and caregiver  - Arrange for needed discharge resources and transportation as appropriate  - Identify discharge learning needs (meds, wound care, etc.)  - Arrange for interpretive services to assist at discharge as needed  - Refer to Case Management Department for coordinating discharge planning if the patient needs post-hospital services based on physician/advanced practitioner order or complex needs related to functional status, cognitive ability, or social support system  6/6/2025 1256 by Shamika Briseno  Outcome: Adequate for Discharge  6/6/2025 1157 by Shamika Briseno  Outcome: Progressing     Problem: Knowledge Deficit  Goal: Patient/family/caregiver demonstrates understanding of disease process, treatment plan, medications, and discharge  instructions  Description: Complete learning assessment and assess knowledge base.  Interventions:  - Provide teaching at level of understanding  - Provide teaching via preferred learning methods  6/6/2025 1256 by Shamika Briseno  Outcome: Adequate for Discharge  6/6/2025 1157 by Shamika Briseno  Outcome: Progressing     Problem: SLP ADULT - COMMUNICATION, IMPAIRED  Goal: Initial communication eval performed  Outcome: Adequate for Discharge  Goal: Demonstrates communication skills at highest level of function for planned discharge setting.  See evaluation for individualized goals.  Description: Patient will be able to:    1. Increase ability to  with % accuracy .   2. Demostrate reading comprehension at  with % accuracy .  3. Increase ability to write  with % .  4. Demonstrate auditory comprehension of  with % accuracy .  5.  with % accuracy.    Outcome: Adequate for Discharge     Problem: Nutrition/Hydration-ADULT  Goal: Nutrient/Hydration intake appropriate for improving, restoring or maintaining nutritional needs  Description: Monitor and assess patient's nutrition/hydration status for malnutrition. Collaborate with interdisciplinary team and initiate plan and interventions as ordered.  Monitor patient's weight and dietary intake as ordered or per policy. Utilize nutrition screening tool and intervene as necessary. Determine patient's food preferences and provide high-protein, high-caloric foods as appropriate.     INTERVENTIONS:  - Monitor oral intake, urinary output, labs, and treatment plans  - Assess nutrition and hydration status and recommend course of action  - Evaluate amount of meals eaten  - Assist patient with eating if necessary   - Allow adequate time for meals  - Recommend/ encourage appropriate diets, oral nutritional supplements, and vitamin/mineral supplements  - Order, calculate, and assess calorie counts as needed  - Recommend, monitor, and adjust tube feedings and TPN/PPN based on assessed  needs  - Assess need for intravenous fluids  - Provide specific nutrition/hydration education as appropriate  - Include patient/family/caregiver in decisions related to nutrition  6/6/2025 1256 by Shamika Briseno  Outcome: Adequate for Discharge  6/6/2025 1157 by Shamika Briseno  Outcome: Progressing

## 2025-06-06 NOTE — ASSESSMENT & PLAN NOTE
Lab Results   Component Value Date    EGFR 7 06/06/2025    EGFR 7 06/05/2025    EGFR 8 06/04/2025    CREATININE 7.31 (H) 06/06/2025    CREATININE 7.31 (H) 06/05/2025    CREATININE 6.77 (H) 06/04/2025     Renal function is stable.  Will be seen by Dr. Judge as outpatient to initiate dialysis

## 2025-06-06 NOTE — PLAN OF CARE
Problem: PAIN - ADULT  Goal: Verbalizes/displays adequate comfort level or baseline comfort level  Description: Interventions:  - Encourage patient to monitor pain and request assistance  - Assess pain using appropriate pain scale  - Administer analgesics as ordered based on type and severity of pain and evaluate response  - Implement non-pharmacological measures as appropriate and evaluate response  - Consider cultural and social influences on pain and pain management  - Notify physician/advanced practitioner if interventions unsuccessful or patient reports new pain  - Educate patient/family on pain management process including their role and importance of  reporting pain   - Provide non-pharmacologic/complimentary pain relief interventions  Outcome: Progressing     Problem: Knowledge Deficit  Goal: Patient/family/caregiver demonstrates understanding of disease process, treatment plan, medications, and discharge instructions  Description: Complete learning assessment and assess knowledge base.  Interventions:  - Provide teaching at level of understanding  - Provide teaching via preferred learning methods  Outcome: Progressing     Problem: SAFETY ADULT  Goal: Patient will remain free of falls  Description: INTERVENTIONS:  - Educate patient/family on patient safety including physical limitations  - Instruct patient to call for assistance with activity   - Consider consulting OT/PT to assist with strengthening/mobility based on AM PAC & JH-HLM score  - Consult OT/PT to assist with strengthening/mobility   - Keep Call bell within reach  - Keep bed low and locked with side rails adjusted as appropriate  - Keep care items and personal belongings within reach  - Initiate and maintain comfort rounds  - Make Fall Risk Sign visible to staff  - Offer Toileting every 2 Hours, in advance of need  - Initiate/Maintain alarm  - Obtain necessary fall risk management equipment:   - Apply yellow socks and bracelet for high fall  risk patients  - Consider moving patient to room near nurses station  Outcome: Progressing   Continue with care plan , assess and treat patient respiratory issues, monitor and treat blood glucose. Monitor VS and patient laboratory. Replace abnormal electrolyte

## 2025-06-06 NOTE — PROGRESS NOTES
Progress Note - Nephrology   Name: Adam Gonzalez 50 y.o. male I MRN: [id number]  Unit/Bed#: -01 I Date of Admission: 6/4/2025   Date of Service: 6/6/2025 I Hospital Day: 2    Assessment & Plan  Chronic kidney disease, stage V (Formerly [facility])  Lab Results   Component Value Date    EGFR 7 06/06/2025    EGFR 7 06/05/2025    EGFR 8 06/04/2025    CREATININE 7.31 (H) 06/06/2025    CREATININE 7.31 (H) 06/05/2025    CREATININE 6.77 (H) 06/04/2025     Renal function is stable.  Will be seen by Dr. Judge as outpatient to initiate dialysis  Shortness of breath  Noted on admission.  Now resolved.  Noted to have mild pulmonary venous congestion.  Will keep patient on torsemide 80 mg daily.  Current smoker  Cessation of smoking reiterated.  Essential hypertension  Blood pressure is elevated 159/90.  Currently on amlodipine, hydralazine and metoprolol.  Type 2 diabetes mellitus with circulatory disorder, with long-term current use of insulin (Formerly [facility])  Lab Results   Component Value Date    HGBA1C 7.0 (H) 05/05/2025       Recent Labs     06/05/25  1635 06/05/25  2104 06/06/25  0659 06/06/25  1104   POCGLU 391* 331* 120 160*     Blood glucose noted to be significantly elevated.  Electrolyte abnormality  Patient with prior history of hypocalcemia emanating from advanced CKD.  Patient is noted to be on calcium carbonate 2000 mg daily.  Noted to have a PTH intact of 724.3 obtained in May 2025.  Increase calcitriol to 0.5 mcg p.o. daily.  Initiated patient on ergocalciferol 50,000 is 1 tablet every 7 days for low 25-hydroxy vitamin D levels noted.  Metabolic acidosis  Patient noted to have non-anion gap metabolic acidosis with serum bicarbonate of 18 emanating from CKD.  Currently on sodium bicarb tablets.      I have reviewed the nephrology recommendations including need for outpatient follow-up for dialysis, with [facility], and we are in agreement with renal plan including the information outlined above.     Subjective   Brief History of  Admission -admitted with CKD    Overall feeling well    No acute complaint    No chest pain no palpitation    No nausea no vomit    Objective :  Temp:  [97 °F (36.1 °C)-97.9 °F (36.6 °C)] 97.1 °F (36.2 °C)  HR:  [73-99] 80  BP: (107-147)/(57-79) 119/60  Resp:  [16-20] 18  SpO2:  [96 %-99 %] 96 %    Current Weight: Weight - Scale: 88 kg (194 lb)  First Weight: Weight - Scale: 89 kg (196 lb 3.4 oz)  I/O         06/04 0701  06/05 0700 06/05 0701  06/06 0700 06/06 0701  06/07 0700    P.O. 1200 320     I.V. (mL/kg) 17.6 (0.2) 104.8 (1.2)     IV Piggyback 50      Total Intake(mL/kg) 1267.6 (14.4) 424.8 (4.8)     Net +1267.6 +424.8            Unmeasured Urine Occurrence 2 x      Unmeasured Stool Occurrence  0 x           Physical Exam  Constitutional:       General: He is not in acute distress.     Appearance: He is well-developed.   HENT:      Head: Normocephalic.      Mouth/Throat:      Mouth: Mucous membranes are moist.     Eyes:      General: No scleral icterus.     Conjunctiva/sclera: Conjunctivae normal.     Neck:      Vascular: No JVD.     Cardiovascular:      Rate and Rhythm: Normal rate.      Heart sounds: Normal heart sounds.   Pulmonary:      Effort: Pulmonary effort is normal.      Breath sounds: No wheezing.   Abdominal:      Palpations: Abdomen is soft.      Tenderness: There is no abdominal tenderness.     Musculoskeletal:         General: Normal range of motion.      Cervical back: Neck supple.     Skin:     General: Skin is warm.      Findings: No rash.     Neurological:      Mental Status: He is alert and oriented to person, place, and time.     Psychiatric:         Behavior: Behavior normal.         Medications:  Current Medications[1]      Lab Results: I have reviewed the following results:  Results from last 7 days   Lab Units 06/06/25  0655 06/05/25  0617 06/04/25  1853   WBC Thousand/uL 13.09* 11.62* 9.99   HEMOGLOBIN g/dL 10.0* 10.5* 11.2*   HEMATOCRIT % 30.5* 32.2* 35.2*   PLATELETS Thousands/uL  "208 192 222   POTASSIUM mmol/L 3.5 4.0 3.7   CHLORIDE mmol/L 108 110* 108   CO2 mmol/L 16* 18* 18*   BUN mg/dL 91* 73* 70*   CREATININE mg/dL 7.31* 7.31* 6.77*   CALCIUM mg/dL 6.2* 5.6* 5.9*   MAGNESIUM mg/dL 1.4* 1.8* 1.1*   ALBUMIN g/dL 3.2* 3.3* 3.6       Administrative Statements     Portions of the record may have been created with voice recognition software. Occasional wrong word or \"sound a like\" substitutions may have occurred due to the inherent limitations of voice recognition software. Read the chart carefully and recognize, using context, where substitutions have occurred.If you have any questions, please contact the dictating provider.       [1]   Current Facility-Administered Medications:     acetaminophen (TYLENOL) tablet 650 mg, 650 mg, Oral, Q6H PRN, YASMIN Swift    albuterol inhalation solution 2.5 mg, 2.5 mg, Nebulization, Q6H PRN, YASMIN Garrett, 2.5 mg at 06/05/25 0257    amLODIPine (NORVASC) tablet 10 mg, 10 mg, Oral, Daily, YASMIN Swift, 10 mg at 06/05/25 0911    atorvastatin (LIPITOR) tablet 80 mg, 80 mg, Oral, Daily, YASMIN Swift, 80 mg at 06/06/25 0902    calcitriol (ROCALTROL) capsule 0.5 mcg, 0.5 mcg, Oral, Daily, Malia Elliott MD, 0.5 mcg at 06/06/25 0902    calcium carbonate (TUMS) chewable tablet 1,000 mg, 1,000 mg, Oral, Daily PRN, YASMIN Swift    calcium carbonate (TUMS) chewable tablet 1,000 mg, 1,000 mg, Oral, BID, Malia Elliott MD, 1,000 mg at 06/06/25 0902    clopidogrel (PLAVIX) tablet 75 mg, 75 mg, Oral, Daily, YASMIN Swift, 75 mg at 06/06/25 0902    ergocalciferol (VITAMIN D2) capsule 50,000 Units, 50,000 Units, Oral, Weekly, Malia Elliott MD, 50,000 Units at 06/05/25 1346    hydrALAZINE (APRESOLINE) tablet 50 mg, 50 mg, Oral, BID, YASMIN Swift, 50 mg at 06/05/25 1707    insulin glargine (LANTUS) subcutaneous injection 14 Units 0.14 mL, 14 Units, Subcutaneous, " QAM, YASMIN Swift, 14 Units at 06/06/25 0902    insulin lispro (HumALOG/ADMELOG) 100 units/mL subcutaneous injection 1-6 Units, 1-6 Units, Subcutaneous, TID AC, 1 Units at 06/06/25 1206 **AND** Fingerstick Glucose (POCT), , , TID AC, YASMIN Swift    insulin lispro (HumALOG/ADMELOG) 100 units/mL subcutaneous injection 1-6 Units, 1-6 Units, Subcutaneous, HS, YASMIN Swift, 6 Units at 06/05/25 2141    metoprolol succinate (TOPROL-XL) 24 hr tablet 50 mg, 50 mg, Oral, Daily, YASMIN Swift, 50 mg at 06/05/25 0911    nicotine (NICODERM CQ) 21 mg/24 hr TD 24 hr patch 1 patch, 1 patch, Transdermal, Daily, YASMIN Swift, 1 patch at 06/06/25 0902    sodium bicarbonate tablet 650 mg, 650 mg, Oral, BID, YASMIN Swift, 650 mg at 06/06/25 0902    torsemide (DEMADEX) tablet 80 mg, 80 mg, Oral, Daily, Malia Elliott MD, 80 mg at 06/05/25 1334

## 2025-06-06 NOTE — RESPIRATORY THERAPY NOTE
06/06/25 0728   Respiratory Protocol   Protocol Initiated? No   Protocol Selection Respiratory   Language Barrier? No   Medical & Social History Reviewed? Yes   Diagnostic Studies Reviewed? Yes   Physical Assessment Performed? Yes   Respiratory Plan No distress/Pulmonary history   Respiratory Assessment   Assessment Type Assess only   General Appearance Sleeping   Respiratory Pattern Dyspnea with exertion   Chest Assessment Chest expansion symmetrical   Bilateral Breath Sounds Clear   Resp Comments will cont w PRN txs   O2 Device ra   Additional Assessments   Pulse 75   Respirations 17   SpO2 97 %

## 2025-06-06 NOTE — ASSESSMENT & PLAN NOTE
Lab Results   Component Value Date    HGBA1C 7.0 (H) 05/05/2025       Recent Labs     06/05/25  1635 06/05/25  2104 06/06/25  0659 06/06/25  1104   POCGLU 391* 331* 120 160*     Blood glucose noted to be significantly elevated.

## 2025-06-06 NOTE — ASSESSMENT & PLAN NOTE
Lab Results   Component Value Date    EGFR 7 06/06/2025    EGFR 7 06/05/2025    EGFR 8 06/04/2025    CREATININE 7.31 (H) 06/06/2025    CREATININE 7.31 (H) 06/05/2025    CREATININE 6.77 (H) 06/04/2025   Patient has history of chronic kidney disease, has fistula placed about a year ago; but patient does not do dialysis treatments.  Creatinine noted to be trending up to 7.3  Nephrology consulted  Planning for initiation of dialysis as an outpatient

## 2025-06-06 NOTE — PLAN OF CARE
Problem: PAIN - ADULT  Goal: Verbalizes/displays adequate comfort level or baseline comfort level  Description: Interventions:  - Encourage patient to monitor pain and request assistance  - Assess pain using appropriate pain scale  - Administer analgesics as ordered based on type and severity of pain and evaluate response  - Implement non-pharmacological measures as appropriate and evaluate response  - Consider cultural and social influences on pain and pain management  - Notify physician/advanced practitioner if interventions unsuccessful or patient reports new pain  - Educate patient/family on pain management process including their role and importance of  reporting pain   - Provide non-pharmacologic/complimentary pain relief interventions  Outcome: Progressing     Problem: INFECTION - ADULT  Goal: Absence or prevention of progression during hospitalization  Description: INTERVENTIONS:  - Assess and monitor for signs and symptoms of infection  - Monitor lab/diagnostic results  - Monitor all insertion sites, i.e. indwelling lines, tubes, and drains  - Monitor endotracheal if appropriate and nasal secretions for changes in amount and color  - [place] appropriate cooling/warming therapies per order  - Administer medications as ordered  - Instruct and encourage patient and family to use good hand hygiene technique  - Identify and instruct in appropriate isolation precautions for identified infection/condition  Outcome: Progressing  Goal: Absence of fever/infection during neutropenic period  Description: INTERVENTIONS:  - Monitor WBC  - Perform strict hand hygiene  - Limit to healthy visitors only  - No plants, dried, fresh or silk flowers with christie in patient room  Outcome: Progressing     Problem: SAFETY ADULT  Goal: Patient will remain free of falls  Description: INTERVENTIONS:  - Educate patient/family on patient safety including physical limitations  - Instruct patient to call for assistance with activity   -  Consider consulting OT/PT to assist with strengthening/mobility based on AM PAC & JH-HLM score  - Consult OT/PT to assist with strengthening/mobility   - Keep Call bell within reach  - Keep bed low and locked with side rails adjusted as appropriate  - Keep care items and personal belongings within reach  - Initiate and maintain comfort rounds  - Make Fall Risk Sign visible to staff  - Offer Toileting every 3 Hours, in advance of need  - Initiate/Maintain bed alarm  - Obtain necessary fall risk management equipment:   - Apply yellow socks and bracelet for high fall risk patients  - Consider moving patient to room near nurses station  Outcome: Progressing  Goal: Maintain or return to baseline ADL function  Description: INTERVENTIONS:  -  Assess patient's ability to carry out ADLs; assess patient's baseline for ADL function and identify physical deficits which impact ability to perform ADLs (bathing, care of mouth/teeth, toileting, grooming, dressing, etc.)  - Assess/evaluate cause of self-care deficits   - Assess range of motion  - Assess patient's mobility; develop plan if impaired  - Assess patient's need for assistive devices and provide as appropriate  - Encourage maximum independence but intervene and supervise when necessary  - Involve family in performance of ADLs  - Assess for home care needs following discharge   - Consider OT consult to assist with ADL evaluation and planning for discharge  - Provide patient education as appropriate  - Monitor functional capacity and physical performance, use of AM PAC & JH-HLM   - Monitor gait, balance and fatigue with ambulation    Outcome: Progressing  Goal: Maintains/Returns to pre admission functional level  Description: INTERVENTIONS:  - Perform AM-PAC 6 Click Basic Mobility/ Daily Activity assessment daily.  - Set and communicate daily mobility goal to care team and patient/family/caregiver.   - Collaborate with rehabilitation services on mobility goals if  consulted  - Perform Range of Motion 3 times a day.  - Reposition patient every 3 hours.  - Dangle patient 3 times a day  - Stand patient 3 times a day  - Ambulate patient 3 times a day  - Out of bed to chair 3 times a day   - Out of bed for meals 3 times a day  - Out of bed for toileting  - Record patient progress and toleration of activity level   Outcome: Progressing     Problem: DISCHARGE PLANNING  Goal: Discharge to home or other facility with appropriate resources  Description: INTERVENTIONS:  - Identify barriers to discharge w/patient and caregiver  - Arrange for needed discharge resources and transportation as appropriate  - Identify discharge learning needs (meds, wound care, etc.)  - Arrange for interpretive services to assist at discharge as needed  - Refer to Case Management Department for coordinating discharge planning if the patient needs post-hospital services based on physician/advanced practitioner order or complex needs related to functional status, cognitive ability, or social support system  Outcome: Progressing     Problem: Knowledge Deficit  Goal: Patient/family/caregiver demonstrates understanding of disease process, treatment plan, medications, and discharge instructions  Description: Complete learning assessment and assess knowledge base.  Interventions:  - Provide teaching at level of understanding  - Provide teaching via preferred learning methods  Outcome: Progressing     Problem: Nutrition/Hydration-ADULT  Goal: Nutrient/Hydration intake appropriate for improving, restoring or maintaining nutritional needs  Description: Monitor and assess patient's nutrition/hydration status for malnutrition. Collaborate with interdisciplinary team and initiate plan and interventions as ordered.  Monitor patient's weight and dietary intake as ordered or per policy. Utilize nutrition screening tool and intervene as necessary. Determine patient's food preferences and provide high-protein, high-caloric  foods as appropriate.     INTERVENTIONS:  - Monitor oral intake, urinary output, labs, and treatment plans  - Assess nutrition and hydration status and recommend course of action  - Evaluate amount of meals eaten  - Assist patient with eating if necessary   - Allow adequate time for meals  - Recommend/ encourage appropriate diets, oral nutritional supplements, and vitamin/mineral supplements  - Order, calculate, and assess calorie counts as needed  - Recommend, monitor, and adjust tube feedings and TPN/PPN based on assessed needs  - Assess need for intravenous fluids  - Provide specific nutrition/hydration education as appropriate  - Include patient/family/caregiver in decisions related to nutrition  Outcome: Progressing

## 2025-06-06 NOTE — ASSESSMENT & PLAN NOTE
Presented with shortness of breath  Noted to have elevated D-dimer  Shortness of breath resolved  VQ scan ruled out PE  Medically cleared for discharge

## 2025-06-09 ENCOUNTER — TRANSITIONAL CARE MANAGEMENT (OUTPATIENT)
Dept: FAMILY MEDICINE CLINIC | Facility: CLINIC | Age: 50
End: 2025-06-09

## 2025-06-10 ENCOUNTER — TELEPHONE (OUTPATIENT)
Age: 50
End: 2025-06-10

## 2025-06-10 NOTE — TELEPHONE ENCOUNTER
Admission  Juan Daniel  called  patient  has  a chair.  His  date or Monday , Wednesday Friday. His time  is 3:00  they would  need for  the  patient to arrive at  2:45 June 13, 25 . Please  contact  patient .  Aoggsh-[phone]

## 2025-06-10 NOTE — TELEPHONE ENCOUNTER
LM stating that Chai called and His  dates are Mondays , Wednesdays Fridays. His time  is 3:00  they would  need for  the  patient to arrive at  2:45 June 13, 25 . Asked pt to contact chai at [phone] for any questions or concerns.

## 2025-06-13 ENCOUNTER — OFFICE VISIT (OUTPATIENT)
Dept: FAMILY MEDICINE CLINIC | Facility: CLINIC | Age: 50
End: 2025-06-13
Payer: COMMERCIAL

## 2025-06-13 VITALS
OXYGEN SATURATION: 99 % | WEIGHT: 202 LBS | SYSTOLIC BLOOD PRESSURE: 139 MMHG | DIASTOLIC BLOOD PRESSURE: 80 MMHG | HEART RATE: 85 BPM | BODY MASS INDEX: 28.98 KG/M2

## 2025-06-13 DIAGNOSIS — E78.00 HYPERCHOLESTEROLEMIA: ICD-10-CM

## 2025-06-13 DIAGNOSIS — I10 ESSENTIAL HYPERTENSION: ICD-10-CM

## 2025-06-13 DIAGNOSIS — Z79.4 TYPE 2 DIABETES MELLITUS WITH OTHER CIRCULATORY COMPLICATION, WITH LONG-TERM CURRENT USE OF INSULIN (HCC): ICD-10-CM

## 2025-06-13 DIAGNOSIS — E11.59 TYPE 2 DIABETES MELLITUS WITH OTHER CIRCULATORY COMPLICATION, WITH LONG-TERM CURRENT USE OF INSULIN (HCC): ICD-10-CM

## 2025-06-13 DIAGNOSIS — N18.5 STAGE 5 CHRONIC KIDNEY DISEASE NOT ON CHRONIC DIALYSIS (HCC): ICD-10-CM

## 2025-06-13 DIAGNOSIS — Z00.00 ANNUAL PHYSICAL EXAM: Primary | ICD-10-CM

## 2025-06-13 PROCEDURE — 99396 PREV VISIT EST AGE 40-64: CPT | Performed by: INTERNAL MEDICINE

## 2025-06-13 PROCEDURE — 99214 OFFICE O/P EST MOD 30 MIN: CPT | Performed by: INTERNAL MEDICINE

## 2025-06-13 NOTE — PROGRESS NOTES
Name: Adam Gonzalez      : 1975      MRN: [id number]  Encounter Provider: Evans Birmingham MD  Encounter Date: 2025   Encounter department: Pending sale to [facility]    Assessment & Plan  Type 2 diabetes mellitus with other circulatory complication, with long-term current use of insulin (HCC)  Hemoglobin A1c is overall acceptable, continue current dose of Trulicity, Basaglar.  Lab Results   Component Value Date    HGBA1C 7.0 (H) 2025       Orders:    IRIS Diabetic eye exam    Annual physical exam         Stage 5 chronic kidney disease not on chronic dialysis (HCC)  Lab Results   Component Value Date    EGFR 7 2025    EGFR 7 2025    EGFR 8 2025    CREATININE 7.31 (H) 2025    CREATININE 7.31 (H) 2025    CREATININE 6.77 (H) 2025   He will start hemodialysis soon, continue follow-up with nephrology service.         Hypercholesterolemia  Continue current dose of atorvastatin.  Recheck lipid panel with next blood work.       Essential hypertension  Blood pressure is very well-controlled            History of Present Illness     Came today for annual checkup and to follow-up on the use chronic medical problems.  His vital signs are overall acceptable.  He would like to hold off on vaccinations today.  He still smokes, does not drink alcohol.  He is trying to be physically active.        Review of Systems   Constitutional:  Negative for chills and fever.   Respiratory:  Negative for cough, shortness of breath and wheezing.    Cardiovascular:  Negative for chest pain, palpitations and leg swelling.   Musculoskeletal:  Negative for arthralgias, back pain, neck pain and neck stiffness.     Past Medical History[1]  Past Surgical History[2]  Family History[3]  Social History[4]  Medications[5]  No Known Allergies  Immunization History   Administered Date(s) Administered    COVID-19 MODERNA VACC 0.5 ML IM 2021, 2021    Tdap 2020      Objective   /80   Pulse 85   Wt 91.6 kg (202 lb)   SpO2 99%   BMI 28.98 kg/m²     Physical Exam  Constitutional:       General: He is not in acute distress.     Appearance: He is not ill-appearing or toxic-appearing.     Cardiovascular:      Heart sounds: No murmur heard.     No gallop.   Pulmonary:      Effort: No respiratory distress.      Breath sounds: No wheezing or rales.              [1]   Past Medical History:  Diagnosis Date    Chronic kidney disease     Diabetes mellitus (HCC)     Hypertension     Received intravenous tissue plasminogen activator (tPA) in emergency department 02/01/2022    Stroke (HCC)     no deficits   [2]   Past Surgical History:  Procedure Laterality Date    CARDIAC LOOP RECORDER  2023    PILONIDAL CYST EXCISION      TX ARTERIOVENOUS ANASTOMOSIS OPEN DIRECT Left 4/10/2024    Procedure: CREATION FISTULA BRACHIOCEPHALIC LEFT (AV);  Surgeon: Maxwell Nielsen MD;  Location: [facility];  Service: Vascular    TOE AMPUTATION Left 4/2/2025    Procedure: AMPUTATION TOE;  Surgeon: Adriane Anna DPM;  Location: [facility];  Service: Podiatry   [3]   Family History  Problem Relation Name Age of Onset    No Known Problems Mother      No Known Problems Father      Pancreatic cancer Brother     [4]   Social History  Tobacco Use    Smoking status: Every Day     Current packs/day: 0.50     Average packs/day: 1 pack/day for 38.0 years (36.8 ttl pk-yrs)     Types: Cigarettes     Start date: 6/16/1987     Passive exposure: Current    Smokeless tobacco: Never    Tobacco comments:     states slowing it down, used Chantix-currently 1/2 pack/day, at least 35 pack years as of 2023   Vaping Use    Vaping status: Never Used   Substance and Sexual Activity    Alcohol use: Never     Comment: stopped 8/22    Drug use: Never    Sexual activity: Yes     Partners: Female     Birth control/protection: Condom Male   [5]   Current Outpatient Medications on File Prior to Visit   Medication Sig     amLODIPine (NORVASC) 10 mg tablet Take 1 tablet (10 mg total) by mouth daily    atorvastatin (LIPITOR) 80 mg tablet Take 1 tablet (80 mg total) by mouth daily    calcitriol (ROCALTROL) 0.5 MCG capsule Take 1 capsule (0.5 mcg total) by mouth daily    calcium carbonate (TUMS) 500 mg chewable tablet Chew 2 tablets (1,000 mg total) daily as needed for indigestion or heartburn    calcium carbonate (TUMS) 500 mg chewable tablet Chew 2 tablets (1,000 mg total) 2 (two) times a day    clopidogrel (PLAVIX) 75 mg tablet Take 1 tablet (75 mg total) by mouth daily    Dulaglutide 0.75 MG/0.5ML SOAJ Inject 0.75 mg under the skin once a week    ergocalciferol (VITAMIN D2) 50,000 units Take 1 capsule (50,000 Units total) by mouth once a week    hydrALAZINE (APRESOLINE) 50 mg tablet Take 1 tablet (50 mg total) by mouth 2 (two) times a day    Insulin Glargine Solostar (Basaglar KwikPen) 100 UNIT/ML SOPN Inject 0.28 mL (28 Units total) under the skin in the morning    metoprolol succinate (TOPROL-XL) 50 mg 24 hr tablet Take 1 tablet (50 mg total) by mouth daily    nicotine (NICODERM CQ) 21 mg/24 hr TD 24 hr patch Place 1 patch on the skin daily over 24 hours    sodium bicarbonate 650 mg tablet Take 1 tablet (650 mg total) by mouth 2 (two) times a day    torsemide (DEMADEX) 20 mg tablet Take 4 tablets (80 mg total) by mouth daily

## 2025-06-13 NOTE — ASSESSMENT & PLAN NOTE
Lab Results   Component Value Date    EGFR 7 06/06/2025    EGFR 7 06/05/2025    EGFR 8 06/04/2025    CREATININE 7.31 (H) 06/06/2025    CREATININE 7.31 (H) 06/05/2025    CREATININE 6.77 (H) 06/04/2025   He will start hemodialysis soon, continue follow-up with nephrology service.

## 2025-06-13 NOTE — ASSESSMENT & PLAN NOTE
Hemoglobin A1c is overall acceptable, continue current dose of Trulicity, Basaglar.  Lab Results   Component Value Date    HGBA1C 7.0 (H) 05/05/2025       Orders:    IRIS Diabetic eye exam

## 2025-06-27 DIAGNOSIS — E83.39 HYPERPHOSPHATEMIA: Primary | ICD-10-CM

## 2025-06-27 RX ORDER — CALCIUM ACETATE 667 MG/1
667 CAPSULE ORAL
Qty: 270 CAPSULE | Refills: 3 | Status: SHIPPED | OUTPATIENT
Start: 2025-06-27

## 2025-08-04 ENCOUNTER — TELEPHONE (OUTPATIENT)
Dept: NEPHROLOGY | Facility: CLINIC | Age: 50
End: 2025-08-04

## 2025-08-06 ENCOUNTER — VBI (OUTPATIENT)
Dept: ADMINISTRATIVE | Facility: OTHER | Age: 50
End: 2025-08-06

## 2025-08-15 ENCOUNTER — TELEPHONE (OUTPATIENT)
Age: 50
End: 2025-08-15

## 2025-08-18 ENCOUNTER — TRANSCRIBE ORDERS (OUTPATIENT)
Dept: RADIOLOGY | Facility: HOSPITAL | Age: 50
End: 2025-08-18

## 2025-08-18 ENCOUNTER — PREP FOR PROCEDURE (OUTPATIENT)
Dept: INTERVENTIONAL RADIOLOGY/VASCULAR | Facility: HOSPITAL | Age: 50
End: 2025-08-18

## 2025-08-18 DIAGNOSIS — N18.5 STAGE 5 CHRONIC KIDNEY DISEASE NOT ON CHRONIC DIALYSIS (HCC): ICD-10-CM

## 2025-08-18 DIAGNOSIS — N18.9 CHRONIC KIDNEY DISEASE, UNSPECIFIED CKD STAGE: Primary | ICD-10-CM

## 2025-08-18 DIAGNOSIS — N18.5 CHRONIC KIDNEY DISEASE, STAGE V (HCC): ICD-10-CM

## 2025-08-18 DIAGNOSIS — T82.590A MALFUNCTION OF ARTERIOVENOUS DIALYSIS FISTULA, INITIAL ENCOUNTER (HCC): Primary | ICD-10-CM

## 2025-08-21 ENCOUNTER — HOSPITAL ENCOUNTER (OUTPATIENT)
Dept: NON INVASIVE DIAGNOSTICS | Facility: HOSPITAL | Age: 50
Discharge: HOME | End: 2025-08-21
Attending: RADIOLOGY | Admitting: RADIOLOGY
Payer: COMMERCIAL

## 2025-08-21 DIAGNOSIS — N18.5 CHRONIC KIDNEY DISEASE, STAGE V (HCC): ICD-10-CM

## 2025-08-21 DIAGNOSIS — T82.590A MALFUNCTION OF ARTERIOVENOUS DIALYSIS FISTULA, INITIAL ENCOUNTER (HCC): ICD-10-CM

## 2025-08-21 LAB — GLUCOSE SERPL-MCNC: 302 MG/DL (ref 65–140)

## 2025-08-21 PROCEDURE — 36901 INTRO CATH DIALYSIS CIRCUIT: CPT

## 2025-08-21 PROCEDURE — 76937 US GUIDE VASCULAR ACCESS: CPT | Performed by: RADIOLOGY

## 2025-08-21 PROCEDURE — C1769 GUIDE WIRE: HCPCS

## 2025-08-21 PROCEDURE — 36901 INTRO CATH DIALYSIS CIRCUIT: CPT | Performed by: RADIOLOGY

## 2025-08-21 PROCEDURE — C1894 INTRO/SHEATH, NON-LASER: HCPCS

## 2025-08-21 PROCEDURE — C1887 CATHETER, GUIDING: HCPCS

## 2025-08-21 PROCEDURE — 82948 REAGENT STRIP/BLOOD GLUCOSE: CPT

## 2025-08-21 RX ORDER — ACETAMINOPHEN 325 MG/1
650 TABLET ORAL EVERY 4 HOURS PRN
Status: DISCONTINUED | OUTPATIENT
Start: 2025-08-21 | End: 2025-08-22 | Stop reason: HOSPADM

## 2025-08-21 RX ORDER — LIDOCAINE WITH 8.4% SOD BICARB 0.9%(10ML)
SYRINGE (ML) INJECTION AS NEEDED
Status: COMPLETED | OUTPATIENT
Start: 2025-08-21 | End: 2025-08-21

## 2025-08-21 RX ADMIN — IOHEXOL 15 ML: 350 INJECTION, SOLUTION INTRAVENOUS at 13:33

## 2025-08-21 RX ADMIN — Medication 5 ML: at 13:21

## 2025-08-22 VITALS
HEART RATE: 64 BPM | DIASTOLIC BLOOD PRESSURE: 85 MMHG | SYSTOLIC BLOOD PRESSURE: 167 MMHG | TEMPERATURE: 97.1 F | RESPIRATION RATE: 20 BRPM | OXYGEN SATURATION: 100 %

## (undated) DEVICE — SUT VICRYL 3-0 PS-2 27 IN J427H

## (undated) DEVICE — STRL BETHLEHEM A V FISTULA PK: Brand: CARDINAL HEALTH

## (undated) DEVICE — 4-PORT MANIFOLD: Brand: NEPTUNE 2

## (undated) DEVICE — REM POLYHESIVE ADULT PATIENT RETURN ELECTRODE: Brand: VALLEYLAB

## (undated) DEVICE — GLOVE INDICATOR PI UNDERGLOVE SZ 7.5 BLUE

## (undated) DEVICE — SUT PROLENE 6-0 BV-1/BV-1 24 IN 8805H

## (undated) DEVICE — INTENDED FOR TISSUE SEPARATION, AND OTHER PROCEDURES THAT REQUIRE A SHARP SURGICAL BLADE TO PUNCTURE OR CUT.: Brand: BARD-PARKER ® CARBON RIB-BACK BLADES

## (undated) DEVICE — PREP PAD BNS: Brand: CONVERTORS

## (undated) DEVICE — SUT MONOCRYL 3-0 SH 27 IN Y416H

## (undated) DEVICE — SUT SILK 2-0 18 IN A185H

## (undated) DEVICE — SUT MONOCRYL 4-0 PS-2 18 IN Y496G

## (undated) DEVICE — GLOVE SRG BIOGEL 7.5

## (undated) DEVICE — PAD CAST 4 IN COTTON NON STERILE

## (undated) DEVICE — BANDAGE, ESMARK LF STR 6"X9' (20/CS): Brand: CYPRESS

## (undated) DEVICE — DECANTER: Brand: UNBRANDED

## (undated) DEVICE — SUT SILK 4-0 18 IN A183H

## (undated) DEVICE — ADHESIVE SKIN HIGH VISCOSITY EXOFIN 1ML

## (undated) DEVICE — STOCKINETTE: Brand: DEROYAL

## (undated) DEVICE — MEDI-VAC YANKAUER SUCTION HANDLE W/BULBOUS AND CONTROL VENT: Brand: CARDINAL HEALTH

## (undated) DEVICE — LIGHT GLOVE GREEN

## (undated) DEVICE — BETHLEHEM UNIVERSAL  MIONR EXT: Brand: CARDINAL HEALTH

## (undated) DEVICE — WET SKIN PREP TRAY: Brand: MEDLINE INDUSTRIES, INC.

## (undated) DEVICE — LIGACLIP MCA MULTIPLE CLIP APPLIERS, 20 SMALL CLIPS: Brand: LIGACLIP

## (undated) DEVICE — PAD GROUNDING DUAL ADULT

## (undated) DEVICE — SURGICEL FIBRILLAR 1 X 2

## (undated) DEVICE — SUT ETHILON 3-0 PS-1 18 IN 1663G